# Patient Record
Sex: FEMALE | Race: WHITE | NOT HISPANIC OR LATINO
[De-identification: names, ages, dates, MRNs, and addresses within clinical notes are randomized per-mention and may not be internally consistent; named-entity substitution may affect disease eponyms.]

---

## 2021-03-18 ENCOUNTER — APPOINTMENT (OUTPATIENT)
Dept: NUCLEAR MEDICINE | Facility: CLINIC | Age: 56
End: 2021-03-18
Payer: COMMERCIAL

## 2021-03-18 ENCOUNTER — OUTPATIENT (OUTPATIENT)
Dept: OUTPATIENT SERVICES | Facility: HOSPITAL | Age: 56
LOS: 1 days | End: 2021-03-18

## 2021-03-18 ENCOUNTER — APPOINTMENT (OUTPATIENT)
Dept: GASTROENTEROLOGY | Facility: CLINIC | Age: 56
End: 2021-03-18
Payer: COMMERCIAL

## 2021-03-18 VITALS
HEART RATE: 70 BPM | SYSTOLIC BLOOD PRESSURE: 120 MMHG | DIASTOLIC BLOOD PRESSURE: 84 MMHG | OXYGEN SATURATION: 98 % | RESPIRATION RATE: 14 BRPM | HEIGHT: 62 IN | WEIGHT: 165 LBS | BODY MASS INDEX: 30.36 KG/M2 | TEMPERATURE: 97.6 F

## 2021-03-18 DIAGNOSIS — R25.1 TREMOR, UNSPECIFIED: ICD-10-CM

## 2021-03-18 PROCEDURE — 99072 ADDL SUPL MATRL&STAF TM PHE: CPT

## 2021-03-18 PROCEDURE — 99204 OFFICE O/P NEW MOD 45 MIN: CPT

## 2021-03-18 PROCEDURE — 78803 RP LOCLZJ TUM SPECT 1 AREA: CPT | Mod: 26

## 2021-03-18 NOTE — HISTORY OF PRESENT ILLNESS
[de-identified] : Patient recently with increasing constipation and sometimes goes for 5 days without moving her bowels.  She has been unresponsive to MiraLAX or fiber.  She has no nausea vomiting hematemesis melena hematochezia or weight loss.  Her mother had colon cancer and the patient's last colonoscopy was about 6 years ago.

## 2021-03-18 NOTE — ASSESSMENT
[FreeTextEntry1] : I discussed with the patient that I felt her constipation was functional but that we should treat her empirically with Zelnorm 6 mg twice a day to see if this helps ameliorate her symptoms.  In addition she does need a colonoscopy not only for the symptoms as as well as the fact that her last colonoscopy 6 years ago and her mother had colon cancer.  The indications benefits risks and alternatives were discussed with the patient is medically optimal for the planned procedure

## 2021-10-31 DIAGNOSIS — Z01.818 ENCOUNTER FOR OTHER PREPROCEDURAL EXAMINATION: ICD-10-CM

## 2021-11-01 ENCOUNTER — APPOINTMENT (OUTPATIENT)
Dept: DISASTER EMERGENCY | Facility: CLINIC | Age: 56
End: 2021-11-01

## 2021-11-02 LAB — SARS-COV-2 N GENE NPH QL NAA+PROBE: NOT DETECTED

## 2021-11-04 ENCOUNTER — APPOINTMENT (OUTPATIENT)
Dept: GASTROENTEROLOGY | Facility: GI CENTER | Age: 56
End: 2021-11-04
Payer: COMMERCIAL

## 2021-11-04 ENCOUNTER — OUTPATIENT (OUTPATIENT)
Dept: OUTPATIENT SERVICES | Facility: HOSPITAL | Age: 56
LOS: 1 days | End: 2021-11-04
Payer: COMMERCIAL

## 2021-11-04 DIAGNOSIS — Z80.0 FAMILY HISTORY OF MALIGNANT NEOPLASM OF DIGESTIVE ORGANS: ICD-10-CM

## 2021-11-04 PROCEDURE — 45378 DIAGNOSTIC COLONOSCOPY: CPT | Mod: PT

## 2021-11-04 PROCEDURE — 45378 DIAGNOSTIC COLONOSCOPY: CPT

## 2021-11-04 NOTE — PHYSICAL EXAM
[General Appearance - Alert] : alert [General Appearance - In No Acute Distress] : in no acute distress [Heart Rate And Rhythm] : heart rate was normal and rhythm regular [Heart Sounds] : normal S1 and S2 [Heart Sounds Gallop] : no gallops [Murmurs] : no murmurs [Heart Sounds Pericardial Friction Rub] : no pericardial rub [Bowel Sounds] : normal bowel sounds [Abdomen Soft] : soft [Abdomen Tenderness] : non-tender [] : no hepato-splenomegaly [Abdomen Mass (___ Cm)] : no abdominal mass palpated

## 2021-11-04 NOTE — REASON FOR VISIT
[Procedure: _________] : a [unfilled] procedure visit [FreeTextEntry1] : Chronic constipation, family history colon cancer

## 2022-04-13 ENCOUNTER — OUTPATIENT (OUTPATIENT)
Dept: OUTPATIENT SERVICES | Facility: HOSPITAL | Age: 57
LOS: 1 days | Discharge: HOME | End: 2022-04-13

## 2022-04-13 DIAGNOSIS — C91.50 ADULT T-CELL LYMPHOMA/LEUKEMIA (HTLV-1-ASSOCIATED) NOT HAVING ACHIEVED REMISSION: ICD-10-CM

## 2022-04-15 ENCOUNTER — APPOINTMENT (OUTPATIENT)
Dept: HEMATOLOGY ONCOLOGY | Facility: CLINIC | Age: 57
End: 2022-04-15
Payer: COMMERCIAL

## 2022-04-15 ENCOUNTER — RESULT REVIEW (OUTPATIENT)
Age: 57
End: 2022-04-15

## 2022-04-15 ENCOUNTER — TRANSCRIPTION ENCOUNTER (OUTPATIENT)
Age: 57
End: 2022-04-15

## 2022-04-15 VITALS
BODY MASS INDEX: 29.81 KG/M2 | OXYGEN SATURATION: 97 % | HEART RATE: 84 BPM | DIASTOLIC BLOOD PRESSURE: 78 MMHG | WEIGHT: 162 LBS | HEIGHT: 62 IN | SYSTOLIC BLOOD PRESSURE: 121 MMHG

## 2022-04-15 LAB
BASOPHILS # BLD AUTO: 0.1 K/UL — SIGNIFICANT CHANGE UP (ref 0–0.2)
BASOPHILS NFR BLD AUTO: 1.2 % — SIGNIFICANT CHANGE UP (ref 0–2)
EOSINOPHIL # BLD AUTO: 0.2 K/UL — SIGNIFICANT CHANGE UP (ref 0–0.5)
EOSINOPHIL NFR BLD AUTO: 3.7 % — SIGNIFICANT CHANGE UP (ref 0–6)
HCT VFR BLD CALC: 39.8 % — SIGNIFICANT CHANGE UP (ref 34.5–45)
HGB BLD-MCNC: 12.5 G/DL — SIGNIFICANT CHANGE UP (ref 11.5–15.5)
LYMPHOCYTES # BLD AUTO: 1.4 K/UL — SIGNIFICANT CHANGE UP (ref 1–3.3)
LYMPHOCYTES # BLD AUTO: 24.6 % — SIGNIFICANT CHANGE UP (ref 13–44)
MCHC RBC-ENTMCNC: 28.7 PG — SIGNIFICANT CHANGE UP (ref 27–34)
MCHC RBC-ENTMCNC: 31.5 G/DL — LOW (ref 32–36)
MCV RBC AUTO: 91.1 FL — SIGNIFICANT CHANGE UP (ref 80–100)
MONOCYTES # BLD AUTO: 0.6 K/UL — SIGNIFICANT CHANGE UP (ref 0–0.9)
MONOCYTES NFR BLD AUTO: 10.2 % — SIGNIFICANT CHANGE UP (ref 2–14)
NEUTROPHILS # BLD AUTO: 3.3 K/UL — SIGNIFICANT CHANGE UP (ref 1.8–7.4)
NEUTROPHILS NFR BLD AUTO: 60.4 % — SIGNIFICANT CHANGE UP (ref 43–77)
PLATELET # BLD AUTO: 328 K/UL — SIGNIFICANT CHANGE UP (ref 150–400)
RBC # BLD: 4.36 M/UL — SIGNIFICANT CHANGE UP (ref 3.8–5.2)
RBC # FLD: 12.4 % — SIGNIFICANT CHANGE UP (ref 10.3–14.5)
WBC # BLD: 5.5 K/UL — SIGNIFICANT CHANGE UP (ref 3.8–10.5)
WBC # FLD AUTO: 5.5 K/UL — SIGNIFICANT CHANGE UP (ref 3.8–10.5)

## 2022-04-15 PROCEDURE — 99205 OFFICE O/P NEW HI 60 MIN: CPT

## 2022-04-15 NOTE — ASSESSMENT
[FreeTextEntry1] : 57 year old female no significant past medical hx Patient presented on 3/4/22 c/o right axilla mass  to general surgeon, Dr. Amadeo Grace. Patient incidentally discovered it while shaving a few weeks ago.  \par Biopsy of right axilla performed on 3/22/22 revealing atypical lymphoid infiltrate ( NEGATIVE for a clonal lgH gene rearrangement/ Positive clonal TCR gamma gene rearrangement )\par \par Planned for lymph node excision with Dr. Grace 5/2/22; Patient hasn’t done pre surgical testing and wishes to see a surgeon in the [facility] system,\par \par -  Labs today with cbc/ cmp/ LDH/ Uric acid, Flow cytometry, tcell gene-arrangement\par - Some B symptoms, Night sweats since Jan 2022, fatigue, 6 lb weight loss\par - Order PET Scan \par - Will refer to dr. tovar for lymph node resection \par - If Pet with concerning widespread adenopathy will move excision up ASAP \par - f/u in 4-6 weeks

## 2022-04-15 NOTE — RESULTS/DATA
[FreeTextEntry1] : 3/22/22 Lymph Node, right axilla \par -Atypical lymphoid infiltrate\par - NEGATIVE for a clonal lgH gene rearrangement.\par - Positive clonal TCR gamma gene rearrangement

## 2022-04-15 NOTE — ADDENDUM
[FreeTextEntry1] : Documented by Shelly Suárez acting as scribe for Dr. Rendon on 04/15/2022 \par \par All Medical record entries made by the Scribe were at my, Dr. Rendon's, direction and personally dictated by me on 04/15/2022 . I have reviewed the chart and agree that the record accurately reflects my personal performance of the history, physical exam, assessment and plan. I have also personally directed, reviewed, and agreed with the discharge instructions.\par

## 2022-04-15 NOTE — REASON FOR VISIT
[Initial Consultation] : an initial consultation for [FreeTextEntry2] : atypical  lymphoid infiltrate on Axillary LN

## 2022-04-15 NOTE — HISTORY OF PRESENT ILLNESS
[de-identified] : 57 year old female with no significant past medical hx presents to office for initial visit.  Patient presented on 3/4/22 c/o right axilla mass  to general surgeon, Dr. Amadeo Grace. Patient incidentally discovered it while shavingin Jan 2022  \par Biopsy of right axilla performed on 3/22/22 revealing atypical lymphoid infiltrate ( NEGATIVE for a clonal lgH gene rearrangement/ Positive clonal TCR gamma gene rearrangement )\par \par Planned for lymph node excision with Dr. Grace\par Colonoscopy November 2021 [de-identified] : Patient presents for an initial consultation with \par \par Patient noticed small mass under armpit in January 2022. Denies pain and hasn’t grown in size.\par Breast node biopsy performed February 2022 was negative. Lymph node biopsy February 2022 .Lymph node biopsy repeated 3/22/22: Negative\par \par Planned for lymph node excision with Dr. Grace 5/2/22\par \par Denies fevers, chills, abdominal pain\par Reports night sweats started January 2022 3x a week, sweats through clothes has to change shirt, denies sweating through sheets\par Patient reports menopause ended September 2021, during menopause only had hot flashes, denies night sweats\par Reports appetite loss, does however stress eat occasionally \par Reports losing 6 lbs since February 2022, Ceased diet Dec 2021\par Reports fatigue, doesn’t have energy when she comes home and has to leave work early.\par \par Currently on linzess for constipation, baby aspirin 81 mg, Prozac 20mg, multivitamin \par \par 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

## 2022-04-18 ENCOUNTER — LABORATORY RESULT (OUTPATIENT)
Age: 57
End: 2022-04-18

## 2022-04-21 ENCOUNTER — APPOINTMENT (OUTPATIENT)
Dept: NUCLEAR MEDICINE | Facility: CLINIC | Age: 57
End: 2022-04-21

## 2022-04-21 ENCOUNTER — APPOINTMENT (OUTPATIENT)
Dept: NUCLEAR MEDICINE | Facility: CLINIC | Age: 57
End: 2022-04-21
Payer: COMMERCIAL

## 2022-04-21 ENCOUNTER — OUTPATIENT (OUTPATIENT)
Dept: OUTPATIENT SERVICES | Facility: HOSPITAL | Age: 57
LOS: 1 days | End: 2022-04-21

## 2022-04-21 DIAGNOSIS — R22.30 LOCALIZED SWELLING, MASS AND LUMP, UNSPECIFIED UPPER LIMB: ICD-10-CM

## 2022-04-21 PROCEDURE — 78815 PET IMAGE W/CT SKULL-THIGH: CPT | Mod: 26,PI

## 2022-04-22 ENCOUNTER — RESULT REVIEW (OUTPATIENT)
Age: 57
End: 2022-04-22

## 2022-04-25 ENCOUNTER — NON-APPOINTMENT (OUTPATIENT)
Age: 57
End: 2022-04-25

## 2022-04-28 ENCOUNTER — APPOINTMENT (OUTPATIENT)
Dept: SURGICAL ONCOLOGY | Facility: CLINIC | Age: 57
End: 2022-04-28
Payer: COMMERCIAL

## 2022-04-28 VITALS
HEART RATE: 73 BPM | DIASTOLIC BLOOD PRESSURE: 72 MMHG | BODY MASS INDEX: 30 KG/M2 | SYSTOLIC BLOOD PRESSURE: 110 MMHG | OXYGEN SATURATION: 98 % | HEIGHT: 62 IN | WEIGHT: 163 LBS

## 2022-04-28 PROCEDURE — 99205 OFFICE O/P NEW HI 60 MIN: CPT

## 2022-05-02 NOTE — PHYSICAL EXAM
[Normal] : supple, no neck mass and thyroid not enlarged [Normal Neck Lymph Nodes] : normal neck lymph nodes  [Normal Supraclavicular Lymph Nodes] : normal supraclavicular lymph nodes [Normal] : oriented to person, place and time, with appropriate affect [de-identified] : large right axillary mass/lymphadenopathy ; no left axillary lymphadenopathy

## 2022-05-02 NOTE — HISTORY OF PRESENT ILLNESS
[de-identified] : Ms. REESE SCHERER  is a 57 year  old female  presenting for an initial consultation for right axillary adenopathy, referred by Dr. Leslie.  \par \par The patient states she noticed right axillary fullness while shaving in 2022.  She states she didn't think much of it however she states she underwent a mammogram/sonogram at [facility] in 2022 which revealed a suspicious right axillary lymph node and biopsy was advised.  We do not have her breast imaging reports available today.  She is s/p right axillary lymph node US guided biopsy on 3/22/22 which revealed atypical lymphoid infiltrate.   Pt. states she has been experiencing fatigue and night sweats since 2022.  She also has had a 6 lb unintentional weight loss. \par \par Pt. was seen by Dr. Rendon and underwent extensive bloodwork and was referred for a PET CT.  PET completed on 22 showed FDG avid extensive right axillary lymphadenopathy and right supraclavicular lymph node.  DIfficult to delineate retroareolar right breast soft tissue, with low grade FDG avidity and minimally FDG avid diffuse, right breast cutaneous thickening. Please correlate with mammo/ultrasound if indicated. \par \par PMH: Anxiety (on Prozac), constipation/IBS (on Linzess).  On ASA 81 mg daily. 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 aunt with pancreatic cancer in 70's,  P. Grandfather with colon cancer (unknown age); M. Grandfather with unknown cancer. \par Pt. goes for routine colonoscopies due to family hx \par \par

## 2022-05-02 NOTE — REVIEW OF SYSTEMS
[Negative] : Endocrine [FreeTextEntry2] : fatigue, 6 lb weight loss [FreeTextEntry8] : IBS/constipation [de-identified] : anxiety  [FreeTextEntry1] : right axillary lymphadenopathy

## 2022-05-02 NOTE — CONSULT LETTER
[Dear  ___] : Dear  [unfilled], [Consult Letter:] : I had the pleasure of evaluating your patient, [unfilled]. [Please see my note below.] : Please see my note below. [Consult Closing:] : Thank you very much for allowing me to participate in the care of this patient.  If you have any questions, please do not hesitate to contact me. [Sincerely,] : Sincerely, [FreeTextEntry3] : Edward Preston MD, MPH, FACS, FSSO\par , [facility]\par [facility]\par Associate Professor of Surgery\par [employer] [facility]

## 2022-05-02 NOTE — ASSESSMENT
[FreeTextEntry1] : 56 y/o female with PMHx of anxiety, IBS/constipation who incidentally felt a palpable right axillary mass while shaving in 1/2022.  Pt. has been experiencing fatigue, weight loss and night sweats.  She states she underwent breast imaging 2/2022 which revealed right axillary lymphadenopathy. She is s/p right axillary US guided biopsy on 3/22/22 revealing atypical lymphoid infiltrate. PET completed on 4/21/22 showed FDG avid extensive right axillary lymphadenopathy and right supraclavicular lymph node.  DIfficult to delineate retroareolar right breast soft tissue, with low grade FDG avidity and minimally FDG avid diffuse, right breast cutaneous thickening. Please correlate with mammo/ultrasound if indicated. \par \par PLAN:\par Right axillary lymph node excision. We discussed the risks, benefits, and alternatives of the procedure with the patient, she expressed understanding and agree to proceed.

## 2022-05-03 LAB
ALBUMIN SERPL ELPH-MCNC: 4.7 G/DL
ALP BLD-CCNC: 90 U/L
ALT SERPL-CCNC: 34 U/L
ANION GAP SERPL CALC-SCNC: 11 MMOL/L
AST SERPL-CCNC: 29 U/L
BILIRUB SERPL-MCNC: 0.2 MG/DL
BUN SERPL-MCNC: 16 MG/DL
CALCIUM SERPL-MCNC: 10 MG/DL
CHLORIDE SERPL-SCNC: 102 MMOL/L
CO2 SERPL-SCNC: 26 MMOL/L
CREAT SERPL-MCNC: 0.68 MG/DL
EGFR: 103 ML/MIN/1.73M2
GLUCOSE SERPL-MCNC: 93 MG/DL
POTASSIUM SERPL-SCNC: 4.3 MMOL/L
PROT SERPL-MCNC: 8.1 G/DL
SODIUM SERPL-SCNC: 139 MMOL/L

## 2022-05-11 LAB — SURGICAL PATHOLOGY STUDY: SIGNIFICANT CHANGE UP

## 2022-05-12 ENCOUNTER — OUTPATIENT (OUTPATIENT)
Dept: OUTPATIENT SERVICES | Facility: HOSPITAL | Age: 57
LOS: 1 days | End: 2022-05-12
Payer: COMMERCIAL

## 2022-05-12 VITALS
HEART RATE: 90 BPM | SYSTOLIC BLOOD PRESSURE: 104 MMHG | TEMPERATURE: 98 F | WEIGHT: 162.04 LBS | DIASTOLIC BLOOD PRESSURE: 60 MMHG | HEIGHT: 62 IN | RESPIRATION RATE: 16 BRPM | OXYGEN SATURATION: 97 %

## 2022-05-12 DIAGNOSIS — R59.0 LOCALIZED ENLARGED LYMPH NODES: ICD-10-CM

## 2022-05-12 DIAGNOSIS — Z01.818 ENCOUNTER FOR OTHER PREPROCEDURAL EXAMINATION: ICD-10-CM

## 2022-05-12 DIAGNOSIS — Z29.9 ENCOUNTER FOR PROPHYLACTIC MEASURES, UNSPECIFIED: ICD-10-CM

## 2022-05-12 DIAGNOSIS — U07.1 COVID-19: ICD-10-CM

## 2022-05-12 LAB
ANION GAP SERPL CALC-SCNC: 13 MMOL/L — SIGNIFICANT CHANGE UP (ref 5–17)
BASOPHILS # BLD AUTO: 0.03 K/UL — SIGNIFICANT CHANGE UP (ref 0–0.2)
BASOPHILS NFR BLD AUTO: 0.5 % — SIGNIFICANT CHANGE UP (ref 0–2)
BUN SERPL-MCNC: 12.5 MG/DL — SIGNIFICANT CHANGE UP (ref 8–20)
CALCIUM SERPL-MCNC: 9.5 MG/DL — SIGNIFICANT CHANGE UP (ref 8.6–10.2)
CHLORIDE SERPL-SCNC: 100 MMOL/L — SIGNIFICANT CHANGE UP (ref 98–107)
CO2 SERPL-SCNC: 24 MMOL/L — SIGNIFICANT CHANGE UP (ref 22–29)
CREAT SERPL-MCNC: 0.61 MG/DL — SIGNIFICANT CHANGE UP (ref 0.5–1.3)
EGFR: 106 ML/MIN/1.73M2 — SIGNIFICANT CHANGE UP
EOSINOPHIL # BLD AUTO: 0.3 K/UL — SIGNIFICANT CHANGE UP (ref 0–0.5)
EOSINOPHIL NFR BLD AUTO: 5.1 % — SIGNIFICANT CHANGE UP (ref 0–6)
GLUCOSE SERPL-MCNC: 94 MG/DL — SIGNIFICANT CHANGE UP (ref 70–99)
HCT VFR BLD CALC: 37.6 % — SIGNIFICANT CHANGE UP (ref 34.5–45)
HGB BLD-MCNC: 12.1 G/DL — SIGNIFICANT CHANGE UP (ref 11.5–15.5)
IMM GRANULOCYTES NFR BLD AUTO: 0.2 % — SIGNIFICANT CHANGE UP (ref 0–1.5)
LYMPHOCYTES # BLD AUTO: 1.38 K/UL — SIGNIFICANT CHANGE UP (ref 1–3.3)
LYMPHOCYTES # BLD AUTO: 23.5 % — SIGNIFICANT CHANGE UP (ref 13–44)
MCHC RBC-ENTMCNC: 28.3 PG — SIGNIFICANT CHANGE UP (ref 27–34)
MCHC RBC-ENTMCNC: 32.2 GM/DL — SIGNIFICANT CHANGE UP (ref 32–36)
MCV RBC AUTO: 88.1 FL — SIGNIFICANT CHANGE UP (ref 80–100)
MONOCYTES # BLD AUTO: 0.41 K/UL — SIGNIFICANT CHANGE UP (ref 0–0.9)
MONOCYTES NFR BLD AUTO: 7 % — SIGNIFICANT CHANGE UP (ref 2–14)
NEUTROPHILS # BLD AUTO: 3.74 K/UL — SIGNIFICANT CHANGE UP (ref 1.8–7.4)
NEUTROPHILS NFR BLD AUTO: 63.7 % — SIGNIFICANT CHANGE UP (ref 43–77)
PLATELET # BLD AUTO: 320 K/UL — SIGNIFICANT CHANGE UP (ref 150–400)
POTASSIUM SERPL-MCNC: 4.3 MMOL/L — SIGNIFICANT CHANGE UP (ref 3.5–5.3)
POTASSIUM SERPL-SCNC: 4.3 MMOL/L — SIGNIFICANT CHANGE UP (ref 3.5–5.3)
RBC # BLD: 4.27 M/UL — SIGNIFICANT CHANGE UP (ref 3.8–5.2)
RBC # FLD: 13.4 % — SIGNIFICANT CHANGE UP (ref 10.3–14.5)
SARS-COV-2 RNA SPEC QL NAA+PROBE: SIGNIFICANT CHANGE UP
SODIUM SERPL-SCNC: 137 MMOL/L — SIGNIFICANT CHANGE UP (ref 135–145)
WBC # BLD: 5.87 K/UL — SIGNIFICANT CHANGE UP (ref 3.8–10.5)
WBC # FLD AUTO: 5.87 K/UL — SIGNIFICANT CHANGE UP (ref 3.8–10.5)

## 2022-05-12 PROCEDURE — 93010 ELECTROCARDIOGRAM REPORT: CPT

## 2022-05-12 PROCEDURE — G0463: CPT

## 2022-05-12 PROCEDURE — 85025 COMPLETE CBC W/AUTO DIFF WBC: CPT

## 2022-05-12 PROCEDURE — 93005 ELECTROCARDIOGRAM TRACING: CPT

## 2022-05-12 PROCEDURE — 36415 COLL VENOUS BLD VENIPUNCTURE: CPT

## 2022-05-12 PROCEDURE — U0005: CPT

## 2022-05-12 PROCEDURE — U0003: CPT

## 2022-05-12 PROCEDURE — 80048 BASIC METABOLIC PNL TOTAL CA: CPT

## 2022-05-12 NOTE — H&P PST ADULT - NSICDXPASTSURGICALHX_GEN_ALL_CORE_FT
PAST SURGICAL HISTORY:  H/O  section x2    H/O nasal polypectomy     History of appendectomy     History of cholecystectomy

## 2022-05-12 NOTE — H&P PST ADULT - NSICDXPASTMEDICALHX_GEN_ALL_CORE_FT
PAST MEDICAL HISTORY:  Anxiety      PAST MEDICAL HISTORY:  2019 novel coronavirus disease (COVID-19) 2/2022    Anxiety     Axillary adenopathy right    History of IBS

## 2022-05-12 NOTE — H&P PST ADULT - NSICDXFAMILYHX_GEN_ALL_CORE_FT
FAMILY HISTORY:  Father  Still living? Yes, Estimated age: Age Unknown  FH: heart disease, Age at diagnosis: Age Unknown  FHx: diabetes mellitus, Age at diagnosis: Age Unknown    Mother  Still living? No  Family hx of colon cancer, Age at diagnosis: Age Unknown  FH: dementia, Age at diagnosis: Age Unknown

## 2022-05-12 NOTE — H&P PST ADULT - NSANTHOSAYNRD_GEN_A_CORE
No. SHU screening performed.  STOP BANG Legend: 0-2 = LOW Risk; 3-4 = INTERMEDIATE Risk; 5-8 = HIGH Risk

## 2022-05-12 NOTE — H&P PST ADULT - SKIN/BREAST
[FreeTextEntry1] : Problem\par 1) Pelvic Mass\par \par Previous Therapy\par 1) MRI Pelvis 8/30/18\par    a) Large cystic mass within the pelvis 9.8x6.7x11.8cm, mass abutts the posterior uterine serosa and contacts both ovaries. The shape of this mass is along the confines of the peritoneal cavity without solid component.\par 2)Laparoscopic bilateral salpingo-oophorectomy removal of inclusion cyst 11/8/18\par    a)Portion of ovary with hemorrhagic cystic follicle\par    b)Fallopian tube without significant microscopic abnormalities \par    c)Portion of ovaries, exhibiting follicular cyst fibrotic wall, and an inclusion cyst \par    d)Adherent fragment of fallopian tube, unremarkable \par 3)US 2/26/19\par    a)Uterus is 5.9x3.9cm\par    b)Intramural fibroids x3 seen, largest 3.7x3.2x3.3cm \par    c)Endometrium 3mm\par    d)S/p BSO, and there is a 2.9x2.7x1.7cm cystic structure within the right adnexa\par    e)No free fluid seen in cul de sac 
details…

## 2022-05-12 NOTE — H&P PST ADULT - HISTORY OF PRESENT ILLNESS
57 yr old female presents with c/o enlarged right axillary lymph nodes. Pt felt enlargement in JAn 2022 while shaving denies any pain . pt went for mammo in 2/2022 with suspicious lymph node noted, biopsy done  3/22/22 with atypical lymphoid infiltrate noted.  PT states she has lost approx 8 lb, feels fatigued and has night sweats.  PET scan done 4/22 22  with FDG  Avid extensive axillary lymphadenopathy , met with DR. Rendon  and is having a node removed to determine treatment course.  57 yr old female with history of anxiety and IBS  presents with c/o enlarged right axillary lymph nodes. Pt felt enlargement in JAn 2022 while shaving denies any pain . pt went for mammo in 2/2022 with suspicious lymph node noted, biopsy done  3/22/22 with atypical lymphoid infiltrate noted.  PT states she has lost approx 8 lb, feels fatigued and has night sweats.  PET scan done 4/22 22  with FDG  Avid extensive axillary lymphadenopathy , met with DR. Rendon states she has t cell lymphoma   and is having a node removed to determine treatment course.

## 2022-05-12 NOTE — H&P PST ADULT - HISTORY OF COVID-19 VACCINATION
Enzo No APRN CNP Labossiere, Laura, RN    Caller: Unspecified (Today, 10:59 AM)               Could you please ask her if she is feeling any dizziness or lightheadedness?  Does she have access of blood pressure cuff?  I'm somewhat hesitant to increase dose without having vitals.  Also, could you ask her if she is still seeing her therapist?  If not we need to get her hooked up with another one     Thanks     Jon        Called patient and discussed the provider's recommendations.  She believes her father has a BP cuff. Pt agreed to take her BP for the next three days and will then call on Friday and provide those readings. Will then discuss dose increase.    Pt denies lightheadedness or dizziness at this time. She also confirmed that she still sees a therapist once/week and is on the wait list for DBT at the [facility].      Writer received incoming phone call from the pt with BP readings from the last few days. She provided the following information:    -7/15: 130/88, 115/84, and 123/85  -7/16: 131/75, 122/75, and 105/74  -7/17: 104/75, 107/77, and 93/73    Pt reports taking propranolol on 7/15 and 7/16, but has not taken a dose today. She reports not sleeping 7/14 and 7/15 as well, but did get some rest yesterday.    Agreed to relay the above the provider to see if he wants to make any adjustments to propranolol at this time and will then call her back.   Writer received incoming phone call from the pt. She informed this writer that she has been taking propranolol PRN for anxiety, but feels it has not been effective. Originally, she would take it at bedtime, but said it would keep her awake. She discussed with the provider who suggested taking it during the day PRN for anxiety. The pt has tried this randomly when her anxiety is heightened, but feels it's not effective. She said it will slow her down and make her want to lay down, but doesn't actually help with her anxiety.     The pt also wonders if it could cause a decrease in appetite and stomach/chest pain. Explained that this is unlikely. Pt uncertain when these symptoms began and agrees that it likely did not start with the propranolol. Although, she wonders if it's related to her anxiety.    Writer agreed to discuss all of the above with the provider and will then call her back with recommendations.     Pt confirmed that she is on the wait list for DBT and is looking forward to this. She's also getting approximately 4-5 hours of sleep/night.   Yes Abdominal pain

## 2022-05-12 NOTE — H&P PST ADULT - ASSESSMENT
57 yr old female presents with c/o enlarged right axillary lymph nodes. Pt felt enlargement in 2022 while shaving denies any pain . pt went for mammo in 2022 with suspicious lymph node noted, biopsy done  3/22/22 with atypical lymphoid infiltrate noted.  PT states she has lost approx 8 lb, feels fatigued and has night sweats.  PET scan done 22  with FDG  Avid extensive axillary lymphadenopathy , met with DR. Rendon states she has t cell lymphoma   and is having a node removed to determine treatment course.  pt was vaccinated x 2 with moderna  pcr done at [facility].  OPIOID RISK TOOL    JAMES EACH BOX THAT APPLIES AND ADD TOTALS AT THE END    FAMILY HISTORY OF SUBSTANCE ABUSE                 FEMALE         MALE                                                Alcohol                             [  ]1 pt          [  ]3pts                                               Illegal Durgs                     [  ]2 pts        [  ]3pts                                               Rx Drugs                           [  ]4 pts        [  ]4 pts    PERSONAL HISTORY OF SUBSTANCE ABUSE                                                                                          Alcohol                             [  ]3 pts       [  ]3 pts                                               Illegal Durgs                     [  ]4 pts        [  ]4 pts                                               Rx Drugs                           [  ]5 pts        [  ]5 pts    AGE BETWEEN 16-45 YEARS                                      [  ]1 pt         [  ]1 pt    HISTORY OF PREADOLESCENT   SEXUAL ABUSE                                                             [  ]3 pts        [  ]0pts    PSYCHOLOGICAL DISEASE                     ADD, OCD, Bipolar, Schizophrenia        [  ]2 pts         [  ]2 pts                      Depression                                               [  ]1 pt           [  ]1 pt           SCORING TOTAL   (add numbers and type here)              (*0**)                                     A score of 3 or lower indicated LOW risk for future opiod abuse  A score of 4 to 7 indicated moderate risk for future opiod abuse  A score of 8 or higher indicates a high risk for opiod abuse  CAPRINI VTE 2.0 SCORE [CLOT updated 2019]    AGE RELATED RISK FACTORS                                                       MOBILITY RELATED FACTORS  [X ] Age 41-60 years                                            (1 Point)                    [ ] Bed rest                                                        (1 Point)  [ ] Age: 61-74 years                                           (2 Points)                  [ ] Plaster cast                                                   (2 Points)  [ ] Age= 75 years                                              (3 Points)                    [ ] Bed bound for more than 72 hours                 (2 Points)    DISEASE RELATED RISK FACTORS                                               GENDER SPECIFIC FACTORS  [ ] Edema in the lower extremities                       (1 Point)              [ ] Pregnancy                                                     (1 Point)  [ ] Varicose veins                                               (1 Point)                     [ ] Post-partum < 6 weeks                                   (1 Point)             [ X] BMI > 25 Kg/m2                                            (1 Point)                     [ ] Hormonal therapy  or oral contraception          (1 Point)                 [ ] Sepsis (in the previous month)                        (1 Point)               [ ] History of pregnancy complications                 (1 point)  [ ] Pneumonia or serious lung disease                                               [ ] Unexplained or recurrent                     (1 Point)           (in the previous month)                               (1 Point)  [ ] Abnormal pulmonary function test                     (1 Point)                 SURGERY RELATED RISK FACTORS  [ ] Acute myocardial infarction                              (1 Point)               [ ]  Section                                             (1 Point)  [ ] Congestive heart failure (in the previous month)  (1 Point)      [ ] Minor surgery                                                  (1 Point)   [ ] Inflammatory bowel disease                             (1 Point)               [ ] Arthroscopic surgery                                        (2 Points)  [ ] Central venous access                                      (2 Points)                [ x] General surgery lasting more than 45 minutes (2 points)  [X ] Malignancy- Present or previous                   (2 Points)                [ ] Elective arthroplasty                                         (5 points)    [ ] Stroke (in the previous month)                          (5 Points)                                                                                                                                                           HEMATOLOGY RELATED FACTORS                                                 TRAUMA RELATED RISK FACTORS  [ ] Prior episodes of VTE                                     (3 Points)                [ ] Fracture of the hip, pelvis, or leg                       (5 Points)  [ ] Positive family history for VTE                         (3 Points)             [ ] Acute spinal cord injury (in the previous month)  (5 Points)  [ ] Prothrombin 13178 A                                     (3 Points)               [ ] Paralysis  (less than 1 month)                             (5 Points)  [ ] Factor V Leiden                                             (3 Points)                  [ ] Multiple Trauma within 1 month                        (5 Points)  [ ] Lupus anticoagulants                                     (3 Points)                                                           [ ] Anticardiolipin antibodies                               (3 Points)                                                       [ ] High homocysteine in the blood                      (3 Points)                                             [ ] Other congenital or acquired thrombophilia      (3 Points)                                                [ ] Heparin induced thrombocytopenia                  (3 Points)                                     Total Score [       6   ]

## 2022-05-13 NOTE — ASU PATIENT PROFILE, ADULT - NSICDXPASTMEDICALHX_GEN_ALL_CORE_FT
PAST MEDICAL HISTORY:  2019 novel coronavirus disease (COVID-19) 2/2022    Anxiety     Axillary adenopathy right    History of IBS

## 2022-05-15 ENCOUNTER — TRANSCRIPTION ENCOUNTER (OUTPATIENT)
Age: 57
End: 2022-05-15

## 2022-05-16 ENCOUNTER — OUTPATIENT (OUTPATIENT)
Dept: OUTPATIENT SERVICES | Facility: HOSPITAL | Age: 57
LOS: 1 days | End: 2022-05-16
Payer: COMMERCIAL

## 2022-05-16 ENCOUNTER — TRANSCRIPTION ENCOUNTER (OUTPATIENT)
Age: 57
End: 2022-05-16

## 2022-05-16 ENCOUNTER — RESULT REVIEW (OUTPATIENT)
Age: 57
End: 2022-05-16

## 2022-05-16 ENCOUNTER — APPOINTMENT (OUTPATIENT)
Dept: SURGICAL ONCOLOGY | Facility: HOSPITAL | Age: 57
End: 2022-05-16

## 2022-05-16 VITALS
HEIGHT: 62 IN | DIASTOLIC BLOOD PRESSURE: 68 MMHG | HEART RATE: 78 BPM | SYSTOLIC BLOOD PRESSURE: 114 MMHG | TEMPERATURE: 99 F | RESPIRATION RATE: 18 BRPM | WEIGHT: 162.04 LBS | OXYGEN SATURATION: 100 %

## 2022-05-16 VITALS
DIASTOLIC BLOOD PRESSURE: 62 MMHG | RESPIRATION RATE: 18 BRPM | SYSTOLIC BLOOD PRESSURE: 106 MMHG | HEART RATE: 69 BPM | TEMPERATURE: 98 F | OXYGEN SATURATION: 100 %

## 2022-05-16 DIAGNOSIS — R59.0 LOCALIZED ENLARGED LYMPH NODES: ICD-10-CM

## 2022-05-16 PROCEDURE — 81264 IGK REARRANGEABN CLONAL POP: CPT

## 2022-05-16 PROCEDURE — 81261 IGH GENE REARRANGE AMP METH: CPT

## 2022-05-16 PROCEDURE — 81342 TRG GENE REARRANGEMENT ANAL: CPT

## 2022-05-16 PROCEDURE — 88305 TISSUE EXAM BY PATHOLOGIST: CPT | Mod: 26

## 2022-05-16 PROCEDURE — 88341 IMHCHEM/IMCYTCHM EA ADD ANTB: CPT | Mod: 26,59

## 2022-05-16 PROCEDURE — 38525 BIOPSY/REMOVAL LYMPH NODES: CPT | Mod: RT

## 2022-05-16 PROCEDURE — 81340 TRB@ GENE REARRANGE AMPLIFY: CPT

## 2022-05-16 PROCEDURE — 88365 INSITU HYBRIDIZATION (FISH): CPT | Mod: 26

## 2022-05-16 PROCEDURE — 88364 INSITU HYBRIDIZATION (FISH): CPT | Mod: 26

## 2022-05-16 PROCEDURE — 88360 TUMOR IMMUNOHISTOCHEM/MANUAL: CPT | Mod: 26

## 2022-05-16 PROCEDURE — 88185 FLOWCYTOMETRY/TC ADD-ON: CPT

## 2022-05-16 PROCEDURE — 87205 SMEAR GRAM STAIN: CPT

## 2022-05-16 PROCEDURE — C1889: CPT

## 2022-05-16 PROCEDURE — 88342 IMHCHEM/IMCYTCHM 1ST ANTB: CPT | Mod: 26,59

## 2022-05-16 PROCEDURE — 88333 PATH CONSLTJ SURG CYTO XM 1: CPT | Mod: 26

## 2022-05-16 DEVICE — CLIP APPLIER COVIDIEN SURGICLIP 11.5" MEDIUM: Type: IMPLANTABLE DEVICE | Status: FUNCTIONAL

## 2022-05-16 RX ORDER — ONDANSETRON 8 MG/1
4 TABLET, FILM COATED ORAL ONCE
Refills: 0 | Status: COMPLETED | OUTPATIENT
Start: 2022-05-16 | End: 2022-05-16

## 2022-05-16 RX ORDER — HYDROMORPHONE HYDROCHLORIDE 2 MG/ML
0.5 INJECTION INTRAMUSCULAR; INTRAVENOUS; SUBCUTANEOUS
Refills: 0 | Status: DISCONTINUED | OUTPATIENT
Start: 2022-05-16 | End: 2022-05-16

## 2022-05-16 RX ORDER — FENTANYL CITRATE 50 UG/ML
25 INJECTION INTRAVENOUS
Refills: 0 | Status: DISCONTINUED | OUTPATIENT
Start: 2022-05-16 | End: 2022-05-16

## 2022-05-16 RX ORDER — SODIUM CHLORIDE 9 MG/ML
1000 INJECTION, SOLUTION INTRAVENOUS
Refills: 0 | Status: DISCONTINUED | OUTPATIENT
Start: 2022-05-16 | End: 2022-05-16

## 2022-05-16 RX ADMIN — ONDANSETRON 4 MILLIGRAM(S): 8 TABLET, FILM COATED ORAL at 16:15

## 2022-05-16 NOTE — BRIEF OPERATIVE NOTE - OPERATION/FINDINGS
Upon entry to clavipectoral fascia, patient noted to have a several large palpable nodes (Level I) .Single ALN dissected. Hemostasis achieved via clips and electrocautery. Adequate biopsy confirmed with intra-op frozen section. Clavipectoral fascia re-approximated. Closure of deep dermal and skin layer. No evidence of hematoma.

## 2022-05-16 NOTE — ASU DISCHARGE PLAN (ADULT/PEDIATRIC) - NS MD DC FALL RISK RISK
For information on Fall & Injury Prevention, visit: [email or web address] OR  [email or web address] OR  https://www.cdc.gov/steadi/patient.html

## 2022-05-16 NOTE — ASU DISCHARGE PLAN (ADULT/PEDIATRIC) - ASU DC SPECIAL INSTRUCTIONSFT
No creams, lotions, powders  or ointments to incision site.   You were given Tylenol for pain management.  Please DO NOT take tylenol or products that contain tylenol for the next 6-8 hours (until 9:30pm ). Please do not exceed 4000mg in 24hours.

## 2022-05-16 NOTE — ASU DISCHARGE PLAN (ADULT/PEDIATRIC) - CARE PROVIDER_API CALL
Musa Mariano (MD)  Surgery  [address] [place] [address]  Phone: [phone]  Fax: [phone]  Follow Up Time: 2 weeks

## 2022-05-17 PROBLEM — R59.0 LOCALIZED ENLARGED LYMPH NODES: Chronic | Status: ACTIVE | Noted: 2022-05-12

## 2022-05-17 PROBLEM — Z87.19 PERSONAL HISTORY OF OTHER DISEASES OF THE DIGESTIVE SYSTEM: Chronic | Status: ACTIVE | Noted: 2022-05-12

## 2022-05-17 PROBLEM — U07.1 COVID-19: Chronic | Status: ACTIVE | Noted: 2022-05-12

## 2022-05-19 LAB
LDH SERPL-CCNC: 234 U/L
URATE SERPL-MCNC: 3.6 MG/DL

## 2022-05-26 ENCOUNTER — APPOINTMENT (OUTPATIENT)
Dept: MRI IMAGING | Facility: CLINIC | Age: 57
End: 2022-05-26
Payer: COMMERCIAL

## 2022-05-26 ENCOUNTER — OUTPATIENT (OUTPATIENT)
Dept: OUTPATIENT SERVICES | Facility: HOSPITAL | Age: 57
LOS: 1 days | End: 2022-05-26

## 2022-05-26 ENCOUNTER — APPOINTMENT (OUTPATIENT)
Dept: SURGICAL ONCOLOGY | Facility: CLINIC | Age: 57
End: 2022-05-26
Payer: COMMERCIAL

## 2022-05-26 DIAGNOSIS — R22.0 LOCALIZED SWELLING, MASS AND LUMP, HEAD: ICD-10-CM

## 2022-05-26 PROCEDURE — 99024 POSTOP FOLLOW-UP VISIT: CPT

## 2022-05-26 PROCEDURE — 77049 MRI BREAST C-+ W/CAD BI: CPT | Mod: 26

## 2022-05-29 LAB — TM INTERPRETATION: SIGNIFICANT CHANGE UP

## 2022-05-31 NOTE — REASON FOR VISIT
[de-identified] : right axillary lymph node excisional biopsy [de-identified] : 5/16 [de-identified] : 10 [Spouse] : spouse

## 2022-05-31 NOTE — PHYSICAL EXAM
[Normal] : supple, no neck mass and thyroid not enlarged [Normal] : oriented to person, place and time, with appropriate affect [de-identified] : right axillary incision clean, dry, intact, no induration or fluctuance.

## 2022-05-31 NOTE — ASSESSMENT
[FreeTextEntry1] : 57 year old woman with newly diagnosed lymphoma, awaiting final node biopsy results. Wound-healing appropriate. She will follow up at [facility] with Dr. Rendon for treatment was we have final pathology.

## 2022-05-31 NOTE — HISTORY OF PRESENT ILLNESS
[FreeTextEntry1] : Ms. Johnston presents for post-op visit 10 days following excisional biopsy of pronounced right axillary adenopathy after a needle biopsy was suggestive of T-cell lymphoma, but tissue volume was insufficient. \par \par She denies any wound-healing issues, paresthesias, drainage, fevers, swelling. Final pathology has not yet resulted.

## 2022-06-01 ENCOUNTER — OUTPATIENT (OUTPATIENT)
Dept: OUTPATIENT SERVICES | Facility: HOSPITAL | Age: 57
LOS: 1 days | Discharge: HOME | End: 2022-06-01

## 2022-06-01 DIAGNOSIS — C91.50 ADULT T-CELL LYMPHOMA/LEUKEMIA (HTLV-1-ASSOCIATED) NOT HAVING ACHIEVED REMISSION: ICD-10-CM

## 2022-06-02 LAB
DNA PLOIDY SPEC FC-IMP: SIGNIFICANT CHANGE UP
DNA PLOIDY SPEC FC-IMP: SIGNIFICANT CHANGE UP
SURGICAL PATHOLOGY STUDY: SIGNIFICANT CHANGE UP

## 2022-06-06 ENCOUNTER — RESULT REVIEW (OUTPATIENT)
Age: 57
End: 2022-06-06

## 2022-06-06 ENCOUNTER — APPOINTMENT (OUTPATIENT)
Dept: SURGICAL ONCOLOGY | Facility: HOSPITAL | Age: 57
End: 2022-06-06

## 2022-06-06 ENCOUNTER — APPOINTMENT (OUTPATIENT)
Dept: HEMATOLOGY ONCOLOGY | Facility: CLINIC | Age: 57
End: 2022-06-06

## 2022-06-06 ENCOUNTER — LABORATORY RESULT (OUTPATIENT)
Age: 57
End: 2022-06-06

## 2022-06-06 ENCOUNTER — APPOINTMENT (OUTPATIENT)
Dept: HEMATOLOGY ONCOLOGY | Facility: CLINIC | Age: 57
End: 2022-06-06
Payer: COMMERCIAL

## 2022-06-06 VITALS
SYSTOLIC BLOOD PRESSURE: 107 MMHG | BODY MASS INDEX: 30 KG/M2 | HEIGHT: 62 IN | WEIGHT: 163 LBS | OXYGEN SATURATION: 100 % | HEART RATE: 72 BPM | DIASTOLIC BLOOD PRESSURE: 75 MMHG

## 2022-06-06 LAB
BASOPHILS # BLD AUTO: 0.1 K/UL — SIGNIFICANT CHANGE UP (ref 0–0.2)
BASOPHILS NFR BLD AUTO: 0.7 % — SIGNIFICANT CHANGE UP (ref 0–2)
EOSINOPHIL # BLD AUTO: 0.4 K/UL — SIGNIFICANT CHANGE UP (ref 0–0.5)
EOSINOPHIL NFR BLD AUTO: 6 % — SIGNIFICANT CHANGE UP (ref 0–6)
HCT VFR BLD CALC: 40.1 % — SIGNIFICANT CHANGE UP (ref 34.5–45)
HGB BLD-MCNC: 12.7 G/DL — SIGNIFICANT CHANGE UP (ref 11.5–15.5)
LYMPHOCYTES # BLD AUTO: 1.7 K/UL — SIGNIFICANT CHANGE UP (ref 1–3.3)
LYMPHOCYTES # BLD AUTO: 23.9 % — SIGNIFICANT CHANGE UP (ref 13–44)
MCHC RBC-ENTMCNC: 28.2 PG — SIGNIFICANT CHANGE UP (ref 27–34)
MCHC RBC-ENTMCNC: 31.7 G/DL — LOW (ref 32–36)
MCV RBC AUTO: 88.8 FL — SIGNIFICANT CHANGE UP (ref 80–100)
MONOCYTES # BLD AUTO: 0.5 K/UL — SIGNIFICANT CHANGE UP (ref 0–0.9)
MONOCYTES NFR BLD AUTO: 6.8 % — SIGNIFICANT CHANGE UP (ref 2–14)
NEUTROPHILS # BLD AUTO: 4.6 K/UL — SIGNIFICANT CHANGE UP (ref 1.8–7.4)
NEUTROPHILS NFR BLD AUTO: 62.6 % — SIGNIFICANT CHANGE UP (ref 43–77)
PLATELET # BLD AUTO: 375 K/UL — SIGNIFICANT CHANGE UP (ref 150–400)
RBC # BLD: 4.52 M/UL — SIGNIFICANT CHANGE UP (ref 3.8–5.2)
RBC # FLD: 12.6 % — SIGNIFICANT CHANGE UP (ref 10.3–14.5)
WBC # BLD: 7.3 K/UL — SIGNIFICANT CHANGE UP (ref 3.8–10.5)
WBC # FLD AUTO: 7.3 K/UL — SIGNIFICANT CHANGE UP (ref 3.8–10.5)

## 2022-06-06 PROCEDURE — 99215 OFFICE O/P EST HI 40 MIN: CPT

## 2022-06-07 ENCOUNTER — RESULT REVIEW (OUTPATIENT)
Age: 57
End: 2022-06-07

## 2022-06-07 LAB
ALBUMIN SERPL ELPH-MCNC: 4.9 G/DL
ALP BLD-CCNC: 92 U/L
ALT SERPL-CCNC: 22 U/L
ANION GAP SERPL CALC-SCNC: 12 MMOL/L
AST SERPL-CCNC: 23 U/L
BILIRUB SERPL-MCNC: 0.5 MG/DL
BUN SERPL-MCNC: 12 MG/DL
CALCIUM SERPL-MCNC: 10.4 MG/DL
CHLORIDE SERPL-SCNC: 102 MMOL/L
CO2 SERPL-SCNC: 26 MMOL/L
CREAT SERPL-MCNC: 0.6 MG/DL
EGFR: 106 ML/MIN/1.73M2
GLUCOSE SERPL-MCNC: 90 MG/DL
HBV CORE IGG+IGM SER QL: NONREACTIVE
HBV SURFACE AB SER QL: REACTIVE
HBV SURFACE AG SER QL: NONREACTIVE
HCV AB SER QL: NONREACTIVE
HCV S/CO RATIO: 0.14 S/CO
INR PPP: 1.06 RATIO
POTASSIUM SERPL-SCNC: 4.8 MMOL/L
PROT SERPL-MCNC: 8.3 G/DL
PT BLD: 12.5 SEC
SODIUM SERPL-SCNC: 140 MMOL/L

## 2022-06-07 NOTE — RESULTS/DATA
[FreeTextEntry1] : \par 5/26/22: MRI BREAST: No MRI evidence of malignancy, left breast.\par Right axillary adenopathy. Skin thickening and parenchymal edema in the right breast which was seen on PET/CT is consistent with lymphatic obstruction.\par There is a benign-appearing circumscribed mass in the right central breast for which six-month follow-up MRI is recommended to document stability.\par RECOMMENDATION:  MRI in 6 months.\par BI-RADS 3-Probably Benign Finding(s)\par \par \par 5/16/22: Er Axillary Excisional Biopsy   Peripheral T- Cell Lymphoma, NOS\par - By   immunohistochemical  stains the atypical cells are positive for CD4, BCL-6 (weak/partial), PD-1 and BCL2 (dim) with high proliferation index, suggestive of T   follicular  helper immunophenotype.\par CD 30-veTest Performed: T Cell Gene Rearrangement\par RESULTS:\par TCR-beta: NEGATIVE\par TCR-gamma: INDETERMINATE\par \par \par \par \par 3/22/22 Lymph Node, right axilla \par -Atypical lymphoid infiltrate\par - NEGATIVE for a clonal lgH gene rearrangement.\par - Positive clonal TCR gamma gene rearrangement

## 2022-06-07 NOTE — REASON FOR VISIT
[Follow-Up Visit] : a follow-up visit for [FreeTextEntry2] : atypical  lymphoid infiltrate on Axillary LN

## 2022-06-07 NOTE — ASSESSMENT
[FreeTextEntry1] : 57 year old female no significant past medical hx now seen for Peripheral T cell Lymphoma NOS\par \par Patient presented on 3/4/22 c/o right axilla mass  to general surgeon, Dr. Amadeo Grace. Patient incidentally discovered it while shaving in Jan / Feb 2022 \par Biopsy of right axilla performed on 3/22/22 revealing atypical lymphoid infiltrate ( NEGATIVE for a clonal lgH gene rearrangement/ Positive clonal TCR gamma gene rearrangement )\par \par On review of needle biopsy from 3/22/22 at [facility], suggestive of T cell lymphoma\par \par S/p right axillary lymph node excisional biopsy on 5/16/22  per DR Mariano Path c/w Peripheral T- Cell Lymphoma, NOS\par \par PET SCAN 4/21/22: \par - 1. FDG avid extensive right axillary lymphadenopathy 5.4 x 3.6 cm , SUV 22.5. and right supraclavicular lymph node.0.8 x 0.8 cm (image 53), SUV 5.8.\par 2. Difficult to delineate retroareolar right breast soft tissue, with low-grade FDG avidity and minimally FDG avid diffuse, right breast cutaneous thickening. \par \par Breast node biopsy performed February 2022 was negative. \par MRI BREAST 5/26/22: BiRADS 3 , f/u in 6 months\par \par PERIPHERAL T CELL LYMPHOMA NOS Stage 1\par IPI SCORE: 0 LOW RISK \par - Some B symptoms, Night sweats since Jan 2022, fatigue, 6 lb weight loss\par - Discussed with patient Chemotherapty with CHOEP WITH NEULASTA Support \par -D1 :  Cyclophosphamide 750 mg /m2, Adriamycin 50 mg/ m2 Vincristone 1.4mg/ m2, Etoposide 10mg/ m2 IV \par D2- D3 Etoposide 100mg/m2  IV Hydration \par D1-D5 Prednisone 10mg po \par Allopurinol 100mg daily for TLS prophylaxis\par   Discussed ae of chemotherapy including cardiac dysfunction/ BM supresson/ N/ v/  Diarrhea\par - POrt placement \par - ECHO Heart ( cardiologist Evens Barry) \par - Given that she notes a new LN in rt groin and it is 6 weeks since prior pET scan will repeat pet scan prior to starting treatment\par - WIll order Restaging PET Scan in 2-3 cycles \par - Will refer to Dr Wilkerson for transplant in 1st remission \par - F/u with jacqueline in 3 weeks \par \par \par MRI BREAST: to be repeated in Nov 2022 \par \par

## 2022-06-07 NOTE — HISTORY OF PRESENT ILLNESS
[de-identified] : 57 year old female with no significant past medical hx presents to office for initial visit.  Patient presented on 3/4/22 c/o right axilla mass  to general surgeon, Dr. Amadeo Grace. Patient incidentally discovered it while shavingin Jan 2022  \par Biopsy of right axilla performed on 3/22/22 revealing atypical lymphoid infiltrate ( NEGATIVE for a clonal lgH gene rearrangement/ Positive clonal TCR gamma gene rearrangement )\par \par Planned for lymph node excision with Dr. Grace however patient decided to stay in the [facility] and had Excisional Biopsy per Dr Mariano \par Colonoscopy November 2021 [de-identified] : Patient presents for follow up \par Since last visit LN excional BIopsy was delayed 2/2 to her travelling and pre surg testing.\par On review of needle biopsy from 3/22/22 at [facility], suggestive of T cell lymphoma\par \par S/p right axillary lymph node excisional biopsy on 5/16/22  per DR Mariano Path c/w Peripheral T- Cell Lymphoma, NOS\par PET SCAN 4/21/22: \par - 1. FDG avid extensive right axillary lymphadenopathy 5.4 x 3.6 cm , SUV 22.5. and right supraclavicular lymph node.0.8 x 0.8 cm (image 53), SUV 5.8.\par 2. Difficult to delineate retroareolar right breast soft tissue, with low-grade FDG avidity and minimally FDG avid diffuse, right breast cutaneous thickening. \par \par Breast node biopsy performed February 2022 was negative. \par MRI Breast on 5/26/22 with benign findings repeat in 6 months\par \par She reports fatigue/ Some night sweats \par Denies any increase in size of Axillary LN however notes a new right inguinal LN \par \par \par Currently on linzess for constipation, baby aspirin 81 mg, Prozac 20mg, multivitamin \par \par \par

## 2022-06-08 ENCOUNTER — NON-APPOINTMENT (OUTPATIENT)
Age: 57
End: 2022-06-08

## 2022-06-08 ENCOUNTER — APPOINTMENT (OUTPATIENT)
Dept: INTERVENTIONAL RADIOLOGY/VASCULAR | Facility: CLINIC | Age: 57
End: 2022-06-08
Payer: COMMERCIAL

## 2022-06-08 ENCOUNTER — OUTPATIENT (OUTPATIENT)
Dept: OUTPATIENT SERVICES | Facility: HOSPITAL | Age: 57
LOS: 1 days | End: 2022-06-08

## 2022-06-08 VITALS
HEART RATE: 76 BPM | RESPIRATION RATE: 16 BRPM | OXYGEN SATURATION: 100 % | SYSTOLIC BLOOD PRESSURE: 120 MMHG | DIASTOLIC BLOOD PRESSURE: 75 MMHG | TEMPERATURE: 98 F

## 2022-06-08 DIAGNOSIS — C85.90 NON-HODGKIN LYMPHOMA, UNSPECIFIED, UNSPECIFIED SITE: ICD-10-CM

## 2022-06-08 PROCEDURE — 76937 US GUIDE VASCULAR ACCESS: CPT | Mod: 26

## 2022-06-08 PROCEDURE — 36561 INSERT TUNNELED CV CATH: CPT

## 2022-06-08 PROCEDURE — 77001 FLUOROGUIDE FOR VEIN DEVICE: CPT | Mod: 26

## 2022-06-08 NOTE — HISTORY OF PRESENT ILLNESS
[FreeTextEntry1] : PRE CALL PRIOR TO APPOINTMENT: \par \par Phone Number: \par \par Diagnosis: T-cell lymphoma \par \par COVID-19 SWAB: went 6/6\par  \par RX on file: yes\par \par Referring MD: Mariama\par \par Appointment date:  06/08/2022\par \par Currently on Chemotherapy:  NO     \par \par Diabetic: NO\par \par Clotting or Bleeding disorders: NO\par \par PPM / Defibrillator: NO\par \par NPO status advised: YES\par \par History of fall: NO\par \par Assistant device for walking: NO\par \par  home:  [place] [phone]\par \par Blood Thinners: NO \par \par Prescribing MD agree to have medication held: n/a\par \par Capacity to make decisions: yes\par \par HCP: no\par \par DNR: no\par \par Person contact for Pre-Call: patient \par \par \par \par \par \par Guidelines for Screening Anesthesia / Sedation Cases	(TYPE YES OR NO) \par  \par Obtain Prescription / Authorization from Referring Physician: YES	\par \par Medical Necessity (Justification of the need for Anesthesia / Sedation) from referring Physician: YES	\par \par Have you taken oral sedation? (e.g. Xanax, Valium, and other oral sedatives):  yes 	\par \par Clearance to receive Anesthesia / Sedation: YES- BY Dr. Hauser 	\par \par \par \par ANESTHESIA EXCLUSION CRITERIA QUESTIONNAIRE:	\par 	\par Difficult Airway: (TYPE YES OR NO) 	\par          \par Previous Problem with Anesthesia or sedation: NO	\par \par History of Difficult Intubation: NO	\par \par Stridor, Snoring, Sleep Apnea: NO	\par \par Tonsils / Adenoids present: NO	\par \par Dysmorphic Facial Features: NO	\par \par Cervical Spine Fusion/ Cervical Spine Surgery: NO	\par \par Tumor in Airway: NO	\par \par Trauma to Airway: NO	\par \par Radiation therapy to head / neck: NO	\par \par Advanced Rheumatoid ArthritiS: NO	\par \par Active Cardiac Ischemia (as defined by EKG or Laboratory  Examination): NO	\par \par  Severe COPD / Home O2: NO	\par \par  Known or Suspected Increased Intracranial pressure: NO	\par \par Patient with BMI of 40 or greater : NO    Weight (kgs.) __163 lb___ Height (ft.) _5'2''_____       BMI_29.81______	 	\par  	\par Patients with Increased Risk of Aspiration: (TYPE YES OR NO) 		\par \par Abnormal Airway: NO	\par \par Hiatal Hernia with Reflux: NO	\par           \par Pregnancy: NO	 	\par           \par Altered Mental State: NO 	 	\par           \par Spinal Cord Injury with Paraplegia or Quadriplegia: NO	 	\par        \par History of delayed Gastric Emptying: NO 	 	\par          \par  ASA Physical Status of 3 or greater (See Appendix 1 from policy ANSL.5502) 	 	\par  	\par \par Malignant Hyperthermia Screening: (TYPE YES OR NO) 	\par           \par Family history unexpected death following anesthesia or exercise: NO	\par          \par  Family or personal history of Malignant Hyperthermia: NO  	\par          \par  A muscle or neuromuscular disorder: NO \par \par Malignant Hyperthermia Screening\par \par Does the patient have a history of any of the following (Type YES or NO)\par \par Malignant hyperthermia: NO\par \par A muscle or neuromuscular disorder: NO \par \par \par Personal history of: (Type YES or NO)\par \par Muscle spasm: NO\par \par Dark or chocolate-colored urine: NO\par \par Unanticipated fever immediately following anesthesia or exercise: NO	\par \par \par \par Pam -Operative Assessment (Day of Procedure) \par \par Procedure: mediport insertion\par \par Indication: t-cell lymphoma \par \par NPO status: NPO since 6/7/22\par \par Accompanied by:  Carl [phone]\par \par Falls risk: no\par \par IVL: 20g Left hand\par \par IR MD: Dr. Grady Winn \par \par Urine Pregnancy: n/a\par \par Pre-Op instructions: yes\par \par POST-OP teaching initiated: yes\par \par Allergy bracelet on: yes\par \par Anesthesia plan discussed with IR MD: yes\par \par Antibiotic given: clindamycin\par

## 2022-06-09 LAB — APTT BLD: 27.3 SEC

## 2022-06-13 ENCOUNTER — APPOINTMENT (OUTPATIENT)
Dept: NUCLEAR MEDICINE | Facility: CLINIC | Age: 57
End: 2022-06-13
Payer: COMMERCIAL

## 2022-06-13 ENCOUNTER — OUTPATIENT (OUTPATIENT)
Dept: OUTPATIENT SERVICES | Facility: HOSPITAL | Age: 57
LOS: 1 days | End: 2022-06-13

## 2022-06-13 DIAGNOSIS — C85.90 NON-HODGKIN LYMPHOMA, UNSPECIFIED, UNSPECIFIED SITE: ICD-10-CM

## 2022-06-13 PROCEDURE — 78815 PET IMAGE W/CT SKULL-THIGH: CPT | Mod: 26,PI

## 2022-06-14 ENCOUNTER — APPOINTMENT (OUTPATIENT)
Dept: CARDIOLOGY | Facility: CLINIC | Age: 57
End: 2022-06-14
Payer: COMMERCIAL

## 2022-06-14 PROCEDURE — 93306 TTE W/DOPPLER COMPLETE: CPT

## 2022-06-14 PROCEDURE — 93356 MYOCRD STRAIN IMG SPCKL TRCK: CPT

## 2022-06-20 ENCOUNTER — RESULT REVIEW (OUTPATIENT)
Age: 57
End: 2022-06-20

## 2022-06-20 ENCOUNTER — APPOINTMENT (OUTPATIENT)
Age: 57
End: 2022-06-20

## 2022-06-20 LAB
ALBUMIN SERPL ELPH-MCNC: 4.2 G/DL — SIGNIFICANT CHANGE UP (ref 3.3–5)
ALP SERPL-CCNC: 75 U/L — SIGNIFICANT CHANGE UP (ref 40–120)
ALT FLD-CCNC: 13 U/L — SIGNIFICANT CHANGE UP (ref 10–45)
ANION GAP SERPL CALC-SCNC: 11 MMOL/L — SIGNIFICANT CHANGE UP (ref 5–17)
ANISOCYTOSIS BLD QL: SLIGHT — SIGNIFICANT CHANGE UP
AST SERPL-CCNC: 21 U/L — SIGNIFICANT CHANGE UP (ref 10–40)
BASOPHILS # BLD AUTO: 0.1 K/UL — SIGNIFICANT CHANGE UP (ref 0–0.2)
BASOPHILS NFR BLD AUTO: 1 % — SIGNIFICANT CHANGE UP (ref 0–2)
BILIRUB SERPL-MCNC: 0.2 MG/DL — SIGNIFICANT CHANGE UP (ref 0.2–1.2)
BUN SERPL-MCNC: 14 MG/DL — SIGNIFICANT CHANGE UP (ref 7–23)
CALCIUM SERPL-MCNC: 9.3 MG/DL — SIGNIFICANT CHANGE UP (ref 8.4–10.5)
CHLORIDE SERPL-SCNC: 105 MMOL/L — SIGNIFICANT CHANGE UP (ref 96–108)
CO2 SERPL-SCNC: 22 MMOL/L — SIGNIFICANT CHANGE UP (ref 22–31)
CREAT SERPL-MCNC: 0.49 MG/DL — LOW (ref 0.5–1.3)
EGFR: 111 ML/MIN/1.73M2 — SIGNIFICANT CHANGE UP
EOSINOPHIL # BLD AUTO: 0.3 K/UL — SIGNIFICANT CHANGE UP (ref 0–0.5)
EOSINOPHIL NFR BLD AUTO: 3 % — SIGNIFICANT CHANGE UP (ref 0–6)
GLUCOSE SERPL-MCNC: 132 MG/DL — HIGH (ref 70–99)
HCT VFR BLD CALC: 38.8 % — SIGNIFICANT CHANGE UP (ref 34.5–45)
HGB BLD-MCNC: 12.8 G/DL — SIGNIFICANT CHANGE UP (ref 11.5–15.5)
LDH SERPL L TO P-CCNC: 313 U/L — HIGH (ref 50–242)
LYMPHOCYTES # BLD AUTO: 1.4 K/UL — SIGNIFICANT CHANGE UP (ref 1–3.3)
LYMPHOCYTES # BLD AUTO: 23 % — SIGNIFICANT CHANGE UP (ref 13–44)
MCHC RBC-ENTMCNC: 29 PG — SIGNIFICANT CHANGE UP (ref 27–34)
MCHC RBC-ENTMCNC: 32.9 G/DL — SIGNIFICANT CHANGE UP (ref 32–36)
MCV RBC AUTO: 87.9 FL — SIGNIFICANT CHANGE UP (ref 80–100)
METAMYELOCYTES # FLD: 1 % — HIGH (ref 0–0)
MONOCYTES # BLD AUTO: 0.4 K/UL — SIGNIFICANT CHANGE UP (ref 0–0.9)
MONOCYTES NFR BLD AUTO: 10 % — SIGNIFICANT CHANGE UP (ref 2–14)
NEUTROPHILS # BLD AUTO: 3.9 K/UL — SIGNIFICANT CHANGE UP (ref 1.8–7.4)
NEUTROPHILS NFR BLD AUTO: 61 % — SIGNIFICANT CHANGE UP (ref 43–77)
OVALOCYTES BLD QL SMEAR: SLIGHT — SIGNIFICANT CHANGE UP
PLAT MORPH BLD: NORMAL — SIGNIFICANT CHANGE UP
PLATELET # BLD AUTO: 250 K/UL — SIGNIFICANT CHANGE UP (ref 150–400)
POLYCHROMASIA BLD QL SMEAR: SLIGHT — SIGNIFICANT CHANGE UP
POTASSIUM SERPL-MCNC: 4.3 MMOL/L — SIGNIFICANT CHANGE UP (ref 3.5–5.3)
POTASSIUM SERPL-SCNC: 4.3 MMOL/L — SIGNIFICANT CHANGE UP (ref 3.5–5.3)
PROT SERPL-MCNC: 7.6 G/DL — SIGNIFICANT CHANGE UP (ref 6–8.3)
RBC # BLD: 4.41 M/UL — SIGNIFICANT CHANGE UP (ref 3.8–5.2)
RBC # FLD: 13 % — SIGNIFICANT CHANGE UP (ref 10.3–14.5)
RBC BLD AUTO: NORMAL — SIGNIFICANT CHANGE UP
SODIUM SERPL-SCNC: 137 MMOL/L — SIGNIFICANT CHANGE UP (ref 135–145)
VARIANT LYMPHS # BLD: 1 % — SIGNIFICANT CHANGE UP (ref 0–6)
WBC # BLD: 6.1 K/UL — SIGNIFICANT CHANGE UP (ref 3.8–10.5)
WBC # FLD AUTO: 6.1 K/UL — SIGNIFICANT CHANGE UP (ref 3.8–10.5)

## 2022-06-21 ENCOUNTER — APPOINTMENT (OUTPATIENT)
Age: 57
End: 2022-06-21

## 2022-06-21 DIAGNOSIS — R11.2 NAUSEA WITH VOMITING, UNSPECIFIED: ICD-10-CM

## 2022-06-21 DIAGNOSIS — C84.40 PERIPHERAL T-CELL LYMPHOMA, NOT ELSEWHERE CLASSIFIED, UNSPECIFIED SITE: ICD-10-CM

## 2022-06-21 DIAGNOSIS — Z51.11 ENCOUNTER FOR ANTINEOPLASTIC CHEMOTHERAPY: ICD-10-CM

## 2022-06-21 DIAGNOSIS — C85.90 NON-HODGKIN LYMPHOMA, UNSPECIFIED, UNSPECIFIED SITE: ICD-10-CM

## 2022-06-22 ENCOUNTER — APPOINTMENT (OUTPATIENT)
Age: 57
End: 2022-06-22

## 2022-06-22 DIAGNOSIS — E86.0 DEHYDRATION: ICD-10-CM

## 2022-06-23 DIAGNOSIS — Z51.89 ENCOUNTER FOR OTHER SPECIFIED AFTERCARE: ICD-10-CM

## 2022-07-06 ENCOUNTER — APPOINTMENT (OUTPATIENT)
Dept: HEMATOLOGY ONCOLOGY | Facility: CLINIC | Age: 57
End: 2022-07-06

## 2022-07-06 ENCOUNTER — RESULT REVIEW (OUTPATIENT)
Age: 57
End: 2022-07-06

## 2022-07-06 VITALS
OXYGEN SATURATION: 99 % | SYSTOLIC BLOOD PRESSURE: 105 MMHG | DIASTOLIC BLOOD PRESSURE: 71 MMHG | BODY MASS INDEX: 29.63 KG/M2 | HEART RATE: 73 BPM | HEIGHT: 62 IN | WEIGHT: 161 LBS

## 2022-07-06 LAB
ANISOCYTOSIS BLD QL: SLIGHT — SIGNIFICANT CHANGE UP
BASOPHILS # BLD AUTO: 0.3 K/UL — HIGH (ref 0–0.2)
BASOPHILS NFR BLD AUTO: 5 % — HIGH (ref 0–2)
EOSINOPHIL # BLD AUTO: 0.1 K/UL — SIGNIFICANT CHANGE UP (ref 0–0.5)
EOSINOPHIL NFR BLD AUTO: 1 % — SIGNIFICANT CHANGE UP (ref 0–6)
HCT VFR BLD CALC: 41.1 % — SIGNIFICANT CHANGE UP (ref 34.5–45)
HGB BLD-MCNC: 13.5 G/DL — SIGNIFICANT CHANGE UP (ref 11.5–15.5)
LYMPHOCYTES # BLD AUTO: 2.3 K/UL — SIGNIFICANT CHANGE UP (ref 1–3.3)
LYMPHOCYTES # BLD AUTO: 21 % — SIGNIFICANT CHANGE UP (ref 13–44)
MCHC RBC-ENTMCNC: 28.1 PG — SIGNIFICANT CHANGE UP (ref 27–34)
MCHC RBC-ENTMCNC: 32.8 G/DL — SIGNIFICANT CHANGE UP (ref 32–36)
MCV RBC AUTO: 85.6 FL — SIGNIFICANT CHANGE UP (ref 80–100)
MONOCYTES # BLD AUTO: 0.6 K/UL — SIGNIFICANT CHANGE UP (ref 0–0.9)
MONOCYTES NFR BLD AUTO: 4 % — SIGNIFICANT CHANGE UP (ref 2–14)
NEUTROPHILS # BLD AUTO: 5.9 K/UL — SIGNIFICANT CHANGE UP (ref 1.8–7.4)
NEUTROPHILS NFR BLD AUTO: 68 % — SIGNIFICANT CHANGE UP (ref 43–77)
NEUTS BAND # BLD: 1 % — SIGNIFICANT CHANGE UP (ref 0–8)
PLAT MORPH BLD: NORMAL — SIGNIFICANT CHANGE UP
PLATELET # BLD AUTO: 303 K/UL — SIGNIFICANT CHANGE UP (ref 150–400)
POIKILOCYTOSIS BLD QL AUTO: SLIGHT — SIGNIFICANT CHANGE UP
RBC # BLD: 4.8 M/UL — SIGNIFICANT CHANGE UP (ref 3.8–5.2)
RBC # FLD: 12.4 % — SIGNIFICANT CHANGE UP (ref 10.3–14.5)
RBC BLD AUTO: SIGNIFICANT CHANGE UP
TOXIC GRANULES BLD QL SMEAR: PRESENT — SIGNIFICANT CHANGE UP
WBC # BLD: 9.2 K/UL — SIGNIFICANT CHANGE UP (ref 3.8–10.5)
WBC # FLD AUTO: 9.2 K/UL — SIGNIFICANT CHANGE UP (ref 3.8–10.5)

## 2022-07-06 PROCEDURE — 99214 OFFICE O/P EST MOD 30 MIN: CPT

## 2022-07-07 NOTE — HISTORY OF PRESENT ILLNESS
[de-identified] : 57 year old female with no significant past medical hx presents to office for initial visit.  Patient presented on 3/4/22 c/o right axilla mass  to general surgeon, Dr. Amadeo Grace. Patient incidentally discovered it while shavingin Jan 2022  \par Biopsy of right axilla performed on 3/22/22 revealing atypical lymphoid infiltrate ( NEGATIVE for a clonal lgH gene rearrangement/ Positive clonal TCR gamma gene rearrangement )\par \par Planned for lymph node excision with Dr. Grace however patient decided to stay in the [facility] system and had Excisional Biopsy per Dr Mariano \par Colonoscopy November 2021\par \par LN excional BIopsy was delayed 2/2 to her travelling and pre surg testing.\par On review of needle biopsy from 3/22/22 at [facility], suggestive of T cell lymphoma\par \par S/p right axillary lymph node excisional biopsy on 5/16/22  per DR Mariano Path c/w Peripheral T- Cell Lymphoma, NOS\par PET SCAN 4/21/22: \par - 1. FDG avid extensive right axillary lymphadenopathy 5.4 x 3.6 cm , SUV 22.5. and right supraclavicular lymph node.0.8 x 0.8 cm (image 53), SUV 5.8.\par 2. Difficult to delineate retroareolar right breast soft tissue, with low-grade FDG avidity and minimally FDG avid diffuse, right breast cutaneous thickening. \par \par Breast node biopsy performed February 2022 was negative. \par MRI Breast on 5/26/22 with benign findings repeat in 6 months [de-identified] : Patient presents for follow up \par S/p right axillary lymph node excisional biopsy on 5/16/22  per DR Mariano Path c/w Peripheral T- Cell Lymphoma, NOS\par Patient started  CHOEP on 6/20/22, C2 scheduled for 7/11/22\par \par She reports fatigue after treatment\par Patient admits constipation relieved with Miralax\par Admits mild neuropathy on bottom of both feet 1-2 days after treatment then resolves\par Patient also admits mild nausea relieved with Compazine\par Admits gum sensitivity lasting 1-2 days after treatment \par Denies fever/chills, rash, mouth sores, nausea, vomiting, diarrhea,  abdominal pain, bleeding, easy bruising or visual changes, chest pain, cough, SOB or POSADAS,  LE edema.\par \par PET SCAN 6//13/22: \par IMPRESSION: Compared to FDG-PET/CT scan dated 4/21/2022:\par \par 1. Interval resolution of FDG avidity associated with right supraclavicular lymph node which has decreased in size.\par 2. Interval decrease in size, number and avidity of right axillary lymphadenopathy and resolution of  FDG avid right retropectoral lymph nodes.\par 3. Unchanged minimally FDG avid difficult to delineate retroareolar right breast soft tissue and minimally FDG avid diffuse right breast cutaneous thickening. Skin thickening and parenchymal edema in the right breast is consistent with metastatic obstruction on interval MRI from 5/26/2022.\par \par Currently on linzess for constipation, baby aspirin 81 mg, Prozac 20mg, multivitamin \par \par \par

## 2022-07-07 NOTE — RESULTS/DATA
[FreeTextEntry1] : \par 5/26/22: MRI BREAST: No MRI evidence of malignancy, left breast.\par Right axillary adenopathy. Skin thickening and parenchymal edema in the right breast which was seen on PET/CT is consistent with lymphatic obstruction.\par There is a benign-appearing circumscribed mass in the right central breast for which six-month follow-up MRI is recommended to document stability.\par RECOMMENDATION:  MRI in 6 months.\par BI-RADS 3-Probably Benign Finding(s)\par \par \par 5/16/22: Er Axillary Excisional Biopsy   Peripheral T- Cell Lymphoma, NOS\par - By   immunohistochemical  stains the atypical cells are positive for CD4, BCL-6 (weak/partial), PD-1 and BCL2 (dim) with high proliferation index, suggestive of T   follicular  helper immunophenotype.\par CD 30-veTest Performed: T Cell Gene Rearrangement\par RESULTS:\par TCR-beta: NEGATIVE\par TCR-gamma: INDETERMINATE\par \par PET SCAN 4/21/22: \par - 1. FDG avid extensive right axillary lymphadenopathy 5.4 x 3.6 cm , SUV 22.5. and right supraclavicular lymph node.0.8 x 0.8 cm (image 53), SUV 5.8.\par 2. Difficult to delineate retroareolar right breast soft tissue, with low-grade FDG avidity and minimally FDG avid diffuse, right breast cutaneous thickening. \par \par \par 3/22/22 Lymph Node, right axilla \par -Atypical lymphoid infiltrate\par - NEGATIVE for a clonal lgH gene rearrangement.\par - Positive clonal TCR gamma gene rearrangement

## 2022-07-07 NOTE — ASSESSMENT
[FreeTextEntry1] : 57 year old female no significant past medical hx now seen for Peripheral T cell Lymphoma NOS\par \par Patient presented on 3/4/22 c/o right axilla mass  to general surgeon, Dr. Amadeo Grace. Patient incidentally discovered it while shaving in Jan / Feb 2022 \par Biopsy of right axilla performed on 3/22/22 revealing atypical lymphoid infiltrate ( NEGATIVE for a clonal lgH gene rearrangement/ Positive clonal TCR gamma gene rearrangement )\par \par On review of needle biopsy from 3/22/22 at [facility], suggestive of T cell lymphoma\par \par S/p right axillary lymph node excisional biopsy on 5/16/22  per DR Mariano Path c/w Peripheral T- Cell Lymphoma, NOS\par \par PET SCAN 4/21/22: \par - 1. FDG avid extensive right axillary lymphadenopathy 5.4 x 3.6 cm , SUV 22.5. and right supraclavicular lymph node.0.8 x 0.8 cm (image 53), SUV 5.8.\par 2. Difficult to delineate retroareolar right breast soft tissue, with low-grade FDG avidity and minimally FDG avid diffuse, right breast cutaneous thickening. \par \par Breast node biopsy performed February 2022 was negative. \par MRI BREAST 5/26/22: BiRADS 3 , f/u in 6 months\par \par PET SCAN 6//13/22: \par IMPRESSION: Compared to FDG-PET/CT scan dated 4/21/2022:\par \par 1. Interval resolution of FDG avidity associated with right supraclavicular lymph node which has decreased in size.\par 2. Interval decrease in size, number and avidity of right axillary lymphadenopathy and resolution of  FDG avid right retropectoral lymph nodes.\par 3. Unchanged minimally FDG avid difficult to delineate retroareolar right breast soft tissue and minimally FDG avid diffuse right breast cutaneous thickening. Skin thickening and parenchymal edema in the right breast is consistent with metastatic obstruction on interval MRI from 5/26/2022.\par \par \par PERIPHERAL T CELL LYMPHOMA NOS Stage 1\par IPI SCORE: 0 LOW RISK \par - Some B symptoms, Night sweats since Jan 2022, fatigue, 6 lb weight loss\par - Discussed with patient Chemotherapy with CHOEP WITH NEULASTA Support \par -D1 :  Cyclophosphamide 750 mg /m2, Adriamycin 50 mg/ m2 Vincristone 1.4mg/ m2, Etoposide 10mg/ m2 IV \par D2- D3 Etoposide 100mg/m2  IV Hydration \par D1-D5 Prednisone 10mg po \par Allopurinol 100mg daily for TLS prophylaxis\par   Discussed ae of chemotherapy including cardiac dysfunction/ BM suppression/ N/ v/  Diarrhea\par - Port placement on 6/8/22\par - ECHO on 6/14/22, LV EF 59%\par - Patient started  CHOEP on 6/20/22, C2 scheduled for 7/11/22\par - WIll order Restaging PET Scan in 2-3 cycles \par - Will refer to Dr Wilkerson for transplant in 1st remission \par - F/u with in 3-4 weeks \par \par \par MRI BREAST: to be repeated in Nov 2022 \par \par

## 2022-07-11 ENCOUNTER — RESULT REVIEW (OUTPATIENT)
Age: 57
End: 2022-07-11

## 2022-07-11 ENCOUNTER — APPOINTMENT (OUTPATIENT)
Age: 57
End: 2022-07-11

## 2022-07-11 ENCOUNTER — APPOINTMENT (OUTPATIENT)
Dept: HEMATOLOGY ONCOLOGY | Facility: CLINIC | Age: 57
End: 2022-07-11

## 2022-07-11 LAB
ALBUMIN SERPL ELPH-MCNC: 4.6 G/DL — SIGNIFICANT CHANGE UP (ref 3.3–5)
ALP SERPL-CCNC: 85 U/L — SIGNIFICANT CHANGE UP (ref 40–120)
ALT FLD-CCNC: 25 U/L — SIGNIFICANT CHANGE UP (ref 10–45)
ANION GAP SERPL CALC-SCNC: 14 MMOL/L — SIGNIFICANT CHANGE UP (ref 5–17)
AST SERPL-CCNC: 37 U/L — SIGNIFICANT CHANGE UP (ref 10–40)
BASOPHILS # BLD AUTO: 0.1 K/UL — SIGNIFICANT CHANGE UP (ref 0–0.2)
BASOPHILS NFR BLD AUTO: 1.4 % — SIGNIFICANT CHANGE UP (ref 0–2)
BILIRUB SERPL-MCNC: <0.2 MG/DL — SIGNIFICANT CHANGE UP (ref 0.2–1.2)
BUN SERPL-MCNC: 17 MG/DL — SIGNIFICANT CHANGE UP (ref 7–23)
CALCIUM SERPL-MCNC: 10.2 MG/DL — SIGNIFICANT CHANGE UP (ref 8.4–10.5)
CHLORIDE SERPL-SCNC: 101 MMOL/L — SIGNIFICANT CHANGE UP (ref 96–108)
CO2 SERPL-SCNC: 21 MMOL/L — LOW (ref 22–31)
CREAT SERPL-MCNC: 0.54 MG/DL — SIGNIFICANT CHANGE UP (ref 0.5–1.3)
EGFR: 109 ML/MIN/1.73M2 — SIGNIFICANT CHANGE UP
EOSINOPHIL # BLD AUTO: 0 K/UL — SIGNIFICANT CHANGE UP (ref 0–0.5)
EOSINOPHIL NFR BLD AUTO: 0.2 % — SIGNIFICANT CHANGE UP (ref 0–6)
GLUCOSE SERPL-MCNC: 124 MG/DL — HIGH (ref 70–99)
HCT VFR BLD CALC: 36.8 % — SIGNIFICANT CHANGE UP (ref 34.5–45)
HGB BLD-MCNC: 12.4 G/DL — SIGNIFICANT CHANGE UP (ref 11.5–15.5)
LDH SERPL L TO P-CCNC: 555 U/L — HIGH (ref 50–242)
LYMPHOCYTES # BLD AUTO: 1 K/UL — SIGNIFICANT CHANGE UP (ref 1–3.3)
LYMPHOCYTES # BLD AUTO: 12.7 % — LOW (ref 13–44)
MCHC RBC-ENTMCNC: 28.7 PG — SIGNIFICANT CHANGE UP (ref 27–34)
MCHC RBC-ENTMCNC: 33.8 G/DL — SIGNIFICANT CHANGE UP (ref 32–36)
MCV RBC AUTO: 84.9 FL — SIGNIFICANT CHANGE UP (ref 80–100)
MONOCYTES # BLD AUTO: 0.2 K/UL — SIGNIFICANT CHANGE UP (ref 0–0.9)
MONOCYTES NFR BLD AUTO: 2.1 % — SIGNIFICANT CHANGE UP (ref 2–14)
NEUTROPHILS # BLD AUTO: 6.5 K/UL — SIGNIFICANT CHANGE UP (ref 1.8–7.4)
NEUTROPHILS NFR BLD AUTO: 83.5 % — HIGH (ref 43–77)
PLATELET # BLD AUTO: 420 K/UL — HIGH (ref 150–400)
POTASSIUM SERPL-MCNC: 4.5 MMOL/L — SIGNIFICANT CHANGE UP (ref 3.5–5.3)
POTASSIUM SERPL-SCNC: 4.5 MMOL/L — SIGNIFICANT CHANGE UP (ref 3.5–5.3)
PROT SERPL-MCNC: 7.6 G/DL — SIGNIFICANT CHANGE UP (ref 6–8.3)
RBC # BLD: 4.33 M/UL — SIGNIFICANT CHANGE UP (ref 3.8–5.2)
RBC # FLD: 12.6 % — SIGNIFICANT CHANGE UP (ref 10.3–14.5)
SODIUM SERPL-SCNC: 136 MMOL/L — SIGNIFICANT CHANGE UP (ref 135–145)
WBC # BLD: 7.8 K/UL — SIGNIFICANT CHANGE UP (ref 3.8–10.5)
WBC # FLD AUTO: 7.8 K/UL — SIGNIFICANT CHANGE UP (ref 3.8–10.5)

## 2022-07-12 ENCOUNTER — APPOINTMENT (OUTPATIENT)
Age: 57
End: 2022-07-12

## 2022-07-13 ENCOUNTER — APPOINTMENT (OUTPATIENT)
Age: 57
End: 2022-07-13

## 2022-07-27 ENCOUNTER — RESULT REVIEW (OUTPATIENT)
Age: 57
End: 2022-07-27

## 2022-07-27 ENCOUNTER — APPOINTMENT (OUTPATIENT)
Dept: HEMATOLOGY ONCOLOGY | Facility: CLINIC | Age: 57
End: 2022-07-27

## 2022-07-27 VITALS
WEIGHT: 164 LBS | HEIGHT: 62 IN | BODY MASS INDEX: 30.18 KG/M2 | HEART RATE: 77 BPM | OXYGEN SATURATION: 98 % | SYSTOLIC BLOOD PRESSURE: 120 MMHG | DIASTOLIC BLOOD PRESSURE: 76 MMHG

## 2022-07-27 DIAGNOSIS — R21 RASH AND OTHER NONSPECIFIC SKIN ERUPTION: ICD-10-CM

## 2022-07-27 LAB
BASOPHILS # BLD AUTO: 0.1 K/UL — SIGNIFICANT CHANGE UP (ref 0–0.2)
BASOPHILS NFR BLD AUTO: 1.6 % — SIGNIFICANT CHANGE UP (ref 0–2)
EOSINOPHIL # BLD AUTO: 0.1 K/UL — SIGNIFICANT CHANGE UP (ref 0–0.5)
EOSINOPHIL NFR BLD AUTO: 1.3 % — SIGNIFICANT CHANGE UP (ref 0–6)
HCT VFR BLD CALC: 38.2 % — SIGNIFICANT CHANGE UP (ref 34.5–45)
HGB BLD-MCNC: 12.1 G/DL — SIGNIFICANT CHANGE UP (ref 11.5–15.5)
LYMPHOCYTES # BLD AUTO: 1.8 K/UL — SIGNIFICANT CHANGE UP (ref 1–3.3)
LYMPHOCYTES # BLD AUTO: 19.6 % — SIGNIFICANT CHANGE UP (ref 13–44)
MCHC RBC-ENTMCNC: 27.8 PG — SIGNIFICANT CHANGE UP (ref 27–34)
MCHC RBC-ENTMCNC: 31.8 G/DL — LOW (ref 32–36)
MCV RBC AUTO: 87.3 FL — SIGNIFICANT CHANGE UP (ref 80–100)
MONOCYTES # BLD AUTO: 0.5 K/UL — SIGNIFICANT CHANGE UP (ref 0–0.9)
MONOCYTES NFR BLD AUTO: 5.3 % — SIGNIFICANT CHANGE UP (ref 2–14)
NEUTROPHILS # BLD AUTO: 6.6 K/UL — SIGNIFICANT CHANGE UP (ref 1.8–7.4)
NEUTROPHILS NFR BLD AUTO: 72.2 % — SIGNIFICANT CHANGE UP (ref 43–77)
PLATELET # BLD AUTO: 142 K/UL — LOW (ref 150–400)
RBC # BLD: 4.37 M/UL — SIGNIFICANT CHANGE UP (ref 3.8–5.2)
RBC # FLD: 14.8 % — HIGH (ref 10.3–14.5)
WBC # BLD: 9.1 K/UL — SIGNIFICANT CHANGE UP (ref 3.8–10.5)
WBC # FLD AUTO: 9.1 K/UL — SIGNIFICANT CHANGE UP (ref 3.8–10.5)

## 2022-07-27 PROCEDURE — 99214 OFFICE O/P EST MOD 30 MIN: CPT

## 2022-07-28 ENCOUNTER — OUTPATIENT (OUTPATIENT)
Dept: OUTPATIENT SERVICES | Facility: HOSPITAL | Age: 57
LOS: 1 days | Discharge: HOME | End: 2022-07-28

## 2022-07-28 DIAGNOSIS — C91.50 ADULT T-CELL LYMPHOMA/LEUKEMIA (HTLV-1-ASSOCIATED) NOT HAVING ACHIEVED REMISSION: ICD-10-CM

## 2022-07-29 ENCOUNTER — RX RENEWAL (OUTPATIENT)
Age: 57
End: 2022-07-29

## 2022-07-29 PROBLEM — R21 RASH: Status: ACTIVE | Noted: 2022-07-27

## 2022-07-29 NOTE — HISTORY OF PRESENT ILLNESS
[de-identified] : 57 year old female with no significant past medical hx presents to office for initial visit.  Patient presented on 3/4/22 c/o right axilla mass  to general surgeon, Dr. Amadeo Grace. Patient incidentally discovered it while shavingin Jan 2022  \par Biopsy of right axilla performed on 3/22/22 revealing atypical lymphoid infiltrate ( NEGATIVE for a clonal lgH gene rearrangement/ Positive clonal TCR gamma gene rearrangement )\par \par Planned for lymph node excision with Dr. Grace however patient decided to stay in the [facility] system and had Excisional Biopsy per Dr Mariano \par Colonoscopy November 2021\par \par LN excional BIopsy was delayed 2/2 to her travelling and pre surg testing.\par On review of needle biopsy from 3/22/22 at [facility], suggestive of T cell lymphoma\par \par S/p right axillary lymph node excisional biopsy on 5/16/22  per DR Mariano Path c/w Peripheral T- Cell Lymphoma, NOS\par PET SCAN 4/21/22: \par - 1. FDG avid extensive right axillary lymphadenopathy 5.4 x 3.6 cm , SUV 22.5. and right supraclavicular lymph node.0.8 x 0.8 cm (image 53), SUV 5.8.\par 2. Difficult to delineate retroareolar right breast soft tissue, with low-grade FDG avidity and minimally FDG avid diffuse, right breast cutaneous thickening. \par \par Breast node biopsy performed February 2022 was negative. \par MRI Breast on 5/26/22 with benign findings repeat in 6 months [de-identified] : Patient presents for follow up accompanied with \par S/p right axillary lymph node excisional biopsy on 5/16/22  per DR Mariano Path c/w Peripheral T- Cell Lymphoma, NOS\par Patient started  CHOEP on 6/20/22, C3 scheduled for 8/1/22\par \par She reports fatigue after treatment\par Patient admits constipation relieved with Miralax\par Admits mild neuropathy on bottom of both feet 1-2 days after treatment then resolves\par Patient also admits mild nausea relieved with Compazine\par Patient admits myalgias in thighs and b/l hips, taking Claritin and Tylenol with some relief. \par admits acne like rash on left side of scalp \par Denies fever/chills, rash, mouth sores, nausea, vomiting, diarrhea,  abdominal pain, bleeding, easy bruising or visual changes, chest pain, cough, SOB or POSADAS,  LE edema.\par \par PET SCAN 6//13/22: \par IMPRESSION: Compared to FDG-PET/CT scan dated 4/21/2022:\par \par 1. Interval resolution of FDG avidity associated with right supraclavicular lymph node which has decreased in size.\par 2. Interval decrease in size, number and avidity of right axillary lymphadenopathy and resolution of  FDG avid right retropectoral lymph nodes.\par 3. Unchanged minimally FDG avid difficult to delineate retroareolar right breast soft tissue and minimally FDG avid diffuse right breast cutaneous thickening. Skin thickening and parenchymal edema in the right breast is consistent with metastatic obstruction on interval MRI from 5/26/2022.\par \par Currently on linzess for constipation, baby aspirin 81 mg, Prozac 20mg, multivitamin \par \par \par

## 2022-07-29 NOTE — ASSESSMENT
[FreeTextEntry1] : 57 year old female no significant past medical hx now seen for Peripheral T cell Lymphoma NOS\par \par Patient presented on 3/4/22 c/o right axilla mass  to general surgeon, Dr. Amadeo Grace. Patient incidentally discovered it while shaving in Jan / Feb 2022 \par Biopsy of right axilla performed on 3/22/22 revealing atypical lymphoid infiltrate ( NEGATIVE for a clonal lgH gene rearrangement/ Positive clonal TCR gamma gene rearrangement )\par \par On review of needle biopsy from 3/22/22 at [facility], suggestive of T cell lymphoma\par \par S/p right axillary lymph node excisional biopsy on 5/16/22  per DR Mariano Path c/w Peripheral T- Cell Lymphoma, NOS\par \par PET SCAN 4/21/22: \par - 1. FDG avid extensive right axillary lymphadenopathy 5.4 x 3.6 cm , SUV 22.5. and right supraclavicular lymph node.0.8 x 0.8 cm (image 53), SUV 5.8.\par 2. Difficult to delineate retroareolar right breast soft tissue, with low-grade FDG avidity and minimally FDG avid diffuse, right breast cutaneous thickening. \par \par Breast node biopsy performed February 2022 was negative. \par MRI BREAST 5/26/22: BiRADS 3 , f/u in 6 months\par \par PET SCAN 6//13/22: \par IMPRESSION: Compared to FDG-PET/CT scan dated 4/21/2022:\par \par 1. Interval resolution of FDG avidity associated with right supraclavicular lymph node which has decreased in size.\par 2. Interval decrease in size, number and avidity of right axillary lymphadenopathy and resolution of  FDG avid right retropectoral lymph nodes.\par 3. Unchanged minimally FDG avid difficult to delineate retroareolar right breast soft tissue and minimally FDG avid diffuse right breast cutaneous thickening. Skin thickening and parenchymal edema in the right breast is consistent with metastatic obstruction on interval MRI from 5/26/2022.\par \par \par PERIPHERAL T CELL LYMPHOMA NOS Stage 1\par IPI SCORE: 0 LOW RISK \par - Some B symptoms, Night sweats since Jan 2022, fatigue, 6 lb weight loss\par - Discussed with patient Chemotherapy with CHOEP WITH NEULASTA Support \par -D1 :  Cyclophosphamide 750 mg /m2, Adriamycin 50 mg/ m2 Vincristone 1.4mg/ m2, Etoposide 10mg/ m2 IV \par D2- D3 Etoposide 100mg/m2  IV Hydration \par D1-D5 Prednisone 10mg po \par Allopurinol 100mg daily for TLS prophylaxis\par   Discussed ae of chemotherapy including cardiac dysfunction/ BM suppression/ N/ v/  Diarrhea\par - Port placement on 6/8/22\par - ECHO on 6/14/22, LV EF 59%\par - Patient started  CHOEP on 6/20/22, C3 scheduled for 8/1/22\par - WIll order Restaging PET Scan after 3 cycles \par - Will refer to Dr Wilkerson for transplant in 1st remission \par - Start salicylic acid cream for rash, can try clindamycin ointment if not improved\par - Advised to continue Claritin and Tylenol for myalgias, suspect due to Onpro. Call office if symptoms worsen \par - F/u with in 3-4 weeks \par \par \par MRI BREAST: to be repeated in Nov 2022 \par \par

## 2022-08-01 ENCOUNTER — APPOINTMENT (OUTPATIENT)
Age: 57
End: 2022-08-01

## 2022-08-01 ENCOUNTER — RESULT REVIEW (OUTPATIENT)
Age: 57
End: 2022-08-01

## 2022-08-01 ENCOUNTER — APPOINTMENT (OUTPATIENT)
Dept: HEMATOLOGY ONCOLOGY | Facility: CLINIC | Age: 57
End: 2022-08-01

## 2022-08-01 LAB
ALBUMIN SERPL ELPH-MCNC: 4.5 G/DL — SIGNIFICANT CHANGE UP (ref 3.3–5)
ALP SERPL-CCNC: 86 U/L — SIGNIFICANT CHANGE UP (ref 40–120)
ALT FLD-CCNC: 22 U/L — SIGNIFICANT CHANGE UP (ref 10–45)
ANION GAP SERPL CALC-SCNC: 13 MMOL/L — SIGNIFICANT CHANGE UP (ref 5–17)
AST SERPL-CCNC: 24 U/L — SIGNIFICANT CHANGE UP (ref 10–40)
BASOPHILS # BLD AUTO: 0.1 K/UL — SIGNIFICANT CHANGE UP (ref 0–0.2)
BASOPHILS NFR BLD AUTO: 1 % — SIGNIFICANT CHANGE UP (ref 0–2)
BILIRUB SERPL-MCNC: 0.2 MG/DL — SIGNIFICANT CHANGE UP (ref 0.2–1.2)
BUN SERPL-MCNC: 14 MG/DL — SIGNIFICANT CHANGE UP (ref 7–23)
CALCIUM SERPL-MCNC: 9.9 MG/DL — SIGNIFICANT CHANGE UP (ref 8.4–10.5)
CHLORIDE SERPL-SCNC: 104 MMOL/L — SIGNIFICANT CHANGE UP (ref 96–108)
CO2 SERPL-SCNC: 21 MMOL/L — LOW (ref 22–31)
CREAT SERPL-MCNC: 0.54 MG/DL — SIGNIFICANT CHANGE UP (ref 0.5–1.3)
EGFR: 109 ML/MIN/1.73M2 — SIGNIFICANT CHANGE UP
EOSINOPHIL # BLD AUTO: 0 K/UL — SIGNIFICANT CHANGE UP (ref 0–0.5)
EOSINOPHIL NFR BLD AUTO: 0.5 % — SIGNIFICANT CHANGE UP (ref 0–6)
GLUCOSE SERPL-MCNC: 123 MG/DL — HIGH (ref 70–99)
HCT VFR BLD CALC: 34.4 % — LOW (ref 34.5–45)
HGB BLD-MCNC: 11.3 G/DL — LOW (ref 11.5–15.5)
LDH SERPL L TO P-CCNC: 411 U/L — HIGH (ref 50–242)
LYMPHOCYTES # BLD AUTO: 0.9 K/UL — LOW (ref 1–3.3)
LYMPHOCYTES # BLD AUTO: 10.8 % — LOW (ref 13–44)
MCHC RBC-ENTMCNC: 28.7 PG — SIGNIFICANT CHANGE UP (ref 27–34)
MCHC RBC-ENTMCNC: 32.8 G/DL — SIGNIFICANT CHANGE UP (ref 32–36)
MCV RBC AUTO: 87.3 FL — SIGNIFICANT CHANGE UP (ref 80–100)
MONOCYTES # BLD AUTO: 0.3 K/UL — SIGNIFICANT CHANGE UP (ref 0–0.9)
MONOCYTES NFR BLD AUTO: 3.3 % — SIGNIFICANT CHANGE UP (ref 2–14)
NEUTROPHILS # BLD AUTO: 6.9 K/UL — SIGNIFICANT CHANGE UP (ref 1.8–7.4)
NEUTROPHILS NFR BLD AUTO: 84.4 % — HIGH (ref 43–77)
PLATELET # BLD AUTO: 408 K/UL — HIGH (ref 150–400)
POTASSIUM SERPL-MCNC: 4.3 MMOL/L — SIGNIFICANT CHANGE UP (ref 3.5–5.3)
POTASSIUM SERPL-SCNC: 4.3 MMOL/L — SIGNIFICANT CHANGE UP (ref 3.5–5.3)
PROT SERPL-MCNC: 7.7 G/DL — SIGNIFICANT CHANGE UP (ref 6–8.3)
RBC # BLD: 3.94 M/UL — SIGNIFICANT CHANGE UP (ref 3.8–5.2)
RBC # FLD: 15.2 % — HIGH (ref 10.3–14.5)
SODIUM SERPL-SCNC: 138 MMOL/L — SIGNIFICANT CHANGE UP (ref 135–145)
WBC # BLD: 8.2 K/UL — SIGNIFICANT CHANGE UP (ref 3.8–10.5)
WBC # FLD AUTO: 8.2 K/UL — SIGNIFICANT CHANGE UP (ref 3.8–10.5)

## 2022-08-02 ENCOUNTER — APPOINTMENT (OUTPATIENT)
Age: 57
End: 2022-08-02

## 2022-08-02 DIAGNOSIS — Z51.11 ENCOUNTER FOR ANTINEOPLASTIC CHEMOTHERAPY: ICD-10-CM

## 2022-08-02 DIAGNOSIS — R11.2 NAUSEA WITH VOMITING, UNSPECIFIED: ICD-10-CM

## 2022-08-03 ENCOUNTER — APPOINTMENT (OUTPATIENT)
Age: 57
End: 2022-08-03

## 2022-08-03 DIAGNOSIS — E86.0 DEHYDRATION: ICD-10-CM

## 2022-08-04 DIAGNOSIS — Z51.89 ENCOUNTER FOR OTHER SPECIFIED AFTERCARE: ICD-10-CM

## 2022-08-08 ENCOUNTER — APPOINTMENT (OUTPATIENT)
Dept: NUCLEAR MEDICINE | Facility: CLINIC | Age: 57
End: 2022-08-08

## 2022-08-08 ENCOUNTER — OUTPATIENT (OUTPATIENT)
Dept: OUTPATIENT SERVICES | Facility: HOSPITAL | Age: 57
LOS: 1 days | End: 2022-08-08

## 2022-08-08 DIAGNOSIS — C85.90 NON-HODGKIN LYMPHOMA, UNSPECIFIED, UNSPECIFIED SITE: ICD-10-CM

## 2022-08-08 PROCEDURE — 78815 PET IMAGE W/CT SKULL-THIGH: CPT | Mod: 26,PS

## 2022-08-12 ENCOUNTER — RX RENEWAL (OUTPATIENT)
Age: 57
End: 2022-08-12

## 2022-08-15 ENCOUNTER — APPOINTMENT (OUTPATIENT)
Dept: HEMATOLOGY ONCOLOGY | Facility: CLINIC | Age: 57
End: 2022-08-15

## 2022-08-15 ENCOUNTER — RESULT REVIEW (OUTPATIENT)
Age: 57
End: 2022-08-15

## 2022-08-15 VITALS
BODY MASS INDEX: 30.55 KG/M2 | WEIGHT: 166 LBS | HEART RATE: 75 BPM | DIASTOLIC BLOOD PRESSURE: 74 MMHG | SYSTOLIC BLOOD PRESSURE: 107 MMHG | OXYGEN SATURATION: 97 % | HEIGHT: 62 IN

## 2022-08-15 LAB
BASOPHILS # BLD AUTO: 0.2 K/UL — SIGNIFICANT CHANGE UP (ref 0–0.2)
BASOPHILS NFR BLD AUTO: 1.8 % — SIGNIFICANT CHANGE UP (ref 0–2)
EOSINOPHIL # BLD AUTO: 0.1 K/UL — SIGNIFICANT CHANGE UP (ref 0–0.5)
EOSINOPHIL NFR BLD AUTO: 1.2 % — SIGNIFICANT CHANGE UP (ref 0–6)
HCT VFR BLD CALC: 32.3 % — LOW (ref 34.5–45)
HGB BLD-MCNC: 10.4 G/DL — LOW (ref 11.5–15.5)
LYMPHOCYTES # BLD AUTO: 1.6 K/UL — SIGNIFICANT CHANGE UP (ref 1–3.3)
LYMPHOCYTES # BLD AUTO: 13.3 % — SIGNIFICANT CHANGE UP (ref 13–44)
MCHC RBC-ENTMCNC: 28.9 PG — SIGNIFICANT CHANGE UP (ref 27–34)
MCHC RBC-ENTMCNC: 32.4 G/DL — SIGNIFICANT CHANGE UP (ref 32–36)
MCV RBC AUTO: 89.2 FL — SIGNIFICANT CHANGE UP (ref 80–100)
MONOCYTES # BLD AUTO: 0.7 K/UL — SIGNIFICANT CHANGE UP (ref 0–0.9)
MONOCYTES NFR BLD AUTO: 5.8 % — SIGNIFICANT CHANGE UP (ref 2–14)
NEUTROPHILS # BLD AUTO: 9.3 K/UL — HIGH (ref 1.8–7.4)
NEUTROPHILS NFR BLD AUTO: 77.8 % — HIGH (ref 43–77)
PLATELET # BLD AUTO: 213 K/UL — SIGNIFICANT CHANGE UP (ref 150–400)
RBC # BLD: 3.62 M/UL — LOW (ref 3.8–5.2)
RBC # FLD: 16.7 % — HIGH (ref 10.3–14.5)
WBC # BLD: 11.9 K/UL — HIGH (ref 3.8–10.5)
WBC # FLD AUTO: 11.9 K/UL — HIGH (ref 3.8–10.5)

## 2022-08-15 PROCEDURE — 99215 OFFICE O/P EST HI 40 MIN: CPT

## 2022-08-15 NOTE — ADDENDUM
[FreeTextEntry1] : Documented by Shelly Suárez acting as scribe for Dr. Rendon on 08/15/2022 \par \par All Medical record entries made by the Scribe were at my, Dr. Rendon's, direction and personally dictated by me on 08/15/2022 . I have reviewed the chart and agree that the record accurately reflects my personal performance of the history, physical exam, assessment and plan. I have also personally directed, reviewed, and agreed with the discharge instructions.\par

## 2022-08-15 NOTE — HISTORY OF PRESENT ILLNESS
[de-identified] : 57 year old female with no significant past medical hx presents to office for initial visit.  Patient presented on 3/4/22 c/o right axilla mass  to general surgeon, Dr. Amadeo Grace. Patient incidentally discovered it while shavingin Jan 2022  \par Biopsy of right axilla performed on 3/22/22 revealing atypical lymphoid infiltrate ( NEGATIVE for a clonal lgH gene rearrangement/ Positive clonal TCR gamma gene rearrangement )\par \par Planned for lymph node excision with Dr. Grace however patient decided to stay in the [facility] system and had Excisional Biopsy per Dr Mariano \par Colonoscopy November 2021\par \par LN excional BIopsy was delayed 2/2 to her travelling and pre surg testing.\par On review of needle biopsy from 3/22/22 at [facility], suggestive of T cell lymphoma\par \par S/p right axillary lymph node excisional biopsy on 5/16/22  per DR Mariano Path c/w Peripheral T- Cell Lymphoma, NOS\par PET SCAN 4/21/22: \par - 1. FDG avid extensive right axillary lymphadenopathy 5.4 x 3.6 cm , SUV 22.5. and right supraclavicular lymph node.0.8 x 0.8 cm (image 53), SUV 5.8.\par 2. Difficult to delineate retroareolar right breast soft tissue, with low-grade FDG avidity and minimally FDG avid diffuse, right breast cutaneous thickening. \par \par Breast node biopsy performed February 2022 was negative. \par MRI Breast on 5/26/22 with benign findings repeat in 6 months [de-identified] : Patient presents for follow up accompanied with .\par S/p right axillary lymph node excisional biopsy on 5/16/22  per DR Mariano Path c/w Peripheral T- Cell Lymphoma, NOS\par Patient started  CHOEP on 6/20/22, C4 scheduled for 8/22/22\par \par She reports increased fatigue and insomnia 2/2 to steriod after first week of  treatment.\par She takes a nap during the day but is able to get up and be active. \par Reports patch release pain the next day. She takes Claritin and Tylenol with relief. \par Patient admits constipation uses MiraLAX however takes 6 days after treatment to have a bowel movement. \par Denies trouble hearing.  \par \par \par Currently on linzess for constipation, baby aspirin 81 mg, Prozac 20mg, multivitamin

## 2022-08-15 NOTE — ASSESSMENT
[FreeTextEntry1] : 57 year old female no significant past medical hx now seen for Peripheral T cell Lymphoma NOS\par \par Patient presented on 3/4/22 c/o right axilla mass  to general surgeon, Dr. Amadeo Grace. Patient incidentally discovered it while shaving in Jan / Feb 2022 \par Biopsy of right axilla performed on 3/22/22 revealing atypical lymphoid infiltrate ( NEGATIVE for a clonal lgH gene rearrangement/ Positive clonal TCR gamma gene rearrangement )\par \par On review of needle biopsy from 3/22/22 at [facility], suggestive of T cell lymphoma\par \par S/p right axillary lymph node excisional biopsy on 5/16/22  per DR Mariano Path c/w Peripheral T- Cell Lymphoma, NOS\par \par PET SCAN 4/21/22: \par - 1. FDG avid extensive right axillary lymphadenopathy 5.4 x 3.6 cm , SUV 22.5. and right supraclavicular lymph node.0.8 x 0.8 cm (image 53), SUV 5.8.\par 2. Difficult to delineate retroareolar right breast soft tissue, with low-grade FDG avidity and minimally FDG avid diffuse, right breast cutaneous thickening. \par \par Breast node biopsy performed February 2022 was negative. \par MRI BREAST 5/26/22: BiRADS 3 , f/u in 6 months\par \par PET SCAN 6/13/22: \par 1. Interval resolution of FDG avidity associated with right supraclavicular lymph node which has decreased in size.\par 2. Interval decrease in size, number and avidity of right axillary lymphadenopathy and resolution of  FDG avid right retropectoral lymph nodes.\par 3. Unchanged minimally FDG avid difficult to delineate retroareolar right breast soft tissue and minimally FDG avid diffuse right breast cutaneous thickening. Skin thickening and parenchymal edema in the right breast is consistent with metastatic obstruction on interval MRI from 5/26/2022.\par \par PET/CT 8/8/22 \par 1. Interval further decrease of right axillary lymph nodes.\par 2. Unchanged nonspecific minimally FDG avid retroareolar right breast soft tissue. Interval decreased avidity of unchanged cutaneous thickening in the right breast.\par 3. Subcentimeter nodular opacity in the right upper lobe medially, unchanged since PET/CT on 4/21/2022. \par 4. New generalized diffuse bone marrow hypermetabolism, nonspecific and may be related to treatment. \par \par \par PERIPHERAL T CELL LYMPHOMA NOS Stage 1\par IPI SCORE: 0 LOW RISK \par - Some B symptoms, Night sweats since Jan 2022, fatigue, 6 lb weight loss\par - Discussed with patient Chemotherapy with CHOEP WITH NEULASTA Support \par -D1 :  Cyclophosphamide 750 mg /m2, Adriamycin 50 mg/ m2 Vincristone 1.4mg/ m2, Etoposide 10mg/ m2 IV \par D2- D3 Etoposide 100mg/m2  IV Hydration \par D1-D5 Prednisone 10mg po \par Allopurinol 100mg daily for TLS prophylaxis\par \par \par - Port placement on 6/8/22\par - ECHO on 6/14/22, LV EF 59%\par - Patient started  CHOEP on 6/20/22, C4 scheduled for 8/22/22\par -Constipation; continue MiraLAX. Advised senna and colace. \par -Rash; Continue salicylic acid cream for rash, can try clindamycin ointment if not improved. \par - WIll order Restaging PET Scan after 6 cycles \par - Will refer to Dr. Acevedo for transplant evaluation \par - Echo in Sept 2022\par - f/u with jacqueline in 4 weeks \par \par \par MRI BREAST: to be repeated in Nov 2022 \par \par

## 2022-08-18 ENCOUNTER — OUTPATIENT (OUTPATIENT)
Dept: OUTPATIENT SERVICES | Facility: HOSPITAL | Age: 57
LOS: 1 days | End: 2022-08-18

## 2022-08-18 DIAGNOSIS — C85.90 NON-HODGKIN LYMPHOMA, UNSPECIFIED, UNSPECIFIED SITE: ICD-10-CM

## 2022-08-22 ENCOUNTER — APPOINTMENT (OUTPATIENT)
Age: 57
End: 2022-08-22

## 2022-08-22 ENCOUNTER — RESULT REVIEW (OUTPATIENT)
Age: 57
End: 2022-08-22

## 2022-08-22 ENCOUNTER — APPOINTMENT (OUTPATIENT)
Dept: HEMATOLOGY ONCOLOGY | Facility: CLINIC | Age: 57
End: 2022-08-22

## 2022-08-22 LAB
ALBUMIN SERPL ELPH-MCNC: 4.5 G/DL — SIGNIFICANT CHANGE UP (ref 3.3–5)
ALP SERPL-CCNC: 74 U/L — SIGNIFICANT CHANGE UP (ref 40–120)
ALT FLD-CCNC: 25 U/L — SIGNIFICANT CHANGE UP (ref 10–45)
ANION GAP SERPL CALC-SCNC: 10 MMOL/L — SIGNIFICANT CHANGE UP (ref 5–17)
AST SERPL-CCNC: 20 U/L — SIGNIFICANT CHANGE UP (ref 10–40)
BASOPHILS # BLD AUTO: 0.1 K/UL — SIGNIFICANT CHANGE UP (ref 0–0.2)
BASOPHILS NFR BLD AUTO: 1.5 % — SIGNIFICANT CHANGE UP (ref 0–2)
BILIRUB SERPL-MCNC: 0.2 MG/DL — SIGNIFICANT CHANGE UP (ref 0.2–1.2)
BUN SERPL-MCNC: 15 MG/DL — SIGNIFICANT CHANGE UP (ref 7–23)
CALCIUM SERPL-MCNC: 9.8 MG/DL — SIGNIFICANT CHANGE UP (ref 8.4–10.5)
CHLORIDE SERPL-SCNC: 102 MMOL/L — SIGNIFICANT CHANGE UP (ref 96–108)
CO2 SERPL-SCNC: 24 MMOL/L — SIGNIFICANT CHANGE UP (ref 22–31)
CREAT SERPL-MCNC: 0.53 MG/DL — SIGNIFICANT CHANGE UP (ref 0.5–1.3)
EGFR: 109 ML/MIN/1.73M2 — SIGNIFICANT CHANGE UP
EOSINOPHIL # BLD AUTO: 0.1 K/UL — SIGNIFICANT CHANGE UP (ref 0–0.5)
EOSINOPHIL NFR BLD AUTO: 1.3 % — SIGNIFICANT CHANGE UP (ref 0–6)
GLUCOSE SERPL-MCNC: 122 MG/DL — HIGH (ref 70–99)
HCT VFR BLD CALC: 30.8 % — LOW (ref 34.5–45)
HGB BLD-MCNC: 10.1 G/DL — LOW (ref 11.5–15.5)
LDH SERPL L TO P-CCNC: 296 U/L — HIGH (ref 50–242)
LYMPHOCYTES # BLD AUTO: 0.9 K/UL — LOW (ref 1–3.3)
LYMPHOCYTES # BLD AUTO: 22.2 % — SIGNIFICANT CHANGE UP (ref 13–44)
MCHC RBC-ENTMCNC: 28.7 PG — SIGNIFICANT CHANGE UP (ref 27–34)
MCHC RBC-ENTMCNC: 32.8 G/DL — SIGNIFICANT CHANGE UP (ref 32–36)
MCV RBC AUTO: 87.5 FL — SIGNIFICANT CHANGE UP (ref 80–100)
MONOCYTES # BLD AUTO: 0.3 K/UL — SIGNIFICANT CHANGE UP (ref 0–0.9)
MONOCYTES NFR BLD AUTO: 8.3 % — SIGNIFICANT CHANGE UP (ref 2–14)
NEUTROPHILS # BLD AUTO: 2.7 K/UL — SIGNIFICANT CHANGE UP (ref 1.8–7.4)
NEUTROPHILS NFR BLD AUTO: 66.8 % — SIGNIFICANT CHANGE UP (ref 43–77)
PLATELET # BLD AUTO: 392 K/UL — SIGNIFICANT CHANGE UP (ref 150–400)
POTASSIUM SERPL-MCNC: 4.3 MMOL/L — SIGNIFICANT CHANGE UP (ref 3.5–5.3)
POTASSIUM SERPL-SCNC: 4.3 MMOL/L — SIGNIFICANT CHANGE UP (ref 3.5–5.3)
PROT SERPL-MCNC: 7 G/DL — SIGNIFICANT CHANGE UP (ref 6–8.3)
RBC # BLD: 3.52 M/UL — LOW (ref 3.8–5.2)
RBC # FLD: 17.4 % — HIGH (ref 10.3–14.5)
SODIUM SERPL-SCNC: 136 MMOL/L — SIGNIFICANT CHANGE UP (ref 135–145)
WBC # BLD: 4 K/UL — SIGNIFICANT CHANGE UP (ref 3.8–10.5)
WBC # FLD AUTO: 4 K/UL — SIGNIFICANT CHANGE UP (ref 3.8–10.5)

## 2022-08-23 ENCOUNTER — APPOINTMENT (OUTPATIENT)
Age: 57
End: 2022-08-23

## 2022-08-24 ENCOUNTER — APPOINTMENT (OUTPATIENT)
Age: 57
End: 2022-08-24

## 2022-08-24 DIAGNOSIS — C85.90 NON-HODGKIN LYMPHOMA, UNSPECIFIED, UNSPECIFIED SITE: ICD-10-CM

## 2022-08-24 DIAGNOSIS — C84.40 PERIPHERAL T-CELL LYMPHOMA, NOT ELSEWHERE CLASSIFIED, UNSPECIFIED SITE: ICD-10-CM

## 2022-09-06 ENCOUNTER — APPOINTMENT (OUTPATIENT)
Dept: HEMATOLOGY ONCOLOGY | Facility: CLINIC | Age: 57
End: 2022-09-06

## 2022-09-06 ENCOUNTER — RESULT REVIEW (OUTPATIENT)
Age: 57
End: 2022-09-06

## 2022-09-06 ENCOUNTER — OUTPATIENT (OUTPATIENT)
Dept: OUTPATIENT SERVICES | Facility: HOSPITAL | Age: 57
LOS: 1 days | Discharge: HOME | End: 2022-09-06

## 2022-09-06 VITALS
HEART RATE: 79 BPM | HEIGHT: 72 IN | BODY MASS INDEX: 23.03 KG/M2 | SYSTOLIC BLOOD PRESSURE: 108 MMHG | WEIGHT: 170.01 LBS | OXYGEN SATURATION: 99 % | DIASTOLIC BLOOD PRESSURE: 74 MMHG

## 2022-09-06 DIAGNOSIS — C84.40 PERIPHERAL T-CELL LYMPHOMA, NOT ELSEWHERE CLASSIFIED, UNSPECIFIED SITE: ICD-10-CM

## 2022-09-06 LAB
BASOPHILS # BLD AUTO: 0.2 K/UL — SIGNIFICANT CHANGE UP (ref 0–0.2)
BASOPHILS NFR BLD AUTO: 2.6 % — HIGH (ref 0–2)
EOSINOPHIL # BLD AUTO: 0.1 K/UL — SIGNIFICANT CHANGE UP (ref 0–0.5)
EOSINOPHIL NFR BLD AUTO: 0.8 % — SIGNIFICANT CHANGE UP (ref 0–6)
HCT VFR BLD CALC: 31.1 % — LOW (ref 34.5–45)
HGB BLD-MCNC: 10.3 G/DL — LOW (ref 11.5–15.5)
LYMPHOCYTES # BLD AUTO: 1.3 K/UL — SIGNIFICANT CHANGE UP (ref 1–3.3)
LYMPHOCYTES # BLD AUTO: 18.6 % — SIGNIFICANT CHANGE UP (ref 13–44)
MCHC RBC-ENTMCNC: 30.1 PG — SIGNIFICANT CHANGE UP (ref 27–34)
MCHC RBC-ENTMCNC: 33.3 G/DL — SIGNIFICANT CHANGE UP (ref 32–36)
MCV RBC AUTO: 90.3 FL — SIGNIFICANT CHANGE UP (ref 80–100)
MONOCYTES # BLD AUTO: 0.6 K/UL — SIGNIFICANT CHANGE UP (ref 0–0.9)
MONOCYTES NFR BLD AUTO: 8.3 % — SIGNIFICANT CHANGE UP (ref 2–14)
NEUTROPHILS # BLD AUTO: 5 K/UL — SIGNIFICANT CHANGE UP (ref 1.8–7.4)
NEUTROPHILS NFR BLD AUTO: 69.6 % — SIGNIFICANT CHANGE UP (ref 43–77)
PLATELET # BLD AUTO: 280 K/UL — SIGNIFICANT CHANGE UP (ref 150–400)
RBC # BLD: 3.44 M/UL — LOW (ref 3.8–5.2)
RBC # FLD: 17.7 % — HIGH (ref 10.3–14.5)
WBC # BLD: 7.2 K/UL — SIGNIFICANT CHANGE UP (ref 3.8–10.5)
WBC # FLD AUTO: 7.2 K/UL — SIGNIFICANT CHANGE UP (ref 3.8–10.5)

## 2022-09-06 PROCEDURE — 99214 OFFICE O/P EST MOD 30 MIN: CPT

## 2022-09-07 ENCOUNTER — APPOINTMENT (OUTPATIENT)
Dept: HEMATOLOGY ONCOLOGY | Facility: CLINIC | Age: 57
End: 2022-09-07

## 2022-09-07 VITALS
BODY MASS INDEX: 31.44 KG/M2 | TEMPERATURE: 96.8 F | HEART RATE: 93 BPM | WEIGHT: 170.86 LBS | DIASTOLIC BLOOD PRESSURE: 68 MMHG | OXYGEN SATURATION: 99 % | SYSTOLIC BLOOD PRESSURE: 112 MMHG | RESPIRATION RATE: 17 BRPM | HEIGHT: 61.69 IN

## 2022-09-07 DIAGNOSIS — Z83.79 FAMILY HISTORY OF OTHER DISEASES OF THE DIGESTIVE SYSTEM: ICD-10-CM

## 2022-09-07 DIAGNOSIS — Z98.890 OTHER SPECIFIED POSTPROCEDURAL STATES: ICD-10-CM

## 2022-09-07 DIAGNOSIS — Z78.9 OTHER SPECIFIED HEALTH STATUS: ICD-10-CM

## 2022-09-07 DIAGNOSIS — Z80.0 FAMILY HISTORY OF MALIGNANT NEOPLASM OF DIGESTIVE ORGANS: ICD-10-CM

## 2022-09-07 PROCEDURE — 99215 OFFICE O/P EST HI 40 MIN: CPT

## 2022-09-09 NOTE — HISTORY OF PRESENT ILLNESS
[de-identified] : 57 year old female with no significant past medical hx presents to office for initial visit.  Patient presented on 3/4/22 c/o right axilla mass  to general surgeon, Dr. Amadeo Grace. Patient incidentally discovered it while shavingin Jan 2022  \par Biopsy of right axilla performed on 3/22/22 revealing atypical lymphoid infiltrate ( NEGATIVE for a clonal lgH gene rearrangement/ Positive clonal TCR gamma gene rearrangement )\par \par Planned for lymph node excision with Dr. Grace however patient decided to stay in the [facility] system and had Excisional Biopsy per Dr Mariano \par Colonoscopy November 2021\par \par LN excional BIopsy was delayed 2/2 to her travelling and pre surg testing.\par On review of needle biopsy from 3/22/22 at [facility], suggestive of T cell lymphoma\par \par S/p right axillary lymph node excisional biopsy on 5/16/22  per DR Mariano Path c/w Peripheral T- Cell Lymphoma, NOS\par PET SCAN 4/21/22: \par - 1. FDG avid extensive right axillary lymphadenopathy 5.4 x 3.6 cm , SUV 22.5. and right supraclavicular lymph node.0.8 x 0.8 cm (image 53), SUV 5.8.\par 2. Difficult to delineate retroareolar right breast soft tissue, with low-grade FDG avidity and minimally FDG avid diffuse, right breast cutaneous thickening. \par \par Breast node biopsy performed February 2022 was negative. \par MRI Breast on 5/26/22 with benign findings repeat in 6 months [de-identified] : Patient presents for follow up accompanied with .\par S/p right axillary lymph node excisional biopsy on 5/16/22  per DR Mariano Path c/w Peripheral T- Cell Lymphoma, NOS\par Patient started  CHOEP on 6/20/22, C5 scheduled for 9/12/22\par \par She reports increased fatigue  after first week of  treatment.\par She takes a nap during the day but is able to get up and be active. \par admits decreased appetite, tries to hydrate throughout the day \par Reports patch release pain the next day. She takes Claritin and Tylenol with relief. \par Patient admits constipation uses MiraLAX with relief\par Denies trouble hearing.  \par \par \par Currently on linzess for constipation, baby aspirin 81 mg, Prozac 20 mg, multivitamin

## 2022-09-09 NOTE — ASSESSMENT
[FreeTextEntry1] : 57 year old female no significant past medical hx now seen for Peripheral T cell Lymphoma NOS\par \par Patient presented on 3/4/22 c/o right axilla mass  to general surgeon, Dr. Amadeo Grace. Patient incidentally discovered it while shaving in Jan / Feb 2022 \par Biopsy of right axilla performed on 3/22/22 revealing atypical lymphoid infiltrate ( NEGATIVE for a clonal lgH gene rearrangement/ Positive clonal TCR gamma gene rearrangement )\par \par On review of needle biopsy from 3/22/22 at [facility], suggestive of T cell lymphoma\par \par S/p right axillary lymph node excisional biopsy on 5/16/22  per DR Mariano Path c/w Peripheral T- Cell Lymphoma, NOS\par \par PET SCAN 4/21/22: \par - 1. FDG avid extensive right axillary lymphadenopathy 5.4 x 3.6 cm , SUV 22.5. and right supraclavicular lymph node.0.8 x 0.8 cm (image 53), SUV 5.8.\par 2. Difficult to delineate retroareolar right breast soft tissue, with low-grade FDG avidity and minimally FDG avid diffuse, right breast cutaneous thickening. \par \par Breast node biopsy performed February 2022 was negative. \par MRI BREAST 5/26/22: BiRADS 3 , f/u in 6 months\par \par PET SCAN 6/13/22: \par 1. Interval resolution of FDG avidity associated with right supraclavicular lymph node which has decreased in size.\par 2. Interval decrease in size, number and avidity of right axillary lymphadenopathy and resolution of  FDG avid right retropectoral lymph nodes.\par 3. Unchanged minimally FDG avid difficult to delineate retroareolar right breast soft tissue and minimally FDG avid diffuse right breast cutaneous thickening. Skin thickening and parenchymal edema in the right breast is consistent with metastatic obstruction on interval MRI from 5/26/2022.\par \par PET/CT 8/8/22 \par 1. Interval further decrease of right axillary lymph nodes.\par 2. Unchanged nonspecific minimally FDG avid retroareolar right breast soft tissue. Interval decreased avidity of unchanged cutaneous thickening in the right breast.\par 3. Subcentimeter nodular opacity in the right upper lobe medially, unchanged since PET/CT on 4/21/2022. \par 4. New generalized diffuse bone marrow hypermetabolism, nonspecific and may be related to treatment. \par \par \par PERIPHERAL T CELL LYMPHOMA NOS Stage 1\par IPI SCORE: 0 LOW RISK \par - Some B symptoms, Night sweats since Jan 2022, fatigue, 6 lb weight loss\par - Discussed with patient Chemotherapy with CHOEP WITH NEULASTA Support \par -D1 :  Cyclophosphamide 750 mg /m2, Adriamycin 50 mg/ m2 Vincristone 1.4mg/ m2, Etoposide 10mg/ m2 IV \par D2- D3 Etoposide 100mg/m2  IV Hydration \par D1-D5 Prednisone 10mg po \par Allopurinol 100mg daily for TLS prophylaxis\par \par \par - Port placement on 6/8/22\par - ECHO on 6/14/22, LV EF 59%\par - Patient started  CHOEP on 6/20/22, C5 scheduled for 9/12/22\par -Constipation; continue MiraLAX. Advised senna and colace. \par -Rash-resolved \par - WIll order Restaging PET Scan after 6 cycles \par - Will refer to Dr. Acevedo for transplant evaluation, schedule on 9/7/22 \par - Echo in Sept 2022\par - f/u  in 4 weeks after PET/CT\par \par \par MRI BREAST: to be repeated in Nov 2022 \par \par

## 2022-09-12 ENCOUNTER — RESULT REVIEW (OUTPATIENT)
Age: 57
End: 2022-09-12

## 2022-09-12 ENCOUNTER — APPOINTMENT (OUTPATIENT)
Dept: HEMATOLOGY ONCOLOGY | Facility: CLINIC | Age: 57
End: 2022-09-12

## 2022-09-12 ENCOUNTER — APPOINTMENT (OUTPATIENT)
Age: 57
End: 2022-09-12

## 2022-09-12 LAB
ALBUMIN SERPL ELPH-MCNC: 4.2 G/DL — SIGNIFICANT CHANGE UP (ref 3.3–5)
ALP SERPL-CCNC: 75 U/L — SIGNIFICANT CHANGE UP (ref 40–120)
ALT FLD-CCNC: 27 U/L — SIGNIFICANT CHANGE UP (ref 10–45)
ANION GAP SERPL CALC-SCNC: 10 MMOL/L — SIGNIFICANT CHANGE UP (ref 5–17)
AST SERPL-CCNC: 33 U/L — SIGNIFICANT CHANGE UP (ref 10–40)
BASOPHILS # BLD AUTO: 0.1 K/UL — SIGNIFICANT CHANGE UP (ref 0–0.2)
BASOPHILS NFR BLD AUTO: 1 % — SIGNIFICANT CHANGE UP (ref 0–2)
BILIRUB SERPL-MCNC: 0.3 MG/DL — SIGNIFICANT CHANGE UP (ref 0.2–1.2)
BUN SERPL-MCNC: 17 MG/DL — SIGNIFICANT CHANGE UP (ref 7–23)
CALCIUM SERPL-MCNC: 9.3 MG/DL — SIGNIFICANT CHANGE UP (ref 8.4–10.5)
CHLORIDE SERPL-SCNC: 106 MMOL/L — SIGNIFICANT CHANGE UP (ref 96–108)
CO2 SERPL-SCNC: 21 MMOL/L — LOW (ref 22–31)
CREAT SERPL-MCNC: 0.49 MG/DL — LOW (ref 0.5–1.3)
EGFR: 111 ML/MIN/1.73M2 — SIGNIFICANT CHANGE UP
EOSINOPHIL # BLD AUTO: 0.1 K/UL — SIGNIFICANT CHANGE UP (ref 0–0.5)
EOSINOPHIL NFR BLD AUTO: 1 % — SIGNIFICANT CHANGE UP (ref 0–6)
GLUCOSE SERPL-MCNC: 139 MG/DL — HIGH (ref 70–99)
HCT VFR BLD CALC: 28.9 % — LOW (ref 34.5–45)
HGB BLD-MCNC: 9.5 G/DL — LOW (ref 11.5–15.5)
LDH SERPL L TO P-CCNC: 307 U/L — HIGH (ref 50–242)
LYMPHOCYTES # BLD AUTO: 0.6 K/UL — LOW (ref 1–3.3)
LYMPHOCYTES # BLD AUTO: 9.8 % — LOW (ref 13–44)
MCHC RBC-ENTMCNC: 30.2 PG — SIGNIFICANT CHANGE UP (ref 27–34)
MCHC RBC-ENTMCNC: 33.1 G/DL — SIGNIFICANT CHANGE UP (ref 32–36)
MCV RBC AUTO: 91.2 FL — SIGNIFICANT CHANGE UP (ref 80–100)
MONOCYTES # BLD AUTO: 0.3 K/UL — SIGNIFICANT CHANGE UP (ref 0–0.9)
MONOCYTES NFR BLD AUTO: 5.3 % — SIGNIFICANT CHANGE UP (ref 2–14)
NEUTROPHILS # BLD AUTO: 5.3 K/UL — SIGNIFICANT CHANGE UP (ref 1.8–7.4)
NEUTROPHILS NFR BLD AUTO: 82.9 % — HIGH (ref 43–77)
PLATELET # BLD AUTO: 405 K/UL — HIGH (ref 150–400)
POTASSIUM SERPL-MCNC: 4.4 MMOL/L — SIGNIFICANT CHANGE UP (ref 3.5–5.3)
POTASSIUM SERPL-SCNC: 4.4 MMOL/L — SIGNIFICANT CHANGE UP (ref 3.5–5.3)
PROT SERPL-MCNC: 6.8 G/DL — SIGNIFICANT CHANGE UP (ref 6–8.3)
RBC # BLD: 3.16 M/UL — LOW (ref 3.8–5.2)
RBC # FLD: 18.8 % — HIGH (ref 10.3–14.5)
SODIUM SERPL-SCNC: 137 MMOL/L — SIGNIFICANT CHANGE UP (ref 135–145)
WBC # BLD: 6.3 K/UL — SIGNIFICANT CHANGE UP (ref 3.8–10.5)
WBC # FLD AUTO: 6.3 K/UL — SIGNIFICANT CHANGE UP (ref 3.8–10.5)

## 2022-09-13 ENCOUNTER — RESULT REVIEW (OUTPATIENT)
Age: 57
End: 2022-09-13

## 2022-09-13 ENCOUNTER — APPOINTMENT (OUTPATIENT)
Age: 57
End: 2022-09-13

## 2022-09-13 DIAGNOSIS — C85.90 NON-HODGKIN LYMPHOMA, UNSPECIFIED, UNSPECIFIED SITE: ICD-10-CM

## 2022-09-13 DIAGNOSIS — R11.2 NAUSEA WITH VOMITING, UNSPECIFIED: ICD-10-CM

## 2022-09-13 DIAGNOSIS — Z51.11 ENCOUNTER FOR ANTINEOPLASTIC CHEMOTHERAPY: ICD-10-CM

## 2022-09-13 LAB — MAGNESIUM SERPL-MCNC: 1.9 MG/DL — SIGNIFICANT CHANGE UP (ref 1.6–2.6)

## 2022-09-14 ENCOUNTER — APPOINTMENT (OUTPATIENT)
Age: 57
End: 2022-09-14

## 2022-09-14 DIAGNOSIS — E86.0 DEHYDRATION: ICD-10-CM

## 2022-09-19 DIAGNOSIS — C84.40 PERIPHERAL T-CELL LYMPHOMA, NOT ELSEWHERE CLASSIFIED, UNSPECIFIED SITE: ICD-10-CM

## 2022-09-19 PROBLEM — Z78.9 SOCIAL ALCOHOL USE: Status: ACTIVE | Noted: 2022-04-29

## 2022-09-19 PROBLEM — Z80.0 FAMILY HISTORY OF PANCREATIC CANCER: Status: ACTIVE | Noted: 2022-04-15

## 2022-09-19 PROBLEM — Z98.890 HISTORY OF LYMPH NODE BIOPSY: Status: RESOLVED | Noted: 2022-04-15 | Resolved: 2022-09-19

## 2022-09-19 PROBLEM — Z83.79 FAMILY HISTORY OF COLITIS: Status: ACTIVE | Noted: 2022-04-15

## 2022-09-19 NOTE — REASON FOR VISIT
[Initial Consultation] : an initial consultation for [Lymphoma] : lymphoma [Spouse] : spouse [FreeTextEntry2] : atypical  lymphoid infiltrate on Axillary LN

## 2022-09-19 NOTE — HISTORY OF PRESENT ILLNESS
[de-identified] : 57 year old female with no significant past medical hx presents to office for initial visit for high dose chemtherapy and auto stem cell transplant....  Patient presented on 3/4/22 c/o right axilla mass  to general surgeon, Dr. Amadeo Grace. Patient incidentally discovered it while shavingin Jan 2022  \par Biopsy of right axilla performed on 3/22/22 revealing atypical lymphoid infiltrate ( NEGATIVE for a clonal lgH gene rearrangement/ Positive clonal TCR gamma gene rearrangement )\par \par Planned for lymph node excision with Dr. Grace however patient decided to stay in the [facility] system and had Excisional Biopsy per Dr Mariano \par Colonoscopy November 2021\par \par LN excional BIopsy was delayed 2/2 to her travelling and pre surg testing.\par On review of needle biopsy from 3/22/22 at [facility], suggestive of T cell lymphoma\par \par S/p right axillary lymph node excisional biopsy on 5/16/22  per DR Mariano Path c/w Peripheral T- Cell Lymphoma, NOS\par PET SCAN 4/21/22: \par - 1. FDG avid extensive right axillary lymphadenopathy 5.4 x 3.6 cm , SUV 22.5. and right supraclavicular lymph node.0.8 x 0.8 cm (image 53), SUV 5.8.\par 2. Difficult to delineate retroareolar right breast soft tissue, with low-grade FDG avidity and minimally FDG avid diffuse, right breast cutaneous thickening. \par \par Breast node biopsy performed February 2022 was negative. \par MRI Breast on 5/26/22 with benign findings repeat in 6 months [de-identified] : Patient presents accompanied with .\par S/p right axillary lymph node excisional biopsy on 5/16/22  per DR Mariano Path c/w Peripheral T- Cell Lymphoma, NOS\par Patient started  CHOEP on 6/20/22, C4  8/22/22\par \par She reports increased fatigue and insomnia 2/2 to steriod after first week of  treatment.\par She takes a nap during the day but is able to get up and be active. \par Reports patch release pain the next day. She takes Claritin and Tylenol with relief. \par Patient admits constipation uses MiraLAX however takes 6 days after treatment to have a bowel movement. \par Denies trouble hearing.  \par Currently on linzess for constipation, baby aspirin 81 mg, Prozac 20mg, multivitamin

## 2022-09-19 NOTE — ASSESSMENT
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 Amadeo Grace. Patient incidentally discovered it while shaving in Jan / Feb 2022 \par Biopsy of right axilla performed on 3/22/22 revealing atypical lymphoid infiltrate ( NEGATIVE for a clonal lgH gene rearrangement/ Positive clonal TCR gamma gene rearrangement )\par \par On review of needle biopsy from 3/22/22 at [facility], suggestive of T cell lymphoma\par \par S/p right axillary lymph node excisional biopsy on 5/16/22  per DR Montserrat Knight c/w Peripheral T- Cell Lymphoma, NOS\par \par PET SCAN 4/21/22: \par - 1. FDG avid extensive right axillary lymphadenopathy 5.4 x 3.6 cm , SUV 22.5. and right supraclavicular lymph node.0.8 x 0.8 cm (image 53), SUV 5.8.\par 2. Difficult to delineate retroareolar right breast soft tissue, with low-grade FDG avidity and minimally FDG avid diffuse, right breast cutaneous thickening. \par \par Breast node biopsy performed February 2022 was negative. \par MRI BREAST 5/26/22: BiRADS 3 , f/u in 6 months\par \par PET SCAN 6/13/22: \par 1. Interval resolution of FDG avidity associated with right supraclavicular lymph node which has decreased in size.\par 2. Interval decrease in size, number and avidity of right axillary lymphadenopathy and resolution of  FDG avid right retropectoral lymph nodes.\par 3. Unchanged minimally FDG avid difficult to delineate retroareolar right breast soft tissue and minimally FDG avid diffuse right breast cutaneous thickening. Skin thickening and parenchymal edema in the right breast is consistent with metastatic obstruction on interval MRI from 5/26/2022.\par \par PET/CT 8/8/22 \par 1. Interval further decrease of right axillary lymph nodes.\par 2. Unchanged nonspecific minimally FDG avid retroareolar right breast soft tissue. Interval decreased avidity of unchanged cutaneous thickening in the right breast.\par 3. Subcentimeter nodular opacity in the right upper lobe medially, unchanged since PET/CT on 4/21/2022. \par 4. New generalized diffuse bone marrow hypermetabolism, nonspecific and may be related to treatment. \par \par \par PERIPHERAL T CELL LYMPHOMA NOS Stage 1\par IPI SCORE: 0 LOW RISK \par - Some B symptoms, Night sweats since Jan 2022, fatigue, 6 lb weight loss\par - Discussed with patient Chemotherapy with CHOEP WITH NEULASTA Support \par -D1 :  Cyclophosphamide 750 mg /m2, Adriamycin 50 mg/ m2 Vincristone 1.4mg/ m2, Etoposide 10mg/ m2 IV \par D2- D3 Etoposide 100mg/m2  IV Hydration \par D1-D5 Prednisone 10mg po \par Allopurinol 100mg daily for TLS prophylaxis\par \par \par - Port placement on 6/8/22\par - ECHO on 6/14/22, LV EF 59%\par - Patient started  CHOEP on 6/20/22, C4  8/22/22\par -Constipation; continue MiraLAX. Advised senna and colace. \par -Rash; Continue salicylic acid cream for rash, can try clindamycin ointment if not improved. \par - Restaging PET Scan after 6 cycles..to have f/u MRI \par - Echo in Sept 2022 along with pre transplant testing and dental eval\par - f/u with jacqueline in 4 weeks \par MRI BREAST: to be repeated in Nov 2022 \par I had an extensive discussion regarding the risks, benefits, alternatives, logistics and rationale for high dose chemotherapy and auto stem cell transplant for t cell NHL..4 week hospital stay and 8 week recovery discussed...augmented cbv prep...stem cell mobilization with growth factor and collection discussed...need confirmed negative bm bx...if not consider ICE mobilization...goal 2 to 5 million cd 34 cells per kg...Collection early to mid nov...admit for transplant end nov.Literature and consents provided for review..quests addressed..f/u 3 weeks with transplant team...orientation and sw eval...\par \par

## 2022-09-19 NOTE — PHYSICAL EXAM
[Restricted in physically strenuous activity but ambulatory and able to carry out work of a light or sedentary nature] : Status 1- Restricted in physically strenuous activity but ambulatory and able to carry out work of a light or sedentary nature, e.g., light house work, office work [Normal] : normal spine exam without palpable tenderness, no kyphosis or scoliosis [de-identified] : port in place [de-identified] : No edema

## 2022-09-21 ENCOUNTER — LABORATORY RESULT (OUTPATIENT)
Age: 57
End: 2022-09-21

## 2022-09-21 ENCOUNTER — RESULT REVIEW (OUTPATIENT)
Age: 57
End: 2022-09-21

## 2022-09-21 ENCOUNTER — APPOINTMENT (OUTPATIENT)
Dept: HEMATOLOGY ONCOLOGY | Facility: CLINIC | Age: 57
End: 2022-09-21

## 2022-09-21 VITALS
TEMPERATURE: 97.3 F | HEART RATE: 96 BPM | RESPIRATION RATE: 17 BRPM | BODY MASS INDEX: 31.77 KG/M2 | DIASTOLIC BLOOD PRESSURE: 72 MMHG | WEIGHT: 171.96 LBS | SYSTOLIC BLOOD PRESSURE: 106 MMHG | OXYGEN SATURATION: 98 %

## 2022-09-21 LAB
ANISOCYTOSIS BLD QL: SLIGHT — SIGNIFICANT CHANGE UP
BASOPHILS # BLD AUTO: 0.02 K/UL — SIGNIFICANT CHANGE UP (ref 0–0.2)
BASOPHILS NFR BLD AUTO: 1 % — SIGNIFICANT CHANGE UP (ref 0–2)
DACRYOCYTES BLD QL SMEAR: SLIGHT — SIGNIFICANT CHANGE UP
EOSINOPHIL # BLD AUTO: 0.04 K/UL — SIGNIFICANT CHANGE UP (ref 0–0.5)
EOSINOPHIL NFR BLD AUTO: 2 % — SIGNIFICANT CHANGE UP (ref 0–6)
HCT VFR BLD CALC: 27.5 % — LOW (ref 34.5–45)
HGB BLD-MCNC: 9 G/DL — LOW (ref 11.5–15.5)
LYMPHOCYTES # BLD AUTO: 0.85 K/UL — LOW (ref 1–3.3)
LYMPHOCYTES # BLD AUTO: 38 % — SIGNIFICANT CHANGE UP (ref 13–44)
MCHC RBC-ENTMCNC: 30.8 PG — SIGNIFICANT CHANGE UP (ref 27–34)
MCHC RBC-ENTMCNC: 32.7 G/DL — SIGNIFICANT CHANGE UP (ref 32–36)
MCV RBC AUTO: 94.2 FL — SIGNIFICANT CHANGE UP (ref 80–100)
MONOCYTES # BLD AUTO: 0.47 K/UL — SIGNIFICANT CHANGE UP (ref 0–0.9)
MONOCYTES NFR BLD AUTO: 21 % — HIGH (ref 2–14)
MYELOCYTES NFR BLD: 2 % — HIGH (ref 0–0)
NEUTROPHILS # BLD AUTO: 0.78 K/UL — LOW (ref 1.8–7.4)
NEUTROPHILS NFR BLD AUTO: 35 % — LOW (ref 43–77)
NRBC # BLD: 8 /100 — HIGH (ref 0–0)
NRBC # BLD: SIGNIFICANT CHANGE UP /100 WBCS (ref 0–0)
PLAT MORPH BLD: NORMAL — SIGNIFICANT CHANGE UP
PLATELET # BLD AUTO: 107 K/UL — LOW (ref 150–400)
POIKILOCYTOSIS BLD QL AUTO: SLIGHT — SIGNIFICANT CHANGE UP
PROMYELOCYTES # FLD: 1 % — HIGH (ref 0–0)
RBC # BLD: 2.92 M/UL — LOW (ref 3.8–5.2)
RBC # FLD: 16.5 % — HIGH (ref 10.3–14.5)
RBC BLD AUTO: SIGNIFICANT CHANGE UP
WBC # BLD: 2.24 K/UL — LOW (ref 3.8–10.5)
WBC # FLD AUTO: 2.24 K/UL — LOW (ref 3.8–10.5)

## 2022-09-21 PROCEDURE — 38222 DX BONE MARROW BX & ASPIR: CPT | Mod: RT

## 2022-09-21 NOTE — REASON FOR VISIT
[Bone Marrow Biopsy] : bone marrow biopsy [Bone Marrow Aspiration] : bone marrow aspiration [FreeTextEntry2] : 58 yo female w/ t cell lymphoma s/p CHOEP, pre BMT assessment

## 2022-09-21 NOTE — PROCEDURE
[Bone Marrow Biopsy] : bone marrow biopsy [Bone Marrow Aspiration] : bone marrow aspiration  [Patient] : the patient [Patient identification verified] : patient identification verified [Procedure verified and consent obtained] : procedure verified and consent obtained [Correct implant and/ or special equipment obtained] : correct impact and/ or special equipment obtained [Prone] : prone [Lidocaine was injected and into the periosteum overlying the site.] : Lidocaine was injected and into the periosteum overlying the site. [Aspirate] : aspirate [Cytogenetics] : cytogenetics [FISH] : FISH [Biopsy] : biopsy [Flow Cytometry] : flow cytometry [] : The patient was instructed to remove the bandage the following AM. The patient may bathe. Acetaminophen may be taken for discomfort, as per package directions.If there are any other problems, the patient was instructed to call the office. The patient verbalized understanding, and is aware of the office contact numbers. [FreeTextEntry1] : 56 yo female w/ t cell lymphoma s/p CHOEP, pre BMT assessment [FreeTextEntry2] : CBC prior to procedure on 9/12 \par WBC 6.3\par Hgb  9.35 Hct 28.9\par Plt 405\par BM Bx and aspiration was performed by DEBRA Woodward. 2 lavender + 2 green top tubes of BM aspirate and 1 cassette of BM core specimen sent to lab.\par \par

## 2022-09-23 ENCOUNTER — RESULT REVIEW (OUTPATIENT)
Age: 57
End: 2022-09-23

## 2022-09-28 ENCOUNTER — OUTPATIENT (OUTPATIENT)
Dept: OUTPATIENT SERVICES | Facility: HOSPITAL | Age: 57
LOS: 1 days | Discharge: HOME | End: 2022-09-28

## 2022-09-28 DIAGNOSIS — C84.40 PERIPHERAL T-CELL LYMPHOMA, NOT ELSEWHERE CLASSIFIED, UNSPECIFIED SITE: ICD-10-CM

## 2022-10-03 ENCOUNTER — APPOINTMENT (OUTPATIENT)
Dept: HEMATOLOGY ONCOLOGY | Facility: CLINIC | Age: 57
End: 2022-10-03

## 2022-10-03 ENCOUNTER — RESULT REVIEW (OUTPATIENT)
Age: 57
End: 2022-10-03

## 2022-10-03 ENCOUNTER — APPOINTMENT (OUTPATIENT)
Age: 57
End: 2022-10-03

## 2022-10-03 VITALS
WEIGHT: 175 LBS | SYSTOLIC BLOOD PRESSURE: 112 MMHG | OXYGEN SATURATION: 98 % | HEART RATE: 84 BPM | BODY MASS INDEX: 32.33 KG/M2 | DIASTOLIC BLOOD PRESSURE: 78 MMHG

## 2022-10-03 DIAGNOSIS — R22.30 LOCALIZED SWELLING, MASS AND LUMP, UNSPECIFIED UPPER LIMB: ICD-10-CM

## 2022-10-03 LAB
ALBUMIN SERPL ELPH-MCNC: 4.7 G/DL — SIGNIFICANT CHANGE UP (ref 3.3–5)
ALP SERPL-CCNC: 74 U/L — SIGNIFICANT CHANGE UP (ref 40–120)
ALT FLD-CCNC: 22 U/L — SIGNIFICANT CHANGE UP (ref 10–45)
ANION GAP SERPL CALC-SCNC: 13 MMOL/L — SIGNIFICANT CHANGE UP (ref 5–17)
AST SERPL-CCNC: 27 U/L — SIGNIFICANT CHANGE UP (ref 10–40)
BASOPHILS # BLD AUTO: 0.1 K/UL — SIGNIFICANT CHANGE UP (ref 0–0.2)
BASOPHILS NFR BLD AUTO: 1.5 % — SIGNIFICANT CHANGE UP (ref 0–2)
BILIRUB SERPL-MCNC: 0.3 MG/DL — SIGNIFICANT CHANGE UP (ref 0.2–1.2)
BUN SERPL-MCNC: 11 MG/DL — SIGNIFICANT CHANGE UP (ref 7–23)
CALCIUM SERPL-MCNC: 10 MG/DL — SIGNIFICANT CHANGE UP (ref 8.4–10.5)
CHLORIDE SERPL-SCNC: 100 MMOL/L — SIGNIFICANT CHANGE UP (ref 96–108)
CO2 SERPL-SCNC: 23 MMOL/L — SIGNIFICANT CHANGE UP (ref 22–31)
CREAT SERPL-MCNC: 0.49 MG/DL — LOW (ref 0.5–1.3)
EGFR: 111 ML/MIN/1.73M2 — SIGNIFICANT CHANGE UP
EOSINOPHIL # BLD AUTO: 0 K/UL — SIGNIFICANT CHANGE UP (ref 0–0.5)
EOSINOPHIL NFR BLD AUTO: 0.7 % — SIGNIFICANT CHANGE UP (ref 0–6)
GLUCOSE SERPL-MCNC: 133 MG/DL — HIGH (ref 70–99)
HCT VFR BLD CALC: 29.8 % — LOW (ref 34.5–45)
HGB BLD-MCNC: 9.7 G/DL — LOW (ref 11.5–15.5)
LDH SERPL L TO P-CCNC: 354 U/L — HIGH (ref 50–242)
LYMPHOCYTES # BLD AUTO: 0.6 K/UL — LOW (ref 1–3.3)
LYMPHOCYTES # BLD AUTO: 13.9 % — SIGNIFICANT CHANGE UP (ref 13–44)
MCHC RBC-ENTMCNC: 32.5 G/DL — SIGNIFICANT CHANGE UP (ref 32–36)
MCHC RBC-ENTMCNC: 32.6 PG — SIGNIFICANT CHANGE UP (ref 27–34)
MCV RBC AUTO: 100.1 FL — HIGH (ref 80–100)
MONOCYTES # BLD AUTO: 0.3 K/UL — SIGNIFICANT CHANGE UP (ref 0–0.9)
MONOCYTES NFR BLD AUTO: 6 % — SIGNIFICANT CHANGE UP (ref 2–14)
NEUTROPHILS # BLD AUTO: 3.3 K/UL — SIGNIFICANT CHANGE UP (ref 1.8–7.4)
NEUTROPHILS NFR BLD AUTO: 77.9 % — HIGH (ref 43–77)
PLATELET # BLD AUTO: 415 K/UL — HIGH (ref 150–400)
POTASSIUM SERPL-MCNC: 4.4 MMOL/L — SIGNIFICANT CHANGE UP (ref 3.5–5.3)
POTASSIUM SERPL-SCNC: 4.4 MMOL/L — SIGNIFICANT CHANGE UP (ref 3.5–5.3)
PROT SERPL-MCNC: 7.3 G/DL — SIGNIFICANT CHANGE UP (ref 6–8.3)
RBC # BLD: 2.97 M/UL — LOW (ref 3.8–5.2)
RBC # FLD: 16.9 % — HIGH (ref 10.3–14.5)
SODIUM SERPL-SCNC: 135 MMOL/L — SIGNIFICANT CHANGE UP (ref 135–145)
WBC # BLD: 4.2 K/UL — SIGNIFICANT CHANGE UP (ref 3.8–10.5)
WBC # FLD AUTO: 4.2 K/UL — SIGNIFICANT CHANGE UP (ref 3.8–10.5)

## 2022-10-03 PROCEDURE — 99214 OFFICE O/P EST MOD 30 MIN: CPT

## 2022-10-04 ENCOUNTER — NON-APPOINTMENT (OUTPATIENT)
Age: 57
End: 2022-10-04

## 2022-10-04 ENCOUNTER — APPOINTMENT (OUTPATIENT)
Age: 57
End: 2022-10-04

## 2022-10-04 DIAGNOSIS — Z51.11 ENCOUNTER FOR ANTINEOPLASTIC CHEMOTHERAPY: ICD-10-CM

## 2022-10-04 DIAGNOSIS — R11.2 NAUSEA WITH VOMITING, UNSPECIFIED: ICD-10-CM

## 2022-10-05 ENCOUNTER — APPOINTMENT (OUTPATIENT)
Age: 57
End: 2022-10-05

## 2022-10-05 DIAGNOSIS — E86.0 DEHYDRATION: ICD-10-CM

## 2022-10-06 DIAGNOSIS — Z51.89 ENCOUNTER FOR OTHER SPECIFIED AFTERCARE: ICD-10-CM

## 2022-10-06 PROBLEM — R22.30 MASS OF AXILLA: Status: ACTIVE | Noted: 2022-04-15

## 2022-10-06 NOTE — ASSESSMENT
[FreeTextEntry1] : 57 year old female no significant past medical hx now seen for Peripheral T cell Lymphoma NOS\par \par Patient presented on 3/4/22 c/o right axilla mass  to general surgeon, Dr. Amadeo Grace. Patient incidentally discovered it while shaving in Jan / Feb 2022 \par Biopsy of right axilla performed on 3/22/22 revealing atypical lymphoid infiltrate ( NEGATIVE for a clonal lgH gene rearrangement/ Positive clonal TCR gamma gene rearrangement )\par \par On review of needle biopsy from 3/22/22 at [facility], suggestive of T cell lymphoma\par \par S/p right axillary lymph node excisional biopsy on 5/16/22  per DR Mariano Path c/w Peripheral T- Cell Lymphoma, NOS\par \par PET SCAN 4/21/22: \par - 1. FDG avid extensive right axillary lymphadenopathy 5.4 x 3.6 cm , SUV 22.5. and right supraclavicular lymph node.0.8 x 0.8 cm (image 53), SUV 5.8.\par 2. Difficult to delineate retroareolar right breast soft tissue, with low-grade FDG avidity and minimally FDG avid diffuse, right breast cutaneous thickening. \par \par Breast node biopsy performed February 2022 was negative. \par MRI BREAST 5/26/22: BiRADS 3 , f/u in 6 months\par \par PET SCAN 6/13/22: \par 1. Interval resolution of FDG avidity associated with right supraclavicular lymph node which has decreased in size.\par 2. Interval decrease in size, number and avidity of right axillary lymphadenopathy and resolution of  FDG avid right retropectoral lymph nodes.\par 3. Unchanged minimally FDG avid difficult to delineate retroareolar right breast soft tissue and minimally FDG avid diffuse right breast cutaneous thickening. Skin thickening and parenchymal edema in the right breast is consistent with metastatic obstruction on interval MRI from 5/26/2022.\par \par PET/CT 8/8/22 \par 1. Interval further decrease of right axillary lymph nodes.\par 2. Unchanged nonspecific minimally FDG avid retroareolar right breast soft tissue. Interval decreased avidity of unchanged cutaneous thickening in the right breast.\par 3. Subcentimeter nodular opacity in the right upper lobe medially, unchanged since PET/CT on 4/21/2022. \par 4. New generalized diffuse bone marrow hypermetabolism, nonspecific and may be related to treatment. \par \par \par PERIPHERAL T CELL LYMPHOMA NOS Stage 1\par IPI SCORE: 0 LOW RISK \par - Some B symptoms, Night sweats since Jan 2022, fatigue, 6 lb weight loss\par - Discussed with patient Chemotherapy with CHOEP WITH NEULASTA Support \par -D1 :  Cyclophosphamide 750 mg /m2, Adriamycin 50 mg/ m2 Vincristone 1.4mg/ m2, Etoposide 10mg/ m2 IV \par D2- D3 Etoposide 100mg/m2  IV Hydration \par D1-D5 Prednisone 10mg po \par Allopurinol 100mg daily for TLS prophylaxis\par \par \par - Port placement on 6/8/22\par - ECHO on 6/14/22, LV EF 59%\par - Patient started  CHOEP on 6/20/22, C6D1 scheduled for today\par -Constipation; continue MiraLAX. Advised senna and colace. \par -Rash-resolved \par - Patient evaluated by Dr. Acevedo for auto stem cell transplant for t cell NHL, has f/u in 3 weeks \par - WIll order Restaging PET Scan after 6 cycles, PEt/CT scheduled for 10/17/22 \par - MRI BREAST: to be repeated in Nov 2022 , will have it done prior transplant (end of October)\par - Echo due in Sept 2022, needs to be scheduled\par - f/u in 3-4 weeks after PET/CT\par \par \par \par

## 2022-10-06 NOTE — HISTORY OF PRESENT ILLNESS
[de-identified] : 57 year old female with no significant past medical hx presents to office for initial visit.  Patient presented on 3/4/22 c/o right axilla mass  to general surgeon, Dr. Amadeo Grace. Patient incidentally discovered it while shavingin Jan 2022  \par Biopsy of right axilla performed on 3/22/22 revealing atypical lymphoid infiltrate ( NEGATIVE for a clonal lgH gene rearrangement/ Positive clonal TCR gamma gene rearrangement )\par \par Planned for lymph node excision with Dr. Grace however patient decided to stay in the [facility] system and had Excisional Biopsy per Dr Mariano \par Colonoscopy November 2021\par \par LN excional BIopsy was delayed 2/2 to her travelling and pre surg testing.\par On review of needle biopsy from 3/22/22 at [facility], suggestive of T cell lymphoma\par \par S/p right axillary lymph node excisional biopsy on 5/16/22  per DR Mariano Path c/w Peripheral T- Cell Lymphoma, NOS\par PET SCAN 4/21/22: \par - 1. FDG avid extensive right axillary lymphadenopathy 5.4 x 3.6 cm , SUV 22.5. and right supraclavicular lymph node.0.8 x 0.8 cm (image 53), SUV 5.8.\par 2. Difficult to delineate retroareolar right breast soft tissue, with low-grade FDG avidity and minimally FDG avid diffuse, right breast cutaneous thickening. \par \par Breast node biopsy performed February 2022 was negative. \par MRI Breast on 5/26/22 with benign findings repeat in 6 months [de-identified] : Patient presents for follow up accompanied with .\par S/p right axillary lymph node excisional biopsy on 5/16/22  per DR Mariano Path c/w Peripheral T- Cell Lymphoma, NOS\par Patient started  CHOEP on 6/20/22, C6D1 scheduled for today\par \par She reports increased fatigue  after first week of  treatment.\par Admits decreased appetite, tries to hydrate throughout the day \par Reports patch release pain the next day. She takes Claritin and Tylenol with relief. \par Patient admits constipation uses Linzess and Miralax with relief\par Patient evaluated by Dr. Acevedo for auto stem cell transplant for t cell NHL\par Denies trouble hearing.  \par \par \par Currently on linzess for constipation, baby aspirin 81 mg, Prozac 20 mg, multivitamin

## 2022-10-10 ENCOUNTER — RESULT REVIEW (OUTPATIENT)
Age: 57
End: 2022-10-10

## 2022-10-10 ENCOUNTER — INPATIENT (INPATIENT)
Facility: HOSPITAL | Age: 57
LOS: 2 days | Discharge: HOME | DRG: 809 | End: 2022-10-13
Attending: INTERNAL MEDICINE | Admitting: STUDENT IN AN ORGANIZED HEALTH CARE EDUCATION/TRAINING PROGRAM
Payer: COMMERCIAL

## 2022-10-10 ENCOUNTER — APPOINTMENT (OUTPATIENT)
Dept: HEMATOLOGY ONCOLOGY | Facility: CLINIC | Age: 57
End: 2022-10-10

## 2022-10-10 VITALS
OXYGEN SATURATION: 98 % | HEIGHT: 62 IN | HEART RATE: 131 BPM | SYSTOLIC BLOOD PRESSURE: 115 MMHG | TEMPERATURE: 99 F | WEIGHT: 169.98 LBS | DIASTOLIC BLOOD PRESSURE: 69 MMHG | RESPIRATION RATE: 18 BRPM

## 2022-10-10 DIAGNOSIS — D70.9 NEUTROPENIA, UNSPECIFIED: ICD-10-CM

## 2022-10-10 LAB
ALBUMIN SERPL ELPH-MCNC: 4.5 G/DL — SIGNIFICANT CHANGE UP (ref 3.3–5.2)
ALP SERPL-CCNC: 78 U/L — SIGNIFICANT CHANGE UP (ref 40–120)
ALT FLD-CCNC: 15 U/L — SIGNIFICANT CHANGE UP
ANION GAP SERPL CALC-SCNC: 16 MMOL/L — SIGNIFICANT CHANGE UP (ref 5–17)
ANISOCYTOSIS BLD QL: SLIGHT — SIGNIFICANT CHANGE UP
APPEARANCE UR: CLEAR — SIGNIFICANT CHANGE UP
APTT BLD: 25.4 SEC — LOW (ref 27.5–35.5)
AST SERPL-CCNC: 10 U/L — SIGNIFICANT CHANGE UP
BACTERIA # UR AUTO: ABNORMAL
BASE EXCESS BLDV CALC-SCNC: 2.5 MMOL/L — SIGNIFICANT CHANGE UP (ref -2–3)
BASOPHILS # BLD AUTO: 0 K/UL — SIGNIFICANT CHANGE UP (ref 0–0.2)
BASOPHILS NFR BLD AUTO: 2 % — SIGNIFICANT CHANGE UP (ref 0–2)
BILIRUB SERPL-MCNC: 0.3 MG/DL — LOW (ref 0.4–2)
BILIRUB UR-MCNC: NEGATIVE — SIGNIFICANT CHANGE UP
BUN SERPL-MCNC: 18.1 MG/DL — SIGNIFICANT CHANGE UP (ref 8–20)
CA-I SERPL-SCNC: 1.26 MMOL/L — SIGNIFICANT CHANGE UP (ref 1.15–1.33)
CALCIUM SERPL-MCNC: 9.9 MG/DL — SIGNIFICANT CHANGE UP (ref 8.4–10.5)
CHLORIDE BLDV-SCNC: 101 MMOL/L — SIGNIFICANT CHANGE UP (ref 98–107)
CHLORIDE SERPL-SCNC: 97 MMOL/L — LOW (ref 98–107)
CO2 SERPL-SCNC: 25 MMOL/L — SIGNIFICANT CHANGE UP (ref 22–29)
COLOR SPEC: YELLOW — SIGNIFICANT CHANGE UP
CREAT SERPL-MCNC: 0.45 MG/DL — LOW (ref 0.5–1.3)
DIFF PNL FLD: ABNORMAL
EGFR: 114 ML/MIN/1.73M2 — SIGNIFICANT CHANGE UP
EOSINOPHIL # BLD AUTO: 0.1 K/UL — SIGNIFICANT CHANGE UP (ref 0–0.5)
EOSINOPHIL NFR BLD AUTO: 9 % — HIGH (ref 0–6)
EPI CELLS # UR: SIGNIFICANT CHANGE UP
GAS PNL BLDV: 134 MMOL/L — LOW (ref 136–145)
GAS PNL BLDV: SIGNIFICANT CHANGE UP
GLUCOSE BLDV-MCNC: 107 MG/DL — HIGH (ref 70–99)
GLUCOSE SERPL-MCNC: 102 MG/DL — HIGH (ref 70–99)
GLUCOSE UR QL: NEGATIVE MG/DL — SIGNIFICANT CHANGE UP
HCO3 BLDV-SCNC: 27 MMOL/L — SIGNIFICANT CHANGE UP (ref 22–29)
HCT VFR BLD CALC: 26 % — LOW (ref 34.5–45)
HCT VFR BLD CALC: 26.7 % — LOW (ref 34.5–45)
HCT VFR BLDA CALC: 27 % — SIGNIFICANT CHANGE UP
HGB BLD CALC-MCNC: 8.9 G/DL — LOW (ref 11.7–16.1)
HGB BLD-MCNC: 8.4 G/DL — LOW (ref 11.5–15.5)
HGB BLD-MCNC: 8.9 G/DL — LOW (ref 11.5–15.5)
INR BLD: 1.13 RATIO — SIGNIFICANT CHANGE UP (ref 0.88–1.16)
KETONES UR-MCNC: ABNORMAL
LACTATE BLDV-MCNC: 1.7 MMOL/L — SIGNIFICANT CHANGE UP (ref 0.5–2)
LEUKOCYTE ESTERASE UR-ACNC: NEGATIVE — SIGNIFICANT CHANGE UP
LYMPHOCYTES # BLD AUTO: 0.5 K/UL — LOW (ref 1–3.3)
LYMPHOCYTES # BLD AUTO: 44 % — SIGNIFICANT CHANGE UP (ref 13–44)
MACROCYTES BLD QL: SLIGHT — SIGNIFICANT CHANGE UP
MCHC RBC-ENTMCNC: 31.6 PG — SIGNIFICANT CHANGE UP (ref 27–34)
MCHC RBC-ENTMCNC: 32.3 GM/DL — SIGNIFICANT CHANGE UP (ref 32–36)
MCHC RBC-ENTMCNC: 33.4 PG — SIGNIFICANT CHANGE UP (ref 27–34)
MCHC RBC-ENTMCNC: 33.5 G/DL — SIGNIFICANT CHANGE UP (ref 32–36)
MCV RBC AUTO: 97.7 FL — SIGNIFICANT CHANGE UP (ref 80–100)
MCV RBC AUTO: 99.6 FL — SIGNIFICANT CHANGE UP (ref 80–100)
MICROCYTES BLD QL: SLIGHT — SIGNIFICANT CHANGE UP
MONOCYTES # BLD AUTO: 0.1 K/UL — SIGNIFICANT CHANGE UP (ref 0–0.9)
MONOCYTES NFR BLD AUTO: 11 % — SIGNIFICANT CHANGE UP (ref 2–14)
NEUTROPHILS # BLD AUTO: 0.2 K/UL — LOW (ref 1.8–7.4)
NEUTROPHILS NFR BLD AUTO: 32 % — LOW (ref 43–77)
NEUTS BAND # BLD: 2 % — SIGNIFICANT CHANGE UP (ref 0–8)
NITRITE UR-MCNC: NEGATIVE — SIGNIFICANT CHANGE UP
PCO2 BLDV: 44 MMHG — HIGH (ref 39–42)
PH BLDV: 7.4 — SIGNIFICANT CHANGE UP (ref 7.32–7.43)
PH UR: 6 — SIGNIFICANT CHANGE UP (ref 5–8)
PLAT MORPH BLD: NORMAL — SIGNIFICANT CHANGE UP
PLATELET # BLD AUTO: 79 K/UL — LOW (ref 150–400)
PLATELET # BLD AUTO: 86 K/UL — LOW (ref 150–400)
PO2 BLDV: 53 MMHG — HIGH (ref 25–45)
POIKILOCYTOSIS BLD QL AUTO: SLIGHT — SIGNIFICANT CHANGE UP
POTASSIUM BLDV-SCNC: 3.7 MMOL/L — SIGNIFICANT CHANGE UP (ref 3.5–5.1)
POTASSIUM SERPL-MCNC: 3.7 MMOL/L — SIGNIFICANT CHANGE UP (ref 3.5–5.3)
POTASSIUM SERPL-SCNC: 3.7 MMOL/L — SIGNIFICANT CHANGE UP (ref 3.5–5.3)
PROT SERPL-MCNC: 7.4 G/DL — SIGNIFICANT CHANGE UP (ref 6.6–8.7)
PROT UR-MCNC: 15
PROTHROM AB SERPL-ACNC: 13.1 SEC — SIGNIFICANT CHANGE UP (ref 10.5–13.4)
RAPID RVP RESULT: DETECTED
RBC # BLD: 2.66 M/UL — LOW (ref 3.8–5.2)
RBC # BLD: 2.68 M/UL — LOW (ref 3.8–5.2)
RBC # FLD: 14 % — SIGNIFICANT CHANGE UP (ref 10.3–14.5)
RBC # FLD: 14.5 % — SIGNIFICANT CHANGE UP (ref 10.3–14.5)
RBC BLD AUTO: SIGNIFICANT CHANGE UP
RBC CASTS # UR COMP ASSIST: SIGNIFICANT CHANGE UP /HPF (ref 0–4)
RV+EV RNA SPEC QL NAA+PROBE: DETECTED
SAO2 % BLDV: 83 % — SIGNIFICANT CHANGE UP
SARS-COV-2 RNA SPEC QL NAA+PROBE: SIGNIFICANT CHANGE UP
SODIUM SERPL-SCNC: 138 MMOL/L — SIGNIFICANT CHANGE UP (ref 135–145)
SP GR SPEC: 1.02 — SIGNIFICANT CHANGE UP (ref 1.01–1.02)
TOXIC GRANULES BLD QL SMEAR: PRESENT — SIGNIFICANT CHANGE UP
UROBILINOGEN FLD QL: 1 MG/DL
WBC # BLD: 0.57 K/UL — CRITICAL LOW (ref 3.8–10.5)
WBC # BLD: 0.9 K/UL — CRITICAL LOW (ref 3.8–10.5)
WBC # FLD AUTO: 0.57 K/UL — CRITICAL LOW (ref 3.8–10.5)
WBC # FLD AUTO: 0.9 K/UL — CRITICAL LOW (ref 3.8–10.5)
WBC UR QL: SIGNIFICANT CHANGE UP /HPF (ref 0–5)

## 2022-10-10 PROCEDURE — 99285 EMERGENCY DEPT VISIT HI MDM: CPT

## 2022-10-10 PROCEDURE — 74177 CT ABD & PELVIS W/CONTRAST: CPT | Mod: 26,MA

## 2022-10-10 PROCEDURE — 99223 1ST HOSP IP/OBS HIGH 75: CPT

## 2022-10-10 PROCEDURE — 71260 CT THORAX DX C+: CPT | Mod: 26,MA

## 2022-10-10 PROCEDURE — 71045 X-RAY EXAM CHEST 1 VIEW: CPT | Mod: 26

## 2022-10-10 PROCEDURE — 93010 ELECTROCARDIOGRAM REPORT: CPT

## 2022-10-10 RX ORDER — CEFEPIME 1 G/1
2000 INJECTION, POWDER, FOR SOLUTION INTRAMUSCULAR; INTRAVENOUS EVERY 8 HOURS
Refills: 0 | Status: DISCONTINUED | OUTPATIENT
Start: 2022-10-11 | End: 2022-10-13

## 2022-10-10 RX ORDER — SODIUM CHLORIDE 9 MG/ML
2400 INJECTION, SOLUTION INTRAVENOUS ONCE
Refills: 0 | Status: COMPLETED | OUTPATIENT
Start: 2022-10-10 | End: 2022-10-10

## 2022-10-10 RX ORDER — CEFEPIME 1 G/1
2000 INJECTION, POWDER, FOR SOLUTION INTRAMUSCULAR; INTRAVENOUS EVERY 8 HOURS
Refills: 0 | Status: DISCONTINUED | OUTPATIENT
Start: 2022-10-10 | End: 2022-10-10

## 2022-10-10 RX ORDER — CEFEPIME 1 G/1
INJECTION, POWDER, FOR SOLUTION INTRAMUSCULAR; INTRAVENOUS
Refills: 0 | Status: DISCONTINUED | OUTPATIENT
Start: 2022-10-10 | End: 2022-10-10

## 2022-10-10 RX ORDER — CEFEPIME 1 G/1
2000 INJECTION, POWDER, FOR SOLUTION INTRAMUSCULAR; INTRAVENOUS ONCE
Refills: 0 | Status: DISCONTINUED | OUTPATIENT
Start: 2022-10-10 | End: 2022-10-10

## 2022-10-10 RX ORDER — CEFAZOLIN SODIUM 1 G
1000 VIAL (EA) INJECTION EVERY 8 HOURS
Refills: 0 | Status: DISCONTINUED | OUTPATIENT
Start: 2022-10-10 | End: 2022-10-10

## 2022-10-10 RX ORDER — ALLOPURINOL 300 MG
100 TABLET ORAL DAILY
Refills: 0 | Status: DISCONTINUED | OUTPATIENT
Start: 2022-10-10 | End: 2022-10-13

## 2022-10-10 RX ORDER — IBUPROFEN 200 MG
1 TABLET ORAL
Qty: 56 | Refills: 0

## 2022-10-10 RX ORDER — AZITHROMYCIN 500 MG/1
500 TABLET, FILM COATED ORAL ONCE
Refills: 0 | Status: COMPLETED | OUTPATIENT
Start: 2022-10-10 | End: 2022-10-10

## 2022-10-10 RX ORDER — KETOROLAC TROMETHAMINE 30 MG/ML
15 SYRINGE (ML) INJECTION ONCE
Refills: 0 | Status: DISCONTINUED | OUTPATIENT
Start: 2022-10-10 | End: 2022-10-11

## 2022-10-10 RX ORDER — ONDANSETRON 8 MG/1
4 TABLET, FILM COATED ORAL ONCE
Refills: 0 | Status: COMPLETED | OUTPATIENT
Start: 2022-10-10 | End: 2022-10-10

## 2022-10-10 RX ORDER — FLUOXETINE HCL 10 MG
20 CAPSULE ORAL DAILY
Refills: 0 | Status: DISCONTINUED | OUTPATIENT
Start: 2022-10-10 | End: 2022-10-13

## 2022-10-10 RX ADMIN — SODIUM CHLORIDE 2400 MILLILITER(S): 9 INJECTION, SOLUTION INTRAVENOUS at 17:48

## 2022-10-10 RX ADMIN — CEFEPIME 2000 MILLIGRAM(S): 1 INJECTION, POWDER, FOR SOLUTION INTRAMUSCULAR; INTRAVENOUS at 17:41

## 2022-10-10 RX ADMIN — ONDANSETRON 4 MILLIGRAM(S): 8 TABLET, FILM COATED ORAL at 20:30

## 2022-10-10 RX ADMIN — AZITHROMYCIN 255 MILLIGRAM(S): 500 TABLET, FILM COATED ORAL at 17:48

## 2022-10-10 NOTE — ED ADULT NURSE NOTE - CHIEF COMPLAINT QUOTE
pt finished 6th round of chemo, has neutropenia, sent by Oncologist  A&Ox3, resp wnl, here for hydration & ABx

## 2022-10-10 NOTE — PATIENT PROFILE ADULT - FUNCTIONAL ASSESSMENT - BASIC MOBILITY 6.
Tele SB  rate high 30s to 40s at rest,asymptomatic,Art Horne NP notified no current orders,CTM.   4 = No assist / stand by assistance

## 2022-10-10 NOTE — H&P ADULT - HISTORY OF PRESENT ILLNESS
58 y/o female with hx of NHL diagnosed 5/22, s/p chest port on chemo with Etoposide, last dose 10/6 followed by Diana who was sent in by Oncology after she was told she had fever at home and URI symptoms for the past 3 days. She denies any travel, sick contacts. She states at home her temp was 100.9 and she was c/o congestion and dry cough. No urinary or GI symptoms reported. In the ED she was noted to have Tmax of 99.2, however did take Tylenol prior to coming to ED, tachycardic to 120s, but normotensive. CXR vu, U/A neg. RVP pos. for enterovirus. She was cultured, and started on Cefepime for an ANC of 193. Currently with no other complaints. Denies issues with chest port and no overlying skin changes noted.

## 2022-10-10 NOTE — ED ADULT NURSE NOTE - COVID-19 RESULT DATE/TIME
[facility], [address]    [address]    [facility] [address]    Phone:  [phone]    Fax:  [phone]       Thank You for choosing us for your health care visit. We are glad to serve you and happy to provide you with this summary of your visit. Please help us to ensure we have accurate records. If you find anything that needs to be changed, please let our staff know as soon as possible.          Your Demographic Information     Patient Name Sex Lashae Medina Female 1929       Ethnic Group Patient Race    Not of  or  Origin White      Your Visit Details     Date & Time Provider Department    2017 5:30 PM Damien Rucker DPM [facility], [address]      Your Upcoming Appointment*(Max 10)     2017  5:30 PM CDT   Office Visit with Damien Rucker DPM   [facility], [address] ([facility] [address])    [address]  [facility] [address]   [phone]              Your Vitals Were     Smoking Status                   Former Smoker           Medications Prescribed or Re-Ordered Today     None      Your Current Medications Are        Disp Refills Start End    acetaminophen-codeine (TYLENOL NO.3) 300-30 MG per tablet 60 tablet 0 2015     Sig - Route: Take 1 tablet by mouth 3 times daily as needed for Pain. - Oral    furosemide (LASIX) 20 MG tablet        Sig - Route: Take 20 mg by mouth 2 times daily. - Oral    Class: Historical Med    potassium chloride (KLOR-CON) 20 MEQ packet        Sig - Route: Take 20 mEq by mouth 2 times daily. - Oral    Class: Historical Med    lisinopril (ZESTRIL) 10 MG tablet   3/30/2011     Sig - Route: Take 1 tablet by mouth daily. - Oral    Class: Historical Med    levothyroxine (SYNTHROID, LEVOTHROID) 50 MCG tablet   2010     Sig - Route: Take 1 tablet by mouth daily. - Oral    Class:  07-Oct-2022 05:37 Historical Med    gabapentin (NEURONTIN) 100 MG tablet   11/30/2010     Sig - Route: Take 1 tablet by mouth. 8 daily - Oral    Class: Historical Med    alendronate (FOSAMAX) 35 MG tablet   11/30/2010     Sig - Route: Take 1 tablet by mouth every 7 days. - Oral    Class: Historical Med    montelukast (SINGULAIR) 10 MG tablet   11/30/2010     Sig - Route: Take 1 tablet by mouth nightly. - Oral    Class: Historical Med    pramipexole (MIRAPEX) 1 MG tablet   11/30/2010     Sig - Route: Take 1 tablet by mouth daily. - Oral    Class: Historical Med    Magnesium 250 MG TABS   11/30/2010     Sig - Route: Take  by mouth daily. - Oral    Class: Historical Med    multivitamin (THERAGRAN) per tablet   11/30/2010     Sig - Route: Take 1 tablet by mouth daily. - Oral    Class: Historical Med    aspirin 325 MG tablet   11/30/2010     Sig - Route: Take 1 tablet by mouth daily. - Oral    Class: Historical Med    Albuterol 90 MCG/ACT inhaler   11/30/2010     Sig - Route: Inhale 2 puffs into the lungs every 4 hours as needed. - Inhalation    Class: Historical Med      Allergies     No Known Allergies      Problem List as of 2/1/2017     Chronic diarrhea    Dermatophytosis of nail    Ingrowing nail    Hallux valgus (acquired)    Other hammer toe (acquired)    Pain of toes of both feet            Patient Instructions     None

## 2022-10-10 NOTE — PATIENT PROFILE ADULT - FALL HARM RISK - HARM RISK INTERVENTIONS

## 2022-10-10 NOTE — ED ADULT NURSE REASSESSMENT NOTE - NS ED NURSE REASSESS COMMENT FT1
Assumed care of pt from previous RN. A/O x4. Respirations are even and unlabored on room air. IV intact with antibiotics infusing. Pt offers no complaints of pain or discomfort at this time. Pt awaiting CT scan. Educated on plan of care and expresses understanding.

## 2022-10-10 NOTE — ED PROVIDER NOTE - PHYSICAL EXAMINATION
Gen: NAD.   Head: NC/AT  Neck: trachea midline  Resp:  No distress, lungs cta b/l  Cardiac: Heart tachycardic rate, regular rhythm.   Abdomen: Soft, nontender, nondistended.   Ext: no deformities  Neuro:  A&O appears non focal  Skin:  Warm and dry as visualized PHYSICAL EXAM: VS noted  General: nontoxic appearing  HEENT: NC/AT, mucous membranes moist, airway patent  Cardiovascular: tachycardic rate and regular rhythm, + S1/S2, no murmurs, rubs, gallops appreciated  Respiratory: clear to auscultation bilaterally, good aeration bilaterally, nonlabored respirations  Abdominal: soft, RUQ/suprapubic TTP, nondistended, no rebound, guarding or rigidity  Back: no costovertebral tenderness  Neuro: Alert and oriented x3. Moving all extremities.  Psychiatric: appropriate mood and affect.   Skin: R chest wall port clean dry intact  -Monalisa Raymundo MD Attending Physician

## 2022-10-10 NOTE — ED PROVIDER NOTE - OBJECTIVE STATEMENT
56 yo female hx of NHL presents with neutropenic fever. Patient last received chemotherapy last week. She is followed by Dr. Ruiz of Hematology/Oncology. 56 yo F with hx nonhodgkins lymphoma on CHOP (last tx Thursday, via R chest wall port, known neutropenia) presents at request of oncologist (Dr. Rendon) for fever to 100.9 in setting of 3 days of congestion and dry cough as well as nausea. no sick contacts, no recent travel

## 2022-10-10 NOTE — H&P ADULT - NSICDXPASTMEDICALHX_GEN_ALL_CORE_FT
PAST MEDICAL HISTORY:  2019 novel coronavirus disease (COVID-19) 2/2022    Anxiety     Axillary adenopathy right    History of IBS     Lymphoma

## 2022-10-10 NOTE — H&P ADULT - ASSESSMENT
56 y/o female with Neutropenic fever, SIRS, Enterovirus, Pancytopenia, Lymphoma, anxiety, IBS    Neutropenic fever:  -Patient with viral source of fever but can also increase risk of secondary bacterial infection from translocation of respiratory maria isabel   -Has severe neutropenia by numbers and CVC at risk for being seeded  -Lactate normal  -Will cont. Cefepime pending cultures and if remain neg, likely can DC Abx   -Check Procalcitonin, trend temp/WBC  -PRN tylenol   -OOB, ambulate  -VC boots, no chemical DVT-P with thrombocytopenia    Enterovirus:  -Supportive care    Pancytopenia:  -Due to Chemo therapy  -Monitor CBC, no indication for PRBC transfusion at this time  -On Neulasta     Lymphoma:  -F/U with Dr. Rendon post DC    Anxiety:  -PRN xanax    IBS:  -Resume Linzess on DC  -No symptoms at this time    Discussed with Patient, ED staff

## 2022-10-10 NOTE — ED PROVIDER NOTE - PROGRESS NOTE DETAILS
Jamie: Pt received in signout from Dr. Raymundo. Pt with history of NHL, last received chemo 10/6. Was sent in today for fever and neutropenia. Follows with Dr. Rendon of heme/onc. On reassessment, pt in no acute distress. Results discussed with pt. Will admit.

## 2022-10-10 NOTE — ED PROVIDER NOTE - CLINICAL SUMMARY MEDICAL DECISION MAKING FREE TEXT BOX
58 yo female w Neutropenic fever. Labs, EKG. Monitor and reassess. 56 yo female w Neutropenic fever. concern for sepsis. Possible bacterial PNA vs viral URI, possible intraabd pathology vs UTI as well given tenderness on exam. Labs, ivf, empiric abx (for neutropenia and CAP coverage), CTabd/pelvis given multifocal nature of abdominal tenderness, admission appropriate

## 2022-10-11 ENCOUNTER — APPOINTMENT (OUTPATIENT)
Dept: PULMONOLOGY | Facility: CLINIC | Age: 57
End: 2022-10-11

## 2022-10-11 ENCOUNTER — APPOINTMENT (OUTPATIENT)
Dept: NUCLEAR MEDICINE | Facility: HOSPITAL | Age: 57
End: 2022-10-11

## 2022-10-11 LAB
ANION GAP SERPL CALC-SCNC: 13 MMOL/L — SIGNIFICANT CHANGE UP (ref 5–17)
BASOPHILS # BLD AUTO: 0.01 K/UL — SIGNIFICANT CHANGE UP (ref 0–0.2)
BASOPHILS # BLD AUTO: 0.04 K/UL — SIGNIFICANT CHANGE UP (ref 0–0.2)
BASOPHILS NFR BLD AUTO: 1.7 % — SIGNIFICANT CHANGE UP (ref 0–2)
BASOPHILS NFR BLD AUTO: 4.6 % — HIGH (ref 0–2)
BUN SERPL-MCNC: 11.5 MG/DL — SIGNIFICANT CHANGE UP (ref 8–20)
CALCIUM SERPL-MCNC: 9.2 MG/DL — SIGNIFICANT CHANGE UP (ref 8.4–10.5)
CHLORIDE SERPL-SCNC: 99 MMOL/L — SIGNIFICANT CHANGE UP (ref 98–107)
CO2 SERPL-SCNC: 26 MMOL/L — SIGNIFICANT CHANGE UP (ref 22–29)
CREAT SERPL-MCNC: 0.48 MG/DL — LOW (ref 0.5–1.3)
CULTURE RESULTS: SIGNIFICANT CHANGE UP
DACRYOCYTES BLD QL SMEAR: SLIGHT — SIGNIFICANT CHANGE UP
EGFR: 112 ML/MIN/1.73M2 — SIGNIFICANT CHANGE UP
EOSINOPHIL # BLD AUTO: 0.04 K/UL — SIGNIFICANT CHANGE UP (ref 0–0.5)
EOSINOPHIL # BLD AUTO: 0.04 K/UL — SIGNIFICANT CHANGE UP (ref 0–0.5)
EOSINOPHIL NFR BLD AUTO: 4.6 % — SIGNIFICANT CHANGE UP (ref 0–6)
EOSINOPHIL NFR BLD AUTO: 8.3 % — HIGH (ref 0–6)
GIANT PLATELETS BLD QL SMEAR: PRESENT — SIGNIFICANT CHANGE UP
GLUCOSE SERPL-MCNC: 125 MG/DL — HIGH (ref 70–99)
HCT VFR BLD CALC: 23.3 % — LOW (ref 34.5–45)
HGB BLD-MCNC: 7.5 G/DL — LOW (ref 11.5–15.5)
LYMPHOCYTES # BLD AUTO: 0.25 K/UL — LOW (ref 1–3.3)
LYMPHOCYTES # BLD AUTO: 0.37 K/UL — LOW (ref 1–3.3)
LYMPHOCYTES # BLD AUTO: 47.7 % — HIGH (ref 13–44)
LYMPHOCYTES # BLD AUTO: 51.7 % — HIGH (ref 13–44)
LYMPHOCYTES # SPEC AUTO: 3.3 % — HIGH (ref 0–0)
LYMPHOCYTES # SPEC AUTO: 4.5 % — HIGH (ref 0–0)
MAGNESIUM SERPL-MCNC: 1.8 MG/DL — SIGNIFICANT CHANGE UP (ref 1.8–2.6)
MANUAL DIF COMMENT BLD-IMP: SIGNIFICANT CHANGE UP
MCHC RBC-ENTMCNC: 31.8 PG — SIGNIFICANT CHANGE UP (ref 27–34)
MCHC RBC-ENTMCNC: 32.2 GM/DL — SIGNIFICANT CHANGE UP (ref 32–36)
MCV RBC AUTO: 98.7 FL — SIGNIFICANT CHANGE UP (ref 80–100)
MONOCYTES # BLD AUTO: 0.03 K/UL — SIGNIFICANT CHANGE UP (ref 0–0.9)
MONOCYTES # BLD AUTO: 0.1 K/UL — SIGNIFICANT CHANGE UP (ref 0–0.9)
MONOCYTES NFR BLD AUTO: 4.5 % — SIGNIFICANT CHANGE UP (ref 2–14)
MONOCYTES NFR BLD AUTO: 6.7 % — SIGNIFICANT CHANGE UP (ref 2–14)
NEUTROPHILS # BLD AUTO: 0.14 K/UL — LOW (ref 1.8–7.4)
NEUTROPHILS # BLD AUTO: 0.26 K/UL — LOW (ref 1.8–7.4)
NEUTROPHILS NFR BLD AUTO: 23.3 % — LOW (ref 43–77)
NEUTROPHILS NFR BLD AUTO: 31.8 % — LOW (ref 43–77)
NEUTS BAND # BLD: 2.3 % — SIGNIFICANT CHANGE UP (ref 0–8)
OVALOCYTES BLD QL SMEAR: SLIGHT — SIGNIFICANT CHANGE UP
OVALOCYTES BLD QL SMEAR: SLIGHT — SIGNIFICANT CHANGE UP
PHOSPHATE SERPL-MCNC: 4 MG/DL — SIGNIFICANT CHANGE UP (ref 2.4–4.7)
PLAT MORPH BLD: NORMAL — SIGNIFICANT CHANGE UP
PLAT MORPH BLD: NORMAL — SIGNIFICANT CHANGE UP
PLATELET # BLD AUTO: 61 K/UL — LOW (ref 150–400)
POIKILOCYTOSIS BLD QL AUTO: SLIGHT — SIGNIFICANT CHANGE UP
POIKILOCYTOSIS BLD QL AUTO: SLIGHT — SIGNIFICANT CHANGE UP
POLYCHROMASIA BLD QL SMEAR: SLIGHT — SIGNIFICANT CHANGE UP
POLYCHROMASIA BLD QL SMEAR: SLIGHT — SIGNIFICANT CHANGE UP
POTASSIUM SERPL-MCNC: 4.3 MMOL/L — SIGNIFICANT CHANGE UP (ref 3.5–5.3)
POTASSIUM SERPL-SCNC: 4.3 MMOL/L — SIGNIFICANT CHANGE UP (ref 3.5–5.3)
PROCALCITONIN SERPL-MCNC: 0.13 NG/ML — HIGH (ref 0.02–0.1)
PROMYELOCYTES # FLD: 1.7 % — HIGH (ref 0–0)
RBC # BLD: 2.36 M/UL — LOW (ref 3.8–5.2)
RBC # FLD: 14.4 % — SIGNIFICANT CHANGE UP (ref 10.3–14.5)
RBC BLD AUTO: ABNORMAL
RBC BLD AUTO: ABNORMAL
SODIUM SERPL-SCNC: 138 MMOL/L — SIGNIFICANT CHANGE UP (ref 135–145)
SPECIMEN SOURCE: SIGNIFICANT CHANGE UP
STOMATOCYTES BLD QL SMEAR: PRESENT — SIGNIFICANT CHANGE UP
STOMATOCYTES BLD QL SMEAR: PRESENT — SIGNIFICANT CHANGE UP
TOXIC GRANULES BLD QL SMEAR: PRESENT — SIGNIFICANT CHANGE UP
VARIANT LYMPHS # BLD: 3.3 % — SIGNIFICANT CHANGE UP (ref 0–6)
WBC # BLD: 0.77 K/UL — CRITICAL LOW (ref 3.8–10.5)
WBC # FLD AUTO: 0.77 K/UL — CRITICAL LOW (ref 3.8–10.5)

## 2022-10-11 PROCEDURE — 99233 SBSQ HOSP IP/OBS HIGH 50: CPT

## 2022-10-11 RX ORDER — ONDANSETRON 8 MG/1
8 TABLET, FILM COATED ORAL EVERY 8 HOURS
Refills: 0 | Status: DISCONTINUED | OUTPATIENT
Start: 2022-10-11 | End: 2022-10-11

## 2022-10-11 RX ORDER — ONDANSETRON 8 MG/1
8 TABLET, FILM COATED ORAL EVERY 8 HOURS
Refills: 0 | Status: DISCONTINUED | OUTPATIENT
Start: 2022-10-11 | End: 2022-10-13

## 2022-10-11 RX ORDER — ACETAMINOPHEN 500 MG
650 TABLET ORAL EVERY 6 HOURS
Refills: 0 | Status: DISCONTINUED | OUTPATIENT
Start: 2022-10-11 | End: 2022-10-13

## 2022-10-11 RX ADMIN — CEFEPIME 2000 MILLIGRAM(S): 1 INJECTION, POWDER, FOR SOLUTION INTRAMUSCULAR; INTRAVENOUS at 17:26

## 2022-10-11 RX ADMIN — Medication 650 MILLIGRAM(S): at 21:33

## 2022-10-11 RX ADMIN — CEFEPIME 2000 MILLIGRAM(S): 1 INJECTION, POWDER, FOR SOLUTION INTRAMUSCULAR; INTRAVENOUS at 01:14

## 2022-10-11 RX ADMIN — CEFEPIME 2000 MILLIGRAM(S): 1 INJECTION, POWDER, FOR SOLUTION INTRAMUSCULAR; INTRAVENOUS at 11:46

## 2022-10-11 RX ADMIN — Medication 20 MILLIGRAM(S): at 11:46

## 2022-10-11 RX ADMIN — Medication 100 MILLIGRAM(S): at 11:46

## 2022-10-11 RX ADMIN — Medication 650 MILLIGRAM(S): at 09:32

## 2022-10-11 RX ADMIN — Medication 650 MILLIGRAM(S): at 11:23

## 2022-10-11 RX ADMIN — ONDANSETRON 8 MILLIGRAM(S): 8 TABLET, FILM COATED ORAL at 21:32

## 2022-10-11 NOTE — PHARMACOTHERAPY INTERVENTION NOTE - OUTCOME
July 15, 2020      Rob Park  [address] [place] [address]        Dear Rob,     Please be aware that coverage of these services is subject to the terms and limitations of your health insurance plan.  Call member services at your health plan with any benefit or coverage questions.    Thank you for choosing [facility]. You are scheduled for the following service(s):    Date:  8/10/2020  Monday             Procedure:  COLONOSCOPY  Doctor:        Dr. Ward   Arrival Time:  7:30 am *Enter and check in at the Main Hospital Entrance*  Procedure Time:  8:00 am      Location:   [facility]        Endoscopy Department, First Floor         [address] [place], [place] [address]      [phone] or [phone] ([facility]) to reschedule          MIRALAX -GATORADE  PREP  Colonoscopy is the most accurate test to detect colon polyps and colon cancer; and the only test where polyps can be removed. During this procedure, a doctor examines the lining of your large intestine and rectum through a flexible tube.   Transportation  You must arrange for a ride for the day of your procedure with a responsible adult. A taxi , Uber, etc, is not an option unless you are accompanied by a responsible adult. If you fail to arrange transportation with a responsible adult, your procedure will be cancelled and rescheduled.    Purchase the  following supplies at your local pharmacy:  - 2 (two) bisacodyl tablets: each tablet contains 5 mg.  (Dulcolax  laxative NOT Dulcolax  stool softener)   - 1 (one) 8.3 oz bottle of Polyethylene Glycol (PEG) 3350 Powder   (MiraLAX , Smooth LAX , ClearLAX  or equivalent)  - 64 oz Gatorade    Regular Gatorade, Gatorade G2 , Powerade , Powerade Zero  or Pedialyte  is acceptable. Red colored flavors are not allowed; all other colors (yellow, green, orange, purple and blue) are okay. It is also okay to buy two 2.12 oz packets of powdered Gatorade 
that can be mixed with water to a total volume of 64 oz of liquid.  - 1 (one) 10 oz bottle of Magnesium Citrate (Red colored flavors are not allowed)  It is also okay for you to use a 0.5 oz package of powdered magnesium citrate (17 g) mixed with 10 oz of water.      PREPARATION FOR COLONOSCOPY    7 days before:    Discontinue fiber supplements and medications containing iron. This includes Metamucil  and Fibercon ; and multivitamins with iron.    3 days before:    Begin a low-fiber diet. A low-fiber diet helps making the cleanout more effective.     Examples of a low-fiber diet include (but are not limited to): white bread, white rice, pasta, crackers, fish, chicken, eggs, ground beef, creamy peanut butter, cooked/steamed/boiled vegetables, canned fruit, bananas, melons, milk, plain yogurt cheese, salad dressing and other condiments.     The following are not allowed on a low-fiber diet: seeds, nuts, popcorn, bran, whole wheat, corn, quinoa, raw fruits and vegetables, berries and dried fruit, beans and lentils.    For additional details on low-fiber diet, please refer to the table on the last page.    2 days before:    Continue the low-fiber diet.     Drink at least 8 glasses of water throughout the day.     Stop eating solid foods at 11:45 pm.  1 day before:    In the morning: begin a clear liquid diet (liquids you can see through).     Examples of a clear liquid diet include: water, clear broth or bouillon, Gatorade, Pedialyte or Powerade, carbonated and non-carbonated soft drinks (Sprite , 7-Up , ginger ale), strained fruit juices without pulp (apple, white grape, white cranberry), Jell-O  and popsicles.     The following are not allowed on a clear liquid diet: red liquids, alcoholic beverages, dairy products (milk, creamer, and yogurt), protein shakes, creamy broths, juice with pulp and chewing tobacco.    At noon: take 2 (two) bisacodyl tablets     At 4 (and no later than 6pm): start drinking the 
Miralax-Gatorade preparation (8.3 oz of Miralax mixed with 64 oz of Gatorade in a large pitcher). Drink 1(one) 8 oz glass every 15 minutes thereafter, until the mixture is gone.    COLON CLEANSING TIPS: drink adequate amounts of fluids before and after your colon cleansing to prevent dehydration. Stay near a toilet because you will have diarrhea. Even if you are sitting on the toilet, continue to drink the cleansing solution every 15 minutes. If you feel nauseous or vomit, rinse your mouth with water, take a 15 to 30-minute-break and then continue drinking the solution. You will be uncomfortable until the stool has flushed from your colon (in about 2 to 4 hours). You may feel chilled.    Day of your procedure  You may take all of your morning medications including blood pressure medications, blood thinners (if you have not been instructed to stop these by our office), methadone, anti-seizure medications with sips of water 3 hours prior to your procedure or earlier. Do not take insulin or vitamins prior to your procedure. Continue the clear liquid diet.   4 hours prior: drink 10 oz of magnesium citrate. It may be easier to drink it with a straw.    STOP consuming all liquids after that.     Do not take anything by mouth during this time.     Allow extra time to travel to your procedure as you may need to stop and use a restroom along the way.    You are ready for the procedure, if you followed all instructions and your stool is no longer formed, but clear or yellow liquid. If you are unsure whether your colon is clean, please call our office at [phone] before you leave for your appointment.    Bring the following to your procedure:  - Insurance Card/Photo ID.   - List of current medications including over-the-counter medications and supplements.   - Your rescue inhaler if you currently use one to control asthma.    Canceling or rescheduling your appointment:   If you must cancel or reschedule your appointment, 
accepted
please call [phone] as soon as possible.      COLONOSCOPY PRE-PROCEDURE CHECKLIST    If you have diabetes, ask your regular doctor for diet and medication restrictions.  If you take an anticoagulant or anti-platelet medication (such as Coumadin , Lovenox , Pradaxa , Xarelto , Eliquis , etc.), please call your primary doctor for advice on holding this medication.  If you take aspirin you may continue to do so.  If you are or may be pregnant, please discuss the risks and benefits of this procedure with your doctor.        What happens during a colonoscopy?    Plan to spend up to two hours, starting at registration time, at the endoscopy center the day of your procedure. The colonoscopy takes an average of 15 to 30 minutes. Recovery time is about 30 minutes.      Before the exam:    You will change into a gown.    Your medical history and medication list will be reviewed with you, unless that has been done over the phone prior to the procedure.     A nurse will insert an intravenous (IV) line into your hand or arm.    The doctor will meet with you and will give you a consent form to sign.  During the exam:     Medicine will be given through the IV line to help you relax.     Your heart rate and oxygen levels will be monitored. If your blood pressure is low, you may be given fluids through the IV line.     The doctor will insert a flexible hollow tube, called a colonoscope, into your rectum. The scope will be advanced slowly through the large intestine (colon).    You may have a feeling of fullness or pressure.     If an abnormal tissue or a polyp is found, the doctor may remove it through the endoscope for closer examination, or biopsy. Tissue removal is painless    After the exam:           Any tissue samples removed during the exam will be sent to a lab for evaluation. It may take 5-7 working days for you to be notified of the results.     A nurse will provide you with complete discharge instructions before you 
accepted
leave the endoscopy center. Be sure to ask the nurse for specific instructions if you take blood thinners such as Aspirin, Coumadin or Plavix.     The doctor will prepare a full report for you and for the physician who referred you for the procedure.     Your doctor will talk with you about the initial results of your exam.      Medication given during the exam will prohibit you from driving for the rest of the day.     Following the exam, you may resume your normal diet. Your first meal should be light, no greasy foods. Avoid alcohol until the next day.     You may resume your regular activities the day after the procedure.     DIRECTIONS TO THE ENDOSCOPY DEPARTMENT    From the north ([facility])  Take [address] South, exit on [address] . Get into the left hand dacia, turn left (east), go one-half mile to [address] and turn left. Go north to the second stoplight, take a right on [address] and follow it to the Main Hospital entrance.    From the south ([facility])  Take 35N to the 35E split and exit on [address] . On [address] , turn left (west) to [address]. Turn right (north) on [address]. Go north to the second stoplight, take a right on [address] and follow it to the Main Hospital entrance.    From the east via 35E ([facility])  Take [address]E south to [address]  exit. Turn right on [address]. Go west to [address]. Turn right (north) on [address]. Go to the second stoplight, take a right on [address] to the Main Hospital entrance.    From the east via [address] ([facility])  Take [address] to [address]. Turn left (south) on [address] to [address], turn left (east) on [address] and follow it to the Main Hospital entrance.    From the west via [address] ([place])  Take [address] east to [address]. Turn right (south) on [address] to [address], turn left (east) on [address] and follow it to the Main Hospital entrance.      LOW-FIBER DIET    Foods RECOMMENDED Foods to AVOID   Breads, Cereal, Rice and Pasta:   White bread, rolls, biscuits, croissant and meño toast.   Waffles, Samoan toast and pancakes.   White rice, noodles, pasta, macaroni and peeled cooked potatoes.   Plain crackers and saltines.   Cooked cereals: farina, cream of rice.   Cold cereals: Puffed Rice , Rice Krispies , Corn Flakes  and Special K    Breads, Cereal, Rice and Pasta:   Breads or rolls with nuts, seeds or fruit.   Whole wheat, pumpernickel, rye breads and cornbread.   Potatoes with skin, brown or wild rice, and kasha (buckwheat).     Vegetables:   Tender cooked and canned vegetables without seeds: carrots, asparagus tips, green or wax beans, pumpkin, spinach, lima beans. Vegetables:   Raw or steamed vegetables.   Vegetables with seeds.   Sauerkraut.   Winter squash, peas, broccoli, Brussel sprouts, cabbage, onions, cauliflower, baked beans, peas and corn.   Fruits:   Strained fruit juice.   Canned fruit, except pineapple.   Ripe bananas and melon. Fruits:   Prunes and prune juice.   Raw fruits.   Dried fruits: figs, dates and raisins.   Milk/Dairy:   Milk: plain or flavored.   Yogurt, custard and ice cream.   Cheese and cottage cheese Milk/Dairy:     Meat and other proteins:   ground, well-cooked tender beef, lamb, ham, veal, pork, fish, poultry and organ meats.   Eggs.   Peanut butter without nuts. Meat and other proteins:   Tough, fibrous meats with gristle.   Dry beans, peas and lentils.   Peanut butter with nuts.   Tofu.   Fats, Snack, Sweets, Condiments and Beverages:   Margarine, butter, oils, mayonnaise, sour cream and salad dressing, plain gravy.   Sugar, hard candy, clear jelly, honey and syrup.   Spices, cooked herbs, bouillon, broth and soups made with allowed vegetable, ketchup and mustard.   Coffee, tea and carbonated drinks.   Plain cakes, cookies 
and pretzels.   Gelatin, plain puddings, custard, ice cream, sherbet and popsicles. Fats, Snack, Sweets, Condiments and Beverages:   Nuts, seeds and coconut.   Jam, marmalade and preserves.   Pickles, olives, relish and horseradish.   All desserts containing nuts, seeds, dried fruit and coconut; or made from whole grains or bran.   Candy made with nuts or seeds.   Popcorn.

## 2022-10-11 NOTE — PHARMACOTHERAPY INTERVENTION NOTE - COMMENTS
Modified penicillin allergy history to state that patient tolerated cefepime during this admission.     Bandar Weiss, PharmD, [facility]  Clinical Pharmacy Specialist, Infectious Diseases  Tele-Antimicrobial Stewardship Program (Tele-ASP)  Tele-ASP Phone: [phone]  
Recommended obtaining a Legionella urinary antigen in the setting of azithromycin ordered empirically for CAP.     Marvin Eugene, Loren  Clinical Pharmacy Specialist, Infectious Diseases  Tele-Antimicrobial Stewardship Program (Tele-ASP)  Tele-ASP Phone: [phone]

## 2022-10-11 NOTE — PROGRESS NOTE ADULT - ASSESSMENT
58 y/o female with Neutropenic fever, SIRS, Enterovirus, Pancytopenia, Lymphoma who got last chemo infusion on 10/06 with filgrastim administration same day, anxiety, IBS     Neutropenic fever in pt with URI symptoms and + Entero/Rhino virus   -not toxic appearing >> downgraded to med/surg bed  - c/w cefepime  - will f/u on obtained blood and urine cx  - supportive care with PRN tylenol, zofran, OOB, ambulate  -VC boots, no chemical DVT-P with thrombocytopenia    Enterovirus  -Supportive care    Pancytopenia due to  Chemo therapy for NHL  - cbc noted  -Monitor CBC, no indication for PRBC transfusion at this time  - s/p filgrastim on 10/06  - type and screen in am    Lymphoma:  -F/U with Dr. Rendon post DC    Anxiety:  -PRN xanax    IBS:  -Resume Linzess on DC  -No symptoms at this time    DVT ppx - ambulation every shift  Code/social -  full code  Dispo - if blood cx and urine cx w/o grown may be d/c w/in next 48 hf on PO cefalosporin

## 2022-10-12 ENCOUNTER — TRANSCRIPTION ENCOUNTER (OUTPATIENT)
Age: 57
End: 2022-10-12

## 2022-10-12 LAB
ANION GAP SERPL CALC-SCNC: 13 MMOL/L — SIGNIFICANT CHANGE UP (ref 5–17)
BLD GP AB SCN SERPL QL: SIGNIFICANT CHANGE UP
BUN SERPL-MCNC: 12.6 MG/DL — SIGNIFICANT CHANGE UP (ref 8–20)
CALCIUM SERPL-MCNC: 9.5 MG/DL — SIGNIFICANT CHANGE UP (ref 8.4–10.5)
CHLORIDE SERPL-SCNC: 102 MMOL/L — SIGNIFICANT CHANGE UP (ref 98–107)
CO2 SERPL-SCNC: 24 MMOL/L — SIGNIFICANT CHANGE UP (ref 22–29)
CREAT SERPL-MCNC: 0.56 MG/DL — SIGNIFICANT CHANGE UP (ref 0.5–1.3)
EGFR: 108 ML/MIN/1.73M2 — SIGNIFICANT CHANGE UP
GLUCOSE SERPL-MCNC: 106 MG/DL — HIGH (ref 70–99)
HCT VFR BLD CALC: 25.7 % — LOW (ref 34.5–45)
HGB BLD-MCNC: 8.5 G/DL — LOW (ref 11.5–15.5)
MCHC RBC-ENTMCNC: 32.3 PG — SIGNIFICANT CHANGE UP (ref 27–34)
MCHC RBC-ENTMCNC: 33.1 GM/DL — SIGNIFICANT CHANGE UP (ref 32–36)
MCV RBC AUTO: 97.7 FL — SIGNIFICANT CHANGE UP (ref 80–100)
MRSA PCR RESULT.: SIGNIFICANT CHANGE UP
NRBC # BLD: 1 /100 WBCS — HIGH (ref 0–0)
PLATELET # BLD AUTO: 67 K/UL — LOW (ref 150–400)
POTASSIUM SERPL-MCNC: 3.9 MMOL/L — SIGNIFICANT CHANGE UP (ref 3.5–5.3)
POTASSIUM SERPL-SCNC: 3.9 MMOL/L — SIGNIFICANT CHANGE UP (ref 3.5–5.3)
RBC # BLD: 2.63 M/UL — LOW (ref 3.8–5.2)
RBC # FLD: 14.3 % — SIGNIFICANT CHANGE UP (ref 10.3–14.5)
S AUREUS DNA NOSE QL NAA+PROBE: SIGNIFICANT CHANGE UP
SODIUM SERPL-SCNC: 138 MMOL/L — SIGNIFICANT CHANGE UP (ref 135–145)
WBC # BLD: 3.02 K/UL — LOW (ref 3.8–10.5)
WBC # FLD AUTO: 3.02 K/UL — LOW (ref 3.8–10.5)

## 2022-10-12 PROCEDURE — 99233 SBSQ HOSP IP/OBS HIGH 50: CPT

## 2022-10-12 RX ORDER — POLYETHYLENE GLYCOL 3350 17 G/17G
17 POWDER, FOR SOLUTION ORAL
Qty: 0 | Refills: 0 | DISCHARGE
Start: 2022-10-12

## 2022-10-12 RX ORDER — POLYETHYLENE GLYCOL 3350 17 G/17G
17 POWDER, FOR SOLUTION ORAL DAILY
Refills: 0 | Status: DISCONTINUED | OUTPATIENT
Start: 2022-10-12 | End: 2022-10-13

## 2022-10-12 RX ORDER — INFLUENZA VIRUS VACCINE 15; 15; 15; 15 UG/.5ML; UG/.5ML; UG/.5ML; UG/.5ML
0.5 SUSPENSION INTRAMUSCULAR ONCE
Refills: 0 | Status: DISCONTINUED | OUTPATIENT
Start: 2022-10-12 | End: 2022-10-13

## 2022-10-12 RX ORDER — SENNA PLUS 8.6 MG/1
2 TABLET ORAL DAILY
Refills: 0 | Status: DISCONTINUED | OUTPATIENT
Start: 2022-10-12 | End: 2022-10-13

## 2022-10-12 RX ADMIN — Medication 650 MILLIGRAM(S): at 21:05

## 2022-10-12 RX ADMIN — Medication 650 MILLIGRAM(S): at 11:06

## 2022-10-12 RX ADMIN — ONDANSETRON 8 MILLIGRAM(S): 8 TABLET, FILM COATED ORAL at 11:06

## 2022-10-12 RX ADMIN — Medication 100 MILLIGRAM(S): at 11:06

## 2022-10-12 RX ADMIN — Medication 650 MILLIGRAM(S): at 20:20

## 2022-10-12 RX ADMIN — CEFEPIME 2000 MILLIGRAM(S): 1 INJECTION, POWDER, FOR SOLUTION INTRAMUSCULAR; INTRAVENOUS at 12:39

## 2022-10-12 RX ADMIN — SENNA PLUS 2 TABLET(S): 8.6 TABLET ORAL at 13:27

## 2022-10-12 RX ADMIN — Medication 20 MILLIGRAM(S): at 11:06

## 2022-10-12 RX ADMIN — CEFEPIME 2000 MILLIGRAM(S): 1 INJECTION, POWDER, FOR SOLUTION INTRAMUSCULAR; INTRAVENOUS at 17:48

## 2022-10-12 RX ADMIN — POLYETHYLENE GLYCOL 3350 17 GRAM(S): 17 POWDER, FOR SOLUTION ORAL at 13:27

## 2022-10-12 RX ADMIN — ONDANSETRON 8 MILLIGRAM(S): 8 TABLET, FILM COATED ORAL at 19:06

## 2022-10-12 RX ADMIN — CEFEPIME 2000 MILLIGRAM(S): 1 INJECTION, POWDER, FOR SOLUTION INTRAMUSCULAR; INTRAVENOUS at 02:12

## 2022-10-12 NOTE — DISCHARGE NOTE PROVIDER - NSDCCPCAREPLAN_GEN_ALL_CORE_FT
PRINCIPAL DISCHARGE DIAGNOSIS  Diagnosis: Neutropenic fever  Assessment and Plan of Treatment:       SECONDARY DISCHARGE DIAGNOSES  Diagnosis: Pancytopenia due to chemotherapy  Assessment and Plan of Treatment:      PRINCIPAL DISCHARGE DIAGNOSIS  Diagnosis: Neutropenic fever  Assessment and Plan of Treatment: PO levaquin for 4 more days to complete a 7 day course of antibiotics  Source of fever was Enterovirus infection      SECONDARY DISCHARGE DIAGNOSES  Diagnosis: Pancytopenia due to chemotherapy  Assessment and Plan of Treatment: Improved  Follow up with your oncologist as scheduled

## 2022-10-12 NOTE — DISCHARGE NOTE PROVIDER - ATTENDING DISCHARGE PHYSICAL EXAMINATION:
Vital Signs Last 24 Hrs  T(C): 36.8 (13 Oct 2022 04:55), Max: 37.2 (12 Oct 2022 16:25)  T(F): 98.3 (13 Oct 2022 04:55), Max: 99 (12 Oct 2022 16:25)  HR: 86 (13 Oct 2022 04:55) (86 - 104)  BP: 95/61 (13 Oct 2022 04:55) (95/61 - 103/63)  BP(mean): --  RR: 18 (13 Oct 2022 04:55) (18 - 18)  SpO2: 98% (13 Oct 2022 04:55) (97% - 98%)    Parameters below as of 13 Oct 2022 04:55  Patient On (Oxygen Delivery Method): room air        PHYSICAL EXAM:    GENERAL: NAD, AOX3  HEAD:  Atraumatic, Normocephalic  EYES: conjunctiva and sclera clear  ENMT: Moist mucous membranes  NECK: Supple  CHEST/LUNG: Clear to auscultation bilaterally; No rales, rhonchi, wheezing, or rubs  HEART: Regular rate and rhythm; No murmurs, rubs, or gallops  ABDOMEN: Soft, Nontender, Nondistended; Bowel sounds present  EXTREMITIES:  2+ Peripheral Pulses, No clubbing, cyanosis, or edema

## 2022-10-12 NOTE — DISCHARGE NOTE PROVIDER - CARE PROVIDER_API CALL
Kizzy Lutz)  Hematology; Internal Medicine; Medical Oncology  [address] [place] [address]  Phone: [phone]  Fax: [phone]  Follow Up Time: 1 week

## 2022-10-12 NOTE — DISCHARGE NOTE PROVIDER - NSDCMRMEDTOKEN_GEN_ALL_CORE_FT
acetaminophen 500 mg oral tablet: 2 tab(s) orally every 6 hours, As Needed  allopurinol 100 mg oral tablet: orally once a day  ALPRAZolam 0.5 mg oral tablet: 1  orally , As Needed  aspirin 81 mg oral tablet:   Linzess 145 mcg oral capsule: 1 cap(s) orally once a day  polyethylene glycol 3350 oral powder for reconstitution: 17 gram(s) orally once a day  PROzac 20 mg oral capsule: 1 cap(s) orally once a day   acetaminophen 500 mg oral tablet: 2 tab(s) orally every 6 hours, As Needed  allopurinol 100 mg oral tablet: orally once a day  ALPRAZolam 0.5 mg oral tablet: 1  orally , As Needed  aspirin 81 mg oral tablet:   levoFLOXacin 500 mg oral tablet: 1 tab(s) orally every 24 hours   Linzess 145 mcg oral capsule: 1 cap(s) orally once a day  polyethylene glycol 3350 oral powder for reconstitution: 17 gram(s) orally once a day  PROzac 20 mg oral capsule: 1 cap(s) orally once a day

## 2022-10-12 NOTE — PROGRESS NOTE ADULT - ASSESSMENT
56 y/o female with Neutropenic fever, SIRS, Enterovirus, Pancytopenia, Lymphoma who got last chemo infusion on 10/06 with filgrastim administration same day, anxiety, IBS     Assessment/plan:    1. Neutropenic fever in  with URI symptoms and + Entero/Rhino virus   - IV Cefepime day 2/5    - Blood cultures negative x 2  - Urine culture negative  - CT chest abdomen and pelvis negative   - supportive care with PRN tylenol, zofran, OOB, ambulate  -VC boots, no chemical DVT-P with thrombocytopenia    2. Enterovirus  -Supportive care    3. Pancytopenia due to  Chemo therapy for NHL  - Improving   - s/p filgrastim on 10/06    4. Lymphoma:  -F/U with Dr. Rendon post DC    5. Anxiety:  -PRN xanax    6. IBS:  -Resume Linzess on DC  -No symptoms at this time    DVT ppx - ambulation every shift  Code/social -  full code       56 y/o female with Neutropenic fever, SIRS, Enterovirus, Pancytopenia, Lymphoma who got last chemo infusion on 10/06 with filgrastim administration same day, anxiety, IBS     Assessment/plan:    1. Neutropenic fever in  with URI symptoms and + Entero/Rhino virus   - IV Cefepime day 2/5    - Blood cultures negative x 2  - Urine culture negative  - CT chest abdomen and pelvis negative   - supportive care with PRN tylenol, zofran, OOB, ambulate  -VC boots, no chemical DVT-P with thrombocytopenia    2. Enterovirus  -Supportive care    3. Pancytopenia due to  Chemo therapy for NHL  - Improving   - s/p filgrastim on 10/06    4. Lymphoma:  -F/U with Dr. Rendon post DC    5. Anxiety:  -PRN xanax    6. IBS:  -Resume Linzess on DC  -Add miralax and senna     DVT ppx - ambulation every shift  Code/social -  full code    Anticipate discharge home in 24 hours

## 2022-10-12 NOTE — DISCHARGE NOTE PROVIDER - NSDCFUSCHEDAPPT_GEN_ALL_CORE_FT
[facility]  [facility]  [address]   Scheduled Appointment: 10/17/2022    [facility]  [facility]  [address]   Scheduled Appointment: 10/17/2022    Fozia Acevedo  [facility]  [facility]  Scheduled Appointment: 10/19/2022    Fozia Acevedo  [facility]  [facility]  Scheduled Appointment: 10/19/2022    Kizzy Lutz  [facility]  [facility]  Scheduled Appointment: 10/24/2022    [facility]  MRI  [address]  Scheduled Appointment: 10/25/2022    Jose Mac  [facility]  [facility] [address]  Scheduled Appointment: 10/27/2022    [facility]  [facility]  Scheduled Appointment: 11/16/2022    [facility]  [facility]  Scheduled Appointment: 12/28/2022

## 2022-10-12 NOTE — DISCHARGE NOTE PROVIDER - HOSPITAL COURSE
56 y/o female with Neutropenic fever, SIRS, Enterovirus, Pancytopenia, Lymphoma who got last chemo infusion on 10/06 with filgrastim administration same day, anxiety, IBS. Admitted for neutropenic fever secondary to Enterovirus. Started on Empiric IV Cefepime. Blood cultures negative x 2, urine culture negative. CT chest abdomen and pelvis negative. Status post filgrastrim on 10/06 prior to admission. Pancytopenic improved. Afebrile. Discharged home in stable condition.

## 2022-10-13 ENCOUNTER — TRANSCRIPTION ENCOUNTER (OUTPATIENT)
Age: 57
End: 2022-10-13

## 2022-10-13 VITALS
TEMPERATURE: 99 F | OXYGEN SATURATION: 96 % | SYSTOLIC BLOOD PRESSURE: 105 MMHG | DIASTOLIC BLOOD PRESSURE: 73 MMHG | RESPIRATION RATE: 18 BRPM | HEART RATE: 72 BPM

## 2022-10-13 LAB
BASOPHILS # BLD AUTO: 0.15 K/UL — SIGNIFICANT CHANGE UP (ref 0–0.2)
BASOPHILS NFR BLD AUTO: 0.9 % — SIGNIFICANT CHANGE UP (ref 0–2)
EOSINOPHIL # BLD AUTO: 0 K/UL — SIGNIFICANT CHANGE UP (ref 0–0.5)
EOSINOPHIL NFR BLD AUTO: 0 % — SIGNIFICANT CHANGE UP (ref 0–6)
HCT VFR BLD CALC: 26.6 % — LOW (ref 34.5–45)
HGB BLD-MCNC: 8.5 G/DL — LOW (ref 11.5–15.5)
LYMPHOCYTES # BLD AUTO: 1.58 K/UL — SIGNIFICANT CHANGE UP (ref 1–3.3)
LYMPHOCYTES # BLD AUTO: 9.6 % — LOW (ref 13–44)
MCHC RBC-ENTMCNC: 32 GM/DL — SIGNIFICANT CHANGE UP (ref 32–36)
MCHC RBC-ENTMCNC: 32.4 PG — SIGNIFICANT CHANGE UP (ref 27–34)
MCV RBC AUTO: 101.5 FL — HIGH (ref 80–100)
MONOCYTES # BLD AUTO: 1 K/UL — HIGH (ref 0–0.9)
MONOCYTES NFR BLD AUTO: 6.1 % — SIGNIFICANT CHANGE UP (ref 2–14)
NEUTROPHILS # BLD AUTO: 11.85 K/UL — HIGH (ref 1.8–7.4)
NEUTROPHILS NFR BLD AUTO: 65.2 % — SIGNIFICANT CHANGE UP (ref 43–77)
PLATELET # BLD AUTO: 102 K/UL — LOW (ref 150–400)
RBC # BLD: 2.62 M/UL — LOW (ref 3.8–5.2)
RBC # FLD: 14.4 % — SIGNIFICANT CHANGE UP (ref 10.3–14.5)
WBC # BLD: 16.44 K/UL — HIGH (ref 3.8–10.5)
WBC # FLD AUTO: 16.44 K/UL — HIGH (ref 3.8–10.5)

## 2022-10-13 PROCEDURE — 80048 BASIC METABOLIC PNL TOTAL CA: CPT

## 2022-10-13 PROCEDURE — 83605 ASSAY OF LACTIC ACID: CPT

## 2022-10-13 PROCEDURE — 85027 COMPLETE CBC AUTOMATED: CPT

## 2022-10-13 PROCEDURE — 87040 BLOOD CULTURE FOR BACTERIA: CPT

## 2022-10-13 PROCEDURE — 85730 THROMBOPLASTIN TIME PARTIAL: CPT

## 2022-10-13 PROCEDURE — 82330 ASSAY OF CALCIUM: CPT

## 2022-10-13 PROCEDURE — 84132 ASSAY OF SERUM POTASSIUM: CPT

## 2022-10-13 PROCEDURE — 85025 COMPLETE CBC W/AUTO DIFF WBC: CPT

## 2022-10-13 PROCEDURE — 86850 RBC ANTIBODY SCREEN: CPT

## 2022-10-13 PROCEDURE — 93005 ELECTROCARDIOGRAM TRACING: CPT

## 2022-10-13 PROCEDURE — 87086 URINE CULTURE/COLONY COUNT: CPT

## 2022-10-13 PROCEDURE — 99239 HOSP IP/OBS DSCHRG MGMT >30: CPT

## 2022-10-13 PROCEDURE — 81001 URINALYSIS AUTO W/SCOPE: CPT

## 2022-10-13 PROCEDURE — 99285 EMERGENCY DEPT VISIT HI MDM: CPT | Mod: 25

## 2022-10-13 PROCEDURE — 36415 COLL VENOUS BLD VENIPUNCTURE: CPT

## 2022-10-13 PROCEDURE — 82803 BLOOD GASES ANY COMBINATION: CPT

## 2022-10-13 PROCEDURE — 85018 HEMOGLOBIN: CPT

## 2022-10-13 PROCEDURE — 71045 X-RAY EXAM CHEST 1 VIEW: CPT

## 2022-10-13 PROCEDURE — 82947 ASSAY GLUCOSE BLOOD QUANT: CPT

## 2022-10-13 PROCEDURE — 87641 MR-STAPH DNA AMP PROBE: CPT

## 2022-10-13 PROCEDURE — 85610 PROTHROMBIN TIME: CPT

## 2022-10-13 PROCEDURE — 96375 TX/PRO/DX INJ NEW DRUG ADDON: CPT

## 2022-10-13 PROCEDURE — 86901 BLOOD TYPING SEROLOGIC RH(D): CPT

## 2022-10-13 PROCEDURE — 84100 ASSAY OF PHOSPHORUS: CPT

## 2022-10-13 PROCEDURE — 0225U NFCT DS DNA&RNA 21 SARSCOV2: CPT

## 2022-10-13 PROCEDURE — 84145 PROCALCITONIN (PCT): CPT

## 2022-10-13 PROCEDURE — 96374 THER/PROPH/DIAG INJ IV PUSH: CPT

## 2022-10-13 PROCEDURE — 83735 ASSAY OF MAGNESIUM: CPT

## 2022-10-13 PROCEDURE — 80053 COMPREHEN METABOLIC PANEL: CPT

## 2022-10-13 PROCEDURE — 82435 ASSAY OF BLOOD CHLORIDE: CPT

## 2022-10-13 PROCEDURE — 86900 BLOOD TYPING SEROLOGIC ABO: CPT

## 2022-10-13 PROCEDURE — 84295 ASSAY OF SERUM SODIUM: CPT

## 2022-10-13 PROCEDURE — 74177 CT ABD & PELVIS W/CONTRAST: CPT | Mod: MA

## 2022-10-13 PROCEDURE — 87640 STAPH A DNA AMP PROBE: CPT

## 2022-10-13 PROCEDURE — 71260 CT THORAX DX C+: CPT | Mod: MA

## 2022-10-13 PROCEDURE — 85014 HEMATOCRIT: CPT

## 2022-10-13 RX ADMIN — CEFEPIME 2000 MILLIGRAM(S): 1 INJECTION, POWDER, FOR SOLUTION INTRAMUSCULAR; INTRAVENOUS at 01:54

## 2022-10-13 NOTE — DISCHARGE NOTE NURSING/CASE MANAGEMENT/SOCIAL WORK - PATIENT PORTAL LINK FT
You can access the FollowMyHealth Patient Portal offered by [facility] by registering at the following website: [email or web address]. By joining [employer]’s FollowMyHealth portal, you will also be able to view your health information using other applications (apps) compatible with our system.

## 2022-10-13 NOTE — PROGRESS NOTE ADULT - SUBJECTIVE AND OBJECTIVE BOX
[facility] Division of Hospital Medicine    Chief Complaint:  neutropenic fever    SUBJECTIVE: reports feeling weakn and tired no other more specific complains    OVERNIGHT EVENTS: none reported    Patient denies chest pain, SOB, abd pain, N/V, fever, chills, dysuria or any other complaints. All remainder ROS negative.     MEDICATIONS  (STANDING):  allopurinol 100 milliGRAM(s) Oral daily  cefepime  Injectable. 2000 milliGRAM(s) IV Push every 8 hours  FLUoxetine 20 milliGRAM(s) Oral daily    MEDICATIONS  (PRN):  acetaminophen     Tablet .. 650 milliGRAM(s) Oral every 6 hours PRN Moderate Pain (4 - 6)  ondansetron Injectable 8 milliGRAM(s) IV Push every 8 hours PRN Nausea and/or Vomiting        I&O's Summary      PHYSICAL EXAM:  Vital Signs Last 24 Hrs  T(C): 37.3 (11 Oct 2022 11:39), Max: 37.9 (11 Oct 2022 07:30)  T(F): 99.2 (11 Oct 2022 11:39), Max: 100.2 (11 Oct 2022 07:30)  HR: 91 (11 Oct 2022 11:39) (76 - 131)  BP: 99/58 (11 Oct 2022 11:39) (99/58 - 115/69)  BP(mean): 84 (10 Oct 2022 22:23) (84 - 84)  RR: 18 (11 Oct 2022 11:39) (18 - 18)  SpO2: 100% (11 Oct 2022 11:39) (97% - 100%)    Parameters below as of 11 Oct 2022 11:39  Patient On (Oxygen Delivery Method): room air            CONSTITUTIONAL: NAD, well-developed, well-groomed  ENMT: Moist oral mucosa, no pharyngeal injection or exudates; normal dentition; No JVD  RESPIRATORY: Normal respiratory effort; lungs are clear to auscultation bilaterally  CARDIOVASCULAR: Regular rate and rhythm, normal S1 and S2, no murmur/rub/gallop; No lower extremity edema; Peripheral pulses are 2+ bilaterally  ABDOMEN: Nontender to palpation, normoactive bowel sounds, no rebound/guarding; No hepatosplenomegaly  MUSCLOSKELETAL:  no clubbing or cyanosis of digits; no joint swelling or tenderness to palpation  PSYCH: A+O to person, place, and time; affect appropriate  NEUROLOGY: CN 2-12 are intact and symmetric; no gross sensory deficits; was observed moving all 4 ext against gravity cooperating with exam.   SKIN: No rashes; no palpable lesions    LABS:                        7.5    0.77  )-----------( 61       ( 11 Oct 2022 02:43 )             23.3     10-11    138  |  99  |  11.5  ----------------------------<  125<H>  4.3   |  26.0  |  0.48<L>    Ca    9.2      11 Oct 2022 02:43  Phos  4.0     10-11  Mg     1.8     10-11    TPro  7.4  /  Alb  4.5  /  TBili  0.3<L>  /  DBili  x   /  AST  10  /  ALT  15  /  AlkPhos  78  10-10    PT/INR - ( 10 Oct 2022 17:10 )   PT: 13.1 sec;   INR: 1.13 ratio         PTT - ( 10 Oct 2022 17:10 )  PTT:25.4 sec      Urinalysis Basic - ( 10 Oct 2022 17:00 )    Color: Yellow / Appearance: Clear / S.025 / pH: x  Gluc: x / Ketone: Trace  / Bili: Negative / Urobili: 1 mg/dL   Blood: x / Protein: 15 / Nitrite: Negative   Leuk Esterase: Negative / RBC: 0-2 /HPF / WBC 0-2 /HPF   Sq Epi: x / Non Sq Epi: Occasional / Bacteria: Few        CAPILLARY BLOOD GLUCOSE            RADIOLOGY & ADDITIONAL TESTS:  Results Reviewed:   Imaging Personally Reviewed:  Electrocardiogram Personally Reviewed:
CC: Follow up     INTERVAL HPI/OVERNIGHT EVENTS: Patient seen and examined, afebrile no acute complaints overnight.       Vital Signs Last 24 Hrs  T(C): 36.8 (13 Oct 2022 04:55), Max: 37.2 (12 Oct 2022 16:25)  T(F): 98.3 (13 Oct 2022 04:55), Max: 99 (12 Oct 2022 16:25)  HR: 86 (13 Oct 2022 04:55) (86 - 104)  BP: 95/61 (13 Oct 2022 04:55) (95/61 - 103/63)  BP(mean): --  RR: 18 (13 Oct 2022 04:55) (18 - 18)  SpO2: 98% (13 Oct 2022 04:55) (97% - 98%)    Parameters below as of 13 Oct 2022 04:55  Patient On (Oxygen Delivery Method): room air        PHYSICAL EXAM:    GENERAL: NAD, AOX3  HEAD:  Atraumatic, Normocephalic  EYES: conjunctiva and sclera clear  ENMT: Moist mucous membranes  NECK: Supple  CHEST/LUNG: Clear to auscultation bilaterally; No rales, rhonchi, wheezing, or rubs  HEART: Regular rate and rhythm; No murmurs, rubs, or gallops  ABDOMEN: Soft, Nontender, Nondistended; Bowel sounds present  EXTREMITIES:  2+ Peripheral Pulses, No clubbing, cyanosis, or edema        MEDICATIONS  (STANDING):  allopurinol 100 milliGRAM(s) Oral daily  FLUoxetine 20 milliGRAM(s) Oral daily  influenza   Vaccine 0.5 milliLiter(s) IntraMuscular once  polyethylene glycol 3350 17 Gram(s) Oral daily  senna 2 Tablet(s) Oral daily    MEDICATIONS  (PRN):  acetaminophen     Tablet .. 650 milliGRAM(s) Oral every 6 hours PRN Moderate Pain (4 - 6)  ondansetron Injectable 8 milliGRAM(s) IV Push every 8 hours PRN Nausea and/or Vomiting      Allergies    doxycycline (Rash)  penicillins (Unknown)    Intolerances          LABS:                          8.5    16.44 )-----------( 102      ( 13 Oct 2022 04:50 )             26.6     10-12    138  |  102  |  12.6  ----------------------------<  106<H>  3.9   |  24.0  |  0.56    Ca    9.5      12 Oct 2022 04:50            RADIOLOGY & ADDITIONAL TESTS:  
CC: Follow up     INTERVAL HPI/OVERNIGHT EVENTS: Patient seen and examined, afebrile. Non productive cough. + Consitpation. Denies chills or sweats.       Vital Signs Last 24 Hrs  T(C): 36.8 (12 Oct 2022 04:33), Max: 37.3 (11 Oct 2022 11:39)  T(F): 98.3 (12 Oct 2022 04:33), Max: 99.2 (11 Oct 2022 11:39)  HR: 98 (12 Oct 2022 04:33) (61 - 115)  BP: 100/66 (12 Oct 2022 04:33) (99/58 - 121/80)  BP(mean): --  RR: 18 (12 Oct 2022 04:33) (18 - 18)  SpO2: 97% (12 Oct 2022 04:33) (96% - 100%)    Parameters below as of 12 Oct 2022 04:33  Patient On (Oxygen Delivery Method): room air        PHYSICAL EXAM:    GENERAL: NAD, AOX3  HEAD:  Atraumatic, Normocephalic  EYES:  conjunctiva and sclera clear  ENMT: Moist mucous membranes  NECK: Supple  CHEST/LUNG: Clear to auscultation bilaterally; No rales, rhonchi, wheezing, or rubs  HEART: Regular rate and rhythm; No murmurs, rubs, or gallops  ABDOMEN: Soft, Nontender, Nondistended; Bowel sounds present  EXTREMITIES:  2+ Peripheral Pulses, No clubbing, cyanosis, or edema        MEDICATIONS  (STANDING):  allopurinol 100 milliGRAM(s) Oral daily  cefepime  Injectable. 2000 milliGRAM(s) IV Push every 8 hours  FLUoxetine 20 milliGRAM(s) Oral daily  influenza   Vaccine 0.5 milliLiter(s) IntraMuscular once    MEDICATIONS  (PRN):  acetaminophen     Tablet .. 650 milliGRAM(s) Oral every 6 hours PRN Moderate Pain (4 - 6)  ondansetron Injectable 8 milliGRAM(s) IV Push every 8 hours PRN Nausea and/or Vomiting      Allergies    doxycycline (Rash)  penicillins (Unknown)    Intolerances          LABS:                          8.5    3.02  )-----------( 67       ( 12 Oct 2022 04:50 )             25.7     10-12    138  |  102  |  12.6  ----------------------------<  106<H>  3.9   |  24.0  |  0.56    Ca    9.5      12 Oct 2022 04:50  Phos  4.0     10-11  Mg     1.8     10-11    TPro  7.4  /  Alb  4.5  /  TBili  0.3<L>  /  DBili  x   /  AST  10  /  ALT  15  /  AlkPhos  78  10-10    PT/INR - ( 10 Oct 2022 17:10 )   PT: 13.1 sec;   INR: 1.13 ratio         PTT - ( 10 Oct 2022 17:10 )  PTT:25.4 sec  Urinalysis Basic - ( 10 Oct 2022 17:00 )    Color: Yellow / Appearance: Clear / S.025 / pH: x  Gluc: x / Ketone: Trace  / Bili: Negative / Urobili: 1 mg/dL   Blood: x / Protein: 15 / Nitrite: Negative   Leuk Esterase: Negative / RBC: 0-2 /HPF / WBC 0-2 /HPF   Sq Epi: x / Non Sq Epi: Occasional / Bacteria: Few        RADIOLOGY & ADDITIONAL TESTS:  
Upon entry, pt seated in bedside chair in NAD; + IV. Wife present at bedside. Pt left as received with all tubes/lines intact, call bell in reach and in NAD.

## 2022-10-13 NOTE — DISCHARGE NOTE NURSING/CASE MANAGEMENT/SOCIAL WORK - NSDCPEFALRISK_GEN_ALL_CORE
Ok to give tylenol or ibuprofen as needed for pain or  fever, alternate every 3 hours if needed  Ok to try over the counter cough and cold meds like benadryl or cetirzine for runny nose or post nasal drip  Take omnicef daily for 10 days      
For information on Fall & Injury Prevention, visit: [email or web address] OR  [email or web address] OR  https://www.cdc.gov/steadi/patient.html

## 2022-10-13 NOTE — PROGRESS NOTE ADULT - ASSESSMENT
56 y/o female with Neutropenic fever, SIRS, Enterovirus, Pancytopenia, Lymphoma who got last chemo infusion on 10/06 with filgrastim administration same day, anxiety, IBS     Assessment/plan:    1. Neutropenic fever in  with URI symptoms and + Entero/Rhino virus   - IV Cefepime day 3/7 transition to PO    - Blood cultures negative x 2  - Urine culture negative  - CT chest abdomen and pelvis negative   - supportive care with PRN tylenol, zofran, OOB, ambulate  -VC boots, no chemical DVT-P with thrombocytopenia    2. Enterovirus  -Supportive care    3. Pancytopenia due to  Chemo therapy for NHL  - Improving   - s/p filgrastim on 10/06    4. Lymphoma:  -F/U with Dr. Rendon post DC    5. Anxiety:  -PRN xanax    6. IBS:  -Resume Linzess on DC  -Add miralax and senna     DVT ppx - ambulation every shift  Code/social -  full code      Medically stable for discharge

## 2022-10-15 LAB
CULTURE RESULTS: SIGNIFICANT CHANGE UP
CULTURE RESULTS: SIGNIFICANT CHANGE UP
SPECIMEN SOURCE: SIGNIFICANT CHANGE UP
SPECIMEN SOURCE: SIGNIFICANT CHANGE UP

## 2022-10-17 ENCOUNTER — OUTPATIENT (OUTPATIENT)
Dept: OUTPATIENT SERVICES | Facility: HOSPITAL | Age: 57
LOS: 1 days | End: 2022-10-17

## 2022-10-17 ENCOUNTER — APPOINTMENT (OUTPATIENT)
Dept: NUCLEAR MEDICINE | Facility: CLINIC | Age: 57
End: 2022-10-17

## 2022-10-17 DIAGNOSIS — C85.90 NON-HODGKIN LYMPHOMA, UNSPECIFIED, UNSPECIFIED SITE: ICD-10-CM

## 2022-10-17 PROCEDURE — 78815 PET IMAGE W/CT SKULL-THIGH: CPT | Mod: 26,PS

## 2022-10-19 ENCOUNTER — RESULT REVIEW (OUTPATIENT)
Age: 57
End: 2022-10-19

## 2022-10-19 ENCOUNTER — APPOINTMENT (OUTPATIENT)
Dept: HEMATOLOGY ONCOLOGY | Facility: CLINIC | Age: 57
End: 2022-10-19

## 2022-10-19 VITALS
BODY MASS INDEX: 32.38 KG/M2 | SYSTOLIC BLOOD PRESSURE: 115 MMHG | RESPIRATION RATE: 16 BRPM | HEART RATE: 93 BPM | TEMPERATURE: 97.3 F | OXYGEN SATURATION: 99 % | DIASTOLIC BLOOD PRESSURE: 73 MMHG | WEIGHT: 175.25 LBS

## 2022-10-19 LAB
BASOPHILS # BLD AUTO: 0.06 K/UL — SIGNIFICANT CHANGE UP (ref 0–0.2)
BASOPHILS NFR BLD AUTO: 1 % — SIGNIFICANT CHANGE UP (ref 0–2)
EOSINOPHIL # BLD AUTO: 0.02 K/UL — SIGNIFICANT CHANGE UP (ref 0–0.5)
EOSINOPHIL NFR BLD AUTO: 0.3 % — SIGNIFICANT CHANGE UP (ref 0–6)
HCT VFR BLD CALC: 30.5 % — LOW (ref 34.5–45)
HGB BLD-MCNC: 9.7 G/DL — LOW (ref 11.5–15.5)
IMM GRANULOCYTES NFR BLD AUTO: 1.8 % — HIGH (ref 0–0.9)
LYMPHOCYTES # BLD AUTO: 1.12 K/UL — SIGNIFICANT CHANGE UP (ref 1–3.3)
LYMPHOCYTES # BLD AUTO: 18.7 % — SIGNIFICANT CHANGE UP (ref 13–44)
MCHC RBC-ENTMCNC: 31.6 PG — SIGNIFICANT CHANGE UP (ref 27–34)
MCHC RBC-ENTMCNC: 31.8 G/DL — LOW (ref 32–36)
MCV RBC AUTO: 99.3 FL — SIGNIFICANT CHANGE UP (ref 80–100)
MONOCYTES # BLD AUTO: 0.64 K/UL — SIGNIFICANT CHANGE UP (ref 0–0.9)
MONOCYTES NFR BLD AUTO: 10.7 % — SIGNIFICANT CHANGE UP (ref 2–14)
NEUTROPHILS # BLD AUTO: 4.04 K/UL — SIGNIFICANT CHANGE UP (ref 1.8–7.4)
NEUTROPHILS NFR BLD AUTO: 67.5 % — SIGNIFICANT CHANGE UP (ref 43–77)
NRBC # BLD: 0 /100 WBCS — SIGNIFICANT CHANGE UP (ref 0–0)
PLATELET # BLD AUTO: 334 K/UL — SIGNIFICANT CHANGE UP (ref 150–400)
RBC # BLD: 3.07 M/UL — LOW (ref 3.8–5.2)
RBC # FLD: 14.8 % — HIGH (ref 10.3–14.5)
WBC # BLD: 5.99 K/UL — SIGNIFICANT CHANGE UP (ref 3.8–10.5)
WBC # FLD AUTO: 5.99 K/UL — SIGNIFICANT CHANGE UP (ref 3.8–10.5)

## 2022-10-19 PROCEDURE — 99215 OFFICE O/P EST HI 40 MIN: CPT

## 2022-10-24 ENCOUNTER — APPOINTMENT (OUTPATIENT)
Dept: HEMATOLOGY ONCOLOGY | Facility: CLINIC | Age: 57
End: 2022-10-24

## 2022-10-24 VITALS
SYSTOLIC BLOOD PRESSURE: 103 MMHG | WEIGHT: 175.01 LBS | DIASTOLIC BLOOD PRESSURE: 69 MMHG | OXYGEN SATURATION: 97 % | HEIGHT: 61.69 IN | BODY MASS INDEX: 32.2 KG/M2 | HEART RATE: 79 BPM

## 2022-10-24 DIAGNOSIS — R59.0 LOCALIZED ENLARGED LYMPH NODES: ICD-10-CM

## 2022-10-24 PROCEDURE — 99215 OFFICE O/P EST HI 40 MIN: CPT

## 2022-10-24 NOTE — HISTORY OF PRESENT ILLNESS
[de-identified] : 57 year old female with no significant past medical hx presents to office for initial visit for high dose chemtherapy and auto stem cell transplant....  Patient presented on 3/4/22 c/o right axilla mass  to general surgeon, Dr. Amadeo Grace. Patient incidentally discovered it while shavingin Jan 2022  \par Biopsy of right axilla performed on 3/22/22 revealing atypical lymphoid infiltrate ( NEGATIVE for a clonal lgH gene rearrangement/ Positive clonal TCR gamma gene rearrangement )\par \par Planned for lymph node excision with Dr. Grace however patient decided to stay in the [facility] system and had Excisional Biopsy per Dr Mariano \par Colonoscopy November 2021\par \par LN excional BIopsy was delayed 2/2 to her travelling and pre surg testing.\par On review of needle biopsy from 3/22/22 at [facility], suggestive of T cell lymphoma\par \par S/p right axillary lymph node excisional biopsy on 5/16/22  per DR Mariano Path c/w Peripheral T- Cell Lymphoma, NOS\par PET SCAN 4/21/22: \par - 1. FDG avid extensive right axillary lymphadenopathy 5.4 x 3.6 cm , SUV 22.5. and right supraclavicular lymph node.0.8 x 0.8 cm (image 53), SUV 5.8.\par 2. Difficult to delineate retroareolar right breast soft tissue, with low-grade FDG avidity and minimally FDG avid diffuse, right breast cutaneous thickening. \par \par Breast node biopsy performed February 2022 was negative. \par MRI Breast on 5/26/22 with benign findings repeat in 6 months [de-identified] : Patient presents accompanied with .\par S/p right axillary lymph node excisional biopsy on 5/16/22  per DR Mariano Path c/w Peripheral T- Cell Lymphoma, NOS\par Patient started  CHOEP on 6/20/22, C4  8/22/22\par \par She reports increased fatigue and insomnia 2/2 to steriod after first week of  treatment.\par She takes a nap during the day but is able to get up and be active. \par Reports patch release pain the next day. She takes Claritin and Tylenol with relief. \par Patient admits constipation uses MiraLAX however takes 6 days after treatment to have a bowel movement. \par Denies trouble hearing.  \par Currently on linzess for constipation, baby aspirin 81 mg, Prozac 20mg, multivitamin \par \par 10/19/22 S/P hospitalization for krzysztof fever with 6th cycle of chemotherapy...now recovered

## 2022-10-24 NOTE — PHYSICAL EXAM
[Restricted in physically strenuous activity but ambulatory and able to carry out work of a light or sedentary nature] : Status 1- Restricted in physically strenuous activity but ambulatory and able to carry out work of a light or sedentary nature, e.g., light house work, office work [Normal] : affect appropriate [de-identified] : port in place [de-identified] : No edema

## 2022-10-24 NOTE — ASSESSMENT
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 Amadeo Grace. Patient incidentally discovered it while shaving in Jan / Feb 2022 \par Biopsy of right axilla performed on 3/22/22 revealing atypical lymphoid infiltrate ( NEGATIVE for a clonal lgH gene rearrangement/ Positive clonal TCR gamma gene rearrangement )\par \par On review of needle biopsy from 3/22/22 at [facility], suggestive of T cell lymphoma\par \par S/p right axillary lymph node excisional biopsy on 5/16/22  per DR Montserrat Knight c/w Peripheral T- Cell Lymphoma, NOS\par \par PET SCAN 4/21/22: \par - 1. FDG avid extensive right axillary lymphadenopathy 5.4 x 3.6 cm , SUV 22.5. and right supraclavicular lymph node.0.8 x 0.8 cm (image 53), SUV 5.8.\par 2. Difficult to delineate retroareolar right breast soft tissue, with low-grade FDG avidity and minimally FDG avid diffuse, right breast cutaneous thickening. \par \par Breast node biopsy performed February 2022 was negative. \par MRI BREAST 5/26/22: BiRADS 3 , f/u in 6 months\par \par PET SCAN 6/13/22: \par 1. Interval resolution of FDG avidity associated with right supraclavicular lymph node which has decreased in size.\par 2. Interval decrease in size, number and avidity of right axillary lymphadenopathy and resolution of  FDG avid right retropectoral lymph nodes.\par 3. Unchanged minimally FDG avid difficult to delineate retroareolar right breast soft tissue and minimally FDG avid diffuse right breast cutaneous thickening. Skin thickening and parenchymal edema in the right breast is consistent with metastatic obstruction on interval MRI from 5/26/2022.\par \par PET/CT 8/8/22 \par 1. Interval further decrease of right axillary lymph nodes.\par 2. Unchanged nonspecific minimally FDG avid retroareolar right breast soft tissue. Interval decreased avidity of unchanged cutaneous thickening in the right breast.\par 3. Subcentimeter nodular opacity in the right upper lobe medially, unchanged since PET/CT on 4/21/2022. \par 4. New generalized diffuse bone marrow hypermetabolism, nonspecific and may be related to treatment. \par \par \par PERIPHERAL T CELL LYMPHOMA NOS Stage 1\par IPI SCORE: 0 LOW RISK \par - Some B symptoms, Night sweats since Jan 2022, fatigue, 6 lb weight loss\par - Discussed with patient Chemotherapy with CHOEP WITH NEULASTA Support \par -D1 :  Cyclophosphamide 750 mg /m2, Adriamycin 50 mg/ m2 Vincristone 1.4mg/ m2, Etoposide 10mg/ m2 IV \par D2- D3 Etoposide 100mg/m2  IV Hydration \par D1-D5 Prednisone 10mg po \par Allopurinol 100mg daily for TLS prophylaxis\par \par \par - Port placement on 6/8/22\par - ECHO on 6/14/22, LV EF 59%\par - Patient started  CHOEP on 6/20/22, C4  8/22/22 now completed 6\par -Constipation; continue MiraLAX. Advised senna and colace. \par -Rash; Continue salicylic acid cream for rash, can try clindamycin ointment if not improved. \par - Restaging PET Scan and bm bx  after 6 cycles...confirms response.....to have f/u MRI of breast\par - Echo in Sept 2022 along with pre transplant testing and dental eval\par MRI BREAST: to be repeated in Nov 2022 \par I had another  extensive discussion regarding the risks, benefits, alternatives, logistics and rationale for high dose chemotherapy and auto stem cell transplant for t cell NHL..4 week hospital stay and 8 week recovery discussed...augmented cbv prep...stem cell mobilization with growth factor and collection discussed...need confirmed negative bm bx...if not consider ICE mobilization...goal 2 to 5 million cd 34 cells per kg...Collection early to mid nov...admit for transplant end nov.Literature and consents provided for review..quests addressed..f/u 3 weeks with transplant team...orientation and sw eval...\par \par

## 2022-10-25 ENCOUNTER — APPOINTMENT (OUTPATIENT)
Dept: MRI IMAGING | Facility: CLINIC | Age: 57
End: 2022-10-25

## 2022-10-25 ENCOUNTER — OUTPATIENT (OUTPATIENT)
Dept: OUTPATIENT SERVICES | Facility: HOSPITAL | Age: 57
LOS: 1 days | End: 2022-10-25

## 2022-10-25 DIAGNOSIS — R22.30 LOCALIZED SWELLING, MASS AND LUMP, UNSPECIFIED UPPER LIMB: ICD-10-CM

## 2022-10-25 PROCEDURE — 77049 MRI BREAST C-+ W/CAD BI: CPT | Mod: 26

## 2022-10-27 ENCOUNTER — APPOINTMENT (OUTPATIENT)
Dept: GASTROENTEROLOGY | Facility: CLINIC | Age: 57
End: 2022-10-27

## 2022-10-27 VITALS
RESPIRATION RATE: 14 BRPM | BODY MASS INDEX: 32.2 KG/M2 | OXYGEN SATURATION: 98 % | TEMPERATURE: 98 F | SYSTOLIC BLOOD PRESSURE: 128 MMHG | DIASTOLIC BLOOD PRESSURE: 87 MMHG | WEIGHT: 175 LBS | HEIGHT: 62 IN | HEART RATE: 84 BPM

## 2022-10-27 PROCEDURE — 99214 OFFICE O/P EST MOD 30 MIN: CPT

## 2022-10-27 RX ORDER — PREDNISONE 50 MG/1
50 TABLET ORAL
Qty: 10 | Refills: 5 | Status: DISCONTINUED | COMMUNITY
Start: 2022-06-09 | End: 2022-10-27

## 2022-10-27 RX ORDER — SODIUM PICOSULFATE, MAGNESIUM OXIDE, AND ANHYDROUS CITRIC ACID 10; 3.5; 12 MG/160ML; G/160ML; G/160ML
10-3.5-12 MG-GM LIQUID ORAL
Qty: 1 | Refills: 0 | Status: DISCONTINUED | COMMUNITY
Start: 2021-03-18 | End: 2022-10-27

## 2022-10-27 RX ORDER — ALPRAZOLAM 0.5 MG/1
0.5 TABLET ORAL
Qty: 2 | Refills: 0 | Status: DISCONTINUED | COMMUNITY
Start: 2022-05-16 | End: 2022-10-27

## 2022-10-27 RX ORDER — SALICYLIC ACID 60 MG/G
6 GEL TOPICAL
Qty: 1 | Refills: 1 | Status: DISCONTINUED | COMMUNITY
Start: 2022-07-27 | End: 2022-10-27

## 2022-10-27 RX ORDER — FILGRASTIM-SNDZ 300 UG/.5ML
300 INJECTION, SOLUTION INTRAVENOUS; SUBCUTANEOUS
Qty: 5 | Refills: 0 | Status: DISCONTINUED | COMMUNITY
Start: 2022-10-19 | End: 2022-10-27

## 2022-10-27 RX ORDER — PROCHLORPERAZINE MALEATE 10 MG/1
10 TABLET ORAL EVERY 6 HOURS
Qty: 120 | Refills: 0 | Status: DISCONTINUED | COMMUNITY
Start: 2022-06-06 | End: 2022-10-27

## 2022-10-27 RX ORDER — SODIUM PICOSULFATE, MAGNESIUM OXIDE, AND ANHYDROUS CITRIC ACID 10; 3.5; 12 MG/160ML; G/160ML; G/160ML
10-3.5-12 MG-GM LIQUID ORAL
Qty: 2 | Refills: 0 | Status: DISCONTINUED | COMMUNITY
Start: 2021-03-18 | End: 2022-10-27

## 2022-10-27 RX ORDER — CLINDAMYCIN PHOSPHATE 10 MG/ML
1 LOTION TOPICAL
Qty: 3 | Refills: 5 | Status: DISCONTINUED | COMMUNITY
Start: 2022-08-01 | End: 2022-10-27

## 2022-10-27 NOTE — HISTORY OF PRESENT ILLNESS
[FreeTextEntry1] : Patient with history of chronic constipation treated with Linzess 145 mcg daily successfully.  Patient was asymptomatic although recently she has been on chemotherapy for lymphoma and this is upset her bowel movements.  She had a normal colonoscopy last year.  She has no rectal bleeding.  She is going to undergo a bone marrow transplant next month.

## 2022-10-27 NOTE — ASSESSMENT
[FreeTextEntry1] : The patient is doing well and I renewed her Linzess.  If she has any issues related to her lymphoma treatment causing bowel issues with the Linzess she will call me.

## 2022-10-28 ENCOUNTER — LABORATORY RESULT (OUTPATIENT)
Age: 57
End: 2022-10-28

## 2022-10-28 ENCOUNTER — APPOINTMENT (OUTPATIENT)
Dept: HEMATOLOGY ONCOLOGY | Facility: CLINIC | Age: 57
End: 2022-10-28

## 2022-10-28 ENCOUNTER — APPOINTMENT (OUTPATIENT)
Dept: RADIOLOGY | Facility: IMAGING CENTER | Age: 57
End: 2022-10-28

## 2022-10-28 ENCOUNTER — RESULT REVIEW (OUTPATIENT)
Age: 57
End: 2022-10-28

## 2022-10-28 ENCOUNTER — APPOINTMENT (OUTPATIENT)
Dept: PULMONOLOGY | Facility: CLINIC | Age: 57
End: 2022-10-28

## 2022-10-28 ENCOUNTER — OUTPATIENT (OUTPATIENT)
Dept: OUTPATIENT SERVICES | Facility: HOSPITAL | Age: 57
LOS: 1 days | End: 2022-10-28

## 2022-10-28 ENCOUNTER — OUTPATIENT (OUTPATIENT)
Dept: OUTPATIENT SERVICES | Facility: HOSPITAL | Age: 57
LOS: 1 days | End: 2022-10-28
Payer: COMMERCIAL

## 2022-10-28 DIAGNOSIS — C85.90 NON-HODGKIN LYMPHOMA, UNSPECIFIED, UNSPECIFIED SITE: ICD-10-CM

## 2022-10-28 DIAGNOSIS — Z01.818 ENCOUNTER FOR OTHER PREPROCEDURAL EXAMINATION: ICD-10-CM

## 2022-10-28 DIAGNOSIS — Z00.8 ENCOUNTER FOR OTHER GENERAL EXAMINATION: ICD-10-CM

## 2022-10-28 PROBLEM — R59.0 AXILLARY LYMPHADENOPATHY: Status: ACTIVE | Noted: 2022-04-29

## 2022-10-28 LAB
BASOPHILS # BLD AUTO: 0.03 K/UL — SIGNIFICANT CHANGE UP (ref 0–0.2)
BASOPHILS NFR BLD AUTO: 1 % — SIGNIFICANT CHANGE UP (ref 0–2)
EOSINOPHIL # BLD AUTO: 0.02 K/UL — SIGNIFICANT CHANGE UP (ref 0–0.5)
EOSINOPHIL NFR BLD AUTO: 0.7 % — SIGNIFICANT CHANGE UP (ref 0–6)
HCT VFR BLD CALC: 29.8 % — LOW (ref 34.5–45)
HGB BLD-MCNC: 9.6 G/DL — LOW (ref 11.5–15.5)
IMM GRANULOCYTES NFR BLD AUTO: 0.3 % — SIGNIFICANT CHANGE UP (ref 0–0.9)
LYMPHOCYTES # BLD AUTO: 0.68 K/UL — LOW (ref 1–3.3)
LYMPHOCYTES # BLD AUTO: 23.3 % — SIGNIFICANT CHANGE UP (ref 13–44)
MCHC RBC-ENTMCNC: 31.9 PG — SIGNIFICANT CHANGE UP (ref 27–34)
MCHC RBC-ENTMCNC: 32.2 G/DL — SIGNIFICANT CHANGE UP (ref 32–36)
MCV RBC AUTO: 99 FL — SIGNIFICANT CHANGE UP (ref 80–100)
MONOCYTES # BLD AUTO: 0.29 K/UL — SIGNIFICANT CHANGE UP (ref 0–0.9)
MONOCYTES NFR BLD AUTO: 9.9 % — SIGNIFICANT CHANGE UP (ref 2–14)
NEUTROPHILS # BLD AUTO: 1.89 K/UL — SIGNIFICANT CHANGE UP (ref 1.8–7.4)
NEUTROPHILS NFR BLD AUTO: 64.8 % — SIGNIFICANT CHANGE UP (ref 43–77)
NRBC # BLD: 0 /100 WBCS — SIGNIFICANT CHANGE UP (ref 0–0)
PLATELET # BLD AUTO: 333 K/UL — SIGNIFICANT CHANGE UP (ref 150–400)
RBC # BLD: 3.01 M/UL — LOW (ref 3.8–5.2)
RBC # FLD: 14.4 % — SIGNIFICANT CHANGE UP (ref 10.3–14.5)
WBC # BLD: 2.92 K/UL — LOW (ref 3.8–10.5)
WBC # FLD AUTO: 2.92 K/UL — LOW (ref 3.8–10.5)

## 2022-10-28 PROCEDURE — 94729 DIFFUSING CAPACITY: CPT

## 2022-10-28 PROCEDURE — 94010 BREATHING CAPACITY TEST: CPT

## 2022-10-28 PROCEDURE — 70220 X-RAY EXAM OF SINUSES: CPT | Mod: 26

## 2022-10-28 PROCEDURE — 36600 WITHDRAWAL OF ARTERIAL BLOOD: CPT | Mod: 59

## 2022-10-28 PROCEDURE — 82803 BLOOD GASES ANY COMBINATION: CPT

## 2022-10-28 PROCEDURE — ZZZZZ: CPT

## 2022-10-28 PROCEDURE — 94726 PLETHYSMOGRAPHY LUNG VOLUMES: CPT

## 2022-10-28 PROCEDURE — 70220 X-RAY EXAM OF SINUSES: CPT

## 2022-10-28 NOTE — HISTORY OF PRESENT ILLNESS
[de-identified] : 57 year old female with no significant past medical hx presents to office for initial visit.  Patient presented on 3/4/22 c/o right axilla mass  to general surgeon, Dr. Amadeo Grace. Patient incidentally discovered it while shavingin Jan 2022  \par Biopsy of right axilla performed on 3/22/22 revealing atypical lymphoid infiltrate ( NEGATIVE for a clonal lgH gene rearrangement/ Positive clonal TCR gamma gene rearrangement )\par \par Planned for lymph node excision with Dr. Grace however patient decided to stay in the [facility] system and had Excisional Biopsy per Dr Mariano \par Colonoscopy November 2021\par \par LN excional BIopsy was delayed 2/2 to her travelling and pre surg testing.\par On review of needle biopsy from 3/22/22 at [facility], suggestive of T cell lymphoma\par \par S/p right axillary lymph node excisional biopsy on 5/16/22  per DR Mariano Path c/w Peripheral T- Cell Lymphoma, NOS\par PET SCAN 4/21/22: \par - 1. FDG avid extensive right axillary lymphadenopathy 5.4 x 3.6 cm , SUV 22.5. and right supraclavicular lymph node.0.8 x 0.8 cm (image 53), SUV 5.8.\par 2. Difficult to delineate retroareolar right breast soft tissue, with low-grade FDG avidity and minimally FDG avid diffuse, right breast cutaneous thickening. \par \par Breast node biopsy performed February 2022 was negative. \par MRI Breast on 5/26/22 with benign findings repeat in 6 months [de-identified] : Patient presents for follow up accompanied with .\par S/p right axillary lymph node excisional biopsy on 5/16/22  per DR Mariano Path c/w Peripheral T- Cell Lymphoma, NOS\par Patient started  CHOEP on 6/20/22, Completed C 6 on 10/6/22\par \par PET SCAN ON 10/17/22\par 1. Interval further decreased size of right axillary lymph nodes with unchanged minimal activity comparable to mediastinal blood pool ([place]au[place] 2).\par 2. Unchanged nonspecific minimally FDG avid retroareolar right breast soft tissue and unchanged minimally avid cutaneous thickening in the right breast.\par 3. Unchanged too small to characterize subcentimeter nodular opacity in the right upper lobe medially.\par 4. Generalized diffuse bone marrow hypermetabolism without CT correlate, slightly less prominent. This is nonspecific and may be related to treatment with G-CSF.\par \par Patient admits constipation uses Linzess and Miralax with relief\par Patient evaluated by Dr. Acevedo for auto stem cell transplant for t cell NHL\par Denies trouble hearing.  \par \par \par Currently on linzess for constipation, baby aspirin 81 mg, Prozac 20 mg, multivitamin

## 2022-10-28 NOTE — ASSESSMENT
[FreeTextEntry1] : 57 year old female no significant past medical hx now seen for Peripheral T cell Lymphoma NOS\par \par Patient presented on 3/4/22 c/o right axilla mass  to general surgeon, Dr. Amadeo Grace. Patient incidentally discovered it while shaving in Jan / Feb 2022 \par Biopsy of right axilla performed on 3/22/22 revealing atypical lymphoid infiltrate ( NEGATIVE for a clonal lgH gene rearrangement/ Positive clonal TCR gamma gene rearrangement )\par \par On review of needle biopsy from 3/22/22 at [facility], suggestive of T cell lymphoma\par \par S/p right axillary lymph node excisional biopsy on 5/16/22  per DR Mariano Path c/w Peripheral T- Cell Lymphoma, NOS\par \par PET SCAN 4/21/22: \par - 1. FDG avid extensive right axillary lymphadenopathy 5.4 x 3.6 cm , SUV 22.5. and right supraclavicular lymph node.0.8 x 0.8 cm (image 53), SUV 5.8.\par 2. Difficult to delineate retroareolar right breast soft tissue, with low-grade FDG avidity and minimally FDG avid diffuse, right breast cutaneous thickening. \par \par Breast node biopsy performed February 2022 was negative. \par MRI BREAST 5/26/22: BiRADS 3 , f/u in 6 months\par \par PET SCAN 6/13/22: \par 1. Interval resolution of FDG avidity associated with right supraclavicular lymph node which has decreased in size.\par 2. Interval decrease in size, number and avidity of right axillary lymphadenopathy and resolution of  FDG avid right retropectoral lymph nodes.\par 3. Unchanged minimally FDG avid difficult to delineate retroareolar right breast soft tissue and minimally FDG avid diffuse right breast cutaneous thickening. Skin thickening and parenchymal edema in the right breast is consistent with metastatic obstruction on interval MRI from 5/26/2022.\par \par PET/CT 8/8/22 \par 1. Interval further decrease of right axillary lymph nodes.\par 2. Unchanged nonspecific minimally FDG avid retroareolar right breast soft tissue. Interval decreased avidity of unchanged cutaneous thickening in the right breast.\par 3. Subcentimeter nodular opacity in the right upper lobe medially, unchanged since PET/CT on 4/21/2022. \par 4. New generalized diffuse bone marrow hypermetabolism, nonspecific and may be related to treatment. \par \par \par PERIPHERAL T CELL LYMPHOMA NOS Stage 1\par IPI SCORE: 0 LOW RISK \par - Some B symptoms, Night sweats since Jan 2022, fatigue, 6 lb weight loss\par - Discussed with patient Chemotherapy with CHOEP WITH NEULASTA Support \par -D1 :  Cyclophosphamide 750 mg /m2, Adriamycin 50 mg/ m2 Vincristone 1.4mg/ m2, Etoposide 10mg/ m2 IV \par D2- D3 Etoposide 100mg/m2  IV Hydration \par D1-D5 Prednisone 10mg po \par Allopurinol 100mg daily for TLS prophylaxis\par \par \par - Port placement on 6/8/22\par - ECHO on 6/14/22, LV EF 59%\par - Patient started  CHOEP on 6/20/22, Completed C 6 on 10/6/22\par - Patient evaluated by Dr. Acevedo for auto stem cell transplant for t cell NHL, Plan ON 11/16/22 \par  on 11/3-11/5/22\par - MRI BREAST  tomorrow \par \par \par \par \par

## 2022-11-03 ENCOUNTER — APPOINTMENT (OUTPATIENT)
Dept: HEMATOLOGY ONCOLOGY | Facility: CLINIC | Age: 57
End: 2022-11-03

## 2022-11-03 ENCOUNTER — RESULT REVIEW (OUTPATIENT)
Age: 57
End: 2022-11-03

## 2022-11-03 VITALS
OXYGEN SATURATION: 99 % | TEMPERATURE: 97.2 F | WEIGHT: 173.92 LBS | BODY MASS INDEX: 31.81 KG/M2 | RESPIRATION RATE: 16 BRPM | DIASTOLIC BLOOD PRESSURE: 66 MMHG | HEART RATE: 69 BPM | SYSTOLIC BLOOD PRESSURE: 96 MMHG

## 2022-11-03 DIAGNOSIS — Z01.818 ENCOUNTER FOR OTHER PREPROCEDURAL EXAMINATION: ICD-10-CM

## 2022-11-03 LAB
ALBUMIN SERPL ELPH-MCNC: 4.8 G/DL
ALP BLD-CCNC: 92 U/L
ALT SERPL-CCNC: 35 U/L
ANION GAP SERPL CALC-SCNC: 13 MMOL/L
APTT BLD: 27.6 SEC
AST SERPL-CCNC: 26 U/L
BASOPHILS # BLD AUTO: 0.04 K/UL — SIGNIFICANT CHANGE UP (ref 0–0.2)
BASOPHILS NFR BLD AUTO: 1.1 % — SIGNIFICANT CHANGE UP (ref 0–2)
BILIRUB SERPL-MCNC: 0.2 MG/DL
BUN SERPL-MCNC: 12 MG/DL
CALCIUM SERPL-MCNC: 10.2 MG/DL
CHLORIDE SERPL-SCNC: 101 MMOL/L
CO2 SERPL-SCNC: 26 MMOL/L
CREAT SERPL-MCNC: 0.62 MG/DL
EGFR: 105 ML/MIN/1.73M2
EOSINOPHIL # BLD AUTO: 0.09 K/UL — SIGNIFICANT CHANGE UP (ref 0–0.5)
EOSINOPHIL NFR BLD AUTO: 2.6 % — SIGNIFICANT CHANGE UP (ref 0–6)
GLUCOSE SERPL-MCNC: 115 MG/DL
HCT VFR BLD CALC: 32.5 % — LOW (ref 34.5–45)
HGB BLD-MCNC: 10.4 G/DL — LOW (ref 11.5–15.5)
IMM GRANULOCYTES NFR BLD AUTO: 0 % — SIGNIFICANT CHANGE UP (ref 0–0.9)
INR PPP: 1.03 RATIO
LYMPHOCYTES # BLD AUTO: 0.92 K/UL — LOW (ref 1–3.3)
LYMPHOCYTES # BLD AUTO: 26.4 % — SIGNIFICANT CHANGE UP (ref 13–44)
MCHC RBC-ENTMCNC: 31 PG — SIGNIFICANT CHANGE UP (ref 27–34)
MCHC RBC-ENTMCNC: 32 G/DL — SIGNIFICANT CHANGE UP (ref 32–36)
MCV RBC AUTO: 97 FL — SIGNIFICANT CHANGE UP (ref 80–100)
MONOCYTES # BLD AUTO: 0.5 K/UL — SIGNIFICANT CHANGE UP (ref 0–0.9)
MONOCYTES NFR BLD AUTO: 14.3 % — HIGH (ref 2–14)
NEUTROPHILS # BLD AUTO: 1.94 K/UL — SIGNIFICANT CHANGE UP (ref 1.8–7.4)
NEUTROPHILS NFR BLD AUTO: 55.6 % — SIGNIFICANT CHANGE UP (ref 43–77)
NRBC # BLD: 0 /100 WBCS — SIGNIFICANT CHANGE UP (ref 0–0)
PLATELET # BLD AUTO: 256 K/UL — SIGNIFICANT CHANGE UP (ref 150–400)
POTASSIUM SERPL-SCNC: 4.8 MMOL/L
PROT SERPL-MCNC: 7.1 G/DL
PT BLD: 11.9 SEC
RBC # BLD: 3.35 M/UL — LOW (ref 3.8–5.2)
RBC # FLD: 13.4 % — SIGNIFICANT CHANGE UP (ref 10.3–14.5)
SODIUM SERPL-SCNC: 140 MMOL/L
WBC # BLD: 3.49 K/UL — LOW (ref 3.8–10.5)
WBC # FLD AUTO: 3.49 K/UL — LOW (ref 3.8–10.5)

## 2022-11-03 PROCEDURE — 99214 OFFICE O/P EST MOD 30 MIN: CPT

## 2022-11-03 RX ORDER — ASPIRIN 81 MG
81 TABLET, DELAYED RELEASE (ENTERIC COATED) ORAL
Refills: 0 | Status: DISCONTINUED | COMMUNITY
End: 2022-11-03

## 2022-11-03 RX ORDER — TEGASEROD 6 MG/1
6 TABLET ORAL TWICE DAILY
Qty: 60 | Refills: 3 | Status: DISCONTINUED | COMMUNITY
Start: 2021-03-18 | End: 2022-11-03

## 2022-11-04 ENCOUNTER — OUTPATIENT (OUTPATIENT)
Dept: OUTPATIENT SERVICES | Facility: HOSPITAL | Age: 57
LOS: 1 days | End: 2022-11-04
Payer: COMMERCIAL

## 2022-11-04 DIAGNOSIS — Z11.52 ENCOUNTER FOR SCREENING FOR COVID-19: ICD-10-CM

## 2022-11-04 LAB — SARS-COV-2 RNA SPEC QL NAA+PROBE: SIGNIFICANT CHANGE UP

## 2022-11-04 PROCEDURE — C9803: CPT

## 2022-11-04 PROCEDURE — U0005: CPT

## 2022-11-04 PROCEDURE — U0003: CPT

## 2022-11-04 NOTE — PHYSICAL EXAM
[Restricted in physically strenuous activity but ambulatory and able to carry out work of a light or sedentary nature] : Status 1- Restricted in physically strenuous activity but ambulatory and able to carry out work of a light or sedentary nature, e.g., light house work, office work [Normal] : affect appropriate [de-identified] : port in place [de-identified] : No edema

## 2022-11-04 NOTE — HISTORY OF PRESENT ILLNESS
[de-identified] : 57 year old female with no significant past medical hx presents to office for initial visit for high dose chemtherapy and auto stem cell transplant....  Patient presented on 3/4/22 c/o right axilla mass  to general surgeon, Dr. Amadeo Grace. Patient incidentally discovered it while shavingin Jan 2022  \par Biopsy of right axilla performed on 3/22/22 revealing atypical lymphoid infiltrate ( NEGATIVE for a clonal lgH gene rearrangement/ Positive clonal TCR gamma gene rearrangement )\par \par Planned for lymph node excision with Dr. Grace however patient decided to stay in the [facility] system and had Excisional Biopsy per Dr Mariano \par Colonoscopy November 2021\par \par LN excional BIopsy was delayed 2/2 to her travelling and pre surg testing.\par On review of needle biopsy from 3/22/22 at [facility], suggestive of T cell lymphoma\par \par S/p right axillary lymph node excisional biopsy on 5/16/22  per DR Mariano Path c/w Peripheral T- Cell Lymphoma, NOS\par PET SCAN 4/21/22: \par - 1. FDG avid extensive right axillary lymphadenopathy 5.4 x 3.6 cm , SUV 22.5. and right supraclavicular lymph node.0.8 x 0.8 cm (image 53), SUV 5.8.\par 2. Difficult to delineate retroareolar right breast soft tissue, with low-grade FDG avidity and minimally FDG avid diffuse, right breast cutaneous thickening. \par \par Breast node biopsy performed February 2022 was negative. \par MRI Breast on 5/26/22 with benign findings repeat in 6 months [de-identified] : Patient presents accompanied with .\par S/p right axillary lymph node excisional biopsy on 5/16/22  per DR Mariano Path c/w Peripheral T- Cell Lymphoma, NOS\par Patient started  CHOEP on 6/20/22, C4  8/22/22\par \par She reports increased fatigue and insomnia 2/2 to steriod after first week of  treatment.\par She takes a nap during the day but is able to get up and be active. \par Reports patch release pain the next day. She takes Claritin and Tylenol with relief. \par Patient admits constipation uses MiraLAX however takes 6 days after treatment to have a bowel movement. \par Denies trouble hearing.  \par Currently on linzess for constipation, baby aspirin 81 mg, Prozac 20mg, multivitamin \par \par 10/19/22 S/P hospitalization for krzysztof fever with 6th cycle of chemotherapy...now recovered\par \par On 11/3/22, patient presents for growth factor teaching. Overall well with no acute concerns. Only complaint is lower back pain. Denies fever, chills, nausea, vomiting, diarrhea, rash, mouth sores or any signs of active bleeding. Denies chest pain or B/L LE edema.

## 2022-11-04 NOTE — ASSESSMENT
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 Amadeo Grace. Patient incidentally discovered it while shaving in Jan / Feb 2022 \par Biopsy of right axilla performed on 3/22/22 revealing atypical lymphoid infiltrate ( NEGATIVE for a clonal lgH gene rearrangement/ Positive clonal TCR gamma gene rearrangement )\par \par On review of needle biopsy from 3/22/22 at [facility], suggestive of T cell lymphoma\par \par S/p right axillary lymph node excisional biopsy on 5/16/22  per DR Montserrat Knight c/w Peripheral T- Cell Lymphoma, NOS\par \par PET SCAN 4/21/22: \par - 1. FDG avid extensive right axillary lymphadenopathy 5.4 x 3.6 cm , SUV 22.5. and right supraclavicular lymph node.0.8 x 0.8 cm (image 53), SUV 5.8.\par 2. Difficult to delineate retroareolar right breast soft tissue, with low-grade FDG avidity and minimally FDG avid diffuse, right breast cutaneous thickening. \par \par Breast node biopsy performed February 2022 was negative. \par MRI BREAST 5/26/22: BiRADS 3 , f/u in 6 months\par \par PET SCAN 6/13/22: \par 1. Interval resolution of FDG avidity associated with right supraclavicular lymph node which has decreased in size.\par 2. Interval decrease in size, number and avidity of right axillary lymphadenopathy and resolution of  FDG avid right retropectoral lymph nodes.\par 3. Unchanged minimally FDG avid difficult to delineate retroareolar right breast soft tissue and minimally FDG avid diffuse right breast cutaneous thickening. Skin thickening and parenchymal edema in the right breast is consistent with metastatic obstruction on interval MRI from 5/26/2022.\par \par PET/CT 8/8/22 \par 1. Interval further decrease of right axillary lymph nodes.\par 2. Unchanged nonspecific minimally FDG avid retroareolar right breast soft tissue. Interval decreased avidity of unchanged cutaneous thickening in the right breast.\par 3. Subcentimeter nodular opacity in the right upper lobe medially, unchanged since PET/CT on 4/21/2022. \par 4. New generalized diffuse bone marrow hypermetabolism, nonspecific and may be related to treatment. \par \par \par PERIPHERAL T CELL LYMPHOMA NOS Stage 1\par IPI SCORE: 0 LOW RISK \par - Some B symptoms, Night sweats since Jan 2022, fatigue, 6 lb weight loss\par - Discussed with patient Chemotherapy with CHOEP WITH NEULASTA Support \par -D1 :  Cyclophosphamide 750 mg /m2, Adriamycin 50 mg/ m2 Vincristone 1.4mg/ m2, Etoposide 10mg/ m2 IV \par D2- D3 Etoposide 100mg/m2  IV Hydration \par D1-D5 Prednisone 10mg po \par Allopurinol 100mg daily for TLS prophylaxis\par \par \par - Port placement on 6/8/22\par - ECHO on 6/14/22, LV EF 59%\par - Patient started  CHOEP on 6/20/22, C4  8/22/22 now completed 6\par -Constipation; continue MiraLAX. Advised senna and colace. \par -Rash; Continue salicylic acid cream for rash, can try clindamycin ointment if not improved. \par - Restaging PET Scan and bm bx  after 6 cycles...confirms response.....to have f/u MRI of breast\par - Echo in Sept 2022 along with pre transplant testing and dental eval\par MRI BREAST: to be repeated in Nov 2022 \par I had another  extensive discussion regarding the risks, benefits, alternatives, logistics and rationale for high dose chemotherapy and auto stem cell transplant for t cell NHL..4 week hospital stay and 8 week recovery discussed...augmented cbv prep...stem cell mobilization with growth factor and collection discussed...need confirmed negative bm bx...if not consider ICE mobilization...goal 2 to 5 million cd 34 cells per kg...Collection early to mid nov...admit for transplant end nov.Literature and consents provided for review..quests addressed..f/u 3 weeks with transplant team...orientation and sw eval...\par \par 11/3/22\par Patient presents for growth factor teaching.\par CBC stable. No indication for transfusion today.\par Tyrell will receive Zarxio 780 mcg SQ Daily 11/3/22- 11/7/22. Stem cell collection will start on 11/7/22. \par Teach back method used during today's visit. NP administered Zarxio 480 mcg to RLQ abdomen. Lot #[id number] expiration December 2024. Tolerated injection with no adverse reaction.\par Second Zarxio 300 mcg Lot # [id number] Expiration September 2024 administered by spouse to RLQ abdomen. Tolerated injection with no adverse reaction.\par Recommended Claritin daily 11/3/22- 11/7/22. May take Tylenol prn for pain.\par Avoid Advil, Aleve and aspirin.\par Encouraged to increase calcium in diet over the next few days. \par Questions and concerns addressed. Reassurance provided.\par COVID 19 PCR scheduled at [facility] tomorrow.\par 11/7/22 Return to [facility] for Shiley catheter placement and stem cell collection. \par \par

## 2022-11-04 NOTE — REVIEW OF SYSTEMS
[Negative] : Allergic/Immunologic [Joint Pain] : no joint pain [Joint Stiffness] : no joint stiffness [Muscle Pain] : no muscle pain [Muscle Weakness] : no muscle weakness [FreeTextEntry9] : Lower back pain

## 2022-11-07 ENCOUNTER — OUTPATIENT (OUTPATIENT)
Dept: OUTPATIENT SERVICES | Facility: HOSPITAL | Age: 57
LOS: 1 days | End: 2022-11-07
Payer: COMMERCIAL

## 2022-11-07 ENCOUNTER — TRANSCRIPTION ENCOUNTER (OUTPATIENT)
Age: 57
End: 2022-11-07

## 2022-11-07 ENCOUNTER — RESULT REVIEW (OUTPATIENT)
Age: 57
End: 2022-11-07

## 2022-11-07 VITALS
OXYGEN SATURATION: 99 % | HEART RATE: 78 BPM | DIASTOLIC BLOOD PRESSURE: 92 MMHG | TEMPERATURE: 98 F | SYSTOLIC BLOOD PRESSURE: 118 MMHG

## 2022-11-07 DIAGNOSIS — C90.00 MULTIPLE MYELOMA NOT HAVING ACHIEVED REMISSION: ICD-10-CM

## 2022-11-07 DIAGNOSIS — C85.90 NON-HODGKIN LYMPHOMA, UNSPECIFIED, UNSPECIFIED SITE: ICD-10-CM

## 2022-11-07 LAB
CA-I BLD-SCNC: 1.28 MMOL/L — SIGNIFICANT CHANGE UP (ref 1.15–1.33)
CD34 CELLS # FLD: 39 /UL — SIGNIFICANT CHANGE UP
CD34 VIABILITY: 100 % — SIGNIFICANT CHANGE UP
HCT VFR BLD CALC: 34.2 % — LOW (ref 34.5–45)
HGB BLD-MCNC: 10.7 G/DL — LOW (ref 11.5–15.5)
LYMPHOCYTES # BLD AUTO: 5 % — LOW (ref 13–44)
MCHC RBC-ENTMCNC: 30.7 PG — SIGNIFICANT CHANGE UP (ref 27–34)
MCHC RBC-ENTMCNC: 31.3 GM/DL — LOW (ref 32–36)
MCV RBC AUTO: 98.3 FL — SIGNIFICANT CHANGE UP (ref 80–100)
METAMYELOCYTES # FLD: 2 % — HIGH (ref 0–0)
MONOCYTES NFR BLD AUTO: 2 % — SIGNIFICANT CHANGE UP (ref 2–14)
MYELOCYTES NFR BLD: 1 % — HIGH (ref 0–0)
NEUTROPHILS NFR BLD AUTO: 74 % — SIGNIFICANT CHANGE UP (ref 43–77)
NEUTS BAND # BLD: 15 % — HIGH (ref 0–8)
NRBC # BLD: 0 /100 WBCS — SIGNIFICANT CHANGE UP (ref 0–0)
PLAT MORPH BLD: NORMAL — SIGNIFICANT CHANGE UP
PLATELET # BLD AUTO: 236 K/UL — SIGNIFICANT CHANGE UP (ref 150–400)
PROMYELOCYTES # FLD: 1 % — HIGH (ref 0–0)
RBC # BLD: 3.48 M/UL — LOW (ref 3.8–5.2)
RBC # FLD: 13.4 % — SIGNIFICANT CHANGE UP (ref 10.3–14.5)
RBC BLD AUTO: SIGNIFICANT CHANGE UP
WBC # BLD: 60.32 K/UL — CRITICAL HIGH (ref 3.8–10.5)
WBC # FLD AUTO: 60.32 K/UL — CRITICAL HIGH (ref 3.8–10.5)

## 2022-11-07 PROCEDURE — 36556 INSERT NON-TUNNEL CV CATH: CPT

## 2022-11-07 PROCEDURE — 76937 US GUIDE VASCULAR ACCESS: CPT

## 2022-11-07 PROCEDURE — 76937 US GUIDE VASCULAR ACCESS: CPT | Mod: 26

## 2022-11-07 PROCEDURE — 38206 HARVEST AUTO STEM CELLS: CPT

## 2022-11-07 PROCEDURE — 77001 FLUOROGUIDE FOR VEIN DEVICE: CPT

## 2022-11-07 PROCEDURE — 86367 STEM CELLS TOTAL COUNT: CPT

## 2022-11-07 PROCEDURE — 86900 BLOOD TYPING SEROLOGIC ABO: CPT

## 2022-11-07 PROCEDURE — C1769: CPT

## 2022-11-07 PROCEDURE — 85027 COMPLETE CBC AUTOMATED: CPT

## 2022-11-07 PROCEDURE — 82330 ASSAY OF CALCIUM: CPT

## 2022-11-07 PROCEDURE — 86850 RBC ANTIBODY SCREEN: CPT

## 2022-11-07 PROCEDURE — C1894: CPT

## 2022-11-07 PROCEDURE — P9047: CPT

## 2022-11-07 PROCEDURE — 99214 OFFICE O/P EST MOD 30 MIN: CPT | Mod: 25

## 2022-11-07 PROCEDURE — 85007 BL SMEAR W/DIFF WBC COUNT: CPT

## 2022-11-07 PROCEDURE — 86901 BLOOD TYPING SEROLOGIC RH(D): CPT

## 2022-11-07 PROCEDURE — C1752: CPT

## 2022-11-07 PROCEDURE — 77001 FLUOROGUIDE FOR VEIN DEVICE: CPT | Mod: 26

## 2022-11-07 RX ORDER — BNT162B2 ORIGINAL AND OMICRON BA.4/BA.5 .1125; .1125 MG/2.25ML; MG/2.25ML
0.3 INJECTION, SUSPENSION INTRAMUSCULAR ONCE
Refills: 0 | Status: DISCONTINUED | OUTPATIENT
Start: 2022-11-07 | End: 2022-11-21

## 2022-11-07 NOTE — ASU DISCHARGE PLAN (ADULT/PEDIATRIC) - NURSING INSTRUCTIONS
Please feel free to contact us at [phone] if any problems arise. After 6PM, Monday through Friday, on weekends and on holidays, please call [phone] and ask for the radiology resident on call to be paged..

## 2022-11-07 NOTE — ASU DISCHARGE PLAN (ADULT/PEDIATRIC) - ASU DC SPECIAL INSTRUCTIONSFT
Non-Tunneled Central Venous Catheter Placement    Discharge Instructions  - You have had a non-tunneled central venous catheter placed in your neck.  - The catheter is ready for use.  - You may shower as long as the catheter and dressing remains dry.  -  No soaking or swimming until the catheter is removed or when the site is completely healed.  - Keep the area covered and dry for as long as the catheter remains in. It may be removed and changed by a nurse as needed.  - Do not perform any heavy lifting or put tension on the area for the next week or until the site is healed.  - You may resume your normal diet.  - You may resume your normal medications however you should wait 48 hours before restarting aspirin, plavix, or blood thinners.  - It is normal to experience some pain over the site for the next few days. You may take Tylenol for that pain. Do not take more frequently than every 6 hours and do not exceed more than 3000mg of Tylenol in a 24 hour period.    Notify your primary physician and/or Interventional Radiology IMMEDIATELY if you experience any of the following       - Fever of 101F or 38C       - Chills or Rigors/ Shakes       - Swelling and/or Redness in the area around the port       - Worsening Pain       - Blood soaked bandages or worsening bleeding       - Lightheadedness and/or dizziness upon standing       - Chest Pain/ Tightness       - Shortness of Breath       - Difficulty walking    If you have a problem that you believe requires IMMEDIATE attention, please go to your NEAREST Emergency Room. If you believe your problem can safely wait until you speak to a physician, please call Interventional Radiology for any concerns.      Central venous catheter Removal    Discharge Instructions  - You have had a central venous catheter removed.  - Sit upright x2hrs to prevent bleeding  - You may shower in 48 hours. No soaking or swimming until the site is completely healed.  - Keep the area covered and dry for the next 48 hours.  - Do not perform any heavy lifting for the next few days or until the site is healed.  - You may resume your normal diet.  - It is normal to experience some pain over the site for the next few days. You may take Tylenol for that pain. Do not take more frequently than every 6 hours and do not exceed more than 3000mg of Tylenol in a 24 hour period.    Notify your primary physician and/or Interventional Radiology IMMEDIATELY if you experience any of the following       - Fever of 101F or 38C       - Chills or Rigors/ Shakes       - Swelling and/or Redness in the area around the catheter removal site       - Worsening Pain       - Blood soaked bandages or worsening bleeding       - Lightheadedness and/or dizziness upon standing       - Chest Pain/ Tightness       - Shortness of Breath       - Difficulty walking    If you have a problem that you believe requires IMMEDIATE attention, please go to your NEAREST Emergency Room. If you believe your problem can safely wait until you speak to a physician, please call Interventional Radiology for any concerns.    Please feel free to contact us at [phone] if any problems arise. After 6PM, Monday through Friday, on weekends and on holidays, please call [phone] and ask for the radiology resident on call to be paged.

## 2022-11-07 NOTE — PRE PROCEDURE NOTE - PRE PROCEDURE EVALUATION
Interventional Radiology    HPI: 57y Female with t-cell lymphoma who presents to IR for shiley placement for stem cell harvesting.     Allergies: doxycycline (Rash)  penicillins (Unknown)    Medications (Abx/Cardiac/Anticoagulation/Blood Products)      Data:    T(C): --  HR: --  BP: --  RR: --  SpO2: --    Exam  General: No acute distress  Chest: Non labored breathing    -WBC 3.49 / HgB 10.4 / Hct 32.5 / Plt 256      Plan: 57y Female presents for shiley placement   -Risks/Benefits/alternatives explained with the patient and/or healthcare proxy and witnessed informed consent obtained.

## 2022-11-08 ENCOUNTER — OUTPATIENT (OUTPATIENT)
Dept: OUTPATIENT SERVICES | Facility: HOSPITAL | Age: 57
LOS: 1 days | End: 2022-11-08
Payer: COMMERCIAL

## 2022-11-08 ENCOUNTER — APPOINTMENT (OUTPATIENT)
Dept: INFUSION THERAPY | Facility: HOSPITAL | Age: 57
End: 2022-11-08

## 2022-11-08 ENCOUNTER — OUTPATIENT (OUTPATIENT)
Dept: OUTPATIENT SERVICES | Facility: HOSPITAL | Age: 57
LOS: 1 days | Discharge: HOME | End: 2022-11-08

## 2022-11-08 VITALS
TEMPERATURE: 99 F | DIASTOLIC BLOOD PRESSURE: 85 MMHG | OXYGEN SATURATION: 96 % | SYSTOLIC BLOOD PRESSURE: 115 MMHG | HEART RATE: 84 BPM | RESPIRATION RATE: 18 BRPM

## 2022-11-08 VITALS
TEMPERATURE: 99 F | OXYGEN SATURATION: 96 % | HEART RATE: 77 BPM | SYSTOLIC BLOOD PRESSURE: 111 MMHG | RESPIRATION RATE: 17 BRPM | DIASTOLIC BLOOD PRESSURE: 70 MMHG

## 2022-11-08 DIAGNOSIS — C85.90 NON-HODGKIN LYMPHOMA, UNSPECIFIED, UNSPECIFIED SITE: ICD-10-CM

## 2022-11-08 LAB — PLATELET # BLD AUTO: 136 K/UL — LOW (ref 150–400)

## 2022-11-08 PROCEDURE — 96523 IRRIG DRUG DELIVERY DEVICE: CPT

## 2022-11-08 PROCEDURE — 85049 AUTOMATED PLATELET COUNT: CPT

## 2022-11-14 ENCOUNTER — RESULT REVIEW (OUTPATIENT)
Age: 57
End: 2022-11-14

## 2022-11-14 ENCOUNTER — APPOINTMENT (OUTPATIENT)
Dept: INFUSION THERAPY | Facility: HOSPITAL | Age: 57
End: 2022-11-14

## 2022-11-14 DIAGNOSIS — Z01.818 ENCOUNTER FOR OTHER PREPROCEDURAL EXAMINATION: ICD-10-CM

## 2022-11-14 LAB
ALBUMIN SERPL ELPH-MCNC: 4.6 G/DL — SIGNIFICANT CHANGE UP (ref 3.3–5)
ALP SERPL-CCNC: 115 U/L — SIGNIFICANT CHANGE UP (ref 40–120)
ALT FLD-CCNC: 27 U/L — SIGNIFICANT CHANGE UP (ref 10–45)
ANION GAP SERPL CALC-SCNC: 10 MMOL/L — SIGNIFICANT CHANGE UP (ref 5–17)
APTT BLD: 27.1 SEC — LOW (ref 27.5–35.5)
AST SERPL-CCNC: 21 U/L — SIGNIFICANT CHANGE UP (ref 10–40)
BILIRUB SERPL-MCNC: 0.2 MG/DL — SIGNIFICANT CHANGE UP (ref 0.2–1.2)
BUN SERPL-MCNC: 14 MG/DL — SIGNIFICANT CHANGE UP (ref 7–23)
CALCIUM SERPL-MCNC: 9.7 MG/DL — SIGNIFICANT CHANGE UP (ref 8.4–10.5)
CHLORIDE SERPL-SCNC: 102 MMOL/L — SIGNIFICANT CHANGE UP (ref 96–108)
CO2 SERPL-SCNC: 26 MMOL/L — SIGNIFICANT CHANGE UP (ref 22–31)
CREAT SERPL-MCNC: 0.61 MG/DL — SIGNIFICANT CHANGE UP (ref 0.5–1.3)
EGFR: 106 ML/MIN/1.73M2 — SIGNIFICANT CHANGE UP
GLUCOSE SERPL-MCNC: 94 MG/DL — SIGNIFICANT CHANGE UP (ref 70–99)
HCT VFR BLD CALC: 32.4 % — LOW (ref 34.5–45)
HGB BLD-MCNC: 10.6 G/DL — LOW (ref 11.5–15.5)
INR BLD: 0.98 RATIO — SIGNIFICANT CHANGE UP (ref 0.88–1.16)
LDH SERPL L TO P-CCNC: 179 U/L — SIGNIFICANT CHANGE UP (ref 50–242)
MAGNESIUM SERPL-MCNC: 2.1 MG/DL — SIGNIFICANT CHANGE UP (ref 1.6–2.6)
MCHC RBC-ENTMCNC: 30.8 PG — SIGNIFICANT CHANGE UP (ref 27–34)
MCHC RBC-ENTMCNC: 32.7 G/DL — SIGNIFICANT CHANGE UP (ref 32–36)
MCV RBC AUTO: 94.2 FL — SIGNIFICANT CHANGE UP (ref 80–100)
PLATELET # BLD AUTO: 191 K/UL — SIGNIFICANT CHANGE UP (ref 150–400)
POTASSIUM SERPL-MCNC: 4.4 MMOL/L — SIGNIFICANT CHANGE UP (ref 3.5–5.3)
POTASSIUM SERPL-SCNC: 4.4 MMOL/L — SIGNIFICANT CHANGE UP (ref 3.5–5.3)
PROT SERPL-MCNC: 7.1 G/DL — SIGNIFICANT CHANGE UP (ref 6–8.3)
PROTHROM AB SERPL-ACNC: 11.4 SEC — SIGNIFICANT CHANGE UP (ref 10.5–13.4)
RBC # BLD: 3.44 M/UL — LOW (ref 3.8–5.2)
RBC # FLD: 13 % — SIGNIFICANT CHANGE UP (ref 10.3–14.5)
SODIUM SERPL-SCNC: 138 MMOL/L — SIGNIFICANT CHANGE UP (ref 135–145)
WBC # BLD: 2.96 K/UL — LOW (ref 3.8–10.5)
WBC # FLD AUTO: 2.96 K/UL — LOW (ref 3.8–10.5)

## 2022-11-15 ENCOUNTER — APPOINTMENT (OUTPATIENT)
Age: 57
End: 2022-11-15

## 2022-11-15 ENCOUNTER — APPOINTMENT (OUTPATIENT)
Dept: INFUSION THERAPY | Facility: HOSPITAL | Age: 57
End: 2022-11-15

## 2022-11-15 LAB — SARS-COV-2 RNA SPEC QL NAA+PROBE: SIGNIFICANT CHANGE UP

## 2022-11-16 ENCOUNTER — INPATIENT (INPATIENT)
Facility: HOSPITAL | Age: 57
LOS: 29 days | Discharge: HOME HEALTH | DRG: 16 | End: 2022-12-16
Attending: INTERNAL MEDICINE | Admitting: INTERNAL MEDICINE
Payer: COMMERCIAL

## 2022-11-16 VITALS
RESPIRATION RATE: 18 BRPM | TEMPERATURE: 99 F | HEIGHT: 62.2 IN | WEIGHT: 177.03 LBS | SYSTOLIC BLOOD PRESSURE: 117 MMHG | HEART RATE: 73 BPM | DIASTOLIC BLOOD PRESSURE: 73 MMHG | OXYGEN SATURATION: 100 %

## 2022-11-16 DIAGNOSIS — Z29.9 ENCOUNTER FOR PROPHYLACTIC MEASURES, UNSPECIFIED: ICD-10-CM

## 2022-11-16 DIAGNOSIS — C84.A0 CUTANEOUS T-CELL LYMPHOMA, UNSPECIFIED, UNSPECIFIED SITE: ICD-10-CM

## 2022-11-16 DIAGNOSIS — Z94.84 STEM CELLS TRANSPLANT STATUS: ICD-10-CM

## 2022-11-16 LAB
APPEARANCE UR: ABNORMAL
APTT BLD: 27.1 SEC — LOW (ref 27.5–35.5)
BACTERIA # UR AUTO: NEGATIVE — SIGNIFICANT CHANGE UP
BASOPHILS # BLD AUTO: 0.04 K/UL — SIGNIFICANT CHANGE UP (ref 0–0.2)
BASOPHILS NFR BLD AUTO: 1.7 % — SIGNIFICANT CHANGE UP (ref 0–2)
BILIRUB UR-MCNC: NEGATIVE — SIGNIFICANT CHANGE UP
COLOR SPEC: SIGNIFICANT CHANGE UP
DIFF PNL FLD: NEGATIVE — SIGNIFICANT CHANGE UP
EOSINOPHIL # BLD AUTO: 0.09 K/UL — SIGNIFICANT CHANGE UP (ref 0–0.5)
EOSINOPHIL NFR BLD AUTO: 3.5 % — SIGNIFICANT CHANGE UP (ref 0–6)
EPI CELLS # UR: 15 /HPF — HIGH
FIBRINOGEN PPP-MCNC: 265 MG/DL — SIGNIFICANT CHANGE UP (ref 200–445)
GLUCOSE UR QL: NEGATIVE — SIGNIFICANT CHANGE UP
HCT VFR BLD CALC: 32.5 % — LOW (ref 34.5–45)
HGB BLD-MCNC: 10.2 G/DL — LOW (ref 11.5–15.5)
HYALINE CASTS # UR AUTO: 2 /LPF — SIGNIFICANT CHANGE UP (ref 0–2)
INR BLD: 1.13 RATIO — SIGNIFICANT CHANGE UP (ref 0.88–1.16)
KETONES UR-MCNC: SIGNIFICANT CHANGE UP
LDH SERPL L TO P-CCNC: 150 U/L — SIGNIFICANT CHANGE UP (ref 50–242)
LEUKOCYTE ESTERASE UR-ACNC: ABNORMAL
LYMPHOCYTES # BLD AUTO: 0.42 K/UL — LOW (ref 1–3.3)
LYMPHOCYTES # BLD AUTO: 16.5 % — SIGNIFICANT CHANGE UP (ref 13–44)
MAGNESIUM SERPL-MCNC: 1.9 MG/DL — SIGNIFICANT CHANGE UP (ref 1.6–2.6)
MANUAL SMEAR VERIFICATION: SIGNIFICANT CHANGE UP
MCHC RBC-ENTMCNC: 30.3 PG — SIGNIFICANT CHANGE UP (ref 27–34)
MCHC RBC-ENTMCNC: 31.4 GM/DL — LOW (ref 32–36)
MCV RBC AUTO: 96.4 FL — SIGNIFICANT CHANGE UP (ref 80–100)
MONOCYTES # BLD AUTO: 0.13 K/UL — SIGNIFICANT CHANGE UP (ref 0–0.9)
MONOCYTES NFR BLD AUTO: 5.2 % — SIGNIFICANT CHANGE UP (ref 2–14)
NEUTROPHILS # BLD AUTO: 1.88 K/UL — SIGNIFICANT CHANGE UP (ref 1.8–7.4)
NEUTROPHILS NFR BLD AUTO: 73.1 % — SIGNIFICANT CHANGE UP (ref 43–77)
NITRITE UR-MCNC: NEGATIVE — SIGNIFICANT CHANGE UP
PH UR: 5.5 — SIGNIFICANT CHANGE UP (ref 5–8)
PHOSPHATE SERPL-MCNC: 4 MG/DL — SIGNIFICANT CHANGE UP (ref 2.5–4.5)
PLAT MORPH BLD: NORMAL — SIGNIFICANT CHANGE UP
PLATELET # BLD AUTO: 221 K/UL — SIGNIFICANT CHANGE UP (ref 150–400)
PROT UR-MCNC: SIGNIFICANT CHANGE UP
PROTHROM AB SERPL-ACNC: 13 SEC — SIGNIFICANT CHANGE UP (ref 10.5–13.4)
RBC # BLD: 3.37 M/UL — LOW (ref 3.8–5.2)
RBC # BLD: 3.37 M/UL — LOW (ref 3.8–5.2)
RBC # FLD: 13.2 % — SIGNIFICANT CHANGE UP (ref 10.3–14.5)
RBC BLD AUTO: SIGNIFICANT CHANGE UP
RBC CASTS # UR COMP ASSIST: 1 /HPF — SIGNIFICANT CHANGE UP (ref 0–4)
RETICS #: 37.4 K/UL — SIGNIFICANT CHANGE UP (ref 25–125)
RETICS/RBC NFR: 1.1 % — SIGNIFICANT CHANGE UP (ref 0.5–2.5)
SP GR SPEC: 1.02 — SIGNIFICANT CHANGE UP (ref 1.01–1.02)
UROBILINOGEN FLD QL: NEGATIVE — SIGNIFICANT CHANGE UP
WBC # BLD: 2.57 K/UL — LOW (ref 3.8–10.5)
WBC # FLD AUTO: 2.57 K/UL — LOW (ref 3.8–10.5)
WBC UR QL: 16 /HPF — HIGH (ref 0–5)

## 2022-11-16 PROCEDURE — 36556 INSERT NON-TUNNEL CV CATH: CPT | Mod: LT

## 2022-11-16 PROCEDURE — 93010 ELECTROCARDIOGRAM REPORT: CPT

## 2022-11-16 PROCEDURE — 76937 US GUIDE VASCULAR ACCESS: CPT | Mod: 26

## 2022-11-16 PROCEDURE — 36556 INSERT NON-TUNNEL CV CATH: CPT

## 2022-11-16 PROCEDURE — 77001 FLUOROGUIDE FOR VEIN DEVICE: CPT | Mod: 26

## 2022-11-16 RX ORDER — ACYCLOVIR SODIUM 500 MG
400 VIAL (EA) INTRAVENOUS EVERY 8 HOURS
Refills: 0 | Status: DISCONTINUED | OUTPATIENT
Start: 2022-11-24 | End: 2022-12-16

## 2022-11-16 RX ORDER — APREPITANT 80 MG/1
125 CAPSULE ORAL ONCE
Refills: 0 | Status: DISCONTINUED | OUTPATIENT
Start: 2022-11-16 | End: 2022-11-16

## 2022-11-16 RX ORDER — ONDANSETRON 8 MG/1
8 TABLET, FILM COATED ORAL EVERY 8 HOURS
Refills: 0 | Status: COMPLETED | OUTPATIENT
Start: 2022-11-16 | End: 2022-11-25

## 2022-11-16 RX ORDER — ETOPOSIDE 20 MG/ML
664 VIAL (ML) INTRAVENOUS ONCE
Refills: 0 | Status: COMPLETED | OUTPATIENT
Start: 2022-11-17 | End: 2022-11-17

## 2022-11-16 RX ORDER — URSODIOL 250 MG/1
300 TABLET, FILM COATED ORAL
Refills: 0 | Status: DISCONTINUED | OUTPATIENT
Start: 2022-11-16 | End: 2022-12-16

## 2022-11-16 RX ORDER — ACETAMINOPHEN 500 MG
650 TABLET ORAL EVERY 6 HOURS
Refills: 0 | Status: DISCONTINUED | OUTPATIENT
Start: 2022-11-16 | End: 2022-12-16

## 2022-11-16 RX ORDER — FILGRASTIM 480MCG/1.6
480 VIAL (ML) INJECTION EVERY 24 HOURS
Refills: 0 | Status: DISCONTINUED | OUTPATIENT
Start: 2022-11-25 | End: 2022-12-09

## 2022-11-16 RX ORDER — HEPARIN SODIUM 5000 [USP'U]/ML
334 INJECTION INTRAVENOUS; SUBCUTANEOUS
Qty: 25000 | Refills: 0 | Status: DISCONTINUED | OUTPATIENT
Start: 2022-11-16 | End: 2022-12-09

## 2022-11-16 RX ORDER — FUROSEMIDE 40 MG
20 TABLET ORAL EVERY 24 HOURS
Refills: 0 | Status: COMPLETED | OUTPATIENT
Start: 2022-11-18 | End: 2022-11-21

## 2022-11-16 RX ORDER — ONDANSETRON 8 MG/1
8 TABLET, FILM COATED ORAL EVERY 8 HOURS
Refills: 0 | Status: DISCONTINUED | OUTPATIENT
Start: 2022-11-16 | End: 2022-11-16

## 2022-11-16 RX ORDER — NYSTATIN CREAM 100000 [USP'U]/G
1 CREAM TOPICAL
Refills: 0 | Status: DISCONTINUED | OUTPATIENT
Start: 2022-11-16 | End: 2022-12-16

## 2022-11-16 RX ORDER — ETOPOSIDE 20 MG/ML
800 VIAL (ML) INTRAVENOUS
Refills: 0 | Status: COMPLETED | OUTPATIENT
Start: 2022-11-16 | End: 2022-11-17

## 2022-11-16 RX ORDER — PANTOPRAZOLE SODIUM 20 MG/1
40 TABLET, DELAYED RELEASE ORAL
Refills: 0 | Status: DISCONTINUED | OUTPATIENT
Start: 2022-11-16 | End: 2022-12-16

## 2022-11-16 RX ORDER — FOLIC ACID 0.8 MG
1 TABLET ORAL DAILY
Refills: 0 | Status: DISCONTINUED | OUTPATIENT
Start: 2022-11-16 | End: 2022-12-16

## 2022-11-16 RX ORDER — FLUCONAZOLE 150 MG/1
400 TABLET ORAL DAILY
Refills: 0 | Status: DISCONTINUED | OUTPATIENT
Start: 2022-11-16 | End: 2022-12-16

## 2022-11-16 RX ORDER — METOCLOPRAMIDE HCL 10 MG
10 TABLET ORAL EVERY 6 HOURS
Refills: 0 | Status: DISCONTINUED | OUTPATIENT
Start: 2022-11-16 | End: 2022-12-16

## 2022-11-16 RX ORDER — LORATADINE 10 MG/1
10 TABLET ORAL DAILY
Refills: 0 | Status: DISCONTINUED | OUTPATIENT
Start: 2022-11-16 | End: 2022-12-16

## 2022-11-16 RX ORDER — SODIUM CHLORIDE 9 MG/ML
1000 INJECTION, SOLUTION INTRAVENOUS
Refills: 0 | Status: DISCONTINUED | OUTPATIENT
Start: 2022-11-24 | End: 2022-12-15

## 2022-11-16 RX ORDER — SENNA PLUS 8.6 MG/1
1 TABLET ORAL AT BEDTIME
Refills: 0 | Status: DISCONTINUED | OUTPATIENT
Start: 2022-11-16 | End: 2022-11-16

## 2022-11-16 RX ORDER — DEXAMETHASONE 0.5 MG/5ML
10 ELIXIR ORAL EVERY 24 HOURS
Refills: 0 | Status: COMPLETED | OUTPATIENT
Start: 2022-11-16 | End: 2022-11-24

## 2022-11-16 RX ORDER — ACETAMINOPHEN 500 MG
650 TABLET ORAL ONCE
Refills: 0 | Status: COMPLETED | OUTPATIENT
Start: 2022-11-25 | End: 2022-11-25

## 2022-11-16 RX ORDER — CLOTRIMAZOLE 10 MG
1 TROCHE MUCOUS MEMBRANE
Refills: 0 | Status: DISCONTINUED | OUTPATIENT
Start: 2022-11-16 | End: 2022-12-16

## 2022-11-16 RX ORDER — SODIUM CHLORIDE 9 MG/ML
1000 INJECTION, SOLUTION INTRAVENOUS
Refills: 0 | Status: DISCONTINUED | OUTPATIENT
Start: 2022-11-18 | End: 2022-11-23

## 2022-11-16 RX ORDER — PALIFERMIN 6.25 MG
4800 VIAL (EA) INTRAVENOUS EVERY 24 HOURS
Refills: 0 | Status: DISCONTINUED | OUTPATIENT
Start: 2022-11-25 | End: 2022-11-27

## 2022-11-16 RX ORDER — SODIUM BICARBONATE 1 MEQ/ML
10 SYRINGE (ML) INTRAVENOUS
Refills: 0 | Status: DISCONTINUED | OUTPATIENT
Start: 2022-11-16 | End: 2022-12-16

## 2022-11-16 RX ORDER — FLUOXETINE HCL 10 MG
20 CAPSULE ORAL DAILY
Refills: 0 | Status: DISCONTINUED | OUTPATIENT
Start: 2022-11-16 | End: 2022-12-16

## 2022-11-16 RX ORDER — DIPHENHYDRAMINE HCL 50 MG
25 CAPSULE ORAL ONCE
Refills: 0 | Status: COMPLETED | OUTPATIENT
Start: 2022-11-25 | End: 2022-11-25

## 2022-11-16 RX ORDER — SODIUM CHLORIDE 9 MG/ML
10 INJECTION INTRAMUSCULAR; INTRAVENOUS; SUBCUTANEOUS
Refills: 0 | Status: DISCONTINUED | OUTPATIENT
Start: 2022-11-16 | End: 2022-12-16

## 2022-11-16 RX ORDER — APREPITANT 80 MG/1
125 CAPSULE ORAL ONCE
Refills: 0 | Status: COMPLETED | OUTPATIENT
Start: 2022-11-16 | End: 2022-11-16

## 2022-11-16 RX ORDER — SODIUM CHLORIDE 9 MG/ML
1000 INJECTION INTRAMUSCULAR; INTRAVENOUS; SUBCUTANEOUS
Refills: 0 | Status: DISCONTINUED | OUTPATIENT
Start: 2022-11-16 | End: 2022-12-15

## 2022-11-16 RX ORDER — HYDROCORTISONE 20 MG
50 TABLET ORAL ONCE
Refills: 0 | Status: COMPLETED | OUTPATIENT
Start: 2022-11-25 | End: 2022-11-25

## 2022-11-16 RX ORDER — SALIVA SUBSTITUTE COMB NO.11 351 MG
5 POWDER IN PACKET (EA) MUCOUS MEMBRANE
Refills: 0 | Status: DISCONTINUED | OUTPATIENT
Start: 2022-11-16 | End: 2022-12-16

## 2022-11-16 RX ORDER — POLYETHYLENE GLYCOL 3350 17 G/17G
17 POWDER, FOR SOLUTION ORAL DAILY
Refills: 0 | Status: DISCONTINUED | OUTPATIENT
Start: 2022-11-16 | End: 2022-12-16

## 2022-11-16 RX ORDER — CHLORHEXIDINE GLUCONATE 213 G/1000ML
1 SOLUTION TOPICAL
Refills: 0 | Status: DISCONTINUED | OUTPATIENT
Start: 2022-11-16 | End: 2022-12-16

## 2022-11-16 RX ORDER — SENNA PLUS 8.6 MG/1
2 TABLET ORAL AT BEDTIME
Refills: 0 | Status: DISCONTINUED | OUTPATIENT
Start: 2022-11-16 | End: 2022-12-16

## 2022-11-16 RX ORDER — LIDOCAINE AND PRILOCAINE CREAM 25; 25 MG/G; MG/G
1 CREAM TOPICAL DAILY
Refills: 0 | Status: DISCONTINUED | OUTPATIENT
Start: 2022-11-25 | End: 2022-12-16

## 2022-11-16 RX ADMIN — CHLORHEXIDINE GLUCONATE 1 APPLICATION(S): 213 SOLUTION TOPICAL at 19:10

## 2022-11-16 RX ADMIN — Medication 5 MILLILITER(S): at 20:23

## 2022-11-16 RX ADMIN — Medication 1 LOZENGE: at 23:54

## 2022-11-16 RX ADMIN — NYSTATIN CREAM 1 APPLICATION(S): 100000 CREAM TOPICAL at 17:59

## 2022-11-16 RX ADMIN — Medication 800 MILLIGRAM(S): at 23:51

## 2022-11-16 RX ADMIN — Medication 102 MILLIGRAM(S): at 17:31

## 2022-11-16 RX ADMIN — Medication 1 LOZENGE: at 17:32

## 2022-11-16 RX ADMIN — ONDANSETRON 8 MILLIGRAM(S): 8 TABLET, FILM COATED ORAL at 21:36

## 2022-11-16 RX ADMIN — LORATADINE 10 MILLIGRAM(S): 10 TABLET ORAL at 15:57

## 2022-11-16 RX ADMIN — Medication 1 LOZENGE: at 20:23

## 2022-11-16 RX ADMIN — APREPITANT 125 MILLIGRAM(S): 80 CAPSULE ORAL at 16:19

## 2022-11-16 RX ADMIN — Medication 20 MILLIGRAM(S): at 17:55

## 2022-11-16 RX ADMIN — Medication 1 TABLET(S): at 17:32

## 2022-11-16 RX ADMIN — URSODIOL 300 MILLIGRAM(S): 250 TABLET, FILM COATED ORAL at 17:31

## 2022-11-16 RX ADMIN — FLUCONAZOLE 400 MILLIGRAM(S): 150 TABLET ORAL at 15:57

## 2022-11-16 RX ADMIN — Medication 1 MILLIGRAM(S): at 15:58

## 2022-11-16 RX ADMIN — Medication 1 TABLET(S): at 15:57

## 2022-11-16 RX ADMIN — Medication 5 MILLILITER(S): at 17:32

## 2022-11-16 RX ADMIN — Medication 800 MILLIGRAM(S): at 16:44

## 2022-11-16 RX ADMIN — Medication 10 MILLILITER(S): at 17:31

## 2022-11-16 RX ADMIN — PANTOPRAZOLE SODIUM 40 MILLIGRAM(S): 20 TABLET, DELAYED RELEASE ORAL at 15:57

## 2022-11-16 RX ADMIN — Medication 10 MILLILITER(S): at 23:57

## 2022-11-16 RX ADMIN — Medication 10 MILLILITER(S): at 21:34

## 2022-11-16 RX ADMIN — Medication 5 MILLILITER(S): at 23:54

## 2022-11-16 NOTE — H&P ADULT - HISTORY OF PRESENT ILLNESS
This is a 57 year old female with peripheral T cell lymphoma status post CHOEP x 6 admitted for an autologous peripheral blood stem cell transplant with high dose CBV prep regimen. Hematologic history as follows: initially presented on 3/4/22 with right axilla mass to general surgeon. Biopsy on 3/22/22 showed atypical lymphoid infiltrate. She later had an excisional biopsy on 5/16/22 which confirmed peripheral T cell lymphoma. She has no complaints on admission.

## 2022-11-16 NOTE — H&P ADULT - RESPIRATORY
(Inserted Image. Unable to display)   February 19, 2020        YONATHAN KENT  [address], [place] [id number]        Dear YONATHAN,      Thank you for selecting [facility] for your healthcare needs.    Our records indicate you are due for the following services:    Diabetic Exam ~ Please bring your glucose meter and/or your blood glucose diary to your appointment.  Hypertension check ~ please remember to bring your at-home blood pressure readings with you to your appointment.   Fasting Lab Tests ~ Please do not eat or drink anything 10 hours prior to your scheduled appointment time.  (Water and any medications that you may need are allowed unless directed otherwise.)    If you had your labs done at another facility or with Direct Access Lab Testing at [facility], please bring in a copy of the results to your next visit, mail a copy, or drop off a copy of your results to your Healthcare Provider.    You are due for lab work and an office visit, please schedule the lab appointment 1 week before the office visit.  This will assure all results are available to discuss with your provider during your visit.    **It is very helpful if you bring your medication bottles to your appointment.  This assures we have all of your current medications, including strength and dosing information, documented accurately in your medical record.    To schedule an appointment or if you have further questions, please contact your primary clinic:   [facility]       [phone]   [facility]       [phone]              [facility]     [phone]      Powered by [employer]    Sincerely,    Aiden Drummond M.D., FACP   clear to auscultation bilaterally

## 2022-11-16 NOTE — H&P ADULT - REASON FOR ADMISSION
Autologous peripheral blood stem cell transplant with high dose CBV prep regimen for treatment of peripheral T cell lymphoma

## 2022-11-16 NOTE — H&P ADULT - VTE RISK ASSESSMENT
"Patient presents with complaints of sore throat. Dad states brother was diagnosed with strep yesterday. Unable to get vitals on patient as she was uncooperative.  Chief Complaint   Patient presents with     Pharyngitis       Initial There were no vitals taken for this visit. Estimated body mass index is 15.01 kg/m  as calculated from the following:    Height as of 3/25/19: 3' 8.88\" (1.14 m).    Weight as of 3/25/19: 43 lb (19.5 kg).  Medication Reconciliation: complete    Yessi Nails LPN  "
<<--- Click to launch

## 2022-11-16 NOTE — H&P ADULT - CRITICAL CARE ATTENDING COMMENT
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     Problem/Plan -   1:  Problem: Stem cell transplant   ·  Plan: 1. Admit to BMTU   2. TLC placement in IR   3. Day -9 - day -2 - antiemetics   4. Day -9 & day -8, VP16 2400mg/m2 IV x 34 hours (final concentration 0.4mg /mL).   5. Strict I&O, daily weights, prn diuresis   6. After completion of VP16- start CTX hydration (D51/2NS + 10mEq KCl @ 150ml / m2) - continue for 24 hours post infusion of CTX   7. Day -7 - day -4 - CTX 1800 mg / m2 daily over 2 hours. Follow SMA7, mag, phos 6hours post CT . Mesna  12mg / kg - hemorrhagic cystitis ppx   8. Day -3 - BCNU 400mg / m2   9. Day -2 - day -1 - rest days  10. Day 0 - HPC transplant. Start Zarxio 480mcg SQ QD, continue through engraftment. Kepivance on days 0, 1 & 2   11. Anxiolytics, antinausea, antidiarrhea medications as needed   12. Lasix PRN while being aggressively hydrated to avoid VOD   13. Nutritional support, pain management.    Problem/Plan - 2:  ·  Problem: Need for prophylactic measure.   ·  Plan: 1. VOD prophylaxis - low dose heparin gtt (dosed at 100 units / kg / day), glutamine supplementation, Actigall BID   2. PCP prophylaxis - Bactrim DS through day -2    3. Antiviral prophylaxis - Acyclovir 400mg po TID to start day -1   4. Antifungal prophylaxis- Diflucan 400 mg po daily.  5. GI prophylaxis - Protonix po QD   6. Antibacterial prophylaxis - when ANC < 500, start Cipro 500mg po BID. If becomes febrile, pan cx, CXR and change Cipro to Cefepime 2g IV q 8 hours. Continue until count recovery  7. Kepivance for prevention of mucositis- days 0, +1, +2  8. Aggressive mouth care and skin care as per protocol.  9. transfusion and electrolyte support

## 2022-11-16 NOTE — PROCEDURE NOTE - NSSIZEINFR_GEN_A_CORE
per Dr. Saucedo he spoke with Dr. Scruggs and states the pt has bilateral subdural 
hematoma. 20cm length/7

## 2022-11-16 NOTE — H&P ADULT - PROBLEM SELECTOR PLAN 2
1. VOD prophylaxis - low dose heparin gtt (dosed at 100 units / kg / day), glutamine supplementation, Actigall BID   2. PCP prophylaxis - Bactrim DS through day -2    3. Antiviral prophylaxis - Acyclovir 400mg po TID to start day -1   4. Antifungal prophylaxis- Diflucan 400 mg po daily.  5. GI prophylaxis - Protonix po QD   6. Antibacterial prophylaxis - when ANC < 500, start Cipro 500mg po BID. If becomes febrile, pan cx, CXR and change Cipro to Cefepime 2g IV q 8 hours. Continue until count recovery  7. Kepivance for prevention of mucositis- days 0, +1, +2  8. Aggressive mouth care and skin care as per protocol

## 2022-11-16 NOTE — PRE PROCEDURE NOTE - PRE PROCEDURE EVALUATION
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  Presents to IR for TLC placement .    NPO:  n/a    Allergies: doxycycline (Rash)  penicillins (Unknown)    Medications (Abx/Cardiac/Anticoagulation/Blood Products)      Data:  158  80.3  T(C): 37.1  HR: 73  BP: 117/73  RR: 18  SpO2: 100%          Exam  General: No acute distress  Chest: Non labored breathing    -WBC 2.96 / HgB 10.6 / Hct 32.4 / Plt 191  -Na 138 / Cl 102 / BUN 14 / Glucose 94  -K 4.4 / CO2 26 / Cr 0.61  -ALT 27 / Alk Phos 115 / T.Bili 0.2  -INR0.98    Imaging:   reviewed     Plan: 57y Female presents for non tunneled cvc TLC for stem cell therapy  -Risks/Benefits/alternatives explained with the patient and/or healthcare proxy and witnessed informed consent obtained.

## 2022-11-16 NOTE — H&P ADULT - PROBLEM SELECTOR PLAN 1
1. Admit to [facility]MT[facility]   2. TLC placement in IR   3. Day -9 - day -2 - antiemetics   4. Day -9 & day -8, VP16 2400mg/m2 IV x 34 hours (final concentration 0.4mg /mL).   5. Strict I&O, daily weights, prn diuresis   6. After completion of VP16- start CTX hydration (D51/2NS + 10mEq KCl @ 150ml / m2) - continue for 24 hours post infusion of CTX   7. Day -7 - day -4 - CTX 1800 mg / m2 daily over 2 hours. Follow SMA7, mag, phos 6hours post CT . Mesna  12mg / kg - hemorrhagic cystitis ppx   8. Day -3 - BCNU 400mg / m2   9. Day -2 - day -1 - rest days  10. Day 0 - HPC transplant. Start Zarxio 480mcg SQ QD, continue through engraftment. Kepivance on days 0, 1 & 2   11. Anxiolytics, antinausea, antidiarrhea medications as needed   12. Lasix PRN while being aggressively hydrated to avoid VOD   13. Nutritional support, pain management

## 2022-11-16 NOTE — H&P ADULT - ASSESSMENT
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

## 2022-11-17 LAB
ALBUMIN SERPL ELPH-MCNC: 4.1 G/DL — SIGNIFICANT CHANGE UP (ref 3.3–5)
ALP SERPL-CCNC: 102 U/L — SIGNIFICANT CHANGE UP (ref 40–120)
ALT FLD-CCNC: 20 U/L — SIGNIFICANT CHANGE UP (ref 10–45)
ANION GAP SERPL CALC-SCNC: 12 MMOL/L — SIGNIFICANT CHANGE UP (ref 5–17)
APTT BLD: 28.1 SEC — SIGNIFICANT CHANGE UP (ref 27.5–35.5)
AST SERPL-CCNC: 18 U/L — SIGNIFICANT CHANGE UP (ref 10–40)
BASOPHILS # BLD AUTO: 0.01 K/UL — SIGNIFICANT CHANGE UP (ref 0–0.2)
BASOPHILS NFR BLD AUTO: 0.3 % — SIGNIFICANT CHANGE UP (ref 0–2)
BILIRUB SERPL-MCNC: 0.3 MG/DL — SIGNIFICANT CHANGE UP (ref 0.2–1.2)
BUN SERPL-MCNC: 7 MG/DL — SIGNIFICANT CHANGE UP (ref 7–23)
CALCIUM SERPL-MCNC: 8.9 MG/DL — SIGNIFICANT CHANGE UP (ref 8.4–10.5)
CHLORIDE SERPL-SCNC: 107 MMOL/L — SIGNIFICANT CHANGE UP (ref 96–108)
CO2 SERPL-SCNC: 20 MMOL/L — LOW (ref 22–31)
CREAT SERPL-MCNC: 0.45 MG/DL — LOW (ref 0.5–1.3)
EGFR: 114 ML/MIN/1.73M2 — SIGNIFICANT CHANGE UP
EOSINOPHIL # BLD AUTO: 0 K/UL — SIGNIFICANT CHANGE UP (ref 0–0.5)
EOSINOPHIL NFR BLD AUTO: 0 % — SIGNIFICANT CHANGE UP (ref 0–6)
FIBRINOGEN PPP-MCNC: 266 MG/DL — SIGNIFICANT CHANGE UP (ref 200–445)
GLUCOSE SERPL-MCNC: 151 MG/DL — HIGH (ref 70–99)
HCT VFR BLD CALC: 31.5 % — LOW (ref 34.5–45)
HGB BLD-MCNC: 9.8 G/DL — LOW (ref 11.5–15.5)
IGA FLD-MCNC: 135 MG/DL — SIGNIFICANT CHANGE UP (ref 84–499)
IGD SER-MCNC: <1 MG/DL — SIGNIFICANT CHANGE UP
IGG FLD-MCNC: 786 MG/DL — SIGNIFICANT CHANGE UP (ref 610–1660)
IGM SERPL-MCNC: 51 MG/DL — SIGNIFICANT CHANGE UP (ref 35–242)
IMM GRANULOCYTES NFR BLD AUTO: 0.6 % — SIGNIFICANT CHANGE UP (ref 0–0.9)
INR BLD: 1.14 RATIO — SIGNIFICANT CHANGE UP (ref 0.88–1.16)
KAPPA LC SER QL IFE: 1.36 MG/DL — SIGNIFICANT CHANGE UP (ref 0.33–1.94)
KAPPA/LAMBDA FREE LIGHT CHAIN RATIO, SERUM: 1.07 RATIO — SIGNIFICANT CHANGE UP (ref 0.26–1.65)
LAMBDA LC SER QL IFE: 1.27 MG/DL — SIGNIFICANT CHANGE UP (ref 0.57–2.63)
LDH SERPL L TO P-CCNC: 143 U/L — SIGNIFICANT CHANGE UP (ref 50–242)
LYMPHOCYTES # BLD AUTO: 0.15 K/UL — LOW (ref 1–3.3)
LYMPHOCYTES # BLD AUTO: 4.1 % — LOW (ref 13–44)
MAGNESIUM SERPL-MCNC: 1.8 MG/DL — SIGNIFICANT CHANGE UP (ref 1.6–2.6)
MCHC RBC-ENTMCNC: 30 PG — SIGNIFICANT CHANGE UP (ref 27–34)
MCHC RBC-ENTMCNC: 31.1 GM/DL — LOW (ref 32–36)
MCV RBC AUTO: 96.3 FL — SIGNIFICANT CHANGE UP (ref 80–100)
MONOCYTES # BLD AUTO: 0.06 K/UL — SIGNIFICANT CHANGE UP (ref 0–0.9)
MONOCYTES NFR BLD AUTO: 1.7 % — LOW (ref 2–14)
NEUTROPHILS # BLD AUTO: 3.38 K/UL — SIGNIFICANT CHANGE UP (ref 1.8–7.4)
NEUTROPHILS NFR BLD AUTO: 93.3 % — HIGH (ref 43–77)
NRBC # BLD: 0 /100 WBCS — SIGNIFICANT CHANGE UP (ref 0–0)
PHOSPHATE SERPL-MCNC: 3 MG/DL — SIGNIFICANT CHANGE UP (ref 2.5–4.5)
PLATELET # BLD AUTO: 214 K/UL — SIGNIFICANT CHANGE UP (ref 150–400)
POTASSIUM SERPL-MCNC: 3.9 MMOL/L — SIGNIFICANT CHANGE UP (ref 3.5–5.3)
POTASSIUM SERPL-SCNC: 3.9 MMOL/L — SIGNIFICANT CHANGE UP (ref 3.5–5.3)
PROT SERPL-MCNC: 6.7 G/DL — SIGNIFICANT CHANGE UP (ref 6–8.3)
PROTHROM AB SERPL-ACNC: 13.3 SEC — SIGNIFICANT CHANGE UP (ref 10.5–13.4)
RBC # BLD: 3.27 M/UL — LOW (ref 3.8–5.2)
RBC # FLD: 13.2 % — SIGNIFICANT CHANGE UP (ref 10.3–14.5)
SODIUM SERPL-SCNC: 139 MMOL/L — SIGNIFICANT CHANGE UP (ref 135–145)
WBC # BLD: 3.62 K/UL — LOW (ref 3.8–10.5)
WBC # FLD AUTO: 3.62 K/UL — LOW (ref 3.8–10.5)

## 2022-11-17 RX ORDER — POTASSIUM CHLORIDE 20 MEQ
20 PACKET (EA) ORAL
Refills: 0 | Status: COMPLETED | OUTPATIENT
Start: 2022-11-17 | End: 2022-11-17

## 2022-11-17 RX ORDER — FUROSEMIDE 40 MG
40 TABLET ORAL ONCE
Refills: 0 | Status: COMPLETED | OUTPATIENT
Start: 2022-11-17 | End: 2022-11-17

## 2022-11-17 RX ORDER — APREPITANT 80 MG/1
80 CAPSULE ORAL EVERY 24 HOURS
Refills: 0 | Status: DISCONTINUED | OUTPATIENT
Start: 2022-11-17 | End: 2022-11-17

## 2022-11-17 RX ORDER — APREPITANT 80 MG/1
80 CAPSULE ORAL EVERY 24 HOURS
Refills: 0 | Status: COMPLETED | OUTPATIENT
Start: 2022-11-17 | End: 2022-11-25

## 2022-11-17 RX ADMIN — Medication 1 TABLET(S): at 17:29

## 2022-11-17 RX ADMIN — ONDANSETRON 8 MILLIGRAM(S): 8 TABLET, FILM COATED ORAL at 21:44

## 2022-11-17 RX ADMIN — Medication 1 LOZENGE: at 17:10

## 2022-11-17 RX ADMIN — Medication 5 MILLILITER(S): at 13:02

## 2022-11-17 RX ADMIN — Medication 40 MILLIGRAM(S): at 17:13

## 2022-11-17 RX ADMIN — Medication 10 MILLILITER(S): at 12:00

## 2022-11-17 RX ADMIN — NYSTATIN CREAM 1 APPLICATION(S): 100000 CREAM TOPICAL at 17:30

## 2022-11-17 RX ADMIN — URSODIOL 300 MILLIGRAM(S): 250 TABLET, FILM COATED ORAL at 17:29

## 2022-11-17 RX ADMIN — Medication 650 MILLIGRAM(S): at 06:45

## 2022-11-17 RX ADMIN — LORATADINE 10 MILLIGRAM(S): 10 TABLET ORAL at 13:01

## 2022-11-17 RX ADMIN — Medication 102 MILLIGRAM(S): at 17:26

## 2022-11-17 RX ADMIN — Medication 10 MILLILITER(S): at 17:11

## 2022-11-17 RX ADMIN — ONDANSETRON 8 MILLIGRAM(S): 8 TABLET, FILM COATED ORAL at 06:48

## 2022-11-17 RX ADMIN — FLUCONAZOLE 400 MILLIGRAM(S): 150 TABLET ORAL at 12:56

## 2022-11-17 RX ADMIN — NYSTATIN CREAM 1 APPLICATION(S): 100000 CREAM TOPICAL at 06:49

## 2022-11-17 RX ADMIN — Medication 800 MILLIGRAM(S): at 07:34

## 2022-11-17 RX ADMIN — Medication 1 LOZENGE: at 19:54

## 2022-11-17 RX ADMIN — Medication 5 MILLILITER(S): at 17:11

## 2022-11-17 RX ADMIN — PANTOPRAZOLE SODIUM 40 MILLIGRAM(S): 20 TABLET, DELAYED RELEASE ORAL at 06:45

## 2022-11-17 RX ADMIN — Medication 50 MILLIEQUIVALENT(S): at 12:56

## 2022-11-17 RX ADMIN — Medication 5 MILLILITER(S): at 19:54

## 2022-11-17 RX ADMIN — Medication 1 TABLET(S): at 06:45

## 2022-11-17 RX ADMIN — Medication 5 MILLILITER(S): at 09:10

## 2022-11-17 RX ADMIN — Medication 10 MILLILITER(S): at 20:11

## 2022-11-17 RX ADMIN — APREPITANT 80 MILLIGRAM(S): 80 CAPSULE ORAL at 12:58

## 2022-11-17 RX ADMIN — Medication 1 TABLET(S): at 13:01

## 2022-11-17 RX ADMIN — URSODIOL 300 MILLIGRAM(S): 250 TABLET, FILM COATED ORAL at 06:45

## 2022-11-17 RX ADMIN — CHLORHEXIDINE GLUCONATE 1 APPLICATION(S): 213 SOLUTION TOPICAL at 06:49

## 2022-11-17 RX ADMIN — Medication 664 MILLIGRAM(S): at 22:20

## 2022-11-17 RX ADMIN — Medication 1 MILLIGRAM(S): at 12:57

## 2022-11-17 RX ADMIN — Medication 50 MILLIEQUIVALENT(S): at 09:09

## 2022-11-17 RX ADMIN — Medication 650 MILLIGRAM(S): at 07:15

## 2022-11-17 RX ADMIN — Medication 20 MILLIGRAM(S): at 13:02

## 2022-11-17 RX ADMIN — Medication 1 LOZENGE: at 13:02

## 2022-11-17 RX ADMIN — ONDANSETRON 8 MILLIGRAM(S): 8 TABLET, FILM COATED ORAL at 14:17

## 2022-11-17 RX ADMIN — Medication 10 MILLILITER(S): at 08:00

## 2022-11-17 RX ADMIN — POLYETHYLENE GLYCOL 3350 17 GRAM(S): 17 POWDER, FOR SOLUTION ORAL at 17:00

## 2022-11-17 RX ADMIN — Medication 1 LOZENGE: at 09:10

## 2022-11-17 RX ADMIN — Medication 800 MILLIGRAM(S): at 14:43

## 2022-11-17 RX ADMIN — Medication 50 MILLIEQUIVALENT(S): at 14:16

## 2022-11-17 RX ADMIN — Medication 40 MILLIGRAM(S): at 09:09

## 2022-11-17 NOTE — DIETITIAN INITIAL EVALUATION ADULT - NUTRITIONGOAL OUTCOME1
1. Patient to meet 75% of estimated needs 1. Patient to continue to meet 75% of estimated needs Pt will meet >75% estimated nutritional needs daily via best tolerated route

## 2022-11-17 NOTE — DIETITIAN INITIAL EVALUATION ADULT - OTHER INFO
Patient alert and oriented at time of visit. Patient is currently on a regular diet and reports eating % of meals. She reports that she has been experiencing some constipation and reports last BM was 11/14. Patient was educated on the importance of fiber to aid in GI distress and was advised to try either prunes or benny seed pudding, in which patient elected to try the benny seed pudding. Per chart review, patient is also ordered for Senna and polyethylene. Patient reports no diarrhea or vomiting and reports that she occasionally will feel nauseated. Food preferences obtained from patient and NKFA to report.  She reports that she has been experiencing some constipation and reports last BM was 11/14. Patient was educated on the importance of fiber to aid in GI distress and was advised to try either prunes or benny seed pudding, in which patient elected to try the benny seed pudding. Bowel regimen noted (Senna, Miralax). Reports intermittent nausea (Emend, Zofran, Reglan). Patient denies diarrhea, vomiting. Food preferences obtained from patient and FA to report.

## 2022-11-17 NOTE — DIETITIAN INITIAL EVALUATION ADULT - PERTINENT LABORATORY DATA
11-17    139  |  107  |  7   ----------------------------<  151<H>  3.9   |  20<L>  |  0.45<L>    Ca    8.9      17 Nov 2022 07:31  Phos  3.0     11-17  Mg     1.8     11-17    TPro  6.7  /  Alb  4.1  /  TBili  0.3  /  DBili  x   /  AST  18  /  ALT  20  /  AlkPhos  102  11-17

## 2022-11-17 NOTE — DIETITIAN INITIAL EVALUATION ADULT - PERSON TAUGHT/METHOD
Patient educated on the importance of food safety which includes washing produce thoroughly, cooking foods thoroughly and at optimal temperatures.     If patient is going to snack, she was educated on the importance of eating pre-packages snacks, using pre-packaged condiments and/or dressings.     Patient also educated that if she does not have appetite for 3 big meals, it is recommended that she consume 6 smaller meals throughout the day to promote adequate energy and protein needs./verbal instruction/teach back - (Patient repeats in own words)/patient instructed       Patient also educated that if she does not have appetite for 3 big meals, it is recommended that she consume 6 smaller meals throughout the day to promote adequate energy and protein needs./verbal instruction/teach back - (Patient repeats in own words)/patient instructed

## 2022-11-17 NOTE — DIETITIAN INITIAL EVALUATION ADULT - EDUCATION DIETARY MODIFICATIONS
teach back/(2) meets goals/outcomes/verbalization Discussed importance of adequate consumption of meals/snacks to optimize protein-energy intake. Encouraged small/frequent meals, nutrient dense snacks, prioritizing protein foods at meal time.    Reviewed BMT nutrition recommendations: food safety recommendations, increased protein-energy needs, Glutasolve 1x/day./teach back/(2) meets goals/outcomes/verbalization

## 2022-11-17 NOTE — PROGRESS NOTE ADULT - SUBJECTIVE AND OBJECTIVE BOX
HPC Transplant Team                                                      Critical / Counseling Time Provided: 30 minutes                                                                                                                                                        Chief Complaint: Autologous peripheral blood stem cell transplant with high dose CBV prep regimen for treatment of peripheral T cell lymphoma    S: Patient seen and examined with HPC Transplant Team:     All other ROS negative     O: Vitals:   Vital Signs Last 24 Hrs  T(C): 36.2 (17 Nov 2022 06:03), Max: 37.1 (16 Nov 2022 09:35)  T(F): 97.2 (17 Nov 2022 06:03), Max: 98.8 (16 Nov 2022 09:35)  HR: 84 (17 Nov 2022 06:03) (73 - 92)  BP: 119/72 (17 Nov 2022 06:03) (108/70 - 124/82)  BP(mean): --  RR: 20 (17 Nov 2022 06:03) (18 - 20)  SpO2: 96% (17 Nov 2022 06:03) (96% - 100%)    Parameters below as of 16 Nov 2022 21:10  Patient On (Oxygen Delivery Method): room air      Admit weight: 80.3kg   Daily Height in cm: 158 (16 Nov 2022 10:28)    Today's weight:     Intake / Output:   11-16 @ 07:01  -  11-17 @ 07:00  --------------------------------------------------------  IN: 4673 mL / OUT: 4800 mL / NET: -127 mL      PE:   Oropharynx: no erythema or ulcerations  Oral Mucositis:              -                                          Grade: n/a   CVS: S1, S2 RRR   Lungs: CTA throughout bilaterally   Abdomen: + BS x 4, soft, NT, ND   Extremities: no edema   Gastric Mucositis:       -                                           Grade: n/a   Intestinal Mucositis:     -                                         Grade: n/a   Skin: no rash   TLC: CDI   Neuro: A&O x 3   Pain: 0    Labs:   CBC Full  -  ( 17 Nov 2022 07:29 )  WBC Count : 3.62 K/uL  Hemoglobin : 9.8 g/dL  Hematocrit : 31.5 %  Platelet Count - Automated : 214 K/uL  Mean Cell Volume : 96.3 fl  Mean Cell Hemoglobin : 30.0 pg  Mean Cell Hemoglobin Concentration : 31.1 gm/dL  Auto Neutrophil # : 3.38 K/uL  Auto Lymphocyte # : 0.15 K/uL  Auto Monocyte # : 0.06 K/uL  Auto Eosinophil # : 0.00 K/uL  Auto Basophil # : 0.01 K/uL  Auto Neutrophil % : 93.3 %  Auto Lymphocyte % : 4.1 %  Auto Monocyte % : 1.7 %  Auto Eosinophil % : 0.0 %  Auto Basophil % : 0.3 %                          9.8    3.62  )-----------( 214      ( 17 Nov 2022 07:29 )             31.5       Phos  3.0     11-17  Mg     1.8     11-17      PT/INR - ( 17 Nov 2022 07:29 )   PT: 13.3 sec;   INR: 1.14 ratio         PTT - ( 17 Nov 2022 07:29 )  PTT:28.1 sec    Lactate Dehydrogenase, Serum: 143 U/L (11-17 @ 07:31)  Lactate Dehydrogenase, Serum: 150 U/L (11-16 @ 15:54)      Meds:   Antimicrobials:   clotrimazole Lozenge 1 Lozenge Oral five times a day  fluconAZOLE   Tablet 400 milliGRAM(s) Oral daily  trimethoprim  160 mG/sulfamethoxazole 800 mG 1 Tablet(s) Oral every 12 hours      Heme / Onc:   etoposide (TOPOSAR) IVPB (eMAR) 800 milliGRAM(s) IV Intermittent <User Schedule>  etoposide (TOPOSAR) IVPB (eMAR) 664 milliGRAM(s) IV Intermittent once  heparin  Infusion 334 Unit(s)/Hr IV Continuous <Continuous>      GI:  pantoprazole    Tablet 40 milliGRAM(s) Oral before breakfast  polyethylene glycol 3350 17 Gram(s) Oral daily PRN  senna 2 Tablet(s) Oral at bedtime PRN  sodium bicarbonate Mouth Rinse 10 milliLiter(s) Swish and Spit five times a day  ursodiol Capsule 300 milliGRAM(s) Oral two times a day      Cardiovascular:   furosemide   Injectable 40 milliGRAM(s) IV Push once  furosemide   Injectable 40 milliGRAM(s) IV Push once      Immunologic:       Other medications:   acetaminophen     Tablet .. 650 milliGRAM(s) Oral every 6 hours  Biotene Dry Mouth Oral Rinse 5 milliLiter(s) Swish and Spit five times a day  chlorhexidine 4% Liquid 1 Application(s) Topical <User Schedule>  dexAMETHasone  IVPB 10 milliGRAM(s) IV Intermittent every 24 hours  FLUoxetine 20 milliGRAM(s) Oral daily  folic acid 1 milliGRAM(s) Oral daily  loratadine 10 milliGRAM(s) Oral daily  LORazepam   Injectable 1 milliGRAM(s) IV Push daily  multivitamin 1 Tablet(s) Oral daily  nystatin Powder 1 Application(s) Topical two times a day  ondansetron Injectable 8 milliGRAM(s) IV Push every 8 hours  potassium chloride  20 mEq/100 mL IVPB 20 milliEquivalent(s) IV Intermittent every 2 hours  sodium chloride 0.9%. 1000 milliLiter(s) IV Continuous <Continuous>      PRN:   acetaminophen     Tablet .. 650 milliGRAM(s) Oral every 6 hours PRN  LORazepam   Injectable 1 milliGRAM(s) IV Push every 6 hours PRN  metoclopramide Injectable 10 milliGRAM(s) IV Push every 6 hours PRN  polyethylene glycol 3350 17 Gram(s) Oral daily PRN  senna 2 Tablet(s) Oral at bedtime PRN  sodium chloride 0.9% lock flush 10 milliLiter(s) IV Push every 1 hour PRN      A/P:  57 year old female with a history of  peripheral T cell lymphoma   Pre  :  Autologous PBSCT day - 8   11/17-continue -16 24 hour continuous infusion, strict I&O, daily weights, prn diuresis     1. Infectious Disease:   clotrimazole Lozenge 1 Lozenge Oral five times a day  fluconAZOLE   Tablet 400 milliGRAM(s) Oral daily  trimethoprim  160 mG/sulfamethoxazole 800 mG 1 Tablet(s) Oral every 12 hours    2. VOD Prophylaxis: Actigall, Glutamine, Heparin (dosed at 100 units / kg / day)     3. GI Prophylaxis:   pantoprazole    Tablet 40 milliGRAM(s) Oral before breakfast    4. Mouthcare - NS / NaHCO3 rinses, Mycelex, Biotene; Skin care     5. GVHD prophylaxis - n/a     6. Transfuse & replete electrolytes prn   potassium chloride  20 mEq/100 mL IVPB 20 milliEquivalent(s) IV Intermittent every 2 hours    7. IV hydration, daily weights, strict I&O, prn diuresis   Lasix 40mg IV x 1 0800  Lasix 40mg IV x 1 1600    8. PO intake as tolerated, nutrition follow up as needed, MVI, folic acid     9. Antiemetics, anti-diarrhea medications:   LORazepam   Injectable 1 milliGRAM(s) IV Push every 6 hours PRN  metoclopramide Injectable 10 milliGRAM(s) IV Push every 6 hours PRN  LORazepam   Injectable 1 milliGRAM(s) IV Push daily  ondansetron Injectable 8 milliGRAM(s) IV Push every 8 hours  dexAMETHasone  IVPB 10 milliGRAM(s) IV Intermittent every 24 hours    10. OOB as tolerated, physical therapy consult if needed     11. Monitor coags / fibrinogen 2x week, vitamin K as needed     12. Monitor closely for clinical changes, monitor for fevers     13. Emotional support provided, plan of care discussed and questions addressed     14. Patient education done regarding chemotherapy prep, plan of care, restrictions and discharge planning     15. Continue regular social work input     I have written the above note for Dr. Acevedo who performed service with me in the room.   Leena Ibrahim NP-C ([phone])    I have seen and examined patient with NP, I agree with above note as scribed.                    HPC Transplant Team                                                      Critical / Counseling Time Provided: 30 minutes                                                                                                                                                        Chief Complaint: Autologous peripheral blood stem cell transplant with high dose CBV prep regimen for treatment of peripheral T cell lymphoma    S: Patient seen and examined with HPC Transplant Team:     All other ROS negative     O: Vitals:   Vital Signs Last 24 Hrs  T(C): 36.2 (17 Nov 2022 06:03), Max: 37.1 (16 Nov 2022 09:35)  T(F): 97.2 (17 Nov 2022 06:03), Max: 98.8 (16 Nov 2022 09:35)  HR: 84 (17 Nov 2022 06:03) (73 - 92)  BP: 119/72 (17 Nov 2022 06:03) (108/70 - 124/82)  BP(mean): --  RR: 20 (17 Nov 2022 06:03) (18 - 20)  SpO2: 96% (17 Nov 2022 06:03) (96% - 100%)    Parameters below as of 16 Nov 2022 21:10  Patient On (Oxygen Delivery Method): room air      Admit weight: 80.3kg   Daily Height in cm: 158 (16 Nov 2022 10:28)    Today's weight:     Intake / Output:   11-16 @ 07:01  -  11-17 @ 07:00  --------------------------------------------------------  IN: 4673 mL / OUT: 4800 mL / NET: -127 mL      PE:   Oropharynx: no erythema or ulcerations  Oral Mucositis:              -                                          Grade: n/a   CVS: S1, S2 RRR   Lungs: CTA throughout bilaterally   Abdomen: + BS x 4, soft, NT, ND   Extremities: no edema   Gastric Mucositis:       -                                           Grade: n/a   Intestinal Mucositis:     -                                         Grade: n/a   Skin: no rash   TLC: CDI   Neuro: A&O x 3   Pain: 0    Labs:   CBC Full  -  ( 17 Nov 2022 07:29 )  WBC Count : 3.62 K/uL  Hemoglobin : 9.8 g/dL  Hematocrit : 31.5 %  Platelet Count - Automated : 214 K/uL  Mean Cell Volume : 96.3 fl  Mean Cell Hemoglobin : 30.0 pg  Mean Cell Hemoglobin Concentration : 31.1 gm/dL  Auto Neutrophil # : 3.38 K/uL  Auto Lymphocyte # : 0.15 K/uL  Auto Monocyte # : 0.06 K/uL  Auto Eosinophil # : 0.00 K/uL  Auto Basophil # : 0.01 K/uL  Auto Neutrophil % : 93.3 %  Auto Lymphocyte % : 4.1 %  Auto Monocyte % : 1.7 %  Auto Eosinophil % : 0.0 %  Auto Basophil % : 0.3 %                          9.8    3.62  )-----------( 214      ( 17 Nov 2022 07:29 )             31.5     11-17    139  |  107  |  7   ----------------------------<  151<H>  3.9   |  20<L>  |  0.45<L>    Ca    8.9      17 Nov 2022 07:31  Phos  3.0     11-17  Mg     1.8     11-17    TPro  6.7  /  Alb  4.1  /  TBili  0.3  /  DBili  x   /  AST  18  /  ALT  20  /  AlkPhos  102  11-17      Phos  3.0     11-17  Mg     1.8     11-17      PT/INR - ( 17 Nov 2022 07:29 )   PT: 13.3 sec;   INR: 1.14 ratio         PTT - ( 17 Nov 2022 07:29 )  PTT:28.1 sec    Lactate Dehydrogenase, Serum: 143 U/L (11-17 @ 07:31)  Lactate Dehydrogenase, Serum: 150 U/L (11-16 @ 15:54)      Meds:   Antimicrobials:   clotrimazole Lozenge 1 Lozenge Oral five times a day  fluconAZOLE   Tablet 400 milliGRAM(s) Oral daily  trimethoprim  160 mG/sulfamethoxazole 800 mG 1 Tablet(s) Oral every 12 hours      Heme / Onc:   etoposide (TOPOSAR) IVPB (eMAR) 800 milliGRAM(s) IV Intermittent <User Schedule>  etoposide (TOPOSAR) IVPB (eMAR) 664 milliGRAM(s) IV Intermittent once  heparin  Infusion 334 Unit(s)/Hr IV Continuous <Continuous>      GI:  pantoprazole    Tablet 40 milliGRAM(s) Oral before breakfast  polyethylene glycol 3350 17 Gram(s) Oral daily PRN  senna 2 Tablet(s) Oral at bedtime PRN  sodium bicarbonate Mouth Rinse 10 milliLiter(s) Swish and Spit five times a day  ursodiol Capsule 300 milliGRAM(s) Oral two times a day      Cardiovascular:   furosemide   Injectable 40 milliGRAM(s) IV Push once  furosemide   Injectable 40 milliGRAM(s) IV Push once      Immunologic:       Other medications:   acetaminophen     Tablet .. 650 milliGRAM(s) Oral every 6 hours  Biotene Dry Mouth Oral Rinse 5 milliLiter(s) Swish and Spit five times a day  chlorhexidine 4% Liquid 1 Application(s) Topical <User Schedule>  dexAMETHasone  IVPB 10 milliGRAM(s) IV Intermittent every 24 hours  FLUoxetine 20 milliGRAM(s) Oral daily  folic acid 1 milliGRAM(s) Oral daily  loratadine 10 milliGRAM(s) Oral daily  LORazepam   Injectable 1 milliGRAM(s) IV Push daily  multivitamin 1 Tablet(s) Oral daily  nystatin Powder 1 Application(s) Topical two times a day  ondansetron Injectable 8 milliGRAM(s) IV Push every 8 hours  potassium chloride  20 mEq/100 mL IVPB 20 milliEquivalent(s) IV Intermittent every 2 hours  sodium chloride 0.9%. 1000 milliLiter(s) IV Continuous <Continuous>      PRN:   acetaminophen     Tablet .. 650 milliGRAM(s) Oral every 6 hours PRN  LORazepam   Injectable 1 milliGRAM(s) IV Push every 6 hours PRN  metoclopramide Injectable 10 milliGRAM(s) IV Push every 6 hours PRN  polyethylene glycol 3350 17 Gram(s) Oral daily PRN  senna 2 Tablet(s) Oral at bedtime PRN  sodium chloride 0.9% lock flush 10 milliLiter(s) IV Push every 1 hour PRN      A/P:  57 year old female with a history of  peripheral T cell lymphoma   Pre  :  Autologous PBSCT day - 8   11/17-continue -16 24 hour continuous infusion, strict I&O, daily weights, prn diuresis     1. Infectious Disease:   clotrimazole Lozenge 1 Lozenge Oral five times a day  fluconAZOLE   Tablet 400 milliGRAM(s) Oral daily  trimethoprim  160 mG/sulfamethoxazole 800 mG 1 Tablet(s) Oral every 12 hours    2. VOD Prophylaxis: Actigall, Glutamine, Heparin (dosed at 100 units / kg / day)     3. GI Prophylaxis:   pantoprazole    Tablet 40 milliGRAM(s) Oral before breakfast    4. Mouthcare - NS / NaHCO3 rinses, Mycelex, Biotene; Skin care     5. GVHD prophylaxis - n/a     6. Transfuse & replete electrolytes prn   potassium chloride  20 mEq/100 mL IVPB 20 milliEquivalent(s) IV Intermittent every 2 hours    7. IV hydration, daily weights, strict I&O, prn diuresis   Lasix 40mg IV x 1 0800  Lasix 40mg IV x 1 1600    8. PO intake as tolerated, nutrition follow up as needed, MVI, folic acid     9. Antiemetics, anti-diarrhea medications:   LORazepam   Injectable 1 milliGRAM(s) IV Push every 6 hours PRN  metoclopramide Injectable 10 milliGRAM(s) IV Push every 6 hours PRN  LORazepam   Injectable 1 milliGRAM(s) IV Push daily  ondansetron Injectable 8 milliGRAM(s) IV Push every 8 hours  dexAMETHasone  IVPB 10 milliGRAM(s) IV Intermittent every 24 hours    10. OOB as tolerated, physical therapy consult if needed     11. Monitor coags / fibrinogen 2x week, vitamin K as needed     12. Monitor closely for clinical changes, monitor for fevers     13. Emotional support provided, plan of care discussed and questions addressed     14. Patient education done regarding chemotherapy prep, plan of care, restrictions and discharge planning     15. Continue regular social work input     I have written the above note for Dr. Acevedo who performed service with me in the room.   Leena Ibrahim NP-C ([phone])    I have seen and examined patient with NP, I agree with above note as scribed.                    [facility]PC Transplant Team                                                      Critical / Counseling Time Provided: 30 minutes                                                                                                                                                        Chief Complaint: Autologous peripheral blood stem cell transplant with high dose CBV prep regimen for treatment of peripheral T cell lymphoma    S: Patient seen and examined with HPC Transplant Team:   No complaints today   All other ROS negative     O: Vitals:   Vital Signs Last 24 Hrs  T(C): 36.2 (17 Nov 2022 06:03), Max: 37.1 (16 Nov 2022 09:35)  T(F): 97.2 (17 Nov 2022 06:03), Max: 98.8 (16 Nov 2022 09:35)  HR: 84 (17 Nov 2022 06:03) (73 - 92)  BP: 119/72 (17 Nov 2022 06:03) (108/70 - 124/82)  BP(mean): --  RR: 20 (17 Nov 2022 06:03) (18 - 20)  SpO2: 96% (17 Nov 2022 06:03) (96% - 100%)    Parameters below as of 16 Nov 2022 21:10  Patient On (Oxygen Delivery Method): room air      Admit weight: 80.3kg   Daily Height in cm: 158 (16 Nov 2022 10:28)    Today's weight: 81.4kg     Intake / Output:   11-16 @ 07:01  -  11-17 @ 07:00  --------------------------------------------------------  IN: 4673 mL / OUT: 4800 mL / NET: -127 mL      PE:   Oropharynx: no erythema or ulcerations  Oral Mucositis:              -                                          Grade: n/a   CVS: S1, S2 RRR   Lungs: CTA throughout bilaterally   Abdomen: + BS x 4, soft, NT, ND   Extremities: no edema   Gastric Mucositis:       -                                           Grade: n/a   Intestinal Mucositis:     -                                         Grade: n/a   Skin: no rash   TLC: CDI   Neuro: A&O x 3   Pain: 0    Labs:   CBC Full  -  ( 17 Nov 2022 07:29 )  WBC Count : 3.62 K/uL  Hemoglobin : 9.8 g/dL  Hematocrit : 31.5 %  Platelet Count - Automated : 214 K/uL  Mean Cell Volume : 96.3 fl  Mean Cell Hemoglobin : 30.0 pg  Mean Cell Hemoglobin Concentration : 31.1 gm/dL  Auto Neutrophil # : 3.38 K/uL  Auto Lymphocyte # : 0.15 K/uL  Auto Monocyte # : 0.06 K/uL  Auto Eosinophil # : 0.00 K/uL  Auto Basophil # : 0.01 K/uL  Auto Neutrophil % : 93.3 %  Auto Lymphocyte % : 4.1 %  Auto Monocyte % : 1.7 %  Auto Eosinophil % : 0.0 %  Auto Basophil % : 0.3 %                          9.8    3.62  )-----------( 214      ( 17 Nov 2022 07:29 )             31.5     11-17    139  |  107  |  7   ----------------------------<  151<H>  3.9   |  20<L>  |  0.45<L>    Ca    8.9      17 Nov 2022 07:31  Phos  3.0     11-17  Mg     1.8     11-17    TPro  6.7  /  Alb  4.1  /  TBili  0.3  /  DBili  x   /  AST  18  /  ALT  20  /  AlkPhos  102  11-17      Phos  3.0     11-17  Mg     1.8     11-17      PT/INR - ( 17 Nov 2022 07:29 )   PT: 13.3 sec;   INR: 1.14 ratio         PTT - ( 17 Nov 2022 07:29 )  PTT:28.1 sec    Lactate Dehydrogenase, Serum: 143 U/L (11-17 @ 07:31)  Lactate Dehydrogenase, Serum: 150 U/L (11-16 @ 15:54)      Meds:   Antimicrobials:   clotrimazole Lozenge 1 Lozenge Oral five times a day  fluconAZOLE   Tablet 400 milliGRAM(s) Oral daily  trimethoprim  160 mG/sulfamethoxazole 800 mG 1 Tablet(s) Oral every 12 hours      Heme / Onc:   etoposide (TOPOSAR) IVPB (eMAR) 800 milliGRAM(s) IV Intermittent <User Schedule>  etoposide (TOPOSAR) IVPB (eMAR) 664 milliGRAM(s) IV Intermittent once  heparin  Infusion 334 Unit(s)/Hr IV Continuous <Continuous>      GI:  pantoprazole    Tablet 40 milliGRAM(s) Oral before breakfast  polyethylene glycol 3350 17 Gram(s) Oral daily PRN  senna 2 Tablet(s) Oral at bedtime PRN  sodium bicarbonate Mouth Rinse 10 milliLiter(s) Swish and Spit five times a day  ursodiol Capsule 300 milliGRAM(s) Oral two times a day      Cardiovascular:   furosemide   Injectable 40 milliGRAM(s) IV Push once  furosemide   Injectable 40 milliGRAM(s) IV Push once      Immunologic:       Other medications:   acetaminophen     Tablet .. 650 milliGRAM(s) Oral every 6 hours  Biotene Dry Mouth Oral Rinse 5 milliLiter(s) Swish and Spit five times a day  chlorhexidine 4% Liquid 1 Application(s) Topical <User Schedule>  dexAMETHasone  IVPB 10 milliGRAM(s) IV Intermittent every 24 hours  FLUoxetine 20 milliGRAM(s) Oral daily  folic acid 1 milliGRAM(s) Oral daily  loratadine 10 milliGRAM(s) Oral daily  LORazepam   Injectable 1 milliGRAM(s) IV Push daily  multivitamin 1 Tablet(s) Oral daily  nystatin Powder 1 Application(s) Topical two times a day  ondansetron Injectable 8 milliGRAM(s) IV Push every 8 hours  potassium chloride  20 mEq/100 mL IVPB 20 milliEquivalent(s) IV Intermittent every 2 hours  sodium chloride 0.9%. 1000 milliLiter(s) IV Continuous <Continuous>      PRN:   acetaminophen     Tablet .. 650 milliGRAM(s) Oral every 6 hours PRN  LORazepam   Injectable 1 milliGRAM(s) IV Push every 6 hours PRN  metoclopramide Injectable 10 milliGRAM(s) IV Push every 6 hours PRN  polyethylene glycol 3350 17 Gram(s) Oral daily PRN  senna 2 Tablet(s) Oral at bedtime PRN  sodium chloride 0.9% lock flush 10 milliLiter(s) IV Push every 1 hour PRN      A/P:  57 year old female with a history of  peripheral T cell lymphoma   Pre  :  Autologous PBSCT day - 8   11/17-continue -16 24 hour continuous infusion, strict I&O, daily weights, prn diuresis     1. Infectious Disease:   clotrimazole Lozenge 1 Lozenge Oral five times a day  fluconAZOLE   Tablet 400 milliGRAM(s) Oral daily  trimethoprim  160 mG/sulfamethoxazole 800 mG 1 Tablet(s) Oral every 12 hours    2. VOD Prophylaxis: Actigall, Glutamine, Heparin (dosed at 100 units / kg / day)     3. GI Prophylaxis:   pantoprazole    Tablet 40 milliGRAM(s) Oral before breakfast    4. Mouthcare - NS / NaHCO3 rinses, Mycelex, Biotene; Skin care     5. GVHD prophylaxis - n/a     6. Transfuse & replete electrolytes prn   potassium chloride  20 mEq/100 mL IVPB 20 milliEquivalent(s) IV Intermittent every 2 hours    7. IV hydration, daily weights, strict I&O, prn diuresis   Lasix 40mg IV x 1 0800  Lasix 40mg IV x 1 1600    8. PO intake as tolerated, nutrition follow up as needed, MVI, folic acid     9. Antiemetics, anti-diarrhea medications:   LORazepam   Injectable 1 milliGRAM(s) IV Push every 6 hours PRN  metoclopramide Injectable 10 milliGRAM(s) IV Push every 6 hours PRN  LORazepam   Injectable 1 milliGRAM(s) IV Push daily  ondansetron Injectable 8 milliGRAM(s) IV Push every 8 hours  dexAMETHasone  IVPB 10 milliGRAM(s) IV Intermittent every 24 hours    10. OOB as tolerated, physical therapy consult if needed     11. Monitor coags / fibrinogen 2x week, vitamin K as needed     12. Monitor closely for clinical changes, monitor for fevers     13. Emotional support provided, plan of care discussed and questions addressed     14. Patient education done regarding chemotherapy prep, plan of care, restrictions and discharge planning     15. Continue regular social work input     I have written the above note for Dr. Acevedo who performed service with me in the room.   Leena Ibrahim NP-C ([phone])    I have seen and examined patient with NP, I agree with above note as scribed.

## 2022-11-17 NOTE — DIETITIAN INITIAL EVALUATION ADULT - REASON INDICATOR FOR ASSESSMENT
Initial Visit / Collateral obtained from patient and chart review RD assessment warranted for: new BMT Pt  Collateral obtained from patient and chart review

## 2022-11-17 NOTE — DIETITIAN INITIAL EVALUATION ADULT - ENERGY INTAKE
Adequate (%) Patient alert and oriented at time of visit. Patient is currently on a regular diet and reports eating % of meals.

## 2022-11-17 NOTE — DIETITIAN INITIAL EVALUATION ADULT - ETIOLOGY
Increased physiological demand Decreased ability to meet EER in setting of tx side effects Decreased ability to meet EER in setting of treatment side effects

## 2022-11-17 NOTE — DIETITIAN INITIAL EVALUATION ADULT - PERTINENT MEDS FT
Nutrition Pertinent Meds:    clotrimazole Lozenge 1 Lozenge Oral five times a day  trimethoprim  160 mG/sulfamethoxazole 800 mG 1 Tablet(s) Oral every 12 hours  furosemide   Injectable 40 milliGRAM(s) IV Push once  aprepitant 80 milliGRAM(s) Oral every 24 hours  ondansetron Injectable 8 milliGRAM(s) IV Push every 8 hours  etoposide (TOPOSAR) IVPB (eMAR) 800 milliGRAM(s) IV Intermittent <User Schedule>  etoposide (TOPOSAR) IVPB (eMAR) 664 milliGRAM(s) IV Intermittent once  multivitamin 1 Tablet(s) Oral daily  pantoprazole    Tablet 40 milliGRAM(s) Oral before breakfast  Biotene Dry Mouth Oral Rinse 5 milliLiter(s) Swish and Spit five times a day  dexAMETHasone  IVPB 10 milliGRAM(s) IV Intermittent every 24 hours  folic acid 1 milliGRAM(s) Oral daily  sodium bicarbonate Mouth Rinse 10 milliLiter(s) Swish and Spit five times a day  sodium chloride 0.9%. 1000 milliLiter(s) (20 mL/Hr) IV Continuous <Continuous>  metoclopramide Injectable 10 milliGRAM(s) IV Push every 6 hours PRN Nausea and/or Vomiting  senna 2 Tablet(s) Oral at bedtime PRN Constipation  polyethylene glycol 3350 17 Gram(s) Oral daily PRN Constipation

## 2022-11-17 NOTE — DIETITIAN INITIAL EVALUATION ADULT - NS FNS DIET ORDER
Diet, Regular:   GlutaSolve(Glutamine) 15gm pkg     Qty per Day:  1 (11-16-22 @ 09:46) [Active]

## 2022-11-17 NOTE — DIETITIAN INITIAL EVALUATION ADULT - ORAL INTAKE PTA/DIET HISTORY
Patient reports living at home with her  and children in which she does primarily of the cooking. Patient reports eating % of meals and being aware of food safety protocol. Reports she usually buys food that is pre-packaged, aware that produce has to be thoroughly washed before consuming and foods have to be cooked fully. Patient reports that before her chemo, her UBW was: 160lbs and her height is 5'2".  Patient reports living at home with her  and children in which she does primarily of the cooking. Patient reports eating well PTA and following  food safety protocol. Reports she usually buys food that is pre-packaged, aware that produce has to be thoroughly washed before consuming and foods have to be cooked fully. Patient reports that before her chemo, her UBW was: 160lbs and her height is 5'2".

## 2022-11-17 NOTE — DIETITIAN INITIAL EVALUATION ADULT - ADD RECOMMEND
1. Continue regular diet as medically feasible/tolerated  2. Recommend adding extra fiber to tray (benny pudding) to promote BM  3. Monitor appetite  4. Continue to monitor nutritional intake, tolerance to diet, weights, lab, skin integrity, bowel movements  5. Continue to monitor micronutrient supplements; MVI, Folic Acid  6. RD remains available 1. Continue regular diet as medically feasible/tolerated  2. Food preferences obtained; RD to honor as able.   3. Continue to monitor nutritional intake, tolerance to diet, weights, lab, skin integrity, bowel movements  5. Continue to monitor micronutrient supplements; MVI, Folic Acid  6. Continue Glutasolve 1x/day.

## 2022-11-17 NOTE — DIETITIAN INITIAL EVALUATION ADULT - REASON FOR ADMISSION
Per chart review, patient is "A/P:  57 year old female with a history of  peripheral T cell lymphoma   Pre  :  Autologous PBSCT day - 8 11/17-continue -16 24 hour continuous infusion, strict I&O, daily weights, prn diuresis".     Per chart review, patient is "A/P:  57 year old female with a history of  peripheral T cell lymphoma. Pre  :  Autologous PBSCT day - 8 11/17-continue -16 24 hour continuous infusion, strict I&O, daily weights, prn diuresis".

## 2022-11-18 DIAGNOSIS — Z51.5 ENCOUNTER FOR PALLIATIVE CARE: ICD-10-CM

## 2022-11-18 DIAGNOSIS — R53.83 OTHER FATIGUE: ICD-10-CM

## 2022-11-18 DIAGNOSIS — C85.90 NON-HODGKIN LYMPHOMA, UNSPECIFIED, UNSPECIFIED SITE: ICD-10-CM

## 2022-11-18 DIAGNOSIS — R53.81 OTHER MALAISE: ICD-10-CM

## 2022-11-18 LAB
ALBUMIN SERPL ELPH-MCNC: 4.1 G/DL — SIGNIFICANT CHANGE UP (ref 3.3–5)
ALBUMIN SERPL ELPH-MCNC: 4.5 G/DL — SIGNIFICANT CHANGE UP (ref 3.3–5)
ALP SERPL-CCNC: 92 U/L — SIGNIFICANT CHANGE UP (ref 40–120)
ALP SERPL-CCNC: 93 U/L — SIGNIFICANT CHANGE UP (ref 40–120)
ALT FLD-CCNC: 29 U/L — SIGNIFICANT CHANGE UP (ref 10–45)
ALT FLD-CCNC: 35 U/L — SIGNIFICANT CHANGE UP (ref 10–45)
ANION GAP SERPL CALC-SCNC: 12 MMOL/L — SIGNIFICANT CHANGE UP (ref 5–17)
ANION GAP SERPL CALC-SCNC: 13 MMOL/L — SIGNIFICANT CHANGE UP (ref 5–17)
AST SERPL-CCNC: 29 U/L — SIGNIFICANT CHANGE UP (ref 10–40)
AST SERPL-CCNC: 31 U/L — SIGNIFICANT CHANGE UP (ref 10–40)
BASOPHILS # BLD AUTO: 0 K/UL — SIGNIFICANT CHANGE UP (ref 0–0.2)
BASOPHILS NFR BLD AUTO: 0 % — SIGNIFICANT CHANGE UP (ref 0–2)
BILIRUB SERPL-MCNC: 0.2 MG/DL — SIGNIFICANT CHANGE UP (ref 0.2–1.2)
BILIRUB SERPL-MCNC: 0.3 MG/DL — SIGNIFICANT CHANGE UP (ref 0.2–1.2)
BLD GP AB SCN SERPL QL: NEGATIVE — SIGNIFICANT CHANGE UP
BUN SERPL-MCNC: 10 MG/DL — SIGNIFICANT CHANGE UP (ref 7–23)
BUN SERPL-MCNC: 17 MG/DL — SIGNIFICANT CHANGE UP (ref 7–23)
CALCIUM SERPL-MCNC: 9.3 MG/DL — SIGNIFICANT CHANGE UP (ref 8.4–10.5)
CALCIUM SERPL-MCNC: 9.4 MG/DL — SIGNIFICANT CHANGE UP (ref 8.4–10.5)
CHLORIDE SERPL-SCNC: 100 MMOL/L — SIGNIFICANT CHANGE UP (ref 96–108)
CHLORIDE SERPL-SCNC: 107 MMOL/L — SIGNIFICANT CHANGE UP (ref 96–108)
CO2 SERPL-SCNC: 19 MMOL/L — LOW (ref 22–31)
CO2 SERPL-SCNC: 24 MMOL/L — SIGNIFICANT CHANGE UP (ref 22–31)
CREAT SERPL-MCNC: 0.53 MG/DL — SIGNIFICANT CHANGE UP (ref 0.5–1.3)
CREAT SERPL-MCNC: 0.58 MG/DL — SIGNIFICANT CHANGE UP (ref 0.5–1.3)
EGFR: 107 ML/MIN/1.73M2 — SIGNIFICANT CHANGE UP
EGFR: 109 ML/MIN/1.73M2 — SIGNIFICANT CHANGE UP
EOSINOPHIL # BLD AUTO: 0 K/UL — SIGNIFICANT CHANGE UP (ref 0–0.5)
EOSINOPHIL NFR BLD AUTO: 0 % — SIGNIFICANT CHANGE UP (ref 0–6)
GLUCOSE SERPL-MCNC: 166 MG/DL — HIGH (ref 70–99)
GLUCOSE SERPL-MCNC: 181 MG/DL — HIGH (ref 70–99)
HCT VFR BLD CALC: 28.7 % — LOW (ref 34.5–45)
HGB BLD-MCNC: 9 G/DL — LOW (ref 11.5–15.5)
IGE SERPL-ACNC: 6 KU/L — SIGNIFICANT CHANGE UP
IMM GRANULOCYTES NFR BLD AUTO: 0.8 % — SIGNIFICANT CHANGE UP (ref 0–0.9)
LDH SERPL L TO P-CCNC: 159 U/L — SIGNIFICANT CHANGE UP (ref 50–242)
LYMPHOCYTES # BLD AUTO: 0.09 K/UL — LOW (ref 1–3.3)
LYMPHOCYTES # BLD AUTO: 2.4 % — LOW (ref 13–44)
MAGNESIUM SERPL-MCNC: 1.8 MG/DL — SIGNIFICANT CHANGE UP (ref 1.6–2.6)
MAGNESIUM SERPL-MCNC: 1.9 MG/DL — SIGNIFICANT CHANGE UP (ref 1.6–2.6)
MCHC RBC-ENTMCNC: 30.4 PG — SIGNIFICANT CHANGE UP (ref 27–34)
MCHC RBC-ENTMCNC: 31.4 GM/DL — LOW (ref 32–36)
MCV RBC AUTO: 97 FL — SIGNIFICANT CHANGE UP (ref 80–100)
MONOCYTES # BLD AUTO: 0.05 K/UL — SIGNIFICANT CHANGE UP (ref 0–0.9)
MONOCYTES NFR BLD AUTO: 1.3 % — LOW (ref 2–14)
NEUTROPHILS # BLD AUTO: 3.62 K/UL — SIGNIFICANT CHANGE UP (ref 1.8–7.4)
NEUTROPHILS NFR BLD AUTO: 95.5 % — HIGH (ref 43–77)
NRBC # BLD: 0 /100 WBCS — SIGNIFICANT CHANGE UP (ref 0–0)
PHOSPHATE SERPL-MCNC: 3.2 MG/DL — SIGNIFICANT CHANGE UP (ref 2.5–4.5)
PHOSPHATE SERPL-MCNC: 3.5 MG/DL — SIGNIFICANT CHANGE UP (ref 2.5–4.5)
PLATELET # BLD AUTO: 188 K/UL — SIGNIFICANT CHANGE UP (ref 150–400)
POTASSIUM SERPL-MCNC: 3.7 MMOL/L — SIGNIFICANT CHANGE UP (ref 3.5–5.3)
POTASSIUM SERPL-MCNC: 3.8 MMOL/L — SIGNIFICANT CHANGE UP (ref 3.5–5.3)
POTASSIUM SERPL-SCNC: 3.7 MMOL/L — SIGNIFICANT CHANGE UP (ref 3.5–5.3)
POTASSIUM SERPL-SCNC: 3.8 MMOL/L — SIGNIFICANT CHANGE UP (ref 3.5–5.3)
PROT SERPL-MCNC: 6.8 G/DL — SIGNIFICANT CHANGE UP (ref 6–8.3)
PROT SERPL-MCNC: 7.2 G/DL — SIGNIFICANT CHANGE UP (ref 6–8.3)
RBC # BLD: 2.96 M/UL — LOW (ref 3.8–5.2)
RBC # FLD: 13.5 % — SIGNIFICANT CHANGE UP (ref 10.3–14.5)
RH IG SCN BLD-IMP: POSITIVE — SIGNIFICANT CHANGE UP
SODIUM SERPL-SCNC: 136 MMOL/L — SIGNIFICANT CHANGE UP (ref 135–145)
SODIUM SERPL-SCNC: 139 MMOL/L — SIGNIFICANT CHANGE UP (ref 135–145)
SP GR UR STRIP: 1.01 — SIGNIFICANT CHANGE UP (ref 1.01–1.02)
WBC # BLD: 3.79 K/UL — LOW (ref 3.8–10.5)
WBC # FLD AUTO: 3.79 K/UL — LOW (ref 3.8–10.5)

## 2022-11-18 PROCEDURE — 99223 1ST HOSP IP/OBS HIGH 75: CPT

## 2022-11-18 PROCEDURE — 93010 ELECTROCARDIOGRAM REPORT: CPT

## 2022-11-18 RX ORDER — FUROSEMIDE 40 MG
40 TABLET ORAL ONCE
Refills: 0 | Status: DISCONTINUED | OUTPATIENT
Start: 2022-11-20 | End: 2022-11-20

## 2022-11-18 RX ORDER — FUROSEMIDE 40 MG
40 TABLET ORAL ONCE
Refills: 0 | Status: COMPLETED | OUTPATIENT
Start: 2022-11-19 | End: 2022-11-19

## 2022-11-18 RX ORDER — POTASSIUM CHLORIDE 20 MEQ
20 PACKET (EA) ORAL
Refills: 0 | Status: COMPLETED | OUTPATIENT
Start: 2022-11-19 | End: 2022-11-19

## 2022-11-18 RX ORDER — FUROSEMIDE 40 MG
40 TABLET ORAL ONCE
Refills: 0 | Status: COMPLETED | OUTPATIENT
Start: 2022-11-18 | End: 2022-11-18

## 2022-11-18 RX ORDER — POTASSIUM CHLORIDE 20 MEQ
20 PACKET (EA) ORAL
Refills: 0 | Status: COMPLETED | OUTPATIENT
Start: 2022-11-18 | End: 2022-11-18

## 2022-11-18 RX ORDER — CYCLOPHOSPHAMIDE 100 MG
2898 VIAL (EA) INTRAVENOUS EVERY 24 HOURS
Refills: 0 | Status: COMPLETED | OUTPATIENT
Start: 2022-11-18 | End: 2022-11-21

## 2022-11-18 RX ORDER — POTASSIUM CHLORIDE 20 MEQ
20 PACKET (EA) ORAL
Refills: 0 | Status: COMPLETED | OUTPATIENT
Start: 2022-11-20 | End: 2022-11-20

## 2022-11-18 RX ORDER — FUROSEMIDE 40 MG
40 TABLET ORAL ONCE
Refills: 0 | Status: COMPLETED | OUTPATIENT
Start: 2022-11-20 | End: 2022-11-20

## 2022-11-18 RX ORDER — MESNA 100 MG/ML
964 INJECTION, SOLUTION INTRAVENOUS
Refills: 0 | Status: COMPLETED | OUTPATIENT
Start: 2022-11-18 | End: 2022-11-19

## 2022-11-18 RX ORDER — PETROLATUM,WHITE
1 JELLY (GRAM) TOPICAL
Refills: 0 | Status: DISCONTINUED | OUTPATIENT
Start: 2022-11-18 | End: 2022-12-16

## 2022-11-18 RX ADMIN — Medication 1 LOZENGE: at 16:23

## 2022-11-18 RX ADMIN — Medication 40 MILLIGRAM(S): at 17:17

## 2022-11-18 RX ADMIN — Medication 20 MILLIGRAM(S): at 20:05

## 2022-11-18 RX ADMIN — Medication 650 MILLIGRAM(S): at 18:55

## 2022-11-18 RX ADMIN — Medication 1 LOZENGE: at 20:04

## 2022-11-18 RX ADMIN — Medication 5 MILLILITER(S): at 11:13

## 2022-11-18 RX ADMIN — HEPARIN SODIUM 3.34 UNIT(S)/HR: 5000 INJECTION INTRAVENOUS; SUBCUTANEOUS at 20:05

## 2022-11-18 RX ADMIN — Medication 10 MILLILITER(S): at 00:22

## 2022-11-18 RX ADMIN — Medication 10 MILLILITER(S): at 16:24

## 2022-11-18 RX ADMIN — NYSTATIN CREAM 1 APPLICATION(S): 100000 CREAM TOPICAL at 06:53

## 2022-11-18 RX ADMIN — Medication 2898 MILLIGRAM(S): at 15:07

## 2022-11-18 RX ADMIN — Medication 40 MILLIGRAM(S): at 08:54

## 2022-11-18 RX ADMIN — Medication 10 MILLILITER(S): at 23:05

## 2022-11-18 RX ADMIN — Medication 10 MILLILITER(S): at 08:54

## 2022-11-18 RX ADMIN — SODIUM CHLORIDE 270 MILLILITER(S): 9 INJECTION, SOLUTION INTRAVENOUS at 18:28

## 2022-11-18 RX ADMIN — ONDANSETRON 8 MILLIGRAM(S): 8 TABLET, FILM COATED ORAL at 13:33

## 2022-11-18 RX ADMIN — MESNA 964 MILLIGRAM(S): 100 INJECTION, SOLUTION INTRAVENOUS at 18:00

## 2022-11-18 RX ADMIN — SODIUM CHLORIDE 270 MILLILITER(S): 9 INJECTION, SOLUTION INTRAVENOUS at 20:04

## 2022-11-18 RX ADMIN — NYSTATIN CREAM 1 APPLICATION(S): 100000 CREAM TOPICAL at 17:17

## 2022-11-18 RX ADMIN — Medication 1 LOZENGE: at 11:10

## 2022-11-18 RX ADMIN — Medication 5 MILLILITER(S): at 23:05

## 2022-11-18 RX ADMIN — Medication 5 MILLILITER(S): at 00:21

## 2022-11-18 RX ADMIN — Medication 20 MILLIGRAM(S): at 11:13

## 2022-11-18 RX ADMIN — Medication 1 TABLET(S): at 06:50

## 2022-11-18 RX ADMIN — Medication 10 MILLILITER(S): at 20:04

## 2022-11-18 RX ADMIN — SODIUM CHLORIDE 20 MILLILITER(S): 9 INJECTION INTRAMUSCULAR; INTRAVENOUS; SUBCUTANEOUS at 20:03

## 2022-11-18 RX ADMIN — MESNA 964 MILLIGRAM(S): 100 INJECTION, SOLUTION INTRAVENOUS at 14:53

## 2022-11-18 RX ADMIN — Medication 50 MILLIEQUIVALENT(S): at 16:22

## 2022-11-18 RX ADMIN — Medication 50 MILLIEQUIVALENT(S): at 08:56

## 2022-11-18 RX ADMIN — Medication 1 LOZENGE: at 08:55

## 2022-11-18 RX ADMIN — Medication 5 MILLILITER(S): at 20:04

## 2022-11-18 RX ADMIN — LORATADINE 10 MILLIGRAM(S): 10 TABLET ORAL at 11:12

## 2022-11-18 RX ADMIN — ONDANSETRON 8 MILLIGRAM(S): 8 TABLET, FILM COATED ORAL at 06:51

## 2022-11-18 RX ADMIN — ONDANSETRON 8 MILLIGRAM(S): 8 TABLET, FILM COATED ORAL at 20:08

## 2022-11-18 RX ADMIN — Medication 650 MILLIGRAM(S): at 17:27

## 2022-11-18 RX ADMIN — Medication 1 LOZENGE: at 23:05

## 2022-11-18 RX ADMIN — Medication 5 MILLILITER(S): at 16:23

## 2022-11-18 RX ADMIN — URSODIOL 300 MILLIGRAM(S): 250 TABLET, FILM COATED ORAL at 06:50

## 2022-11-18 RX ADMIN — Medication 1 MILLIGRAM(S): at 11:10

## 2022-11-18 RX ADMIN — PANTOPRAZOLE SODIUM 40 MILLIGRAM(S): 20 TABLET, DELAYED RELEASE ORAL at 06:51

## 2022-11-18 RX ADMIN — Medication 50 MILLIEQUIVALENT(S): at 10:59

## 2022-11-18 RX ADMIN — CHLORHEXIDINE GLUCONATE 1 APPLICATION(S): 213 SOLUTION TOPICAL at 06:53

## 2022-11-18 RX ADMIN — URSODIOL 300 MILLIGRAM(S): 250 TABLET, FILM COATED ORAL at 17:16

## 2022-11-18 RX ADMIN — Medication 102 MILLIGRAM(S): at 18:29

## 2022-11-18 RX ADMIN — Medication 1 TABLET(S): at 17:17

## 2022-11-18 RX ADMIN — MESNA 964 MILLIGRAM(S): 100 INJECTION, SOLUTION INTRAVENOUS at 21:00

## 2022-11-18 RX ADMIN — APREPITANT 80 MILLIGRAM(S): 80 CAPSULE ORAL at 13:33

## 2022-11-18 RX ADMIN — FLUCONAZOLE 400 MILLIGRAM(S): 150 TABLET ORAL at 11:10

## 2022-11-18 RX ADMIN — Medication 1 TABLET(S): at 11:11

## 2022-11-18 RX ADMIN — Medication 1 LOZENGE: at 00:21

## 2022-11-18 RX ADMIN — Medication 5 MILLILITER(S): at 08:55

## 2022-11-18 RX ADMIN — Medication 10 MILLILITER(S): at 11:10

## 2022-11-18 NOTE — CONSULT NOTE ADULT - PROBLEM SELECTOR RECOMMENDATION 4
Current functional PPSv2: 70  Nursing care required:  Some assitance with ADLs         Family Health Care Decision Act (FHCDA) Surrogate Decision Maker Hierarchy in the absence of a health care agent  [facility] Article 81 Guardian-->Spouse or domestic partner--> Adult child-->Parent-->Sibling--> Close friend

## 2022-11-18 NOTE — ADVANCED PRACTICE NURSE CONSULT - ASSESSMENT
with [facility] TLC intact , drsg . dry and intact with single lumen mediport on the Right side not access. still with continous hydration NS +10meq KCL/L at 270cc/hr, And NS @  20cc/hr, and heparin drip @ 3.3 cc/hr  with [facility] TLC intact , drsg . dry and intact with single lumen mediport on the Right side not access. still with continous hydration NS +10meq KCL/L at 270cc/hr, And NS @  20cc/hr, and heparin drip @ 3.3 cc/hr . lasix 20mgs given IV post cytoxan as per protocol

## 2022-11-18 NOTE — CONSULT NOTE ADULT - PROBLEM SELECTOR RECOMMENDATION 5
Actions:  [x] Rapport building     [] Symptom assessment    [] Eliciting preferences of goals   [] Prognostic understanding    [] Emotional Support  [] Coping skill development  []  Other  Interdisciplinary Referrals: None, services reviewed   Communication: d/w BMT Sw  Documentation Review: [x] Primary Team [] Consultants [] Interdisciplinary team  Content: n/a negative

## 2022-11-18 NOTE — CONSULT NOTE ADULT - SUBJECTIVE AND OBJECTIVE BOX
HPI:  This is a 57 year old female with peripheral T cell lymphoma status post CHOEP x 6 admitted for an autologous peripheral blood stem cell transplant with high dose CBV prep regimen. Hematologic history as follows: initially presented on 3/4/22 with right axilla mass to general surgeon. Biopsy on 3/22/22 showed atypical lymphoid infiltrate. She later had an excisional biopsy on 22 which confirmed peripheral T cell lymphoma. She has no complaints on admission.  (2022 12:00)        Palliative Global Assessment    Family Heatlh Care Decision Act Surrogate Decision Maker Hierarchy  L Article 81 Guardian-->Spouse or domestic partner--> Adult child-->Parent-->Sibling--> Close friend      Contacts listed in the paper or electronic chart  Name Carl Johnston  Relationship   Number(s) See EMR  Translation Required: none  Spouse or Partner: Above  Family unit:  and 3 AYA children  Twins 20  Other 22  Family history of malignancy: mother colon cancer  Residence: [ ] Apartment   [x] House  [ ] Other  Degree of functionality in the home: Full  Performance Status (PPSv2):  BMI:BMI (kg/m2): 32.2 (22 @ 10:28)  Engagement in the home: Full  Employment: [ ] Currently employed [x ] Exited workforce due to illness  [ ] Retired prior to illness  [ ] No/distant history of employment   Support network (degree):  ---Family:        [ ] Low  [ ] Moderate  [ x] High  ---Neighbors: [ ] Low  [ ] Moderate  [ ] High  ---Social:         [ ] Low  [ ] Moderate  [ ] High  ---Spiritual:    [ ] Low  [ ] Moderate  [ ] High  Financial concerns: None at this time  Coping Strategies: Reading, phone/video calls  Caregiver burden/fatigue/needs:  is reportedly stoic.   Grief identified: [] Yes [x] No    PERTINENT PM/SXH:   Anxiety    History of IBS    Axillary adenopathy    2019 novel coronavirus disease (COVID-19)    Lymphoma      History of appendectomy    H/O nasal polypectomy    H/O  section    History of cholecystectomy      FAMILY HISTORY:  Family hx of colon cancer (Mother)    FHx: diabetes mellitus (Father)    FH: heart disease (Father)    FH: dementia (Mother)      Family Hx substance abuse [ ]yes [ ]no  ITEMS NOT CHECKED ARE NOT PRESENT    SOCIAL HISTORY:   Significant other/partner[ x]  Children[x ]  Congregation/Spirituality:  Substance hx:  [ ]   Tobacco hx:  [ ]   Alcohol hx: [x ]   Home Opioid hx:  [ ] I-Stop Reference No:  Living Situation: [ ]Home  [ ]Long term care  [ ]Rehab [ ]Other    ADVANCE DIRECTIVES:    DNR/MOLST  [ ]  Living Will  [ ]   DECISION MAKER(s):  [ ] Health Care Proxy(s)  [ ] Surrogate(s)  [ ] Guardian           Name(s): Phone Number(s):    BASELINE (I)ADL(s) (prior to admission):  [place]: [ ]Total  [ ] Moderate [ ]Dependent    Allergies    doxycycline (Rash)  penicillins (Unknown)    Intolerances    MEDICATIONS  (STANDING):  acetaminophen     Tablet .. 650 milliGRAM(s) Oral every 6 hours  aprepitant 80 milliGRAM(s) Oral every 24 hours  Biotene Dry Mouth Oral Rinse 5 milliLiter(s) Swish and Spit five times a day  chlorhexidine 4% Liquid 1 Application(s) Topical <User Schedule>  clotrimazole Lozenge 1 Lozenge Oral five times a day  cyclophosphamide IVPB (eMAR) 2898 milliGRAM(s) IV Intermittent every 24 hours  dexAMETHasone  IVPB 10 milliGRAM(s) IV Intermittent every 24 hours  fluconAZOLE   Tablet 400 milliGRAM(s) Oral daily  FLUoxetine 20 milliGRAM(s) Oral daily  folic acid 1 milliGRAM(s) Oral daily  furosemide   Injectable 20 milliGRAM(s) IV Push every 24 hours  furosemide   Injectable 40 milliGRAM(s) IV Push once  heparin  Infusion 334 Unit(s)/Hr (3.34 mL/Hr) IV Continuous <Continuous>  loratadine 10 milliGRAM(s) Oral daily  LORazepam   Injectable 1 milliGRAM(s) IV Push daily  mesna IVPB (eMAR) 964 milliGRAM(s) IV Intermittent every 3 hours  multivitamin 1 Tablet(s) Oral daily  nystatin Powder 1 Application(s) Topical two times a day  ondansetron Injectable 8 milliGRAM(s) IV Push every 8 hours  pantoprazole    Tablet 40 milliGRAM(s) Oral before breakfast  potassium chloride  20 mEq/100 mL IVPB 20 milliEquivalent(s) IV Intermittent every 2 hours  sodium bicarbonate Mouth Rinse 10 milliLiter(s) Swish and Spit five times a day  sodium chloride 0.9% 1000 milliLiter(s) (270 mL/Hr) IV Continuous <Continuous>  sodium chloride 0.9%. 1000 milliLiter(s) (20 mL/Hr) IV Continuous <Continuous>  trimethoprim  160 mG/sulfamethoxazole 800 mG 1 Tablet(s) Oral every 12 hours  ursodiol Capsule 300 milliGRAM(s) Oral two times a day    MEDICATIONS  (PRN):  acetaminophen     Tablet .. 650 milliGRAM(s) Oral every 6 hours PRN Temp greater or equal to 38C (100.4F), Mild Pain (1 - 3)  AQUAPHOR (petrolatum Ointment) 1 Application(s) Topical two times a day PRN dry skin  LORazepam   Injectable 1 milliGRAM(s) IV Push every 6 hours PRN Anxiety / Nausea / Vomiting  metoclopramide Injectable 10 milliGRAM(s) IV Push every 6 hours PRN Nausea and/or Vomiting  polyethylene glycol 3350 17 Gram(s) Oral daily PRN Constipation  senna 2 Tablet(s) Oral at bedtime PRN Constipation  sodium chloride 0.9% lock flush 10 milliLiter(s) IV Push every 1 hour PRN Pre/post blood products, medications, blood draw, and to maintain line patency    PRESENT SYMPTOMS: [ ]Unable to self-report  [ ] CPOT [ ] PAINADs [ ] RDOS  Source if other than patient:  [ ]Family   [ ]Team     Pain: [ ]yes [x ]no  QOL impact -   Location -                    Aggravating factors -  Quality -  Radiation -  Timing-  Severity (0-10 scale):  Minimal acceptable level (0-10 scale):     CPOT:    https://www.[email or web address]/getattachment/[email or web address]/Critical-Care-Pain-Observation-Tool-(CPOT)    PAIN AD Score:   http://geriatrictoolkit.[place].[email or web address]/painad.pdf (press ctrl +  left click to view)    Dyspnea:                           [ ]Mild [ ]Moderate [ ]Severe      RDOS:  0 to 2  minimal or no respiratory distress   3  mild distress  4 to 6 moderate distress  >7 severe distress  https://homecareinformation.net/handouts/hen/Respiratory_Distress_Observation_Scale.pdf (Ctrl +  left click to view)     Anxiety:                             [ ]Mild [ ]Moderate [ ]Severe  Fatigue:                             [x ]Mild [ ]Moderate [ ]Severe  Nausea:                             [ ]Mild [ ]Moderate [ ]Severe  Loss of appetite:              [ ]Mild [ ]Moderate [ ]Severe  Constipation:                    [ ]Mild [ ]Moderate [ ]Severe    PCSSQ[Palliative Care Spiritual Screening Question]   Severity (0-10):   Score of 4 or > indicate consideration of Chaplaincy referral.  Chaplaincy Referral: [ ] yes [ ] refused [ ] following [x ] Deferred     Caregiver [place]? : [ ] yes [ x] no [ ] Deferred [ ] Declined             Social work referral [ ] Patient & Family Centered Care Referral [ ]     Anticipatory Grief present?:  [ ] yes [ x] no  [ ] Deferred                  Social work referral [ ] Chaplaincy Referral[ ]      Other Symptoms:  [x ]All other review of systems negative     Palliative Performance Status Version 2:    60-70     %    http://npcrc.org/files/news/palliative_performance_scale_ppsv2.pdf  PHYSICAL EXAM:  Vital Signs Last 24 Hrs  T(C): 37.1 (2022 13:46), Max: 37.3 (2022 17:21)  T(F): 98.8 (2022 13:46), Max: 99.1 (2022 17:21)  HR: 90 (2022 13:46) (76 - 93)  BP: 108/73 (2022 13:46) (101/64 - 117/78)  BP(mean): --  RR: 18 (2022 13:46) (18 - 18)  SpO2: 98% (2022 13:46) (96% - 98%)    Parameters below as of 2022 13:46  Patient On (Oxygen Delivery Method): room air     I&O's Summary    2022 07:01  -  2022 07:00  --------------------------------------------------------  IN: 8742 mL / OUT: 8550 mL / NET: 192 mL    2022 07:01  -  2022 14:57  --------------------------------------------------------  IN: 2483.4 mL / OUT: 3000 mL / NET: -516.6 mL      GENERAL: [ ]Cachexia    [x ]Alert  [ ]Oriented x   [ ]Lethargic  [ ]Unarousable  [ ]Verbal  [ ]Non-Verbal  Behavioral:   [ ] Anxiety  [ ] Delirium [ ] Agitation [ x] Other Calm  HEENT:  [ ]Normal   [ x]Dry mouth   [ ]ET Tube/Trach  [ ]Oral lesions  PULMONARY:  No tachypnea  [ ]Clear [ ]Tachypnea  [ ]Audible excessive secretions   [ ]Rhonchi        [ ]Right [ ]Left [ ]Bilateral  [ ]Crackles        [ ]Right [ ]Left [ ]Bilateral  [ ]Wheezing     [ ]Right [ ]Left [ ]Bilateral  [ ]Diminished breath sounds [ ]right [ ]left [ ]bilateral  CARDIOVASCULAR:  No JVP  [ ]Regular [ ]Irregular [ ]Tachy  [ ]Garret [ ]Murmur [ ]Other  GASTROINTESTINAL:  [ ]Soft  [ ]Distended   [ ]+BS  [ ]Non tender [ ]Tender  [ ]Other [ ]PEG [ ]OGT/ NGT  Last BM:  GENITOURINARY:  [x ]Normal [ ] Incontinent   [ ]Oliguria/Anuria   [ ]Davis  MUSCULOSKELETAL:   [x ]Normal   [ ]Weakness  [ ]Bed/Wheelchair bound [ ]Edema  NEUROLOGIC:   [x ]No focal deficits  [ ]Cognitive impairment  [ ]Dysphagia [ ]Dysarthria [ ]Paresis [ ]Other   SKIN:   [ ]Normal  [ ]Rash  [ ]Other  [ ]Pressure ulcer(s)       Present on admission [ ]y [ ]n    CRITICAL CARE:  [ ] Shock Present  [ ]Septic [ ]Cardiogenic [ ]Neurologic [ ]Hypovolemic  [ ]  Vasopressors [ ]  Inotropes   [ ]Respiratory failure present [ ]Mechanical ventilation [ ]Non-invasive ventilatory support [ ]High flow    [ ]Acute  [ ]Chronic [ ]Hypoxic  [ ]Hypercarbic [ ]Other  [ ]Other organ failure     LABS:                        9.0    3.79  )-----------( 188      ( 2022 07:32 )             28.7   11-18    139  |  107  |  10  ----------------------------<  166<H>  3.7   |  19<L>  |  0.53    Ca    9.3      2022 07:16  Phos  3.5       Mg     1.9         TPro  6.8  /  Alb  4.1  /  TBili  0.3  /  DBili  x   /  AST  31  /  ALT  29  /  AlkPhos  92  11-18  PT/INR - ( 2022 07:29 )   PT: 13.3 sec;   INR: 1.14 ratio         PTT - ( 2022 07:29 )  PTT:28.1 sec    Urinalysis Basic - ( 2022 17:21 )    Color: Light Yellow / Appearance: Slightly Turbid / S.017 / pH: x  Gluc: x / Ketone: Trace  / Bili: Negative / Urobili: Negative   Blood: x / Protein: Trace / Nitrite: Negative   Leuk Esterase: Small / RBC: 1 /hpf / WBC 16 /HPF   Sq Epi: x / Non Sq Epi: 15 /hpf / Bacteria: Negative      RADIOLOGY & ADDITIONAL STUDIES:    PROTEIN CALORIE MALNUTRITION PRESENT: [ ]mild [ ]moderate [ ]severe [ ]underweight [ ]morbid obesity  https://www.andeal.org/vault/2440/web/files/ONC/Table_Clinical%20Characteristics%20to%20Document%20Malnutrition-White%20JV%20et%20al%603191.pdf    Height (cm): 158 (22 @ 10:28), 156.69 (10-24-22 @ 10:00), 157.5 (10-11-22 @ 23:35)  Weight (kg): 80.3 (22 @ 10:28), 78 (22 @ 15:07), 79.6 (10-11-22 @ 23:35)  BMI (kg/m2): 32.2 (22 @ 10:28), 31.8 (22 @ 15:07), 32.4 (10-24-22 @ 10:00)    [ ]PPSV2 < or = to 30% [ ]significant weight loss  [ ]poor nutritional intake  [ ]anasarca[ ]Artificial Nutrition      Other REFERRALS:  [ ]Hospice  [ ]Child Life  [ ]Social Work  [ ]Case management [ ]Holistic Therapy     Goals of Care Document:

## 2022-11-18 NOTE — ADVANCED PRACTICE NURSE CONSULT - RECOMMEDATIONS
continue all present meds and hydration. continue anti emetics , give the rest of the remaining Mesna as per protocol . 6hrs post cytoxan will be drawn . will continue to monitor.

## 2022-11-18 NOTE — ADVANCED PRACTICE NURSE CONSULT - REASON FOR CONSULT
pt. was admitted for high dose chemotheraphy followed by auto transplant . today is first day of cytoxan and EKG was done by Day shift , urine SG was done and cytoxan was  checked and re -checked with second RN as per protocol. Mesna 964 mgs IV  was also started 15mins prior to cytoxan. and a total of 5 doses will be given to pt.  pt. was admitted for high dose chemotheraphy followed by auto transplant . today is first day of cytoxan and EKG was done by Day shift , urine SG was done and cytoxan was  checked and re -checked with second RN as per protocol. Mesna 964 mgs IV  was also started 15mins prior to cytoxan. and a total of 5 doses will be given to pt. as per protocol

## 2022-11-18 NOTE — PROGRESS NOTE ADULT - SUBJECTIVE AND OBJECTIVE BOX
HPC Transplant Team                                                      Critical / Counseling Time Provided: 30 minutes                                                                                                                                                        Chief Complaint: Autologous peripheral blood stem cell transplant with high dose CBV prep regimen for treatment of peripheral T cell lymphoma    S: Patient seen and examined with HPC Transplant Team:   No complaints today   All other ROS negative     O: Vitals:   Vital Signs Last 24 Hrs  T(C): 36.2 (18 Nov 2022 06:08), Max: 37.3 (17 Nov 2022 17:21)  T(F): 97.2 (18 Nov 2022 06:08), Max: 99.1 (17 Nov 2022 17:21)  HR: 92 (18 Nov 2022 06:08) (76 - 92)  BP: 117/78 (18 Nov 2022 06:08) (101/64 - 119/76)  BP(mean): --  RR: 18 (18 Nov 2022 06:08) (18 - 18)  SpO2: 98% (18 Nov 2022 06:08) (96% - 100%)    Parameters below as of 17 Nov 2022 21:10  Patient On (Oxygen Delivery Method): room air    Admit weight: 80.3kg   Daily Height in cm: 158 (16 Nov 2022 10:28)    Today's weight: 81.4kg     Intake / Output:   11-17 @ 07:01  -  11-18 @ 07:00  --------------------------------------------------------  IN: 8742 mL / OUT: 8550 mL / NET: 192 mL      PE:   Oropharynx: no erythema or ulcerations  Oral Mucositis:              -                                          Grade: n/a   CVS: S1, S2 RRR   Lungs: CTA throughout bilaterally   Abdomen: + BS x 4, soft, NT, ND   Extremities: no edema   Gastric Mucositis:       -                                           Grade: n/a   Intestinal Mucositis:     -                                         Grade: n/a   Skin: no rash   TLC: CDI   Neuro: A&O x 3   Pain: 0      Labs:                         Cultures:         Radiology:       Meds:   Antimicrobials:   clotrimazole Lozenge 1 Lozenge Oral five times a day  fluconAZOLE   Tablet 400 milliGRAM(s) Oral daily  trimethoprim  160 mG/sulfamethoxazole 800 mG 1 Tablet(s) Oral every 12 hours      Heme / Onc:   heparin  Infusion 334 Unit(s)/Hr IV Continuous <Continuous>      GI:  pantoprazole    Tablet 40 milliGRAM(s) Oral before breakfast  polyethylene glycol 3350 17 Gram(s) Oral daily PRN  senna 2 Tablet(s) Oral at bedtime PRN  sodium bicarbonate Mouth Rinse 10 milliLiter(s) Swish and Spit five times a day  ursodiol Capsule 300 milliGRAM(s) Oral two times a day    Cardiovascular:   furosemide   Injectable 20 milliGRAM(s) IV Push every 24 hours    Other medications:   acetaminophen     Tablet .. 650 milliGRAM(s) Oral every 6 hours  aprepitant 80 milliGRAM(s) Oral every 24 hours  Biotene Dry Mouth Oral Rinse 5 milliLiter(s) Swish and Spit five times a day  chlorhexidine 4% Liquid 1 Application(s) Topical <User Schedule>  dexAMETHasone  IVPB 10 milliGRAM(s) IV Intermittent every 24 hours  FLUoxetine 20 milliGRAM(s) Oral daily  folic acid 1 milliGRAM(s) Oral daily  loratadine 10 milliGRAM(s) Oral daily  LORazepam   Injectable 1 milliGRAM(s) IV Push daily  multivitamin 1 Tablet(s) Oral daily  nystatin Powder 1 Application(s) Topical two times a day  ondansetron Injectable 8 milliGRAM(s) IV Push every 8 hours  sodium chloride 0.9% 1000 milliLiter(s) IV Continuous <Continuous>  sodium chloride 0.9%. 1000 milliLiter(s) IV Continuous <Continuous>    PRN:   acetaminophen     Tablet .. 650 milliGRAM(s) Oral every 6 hours PRN  LORazepam   Injectable 1 milliGRAM(s) IV Push every 6 hours PRN  metoclopramide Injectable 10 milliGRAM(s) IV Push every 6 hours PRN  polyethylene glycol 3350 17 Gram(s) Oral daily PRN  senna 2 Tablet(s) Oral at bedtime PRN  sodium chloride 0.9% lock flush 10 milliLiter(s) IV Push every 1 hour PRN    A/P:  57 year old female with a history of  peripheral T cell lymphoma   Pre  :  Autologous PBSCT day - 7  11/17-continue -16 24 hour continuous infusion, strict I&O, daily weights, prn diuresis     1. Infectious Disease:   clotrimazole Lozenge 1 Lozenge Oral five times a day  fluconAZOLE   Tablet 400 milliGRAM(s) Oral daily  trimethoprim  160 mG/sulfamethoxazole 800 mG 1 Tablet(s) Oral every 12 hours    2. VOD Prophylaxis: Actigall, Glutamine, Heparin (dosed at 100 units / kg / day)     3. GI Prophylaxis:   pantoprazole    Tablet 40 milliGRAM(s) Oral before breakfast    4. Mouthcare - NS / NaHCO3 rinses, Mycelex, Biotene; Skin care     5. GVHD prophylaxis - n/a     6. Transfuse & replete electrolytes prn   potassium chloride  20 mEq/100 mL IVPB 20 milliEquivalent(s) IV Intermittent every 2 hours    7. IV hydration, daily weights, strict I&O, prn diuresis   Lasix 40mg IV x 1 0800  Lasix 40mg IV x 1 1600    8. PO intake as tolerated, nutrition follow up as needed, MVI, folic acid     9. Antiemetics, anti-diarrhea medications:   LORazepam   Injectable 1 milliGRAM(s) IV Push every 6 hours PRN  metoclopramide Injectable 10 milliGRAM(s) IV Push every 6 hours PRN  LORazepam   Injectable 1 milliGRAM(s) IV Push daily  ondansetron Injectable 8 milliGRAM(s) IV Push every 8 hours  dexAMETHasone  IVPB 10 milliGRAM(s) IV Intermittent every 24 hours    10. OOB as tolerated, physical therapy consult if needed     11. Monitor coags / fibrinogen 2x week, vitamin K as needed     12. Monitor closely for clinical changes, monitor for fevers     13. Emotional support provided, plan of care discussed and questions addressed     14. Patient education done regarding chemotherapy prep, plan of care, restrictions and discharge planning     15. Continue regular social work input       I have written the above note for Dr. Cabrales who performed service with me in the room.   Maday Becker  NP-C ([phone])    I have seen and examined patient with NP, I agree with above note as scribed.                    HPC Transplant Team                                                      Critical / Counseling Time Provided: 30 minutes                                                                                                                                                        Chief Complaint: Autologous peripheral blood stem cell transplant with high dose CBV prep regimen for treatment of peripheral T cell lymphoma    S: Patient seen and examined with HPC Transplant Team:   No complaints today   All other ROS negative     O: Vitals:   Vital Signs Last 24 Hrs  T(C): 36.2 (18 Nov 2022 06:08), Max: 37.3 (17 Nov 2022 17:21)  T(F): 97.2 (18 Nov 2022 06:08), Max: 99.1 (17 Nov 2022 17:21)  HR: 92 (18 Nov 2022 06:08) (76 - 92)  BP: 117/78 (18 Nov 2022 06:08) (101/64 - 119/76)  BP(mean): --  RR: 18 (18 Nov 2022 06:08) (18 - 18)  SpO2: 98% (18 Nov 2022 06:08) (96% - 100%)    Parameters below as of 17 Nov 2022 21:10  Patient On (Oxygen Delivery Method): room air    Admit weight: 80.3kg   Daily Height in cm: 158 (16 Nov 2022 10:28)    Today's weight: 81.4kg     Intake / Output:   11-17 @ 07:01  -  11-18 @ 07:00  --------------------------------------------------------  IN: 8742 mL / OUT: 8550 mL / NET: 192 mL      PE:   Oropharynx: no erythema or ulcerations  Oral Mucositis:              -                                          Grade: n/a   CVS: S1, S2 RRR   Lungs: CTA throughout bilaterally   Abdomen: + BS x 4, soft, NT, ND   Extremities: no edema   Gastric Mucositis:       -                                           Grade: n/a   Intestinal Mucositis:     -                                         Grade: n/a   Skin: no rash   TLC: CDI   Neuro: A&O x 3   Pain: 0      Labs:                         Cultures:         Radiology:       Meds:   Antimicrobials:   clotrimazole Lozenge 1 Lozenge Oral five times a day  fluconAZOLE   Tablet 400 milliGRAM(s) Oral daily  trimethoprim  160 mG/sulfamethoxazole 800 mG 1 Tablet(s) Oral every 12 hours      Heme / Onc:   heparin  Infusion 334 Unit(s)/Hr IV Continuous <Continuous>      GI:  pantoprazole    Tablet 40 milliGRAM(s) Oral before breakfast  polyethylene glycol 3350 17 Gram(s) Oral daily PRN  senna 2 Tablet(s) Oral at bedtime PRN  sodium bicarbonate Mouth Rinse 10 milliLiter(s) Swish and Spit five times a day  ursodiol Capsule 300 milliGRAM(s) Oral two times a day    Cardiovascular:   furosemide   Injectable 20 milliGRAM(s) IV Push every 24 hours    Other medications:   acetaminophen     Tablet .. 650 milliGRAM(s) Oral every 6 hours  aprepitant 80 milliGRAM(s) Oral every 24 hours  Biotene Dry Mouth Oral Rinse 5 milliLiter(s) Swish and Spit five times a day  chlorhexidine 4% Liquid 1 Application(s) Topical <User Schedule>  dexAMETHasone  IVPB 10 milliGRAM(s) IV Intermittent every 24 hours  FLUoxetine 20 milliGRAM(s) Oral daily  folic acid 1 milliGRAM(s) Oral daily  loratadine 10 milliGRAM(s) Oral daily  LORazepam   Injectable 1 milliGRAM(s) IV Push daily  multivitamin 1 Tablet(s) Oral daily  nystatin Powder 1 Application(s) Topical two times a day  ondansetron Injectable 8 milliGRAM(s) IV Push every 8 hours  sodium chloride 0.9% 1000 milliLiter(s) IV Continuous <Continuous>  sodium chloride 0.9%. 1000 milliLiter(s) IV Continuous <Continuous>    PRN:   acetaminophen     Tablet .. 650 milliGRAM(s) Oral every 6 hours PRN  LORazepam   Injectable 1 milliGRAM(s) IV Push every 6 hours PRN  metoclopramide Injectable 10 milliGRAM(s) IV Push every 6 hours PRN  polyethylene glycol 3350 17 Gram(s) Oral daily PRN  senna 2 Tablet(s) Oral at bedtime PRN  sodium chloride 0.9% lock flush 10 milliLiter(s) IV Push every 1 hour PRN    A/P:  57 year old female with a history of  peripheral T cell lymphoma   Pre  :  Autologous PBSCT day - 7  11/17-continue -16 24 hour continuous infusion, strict I&O, daily weights, prn diuresis     1. Infectious Disease:   clotrimazole Lozenge 1 Lozenge Oral five times a day  fluconAZOLE   Tablet 400 milliGRAM(s) Oral daily  trimethoprim  160 mG/sulfamethoxazole 800 mG 1 Tablet(s) Oral every 12 hours    2. VOD Prophylaxis: Actigall, Glutamine, Heparin (dosed at 100 units / kg / day)     3. GI Prophylaxis:   pantoprazole    Tablet 40 milliGRAM(s) Oral before breakfast    4. Mouthcare - NS / NaHCO3 rinses, Mycelex, Biotene; Skin care     5. GVHD prophylaxis - n/a     6. Transfuse & replete electrolytes prn   potassium chloride  20 mEq/100 mL IVPB 20 milliEquivalent(s) IV Intermittent every 2 hours    7. IV hydration, daily weights, strict I&O, prn diuresis   Lasix 40mg IV x 1 0800  Lasix 40mg IV x 1 1600    8. PO intake as tolerated, nutrition follow up as needed, MVI, folic acid     9. Antiemetics, anti-diarrhea medications:   LORazepam   Injectable 1 milliGRAM(s) IV Push every 6 hours PRN  metoclopramide Injectable 10 milliGRAM(s) IV Push every 6 hours PRN  LORazepam   Injectable 1 milliGRAM(s) IV Push daily  ondansetron Injectable 8 milliGRAM(s) IV Push every 8 hours  dexAMETHasone  IVPB 10 milliGRAM(s) IV Intermittent every 24 hours    10. OOB as tolerated, physical therapy consult if needed     11. Monitor coags / fibrinogen 2x week, vitamin K as needed     12. Monitor closely for clinical changes, monitor for fevers     13. Emotional support provided, plan of care discussed and questions addressed     14. Patient education done regarding chemotherapy prep, plan of care, restrictions and discharge planning     15. Continue regular social work input     I have written the above note for Dr. Acevedo who performed service with me in the room.   Maday Becker  NP-C ([phone])    I have seen and examined patient with NP, I agree with above note as scribed.                    HPC Transplant Team                                                      Critical / Counseling Time Provided: 30 minutes                                                                                                                                                        Chief Complaint: Autologous peripheral blood stem cell transplant with high dose CBV prep regimen for treatment of peripheral T cell lymphoma    S: Patient seen and examined with HPC Transplant Team:   No complaints today   All other ROS negative     O: Vitals:   Vital Signs Last 24 Hrs  T(C): 36.2 (18 Nov 2022 06:08), Max: 37.3 (17 Nov 2022 17:21)  T(F): 97.2 (18 Nov 2022 06:08), Max: 99.1 (17 Nov 2022 17:21)  HR: 92 (18 Nov 2022 06:08) (76 - 92)  BP: 117/78 (18 Nov 2022 06:08) (101/64 - 119/76)  BP(mean): --  RR: 18 (18 Nov 2022 06:08) (18 - 18)  SpO2: 98% (18 Nov 2022 06:08) (96% - 100%)    Parameters below as of 17 Nov 2022 21:10  Patient On (Oxygen Delivery Method): room air    Admit weight: 80.3kg   Today's weight: 80.6 kg     Intake / Output:   11-17 @ 07:01  -  11-18 @ 07:00  --------------------------------------------------------  IN: 8742 mL / OUT: 8550 mL / NET: 192 mL    PE:   Oropharynx: no erythema or ulcerations  Oral Mucositis:              -                                          Grade: n/a   CVS: S1, S2 RRR   Lungs: CTA throughout bilaterally   Abdomen: + BS x 4, soft, NT, ND   Extremities: no edema   Gastric Mucositis:       -                                           Grade: n/a   Intestinal Mucositis:     -                                         Grade: n/a   Skin: no rash   TLC: CDI   Neuro: A&O x 3   Pain: 0    Labs:                        9.0    3.79  )-----------( 188      ( 18 Nov 2022 07:32 )             28.7     Mean Cell Volume : 97.0 fl  Mean Cell Hemoglobin : 30.4 pg  Mean Cell Hemoglobin Concentration : 31.4 gm/dL  Auto Neutrophil # : 3.62 K/uL  Auto Lymphocyte # : 0.09 K/uL  Auto Monocyte # : 0.05 K/uL  Auto Eosinophil # : 0.00 K/uL  Auto Basophil # : 0.00 K/uL  Auto Neutrophil % : 95.5 %  Auto Lymphocyte % : 2.4 %  Auto Monocyte % : 1.3 %  Auto Eosinophil % : 0.0 %  Auto Basophil % : 0.0 %    11-18    139  |  107  |  10  ----------------------------<  166<H>  3.7   |  19<L>  |  0.53    Ca    9.3      18 Nov 2022 07:16  Phos  3.5     11-18  Mg     1.9     11-18    TPro  6.8  /  Alb  4.1  /  TBili  0.3  /  DBili  x   /  AST  31  /  ALT  29  /  AlkPhos  92  11-18  Mg 1.9  Phos 3.5  PT/INR - ( 17 Nov 2022 07:29 )   PT: 13.3 sec;   INR: 1.14 ratio    PTT - ( 17 Nov 2022 07:29 )  PTT:28.1 sec    Uric Acid --        Cultures:   NA    Radiology:   NA    Meds:   Antimicrobials:   clotrimazole Lozenge 1 Lozenge Oral five times a day  fluconAZOLE   Tablet 400 milliGRAM(s) Oral daily  trimethoprim  160 mG/sulfamethoxazole 800 mG 1 Tablet(s) Oral every 12 hours    Heme / Onc:   heparin  Infusion 334 Unit(s)/Hr IV Continuous <Continuous>    GI:  pantoprazole    Tablet 40 milliGRAM(s) Oral before breakfast  polyethylene glycol 3350 17 Gram(s) Oral daily PRN  senna 2 Tablet(s) Oral at bedtime PRN  sodium bicarbonate Mouth Rinse 10 milliLiter(s) Swish and Spit five times a day  ursodiol Capsule 300 milliGRAM(s) Oral two times a day    Cardiovascular:   furosemide   Injectable 20 milliGRAM(s) IV Push every 24 hours    Other medications:   acetaminophen     Tablet .. 650 milliGRAM(s) Oral every 6 hours  aprepitant 80 milliGRAM(s) Oral every 24 hours  Biotene Dry Mouth Oral Rinse 5 milliLiter(s) Swish and Spit five times a day  chlorhexidine 4% Liquid 1 Application(s) Topical <User Schedule>  dexAMETHasone  IVPB 10 milliGRAM(s) IV Intermittent every 24 hours  FLUoxetine 20 milliGRAM(s) Oral daily  folic acid 1 milliGRAM(s) Oral daily  loratadine 10 milliGRAM(s) Oral daily  LORazepam   Injectable 1 milliGRAM(s) IV Push daily  multivitamin 1 Tablet(s) Oral daily  nystatin Powder 1 Application(s) Topical two times a day  ondansetron Injectable 8 milliGRAM(s) IV Push every 8 hours  sodium chloride 0.9% 1000 milliLiter(s) IV Continuous <Continuous>  sodium chloride 0.9%. 1000 milliLiter(s) IV Continuous <Continuous>    PRN:   acetaminophen     Tablet .. 650 milliGRAM(s) Oral every 6 hours PRN  LORazepam   Injectable 1 milliGRAM(s) IV Push every 6 hours PRN  metoclopramide Injectable 10 milliGRAM(s) IV Push every 6 hours PRN  polyethylene glycol 3350 17 Gram(s) Oral daily PRN  senna 2 Tablet(s) Oral at bedtime PRN  sodium chloride 0.9% lock flush 10 milliLiter(s) IV Push every 1 hour PRN    A/P:  57 year old female with a history of  peripheral T cell lymphoma   Pre  :  Autologous PBSCT day - 7  11/17- Completed  -16 24 hour continuous infusion, strict I&O, daily weights, prn diuresis   11/18 - Cytoxan day 1/4.    1. Infectious Disease:   clotrimazole Lozenge 1 Lozenge Oral five times a day  fluconAZOLE   Tablet 400 milliGRAM(s) Oral daily  trimethoprim  160 mG/sulfamethoxazole 800 mG 1 Tablet(s) Oral every 12 hours    2. VOD Prophylaxis: Actigall, Glutamine, Heparin (dosed at 100 units / kg / day)     3. GI Prophylaxis:   pantoprazole    Tablet 40 milliGRAM(s) Oral before breakfast    4. Mouthcare - NS / NaHCO3 rinses, Mycelex, Biotene; Skin care     5. GVHD prophylaxis - n/a     6. Transfuse & replete electrolytes prn   potassium chloride  20 mEq/100 mL IVPB 20 milliEquivalent(s) IV Intermittent every 2 hours    7. IV hydration, daily weights, strict I&O, prn diuresis   Lasix 40mg IV x 1 0800  Lasix 40mg IV x 1 1600    8. PO intake as tolerated, nutrition follow up as needed, MVI, folic acid     9. Antiemetics, anti-diarrhea medications:   LORazepam   Injectable 1 milliGRAM(s) IV Push every 6 hours PRN  metoclopramide Injectable 10 milliGRAM(s) IV Push every 6 hours PRN  LORazepam   Injectable 1 milliGRAM(s) IV Push daily  ondansetron Injectable 8 milliGRAM(s) IV Push every 8 hours  dexAMETHasone  IVPB 10 milliGRAM(s) IV Intermittent every 24 hours    10. OOB as tolerated, physical therapy consult if needed     11. Monitor coags / fibrinogen 2x week, vitamin K as needed     12. Monitor closely for clinical changes, monitor for fevers     13. Emotional support provided, plan of care discussed and questions addressed     14. Patient education done regarding chemotherapy prep, plan of care, restrictions and discharge planning     15. Continue regular social work input     I have written the above note for Dr. Acevedo who performed service with me in the room.   Maday Becker  NP-C ([phone])    I have seen and examined patient with NP, I agree with above note as scribed.

## 2022-11-18 NOTE — CONSULT NOTE ADULT - PROBLEM SELECTOR RECOMMENDATION 9
Predominant symptom: Fatigue  Likely due to chemo  Degree of control: Fair control  Relative to previous day: comparable  Current treatment regimen: n/a  Recommendations: ambulate as tolerated  Risk mitigation: n/a  Adverse events noted: none

## 2022-11-18 NOTE — CONSULT NOTE ADULT - PROBLEM SELECTOR RECOMMENDATION 2
IV chemotherapy as part of stem cell transplant process  Continue to monitor for known adverse effects/symptoms  Risk of infectious complications given anticipated impact on hematopoietic lineages and resultant immune compromise  PPx/Tx per primary team  Relevant factors: n/a

## 2022-11-18 NOTE — PROGRESS NOTE ADULT - CRITICAL CARE ATTENDING COMMENT
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     Problem/Plan -   1:  Problem: Stem cell transplant   ·  Plan: 1. Admit to BMTU   2. TLC placement in IR   3. Day -9 - day -2 - antiemetics   4. Day -9 & day -8, VP16 2400mg/m2 IV x 34 hours (final concentration 0.4mg /mL).   5. Strict I&O, daily weights, prn diuresis   6. After completion of VP16- start CTX hydration (D51/2NS + 10mEq KCl @ 150ml / m2) - continue for 24 hours post infusion of CTX   7. Day -7 - day -4 - CTX 1800 mg / m2 daily over 2 hours. Follow SMA7, mag, phos 6hours post CT . Mesna  12mg / kg - hemorrhagic cystitis ppx   8. Day -3 - BCNU 400mg / m2   9. Day -2 - day -1 - rest days  10. Day 0 - HPC transplant. Start Zarxio 480mcg SQ QD, continue through engraftment. Kepivance on days 0, 1 & 2   11. Anxiolytics, antinausea, antidiarrhea medications as needed   12. Lasix PRN while being aggressively hydrated to avoid VOD   13. Nutritional support, pain management.    Problem/Plan - 2:  ·  Problem: Need for prophylactic measure.   ·  Plan: 1. VOD prophylaxis - low dose heparin gtt (dosed at 100 units / kg / day), glutamine supplementation, Actigall BID   2. PCP prophylaxis - Bactrim DS through day -2    3. Antiviral prophylaxis - Acyclovir 400mg po TID to start day -1   4. Antifungal prophylaxis- Diflucan 400 mg po daily.  5. GI prophylaxis - Protonix po QD   6. Antibacterial prophylaxis - when ANC < 500, start Cipro 500mg po BID. If becomes febrile, pan cx, CXR and change Cipro to Cefepime 2g IV q 8 hours. Continue until count recovery  7. Kepivance for prevention of mucositis- days 0, +1, +2  8. Aggressive mouth care and skin care as per protocol.  9. transfusion and electrolyte support 57 year old female with peripheral T cell lymphoma s/p CHOEP x 6 admitted for autologous pbsct with high dose CBV prep regimen.   Day -7..no issues  Problem/Plan -   1:  Problem: Stem cell transplant   ·  Plan: 1. Admit to BMTU   2. TLC placement in IR   3. Day -9 - day -2 - antiemetics   4. Day -9 & day -8, VP16 2400mg/m2 IV x 34 hours (final concentration 0.4mg /mL).   5. Strict I&O, daily weights, prn diuresis   6. After completion of VP16- start CTX hydration (D51/2NS + 10mEq KCl @ 150ml / m2) - continue for 24 hours post infusion of CTX   7. Day -7 - day -4 - CTX 1800 mg / m2 daily over 2 hours. Follow SMA7, mag, phos 6hours post CT . Mesna  12mg / kg - hemorrhagic cystitis ppx   8. Day -3 - BCNU 400mg / m2   9. Day -2 - day -1 - rest days  10. Day 0 - HPC transplant. Start Zarxio 480mcg SQ QD, continue through engraftment. Kepivance on days 0, 1 & 2   11. Anxiolytics, antinausea, antidiarrhea medications as needed   12. Lasix PRN while being aggressively hydrated to avoid VOD   13. Nutritional support, pain management.    Problem/Plan - 2:  ·  Problem: Need for prophylactic measure.   ·  Plan: 1. VOD prophylaxis - low dose heparin gtt (dosed at 100 units / kg / day), glutamine supplementation, Actigall BID   2. PCP prophylaxis - Bactrim DS through day -2    3. Antiviral prophylaxis - Acyclovir 400mg po TID to start day -1   4. Antifungal prophylaxis- Diflucan 400 mg po daily.  5. GI prophylaxis - Protonix po QD   6. Antibacterial prophylaxis - when ANC < 500, start Cipro 500mg po BID. If becomes febrile, pan cx, CXR and change Cipro to Cefepime 2g IV q 8 hours. Continue until count recovery  7. Kepivance for prevention of mucositis- days 0, +1, +2  8. Aggressive mouth care and skin care as per protocol.  9. transfusion and electrolyte support

## 2022-11-18 NOTE — CONSULT NOTE ADULT - PROBLEM SELECTOR RECOMMENDATION 3
Condition: peripheral T cell lymphoma  Previous transplant: none  Active treatment: conditioning regimen for transplant  Transplant Type: autologous  Transplant Day: Day -7  Clinical impact on complexity: Significant

## 2022-11-19 LAB
ALBUMIN SERPL ELPH-MCNC: 4.2 G/DL — SIGNIFICANT CHANGE UP (ref 3.3–5)
ALBUMIN SERPL ELPH-MCNC: 4.2 G/DL — SIGNIFICANT CHANGE UP (ref 3.3–5)
ALP SERPL-CCNC: 80 U/L — SIGNIFICANT CHANGE UP (ref 40–120)
ALP SERPL-CCNC: 83 U/L — SIGNIFICANT CHANGE UP (ref 40–120)
ALT FLD-CCNC: 28 U/L — SIGNIFICANT CHANGE UP (ref 10–45)
ALT FLD-CCNC: 33 U/L — SIGNIFICANT CHANGE UP (ref 10–45)
ANION GAP SERPL CALC-SCNC: 13 MMOL/L — SIGNIFICANT CHANGE UP (ref 5–17)
ANION GAP SERPL CALC-SCNC: 13 MMOL/L — SIGNIFICANT CHANGE UP (ref 5–17)
AST SERPL-CCNC: 21 U/L — SIGNIFICANT CHANGE UP (ref 10–40)
AST SERPL-CCNC: 23 U/L — SIGNIFICANT CHANGE UP (ref 10–40)
BASOPHILS # BLD AUTO: 0 K/UL — SIGNIFICANT CHANGE UP (ref 0–0.2)
BASOPHILS NFR BLD AUTO: 0 % — SIGNIFICANT CHANGE UP (ref 0–2)
BILIRUB SERPL-MCNC: 0.2 MG/DL — SIGNIFICANT CHANGE UP (ref 0.2–1.2)
BILIRUB SERPL-MCNC: 0.3 MG/DL — SIGNIFICANT CHANGE UP (ref 0.2–1.2)
BUN SERPL-MCNC: 13 MG/DL — SIGNIFICANT CHANGE UP (ref 7–23)
BUN SERPL-MCNC: 17 MG/DL — SIGNIFICANT CHANGE UP (ref 7–23)
CALCIUM SERPL-MCNC: 9.1 MG/DL — SIGNIFICANT CHANGE UP (ref 8.4–10.5)
CALCIUM SERPL-MCNC: 9.6 MG/DL — SIGNIFICANT CHANGE UP (ref 8.4–10.5)
CHLORIDE SERPL-SCNC: 101 MMOL/L — SIGNIFICANT CHANGE UP (ref 96–108)
CHLORIDE SERPL-SCNC: 103 MMOL/L — SIGNIFICANT CHANGE UP (ref 96–108)
CO2 SERPL-SCNC: 22 MMOL/L — SIGNIFICANT CHANGE UP (ref 22–31)
CO2 SERPL-SCNC: 23 MMOL/L — SIGNIFICANT CHANGE UP (ref 22–31)
CREAT SERPL-MCNC: 0.47 MG/DL — LOW (ref 0.5–1.3)
CREAT SERPL-MCNC: 0.51 MG/DL — SIGNIFICANT CHANGE UP (ref 0.5–1.3)
EGFR: 110 ML/MIN/1.73M2 — SIGNIFICANT CHANGE UP
EGFR: 112 ML/MIN/1.73M2 — SIGNIFICANT CHANGE UP
EOSINOPHIL # BLD AUTO: 0 K/UL — SIGNIFICANT CHANGE UP (ref 0–0.5)
EOSINOPHIL NFR BLD AUTO: 0 % — SIGNIFICANT CHANGE UP (ref 0–6)
GLUCOSE SERPL-MCNC: 190 MG/DL — HIGH (ref 70–99)
GLUCOSE SERPL-MCNC: 209 MG/DL — HIGH (ref 70–99)
HCT VFR BLD CALC: 28.8 % — LOW (ref 34.5–45)
HGB BLD-MCNC: 9 G/DL — LOW (ref 11.5–15.5)
IMM GRANULOCYTES NFR BLD AUTO: 0.4 % — SIGNIFICANT CHANGE UP (ref 0–0.9)
LDH SERPL L TO P-CCNC: 166 U/L — SIGNIFICANT CHANGE UP (ref 50–242)
LYMPHOCYTES # BLD AUTO: 0.09 K/UL — LOW (ref 1–3.3)
LYMPHOCYTES # BLD AUTO: 3.9 % — LOW (ref 13–44)
MAGNESIUM SERPL-MCNC: 1.9 MG/DL — SIGNIFICANT CHANGE UP (ref 1.6–2.6)
MAGNESIUM SERPL-MCNC: 2 MG/DL — SIGNIFICANT CHANGE UP (ref 1.6–2.6)
MCHC RBC-ENTMCNC: 30.1 PG — SIGNIFICANT CHANGE UP (ref 27–34)
MCHC RBC-ENTMCNC: 31.3 GM/DL — LOW (ref 32–36)
MCV RBC AUTO: 96.3 FL — SIGNIFICANT CHANGE UP (ref 80–100)
MONOCYTES # BLD AUTO: 0.04 K/UL — SIGNIFICANT CHANGE UP (ref 0–0.9)
MONOCYTES NFR BLD AUTO: 1.7 % — LOW (ref 2–14)
NEUTROPHILS # BLD AUTO: 2.18 K/UL — SIGNIFICANT CHANGE UP (ref 1.8–7.4)
NEUTROPHILS NFR BLD AUTO: 94 % — HIGH (ref 43–77)
NRBC # BLD: 0 /100 WBCS — SIGNIFICANT CHANGE UP (ref 0–0)
PHOSPHATE SERPL-MCNC: 2.5 MG/DL — SIGNIFICANT CHANGE UP (ref 2.5–4.5)
PHOSPHATE SERPL-MCNC: 3.5 MG/DL — SIGNIFICANT CHANGE UP (ref 2.5–4.5)
PLATELET # BLD AUTO: 188 K/UL — SIGNIFICANT CHANGE UP (ref 150–400)
POTASSIUM SERPL-MCNC: 3.8 MMOL/L — SIGNIFICANT CHANGE UP (ref 3.5–5.3)
POTASSIUM SERPL-MCNC: 4.2 MMOL/L — SIGNIFICANT CHANGE UP (ref 3.5–5.3)
POTASSIUM SERPL-SCNC: 3.8 MMOL/L — SIGNIFICANT CHANGE UP (ref 3.5–5.3)
POTASSIUM SERPL-SCNC: 4.2 MMOL/L — SIGNIFICANT CHANGE UP (ref 3.5–5.3)
PROT SERPL-MCNC: 6.7 G/DL — SIGNIFICANT CHANGE UP (ref 6–8.3)
PROT SERPL-MCNC: 6.7 G/DL — SIGNIFICANT CHANGE UP (ref 6–8.3)
RBC # BLD: 2.99 M/UL — LOW (ref 3.8–5.2)
RBC # FLD: 13.7 % — SIGNIFICANT CHANGE UP (ref 10.3–14.5)
SODIUM SERPL-SCNC: 136 MMOL/L — SIGNIFICANT CHANGE UP (ref 135–145)
SODIUM SERPL-SCNC: 139 MMOL/L — SIGNIFICANT CHANGE UP (ref 135–145)
SP GR UR STRIP: 1.01 — LOW (ref 1.01–1.02)
WBC # BLD: 2.32 K/UL — LOW (ref 3.8–10.5)
WBC # FLD AUTO: 2.32 K/UL — LOW (ref 3.8–10.5)

## 2022-11-19 PROCEDURE — 93010 ELECTROCARDIOGRAM REPORT: CPT

## 2022-11-19 PROCEDURE — 99291 CRITICAL CARE FIRST HOUR: CPT

## 2022-11-19 RX ORDER — MESNA 100 MG/ML
964 INJECTION, SOLUTION INTRAVENOUS
Refills: 0 | Status: COMPLETED | OUTPATIENT
Start: 2022-11-19 | End: 2022-11-20

## 2022-11-19 RX ADMIN — MESNA 964 MILLIGRAM(S): 100 INJECTION, SOLUTION INTRAVENOUS at 14:25

## 2022-11-19 RX ADMIN — Medication 10 MILLILITER(S): at 12:13

## 2022-11-19 RX ADMIN — MESNA 964 MILLIGRAM(S): 100 INJECTION, SOLUTION INTRAVENOUS at 20:33

## 2022-11-19 RX ADMIN — Medication 5 MILLILITER(S): at 12:13

## 2022-11-19 RX ADMIN — Medication 50 MILLIEQUIVALENT(S): at 10:03

## 2022-11-19 RX ADMIN — FLUCONAZOLE 400 MILLIGRAM(S): 150 TABLET ORAL at 12:12

## 2022-11-19 RX ADMIN — Medication 1 LOZENGE: at 12:13

## 2022-11-19 RX ADMIN — MESNA 964 MILLIGRAM(S): 100 INJECTION, SOLUTION INTRAVENOUS at 03:00

## 2022-11-19 RX ADMIN — LORATADINE 10 MILLIGRAM(S): 10 TABLET ORAL at 12:12

## 2022-11-19 RX ADMIN — ONDANSETRON 8 MILLIGRAM(S): 8 TABLET, FILM COATED ORAL at 05:03

## 2022-11-19 RX ADMIN — NYSTATIN CREAM 1 APPLICATION(S): 100000 CREAM TOPICAL at 05:03

## 2022-11-19 RX ADMIN — CHLORHEXIDINE GLUCONATE 1 APPLICATION(S): 213 SOLUTION TOPICAL at 10:03

## 2022-11-19 RX ADMIN — ONDANSETRON 8 MILLIGRAM(S): 8 TABLET, FILM COATED ORAL at 14:25

## 2022-11-19 RX ADMIN — ONDANSETRON 8 MILLIGRAM(S): 8 TABLET, FILM COATED ORAL at 20:23

## 2022-11-19 RX ADMIN — Medication 5 MILLILITER(S): at 20:16

## 2022-11-19 RX ADMIN — SODIUM CHLORIDE 20 MILLILITER(S): 9 INJECTION INTRAMUSCULAR; INTRAVENOUS; SUBCUTANEOUS at 20:15

## 2022-11-19 RX ADMIN — Medication 1 LOZENGE: at 20:17

## 2022-11-19 RX ADMIN — Medication 5 MILLILITER(S): at 07:48

## 2022-11-19 RX ADMIN — NYSTATIN CREAM 1 APPLICATION(S): 100000 CREAM TOPICAL at 18:27

## 2022-11-19 RX ADMIN — MESNA 964 MILLIGRAM(S): 100 INJECTION, SOLUTION INTRAVENOUS at 17:33

## 2022-11-19 RX ADMIN — Medication 1 MILLIGRAM(S): at 12:12

## 2022-11-19 RX ADMIN — Medication 40 MILLIGRAM(S): at 07:47

## 2022-11-19 RX ADMIN — Medication 1 LOZENGE: at 23:21

## 2022-11-19 RX ADMIN — URSODIOL 300 MILLIGRAM(S): 250 TABLET, FILM COATED ORAL at 17:33

## 2022-11-19 RX ADMIN — Medication 102 MILLIGRAM(S): at 17:33

## 2022-11-19 RX ADMIN — URSODIOL 300 MILLIGRAM(S): 250 TABLET, FILM COATED ORAL at 05:04

## 2022-11-19 RX ADMIN — Medication 10 MILLILITER(S): at 23:21

## 2022-11-19 RX ADMIN — Medication 50 MILLIEQUIVALENT(S): at 12:12

## 2022-11-19 RX ADMIN — Medication 1 LOZENGE: at 07:48

## 2022-11-19 RX ADMIN — Medication 20 MILLIGRAM(S): at 12:12

## 2022-11-19 RX ADMIN — Medication 1 TABLET(S): at 05:04

## 2022-11-19 RX ADMIN — Medication 5 MILLILITER(S): at 23:20

## 2022-11-19 RX ADMIN — HEPARIN SODIUM 3.34 UNIT(S)/HR: 5000 INJECTION INTRAVENOUS; SUBCUTANEOUS at 20:16

## 2022-11-19 RX ADMIN — PANTOPRAZOLE SODIUM 40 MILLIGRAM(S): 20 TABLET, DELAYED RELEASE ORAL at 05:04

## 2022-11-19 RX ADMIN — Medication 1 TABLET(S): at 17:33

## 2022-11-19 RX ADMIN — Medication 50 MILLIEQUIVALENT(S): at 14:28

## 2022-11-19 RX ADMIN — Medication 2898 MILLIGRAM(S): at 14:36

## 2022-11-19 RX ADMIN — Medication 10 MILLILITER(S): at 18:27

## 2022-11-19 RX ADMIN — APREPITANT 80 MILLIGRAM(S): 80 CAPSULE ORAL at 12:13

## 2022-11-19 RX ADMIN — Medication 1 LOZENGE: at 17:34

## 2022-11-19 RX ADMIN — MESNA 964 MILLIGRAM(S): 100 INJECTION, SOLUTION INTRAVENOUS at 23:30

## 2022-11-19 RX ADMIN — Medication 5 MILLILITER(S): at 17:37

## 2022-11-19 RX ADMIN — Medication 10 MILLILITER(S): at 07:47

## 2022-11-19 RX ADMIN — Medication 1 TABLET(S): at 12:12

## 2022-11-19 RX ADMIN — Medication 10 MILLILITER(S): at 20:17

## 2022-11-19 RX ADMIN — SODIUM CHLORIDE 270 MILLILITER(S): 9 INJECTION, SOLUTION INTRAVENOUS at 20:16

## 2022-11-19 RX ADMIN — MESNA 964 MILLIGRAM(S): 100 INJECTION, SOLUTION INTRAVENOUS at 00:00

## 2022-11-19 NOTE — PROGRESS NOTE ADULT - CRITICAL CARE ATTENDING COMMENT
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   Day -7..no issues  Problem/Plan -   1:  Problem: Stem cell transplant   ·  Plan: 1. Admit to BMTU   2. TLC placement in IR   3. Day -9 - day -2 - antiemetics   4. Day -9 & day -8, VP16 2400mg/m2 IV x 34 hours (final concentration 0.4mg /mL).   5. Strict I&O, daily weights, prn diuresis   6. After completion of VP16- start CTX hydration (D51/2NS + 10mEq KCl @ 150ml / m2) - continue for 24 hours post infusion of CTX   7. Day -7 - day -4 - CTX 1800 mg / m2 daily over 2 hours. Follow SMA7, mag, phos 6hours post CT . [place]  12mg / kg - hemorrhagic cystitis ppx   8. Day -3 - BCNU 400mg / m2   9. Day -2 - day -1 - rest days  10. Day 0 - HPC transplant. Start Zarxio 480mcg SQ QD, continue through engraftment. Kepivance on days 0, 1 & 2   11. Anxiolytics, antinausea, antidiarrhea medications as needed   12. Lasix PRN while being aggressively hydrated to avoid VOD   13. Nutritional support, pain management.    Problem/Plan - 2:  ·  Problem: Need for prophylactic measure.   ·  Plan: 1. VOD prophylaxis - low dose heparin gtt (dosed at 100 units / kg / day), glutamine supplementation, Actigall BID   2. PCP prophylaxis - Bactrim DS through day -2    3. Antiviral prophylaxis - Acyclovir 400mg po TID to start day -1   4. Antifungal prophylaxis- Diflucan 400 mg po daily.  5. GI prophylaxis - Protonix po QD   6. Antibacterial prophylaxis - when ANC < 500, start Cipro 500mg po BID. If becomes febrile, pan cx, CXR and change Cipro to Cefepime 2g IV q 8 hours. Continue until count recovery  7. Kepivance for prevention of mucositis- days 0, +1, +2  8. Aggressive mouth care and skin care as per protocol.  9. transfusion and electrolyte support

## 2022-11-19 NOTE — PROGRESS NOTE ADULT - SUBJECTIVE AND OBJECTIVE BOX
HPC Transplant Team                                                      Critical / Counseling Time Provided: 30 minutes                                                                                                                                                        Chief Complaint: Autologous peripheral blood stem cell transplant with high dose CBV prep regimen for treatment of peripheral T cell lymphoma    S: Patient seen and examined with HPC Transplant Team:   No complaints today   All other ROS negative     O: Vitals:   Vital Signs Last 24 Hrs  T(C): 36.8 (19 Nov 2022 05:00), Max: 37.1 (18 Nov 2022 09:05)  T(F): 98.2 (19 Nov 2022 05:00), Max: 98.8 (18 Nov 2022 09:05)  HR: 82 (19 Nov 2022 05:00) (71 - 93)  BP: 118/64 (19 Nov 2022 05:00) (103/60 - 118/64)  BP(mean): --  RR: 18 (19 Nov 2022 05:00) (18 - 19)  SpO2: 98% (19 Nov 2022 05:00) (98% - 100%)    Parameters below as of 19 Nov 2022 05:00  Patient On (Oxygen Delivery Method): room air    Admit weight: 80.3kg   Today's weight: 80.6 kg       Intake / Output:   11-18 @ 07:01  -  11-19 @ 07:00  --------------------------------------------------------  IN: 8185.3 mL / OUT: 9130 mL / NET: -944.7 mL    PE:   Oropharynx: no erythema or ulcerations  Oral Mucositis:              -                                          Grade: n/a   CVS: S1, S2 RRR   Lungs: CTA throughout bilaterally   Abdomen: + BS x 4, soft, NT, ND   Extremities: no edema   Gastric Mucositis:       -                                           Grade: n/a   Intestinal Mucositis:     -                                         Grade: n/a   Skin: no rash   TLC: CDI   Neuro: A&O x 3   Pain: 0    Labs:         Cultures:         Radiology:       Meds:   Antimicrobials:   clotrimazole Lozenge 1 Lozenge Oral five times a day  fluconAZOLE   Tablet 400 milliGRAM(s) Oral daily  trimethoprim  160 mG/sulfamethoxazole 800 mG 1 Tablet(s) Oral every 12 hours      Heme / Onc:   cyclophosphamide IVPB (eMAR) 2898 milliGRAM(s) IV Intermittent every 24 hours  heparin  Infusion 334 Unit(s)/Hr IV Continuous <Continuous>  mesna IVPB (eMAR) 964 milliGRAM(s) IV Intermittent every 3 hours      GI:  pantoprazole    Tablet 40 milliGRAM(s) Oral before breakfast  polyethylene glycol 3350 17 Gram(s) Oral daily PRN  senna 2 Tablet(s) Oral at bedtime PRN  sodium bicarbonate Mouth Rinse 10 milliLiter(s) Swish and Spit five times a day  ursodiol Capsule 300 milliGRAM(s) Oral two times a day      Cardiovascular:   furosemide   Injectable 20 milliGRAM(s) IV Push every 24 hours  furosemide   Injectable 40 milliGRAM(s) IV Push once  furosemide   Injectable 40 milliGRAM(s) IV Push once    Other medications:   acetaminophen     Tablet .. 650 milliGRAM(s) Oral every 6 hours  aprepitant 80 milliGRAM(s) Oral every 24 hours  Biotene Dry Mouth Oral Rinse 5 milliLiter(s) Swish and Spit five times a day  chlorhexidine 4% Liquid 1 Application(s) Topical <User Schedule>  dexAMETHasone  IVPB 10 milliGRAM(s) IV Intermittent every 24 hours  FLUoxetine 20 milliGRAM(s) Oral daily  folic acid 1 milliGRAM(s) Oral daily  loratadine 10 milliGRAM(s) Oral daily  LORazepam   Injectable 1 milliGRAM(s) IV Push daily  multivitamin 1 Tablet(s) Oral daily  nystatin Powder 1 Application(s) Topical two times a day  ondansetron Injectable 8 milliGRAM(s) IV Push every 8 hours  potassium chloride  20 mEq/100 mL IVPB 20 milliEquivalent(s) IV Intermittent every 2 hours  sodium chloride 0.9% 1000 milliLiter(s) IV Continuous <Continuous>  sodium chloride 0.9%. 1000 milliLiter(s) IV Continuous <Continuous>    PRN:   acetaminophen     Tablet .. 650 milliGRAM(s) Oral every 6 hours PRN  AQUAPHOR (petrolatum Ointment) 1 Application(s) Topical two times a day PRN  LORazepam   Injectable 1 milliGRAM(s) IV Push every 6 hours PRN  metoclopramide Injectable 10 milliGRAM(s) IV Push every 6 hours PRN  polyethylene glycol 3350 17 Gram(s) Oral daily PRN  senna 2 Tablet(s) Oral at bedtime PRN  sodium chloride 0.9% lock flush 10 milliLiter(s) IV Push every 1 hour PRN    A/P:    57 year old female with a history of  peripheral T cell lymphoma   Pre  :  Autologous PBSCT day - 6  11/17- Completed  -16 24 hour continuous infusion, strict I&O, daily weights, prn diuresis   11/18 - Cytoxan day 1/4.    1. Infectious Disease:   clotrimazole Lozenge 1 Lozenge Oral five times a day  fluconAZOLE   Tablet 400 milliGRAM(s) Oral daily  trimethoprim  160 mG/sulfamethoxazole 800 mG 1 Tablet(s) Oral every 12 hours    2. VOD Prophylaxis: Actigall, Glutamine, Heparin (dosed at 100 units / kg / day)     3. GI Prophylaxis:   pantoprazole    Tablet 40 milliGRAM(s) Oral before breakfast    4. Mouthcare - NS / NaHCO3 rinses, Mycelex, Biotene; Skin care     5. GVHD prophylaxis - n/a     6. Transfuse & replete electrolytes prn   potassium chloride  20 mEq/100 mL IVPB 20 milliEquivalent(s) IV Intermittent every 2 hours    7. IV hydration, daily weights, strict I&O, prn diuresis   Lasix 40mg IV x 1 0800  Lasix 40mg IV x 1 1600    8. PO intake as tolerated, nutrition follow up as needed, MVI, folic acid     9. Antiemetics, anti-diarrhea medications:   LORazepam   Injectable 1 milliGRAM(s) IV Push every 6 hours PRN  metoclopramide Injectable 10 milliGRAM(s) IV Push every 6 hours PRN  LORazepam   Injectable 1 milliGRAM(s) IV Push daily  ondansetron Injectable 8 milliGRAM(s) IV Push every 8 hours  dexAMETHasone  IVPB 10 milliGRAM(s) IV Intermittent every 24 hours    10. OOB as tolerated, physical therapy consult if needed     11. Monitor coags / fibrinogen 2x week, vitamin K as needed     12. Monitor closely for clinical changes, monitor for fevers     13. Emotional support provided, plan of care discussed and questions addressed     14. Patient education done regarding chemotherapy prep, plan of care, restrictions and discharge planning     15. Continue regular social work input     I have written the above note for Dr. Acevedo who performed service with me in the room.   Maday Becker  NP-C ([phone])    I have seen and examined patient with NP, I agree with above note as scribed.                    HPC Transplant Team                                                      Critical / Counseling Time Provided: 30 minutes                                                                                                                                                        Chief Complaint: Autologous peripheral blood stem cell transplant with high dose CBV prep regimen for treatment of peripheral T cell lymphoma    S: Patient seen and examined with HPC Transplant Team:   No complaints today.    All other ROS negative     O: Vitals:   Vital Signs Last 24 Hrs  T(C): 36.8 (19 Nov 2022 05:00), Max: 37.1 (18 Nov 2022 09:05)  T(F): 98.2 (19 Nov 2022 05:00), Max: 98.8 (18 Nov 2022 09:05)  HR: 82 (19 Nov 2022 05:00) (71 - 93)  BP: 118/64 (19 Nov 2022 05:00) (103/60 - 118/64)  BP(mean): --  RR: 18 (19 Nov 2022 05:00) (18 - 19)  SpO2: 98% (19 Nov 2022 05:00) (98% - 100%)    Parameters below as of 19 Nov 2022 05:00  Patient On (Oxygen Delivery Method): room air    Admit weight: 80.3kg   Today's weight: 78.8 kg    Intake / Output:   11-18 @ 07:01  -  11-19 @ 07:00  --------------------------------------------------------  IN: 8185.3 mL / OUT: 9130 mL / NET: -944.7 mL    PE:   Oropharynx: no erythema or ulcerations  Oral Mucositis:              -                                          Grade: n/a   CVS: S1, S2 RRR   Lungs: CTA throughout bilaterally   Abdomen: + BS x 4, soft, NT, ND   Extremities: no edema   Gastric Mucositis:       -                                           Grade: n/a   Intestinal Mucositis:     -                                         Grade: n/a   Skin: no rash, flushed face, neck, UE  TLC: CDI   Neuro: A&O x 3   Pain: Denies    Labs:                        9.0    2.32  )-----------( 188      ( 19 Nov 2022 07:21 )             28.8     Mean Cell Volume : 96.3 fl  Mean Cell Hemoglobin : 30.1 pg  Mean Cell Hemoglobin Concentration : 31.3 gm/dL  Auto Neutrophil # : 2.18 K/uL  Auto Lymphocyte # : 0.09 K/uL  Auto Monocyte # : 0.04 K/uL  Auto Eosinophil # : 0.00 K/uL  Auto Basophil # : 0.00 K/uL  Auto Neutrophil % : 94.0 %  Auto Lymphocyte % : 3.9 %  Auto Monocyte % : 1.7 %  Auto Eosinophil % : 0.0 %  Auto Basophil % : 0.0 %    11-19    139  |  103  |  13  ----------------------------<  190<H>  3.8   |  23  |  0.51    Ca    9.6      19 Nov 2022 07:20  Phos  3.5     11-19  Mg     1.9     11-19    TPro  6.7  /  Alb  4.2  /  TBili  0.3  /  DBili  x   /  AST  23  /  ALT  33  /  AlkPhos  83  11-19    Mg 1.9  Phos 3.5  Mg 1.8  Phos 3.2    Uric Acid --    Cultures:   NA    Radiology:   NA    Meds:   Antimicrobials:   clotrimazole Lozenge 1 Lozenge Oral five times a day  fluconAZOLE   Tablet 400 milliGRAM(s) Oral daily  trimethoprim  160 mG/sulfamethoxazole 800 mG 1 Tablet(s) Oral every 12 hours    Heme / Onc:   cyclophosphamide IVPB (eMAR) 2898 milliGRAM(s) IV Intermittent every 24 hours  heparin  Infusion 334 Unit(s)/Hr IV Continuous <Continuous>  mesna IVPB (eMAR) 964 milliGRAM(s) IV Intermittent every 3 hours    GI:  pantoprazole    Tablet 40 milliGRAM(s) Oral before breakfast  polyethylene glycol 3350 17 Gram(s) Oral daily PRN  senna 2 Tablet(s) Oral at bedtime PRN  sodium bicarbonate Mouth Rinse 10 milliLiter(s) Swish and Spit five times a day  ursodiol Capsule 300 milliGRAM(s) Oral two times a day    Cardiovascular:   furosemide   Injectable 20 milliGRAM(s) IV Push every 24 hours  furosemide   Injectable 40 milliGRAM(s) IV Push once  furosemide   Injectable 40 milliGRAM(s) IV Push once    Other medications:   acetaminophen     Tablet .. 650 milliGRAM(s) Oral every 6 hours  aprepitant 80 milliGRAM(s) Oral every 24 hours  Biotene Dry Mouth Oral Rinse 5 milliLiter(s) Swish and Spit five times a day  chlorhexidine 4% Liquid 1 Application(s) Topical <User Schedule>  dexAMETHasone  IVPB 10 milliGRAM(s) IV Intermittent every 24 hours  FLUoxetine 20 milliGRAM(s) Oral daily  folic acid 1 milliGRAM(s) Oral daily  loratadine 10 milliGRAM(s) Oral daily  LORazepam   Injectable 1 milliGRAM(s) IV Push daily  multivitamin 1 Tablet(s) Oral daily  nystatin Powder 1 Application(s) Topical two times a day  ondansetron Injectable 8 milliGRAM(s) IV Push every 8 hours  potassium chloride  20 mEq/100 mL IVPB 20 milliEquivalent(s) IV Intermittent every 2 hours  sodium chloride 0.9% 1000 milliLiter(s) IV Continuous <Continuous>  sodium chloride 0.9%. 1000 milliLiter(s) IV Continuous <Continuous>    PRN:   acetaminophen     Tablet .. 650 milliGRAM(s) Oral every 6 hours PRN  AQUAPHOR (petrolatum Ointment) 1 Application(s) Topical two times a day PRN  LORazepam   Injectable 1 milliGRAM(s) IV Push every 6 hours PRN  metoclopramide Injectable 10 milliGRAM(s) IV Push every 6 hours PRN  polyethylene glycol 3350 17 Gram(s) Oral daily PRN  senna 2 Tablet(s) Oral at bedtime PRN  sodium chloride 0.9% lock flush 10 milliLiter(s) IV Push every 1 hour PRN    A/P:    57 year old female with a history of  peripheral T cell lymphoma   Pre  :  Autologous PBSCT day - 5  11/17- Completed  -16 24 hour continuous infusion, strict I&O, daily weights, prn diuresis   11/18 - Cytoxan day 2/4.    1. Infectious Disease:   clotrimazole Lozenge 1 Lozenge Oral five times a day  fluconAZOLE   Tablet 400 milliGRAM(s) Oral daily  trimethoprim  160 mG/sulfamethoxazole 800 mG 1 Tablet(s) Oral every 12 hours    2. VOD Prophylaxis: Actigall, Glutamine, Heparin (dosed at 100 units / kg / day)     3. GI Prophylaxis:   pantoprazole    Tablet 40 milliGRAM(s) Oral before breakfast    4. Mouth care - NS / NaHCO3 rinses, Mycelex, Biotene; Skin care     5. GVHD prophylaxis - n/a     6. Transfuse & replete electrolytes prn   potassium chloride  20 mEq/100 mL IVPB 20 milliEquivalent(s) IV Intermittent every 2 hours    7. IV hydration, daily weights, strict I&O, prn diuresis   Lasix 40mg IV x 1 0800  Lasix 40mg IV x 1 1600    8. PO intake as tolerated, nutrition follow up as needed, MVI, folic acid     9. Antiemetics, anti-diarrhea medications:   LORazepam   Injectable 1 milliGRAM(s) IV Push every 6 hours PRN  metoclopramide Injectable 10 milliGRAM(s) IV Push every 6 hours PRN  LORazepam   Injectable 1 milliGRAM(s) IV Push daily  ondansetron Injectable 8 milliGRAM(s) IV Push every 8 hours  dexAMETHasone  IVPB 10 milliGRAM(s) IV Intermittent every 24 hours    10. OOB as tolerated, physical therapy consult if needed     11. Monitor coags / fibrinogen 2x week, vitamin K as needed     12. Monitor closely for clinical changes, monitor for fevers     13. Emotional support provided, plan of care discussed and questions addressed     14. Patient education done regarding chemotherapy prep, plan of care, restrictions and discharge planning     15. Continue regular social work input     I have written the above note for Dr. Kaplan who performed service with me in the room.   Maday Becker  NP-C ([phone])    I have seen and examined patient with NP, I agree with above note as scribed.

## 2022-11-20 LAB
ALBUMIN SERPL ELPH-MCNC: 3.9 G/DL — SIGNIFICANT CHANGE UP (ref 3.3–5)
ALBUMIN SERPL ELPH-MCNC: 4 G/DL — SIGNIFICANT CHANGE UP (ref 3.3–5)
ALP SERPL-CCNC: 71 U/L — SIGNIFICANT CHANGE UP (ref 40–120)
ALP SERPL-CCNC: 74 U/L — SIGNIFICANT CHANGE UP (ref 40–120)
ALT FLD-CCNC: 25 U/L — SIGNIFICANT CHANGE UP (ref 10–45)
ALT FLD-CCNC: 26 U/L — SIGNIFICANT CHANGE UP (ref 10–45)
ANION GAP SERPL CALC-SCNC: 12 MMOL/L — SIGNIFICANT CHANGE UP (ref 5–17)
ANION GAP SERPL CALC-SCNC: 13 MMOL/L — SIGNIFICANT CHANGE UP (ref 5–17)
AST SERPL-CCNC: 12 U/L — SIGNIFICANT CHANGE UP (ref 10–40)
AST SERPL-CCNC: 14 U/L — SIGNIFICANT CHANGE UP (ref 10–40)
BASOPHILS # BLD AUTO: 0 K/UL — SIGNIFICANT CHANGE UP (ref 0–0.2)
BASOPHILS NFR BLD AUTO: 0 % — SIGNIFICANT CHANGE UP (ref 0–2)
BILIRUB SERPL-MCNC: 0.2 MG/DL — SIGNIFICANT CHANGE UP (ref 0.2–1.2)
BILIRUB SERPL-MCNC: 0.3 MG/DL — SIGNIFICANT CHANGE UP (ref 0.2–1.2)
BUN SERPL-MCNC: 12 MG/DL — SIGNIFICANT CHANGE UP (ref 7–23)
BUN SERPL-MCNC: 18 MG/DL — SIGNIFICANT CHANGE UP (ref 7–23)
CALCIUM SERPL-MCNC: 8.8 MG/DL — SIGNIFICANT CHANGE UP (ref 8.4–10.5)
CALCIUM SERPL-MCNC: 9.3 MG/DL — SIGNIFICANT CHANGE UP (ref 8.4–10.5)
CHLORIDE SERPL-SCNC: 106 MMOL/L — SIGNIFICANT CHANGE UP (ref 96–108)
CHLORIDE SERPL-SCNC: 99 MMOL/L — SIGNIFICANT CHANGE UP (ref 96–108)
CO2 SERPL-SCNC: 22 MMOL/L — SIGNIFICANT CHANGE UP (ref 22–31)
CO2 SERPL-SCNC: 24 MMOL/L — SIGNIFICANT CHANGE UP (ref 22–31)
CREAT SERPL-MCNC: 0.46 MG/DL — LOW (ref 0.5–1.3)
CREAT SERPL-MCNC: 0.64 MG/DL — SIGNIFICANT CHANGE UP (ref 0.5–1.3)
EGFR: 104 ML/MIN/1.73M2 — SIGNIFICANT CHANGE UP
EGFR: 113 ML/MIN/1.73M2 — SIGNIFICANT CHANGE UP
EOSINOPHIL # BLD AUTO: 0 K/UL — SIGNIFICANT CHANGE UP (ref 0–0.5)
EOSINOPHIL NFR BLD AUTO: 0 % — SIGNIFICANT CHANGE UP (ref 0–6)
GLUCOSE SERPL-MCNC: 153 MG/DL — HIGH (ref 70–99)
GLUCOSE SERPL-MCNC: 217 MG/DL — HIGH (ref 70–99)
HCT VFR BLD CALC: 26.3 % — LOW (ref 34.5–45)
HGB BLD-MCNC: 8.2 G/DL — LOW (ref 11.5–15.5)
LDH SERPL L TO P-CCNC: 139 U/L — SIGNIFICANT CHANGE UP (ref 50–242)
LYMPHOCYTES # BLD AUTO: 0.09 K/UL — LOW (ref 1–3.3)
LYMPHOCYTES # BLD AUTO: 6.1 % — LOW (ref 13–44)
MAGNESIUM SERPL-MCNC: 2 MG/DL — SIGNIFICANT CHANGE UP (ref 1.6–2.6)
MAGNESIUM SERPL-MCNC: 2.1 MG/DL — SIGNIFICANT CHANGE UP (ref 1.6–2.6)
MANUAL SMEAR VERIFICATION: SIGNIFICANT CHANGE UP
MCHC RBC-ENTMCNC: 30.6 PG — SIGNIFICANT CHANGE UP (ref 27–34)
MCHC RBC-ENTMCNC: 31.2 GM/DL — LOW (ref 32–36)
MCV RBC AUTO: 98.1 FL — SIGNIFICANT CHANGE UP (ref 80–100)
MONOCYTES # BLD AUTO: 0 K/UL — SIGNIFICANT CHANGE UP (ref 0–0.9)
MONOCYTES NFR BLD AUTO: 0 % — LOW (ref 2–14)
NEUTROPHILS # BLD AUTO: 1.35 K/UL — LOW (ref 1.8–7.4)
NEUTROPHILS NFR BLD AUTO: 93.9 % — HIGH (ref 43–77)
PHOSPHATE SERPL-MCNC: 2.7 MG/DL — SIGNIFICANT CHANGE UP (ref 2.5–4.5)
PHOSPHATE SERPL-MCNC: 2.8 MG/DL — SIGNIFICANT CHANGE UP (ref 2.5–4.5)
PLAT MORPH BLD: NORMAL — SIGNIFICANT CHANGE UP
PLATELET # BLD AUTO: 162 K/UL — SIGNIFICANT CHANGE UP (ref 150–400)
POTASSIUM SERPL-MCNC: 4.5 MMOL/L — SIGNIFICANT CHANGE UP (ref 3.5–5.3)
POTASSIUM SERPL-MCNC: 4.5 MMOL/L — SIGNIFICANT CHANGE UP (ref 3.5–5.3)
POTASSIUM SERPL-SCNC: 4.5 MMOL/L — SIGNIFICANT CHANGE UP (ref 3.5–5.3)
POTASSIUM SERPL-SCNC: 4.5 MMOL/L — SIGNIFICANT CHANGE UP (ref 3.5–5.3)
PROT SERPL-MCNC: 6 G/DL — SIGNIFICANT CHANGE UP (ref 6–8.3)
PROT SERPL-MCNC: 6.3 G/DL — SIGNIFICANT CHANGE UP (ref 6–8.3)
RBC # BLD: 2.68 M/UL — LOW (ref 3.8–5.2)
RBC # FLD: 13.8 % — SIGNIFICANT CHANGE UP (ref 10.3–14.5)
RBC BLD AUTO: SIGNIFICANT CHANGE UP
SODIUM SERPL-SCNC: 135 MMOL/L — SIGNIFICANT CHANGE UP (ref 135–145)
SODIUM SERPL-SCNC: 141 MMOL/L — SIGNIFICANT CHANGE UP (ref 135–145)
SP GR UR STRIP: 1.01 — LOW (ref 1.01–1.02)
WBC # BLD: 1.44 K/UL — LOW (ref 3.8–10.5)
WBC # FLD AUTO: 1.44 K/UL — LOW (ref 3.8–10.5)

## 2022-11-20 PROCEDURE — 99291 CRITICAL CARE FIRST HOUR: CPT

## 2022-11-20 PROCEDURE — 93010 ELECTROCARDIOGRAM REPORT: CPT

## 2022-11-20 RX ORDER — MESNA 100 MG/ML
964 INJECTION, SOLUTION INTRAVENOUS
Refills: 0 | Status: COMPLETED | OUTPATIENT
Start: 2022-11-20 | End: 2022-11-21

## 2022-11-20 RX ADMIN — Medication 40 MILLIGRAM(S): at 08:15

## 2022-11-20 RX ADMIN — Medication 10 MILLILITER(S): at 20:03

## 2022-11-20 RX ADMIN — SODIUM CHLORIDE 270 MILLILITER(S): 9 INJECTION, SOLUTION INTRAVENOUS at 21:30

## 2022-11-20 RX ADMIN — Medication 50 MILLIEQUIVALENT(S): at 08:17

## 2022-11-20 RX ADMIN — Medication 1 LOZENGE: at 23:01

## 2022-11-20 RX ADMIN — Medication 1 TABLET(S): at 11:46

## 2022-11-20 RX ADMIN — Medication 1 TABLET(S): at 17:02

## 2022-11-20 RX ADMIN — ONDANSETRON 8 MILLIGRAM(S): 8 TABLET, FILM COATED ORAL at 20:06

## 2022-11-20 RX ADMIN — Medication 5 MILLILITER(S): at 11:46

## 2022-11-20 RX ADMIN — Medication 10 MILLILITER(S): at 23:02

## 2022-11-20 RX ADMIN — LORATADINE 10 MILLIGRAM(S): 10 TABLET ORAL at 11:45

## 2022-11-20 RX ADMIN — Medication 20 MILLIGRAM(S): at 11:46

## 2022-11-20 RX ADMIN — PANTOPRAZOLE SODIUM 40 MILLIGRAM(S): 20 TABLET, DELAYED RELEASE ORAL at 05:14

## 2022-11-20 RX ADMIN — MESNA 964 MILLIGRAM(S): 100 INJECTION, SOLUTION INTRAVENOUS at 02:30

## 2022-11-20 RX ADMIN — MESNA 964 MILLIGRAM(S): 100 INJECTION, SOLUTION INTRAVENOUS at 20:30

## 2022-11-20 RX ADMIN — Medication 2898 MILLIGRAM(S): at 14:50

## 2022-11-20 RX ADMIN — Medication 5 MILLILITER(S): at 08:16

## 2022-11-20 RX ADMIN — URSODIOL 300 MILLIGRAM(S): 250 TABLET, FILM COATED ORAL at 17:02

## 2022-11-20 RX ADMIN — Medication 10 MILLILITER(S): at 17:02

## 2022-11-20 RX ADMIN — Medication 1 MILLIGRAM(S): at 11:46

## 2022-11-20 RX ADMIN — MESNA 964 MILLIGRAM(S): 100 INJECTION, SOLUTION INTRAVENOUS at 23:30

## 2022-11-20 RX ADMIN — Medication 1 LOZENGE: at 08:15

## 2022-11-20 RX ADMIN — Medication 10 MILLILITER(S): at 11:47

## 2022-11-20 RX ADMIN — Medication 1 TABLET(S): at 05:14

## 2022-11-20 RX ADMIN — Medication 650 MILLIGRAM(S): at 21:00

## 2022-11-20 RX ADMIN — Medication 5 MILLILITER(S): at 23:01

## 2022-11-20 RX ADMIN — Medication 50 MILLIEQUIVALENT(S): at 11:47

## 2022-11-20 RX ADMIN — MESNA 964 MILLIGRAM(S): 100 INJECTION, SOLUTION INTRAVENOUS at 14:29

## 2022-11-20 RX ADMIN — Medication 1 LOZENGE: at 20:03

## 2022-11-20 RX ADMIN — NYSTATIN CREAM 1 APPLICATION(S): 100000 CREAM TOPICAL at 17:03

## 2022-11-20 RX ADMIN — ONDANSETRON 8 MILLIGRAM(S): 8 TABLET, FILM COATED ORAL at 05:13

## 2022-11-20 RX ADMIN — APREPITANT 80 MILLIGRAM(S): 80 CAPSULE ORAL at 12:14

## 2022-11-20 RX ADMIN — Medication 50 MILLIEQUIVALENT(S): at 10:54

## 2022-11-20 RX ADMIN — Medication 5 MILLILITER(S): at 20:03

## 2022-11-20 RX ADMIN — Medication 1 LOZENGE: at 11:47

## 2022-11-20 RX ADMIN — CHLORHEXIDINE GLUCONATE 1 APPLICATION(S): 213 SOLUTION TOPICAL at 08:17

## 2022-11-20 RX ADMIN — SODIUM CHLORIDE 20 MILLILITER(S): 9 INJECTION INTRAMUSCULAR; INTRAVENOUS; SUBCUTANEOUS at 20:04

## 2022-11-20 RX ADMIN — NYSTATIN CREAM 1 APPLICATION(S): 100000 CREAM TOPICAL at 05:13

## 2022-11-20 RX ADMIN — Medication 10 MILLILITER(S): at 08:16

## 2022-11-20 RX ADMIN — Medication 102 MILLIGRAM(S): at 17:02

## 2022-11-20 RX ADMIN — Medication 5 MILLILITER(S): at 17:03

## 2022-11-20 RX ADMIN — Medication 1 LOZENGE: at 17:03

## 2022-11-20 RX ADMIN — ONDANSETRON 8 MILLIGRAM(S): 8 TABLET, FILM COATED ORAL at 14:07

## 2022-11-20 RX ADMIN — MESNA 964 MILLIGRAM(S): 100 INJECTION, SOLUTION INTRAVENOUS at 17:33

## 2022-11-20 RX ADMIN — Medication 20 MILLIGRAM(S): at 18:14

## 2022-11-20 RX ADMIN — URSODIOL 300 MILLIGRAM(S): 250 TABLET, FILM COATED ORAL at 05:14

## 2022-11-20 RX ADMIN — HEPARIN SODIUM 3.34 UNIT(S)/HR: 5000 INJECTION INTRAVENOUS; SUBCUTANEOUS at 20:04

## 2022-11-20 RX ADMIN — Medication 650 MILLIGRAM(S): at 20:30

## 2022-11-20 RX ADMIN — FLUCONAZOLE 400 MILLIGRAM(S): 150 TABLET ORAL at 11:45

## 2022-11-20 NOTE — PROGRESS NOTE ADULT - SUBJECTIVE AND OBJECTIVE BOX
HPC Transplant Team                                                      Critical / Counseling Time Provided: 30 minutes                                                                                                                                                        Chief Complaint: Autologous peripheral blood stem cell transplant with high dose CBV prep regimen for treatment of peripheral T cell lymphoma    S: Patient seen and examined with HPC Transplant Team:   No complaints today   All other ROS negative     O: Vitals:   Vital Signs Last 24 Hrs  T(C): 36.8 (2022 05:20), Max: 37.2 (2022 09:38)  T(F): 98.2 (2022 05:20), Max: 99 (2022 09:38)  HR: 83 (2022 05:20) (72 - 87)  BP: 125/80 (2022 05:20) (98/63 - 125/80)  BP(mean): --  RR: 18 (2022 05:20) (18 - 20)  SpO2: 98% (2022 05:20) (97% - 99%)    Parameters below as of 2022 05:20  Patient On (Oxygen Delivery Method): room air        Admit weight: 80.3kg     Daily Weight in k.4 (2022 08:10)    Intake / Output:   - @ 07:01  -  20 @ 07:00  --------------------------------------------------------  IN: 7025.1 mL / OUT: 7970 mL / NET: -944.9 mL      PE:   Oropharynx: no erythema or ulcerations  Oral Mucositis:              -                                          Grade: n/a   CVS: S1, S2 RRR   Lungs: CTA throughout bilaterally   Abdomen: + BS x 4, soft, NT, ND   Extremities: no edema   Gastric Mucositis:       -                                           Grade: n/a   Intestinal Mucositis:     -                                         Grade: n/a   Skin: no rash   TLC: CDI   Neuro: A&O x 3   Pain: 0          Labs:   CBC Full  -  ( 2022 06:52 )  WBC Count : 1.44 K/uL  Hemoglobin : 8.2 g/dL  Hematocrit : 26.3 %  Platelet Count - Automated : 162 K/uL  Mean Cell Volume : 98.1 fl  Mean Cell Hemoglobin : 30.6 pg  Mean Cell Hemoglobin Concentration : 31.2 gm/dL  Auto Neutrophil # : 1.35 K/uL  Auto Lymphocyte # : 0.09 K/uL  Auto Monocyte # : 0.00 K/uL  Auto Eosinophil # : 0.00 K/uL  Auto Basophil # : 0.00 K/uL  Auto Neutrophil % : 93.9 %  Auto Lymphocyte % : 6.1 %  Auto Monocyte % : 0.0 %  Auto Eosinophil % : 0.0 %  Auto Basophil % : 0.0 %                          8.2    1.44  )-----------( 162      ( 2022 06:52 )             26.3         141  |  106  |  12  ----------------------------<  153<H>  4.5   |  22  |  0.46<L>    Ca    9.3      2022 06:50  Phos  2.7       Mg     2.1         TPro  6.0  /  Alb  3.9  /  TBili  0.3  /  DBili  x   /  AST  14  /  ALT  25  /  AlkPhos  71        LIVER FUNCTIONS - ( 2022 06:50 )  Alb: 3.9 g/dL / Pro: 6.0 g/dL / ALK PHOS: 71 U/L / ALT: 25 U/L / AST: 14 U/L / GGT: x           Lactate Dehydrogenase, Serum: 139 U/L ( @ 06:50)                Meds:   Antimicrobials:   clotrimazole Lozenge 1 Lozenge Oral five times a day  fluconAZOLE   Tablet 400 milliGRAM(s) Oral daily  trimethoprim  160 mG/sulfamethoxazole 800 mG 1 Tablet(s) Oral every 12 hours      Heme / Onc:   cyclophosphamide IVPB (eMAR) 2898 milliGRAM(s) IV Intermittent every 24 hours  heparin  Infusion 334 Unit(s)/Hr IV Continuous <Continuous>  mesna IVPB (eMAR) 964 milliGRAM(s) IV Intermittent every 3 hours      GI:  pantoprazole    Tablet 40 milliGRAM(s) Oral before breakfast  polyethylene glycol 3350 17 Gram(s) Oral daily PRN  senna 2 Tablet(s) Oral at bedtime PRN  sodium bicarbonate Mouth Rinse 10 milliLiter(s) Swish and Spit five times a day  ursodiol Capsule 300 milliGRAM(s) Oral two times a day      Cardiovascular:   furosemide   Injectable 20 milliGRAM(s) IV Push every 24 hours  furosemide   Injectable 40 milliGRAM(s) IV Push once      Immunologic:       Other medications:   acetaminophen     Tablet .. 650 milliGRAM(s) Oral every 6 hours  aprepitant 80 milliGRAM(s) Oral every 24 hours  Biotene Dry Mouth Oral Rinse 5 milliLiter(s) Swish and Spit five times a day  chlorhexidine 4% Liquid 1 Application(s) Topical <User Schedule>  dexAMETHasone  IVPB 10 milliGRAM(s) IV Intermittent every 24 hours  FLUoxetine 20 milliGRAM(s) Oral daily  folic acid 1 milliGRAM(s) Oral daily  loratadine 10 milliGRAM(s) Oral daily  LORazepam   Injectable 1 milliGRAM(s) IV Push daily  multivitamin 1 Tablet(s) Oral daily  nystatin Powder 1 Application(s) Topical two times a day  ondansetron Injectable 8 milliGRAM(s) IV Push every 8 hours  potassium chloride  20 mEq/100 mL IVPB 20 milliEquivalent(s) IV Intermittent every 2 hours  sodium chloride 0.9% 1000 milliLiter(s) IV Continuous <Continuous>  sodium chloride 0.9%. 1000 milliLiter(s) IV Continuous <Continuous>      PRN:   acetaminophen     Tablet .. 650 milliGRAM(s) Oral every 6 hours PRN  AQUAPHOR (petrolatum Ointment) 1 Application(s) Topical two times a day PRN  LORazepam   Injectable 1 milliGRAM(s) IV Push every 6 hours PRN  metoclopramide Injectable 10 milliGRAM(s) IV Push every 6 hours PRN  polyethylene glycol 3350 17 Gram(s) Oral daily PRN  senna 2 Tablet(s) Oral at bedtime PRN  sodium chloride 0.9% lock flush 10 milliLiter(s) IV Push every 1 hour PRN      A/P:  57 year old female with a history of  peripheral T cell lymphoma   Pre  :  Autologous PBSCT day - 7  - Completed  -16 24 hour continuous infusion, strict I&O, daily weights, prn diuresis    - Cytoxan day 3/4.    1. Infectious Disease:   clotrimazole Lozenge 1 Lozenge Oral five times a day  fluconAZOLE   Tablet 400 milliGRAM(s) Oral daily  trimethoprim  160 mG/sulfamethoxazole 800 mG 1 Tablet(s) Oral every 12 hours    2. VOD Prophylaxis: Actigall, Glutamine, Heparin (dosed at 100 units / kg / day)     3. GI Prophylaxis:   pantoprazole    Tablet 40 milliGRAM(s) Oral before breakfast    4. Mouthcare - NS / NaHCO3 rinses, Mycelex, Biotene; Skin care     5. GVHD prophylaxis - n/a     6. Transfuse & replete electrolytes prn   potassium chloride  20 mEq/100 mL IVPB 20 milliEquivalent(s) IV Intermittent every 2 hours    7. IV hydration, daily weights, strict I&O, prn diuresis   Lasix 40mg IV x 1 0800  Lasix 40mg IV x 1 1600    8. PO intake as tolerated, nutrition follow up as needed, MVI, folic acid     9. Antiemetics, anti-diarrhea medications:   LORazepam   Injectable 1 milliGRAM(s) IV Push every 6 hours PRN  metoclopramide Injectable 10 milliGRAM(s) IV Push every 6 hours PRN  LORazepam   Injectable 1 milliGRAM(s) IV Push daily  ondansetron Injectable 8 milliGRAM(s) IV Push every 8 hours  dexAMETHasone  IVPB 10 milliGRAM(s) IV Intermittent every 24 hours    10. OOB as tolerated, physical therapy consult if needed     11. Monitor coags / fibrinogen 2x week, vitamin K as needed     12. Monitor closely for clinical changes, monitor for fevers     13. Emotional support provided, plan of care discussed and questions addressed     14. Patient education done regarding chemotherapy prep, plan of care, restrictions and discharge planning     15. Continue regular social work input     I have written the above note for Dr. Acevedo who performed service with me in the room.   Therese Winn  NP-C ([phone])    I have seen and examined patient with NP, I agree with above note as scribed.                      HPC Transplant Team                                                      Critical / Counseling Time Provided: 30 minutes                                                                                                                                                        Chief Complaint: Autologous peripheral blood stem cell transplant with high dose CBV prep regimen for treatment of peripheral T cell lymphoma    S: Patient seen and examined with HPC Transplant Team:   No complaints today   All other ROS negative     O: Vitals:   Vital Signs Last 24 Hrs  T(C): 36.8 (2022 05:20), Max: 37.2 (2022 09:38)  T(F): 98.2 (2022 05:20), Max: 99 (2022 09:38)  HR: 83 (2022 05:20) (72 - 87)  BP: 125/80 (2022 05:20) (98/63 - 125/80)  BP(mean): --  RR: 18 (2022 05:20) (18 - 20)  SpO2: 98% (2022 05:20) (97% - 99%)    Parameters below as of 2022 05:20  Patient On (Oxygen Delivery Method): room air        Admit weight: 80.3kg     Daily Weight in k.4 (2022 08:10)    Intake / Output:   - @ 07:01  -  20 @ 07:00  --------------------------------------------------------  IN: 7025.1 mL / OUT: 7970 mL / NET: -944.9 mL      PE:   Oropharynx: no erythema or ulcerations  Oral Mucositis:              -                                          Grade: n/a   CVS: S1, S2 RRR   Lungs: CTA throughout bilaterally   Abdomen: + BS x 4, soft, NT, ND   Extremities: no edema   Gastric Mucositis:       -                                           Grade: n/a   Intestinal Mucositis:     -                                         Grade: n/a   Skin: no rash   TLC: CDI   Neuro: A&O x 3   Pain: 0          Labs:   CBC Full  -  ( 2022 06:52 )  WBC Count : 1.44 K/uL  Hemoglobin : 8.2 g/dL  Hematocrit : 26.3 %  Platelet Count - Automated : 162 K/uL  Mean Cell Volume : 98.1 fl  Mean Cell Hemoglobin : 30.6 pg  Mean Cell Hemoglobin Concentration : 31.2 gm/dL  Auto Neutrophil # : 1.35 K/uL  Auto Lymphocyte # : 0.09 K/uL  Auto Monocyte # : 0.00 K/uL  Auto Eosinophil # : 0.00 K/uL  Auto Basophil # : 0.00 K/uL  Auto Neutrophil % : 93.9 %  Auto Lymphocyte % : 6.1 %  Auto Monocyte % : 0.0 %  Auto Eosinophil % : 0.0 %  Auto Basophil % : 0.0 %                          8.2    1.44  )-----------( 162      ( 2022 06:52 )             26.3         141  |  106  |  12  ----------------------------<  153<H>  4.5   |  22  |  0.46<L>    Ca    9.3      2022 06:50  Phos  2.7       Mg     2.1         TPro  6.0  /  Alb  3.9  /  TBili  0.3  /  DBili  x   /  AST  14  /  ALT  25  /  AlkPhos  71        LIVER FUNCTIONS - ( 2022 06:50 )  Alb: 3.9 g/dL / Pro: 6.0 g/dL / ALK PHOS: 71 U/L / ALT: 25 U/L / AST: 14 U/L / GGT: x           Lactate Dehydrogenase, Serum: 139 U/L ( @ 06:50)                Meds:   Antimicrobials:   clotrimazole Lozenge 1 Lozenge Oral five times a day  fluconAZOLE   Tablet 400 milliGRAM(s) Oral daily  trimethoprim  160 mG/sulfamethoxazole 800 mG 1 Tablet(s) Oral every 12 hours      Heme / Onc:   cyclophosphamide IVPB (eMAR) 2898 milliGRAM(s) IV Intermittent every 24 hours  heparin  Infusion 334 Unit(s)/Hr IV Continuous <Continuous>  mesna IVPB (eMAR) 964 milliGRAM(s) IV Intermittent every 3 hours      GI:  pantoprazole    Tablet 40 milliGRAM(s) Oral before breakfast  polyethylene glycol 3350 17 Gram(s) Oral daily PRN  senna 2 Tablet(s) Oral at bedtime PRN  sodium bicarbonate Mouth Rinse 10 milliLiter(s) Swish and Spit five times a day  ursodiol Capsule 300 milliGRAM(s) Oral two times a day      Cardiovascular:   furosemide   Injectable 20 milliGRAM(s) IV Push every 24 hours  furosemide   Injectable 40 milliGRAM(s) IV Push once      Immunologic:       Other medications:   acetaminophen     Tablet .. 650 milliGRAM(s) Oral every 6 hours  aprepitant 80 milliGRAM(s) Oral every 24 hours  Biotene Dry Mouth Oral Rinse 5 milliLiter(s) Swish and Spit five times a day  chlorhexidine 4% Liquid 1 Application(s) Topical <User Schedule>  dexAMETHasone  IVPB 10 milliGRAM(s) IV Intermittent every 24 hours  FLUoxetine 20 milliGRAM(s) Oral daily  folic acid 1 milliGRAM(s) Oral daily  loratadine 10 milliGRAM(s) Oral daily  LORazepam   Injectable 1 milliGRAM(s) IV Push daily  multivitamin 1 Tablet(s) Oral daily  nystatin Powder 1 Application(s) Topical two times a day  ondansetron Injectable 8 milliGRAM(s) IV Push every 8 hours  potassium chloride  20 mEq/100 mL IVPB 20 milliEquivalent(s) IV Intermittent every 2 hours  sodium chloride 0.9% 1000 milliLiter(s) IV Continuous <Continuous>  sodium chloride 0.9%. 1000 milliLiter(s) IV Continuous <Continuous>      PRN:   acetaminophen     Tablet .. 650 milliGRAM(s) Oral every 6 hours PRN  AQUAPHOR (petrolatum Ointment) 1 Application(s) Topical two times a day PRN  LORazepam   Injectable 1 milliGRAM(s) IV Push every 6 hours PRN  metoclopramide Injectable 10 milliGRAM(s) IV Push every 6 hours PRN  polyethylene glycol 3350 17 Gram(s) Oral daily PRN  senna 2 Tablet(s) Oral at bedtime PRN  sodium chloride 0.9% lock flush 10 milliLiter(s) IV Push every 1 hour PRN      A/P:  57 year old female with a history of  peripheral T cell lymphoma   Pre  :  Autologous PBSCT day -5  - Completed  -16 24 hour continuous infusion, strict I&O, daily weights, prn diuresis    - Cytoxan day 3/4.    1. Infectious Disease:   clotrimazole Lozenge 1 Lozenge Oral five times a day  fluconAZOLE   Tablet 400 milliGRAM(s) Oral daily  trimethoprim  160 mG/sulfamethoxazole 800 mG 1 Tablet(s) Oral every 12 hours    2. VOD Prophylaxis: Actigall, Glutamine, Heparin (dosed at 100 units / kg / day)     3. GI Prophylaxis:   pantoprazole    Tablet 40 milliGRAM(s) Oral before breakfast    4. Mouthcare - NS / NaHCO3 rinses, Mycelex, Biotene; Skin care     5. GVHD prophylaxis - n/a     6. Transfuse & replete electrolytes prn   potassium chloride  20 mEq/100 mL IVPB 20 milliEquivalent(s) IV Intermittent every 2 hours    7. IV hydration, daily weights, strict I&O, prn diuresis   Lasix 40mg IV x 1 0800  Lasix 40mg IV x 1 1600    8. PO intake as tolerated, nutrition follow up as needed, MVI, folic acid     9. Antiemetics, anti-diarrhea medications:   LORazepam   Injectable 1 milliGRAM(s) IV Push every 6 hours PRN  metoclopramide Injectable 10 milliGRAM(s) IV Push every 6 hours PRN  LORazepam   Injectable 1 milliGRAM(s) IV Push daily  ondansetron Injectable 8 milliGRAM(s) IV Push every 8 hours  dexAMETHasone  IVPB 10 milliGRAM(s) IV Intermittent every 24 hours    10. OOB as tolerated, physical therapy consult if needed     11. Monitor coags / fibrinogen 2x week, vitamin K as needed     12. Monitor closely for clinical changes, monitor for fevers     13. Emotional support provided, plan of care discussed and questions addressed     14. Patient education done regarding chemotherapy prep, plan of care, restrictions and discharge planning     15. Continue regular social work input     I have written the above note for Dr. Acevedo who performed service with me in the room.   Therese Winn  NP-C ([phone])    I have seen and examined patient with NP, I agree with above note as scribed.

## 2022-11-20 NOTE — PROGRESS NOTE ADULT - CRITICAL CARE ATTENDING COMMENT
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   Day -7..no issues  Problem/Plan -   1:  Problem: Stem cell transplant   ·  Plan: 1. Admit to BMTU   2. TLC placement in IR   3. Day -9 - day -2 - antiemetics   4. Day -9 & day -8, VP16 2400mg/m2 IV x 34 hours (final concentration 0.4mg /mL).   5. Strict I&O, daily weights, prn diuresis   6. After completion of VP16- start CTX hydration (D51/2NS + 10mEq KCl @ 150ml / m2) - continue for 24 hours post infusion of CTX   7. Day -7 - day -4 - CTX 1800 mg / m2 daily over 2 hours. Follow SMA7, mag, phos 6hours post CT . Mesna  12mg / kg - hemorrhagic cystitis ppx   8. Day -3 - BCNU 400mg / m2   9. Day -2 - day -1 - rest days  10. Day 0 - HPC transplant. Start Zarxio 480mcg SQ QD, continue through engraftment. Kepivance on days 0, 1 & 2   11. Anxiolytics, antinausea, antidiarrhea medications as needed   12. Lasix PRN while being aggressively hydrated to avoid VOD   13. Nutritional support, pain management.    Problem/Plan - 2:  ·  Problem: Need for prophylactic measure.   ·  Plan: 1. VOD prophylaxis - low dose heparin gtt (dosed at 100 units / kg / day), glutamine supplementation, Actigall BID   2. PCP prophylaxis - Bactrim DS through day -2    3. Antiviral prophylaxis - Acyclovir 400mg po TID to start day -1   4. Antifungal prophylaxis- Diflucan 400 mg po daily.  5. GI prophylaxis - Protonix po QD   6. Antibacterial prophylaxis - when ANC < 500, start Cipro 500mg po BID. If becomes febrile, pan cx, CXR and change Cipro to Cefepime 2g IV q 8 hours. Continue until count recovery  7. Kepivance for prevention of mucositis- days 0, +1, +2  8. Aggressive mouth care and skin care as per protocol.  9. transfusion and electrolyte support 57 year old female with peripheral T cell lymphoma s/p CHOEP x 6 admitted for autologous pbsct with high dose CBV prep regimen.   Day -5.no issues  Problem/Plan -   1:  Problem: Stem cell transplant   ·  Plan: 1. Admit to BMTU   2. TLC placement in IR   3. Day -9 - day -2 - antiemetics   4. Day -9 & day -8, VP16 2400mg/m2 IV x 34 hours (final concentration 0.4mg /mL).   5. Strict I&O, daily weights, prn diuresis   6. After completion of VP16- start CTX hydration (D51/2NS + 10mEq KCl @ 150ml / m2) - continue for 24 hours post infusion of CTX   7. Day -7 - day -4 - CTX 1800 mg / m2 daily over 2 hours. Follow SMA7, mag, phos 6hours post CT . Mesna  12mg / kg - hemorrhagic cystitis ppx   8. Day -3 - BCNU 400mg / m2   9. Day -2 - day -1 - rest days  10. Day 0 - HPC transplant. Start Zarxio 480mcg SQ QD, continue through engraftment. Kepivance on days 0, 1 & 2   11. Anxiolytics, antinausea, antidiarrhea medications as needed   12. Lasix PRN while being aggressively hydrated to avoid VOD   13. Nutritional support, pain management.    Problem/Plan - 2:  ·  Problem: Need for prophylactic measure.   ·  Plan: 1. VOD prophylaxis - low dose heparin gtt (dosed at 100 units / kg / day), glutamine supplementation, Actigall BID   2. PCP prophylaxis - Bactrim DS through day -2    3. Antiviral prophylaxis - Acyclovir 400mg po TID to start day -1   4. Antifungal prophylaxis- Diflucan 400 mg po daily.  5. GI prophylaxis - Protonix po QD   6. Antibacterial prophylaxis - when ANC < 500, start Cipro 500mg po BID. If becomes febrile, pan cx, CXR and change Cipro to Cefepime 2g IV q 8 hours. Continue until count recovery  7. Kepivance for prevention of mucositis- days 0, +1, +2  8. Aggressive mouth care and skin care as per protocol.  9. transfusion and electrolyte support

## 2022-11-21 LAB
ALBUMIN SERPL ELPH-MCNC: 4 G/DL — SIGNIFICANT CHANGE UP (ref 3.3–5)
ALBUMIN SERPL ELPH-MCNC: 4 G/DL — SIGNIFICANT CHANGE UP (ref 3.3–5)
ALP SERPL-CCNC: 74 U/L — SIGNIFICANT CHANGE UP (ref 40–120)
ALP SERPL-CCNC: 74 U/L — SIGNIFICANT CHANGE UP (ref 40–120)
ALT FLD-CCNC: 22 U/L — SIGNIFICANT CHANGE UP (ref 10–45)
ALT FLD-CCNC: 24 U/L — SIGNIFICANT CHANGE UP (ref 10–45)
ANION GAP SERPL CALC-SCNC: 10 MMOL/L — SIGNIFICANT CHANGE UP (ref 5–17)
ANION GAP SERPL CALC-SCNC: 9 MMOL/L — SIGNIFICANT CHANGE UP (ref 5–17)
ANISOCYTOSIS BLD QL: SLIGHT — SIGNIFICANT CHANGE UP
APTT BLD: 25 SEC — LOW (ref 27.5–35.5)
AST SERPL-CCNC: 14 U/L — SIGNIFICANT CHANGE UP (ref 10–40)
AST SERPL-CCNC: 14 U/L — SIGNIFICANT CHANGE UP (ref 10–40)
BASOPHILS # BLD AUTO: 0 K/UL — SIGNIFICANT CHANGE UP (ref 0–0.2)
BASOPHILS NFR BLD AUTO: 0 % — SIGNIFICANT CHANGE UP (ref 0–2)
BILIRUB SERPL-MCNC: 0.3 MG/DL — SIGNIFICANT CHANGE UP (ref 0.2–1.2)
BILIRUB SERPL-MCNC: 0.3 MG/DL — SIGNIFICANT CHANGE UP (ref 0.2–1.2)
BLD GP AB SCN SERPL QL: NEGATIVE — SIGNIFICANT CHANGE UP
BUN SERPL-MCNC: 13 MG/DL — SIGNIFICANT CHANGE UP (ref 7–23)
BUN SERPL-MCNC: 15 MG/DL — SIGNIFICANT CHANGE UP (ref 7–23)
CALCIUM SERPL-MCNC: 9.2 MG/DL — SIGNIFICANT CHANGE UP (ref 8.4–10.5)
CALCIUM SERPL-MCNC: 9.4 MG/DL — SIGNIFICANT CHANGE UP (ref 8.4–10.5)
CHLORIDE SERPL-SCNC: 104 MMOL/L — SIGNIFICANT CHANGE UP (ref 96–108)
CHLORIDE SERPL-SCNC: 104 MMOL/L — SIGNIFICANT CHANGE UP (ref 96–108)
CO2 SERPL-SCNC: 22 MMOL/L — SIGNIFICANT CHANGE UP (ref 22–31)
CO2 SERPL-SCNC: 25 MMOL/L — SIGNIFICANT CHANGE UP (ref 22–31)
CREAT SERPL-MCNC: 0.46 MG/DL — LOW (ref 0.5–1.3)
CREAT SERPL-MCNC: 0.48 MG/DL — LOW (ref 0.5–1.3)
EGFR: 112 ML/MIN/1.73M2 — SIGNIFICANT CHANGE UP
EGFR: 113 ML/MIN/1.73M2 — SIGNIFICANT CHANGE UP
EOSINOPHIL # BLD AUTO: 0 K/UL — SIGNIFICANT CHANGE UP (ref 0–0.5)
EOSINOPHIL NFR BLD AUTO: 0 % — SIGNIFICANT CHANGE UP (ref 0–6)
FIBRINOGEN PPP-MCNC: 202 MG/DL — SIGNIFICANT CHANGE UP (ref 200–445)
GLUCOSE SERPL-MCNC: 144 MG/DL — HIGH (ref 70–99)
GLUCOSE SERPL-MCNC: 245 MG/DL — HIGH (ref 70–99)
HCT VFR BLD CALC: 27.4 % — LOW (ref 34.5–45)
HGB BLD-MCNC: 8.5 G/DL — LOW (ref 11.5–15.5)
INR BLD: 1.1 RATIO — SIGNIFICANT CHANGE UP (ref 0.88–1.16)
LDH SERPL L TO P-CCNC: 135 U/L — SIGNIFICANT CHANGE UP (ref 50–242)
LYMPHOCYTES # BLD AUTO: 0.13 K/UL — LOW (ref 1–3.3)
LYMPHOCYTES # BLD AUTO: 7.1 % — LOW (ref 13–44)
MAGNESIUM SERPL-MCNC: 2.1 MG/DL — SIGNIFICANT CHANGE UP (ref 1.6–2.6)
MAGNESIUM SERPL-MCNC: 2.2 MG/DL — SIGNIFICANT CHANGE UP (ref 1.6–2.6)
MANUAL SMEAR VERIFICATION: SIGNIFICANT CHANGE UP
MCHC RBC-ENTMCNC: 30.1 PG — SIGNIFICANT CHANGE UP (ref 27–34)
MCHC RBC-ENTMCNC: 31 GM/DL — LOW (ref 32–36)
MCV RBC AUTO: 97.2 FL — SIGNIFICANT CHANGE UP (ref 80–100)
MONOCYTES # BLD AUTO: 0 K/UL — SIGNIFICANT CHANGE UP (ref 0–0.9)
MONOCYTES NFR BLD AUTO: 0 % — LOW (ref 2–14)
NEUTROPHILS # BLD AUTO: 1.64 K/UL — LOW (ref 1.8–7.4)
NEUTROPHILS NFR BLD AUTO: 92.9 % — HIGH (ref 43–77)
PHOSPHATE SERPL-MCNC: 2.6 MG/DL — SIGNIFICANT CHANGE UP (ref 2.5–4.5)
PHOSPHATE SERPL-MCNC: 2.8 MG/DL — SIGNIFICANT CHANGE UP (ref 2.5–4.5)
PLAT MORPH BLD: NORMAL — SIGNIFICANT CHANGE UP
PLATELET # BLD AUTO: 167 K/UL — SIGNIFICANT CHANGE UP (ref 150–400)
POIKILOCYTOSIS BLD QL AUTO: SLIGHT — SIGNIFICANT CHANGE UP
POTASSIUM SERPL-MCNC: 4.5 MMOL/L — SIGNIFICANT CHANGE UP (ref 3.5–5.3)
POTASSIUM SERPL-MCNC: 5 MMOL/L — SIGNIFICANT CHANGE UP (ref 3.5–5.3)
POTASSIUM SERPL-SCNC: 4.5 MMOL/L — SIGNIFICANT CHANGE UP (ref 3.5–5.3)
POTASSIUM SERPL-SCNC: 5 MMOL/L — SIGNIFICANT CHANGE UP (ref 3.5–5.3)
PROT SERPL-MCNC: 6.3 G/DL — SIGNIFICANT CHANGE UP (ref 6–8.3)
PROT SERPL-MCNC: 6.5 G/DL — SIGNIFICANT CHANGE UP (ref 6–8.3)
PROTHROM AB SERPL-ACNC: 12.8 SEC — SIGNIFICANT CHANGE UP (ref 10.5–13.4)
RBC # BLD: 2.82 M/UL — LOW (ref 3.8–5.2)
RBC # FLD: 13.5 % — SIGNIFICANT CHANGE UP (ref 10.3–14.5)
RBC BLD AUTO: ABNORMAL
RH IG SCN BLD-IMP: POSITIVE — SIGNIFICANT CHANGE UP
SODIUM SERPL-SCNC: 135 MMOL/L — SIGNIFICANT CHANGE UP (ref 135–145)
SODIUM SERPL-SCNC: 139 MMOL/L — SIGNIFICANT CHANGE UP (ref 135–145)
SP GR UR STRIP: 1.01 — LOW (ref 1.01–1.02)
WBC # BLD: 1.77 K/UL — LOW (ref 3.8–10.5)
WBC # FLD AUTO: 1.77 K/UL — LOW (ref 3.8–10.5)

## 2022-11-21 PROCEDURE — 93010 ELECTROCARDIOGRAM REPORT: CPT

## 2022-11-21 PROCEDURE — 99291 CRITICAL CARE FIRST HOUR: CPT

## 2022-11-21 RX ORDER — POTASSIUM CHLORIDE 20 MEQ
20 PACKET (EA) ORAL
Refills: 0 | Status: COMPLETED | OUTPATIENT
Start: 2022-11-21 | End: 2022-11-21

## 2022-11-21 RX ORDER — FUROSEMIDE 40 MG
40 TABLET ORAL ONCE
Refills: 0 | Status: COMPLETED | OUTPATIENT
Start: 2022-11-21 | End: 2022-11-21

## 2022-11-21 RX ORDER — MESNA 100 MG/ML
964 INJECTION, SOLUTION INTRAVENOUS
Refills: 0 | Status: COMPLETED | OUTPATIENT
Start: 2022-11-21 | End: 2022-11-22

## 2022-11-21 RX ADMIN — Medication 102 MILLIGRAM(S): at 16:34

## 2022-11-21 RX ADMIN — Medication 10 MILLILITER(S): at 20:45

## 2022-11-21 RX ADMIN — MESNA 964 MILLIGRAM(S): 100 INJECTION, SOLUTION INTRAVENOUS at 23:48

## 2022-11-21 RX ADMIN — Medication 2898 MILLIGRAM(S): at 14:56

## 2022-11-21 RX ADMIN — CHLORHEXIDINE GLUCONATE 1 APPLICATION(S): 213 SOLUTION TOPICAL at 08:55

## 2022-11-21 RX ADMIN — Medication 1 MILLIGRAM(S): at 13:14

## 2022-11-21 RX ADMIN — NYSTATIN CREAM 1 APPLICATION(S): 100000 CREAM TOPICAL at 05:08

## 2022-11-21 RX ADMIN — NYSTATIN CREAM 1 APPLICATION(S): 100000 CREAM TOPICAL at 18:00

## 2022-11-21 RX ADMIN — Medication 1 LOZENGE: at 13:11

## 2022-11-21 RX ADMIN — Medication 10 MILLIGRAM(S): at 13:17

## 2022-11-21 RX ADMIN — ONDANSETRON 8 MILLIGRAM(S): 8 TABLET, FILM COATED ORAL at 13:18

## 2022-11-21 RX ADMIN — Medication 1 TABLET(S): at 17:59

## 2022-11-21 RX ADMIN — ONDANSETRON 8 MILLIGRAM(S): 8 TABLET, FILM COATED ORAL at 21:14

## 2022-11-21 RX ADMIN — APREPITANT 80 MILLIGRAM(S): 80 CAPSULE ORAL at 13:15

## 2022-11-21 RX ADMIN — LORATADINE 10 MILLIGRAM(S): 10 TABLET ORAL at 13:14

## 2022-11-21 RX ADMIN — MESNA 964 MILLIGRAM(S): 100 INJECTION, SOLUTION INTRAVENOUS at 14:38

## 2022-11-21 RX ADMIN — URSODIOL 300 MILLIGRAM(S): 250 TABLET, FILM COATED ORAL at 05:09

## 2022-11-21 RX ADMIN — MESNA 964 MILLIGRAM(S): 100 INJECTION, SOLUTION INTRAVENOUS at 20:45

## 2022-11-21 RX ADMIN — Medication 20 MILLIGRAM(S): at 13:14

## 2022-11-21 RX ADMIN — ONDANSETRON 8 MILLIGRAM(S): 8 TABLET, FILM COATED ORAL at 05:09

## 2022-11-21 RX ADMIN — Medication 50 MILLIEQUIVALENT(S): at 14:39

## 2022-11-21 RX ADMIN — Medication 5 MILLILITER(S): at 20:45

## 2022-11-21 RX ADMIN — Medication 5 MILLILITER(S): at 16:32

## 2022-11-21 RX ADMIN — Medication 1 TABLET(S): at 13:13

## 2022-11-21 RX ADMIN — MESNA 964 MILLIGRAM(S): 100 INJECTION, SOLUTION INTRAVENOUS at 17:58

## 2022-11-21 RX ADMIN — FLUCONAZOLE 400 MILLIGRAM(S): 150 TABLET ORAL at 13:13

## 2022-11-21 RX ADMIN — Medication 1 LOZENGE: at 20:45

## 2022-11-21 RX ADMIN — Medication 1 LOZENGE: at 16:33

## 2022-11-21 RX ADMIN — Medication 5 MILLILITER(S): at 13:12

## 2022-11-21 RX ADMIN — Medication 50 MILLIEQUIVALENT(S): at 13:11

## 2022-11-21 RX ADMIN — MESNA 964 MILLIGRAM(S): 100 INJECTION, SOLUTION INTRAVENOUS at 02:30

## 2022-11-21 RX ADMIN — Medication 50 MILLIEQUIVALENT(S): at 08:55

## 2022-11-21 RX ADMIN — Medication 10 MILLILITER(S): at 16:32

## 2022-11-21 RX ADMIN — Medication 1 LOZENGE: at 08:54

## 2022-11-21 RX ADMIN — Medication 10 MILLILITER(S): at 08:53

## 2022-11-21 RX ADMIN — Medication 40 MILLIGRAM(S): at 08:54

## 2022-11-21 RX ADMIN — PANTOPRAZOLE SODIUM 40 MILLIGRAM(S): 20 TABLET, DELAYED RELEASE ORAL at 05:10

## 2022-11-21 RX ADMIN — Medication 10 MILLILITER(S): at 13:12

## 2022-11-21 RX ADMIN — Medication 5 MILLILITER(S): at 08:54

## 2022-11-21 RX ADMIN — URSODIOL 300 MILLIGRAM(S): 250 TABLET, FILM COATED ORAL at 17:59

## 2022-11-21 RX ADMIN — Medication 1 TABLET(S): at 05:09

## 2022-11-21 NOTE — PROGRESS NOTE ADULT - CRITICAL CARE ATTENDING COMMENT
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   Day - 4 no issues  Problem/Plan -   1:  Problem: Stem cell transplant   ·  Plan: 1. Admit to BMTU   2. TLC placement in IR   3. Day -9 - day -2 - antiemetics   4. Day -9 & day -8, VP16 2400mg/m2 IV x 34 hours (final concentration 0.4mg /mL).   5. Strict I&O, daily weights, prn diuresis   6. After completion of VP16- start CTX hydration (D51/2NS + 10mEq KCl @ 150ml / m2) - continue for 24 hours post infusion of CTX   7. Day -7 - day -4 - CTX 1800 mg / m2 daily over 2 hours. Follow SMA7, mag, phos 6hours post CT . Mesna  12mg / kg - hemorrhagic cystitis ppx   8. Day -3 - BCNU 400mg / m2   9. Day -2 - day -1 - rest days  10. Day 0 - HPC transplant. Start Zarxio 480mcg SQ QD, continue through engraftment. Kepivance on days 0, 1 & 2   11. Anxiolytics, antinausea, antidiarrhea medications as needed   12. Lasix PRN while being aggressively hydrated to avoid VOD   13. Nutritional support, pain management.    Problem/Plan - 2:  ·  Problem: Need for prophylactic measure.   ·  Plan: 1. VOD prophylaxis - low dose heparin gtt (dosed at 100 units / kg / day), glutamine supplementation, Actigall BID   2. PCP prophylaxis - Bactrim DS through day -2    3. Antiviral prophylaxis - Acyclovir 400mg po TID to start day -1   4. Antifungal prophylaxis- Diflucan 400 mg po daily.  5. GI prophylaxis - Protonix po QD   6. Antibacterial prophylaxis - when ANC < 500, start Cipro 500mg po BID. If becomes febrile, pan cx, CXR and change Cipro to Cefepime 2g IV q 8 hours. Continue until count recovery  7. Kepivance for prevention of mucositis- days 0, +1, +2  8. Aggressive mouth care and skin care as per protocol.  9. transfusion and electrolyte support 57 year old female with peripheral T cell lymphoma s/p CHOEP x 6 admitted for autologous pbsct with high dose CBV prep regimen.   Day - 4   Problem/Plan -   1:  Problem: Stem cell transplant   ·  Plan: 1. Admit to BMTU   2. TLC placement in IR   3. Day -9 - day -2 - antiemetics   4. Day -9 & day -8, VP16 2400mg/m2 IV x 34 hours (final concentration 0.4mg /mL).   5. Strict I&O, daily weights, prn diuresis   6. After completion of VP16- start CTX hydration (D51/2NS + 10mEq KCl @ 150ml / m2) - continue for 24 hours post infusion of CTX   7. Day -7 - day -4 - CTX 1800 mg / m2 daily over 2 hours. Follow SMA7, mag, phos 6hours post CT . Mesna  12mg / kg - hemorrhagic cystitis ppx   8. Day -3 - BCNU 400mg / m2   9. Day -2 - day -1 - rest days  10. Day 0 - HPC transplant. Start Zarxio 480mcg SQ QD, continue through engraftment. Kepivance on days 0, 1 & 2   11. Anxiolytics, antinausea, antidiarrhea medications as needed   12. Lasix PRN while being aggressively hydrated to avoid VOD   13. Nutritional support, pain management.    Problem/Plan - 2:  ·  Problem: Need for prophylactic measure.   ·  Plan: 1. VOD prophylaxis - low dose heparin gtt (dosed at 100 units / kg / day), glutamine supplementation, Actigall BID   2. PCP prophylaxis - Bactrim DS through day -2    3. Antiviral prophylaxis - Acyclovir 400mg po TID to start day -1   4. Antifungal prophylaxis- Diflucan 400 mg po daily.  5. GI prophylaxis - Protonix po QD   6. Antibacterial prophylaxis - when ANC < 500, start Cipro 500mg po BID. If becomes febrile, pan cx, CXR and change Cipro to Cefepime 2g IV q 8 hours. Continue until count recovery  7. Kepivance for prevention of mucositis- days 0, +1, +2  8. Aggressive mouth care and skin care as per protocol.  9. Receiving transfusion and electrolyte support

## 2022-11-21 NOTE — PROGRESS NOTE ADULT - SUBJECTIVE AND OBJECTIVE BOX
[facility]PC Transplant Team                                                      Critical / Counseling Time Provided: 30 minutes                                                                                                                                                        Chief Complaint: Autologous peripheral blood stem cell transplant with high dose CBV prep regimen for treatment of peripheral T cell lymphoma    S: Patient seen and examined with HPC Transplant Team:     All other ROS negative     O: Vitals:   Vital Signs Last 24 Hrs  T(C): 36.6 (2022 05:10), Max: 37.2 (2022 13:52)  T(F): 97.9 (2022 05:10), Max: 99 (2022 13:52)  HR: 74 (2022 05:10) (74 - 94)  BP: 105/64 (2022 05:10) (93/53 - 117/72)  BP(mean): --  RR: 18 (2022 05:10) (18 - 19)  SpO2: 99% (2022 05:10) (97% - 99%)    Parameters below as of 2022 05:10  Patient On (Oxygen Delivery Method): room air        Admit weight: 80.3kg   Daily Weight in k.4 (2022 08:10)  Today's weight:     Intake / Output:   11-20 @ 07:01  -  11-21 @ 07:00  --------------------------------------------------------  IN: 9058.2 mL / OUT: 9000 mL / NET: 58.2 mL      PE:   Oropharynx: no erythema or ulcerations  Oral Mucositis:              -                                          Grade: n/a   CVS: S1, S2 RRR   Lungs: CTA throughout bilaterally   Abdomen: + BS x 4, soft, NT, ND   Extremities: no edema   Gastric Mucositis:       -                                           Grade: n/a   Intestinal Mucositis:     -                                         Grade: n/a   Skin: no rash   TLC: CDI   Neuro: A&O x 3   Pain: 0    Labs:   CBC Full  -  ( 2022 06:52 )  WBC Count : 1.77 K/uL  Hemoglobin : 8.5 g/dL  Hematocrit : 27.4 %  Platelet Count - Automated : 167 K/uL  Mean Cell Volume : 97.2 fl  Mean Cell Hemoglobin : 30.1 pg  Mean Cell Hemoglobin Concentration : 31.0 gm/dL  Auto Neutrophil # : x  Auto Lymphocyte # : x  Auto Monocyte # : x  Auto Eosinophil # : x  Auto Basophil # : x  Auto Neutrophil % : x  Auto Lymphocyte % : x  Auto Monocyte % : x  Auto Eosinophil % : x  Auto Basophil % : x                          8.5    1.77  )-----------( 167      ( 2022 06:52 )             27.4         139  |  104  |  13  ----------------------------<  144<H>  4.5   |  25  |  0.48<L>    Ca    9.2      2022 06:52  Phos  2.8       Mg     2.2         TPro  6.3  /  Alb  4.0  /  TBili  0.3  /  DBili  x   /  AST  14  /  ALT  24  /  AlkPhos  74      PT/INR - ( 2022 06:52 )   PT: 12.8 sec;   INR: 1.10 ratio         PTT - ( 2022 06:52 )  PTT:25.0 sec  LIVER FUNCTIONS - ( 2022 06:52 )  Alb: 4.0 g/dL / Pro: 6.3 g/dL / ALK PHOS: 74 U/L / ALT: 24 U/L / AST: 14 U/L / GGT: x           Lactate Dehydrogenase, Serum: 135 U/L ( @ 06:52)        Meds:   Antimicrobials:   clotrimazole Lozenge 1 Lozenge Oral five times a day  fluconAZOLE   Tablet 400 milliGRAM(s) Oral daily  trimethoprim  160 mG/sulfamethoxazole 800 mG 1 Tablet(s) Oral every 12 hours      Heme / Onc:   cyclophosphamide IVPB (eMAR) 2898 milliGRAM(s) IV Intermittent every 24 hours  heparin  Infusion 334 Unit(s)/Hr IV Continuous <Continuous>  mesna IVPB (eMAR) 964 milliGRAM(s) IV Intermittent every 3 hours      GI:  pantoprazole    Tablet 40 milliGRAM(s) Oral before breakfast  polyethylene glycol 3350 17 Gram(s) Oral daily PRN  senna 2 Tablet(s) Oral at bedtime PRN  sodium bicarbonate Mouth Rinse 10 milliLiter(s) Swish and Spit five times a day  ursodiol Capsule 300 milliGRAM(s) Oral two times a day      Cardiovascular:   furosemide   Injectable 20 milliGRAM(s) IV Push every 24 hours  furosemide   Injectable 40 milliGRAM(s) IV Push once  furosemide   Injectable 40 milliGRAM(s) IV Push once      Immunologic:       Other medications:   acetaminophen     Tablet .. 650 milliGRAM(s) Oral every 6 hours  aprepitant 80 milliGRAM(s) Oral every 24 hours  Biotene Dry Mouth Oral Rinse 5 milliLiter(s) Swish and Spit five times a day  chlorhexidine 4% Liquid 1 Application(s) Topical <User Schedule>  dexAMETHasone  IVPB 10 milliGRAM(s) IV Intermittent every 24 hours  FLUoxetine 20 milliGRAM(s) Oral daily  folic acid 1 milliGRAM(s) Oral daily  loratadine 10 milliGRAM(s) Oral daily  LORazepam   Injectable 1 milliGRAM(s) IV Push daily  multivitamin 1 Tablet(s) Oral daily  nystatin Powder 1 Application(s) Topical two times a day  ondansetron Injectable 8 milliGRAM(s) IV Push every 8 hours  potassium chloride  20 mEq/100 mL IVPB 20 milliEquivalent(s) IV Intermittent every 2 hours  sodium chloride 0.9% 1000 milliLiter(s) IV Continuous <Continuous>  sodium chloride 0.9%. 1000 milliLiter(s) IV Continuous <Continuous>      PRN:   acetaminophen     Tablet .. 650 milliGRAM(s) Oral every 6 hours PRN  AQUAPHOR (petrolatum Ointment) 1 Application(s) Topical two times a day PRN  LORazepam   Injectable 1 milliGRAM(s) IV Push every 6 hours PRN  metoclopramide Injectable 10 milliGRAM(s) IV Push every 6 hours PRN  polyethylene glycol 3350 17 Gram(s) Oral daily PRN  senna 2 Tablet(s) Oral at bedtime PRN  sodium chloride 0.9% lock flush 10 milliLiter(s) IV Push every 1 hour PRN    A/P:  57 year old female with a history of  peripheral T cell lymphoma   Pre  :  Autologous PBSCT day -5  - Completed  -16 24 hour continuous infusion, strict I&O, daily weights, prn diuresis    - Cytoxan day 3/.  - CTX /- continue CTX hydration for 24 hours post infusion of last dose. Strict I&O, daily weights, prn diuresis. Start cipro 500mg po BID when ANC < 500     1. Infectious Disease:   clotrimazole Lozenge 1 Lozenge Oral five times a day  fluconAZOLE   Tablet 400 milliGRAM(s) Oral daily  trimethoprim  160 mG/sulfamethoxazole 800 mG 1 Tablet(s) Oral every 12 hours    2. VOD Prophylaxis: Actigall, Glutamine, Heparin (dosed at 100 units / kg / day)     3. GI Prophylaxis:    pantoprazole    Tablet 40 milliGRAM(s) Oral before breakfast    4. Mouthcare - NS / NaHCO3 rinses, Mycelex, Biotene; Skin care     5. GVHD prophylaxis - n/a     6. Transfuse & replete electrolytes prn   KCl 20mEq IV q 2 hours x 3 doses     7. IV hydration, daily weights, strict I&O, prn diuresis   Lasix 40mg IV x 1 0800  Lasix 40mg IV x 1 1600    8. PO intake as tolerated, nutrition follow up as needed, MVI, folic acid     9. Antiemetics, anti-diarrhea medications:   LORazepam   Injectable 1 milliGRAM(s) IV Push every 6 hours PRN  metoclopramide Injectable 10 milliGRAM(s) IV Push every 6 hours PRN  aprepitant 80 milliGRAM(s) Oral every 24 hours  dexAMETHasone  IVPB 10 milliGRAM(s) IV Intermittent every 24 hours  LORazepam   Injectable 1 milliGRAM(s) IV Push daily  ondansetron Injectable 8 milliGRAM(s) IV Push every 8 hours    10. OOB as tolerated, physical therapy consult if needed     11. Monitor coags / fibrinogen 2x week, vitamin K as needed     12. Monitor closely for clinical changes, monitor for fevers     13. Emotional support provided, plan of care discussed and questions addressed     14. Patient education done regarding chemotherapy prep, plan of care, restrictions and discharge planning     15. Continue regular social work input     I have written the above note for Dr. Cabrales who performed service with me in the room.   Leena Ibrahim NP-C ([phone])    I have seen and examined patient with NP, I agree with above note as scribed.                    [facility] Transplant Team                                                      Critical / Counseling Time Provided: 30 minutes                                                                                                                                                        Chief Complaint: Autologous peripheral blood stem cell transplant with high dose CBV prep regimen for treatment of peripheral T cell lymphoma    S: Patient seen and examined with HPC Transplant Team:     All other ROS negative     O: Vitals:   Vital Signs Last 24 Hrs  T(C): 36.6 (2022 05:10), Max: 37.2 (2022 13:52)  T(F): 97.9 (2022 05:10), Max: 99 (2022 13:52)  HR: 74 (2022 05:10) (74 - 94)  BP: 105/64 (2022 05:10) (93/53 - 117/72)  BP(mean): --  RR: 18 (2022 05:10) (18 - 19)  SpO2: 99% (2022 05:10) (97% - 99%)    Parameters below as of 2022 05:10  Patient On (Oxygen Delivery Method): room air        Admit weight: 80.3kg   Daily Weight in k.4 (2022 08:10)  Today's weight: 78.6kg     Intake / Output:   -20 @ 07:01  -   @ 07:00  --------------------------------------------------------  IN: 9058.2 mL / OUT: 9000 mL / NET: 58.2 mL      PE:   Oropharynx: no erythema or ulcerations  Oral Mucositis:              -                                          Grade: n/a   CVS: S1, S2 RRR   Lungs: CTA throughout bilaterally   Abdomen: + BS x 4, soft, NT, ND   Extremities: no edema   Gastric Mucositis:       -                                           Grade: n/a   Intestinal Mucositis:     -                                         Grade: n/a   Skin: no rash   TLC: CDI   Neuro: A&O x 3   Pain: 0    Labs:   CBC Full  -  ( 2022 06:52 )  WBC Count : 1.77 K/uL  Hemoglobin : 8.5 g/dL  Hematocrit : 27.4 %  Platelet Count - Automated : 167 K/uL  Mean Cell Volume : 97.2 fl  Mean Cell Hemoglobin : 30.1 pg  Mean Cell Hemoglobin Concentration : 31.0 gm/dL  Auto Neutrophil # : x  Auto Lymphocyte # : x  Auto Monocyte # : x  Auto Eosinophil # : x  Auto Basophil # : x  Auto Neutrophil % : x  Auto Lymphocyte % : x  Auto Monocyte % : x  Auto Eosinophil % : x  Auto Basophil % : x                          8.5    1.77  )-----------( 167      ( 2022 06:52 )             27.4         139  |  104  |  13  ----------------------------<  144<H>  4.5   |  25  |  0.48<L>    Ca    9.2      2022 06:52  Phos  2.8       Mg     2.2         TPro  6.3  /  Alb  4.0  /  TBili  0.3  /  DBili  x   /  AST  14  /  ALT  24  /  AlkPhos  74      PT/INR - ( 2022 06:52 )   PT: 12.8 sec;   INR: 1.10 ratio         PTT - ( 2022 06:52 )  PTT:25.0 sec  LIVER FUNCTIONS - ( 2022 06:52 )  Alb: 4.0 g/dL / Pro: 6.3 g/dL / ALK PHOS: 74 U/L / ALT: 24 U/L / AST: 14 U/L / GGT: x           Lactate Dehydrogenase, Serum: 135 U/L ( @ 06:52)        Meds:   Antimicrobials:   clotrimazole Lozenge 1 Lozenge Oral five times a day  fluconAZOLE   Tablet 400 milliGRAM(s) Oral daily  trimethoprim  160 mG/sulfamethoxazole 800 mG 1 Tablet(s) Oral every 12 hours      Heme / Onc:   cyclophosphamide IVPB (eMAR) 2898 milliGRAM(s) IV Intermittent every 24 hours  heparin  Infusion 334 Unit(s)/Hr IV Continuous <Continuous>  mesna IVPB (eMAR) 964 milliGRAM(s) IV Intermittent every 3 hours      GI:  pantoprazole    Tablet 40 milliGRAM(s) Oral before breakfast  polyethylene glycol 3350 17 Gram(s) Oral daily PRN  senna 2 Tablet(s) Oral at bedtime PRN  sodium bicarbonate Mouth Rinse 10 milliLiter(s) Swish and Spit five times a day  ursodiol Capsule 300 milliGRAM(s) Oral two times a day      Cardiovascular:   furosemide   Injectable 20 milliGRAM(s) IV Push every 24 hours  furosemide   Injectable 40 milliGRAM(s) IV Push once  furosemide   Injectable 40 milliGRAM(s) IV Push once      Immunologic:       Other medications:   acetaminophen     Tablet .. 650 milliGRAM(s) Oral every 6 hours  aprepitant 80 milliGRAM(s) Oral every 24 hours  Biotene Dry Mouth Oral Rinse 5 milliLiter(s) Swish and Spit five times a day  chlorhexidine 4% Liquid 1 Application(s) Topical <User Schedule>  dexAMETHasone  IVPB 10 milliGRAM(s) IV Intermittent every 24 hours  FLUoxetine 20 milliGRAM(s) Oral daily  folic acid 1 milliGRAM(s) Oral daily  loratadine 10 milliGRAM(s) Oral daily  LORazepam   Injectable 1 milliGRAM(s) IV Push daily  multivitamin 1 Tablet(s) Oral daily  nystatin Powder 1 Application(s) Topical two times a day  ondansetron Injectable 8 milliGRAM(s) IV Push every 8 hours  potassium chloride  20 mEq/100 mL IVPB 20 milliEquivalent(s) IV Intermittent every 2 hours  sodium chloride 0.9% 1000 milliLiter(s) IV Continuous <Continuous>  sodium chloride 0.9%. 1000 milliLiter(s) IV Continuous <Continuous>      PRN:   acetaminophen     Tablet .. 650 milliGRAM(s) Oral every 6 hours PRN  AQUAPHOR (petrolatum Ointment) 1 Application(s) Topical two times a day PRN  LORazepam   Injectable 1 milliGRAM(s) IV Push every 6 hours PRN  metoclopramide Injectable 10 milliGRAM(s) IV Push every 6 hours PRN  polyethylene glycol 3350 17 Gram(s) Oral daily PRN  senna 2 Tablet(s) Oral at bedtime PRN  sodium chloride 0.9% lock flush 10 milliLiter(s) IV Push every 1 hour PRN    A/P:  57 year old female with a history of  peripheral T cell lymphoma   Pre  :  Autologous PBSCT day -5  - Completed  -16 24 hour continuous infusion, strict I&O, daily weights, prn diuresis    - Cytoxan day 3/.  - CTX /- continue CTX hydration for 24 hours post infusion of last dose. Strict I&O, daily weights, prn diuresis. Start cipro 500mg po BID when ANC < 500     1. Infectious Disease:   clotrimazole Lozenge 1 Lozenge Oral five times a day  fluconAZOLE   Tablet 400 milliGRAM(s) Oral daily  trimethoprim  160 mG/sulfamethoxazole 800 mG 1 Tablet(s) Oral every 12 hours    2. VOD Prophylaxis: Actigall, Glutamine, Heparin (dosed at 100 units / kg / day)     3. GI Prophylaxis:    pantoprazole    Tablet 40 milliGRAM(s) Oral before breakfast    4. Mouthcare - NS / NaHCO3 rinses, Mycelex, Biotene; Skin care     5. GVHD prophylaxis - n/a     6. Transfuse & replete electrolytes prn   KCl 20mEq IV q 2 hours x 3 doses     7. IV hydration, daily weights, strict I&O, prn diuresis   Lasix 40mg IV x 1 0800  Lasix 40mg IV x 1 1600    8. PO intake as tolerated, nutrition follow up as needed, MVI, folic acid     9. Antiemetics, anti-diarrhea medications:   LORazepam   Injectable 1 milliGRAM(s) IV Push every 6 hours PRN  metoclopramide Injectable 10 milliGRAM(s) IV Push every 6 hours PRN  aprepitant 80 milliGRAM(s) Oral every 24 hours  dexAMETHasone  IVPB 10 milliGRAM(s) IV Intermittent every 24 hours  LORazepam   Injectable 1 milliGRAM(s) IV Push daily  ondansetron Injectable 8 milliGRAM(s) IV Push every 8 hours    10. OOB as tolerated, physical therapy consult if needed     11. Monitor coags / fibrinogen 2x week, vitamin K as needed     12. Monitor closely for clinical changes, monitor for fevers     13. Emotional support provided, plan of care discussed and questions addressed     14. Patient education done regarding chemotherapy prep, plan of care, restrictions and discharge planning     15. Continue regular social work input     I have written the above note for Dr. Cabrales who performed service with me in the room.   Leena Ibrahim NP-C ([phone])    I have seen and examined patient with NP, I agree with above note as scribed.                    [facility] Transplant Team                                                      Critical / Counseling Time Provided: 30 minutes                                                                                                                                                        Chief Complaint: Autologous peripheral blood stem cell transplant with high dose CBV prep regimen for treatment of peripheral T cell lymphoma    S: Patient seen and examined with HPC Transplant Team:   + fatigue   + occasional nausea  + occasional loose stool   All other ROS negative     O: Vitals:   Vital Signs Last 24 Hrs  T(C): 36.6 (2022 05:10), Max: 37.2 (2022 13:52)  T(F): 97.9 (2022 05:10), Max: 99 (2022 13:52)  HR: 74 (2022 05:10) (74 - 94)  BP: 105/64 (2022 05:10) (93/53 - 117/72)  BP(mean): --  RR: 18 (2022 05:10) (18 - 19)  SpO2: 99% (2022 05:10) (97% - 99%)    Parameters below as of 2022 05:10  Patient On (Oxygen Delivery Method): room air      Admit weight: 80.3kg   Daily Weight in k.4 (2022 08:10)  Today's weight: 78.6kg     Intake / Output:   -20 @ 07:01  -   @ 07:00  --------------------------------------------------------  IN: 9058.2 mL / OUT: 9000 mL / NET: 58.2 mL      PE:   Oropharynx: no erythema or ulcerations  Oral Mucositis:              -                                          Grade: n/a   CVS: S1, S2 RRR   Lungs: CTA throughout bilaterally   Abdomen: + BS x 4, soft, NT, ND   Extremities: no edema   Gastric Mucositis:       -                                           Grade: n/a   Intestinal Mucositis:     -                                         Grade: n/a   Skin: + macular erythema to chest, upper back and neck post kepivance   TLC: CDI   Neuro: A&O x 3   Pain: 0    Labs:   CBC Full  -  ( 2022 06:52 )  WBC Count : 1.77 K/uL  Hemoglobin : 8.5 g/dL  Hematocrit : 27.4 %  Platelet Count - Automated : 167 K/uL  Mean Cell Volume : 97.2 fl  Mean Cell Hemoglobin : 30.1 pg  Mean Cell Hemoglobin Concentration : 31.0 gm/dL  Auto Neutrophil # : x  Auto Lymphocyte # : x  Auto Monocyte # : x  Auto Eosinophil # : x  Auto Basophil # : x  Auto Neutrophil % : x  Auto Lymphocyte % : x  Auto Monocyte % : x  Auto Eosinophil % : x  Auto Basophil % : x                          8.5    1.77  )-----------( 167      ( 2022 06:52 )             27.4         139  |  104  |  13  ----------------------------<  144<H>  4.5   |  25  |  0.48<L>    Ca    9.2      2022 06:52  Phos  2.8       Mg     2.2         TPro  6.3  /  Alb  4.0  /  TBili  0.3  /  DBili  x   /  AST  14  /  ALT  24  /  AlkPhos  74      PT/INR - ( 2022 06:52 )   PT: 12.8 sec;   INR: 1.10 ratio         PTT - ( 2022 06:52 )  PTT:25.0 sec  LIVER FUNCTIONS - ( 2022 06:52 )  Alb: 4.0 g/dL / Pro: 6.3 g/dL / ALK PHOS: 74 U/L / ALT: 24 U/L / AST: 14 U/L / GGT: x           Lactate Dehydrogenase, Serum: 135 U/L ( @ 06:52)        Meds:   Antimicrobials:   clotrimazole Lozenge 1 Lozenge Oral five times a day  fluconAZOLE   Tablet 400 milliGRAM(s) Oral daily  trimethoprim  160 mG/sulfamethoxazole 800 mG 1 Tablet(s) Oral every 12 hours      Heme / Onc:   cyclophosphamide IVPB (eMAR) 2898 milliGRAM(s) IV Intermittent every 24 hours  heparin  Infusion 334 Unit(s)/Hr IV Continuous <Continuous>  mesna IVPB (eMAR) 964 milliGRAM(s) IV Intermittent every 3 hours      GI:  pantoprazole    Tablet 40 milliGRAM(s) Oral before breakfast  polyethylene glycol 3350 17 Gram(s) Oral daily PRN  senna 2 Tablet(s) Oral at bedtime PRN  sodium bicarbonate Mouth Rinse 10 milliLiter(s) Swish and Spit five times a day  ursodiol Capsule 300 milliGRAM(s) Oral two times a day      Cardiovascular:   furosemide   Injectable 20 milliGRAM(s) IV Push every 24 hours  furosemide   Injectable 40 milliGRAM(s) IV Push once  furosemide   Injectable 40 milliGRAM(s) IV Push once      Immunologic:       Other medications:   acetaminophen     Tablet .. 650 milliGRAM(s) Oral every 6 hours  aprepitant 80 milliGRAM(s) Oral every 24 hours  Biotene Dry Mouth Oral Rinse 5 milliLiter(s) Swish and Spit five times a day  chlorhexidine 4% Liquid 1 Application(s) Topical <User Schedule>  dexAMETHasone  IVPB 10 milliGRAM(s) IV Intermittent every 24 hours  FLUoxetine 20 milliGRAM(s) Oral daily  folic acid 1 milliGRAM(s) Oral daily  loratadine 10 milliGRAM(s) Oral daily  LORazepam   Injectable 1 milliGRAM(s) IV Push daily  multivitamin 1 Tablet(s) Oral daily  nystatin Powder 1 Application(s) Topical two times a day  ondansetron Injectable 8 milliGRAM(s) IV Push every 8 hours  potassium chloride  20 mEq/100 mL IVPB 20 milliEquivalent(s) IV Intermittent every 2 hours  sodium chloride 0.9% 1000 milliLiter(s) IV Continuous <Continuous>  sodium chloride 0.9%. 1000 milliLiter(s) IV Continuous <Continuous>      PRN:   acetaminophen     Tablet .. 650 milliGRAM(s) Oral every 6 hours PRN  AQUAPHOR (petrolatum Ointment) 1 Application(s) Topical two times a day PRN  LORazepam   Injectable 1 milliGRAM(s) IV Push every 6 hours PRN  metoclopramide Injectable 10 milliGRAM(s) IV Push every 6 hours PRN  polyethylene glycol 3350 17 Gram(s) Oral daily PRN  senna 2 Tablet(s) Oral at bedtime PRN  sodium chloride 0.9% lock flush 10 milliLiter(s) IV Push every 1 hour PRN    A/P:  57 year old female with a history of  peripheral T cell lymphoma   Pre  :  Autologous PBSCT day -5  - Completed  -16 24 hour continuous infusion, strict I&O, daily weights, prn diuresis    - Cytoxan day 3/.  - CTX /- continue CTX hydration for 24 hours post infusion of last dose. Strict I&O, daily weights, prn diuresis. Start cipro 500mg po BID when ANC < 500     1. Infectious Disease:   clotrimazole Lozenge 1 Lozenge Oral five times a day  fluconAZOLE   Tablet 400 milliGRAM(s) Oral daily  trimethoprim  160 mG/sulfamethoxazole 800 mG 1 Tablet(s) Oral every 12 hours    2. VOD Prophylaxis: Actigall, Glutamine, Heparin (dosed at 100 units / kg / day)     3. GI Prophylaxis:    pantoprazole    Tablet 40 milliGRAM(s) Oral before breakfast    4. Mouthcare - NS / NaHCO3 rinses, Mycelex, Biotene; Skin care     5. GVHD prophylaxis - n/a     6. Transfuse & replete electrolytes prn   KCl 20mEq IV q 2 hours x 3 doses     7. IV hydration, daily weights, strict I&O, prn diuresis   Lasix 40mg IV x 1 0800  Lasix 40mg IV x 1 1600    8. PO intake as tolerated, nutrition follow up as needed, MVI, folic acid     9. Antiemetics, anti-diarrhea medications:   LORazepam   Injectable 1 milliGRAM(s) IV Push every 6 hours PRN  metoclopramide Injectable 10 milliGRAM(s) IV Push every 6 hours PRN  aprepitant 80 milliGRAM(s) Oral every 24 hours  dexAMETHasone  IVPB 10 milliGRAM(s) IV Intermittent every 24 hours  LORazepam   Injectable 1 milliGRAM(s) IV Push daily  ondansetron Injectable 8 milliGRAM(s) IV Push every 8 hours    10. OOB as tolerated, physical therapy consult if needed     11. Monitor coags / fibrinogen 2x week, vitamin K as needed     12. Monitor closely for clinical changes, monitor for fevers     13. Emotional support provided, plan of care discussed and questions addressed     14. Patient education done regarding chemotherapy prep, plan of care, restrictions and discharge planning     15. Continue regular social work input     I have written the above note for Dr. Cabrales who performed service with me in the room.   Leena Ibrahim NP-C ([phone])    I have seen and examined patient with NP, I agree with above note as scribed.                    [facility]PC Transplant Team                                                      Critical / Counseling Time Provided: 30 minutes                                                                                                                                                        Chief Complaint: Autologous peripheral blood stem cell transplant with high dose CBV prep regimen for treatment of peripheral T cell lymphoma    S: Patient seen and examined with HPC Transplant Team:   + fatigue   + occasional nausea  + occasional loose stool   All other ROS negative     O: Vitals:   Vital Signs Last 24 Hrs  T(C): 36.6 (2022 05:10), Max: 37.2 (2022 13:52)  T(F): 97.9 (2022 05:10), Max: 99 (2022 13:52)  HR: 74 (2022 05:10) (74 - 94)  BP: 105/64 (2022 05:10) (93/53 - 117/72)  BP(mean): --  RR: 18 (2022 05:10) (18 - 19)  SpO2: 99% (2022 05:10) (97% - 99%)    Parameters below as of 2022 05:10  Patient On (Oxygen Delivery Method): room air      Admit weight: 80.3kg   Daily Weight in k.4 (2022 08:10)  Today's weight: 78.6kg     Intake / Output:   -20 @ 07:01  -   @ 07:00  --------------------------------------------------------  IN: 9058.2 mL / OUT: 9000 mL / NET: 58.2 mL      PE:   Oropharynx: no erythema or ulcerations  Oral Mucositis:              -                                          Grade: n/a   CVS: S1, S2 RRR   Lungs: CTA throughout bilaterally   Abdomen: + BS x 4, soft, NT, ND   Extremities: no edema   Gastric Mucositis:       -                                           Grade: n/a   Intestinal Mucositis:     -                                         Grade: n/a   Skin: + macular erythema to chest, upper back and neck post kepivance   TLC: CDI   Neuro: A&O x 3   Pain: 0    Labs:   CBC Full  -  ( 2022 06:52 )  WBC Count : 1.77 K/uL  Hemoglobin : 8.5 g/dL  Hematocrit : 27.4 %  Platelet Count - Automated : 167 K/uL  Mean Cell Volume : 97.2 fl  Mean Cell Hemoglobin : 30.1 pg  Mean Cell Hemoglobin Concentration : 31.0 gm/dL  Auto Neutrophil # : x  Auto Lymphocyte # : x  Auto Monocyte # : x  Auto Eosinophil # : x  Auto Basophil # : x  Auto Neutrophil % : x  Auto Lymphocyte % : x  Auto Monocyte % : x  Auto Eosinophil % : x  Auto Basophil % : x                          8.5    1.77  )-----------( 167      ( 2022 06:52 )             27.4         139  |  104  |  13  ----------------------------<  144<H>  4.5   |  25  |  0.48<L>    Ca    9.2      2022 06:52  Phos  2.8       Mg     2.2         TPro  6.3  /  Alb  4.0  /  TBili  0.3  /  DBili  x   /  AST  14  /  ALT  24  /  AlkPhos  74      PT/INR - ( 2022 06:52 )   PT: 12.8 sec;   INR: 1.10 ratio         PTT - ( 2022 06:52 )  PTT:25.0 sec  LIVER FUNCTIONS - ( 2022 06:52 )  Alb: 4.0 g/dL / Pro: 6.3 g/dL / ALK PHOS: 74 U/L / ALT: 24 U/L / AST: 14 U/L / GGT: x           Lactate Dehydrogenase, Serum: 135 U/L ( @ 06:52)        Meds:   Antimicrobials:   clotrimazole Lozenge 1 Lozenge Oral five times a day  fluconAZOLE   Tablet 400 milliGRAM(s) Oral daily  trimethoprim  160 mG/sulfamethoxazole 800 mG 1 Tablet(s) Oral every 12 hours      Heme / Onc:   cyclophosphamide IVPB (eMAR) 2898 milliGRAM(s) IV Intermittent every 24 hours  heparin  Infusion 334 Unit(s)/Hr IV Continuous <Continuous>  mesna IVPB (eMAR) 964 milliGRAM(s) IV Intermittent every 3 hours      GI:  pantoprazole    Tablet 40 milliGRAM(s) Oral before breakfast  polyethylene glycol 3350 17 Gram(s) Oral daily PRN  senna 2 Tablet(s) Oral at bedtime PRN  sodium bicarbonate Mouth Rinse 10 milliLiter(s) Swish and Spit five times a day  ursodiol Capsule 300 milliGRAM(s) Oral two times a day      Cardiovascular:   furosemide   Injectable 20 milliGRAM(s) IV Push every 24 hours  furosemide   Injectable 40 milliGRAM(s) IV Push once  furosemide   Injectable 40 milliGRAM(s) IV Push once      Immunologic:       Other medications:   acetaminophen     Tablet .. 650 milliGRAM(s) Oral every 6 hours  aprepitant 80 milliGRAM(s) Oral every 24 hours  Biotene Dry Mouth Oral Rinse 5 milliLiter(s) Swish and Spit five times a day  chlorhexidine 4% Liquid 1 Application(s) Topical <User Schedule>  dexAMETHasone  IVPB 10 milliGRAM(s) IV Intermittent every 24 hours  FLUoxetine 20 milliGRAM(s) Oral daily  folic acid 1 milliGRAM(s) Oral daily  loratadine 10 milliGRAM(s) Oral daily  LORazepam   Injectable 1 milliGRAM(s) IV Push daily  multivitamin 1 Tablet(s) Oral daily  nystatin Powder 1 Application(s) Topical two times a day  ondansetron Injectable 8 milliGRAM(s) IV Push every 8 hours  potassium chloride  20 mEq/100 mL IVPB 20 milliEquivalent(s) IV Intermittent every 2 hours  sodium chloride 0.9% 1000 milliLiter(s) IV Continuous <Continuous>  sodium chloride 0.9%. 1000 milliLiter(s) IV Continuous <Continuous>      PRN:   acetaminophen     Tablet .. 650 milliGRAM(s) Oral every 6 hours PRN  AQUAPHOR (petrolatum Ointment) 1 Application(s) Topical two times a day PRN  LORazepam   Injectable 1 milliGRAM(s) IV Push every 6 hours PRN  metoclopramide Injectable 10 milliGRAM(s) IV Push every 6 hours PRN  polyethylene glycol 3350 17 Gram(s) Oral daily PRN  senna 2 Tablet(s) Oral at bedtime PRN  sodium chloride 0.9% lock flush 10 milliLiter(s) IV Push every 1 hour PRN    A/P:  57 year old female with a history of  peripheral T cell lymphoma   Pre  :  Autologous PBSCT day -4  - Completed  -16 24 hour continuous infusion, strict I&O, daily weights, prn diuresis    - Cytoxan day 3/4.  - CTX - continue CTX hydration for 24 hours post infusion of last dose. Strict I&O, daily weights, prn diuresis. Start cipro 500mg po BID when ANC < 500     1. Infectious Disease:   clotrimazole Lozenge 1 Lozenge Oral five times a day  fluconAZOLE   Tablet 400 milliGRAM(s) Oral daily  trimethoprim  160 mG/sulfamethoxazole 800 mG 1 Tablet(s) Oral every 12 hours    2. VOD Prophylaxis: Actigall, Glutamine, Heparin (dosed at 100 units / kg / day)     3. GI Prophylaxis:    pantoprazole    Tablet 40 milliGRAM(s) Oral before breakfast    4. Mouthcare - NS / NaHCO3 rinses, Mycelex, Biotene; Skin care     5. GVHD prophylaxis - n/a     6. Transfuse & replete electrolytes prn   KCl 20mEq IV q 2 hours x 3 doses     7. IV hydration, daily weights, strict I&O, prn diuresis   Lasix 40mg IV x 1 0800  Lasix 40mg IV x 1 1600    8. PO intake as tolerated, nutrition follow up as needed, MVI, folic acid     9. Antiemetics, anti-diarrhea medications:   LORazepam   Injectable 1 milliGRAM(s) IV Push every 6 hours PRN  metoclopramide Injectable 10 milliGRAM(s) IV Push every 6 hours PRN  aprepitant 80 milliGRAM(s) Oral every 24 hours  dexAMETHasone  IVPB 10 milliGRAM(s) IV Intermittent every 24 hours  LORazepam   Injectable 1 milliGRAM(s) IV Push daily  ondansetron Injectable 8 milliGRAM(s) IV Push every 8 hours    10. OOB as tolerated, physical therapy consult if needed     11. Monitor coags / fibrinogen 2x week, vitamin K as needed     12. Monitor closely for clinical changes, monitor for fevers     13. Emotional support provided, plan of care discussed and questions addressed     14. Patient education done regarding chemotherapy prep, plan of care, restrictions and discharge planning     15. Continue regular social work input     I have written the above note for Dr. Cabrales who performed service with me in the room.   Leena Ibrahim NP-C ([phone])    I have seen and examined patient with NP, I agree with above note as scribed.

## 2022-11-22 LAB
ALBUMIN SERPL ELPH-MCNC: 3.9 G/DL — SIGNIFICANT CHANGE UP (ref 3.3–5)
ALP SERPL-CCNC: 72 U/L — SIGNIFICANT CHANGE UP (ref 40–120)
ALT FLD-CCNC: 22 U/L — SIGNIFICANT CHANGE UP (ref 10–45)
ANION GAP SERPL CALC-SCNC: 9 MMOL/L — SIGNIFICANT CHANGE UP (ref 5–17)
AST SERPL-CCNC: 12 U/L — SIGNIFICANT CHANGE UP (ref 10–40)
BASOPHILS # BLD AUTO: 0 K/UL — SIGNIFICANT CHANGE UP (ref 0–0.2)
BASOPHILS NFR BLD AUTO: 0 % — SIGNIFICANT CHANGE UP (ref 0–2)
BILIRUB SERPL-MCNC: 0.3 MG/DL — SIGNIFICANT CHANGE UP (ref 0.2–1.2)
BUN SERPL-MCNC: 10 MG/DL — SIGNIFICANT CHANGE UP (ref 7–23)
CALCIUM SERPL-MCNC: 9.2 MG/DL — SIGNIFICANT CHANGE UP (ref 8.4–10.5)
CHLORIDE SERPL-SCNC: 104 MMOL/L — SIGNIFICANT CHANGE UP (ref 96–108)
CO2 SERPL-SCNC: 25 MMOL/L — SIGNIFICANT CHANGE UP (ref 22–31)
CREAT SERPL-MCNC: 0.44 MG/DL — LOW (ref 0.5–1.3)
CULTURE RESULTS: SIGNIFICANT CHANGE UP
EGFR: 114 ML/MIN/1.73M2 — SIGNIFICANT CHANGE UP
EOSINOPHIL # BLD AUTO: 0.02 K/UL — SIGNIFICANT CHANGE UP (ref 0–0.5)
EOSINOPHIL NFR BLD AUTO: 1.8 % — SIGNIFICANT CHANGE UP (ref 0–6)
GLUCOSE SERPL-MCNC: 129 MG/DL — HIGH (ref 70–99)
HCT VFR BLD CALC: 26.7 % — LOW (ref 34.5–45)
HGB BLD-MCNC: 8.3 G/DL — LOW (ref 11.5–15.5)
HYPOSEGMENTATION: PRESENT — SIGNIFICANT CHANGE UP
LDH SERPL L TO P-CCNC: 127 U/L — SIGNIFICANT CHANGE UP (ref 50–242)
LYMPHOCYTES # BLD AUTO: 0.08 K/UL — LOW (ref 1–3.3)
LYMPHOCYTES # BLD AUTO: 7 % — LOW (ref 13–44)
MAGNESIUM SERPL-MCNC: 2.2 MG/DL — SIGNIFICANT CHANGE UP (ref 1.6–2.6)
MANUAL SMEAR VERIFICATION: SIGNIFICANT CHANGE UP
MCHC RBC-ENTMCNC: 30.1 PG — SIGNIFICANT CHANGE UP (ref 27–34)
MCHC RBC-ENTMCNC: 31.1 GM/DL — LOW (ref 32–36)
MCV RBC AUTO: 96.7 FL — SIGNIFICANT CHANGE UP (ref 80–100)
MONOCYTES # BLD AUTO: 0 K/UL — SIGNIFICANT CHANGE UP (ref 0–0.9)
MONOCYTES NFR BLD AUTO: 0 % — LOW (ref 2–14)
NEUTROPHILS # BLD AUTO: 1.04 K/UL — LOW (ref 1.8–7.4)
NEUTROPHILS NFR BLD AUTO: 91.2 % — HIGH (ref 43–77)
PHOSPHATE SERPL-MCNC: 2.7 MG/DL — SIGNIFICANT CHANGE UP (ref 2.5–4.5)
PLAT MORPH BLD: NORMAL — SIGNIFICANT CHANGE UP
PLATELET # BLD AUTO: 130 K/UL — LOW (ref 150–400)
POTASSIUM SERPL-MCNC: 4.5 MMOL/L — SIGNIFICANT CHANGE UP (ref 3.5–5.3)
POTASSIUM SERPL-SCNC: 4.5 MMOL/L — SIGNIFICANT CHANGE UP (ref 3.5–5.3)
PROT SERPL-MCNC: 6.3 G/DL — SIGNIFICANT CHANGE UP (ref 6–8.3)
RBC # BLD: 2.76 M/UL — LOW (ref 3.8–5.2)
RBC # FLD: 13.5 % — SIGNIFICANT CHANGE UP (ref 10.3–14.5)
RBC BLD AUTO: SIGNIFICANT CHANGE UP
SODIUM SERPL-SCNC: 138 MMOL/L — SIGNIFICANT CHANGE UP (ref 135–145)
SPECIMEN SOURCE: SIGNIFICANT CHANGE UP
WBC # BLD: 1.14 K/UL — LOW (ref 3.8–10.5)
WBC # FLD AUTO: 1.14 K/UL — LOW (ref 3.8–10.5)

## 2022-11-22 PROCEDURE — 99291 CRITICAL CARE FIRST HOUR: CPT

## 2022-11-22 RX ORDER — FUROSEMIDE 40 MG
40 TABLET ORAL ONCE
Refills: 0 | Status: COMPLETED | OUTPATIENT
Start: 2022-11-22 | End: 2022-11-22

## 2022-11-22 RX ORDER — ZINC OXIDE 200 MG/G
1 OINTMENT TOPICAL THREE TIMES A DAY
Refills: 0 | Status: DISCONTINUED | OUTPATIENT
Start: 2022-11-22 | End: 2022-12-16

## 2022-11-22 RX ORDER — POTASSIUM CHLORIDE 20 MEQ
20 PACKET (EA) ORAL
Refills: 0 | Status: COMPLETED | OUTPATIENT
Start: 2022-11-22 | End: 2022-11-22

## 2022-11-22 RX ORDER — DIPHENHYDRAMINE HYDROCHLORIDE AND LIDOCAINE HYDROCHLORIDE AND ALUMINUM HYDROXIDE AND MAGNESIUM HYDRO
10 KIT EVERY 6 HOURS
Refills: 0 | Status: DISCONTINUED | OUTPATIENT
Start: 2022-11-22 | End: 2022-12-16

## 2022-11-22 RX ORDER — CARMUSTINE 100 MG
644 KIT INTRAVENOUS ONCE
Refills: 0 | Status: COMPLETED | OUTPATIENT
Start: 2022-11-22 | End: 2022-11-22

## 2022-11-22 RX ADMIN — Medication 5 MILLILITER(S): at 00:06

## 2022-11-22 RX ADMIN — Medication 10 MILLILITER(S): at 20:37

## 2022-11-22 RX ADMIN — DIPHENHYDRAMINE HYDROCHLORIDE AND LIDOCAINE HYDROCHLORIDE AND ALUMINUM HYDROXIDE AND MAGNESIUM HYDRO 10 MILLILITER(S): KIT at 18:12

## 2022-11-22 RX ADMIN — Medication 50 MILLIEQUIVALENT(S): at 12:00

## 2022-11-22 RX ADMIN — Medication 1 LOZENGE: at 17:00

## 2022-11-22 RX ADMIN — Medication 10 MILLIGRAM(S): at 17:00

## 2022-11-22 RX ADMIN — Medication 50 MILLIEQUIVALENT(S): at 17:00

## 2022-11-22 RX ADMIN — Medication 1 TABLET(S): at 18:10

## 2022-11-22 RX ADMIN — FLUCONAZOLE 400 MILLIGRAM(S): 150 TABLET ORAL at 12:57

## 2022-11-22 RX ADMIN — MESNA 964 MILLIGRAM(S): 100 INJECTION, SOLUTION INTRAVENOUS at 02:47

## 2022-11-22 RX ADMIN — Medication 1 TABLET(S): at 12:56

## 2022-11-22 RX ADMIN — Medication 40 MILLIGRAM(S): at 16:09

## 2022-11-22 RX ADMIN — CHLORHEXIDINE GLUCONATE 1 APPLICATION(S): 213 SOLUTION TOPICAL at 10:00

## 2022-11-22 RX ADMIN — SODIUM CHLORIDE 270 MILLILITER(S): 9 INJECTION, SOLUTION INTRAVENOUS at 05:27

## 2022-11-22 RX ADMIN — Medication 1 LOZENGE: at 12:58

## 2022-11-22 RX ADMIN — URSODIOL 300 MILLIGRAM(S): 250 TABLET, FILM COATED ORAL at 05:27

## 2022-11-22 RX ADMIN — NYSTATIN CREAM 1 APPLICATION(S): 100000 CREAM TOPICAL at 06:18

## 2022-11-22 RX ADMIN — Medication 10 MILLILITER(S): at 08:15

## 2022-11-22 RX ADMIN — URSODIOL 300 MILLIGRAM(S): 250 TABLET, FILM COATED ORAL at 18:10

## 2022-11-22 RX ADMIN — Medication 10 MILLILITER(S): at 17:00

## 2022-11-22 RX ADMIN — LORATADINE 10 MILLIGRAM(S): 10 TABLET ORAL at 12:57

## 2022-11-22 RX ADMIN — Medication 20 MILLIGRAM(S): at 12:59

## 2022-11-22 RX ADMIN — ONDANSETRON 8 MILLIGRAM(S): 8 TABLET, FILM COATED ORAL at 14:00

## 2022-11-22 RX ADMIN — SODIUM CHLORIDE 20 MILLILITER(S): 9 INJECTION INTRAMUSCULAR; INTRAVENOUS; SUBCUTANEOUS at 05:28

## 2022-11-22 RX ADMIN — Medication 1 LOZENGE: at 08:15

## 2022-11-22 RX ADMIN — HEPARIN SODIUM 3.34 UNIT(S)/HR: 5000 INJECTION INTRAVENOUS; SUBCUTANEOUS at 06:18

## 2022-11-22 RX ADMIN — APREPITANT 80 MILLIGRAM(S): 80 CAPSULE ORAL at 12:57

## 2022-11-22 RX ADMIN — Medication 5 MILLILITER(S): at 16:59

## 2022-11-22 RX ADMIN — Medication 1 LOZENGE: at 20:37

## 2022-11-22 RX ADMIN — Medication 40 MILLIGRAM(S): at 09:19

## 2022-11-22 RX ADMIN — Medication 10 MILLILITER(S): at 00:05

## 2022-11-22 RX ADMIN — Medication 5 MILLILITER(S): at 12:56

## 2022-11-22 RX ADMIN — Medication 5 MILLILITER(S): at 08:15

## 2022-11-22 RX ADMIN — NYSTATIN CREAM 1 APPLICATION(S): 100000 CREAM TOPICAL at 18:10

## 2022-11-22 RX ADMIN — Medication 1 TABLET(S): at 05:27

## 2022-11-22 RX ADMIN — ONDANSETRON 8 MILLIGRAM(S): 8 TABLET, FILM COATED ORAL at 05:27

## 2022-11-22 RX ADMIN — CARMUSTINE 644 MILLIGRAM(S): KIT at 14:55

## 2022-11-22 RX ADMIN — Medication 1 LOZENGE: at 00:06

## 2022-11-22 RX ADMIN — Medication 50 MILLIEQUIVALENT(S): at 10:00

## 2022-11-22 RX ADMIN — ONDANSETRON 8 MILLIGRAM(S): 8 TABLET, FILM COATED ORAL at 21:02

## 2022-11-22 RX ADMIN — Medication 102 MILLIGRAM(S): at 18:09

## 2022-11-22 RX ADMIN — Medication 10 MILLILITER(S): at 12:58

## 2022-11-22 RX ADMIN — PANTOPRAZOLE SODIUM 40 MILLIGRAM(S): 20 TABLET, DELAYED RELEASE ORAL at 06:17

## 2022-11-22 RX ADMIN — Medication 5 MILLILITER(S): at 20:37

## 2022-11-22 RX ADMIN — Medication 1 MILLIGRAM(S): at 12:57

## 2022-11-22 NOTE — CHART NOTE - NSCHARTNOTEFT_GEN_A_CORE
Nutrition Follow Up Note  Patient seen for: BMT Follow up    Chart reviewed, events noted: "57 year old female with a history of  peripheral T cell lymphoma Pre  :  Autologous PBSCT day -3; - Completed  -16 24 hour continuous infusion, strict I&O, daily weights, prn diuresis;  - Cytoxan day 3/4.; - CTX - continue CTX hydration for 24 hours post infusion of last dose. Strict I&O, daily weights, prn diuresis. Start cipro 500mg po BID when ANC < 500."    Source: [x] Patient       [x] EMR        [] RN        [] Family at bedside       [] Other:    Diet Order:   Diet, Regular:   GlutaSolve(Glutamine) 15gm pkg     Qty per Day:  1 (22)    - Is current order appropriate/adequate? [x] Yes  []  No:     - PO intake :   [] >75%  Adequate    [x] 50-75%  Fair       [] <50%  Poor    - Nutrition-related concerns:      -      -       -      -  GI:  Last BM .   Bowel Regimen? [] Yes   [x] No      Weights:   Daily Weight in k.5 (), Weight in k.6 (), Weight in k.4 (), Weight in k.8 (-), Weight in k.6 (-), Weight in k.4 ()    Nutritionally Pertinent MEDICATIONS  (STANDING):  carmustine IVPB (eMAR)  clotrimazole Lozenge  dexAMETHasone  IVPB  fluconAZOLE   Tablet  folic acid  furosemide   Injectable  multivitamin  pantoprazole    Tablet  potassium chloride  20 mEq/100 mL IVPB  sodium bicarbonate Mouth Rinse  sodium chloride 0.9%  sodium chloride 0.9%.  trimethoprim  160 mG/sulfamethoxazole 800 mG  ursodiol Capsule    Pertinent Labs:  @ 06:52: Na 138, BUN 10, Cr 0.44<L>, <H>, K+ 4.5, Phos 2.7, Mg 2.2, Alk Phos 72, ALT/SGPT 22, AST/SGOT 12, HbA1c --   @ 22:19: Na 135, BUN 15, Cr 0.46<L>, <H>, K+ 5.0, Phos 2.6, Mg 2.1, Alk Phos 74, ALT/SGPT 22, AST/SGOT 14, HbA1c --      Finger Sticks: n/a      Skin per nursing documentation: No pressure injuries noted per flowsheets   Edema: no edema noted per flowsheets    Estimated Needs:   [x] no change since previous assessment ()  [] recalculated:     Previous Nutrition Diagnosis:   Nutrition Diagnosis is: [x] ongoing  [] resolved [] not applicable     New Nutrition Diagnosis: [x] Not applicable    Nutrition Care Plan:  [x] In Progress  [] Achieved  [] Not applicable    Nutrition Interventions:     Education Provided:       [x] Yes:  [] No:        Recommendations:         [x] Continue current diet order            [] Add oral nutrition supplement:     [] Discontinue current diet order. Recommend:      [] Add micronutrient supplementation:      [] Continue current micronutrient supplementation:      [] Other:     Monitoring and Evaluation:   Continue to monitor nutritional intake, tolerance to diet prescription, weights, labs, skin integrity      RD remains available upon request and will follow up per protocol  Corinne Lima, Dietetic Intern, Pager #: [phone] Nutrition Follow Up Note  Patient seen for: BMT Follow up    Chart reviewed, events noted: "57 year old female with a history of  peripheral T cell lymphoma Pre  :  Autologous PBSCT day -3; - Completed  -16 24 hour continuous infusion, strict I&O, daily weights, prn diuresis;  - Cytoxan day 3/4.; - CTX - continue CTX hydration for 24 hours post infusion of last dose. Strict I&O, daily weights, prn diuresis. Start cipro 500mg po BID when ANC < 500."    Source: [x] Patient       [x] EMR        [] RN        [] Family at bedside       [] Other:    Diet Order:   Diet, Regular:   GlutaSolve(Glutamine) 15gm pkg     Qty per Day:  1 (22)    - Is current order appropriate/adequate? [x] Yes  []  No:     - PO intake :   [] >75%  Adequate    [x] 50-75%  Fair       [] <50%  Poor    - Nutrition-related concerns:      -      -       -      -  GI:  Last BM .   Bowel Regimen? [] Yes   [x] No  -Reports some nausea, no vomiting; no d/c, previous constipation resolved, loose stools currently.      Weights:   Daily Weight in k.5 (), Weight in k.6 (), Weight in k.4 (-), Weight in k.8 (-), Weight in k.6 (-18), Weight in k.4 (-17)    Nutritionally Pertinent MEDICATIONS  (STANDING):  carmustine IVPB (eMAR)  clotrimazole Lozenge  dexAMETHasone  IVPB  fluconAZOLE   Tablet  folic acid  furosemide   Injectable  multivitamin  pantoprazole    Tablet  potassium chloride  20 mEq/100 mL IVPB  sodium bicarbonate Mouth Rinse  sodium chloride 0.9%  sodium chloride 0.9%.  trimethoprim  160 mG/sulfamethoxazole 800 mG  ursodiol Capsule    Pertinent Labs:  @ 06:52: Na 138, BUN 10, Cr 0.44<L>, <H>, K+ 4.5, Phos 2.7, Mg 2.2, Alk Phos 72, ALT/SGPT 22, AST/SGOT 12, HbA1c --   @ 22:19: Na 135, BUN 15, Cr 0.46<L>, <H>, K+ 5.0, Phos 2.6, Mg 2.1, Alk Phos 74, ALT/SGPT 22, AST/SGOT 14, HbA1c --      Finger Sticks: n/a      Skin per nursing documentation: No pressure injuries noted per flowsheets   Edema: no edema noted per flowsheets    Estimated Needs:   [x] no change since previous assessment (-)  [] recalculated:     Previous Nutrition Diagnosis: Predicted inadequate protein intake  Nutrition Diagnosis is: [x] ongoing  [] resolved [] not applicable     New Nutrition Diagnosis: [x] Not applicable    Nutrition Care Plan:  [x] In Progress- Patient meeting 50-75% protein-energy needs  [] Achieved  [] Not applicable    Nutrition Interventions:     Education Provided:       [x] Yes:  [] No:        Recommendations:         [x] Continue current diet order            [] Add oral nutrition supplement:     [] Discontinue current diet order. Recommend:      [] Add micronutrient supplementation:      [] Continue current micronutrient supplementation:      [] Other:     Monitoring and Evaluation:   Continue to monitor nutritional intake, tolerance to diet prescription, weights, labs, skin integrity      RD remains available upon request and will follow up per protocol  Corinne Lima, Dietetic Intern, Pager #: [phone] Nutrition Follow Up Note  Patient seen for: BMT Follow up    Chart reviewed, events noted: "57 year old female with a history of  peripheral T cell lymphoma Pre  :  Autologous PBSCT day -3; - Completed  -16 24 hour continuous infusion, strict I&O, daily weights, prn diuresis;  - Cytoxan day 3/4.; - CTX - continue CTX hydration for 24 hours post infusion of last dose. Strict I&O, daily weights, prn diuresis. Start cipro 500mg po BID when ANC < 500."    Source: [x] Patient       [x] EMR        [] RN        [] Family at bedside       [] Other:    Diet Order:   Diet, Regular:   GlutaSolve(Glutamine) 15gm pkg     Qty per Day:  1 (22)    - Is current order appropriate/adequate? [x] Yes  []  No:     - PO intake :   [] >75%  Adequate    [x] 50-75%  Fair       [] <50%  Poor  -Pt endorses 50-75% intake of meals    - Nutrition-related concerns:      -Pt reports not ready for nutritional supplement yet.      -Pt with fair appetite, no difficulty chewing or swallowing.      -Pt unsure of which foods on plate belong to protein food group.      -No difficulty chewing or swallowing.        GI:  Last BM .   Bowel Regimen? [] Yes   [x] No  -Reports some nausea, no vomiting; no d/c, prior constipation resolved following previous RD education, loose stools currently.      Weights:   Dosing weight in k.3 (-)  Daily Weight in k.5 (), Weight in k.6 (), Weight in k.4 (), Weight in k.8 (-), Weight in k.6 (-18), Weight in k.4 (17)  Change in weight likely due to IVF.    Nutritionally Pertinent MEDICATIONS  (STANDING):  carmustine IVPB (eMAR)  clotrimazole Lozenge  dexAMETHasone  IVPB  fluconAZOLE   Tablet  folic acid  furosemide   Injectable  multivitamin  pantoprazole    Tablet  potassium chloride  20 mEq/100 mL IVPB  sodium bicarbonate Mouth Rinse  sodium chloride 0.9%  sodium chloride 0.9%.  trimethoprim  160 mG/sulfamethoxazole 800 mG  ursodiol Capsule    Pertinent Labs:  @ 06:52: Na 138, BUN 10, Cr 0.44<L>, <H>, K+ 4.5, Phos 2.7, Mg 2.2, Alk Phos 72, ALT/SGPT 22, AST/SGOT 12, HbA1c --   @ 22:19: Na 135, BUN 15, Cr 0.46<L>, <H>, K+ 5.0, Phos 2.6, Mg 2.1, Alk Phos 74, ALT/SGPT 22, AST/SGOT 14, HbA1c --      Finger Sticks: n/a      Skin per nursing documentation: No pressure injuries noted per flowsheets   Edema: no edema noted per flowsheets    Estimated Needs:   [x] no change since previous assessment ()  Estimated kcal needs:  Estimated protein needs:  Defer fluid needs to team    Previous Nutrition Diagnosis: Predicted inadequate protein intake  Nutrition Diagnosis is: [x] ongoing  [] resolved [] not applicable     New Nutrition Diagnosis: [x] Not applicable    Nutrition Care Plan:  [x] In Progress- Patient meeting 50-75% protein-energy needs  [] Achieved  [] Not applicable    Nutrition Interventions:     Education Provided:       [x] Yes: Educated patient on focusing on protein intake and which foods on plate belong to protein group; emphasized small frequent meals for when appetite fluctuates.         Recommendations:         [x] Continue current diet order            [] Add oral nutrition supplement:     [] Discontinue current diet order. Recommend:      [] Add micronutrient supplementation:      [] Continue current micronutrient supplementation:      [] Other:     Monitoring and Evaluation:   Continue to monitor nutritional intake, tolerance to diet prescription, weights, labs, skin integrity      RD remains available upon request and will follow up per protocol  Corinne Lima, Dietetic Intern, Pager #: [phone] Nutrition Follow Up Note  Patient seen for: BMT Follow up    Chart reviewed, events noted: "57 year old female with a history of  peripheral T cell lymphoma Pre  :  Autologous PBSCT day -3; - Completed  -16 24 hour continuous infusion, strict I&O, daily weights, prn diuresis;  - Cytoxan day 3/4.; - CTX - continue CTX hydration for 24 hours post infusion of last dose. Strict I&O, daily weights, prn diuresis. Start cipro 500mg po BID when ANC < 500."    Source: [x] Patient       [x] EMR        [] RN        [] Family at bedside       [] Other:    Diet Order:   Diet, Regular:   GlutaSolve(Glutamine) 15gm pkg     Qty per Day:  1 (22)    - Is current order appropriate/adequate? [x] Yes  []  No:     - PO intake :   [] >75%  Adequate    [x] 50-75%  Fair       [] <50%  Poor  -Pt endorses 50-75% intake of meals    - Nutrition-related concerns:      -Pt reports not ready for nutritional supplement yet.      -Pt with fair appetite, no difficulty chewing or swallowing.      -Pt unsure of which foods on plate belong to protein food group.      -No difficulty chewing or swallowing.        GI:  Last BM .   Bowel Regimen? [] Yes   [x] No  -Reports some nausea, no vomiting; no d/c, prior constipation resolved following previous RD education, loose stools currently.      Weights:   Dosing weight in k.3 (-)  Daily Weight in k.5 (), Weight in k.6 (), Weight in k.4 (), Weight in k.8 (-), Weight in k.6 (-18), Weight in k.4 (17)  Change in weight likely due to IVF.    Nutritionally Pertinent MEDICATIONS  (STANDING):  carmustine IVPB (eMAR)  clotrimazole Lozenge  dexAMETHasone  IVPB  fluconAZOLE   Tablet  folic acid  furosemide   Injectable  multivitamin  pantoprazole    Tablet  potassium chloride  20 mEq/100 mL IVPB  sodium bicarbonate Mouth Rinse  sodium chloride 0.9%  sodium chloride 0.9%.  trimethoprim  160 mG/sulfamethoxazole 800 mG  ursodiol Capsule    Pertinent Labs:  @ 06:52: Na 138, BUN 10, Cr 0.44<L>, <H>, K+ 4.5, Phos 2.7, Mg 2.2, Alk Phos 72, ALT/SGPT 22, AST/SGOT 12, HbA1c --   @ 22:19: Na 135, BUN 15, Cr 0.46<L>, <H>, K+ 5.0, Phos 2.6, Mg 2.1, Alk Phos 74, ALT/SGPT 22, AST/SGOT 14, HbA1c --      Finger Sticks: no FS noted per results      Skin per nursing documentation: No pressure injuries noted per flowsheets   Edema: no edema noted per flowsheets    Estimated Needs:   [x] no change since previous assessment ()  Estimated kcal needs:  Estimated protein needs:  Defer fluid needs to team    Previous Nutrition Diagnosis: Predicted inadequate protein intake  Nutrition Diagnosis is: [x] ongoing  [] resolved [] not applicable     New Nutrition Diagnosis: [x] Not applicable    Nutrition Care Plan:  [x] In Progress- Patient meeting 50-75% protein-energy needs  [] Achieved  [] Not applicable    Nutrition Interventions:     Education Provided:       [x] Yes: Educated patient on focusing on protein intake and which foods on plate belong to protein group; emphasized small frequent meals for when appetite fluctuates.        Recommendations:      1) Continue current diet order + Glutasolve 1x/day as tolerated and medically feasible  2) Continue to provide assistance and encouragement with PO intake  3) Continue to monitor nutritional intake, tolerance to diet prescription, weights, labs, skin integrity, bowel movements   4) RD will remain available for reiteration of education as appropriate as well as assessing need for nutritional supplement  5) Continue micronutrient supplementation: folic acid, multivitamin    Monitoring and Evaluation:   Continue to monitor nutritional intake, tolerance to diet prescription, weights, labs, skin integrity      RD remains available upon request and will follow up per protocol  Corinne Lima, Dietetic Intern, Pager #: [phone] Nutrition Follow Up Note  Patient seen for: BMT Follow up    Chart reviewed, events noted: "57 year old female with a history of  peripheral T cell lymphoma Pre  :  Autologous PBSCT day -3; - Completed  -16 24 hour continuous infusion, strict I&O, daily weights, prn diuresis;  - Cytoxan day 3/4.; - CTX - continue CTX hydration for 24 hours post infusion of last dose. Strict I&O, daily weights, prn diuresis. Start cipro 500mg po BID when ANC < 500."    Source: [x] Patient       [x] EMR        [] RN        [] Family at bedside       [] Other:    Diet Order:   Diet, Regular:   GlutaSolve(Glutamine) 15gm pkg     Qty per Day:  1 (22)    - Is current order appropriate/adequate? [x] Yes  []  No:     - PO intake :   [] >75%  Adequate    [x] 50-75%  Fair       [] <50%  Poor  -Pt endorses 50-75% intake of meals    - Nutrition-related concerns:      -Pt reports not ready for nutritional supplement yet.      -Pt with fair appetite, no difficulty chewing or swallowing.      -Pt unsure of which foods on plate belong to protein food group.      -No difficulty chewing or swallowing.        GI:  Last BM .   Bowel Regimen? [] Yes   [x] No  -Reports some nausea, no vomiting; no d/c, prior constipation resolved following previous RD education, loose stools currently.      Weights:   Dosing weight in k.3 ()  Daily Weight in k.5 (), Weight in k.6 (), Weight in k.4 (), Weight in k.8 (-), Weight in k.6 (-18), Weight in k.4 (17)  Change in weight likely due to IVF and diuretics.     Nutritionally Pertinent MEDICATIONS  (STANDING):  carmustine IVPB (eMAR)  clotrimazole Lozenge  dexAMETHasone  IVPB  fluconAZOLE   Tablet  folic acid  furosemide   Injectable  multivitamin  pantoprazole    Tablet  potassium chloride  20 mEq/100 mL IVPB  sodium bicarbonate Mouth Rinse  sodium chloride 0.9%  sodium chloride 0.9%.  trimethoprim  160 mG/sulfamethoxazole 800 mG  ursodiol Capsule    Pertinent Labs:  @ 06:52: Na 138, BUN 10, Cr 0.44<L>, <H>, K+ 4.5, Phos 2.7, Mg 2.2, Alk Phos 72, ALT/SGPT 22, AST/SGOT 12, HbA1c --   @ 22:19: Na 135, BUN 15, Cr 0.46<L>, <H>, K+ 5.0, Phos 2.6, Mg 2.1, Alk Phos 74, ALT/SGPT 22, AST/SGOT 14, HbA1c --      Skin per nursing documentation: No pressure injuries noted per flowsheets   Edema: no edema noted per flowsheets    Estimated Needs:   [x] no change since previous assessment () based on CBW 177lb/80.2kg :  25-30 kcal/ [id number] kcal  1-1.5 Gm pro/ k.2-112.28 Gm pro   Defer fluid needs to medical team    Previous Nutrition Diagnosis: Predicted inadequate protein intake  Nutrition Diagnosis is: [x] ongoing- emphasized importance of protein intake    New Nutrition Diagnosis: [x] Not applicable    Nutrition Care Plan:  [x] In Progress- Patient meeting 50-75% protein-energy needs  [] Achieved  [] Not applicable    Nutrition Interventions:     Education Provided:       [x] Yes: Educated patient on focusing on protein intake and which foods on plate belong to protein group; emphasized small frequent meals for when appetite fluctuates.        Recommendations:      1) Continue current diet order + Glutasolve 1x/day as tolerated and medically feasible  2) Continue to provide assistance and encouragement with PO intake  3) Continue to monitor nutritional intake, tolerance to diet prescription, weights, labs, skin integrity, bowel movements   4) RD will remain available for reiteration of education as appropriate as well as assessing need for nutritional supplement  5) Continue micronutrient supplementation: folic acid, multivitamin    Monitoring and Evaluation:   Continue to monitor nutritional intake, tolerance to diet prescription, weights, labs, skin integrity      RD remains available upon request and will follow up per protocol  Corinne Lima, Dietetic Intern, Pager #: [phone] Nutrition Follow Up Note  Patient seen for: BMT Follow up    Chart reviewed, events noted: "57 year old female with peripheral T cell lymphoma s/p CHOEP x 6 admitted for autologous pbsct with high dose CBV prep regimen. Day - 4 "    Source: [x] Patient       [x] EMR        [] RN        [] Family at bedside       [] Other:    Diet Order:   Diet, Regular:   GlutaSolve(Glutamine) 15gm pkg     Qty per Day:  1 (22)    - Is current order appropriate/adequate? [x] Yes  []  No:     - PO intake :   [] >75%  Adequate    [x] 50-75%  Fair       [] <50%  Poor    - Nutrition-related concerns:      - Pt reports fair appetite/PO intake, ~50-75% of meals consumed.       - Declined oral nutritional supplement at this time. Food preferences obtained, RD will honor as able.       -Pt unsure of which foods on plate belong to protein food group.      -No difficulty chewing or swallowing.        GI:  Last BM .   Bowel Regimen? [] Yes   [x] No  -Reports some nausea, no vomiting; no d/c, prior constipation resolved following previous RD education, loose stools currently.      Weights:   Dosing weight in k.3 (11-16)  Daily Weight in k.5 (-22), Weight in k.6 (-21), Weight in k.4 (11-20), Weight in k.8 (11-19), Weight in k.6 (11-18), Weight in k.4 (11-17)  Change in weight likely due to IVF and diuretics.     Nutritionally Pertinent MEDICATIONS  (STANDING):  carmustine IVPB (eMAR)  clotrimazole Lozenge  dexAMETHasone  IVPB  fluconAZOLE   Tablet  folic acid  furosemide   Injectable  multivitamin  pantoprazole    Tablet  potassium chloride  20 mEq/100 mL IVPB  sodium bicarbonate Mouth Rinse  sodium chloride 0.9%  sodium chloride 0.9%.  trimethoprim  160 mG/sulfamethoxazole 800 mG  ursodiol Capsule    Pertinent Labs:  @ 06:52: Na 138, BUN 10, Cr 0.44<L>, <H>, K+ 4.5, Phos 2.7, Mg 2.2, Alk Phos 72, ALT/SGPT 22, AST/SGOT 12, HbA1c --   @ 22:19: Na 135, BUN 15, Cr 0.46<L>, <H>, K+ 5.0, Phos 2.6, Mg 2.1, Alk Phos 74, ALT/SGPT 22, AST/SGOT 14      Skin per nursing documentation: No pressure injuries noted per flowsheets   Edema: no edema noted per flowsheets    Estimated Needs:   [x] no change since previous assessment () based on CBW 177lb/80.2kg :  25-30 kcal/ [id number] kcal  1-1.5 Gm pro/ k.2-112.28 Gm pro   Defer fluid needs to medical team    Previous Nutrition Diagnosis: Predicted inadequate protein intake  Nutrition Diagnosis is: [x] ongoing- emphasized importance of protein intake    New Nutrition Diagnosis: [x] Not applicable    Nutrition Care Plan:  [x] In Progress- Patient meeting 50-75% protein-energy needs  [] Achieved  [] Not applicable    Nutrition Interventions:     Education Provided:       [x] Yes: Educated patient on focusing on protein intake and which foods on plate belong to protein group; emphasized small frequent meals for when appetite fluctuates.        Recommendations:      1) Continue current diet order + Glutasolve 1x/day as tolerated and medically feasible  2) Continue to provide assistance and encouragement with PO intake  3) Continue to monitor nutritional intake, tolerance to diet prescription, weights, labs, skin integrity, bowel movements   4) RD will remain available for reiteration of education as appropriate as well as assessing need for nutritional supplement  5) Continue micronutrient supplementation: folic acid, multivitamin    Monitoring and Evaluation:   Continue to monitor nutritional intake, tolerance to diet prescription, weights, labs, skin integrity      RD remains available upon request and will follow up per protocol  Corinne Lima, Dietetic Intern, Pager #: [phone] Nutrition Follow Up Note  Patient seen for: BMT Follow up    Chart reviewed, events noted: "57 year old female with peripheral T cell lymphoma s/p CHOEP x 6 admitted for autologous pbsct with high dose CBV prep regimen. Day - 4 "    Source: [x] Patient       [x] EMR        [] RN        [] Family at bedside       [] Other:    Diet Order:   Diet, Regular:   GlutaSolve(Glutamine) 15gm pkg     Qty per Day:  1 (22)    - Is current order appropriate/adequate? [x] Yes  []  No:     - PO intake :   [] >75%  Adequate    [x] 50-75%  Fair       [] <50%  Poor    - Nutrition-related concerns:      - Pt reports fair appetite/PO intake, ~50-75% of meals consumed x 5 days.       - Declined oral nutritional supplement at this time. Food preferences obtained, RD will honor as able.       - Ordered for Decadron, BG controlled at this time       - Micronutrient/Other supplementation: multivitamin, folic acid, KCl        GI:  Last BM .   Bowel Regimen? [] Yes   [x] No  -No difficulty chewing or swallowing. Reports some nausea, no vomiting; prior constipation (improving per Pt ), loose stools today     Weights:   Dosing weight in k.3 (11-16)  Daily Weight in k.5 (-22), Weight in k.6 (11-21), Weight in k.4 (11-20), Weight in k.8 (11-19), Weight in k.6 (11-18), Weight in k.4 (11-17)  ** Weight fluctuating - Pt received IVF and diuretics in-house. Weight changes likely secondary to fluid shifts. RD to continue to monitor weight trends as able.     Nutritionally Pertinent MEDICATIONS  (STANDING):  carmustine IVPB (eMAR)  clotrimazole Lozenge  dexAMETHasone  IVPB  fluconAZOLE   Tablet  folic acid  furosemide   Injectable  multivitamin  pantoprazole    Tablet  potassium chloride  20 mEq/100 mL IVPB  sodium bicarbonate Mouth Rinse  sodium chloride 0.9%  sodium chloride 0.9%.  trimethoprim  160 mG/sulfamethoxazole 800 mG  ursodiol Capsule    Pertinent Labs:  @ 06:52: Na 138, BUN 10, Cr 0.44<L>, <H>, K+ 4.5, Phos 2.7, Mg 2.2, Alk Phos 72, ALT/SGPT 22, AST/SGOT 12, HbA1c --  11-21 @ 22:19: Na 135, BUN 15, Cr 0.46<L>, <H>, K+ 5.0, Phos 2.6, Mg 2.1, Alk Phos 74, ALT/SGPT 22, AST/SGOT 14    Skin per nursing documentation: No pressure injuries noted per flowsheets   Edema: no edema noted per flowsheets    Estimated Needs:   [x] no change since previous assessment () based on CBW 177lb/80.2kg :  25-30 kcal/ [id number] kcal  1-1.5 Gm pro/ k.2-112.28 Gm pro   Defer fluid needs to medical team    Previous Nutrition Diagnosis: Predicted inadequate protein intake  Nutrition Diagnosis is: [x] ongoing- emphasized importance of protein intake    New Nutrition Diagnosis: [x] inadequate protein-energy intake RT decreased ability to consume adequate protein-energy in setting of treatment side effects AEB auto SCT day -4, <75% of meals x 5 days    Nutrition Care Plan:  [x] In Progress- Patient meeting 50-75% protein-energy needs  [] Achieved  [] Not applicable    Nutrition Interventions:     Education Provided:       [x] Yes: Educated patient on prioritizing protein intake and protein food sources; emphasized small frequent meals to optimize PO; encouraged nutrient dense snacks.       Recommendations:      1) Continue current diet order + Glutasolve 1x/day as tolerated and medically feasible  2) Continue to provide assistance and encouragement with PO intake  3) Continue to monitor nutritional intake, tolerance to diet prescription, weights, labs, skin integrity, bowel movements   4) RD will remain available for reiteration of education as appropriate as well as assessing need for nutritional supplement  5) Continue micronutrient supplementation: folic acid, multivitamin    Monitoring and Evaluation:   Continue to monitor nutritional intake, tolerance to diet prescription, weights, labs, skin integrity    RD remains available upon request and will follow up per protocol  Corinne Lima, Dietetic Intern, Pager #: [phone]

## 2022-11-22 NOTE — PROGRESS NOTE ADULT - CRITICAL CARE ATTENDING COMMENT
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   Day - 4   Problem/Plan -   1:  Problem: Stem cell transplant   ·  Plan: 1. Admit to BMTU   2. TLC placement in IR   3. Day -9 - day -2 - antiemetics   4. Day -9 & day -8, VP16 2400mg/m2 IV x 34 hours (final concentration 0.4mg /mL).   5. Strict I&O, daily weights, prn diuresis   6. After completion of VP16- start CTX hydration (D51/2NS + 10mEq KCl @ 150ml / m2) - continue for 24 hours post infusion of CTX   7. Day -7 - day -4 - CTX 1800 mg / m2 daily over 2 hours. Follow SMA7, mag, phos 6hours post CT . Mesna  12mg / kg - hemorrhagic cystitis ppx   8. Day -3 - BCNU 400mg / m2   9. Day -2 - day -1 - rest days  10. Day 0 - HPC transplant. Start Zarxio 480mcg SQ QD, continue through engraftment. Kepivance on days 0, 1 & 2   11. Anxiolytics, antinausea, antidiarrhea medications as needed   12. Lasix PRN while being aggressively hydrated to avoid VOD   13. Nutritional support, pain management.    Problem/Plan - 2:  ·  Problem: Need for prophylactic measure.   ·  Plan: 1. VOD prophylaxis - low dose heparin gtt (dosed at 100 units / kg / day), glutamine supplementation, Actigall BID   2. PCP prophylaxis - Bactrim DS through day -2    3. Antiviral prophylaxis - Acyclovir 400mg po TID to start day -1   4. Antifungal prophylaxis- Diflucan 400 mg po daily.  5. GI prophylaxis - Protonix po QD   6. Antibacterial prophylaxis - when ANC < 500, start Cipro 500mg po BID. If becomes febrile, pan cx, CXR and change Cipro to Cefepime 2g IV q 8 hours. Continue until count recovery  7. Kepivance for prevention of mucositis- days 0, +1, +2  8. Aggressive mouth care and skin care as per protocol.  9. Receiving transfusion and electrolyte support 57 year old female with peripheral T cell lymphoma s/p CHOEP x 6 admitted for autologous pbsct with high dose CBV prep regimen.   Day - 3  Problem/Plan -   1:  Problem: Stem cell transplant   ·  Plan: 1. Admit to BMTU   2. TLC placement in IR   3. Day -9 - day -2 - antiemetics   4. Day -9 & day -8, VP16 2400mg/m2 IV x 34 hours (final concentration 0.4mg /mL).   5. Strict I&O, daily weights, prn diuresis   6. After completion of VP16- start CTX hydration (D51/2NS + 10mEq KCl @ 150ml / m2) - continue for 24 hours post infusion of CTX   7. Day -7 - day -4 - CTX 1800 mg / m2 daily over 2 hours. Follow SMA7, mag, phos 6hours post CT . Mesna  12mg / kg - hemorrhagic cystitis ppx   8. Day -3 - BCNU 400mg / m2   9. Day -2 - day -1 - rest days  10. Day 0(11/25/22) - HPC transplant. Start Zarxio 480mcg SQ QD, continue through engraftment. Kepivance on days 0, 1 & 2   11. Anxiolytics, antinausea, antidiarrhea medications as needed   12. Lasix PRN while being aggressively hydrated to avoid VOD   13. Nutritional support, pain management.    Problem/Plan - 2:  ·  Problem: Need for prophylactic measure.   ·  Plan: 1. VOD prophylaxis - low dose heparin gtt (dosed at 100 units / kg / day), glutamine supplementation, Actigall BID   2. PCP prophylaxis - Bactrim DS through day -2    3. Antiviral prophylaxis - Acyclovir 400mg po TID to start day -1   4. Antifungal prophylaxis- Diflucan 400 mg po daily.  5. GI prophylaxis - Protonix po QD   6. Antibacterial prophylaxis - when ANC < 500, start Cipro 500mg po BID. If becomes febrile, pan cx, CXR and change Cipro to Cefepime 2g IV q 8 hours. Continue until count recovery  7. Kepivance for prevention of mucositis- days 0, +1, +2  8. Aggressive mouth care and skin care as per protocol.  9. Receiving transfusion and electrolyte support

## 2022-11-22 NOTE — PROGRESS NOTE ADULT - SUBJECTIVE AND OBJECTIVE BOX
HPC Transplant Team                                                      Critical / Counseling Time Provided: 30 minutes                                                                                                                                                        Chief Complaint: Autologous peripheral blood stem cell transplant with high dose CBV prep regimen for treatment of peripheral T cell lymphoma    S: Patient seen and examined with HPC Transplant Team:   + fatigue   + occasional nausea  + occasional loose stool   All other ROS negative     O: Vitals:   Vital Signs Last 24 Hrs  T(C): 36.7 (2022 05:17), Max: 37.1 (2022 17:17)  T(F): 98.1 (2022 05:17), Max: 98.8 (2022 17:17)  HR: 82 (2022 05:17) (71 - 87)  BP: 116/69 (2022 05:17) (100/62 - 117/75)  BP(mean): --  RR: 18 (2022 05:17) (18 - 18)  SpO2: 100% (2022 05:17) (98% - 100%)    Parameters below as of 2022 05:17  Patient On (Oxygen Delivery Method): room air    Admit weight: 80.3 kg   Daily     Daily Weight in k.6 (2022 09:50)  Today's weight: pending     Intake / Output:    @ 07:01  -   @ 07:00  --------------------------------------------------------  IN: 8448.8 mL / OUT: 9400 mL / NET: -951.2 mL    PE:   Oropharynx: no erythema or ulcerations  Oral Mucositis:              -                                          Grade: n/a   CVS: S1, S2 RRR   Lungs: CTA throughout bilaterally   Abdomen: + BS x 4, soft, NT, ND   Extremities: no edema   Gastric Mucositis:       -                                           Grade: n/a   Intestinal Mucositis:     -                                         Grade: n/a   Skin: + macular erythema to chest, upper back and neck post kepivance   TLC: CDI   Neuro: A&O x 3   Pain: 0      Labs:   CBC Full  -  ( 2022 06:53 )  WBC Count : 1.14 K/uL  Hemoglobin : 8.3 g/dL  Hematocrit : 26.7 %  Platelet Count - Automated : 130 K/uL  Mean Cell Volume : 96.7 fl  Mean Cell Hemoglobin : 30.1 pg  Mean Cell Hemoglobin Concentration : 31.1 gm/dL  Auto Neutrophil # : x  Auto Lymphocyte # : x  Auto Monocyte # : x  Auto Eosinophil # : x  Auto Basophil # : x  Auto Neutrophil % : x  Auto Lymphocyte % : x  Auto Monocyte % : x  Auto Eosinophil % : x  Auto Basophil % : x                          8.3    1.14  )-----------( 130      ( 2022 06:53 )             26.7         138  |  104  |  10  ----------------------------<  129<H>  4.5   |  25  |  0.44<L>    Ca    9.2      2022 06:52  Phos  2.7       Mg     2.2         TPro  6.3  /  Alb  3.9  /  TBili  0.3  /  DBili  x   /  AST  12  /  ALT  22  /  AlkPhos  72      PT/INR - ( 2022 06:52 )   PT: 12.8 sec;   INR: 1.10 ratio         PTT - ( 2022 06:52 )  PTT:25.0 sec  LIVER FUNCTIONS - ( 2022 06:52 )  Alb: 3.9 g/dL / Pro: 6.3 g/dL / ALK PHOS: 72 U/L / ALT: 22 U/L / AST: 12 U/L / GGT: x           Lactate Dehydrogenase, Serum: 127 U/L ( @ 06:52)    Cultures:   Culture - Sterility Check (22 @ 13:29)    Culture Results:   No growth to date    Specimen Source: STER [id number]    Meds:   Antimicrobials:   clotrimazole Lozenge 1 Lozenge Oral five times a day  fluconAZOLE   Tablet 400 milliGRAM(s) Oral daily  trimethoprim  160 mG/sulfamethoxazole 800 mG 1 Tablet(s) Oral every 12 hours    Heme / Onc:   heparin  Infusion 334 Unit(s)/Hr IV Continuous <Continuous>    GI:  pantoprazole    Tablet 40 milliGRAM(s) Oral before breakfast  polyethylene glycol 3350 17 Gram(s) Oral daily PRN  senna 2 Tablet(s) Oral at bedtime PRN  sodium bicarbonate Mouth Rinse 10 milliLiter(s) Swish and Spit five times a day  ursodiol Capsule 300 milliGRAM(s) Oral two times a day    Cardiovascular:   furosemide   Injectable 40 milliGRAM(s) IV Push once  furosemide   Injectable 40 milliGRAM(s) IV Push once      Other medications:   acetaminophen     Tablet .. 650 milliGRAM(s) Oral every 6 hours  aprepitant 80 milliGRAM(s) Oral every 24 hours  Biotene Dry Mouth Oral Rinse 5 milliLiter(s) Swish and Spit five times a day  chlorhexidine 4% Liquid 1 Application(s) Topical <User Schedule>  dexAMETHasone  IVPB 10 milliGRAM(s) IV Intermittent every 24 hours  FLUoxetine 20 milliGRAM(s) Oral daily  folic acid 1 milliGRAM(s) Oral daily  loratadine 10 milliGRAM(s) Oral daily  LORazepam   Injectable 1 milliGRAM(s) IV Push daily  multivitamin 1 Tablet(s) Oral daily  nystatin Powder 1 Application(s) Topical two times a day  ondansetron Injectable 8 milliGRAM(s) IV Push every 8 hours  potassium chloride  20 mEq/100 mL IVPB 20 milliEquivalent(s) IV Intermittent every 2 hours  sodium chloride 0.9% 1000 milliLiter(s) IV Continuous <Continuous>  sodium chloride 0.9%. 1000 milliLiter(s) IV Continuous <Continuous>      PRN:   acetaminophen     Tablet .. 650 milliGRAM(s) Oral every 6 hours PRN  AQUAPHOR (petrolatum Ointment) 1 Application(s) Topical two times a day PRN  LORazepam   Injectable 1 milliGRAM(s) IV Push every 6 hours PRN  metoclopramide Injectable 10 milliGRAM(s) IV Push every 6 hours PRN  polyethylene glycol 3350 17 Gram(s) Oral daily PRN  senna 2 Tablet(s) Oral at bedtime PRN  sodium chloride 0.9% lock flush 10 milliLiter(s) IV Push every 1 hour PRN      A/P:  57 year old female with a history of  peripheral T cell lymphoma   Pre  :  Autologous PBSCT day -3  - Completed  -16 24 hour continuous infusion, strict I&O, daily weights, prn diuresis    - Cytoxan day 3/.  - CTX /- continue CTX hydration for 24 hours post infusion of last dose. Strict I&O, daily weights, prn diuresis. Start cipro 500mg po BID when ANC < 500       1. Infectious Disease:   clotrimazole Lozenge 1 Lozenge Oral five times a day  fluconAZOLE   Tablet 400 milliGRAM(s) Oral daily  trimethoprim  160 mG/sulfamethoxazole 800 mG 1 Tablet(s)    2. VOD Prophylaxis: Actigall, Glutamine, Heparin (dosed at 100 units / kg / day)     3. GI Prophylaxis:    pantoprazole    Tablet 40 milliGRAM(s) Oral before breakfast    4. Mouthcare - NS / NaHCO3 rinses, Mycelex, Biotene; Skin care     5. GVHD prophylaxis - n/a     6. Transfuse & replete electrolytes prn   KCl 20mEq IV q 2 hours x 3 doses     7. IV hydration, daily weights, strict I&O, prn diuresis   Lasix 40mg IV x 1 0800  Lasix 40mg IV x 1 1600    8. PO intake as tolerated, nutrition follow up as needed, MVI, folic acid     9. Antiemetics, anti-diarrhea medications:   LORazepam   Injectable 1 milliGRAM(s) IV Push every 6 hours PRN  metoclopramide Injectable 10 milliGRAM(s) IV Push every 6 hours PRN  aprepitant 80 milliGRAM(s) Oral every 24 hours  dexAMETHasone  IVPB 10 milliGRAM(s) IV Intermittent every 24 hours  LORazepam   Injectable 1 milliGRAM(s) IV Push daily  ondansetron Injectable 8 milliGRAM(s) IV Push every 8 hours    10. OOB as tolerated, physical therapy consult if needed     11. Monitor coags / fibrinogen 2x week, vitamin K as needed     12. Monitor closely for clinical changes, monitor for fevers     13. Emotional support provided, plan of care discussed and questions addressed     14. Patient education done regarding chemotherapy prep, plan of care, restrictions and discharge planning     15. Continue regular social work input     I have written the above note for Dr. Cabrales who performed service with me in the room.   Balbina Astudillo NP-C ([phone])    I have seen and examined patient with NP, I agree with above note as scribed.        HPC Transplant Team                                                      Critical / Counseling Time Provided: 30 minutes                                                                                                                                                        Chief Complaint: Autologous peripheral blood stem cell transplant with high dose CBV prep regimen for treatment of peripheral T cell lymphoma    S: Patient seen and examined with HPC Transplant Team:   + fatigue   + occasional nausea  + occasional loose stool   + chronic low back pain     All other ROS negative     O: Vitals:   Vital Signs Last 24 Hrs  T(C): 36.7 (2022 05:17), Max: 37.1 (2022 17:17)  T(F): 98.1 (2022 05:17), Max: 98.8 (2022 17:17)  HR: 82 (2022 05:17) (71 - 87)  BP: 116/69 (2022 05:17) (100/62 - 117/75)  BP(mean): --  RR: 18 (2022 05:17) (18 - 18)  SpO2: 100% (2022 05:17) (98% - 100%)    Parameters below as of 2022 05:17  Patient On (Oxygen Delivery Method): room air    Admit weight: 80.3 kg     Daily Weight in k.6 (2022 09:50)  Today's weight: 78.6 kg    Intake / Output:    @ 07:01  -   @ 07:00  --------------------------------------------------------  IN: 8448.8 mL / OUT: 9400 mL / NET: -951.2 mL    PE:   Oropharynx: no erythema or ulcerations  Oral Mucositis:              -                                          Grade: n/a   CVS: S1, S2 RRR   Lungs: CTA throughout bilaterally   Abdomen: + BS x 4, soft, NT, ND   Extremities: no edema   Gastric Mucositis:       -                                           Grade: n/a   Intestinal Mucositis:     -                                         Grade: n/a   Skin: + macular erythema to chest, upper back and neck post kepivance   TLC: CDI   Neuro: A&O x 3   Pain: 0      Labs:   CBC Full  -  ( 2022 06:53 )  WBC Count : 1.14 K/uL  Hemoglobin : 8.3 g/dL  Hematocrit : 26.7 %  Platelet Count - Automated : 130 K/uL  Mean Cell Volume : 96.7 fl  Mean Cell Hemoglobin : 30.1 pg  Mean Cell Hemoglobin Concentration : 31.1 gm/dL  Auto Neutrophil # : x  Auto Lymphocyte # : x  Auto Monocyte # : x  Auto Eosinophil # : x  Auto Basophil # : x  Auto Neutrophil % : x  Auto Lymphocyte % : x  Auto Monocyte % : x  Auto Eosinophil % : x  Auto Basophil % : x                          8.3    1.14  )-----------( 130      ( 2022 06:53 )             26.7         138  |  104  |  10  ----------------------------<  129<H>  4.5   |  25  |  0.44<L>    Ca    9.2      2022 06:52  Phos  2.7       Mg     2.2         TPro  6.3  /  Alb  3.9  /  TBili  0.3  /  DBili  x   /  AST  12  /  ALT  22  /  AlkPhos  72      PT/INR - ( 2022 06:52 )   PT: 12.8 sec;   INR: 1.10 ratio         PTT - ( 2022 06:52 )  PTT:25.0 sec  LIVER FUNCTIONS - ( 2022 06:52 )  Alb: 3.9 g/dL / Pro: 6.3 g/dL / ALK PHOS: 72 U/L / ALT: 22 U/L / AST: 12 U/L / GGT: x           Lactate Dehydrogenase, Serum: 127 U/L ( @ 06:52)    Cultures:   Culture - Sterility Check (22 @ 13:29)    Culture Results:   No growth to date    Specimen Source: STER [id number]    Meds:   Antimicrobials:   clotrimazole Lozenge 1 Lozenge Oral five times a day  fluconAZOLE   Tablet 400 milliGRAM(s) Oral daily  trimethoprim  160 mG/sulfamethoxazole 800 mG 1 Tablet(s) Oral every 12 hours    Heme / Onc:   heparin  Infusion 334 Unit(s)/Hr IV Continuous <Continuous>    GI:  pantoprazole    Tablet 40 milliGRAM(s) Oral before breakfast  polyethylene glycol 3350 17 Gram(s) Oral daily PRN  senna 2 Tablet(s) Oral at bedtime PRN  sodium bicarbonate Mouth Rinse 10 milliLiter(s) Swish and Spit five times a day  ursodiol Capsule 300 milliGRAM(s) Oral two times a day    Cardiovascular:   furosemide   Injectable 40 milliGRAM(s) IV Push once  furosemide   Injectable 40 milliGRAM(s) IV Push once      Other medications:   acetaminophen     Tablet .. 650 milliGRAM(s) Oral every 6 hours  aprepitant 80 milliGRAM(s) Oral every 24 hours  Biotene Dry Mouth Oral Rinse 5 milliLiter(s) Swish and Spit five times a day  chlorhexidine 4% Liquid 1 Application(s) Topical <User Schedule>  dexAMETHasone  IVPB 10 milliGRAM(s) IV Intermittent every 24 hours  FLUoxetine 20 milliGRAM(s) Oral daily  folic acid 1 milliGRAM(s) Oral daily  loratadine 10 milliGRAM(s) Oral daily  LORazepam   Injectable 1 milliGRAM(s) IV Push daily  multivitamin 1 Tablet(s) Oral daily  nystatin Powder 1 Application(s) Topical two times a day  ondansetron Injectable 8 milliGRAM(s) IV Push every 8 hours  potassium chloride  20 mEq/100 mL IVPB 20 milliEquivalent(s) IV Intermittent every 2 hours  sodium chloride 0.9% 1000 milliLiter(s) IV Continuous <Continuous>  sodium chloride 0.9%. 1000 milliLiter(s) IV Continuous <Continuous>      PRN:   acetaminophen     Tablet .. 650 milliGRAM(s) Oral every 6 hours PRN  AQUAPHOR (petrolatum Ointment) 1 Application(s) Topical two times a day PRN  LORazepam   Injectable 1 milliGRAM(s) IV Push every 6 hours PRN  metoclopramide Injectable 10 milliGRAM(s) IV Push every 6 hours PRN  polyethylene glycol 3350 17 Gram(s) Oral daily PRN  senna 2 Tablet(s) Oral at bedtime PRN  sodium chloride 0.9% lock flush 10 milliLiter(s) IV Push every 1 hour PRN      A/P:  57 year old female with a history of  peripheral T cell lymphoma   Pre  :  Autologous PBSCT day -3  - Completed  -16 24 hour continuous infusion, strict I&O, daily weights, prn diuresis    - Cytoxan day 3/.  - CTX /- continue CTX hydration for 24 hours post infusion of last dose. Strict I&O, daily weights, prn diuresis. Start cipro 500mg po BID when ANC < 500    Lasix 40mv IV x 2 today       1. Infectious Disease:   clotrimazole Lozenge 1 Lozenge Oral five times a day  fluconAZOLE   Tablet 400 milliGRAM(s) Oral daily  trimethoprim  160 mG/sulfamethoxazole 800 mG 1 Tablet(s)    2. VOD Prophylaxis: Actigall, Glutamine, Heparin (dosed at 100 units / kg / day)     3. GI Prophylaxis:    pantoprazole    Tablet 40 milliGRAM(s) Oral before breakfast    4. Mouthcare - NS / NaHCO3 rinses, Mycelex, Biotene; Skin care     5. GVHD prophylaxis - n/a     6. Transfuse & replete electrolytes prn   KCl 20mEq IV q 2 hours x 3 doses     7. IV hydration, daily weights, strict I&O, prn diuresis   Lasix 40mg IV x 1 0800  Lasix 40mg IV x 1 1600    8. PO intake as tolerated, nutrition follow up as needed, MVI, folic acid     9. Antiemetics, anti-diarrhea medications:   LORazepam   Injectable 1 milliGRAM(s) IV Push every 6 hours PRN  metoclopramide Injectable 10 milliGRAM(s) IV Push every 6 hours PRN  aprepitant 80 milliGRAM(s) Oral every 24 hours  dexAMETHasone  IVPB 10 milliGRAM(s) IV Intermittent every 24 hours  LORazepam   Injectable 1 milliGRAM(s) IV Push daily  ondansetron Injectable 8 milliGRAM(s) IV Push every 8 hours    10. OOB as tolerated, physical therapy consult if needed     11. Monitor coags / fibrinogen 2x week, vitamin K as needed     12. Monitor closely for clinical changes, monitor for fevers     13. Emotional support provided, plan of care discussed and questions addressed     14. Patient education done regarding chemotherapy prep, plan of care, restrictions and discharge planning     15. Continue regular social work input     I have written the above note for Dr. Cabrales who performed service with me in the room.   Balbina Astudillo NP-C ([phone])    I have seen and examined patient with NP, I agree with above note as scribed.

## 2022-11-23 LAB
ALBUMIN SERPL ELPH-MCNC: 4.3 G/DL — SIGNIFICANT CHANGE UP (ref 3.3–5)
ALP SERPL-CCNC: 77 U/L — SIGNIFICANT CHANGE UP (ref 40–120)
ALT FLD-CCNC: 23 U/L — SIGNIFICANT CHANGE UP (ref 10–45)
ANION GAP SERPL CALC-SCNC: 11 MMOL/L — SIGNIFICANT CHANGE UP (ref 5–17)
AST SERPL-CCNC: 15 U/L — SIGNIFICANT CHANGE UP (ref 10–40)
BILIRUB SERPL-MCNC: 0.3 MG/DL — SIGNIFICANT CHANGE UP (ref 0.2–1.2)
BLD GP AB SCN SERPL QL: NEGATIVE — SIGNIFICANT CHANGE UP
BUN SERPL-MCNC: 13 MG/DL — SIGNIFICANT CHANGE UP (ref 7–23)
CALCIUM SERPL-MCNC: 9.5 MG/DL — SIGNIFICANT CHANGE UP (ref 8.4–10.5)
CHLORIDE SERPL-SCNC: 98 MMOL/L — SIGNIFICANT CHANGE UP (ref 96–108)
CO2 SERPL-SCNC: 26 MMOL/L — SIGNIFICANT CHANGE UP (ref 22–31)
CREAT SERPL-MCNC: 0.49 MG/DL — LOW (ref 0.5–1.3)
EGFR: 111 ML/MIN/1.73M2 — SIGNIFICANT CHANGE UP
G6PD RBC-CCNC: 16.7 U/G HGB — SIGNIFICANT CHANGE UP (ref 7–20.5)
GLUCOSE SERPL-MCNC: 124 MG/DL — HIGH (ref 70–99)
HCT VFR BLD CALC: 27.8 % — LOW (ref 34.5–45)
HGB BLD-MCNC: 9 G/DL — LOW (ref 11.5–15.5)
LDH SERPL L TO P-CCNC: 130 U/L — SIGNIFICANT CHANGE UP (ref 50–242)
MAGNESIUM SERPL-MCNC: 2.1 MG/DL — SIGNIFICANT CHANGE UP (ref 1.6–2.6)
MCHC RBC-ENTMCNC: 30.5 PG — SIGNIFICANT CHANGE UP (ref 27–34)
MCHC RBC-ENTMCNC: 32.4 GM/DL — SIGNIFICANT CHANGE UP (ref 32–36)
MCV RBC AUTO: 94.2 FL — SIGNIFICANT CHANGE UP (ref 80–100)
NRBC # BLD: 0 /100 WBCS — SIGNIFICANT CHANGE UP (ref 0–0)
PHOSPHATE SERPL-MCNC: 3.2 MG/DL — SIGNIFICANT CHANGE UP (ref 2.5–4.5)
PLATELET # BLD AUTO: 116 K/UL — LOW (ref 150–400)
POTASSIUM SERPL-MCNC: 4.4 MMOL/L — SIGNIFICANT CHANGE UP (ref 3.5–5.3)
POTASSIUM SERPL-SCNC: 4.4 MMOL/L — SIGNIFICANT CHANGE UP (ref 3.5–5.3)
PROT SERPL-MCNC: 7 G/DL — SIGNIFICANT CHANGE UP (ref 6–8.3)
RBC # BLD: 2.95 M/UL — LOW (ref 3.8–5.2)
RBC # FLD: 13.3 % — SIGNIFICANT CHANGE UP (ref 10.3–14.5)
RH IG SCN BLD-IMP: POSITIVE — SIGNIFICANT CHANGE UP
SODIUM SERPL-SCNC: 135 MMOL/L — SIGNIFICANT CHANGE UP (ref 135–145)
WBC # BLD: 0.48 K/UL — CRITICAL LOW (ref 3.8–10.5)
WBC # FLD AUTO: 0.48 K/UL — CRITICAL LOW (ref 3.8–10.5)

## 2022-11-23 PROCEDURE — 99291 CRITICAL CARE FIRST HOUR: CPT

## 2022-11-23 RX ORDER — CIPROFLOXACIN LACTATE 400MG/40ML
500 VIAL (ML) INTRAVENOUS EVERY 12 HOURS
Refills: 0 | Status: DISCONTINUED | OUTPATIENT
Start: 2022-11-23 | End: 2022-11-28

## 2022-11-23 RX ADMIN — APREPITANT 80 MILLIGRAM(S): 80 CAPSULE ORAL at 13:22

## 2022-11-23 RX ADMIN — DIPHENHYDRAMINE HYDROCHLORIDE AND LIDOCAINE HYDROCHLORIDE AND ALUMINUM HYDROXIDE AND MAGNESIUM HYDRO 10 MILLILITER(S): KIT at 13:28

## 2022-11-23 RX ADMIN — Medication 10 MILLILITER(S): at 13:21

## 2022-11-23 RX ADMIN — Medication 1 LOZENGE: at 16:29

## 2022-11-23 RX ADMIN — Medication 10 MILLILITER(S): at 16:28

## 2022-11-23 RX ADMIN — Medication 10 MILLILITER(S): at 09:00

## 2022-11-23 RX ADMIN — URSODIOL 300 MILLIGRAM(S): 250 TABLET, FILM COATED ORAL at 05:22

## 2022-11-23 RX ADMIN — ONDANSETRON 8 MILLIGRAM(S): 8 TABLET, FILM COATED ORAL at 13:22

## 2022-11-23 RX ADMIN — DIPHENHYDRAMINE HYDROCHLORIDE AND LIDOCAINE HYDROCHLORIDE AND ALUMINUM HYDROXIDE AND MAGNESIUM HYDRO 10 MILLILITER(S): KIT at 23:07

## 2022-11-23 RX ADMIN — DIPHENHYDRAMINE HYDROCHLORIDE AND LIDOCAINE HYDROCHLORIDE AND ALUMINUM HYDROXIDE AND MAGNESIUM HYDRO 10 MILLILITER(S): KIT at 05:24

## 2022-11-23 RX ADMIN — CHLORHEXIDINE GLUCONATE 1 APPLICATION(S): 213 SOLUTION TOPICAL at 09:04

## 2022-11-23 RX ADMIN — Medication 1 LOZENGE: at 20:09

## 2022-11-23 RX ADMIN — PANTOPRAZOLE SODIUM 40 MILLIGRAM(S): 20 TABLET, DELAYED RELEASE ORAL at 06:17

## 2022-11-23 RX ADMIN — Medication 1 LOZENGE: at 00:34

## 2022-11-23 RX ADMIN — Medication 5 MILLILITER(S): at 00:35

## 2022-11-23 RX ADMIN — Medication 500 MILLIGRAM(S): at 18:33

## 2022-11-23 RX ADMIN — Medication 5 MILLILITER(S): at 09:00

## 2022-11-23 RX ADMIN — LORATADINE 10 MILLIGRAM(S): 10 TABLET ORAL at 13:27

## 2022-11-23 RX ADMIN — Medication 1 TABLET(S): at 18:32

## 2022-11-23 RX ADMIN — Medication 1 LOZENGE: at 13:20

## 2022-11-23 RX ADMIN — DIPHENHYDRAMINE HYDROCHLORIDE AND LIDOCAINE HYDROCHLORIDE AND ALUMINUM HYDROXIDE AND MAGNESIUM HYDRO 10 MILLILITER(S): KIT at 00:53

## 2022-11-23 RX ADMIN — Medication 500 MILLIGRAM(S): at 13:24

## 2022-11-23 RX ADMIN — Medication 10 MILLILITER(S): at 00:35

## 2022-11-23 RX ADMIN — Medication 102 MILLIGRAM(S): at 18:30

## 2022-11-23 RX ADMIN — HEPARIN SODIUM 3.34 UNIT(S)/HR: 5000 INJECTION INTRAVENOUS; SUBCUTANEOUS at 20:08

## 2022-11-23 RX ADMIN — URSODIOL 300 MILLIGRAM(S): 250 TABLET, FILM COATED ORAL at 18:33

## 2022-11-23 RX ADMIN — SODIUM CHLORIDE 20 MILLILITER(S): 9 INJECTION INTRAMUSCULAR; INTRAVENOUS; SUBCUTANEOUS at 05:24

## 2022-11-23 RX ADMIN — Medication 10 MILLILITER(S): at 23:05

## 2022-11-23 RX ADMIN — Medication 20 MILLIGRAM(S): at 16:20

## 2022-11-23 RX ADMIN — ONDANSETRON 8 MILLIGRAM(S): 8 TABLET, FILM COATED ORAL at 05:22

## 2022-11-23 RX ADMIN — Medication 5 MILLILITER(S): at 13:21

## 2022-11-23 RX ADMIN — HEPARIN SODIUM 3.34 UNIT(S)/HR: 5000 INJECTION INTRAVENOUS; SUBCUTANEOUS at 06:20

## 2022-11-23 RX ADMIN — Medication 1 LOZENGE: at 09:04

## 2022-11-23 RX ADMIN — Medication 5 MILLILITER(S): at 16:28

## 2022-11-23 RX ADMIN — NYSTATIN CREAM 1 APPLICATION(S): 100000 CREAM TOPICAL at 05:23

## 2022-11-23 RX ADMIN — Medication 1 TABLET(S): at 05:23

## 2022-11-23 RX ADMIN — DIPHENHYDRAMINE HYDROCHLORIDE AND LIDOCAINE HYDROCHLORIDE AND ALUMINUM HYDROXIDE AND MAGNESIUM HYDRO 10 MILLILITER(S): KIT at 18:33

## 2022-11-23 RX ADMIN — Medication 1 LOZENGE: at 23:05

## 2022-11-23 RX ADMIN — Medication 1 MILLIGRAM(S): at 13:26

## 2022-11-23 RX ADMIN — Medication 10 MILLILITER(S): at 20:09

## 2022-11-23 RX ADMIN — NYSTATIN CREAM 1 APPLICATION(S): 100000 CREAM TOPICAL at 18:33

## 2022-11-23 RX ADMIN — ONDANSETRON 8 MILLIGRAM(S): 8 TABLET, FILM COATED ORAL at 20:30

## 2022-11-23 RX ADMIN — FLUCONAZOLE 400 MILLIGRAM(S): 150 TABLET ORAL at 13:25

## 2022-11-23 RX ADMIN — Medication 1 TABLET(S): at 13:25

## 2022-11-23 RX ADMIN — Medication 5 MILLILITER(S): at 20:09

## 2022-11-23 RX ADMIN — Medication 5 MILLILITER(S): at 23:05

## 2022-11-23 RX ADMIN — SODIUM CHLORIDE 20 MILLILITER(S): 9 INJECTION INTRAMUSCULAR; INTRAVENOUS; SUBCUTANEOUS at 20:08

## 2022-11-23 NOTE — PROGRESS NOTE ADULT - CRITICAL CARE ATTENDING COMMENT
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   Day - 3  Problem/Plan -   1:  Problem: Stem cell transplant   ·  Plan: 1. Admit to BMTU   2. TLC placement in IR   3. Day -9 - day -2 - antiemetics   4. Day -9 & day -8, VP16 2400mg/m2 IV x 34 hours (final concentration 0.4mg /mL).   5. Strict I&O, daily weights, prn diuresis   6. After completion of VP16- start CTX hydration (D51/2NS + 10mEq KCl @ 150ml / m2) - continue for 24 hours post infusion of CTX   7. Day -7 - day -4 - CTX 1800 mg / m2 daily over 2 hours. Follow SMA7, mag, phos 6hours post CT . Mesna  12mg / kg - hemorrhagic cystitis ppx   8. Day -3 - BCNU 400mg / m2   9. Day -2 - day -1 - rest days  10. Day 0(11/25/22) - HPC transplant. Start Zarxio 480mcg SQ QD, continue through engraftment. Kepivance on days 0, 1 & 2   11. Anxiolytics, antinausea, antidiarrhea medications as needed   12. Lasix PRN while being aggressively hydrated to avoid VOD   13. Nutritional support, pain management.    Problem/Plan - 2:  ·  Problem: Need for prophylactic measure.   ·  Plan: 1. VOD prophylaxis - low dose heparin gtt (dosed at 100 units / kg / day), glutamine supplementation, Actigall BID   2. PCP prophylaxis - Bactrim DS through day -2    3. Antiviral prophylaxis - Acyclovir 400mg po TID to start day -1   4. Antifungal prophylaxis- Diflucan 400 mg po daily.  5. GI prophylaxis - Protonix po QD   6. Antibacterial prophylaxis - when ANC < 500, start Cipro 500mg po BID. If becomes febrile, pan cx, CXR and change Cipro to Cefepime 2g IV q 8 hours. Continue until count recovery  7. Kepivance for prevention of mucositis- days 0, +1, +2  8. Aggressive mouth care and skin care as per protocol.  9. Receiving transfusion and electrolyte support 57 year old female with peripheral T cell lymphoma s/p CHOEP x 6 admitted for autologous pbsct with high dose CBV prep regimen.   Day - 2  Problem/Plan -   1:  Problem: Stem cell transplant   ·  Plan: 1. Admit to BMTU   2. TLC placement in IR   3. Day -9 - day -2 - antiemetics   4. Day -9 & day -8, VP16 2400mg/m2 IV x 34 hours (final concentration 0.4mg /mL).   5. Strict I&O, daily weights, prn diuresis   6. After completion of VP16- start CTX hydration (D51/2NS + 10mEq KCl @ 150ml / m2) - continue for 24 hours post infusion of CTX   7. Day -7 - day -4 - CTX 1800 mg / m2 daily over 2 hours. Follow SMA7, mag, phos 6hours post CT . Mesna  12mg / kg - hemorrhagic cystitis ppx   8. Day -3 - BCNU 400mg / m2   9. Day -2 - day -1 - rest days  10. Day 0(11/25/22) - HPC transplant. Start Zarxio 480mcg SQ QD, continue through engraftment. Kepivance on days 0, 1 & 2   11. Anxiolytics, antinausea, antidiarrhea medications as needed   12. Lasix PRN while being aggressively hydrated to avoid VOD   13. Nutritional support, pain management.    Problem/Plan - 2:  ·  Problem: Need for prophylactic measure.   ·  Plan: 1. VOD prophylaxis - low dose heparin gtt (dosed at 100 units / kg / day), glutamine supplementation, Actigall BID   2. PCP prophylaxis - Bactrim DS through day -2    3. Antiviral prophylaxis - Acyclovir 400mg po TID to start day -1   4. Antifungal prophylaxis- Diflucan 400 mg po daily.  5. GI prophylaxis - Protonix po QD   6. Antibacterial prophylaxis - when ANC < 500, start Cipro 500mg po BID. If becomes febrile, pan cx, CXR and change Cipro to Cefepime 2g IV q 8 hours. Continue until count recovery  7. Kepivance for prevention of mucositis- days 0, +1, +2  8. Aggressive mouth care and skin care as per protocol.  9. Receiving transfusion and electrolyte support

## 2022-11-23 NOTE — PROGRESS NOTE ADULT - SUBJECTIVE AND OBJECTIVE BOX
HPC Transplant Team                                                      Critical / Counseling Time Provided: 30 minutes    Chief Complaint: Autologous peripheral blood stem cell transplant with high dose CBV prep regimen for treatment of peripheral T cell lymphoma    S: Patient seen and examined with HPC Transplant Team:   + fatigue   + occasional nausea  + occasional loose stool   + chronic low back pain     All other ROS negative     O: Vitals:   Vital Signs Last 24 Hrs  T(C): 36.9 (2022 05:24), Max: 37.1 (2022 17:35)  T(F): 98.4 (2022 05:24), Max: 98.8 (2022 17:35)  HR: 80 (2022 05:24) (80 - 102)  BP: 105/71 (2022 05:24) (94/59 - 117/75)  BP(mean): --  RR: 18 (2022 05:24) (18 - 18)  SpO2: 98% (2022 05:24) (98% - 100%)    Parameters below as of 2022 05:24  Patient On (Oxygen Delivery Method): room air    Admit weight: 80.3 kg   Daily Weight in k.5 (2022 09:25)  Today's weight: pending     Intake / Output:    @ 07:01  -   @ 07:00  --------------------------------------------------------  IN: 5377.3 mL / OUT: 6800 mL / NET: -1422.7 mL    PE:   Oropharynx: no erythema or ulcerations  Oral Mucositis:              -                                          Grade: n/a   CVS: S1, S2 RRR   Lungs: CTA throughout bilaterally   Abdomen: + BS x 4, soft, NT, ND   Extremities: no edema   Gastric Mucositis:       -                                           Grade: n/a   Intestinal Mucositis:     -                                         Grade: n/a   Skin: + macular erythema to chest, upper back and neck post kepivance   TLC: CDI   Neuro: A&O x 3   Pain: 0      Labs:   CBC Full  -  ( 2022 06:53 )  WBC Count : 1.14 K/uL  Hemoglobin : 8.3 g/dL  Hematocrit : 26.7 %  Platelet Count - Automated : 130 K/uL  Mean Cell Volume : 96.7 fl  Mean Cell Hemoglobin : 30.1 pg  Mean Cell Hemoglobin Concentration : 31.1 gm/dL  Auto Neutrophil # : 1.04 K/uL  Auto Lymphocyte # : 0.08 K/uL  Auto Monocyte # : 0.00 K/uL  Auto Eosinophil # : 0.02 K/uL  Auto Basophil # : 0.00 K/uL  Auto Neutrophil % : 91.2 %  Auto Lymphocyte % : 7.0 %  Auto Monocyte % : 0.0 %  Auto Eosinophil % : 1.8 %  Auto Basophil % : 0.0 %                          8.3    1.14  )-----------( 130      ( 2022 06:53 )             26.7         138  |  104  |  10  ----------------------------<  129<H>  4.5   |  25  |  0.44<L>    Ca    9.2      2022 06:52  Phos  2.7       Mg     2.2         TPro  6.3  /  Alb  3.9  /  TBili  0.3  /  DBili  x   /  AST  12  /  ALT  22  /  AlkPhos  72        LIVER FUNCTIONS - ( 2022 06:52 )  Alb: 3.9 g/dL / Pro: 6.3 g/dL / ALK PHOS: 72 U/L / ALT: 22 U/L / AST: 12 U/L / GGT: x           Meds:   Antimicrobials:   clotrimazole Lozenge 1 Lozenge Oral five times a day  fluconAZOLE   Tablet 400 milliGRAM(s) Oral daily  trimethoprim  160 mG/sulfamethoxazole 800 mG 1 Tablet(s) Oral every 12 hours    Heme / Onc:   heparin  Infusion 334 Unit(s)/Hr IV Continuous <Continuous>    GI:  pantoprazole    Tablet 40 milliGRAM(s) Oral before breakfast  polyethylene glycol 3350 17 Gram(s) Oral daily PRN  senna 2 Tablet(s) Oral at bedtime PRN  sodium bicarbonate Mouth Rinse 10 milliLiter(s) Swish and Spit five times a day  ursodiol Capsule 300 milliGRAM(s) Oral two times a day    Other medications:   acetaminophen     Tablet .. 650 milliGRAM(s) Oral every 6 hours  aprepitant 80 milliGRAM(s) Oral every 24 hours  Biotene Dry Mouth Oral Rinse 5 milliLiter(s) Swish and Spit five times a day  chlorhexidine 4% Liquid 1 Application(s) Topical <User Schedule>  dexAMETHasone  IVPB 10 milliGRAM(s) IV Intermittent every 24 hours  FIRST- Mouthwash  BLM 10 milliLiter(s) Swish and Spit every 6 hours  FLUoxetine 20 milliGRAM(s) Oral daily  folic acid 1 milliGRAM(s) Oral daily  loratadine 10 milliGRAM(s) Oral daily  LORazepam   Injectable 1 milliGRAM(s) IV Push daily  multivitamin 1 Tablet(s) Oral daily  nystatin Powder 1 Application(s) Topical two times a day  ondansetron Injectable 8 milliGRAM(s) IV Push every 8 hours  sodium chloride 0.9% 1000 milliLiter(s) IV Continuous <Continuous>  sodium chloride 0.9%. 1000 milliLiter(s) IV Continuous <Continuous>      PRN:   acetaminophen     Tablet .. 650 milliGRAM(s) Oral every 6 hours PRN  AQUAPHOR (petrolatum Ointment) 1 Application(s) Topical two times a day PRN  LORazepam   Injectable 1 milliGRAM(s) IV Push every 6 hours PRN  metoclopramide Injectable 10 milliGRAM(s) IV Push every 6 hours PRN  polyethylene glycol 3350 17 Gram(s) Oral daily PRN  senna 2 Tablet(s) Oral at bedtime PRN  sodium chloride 0.9% lock flush 10 milliLiter(s) IV Push every 1 hour PRN  zinc oxide 40% Paste 1 Application(s) Topical three times a day PRN    A/P:  57 year old female with a history of  peripheral T cell lymphoma   Pre  :  Autologous PBSCT day -2  - Completed  -16 24 hour continuous infusion, strict I&O, daily weights, prn diuresis    - Cytoxan day 3/.  - CTX - continue CTX hydration for 24 hours post infusion of last dose. Strict I&O, daily weights, prn diuresis. Start cipro 500mg po BID when ANC < 500    Lasix 40mv IV x 2 today     1. Infectious Disease:   clotrimazole Lozenge 1 Lozenge Oral five times a day  fluconAZOLE   Tablet 400 milliGRAM(s) Oral daily  trimethoprim  160 mG/sulfamethoxazole 800 mG 1 Tablet(s) Oral every 12     2. VOD Prophylaxis: Actigall, Glutamine, Heparin (dosed at 100 units / kg / day)     3. GI Prophylaxis:    pantoprazole    Tablet 40 milliGRAM(s) Oral before breakfast    4. Mouthcare - NS / NaHCO3 rinses, Mycelex, Biotene; Skin care     5. GVHD prophylaxis - n/a     6. Transfuse & replete electrolytes prn     7. IV hydration, daily weights, strict I&O, prn diuresis     8. PO intake as tolerated, nutrition follow up as needed, MVI, folic acid     9. Antiemetics, anti-diarrhea medications:   LORazepam   Injectable 1 milliGRAM(s) IV Push every 6 hours PRN  metoclopramide Injectable 10 milliGRAM(s) IV Push every 6 hours PRN  aprepitant 80 milliGRAM(s) Oral every 24 hours  dexAMETHasone  IVPB 10 milliGRAM(s) IV Intermittent every 24 hours  LORazepam   Injectable 1 milliGRAM(s) IV Push daily  ondansetron Injectable 8 milliGRAM(s) IV Push every 8 hours    10. OOB as tolerated, physical therapy consult if needed     11. Monitor coags / fibrinogen 2x week, vitamin K as needed     12. Monitor closely for clinical changes, monitor for fevers     13. Emotional support provided, plan of care discussed and questions addressed     14. Patient education done regarding chemotherapy prep, plan of care, restrictions and discharge planning     15. Continue regular social work input     I have written the above note for Dr. Cabrales who performed service with me in the room.   Balbina Astudillo NP-C ([phone])    I have seen and examined patient with NP, I agree with above note as scribed.          HPC Transplant Team                                                      Critical / Counseling Time Provided: 30 minutes    Chief Complaint: Autologous peripheral blood stem cell transplant with high dose CBV prep regimen for treatment of peripheral T cell lymphoma    S: Patient seen and examined with HPC Transplant Team:   + fatigue   + occasional nausea  + occasional loose stool   + chronic low back pain     All other ROS negative     O: Vitals:   Vital Signs Last 24 Hrs  T(C): 36.9 (2022 05:24), Max: 37.1 (2022 17:35)  T(F): 98.4 (2022 05:24), Max: 98.8 (2022 17:35)  HR: 80 (2022 05:24) (80 - 102)  BP: 105/71 (2022 05:24) (94/59 - 117/75)  BP(mean): --  RR: 18 (2022 05:24) (18 - 18)  SpO2: 98% (2022 05:24) (98% - 100%)    Parameters below as of 2022 05:24  Patient On (Oxygen Delivery Method): room air    Admit weight: 80.3 kg   Daily Weight in k.5 (2022 09:25)  Today's weight: pending     Intake / Output:    @ 07:01  -   @ 07:00  --------------------------------------------------------  IN: 5377.3 mL / OUT: 6800 mL / NET: -1422.7 mL    PE:   Oropharynx: no erythema or ulcerations  Oral Mucositis:              -                                          Grade: n/a   CVS: S1, S2 RRR   Lungs: CTA throughout bilaterally   Abdomen: + BS x 4, soft, NT, ND   Extremities: no edema   Gastric Mucositis:       -                                           Grade: n/a   Intestinal Mucositis:     -                                         Grade: n/a   Skin: + macular erythema to chest, upper back and neck post kepivance   TLC: CDI   Neuro: A&O x 3   Pain: 0      Labs:   CBC Full  -  ( 2022 07:06 )  WBC Count : 0.48 K/uL  Hemoglobin : 9.0 g/dL  Hematocrit : 27.8 %  Platelet Count - Automated : 116 K/uL  Mean Cell Volume : 94.2 fl  Mean Cell Hemoglobin : 30.5 pg  Mean Cell Hemoglobin Concentration : 32.4 gm/dL  Auto Neutrophil # : x  Auto Lymphocyte # : x  Auto Monocyte # : x  Auto Eosinophil # : x  Auto Basophil # : x  Auto Neutrophil % : x  Auto Lymphocyte % : x  Auto Monocyte % : x  Auto Eosinophil % : x  Auto Basophil % : x    2022 07:06    135    |  98     |  13     ----------------------------<  124    4.4     |  26     |  0.49     Ca    9.5        2022 07:06  Phos  3.2       2022 07:06  Mg     2.1       2022 07:06    TPro  7.0    /  Alb  4.3    /  TBili  0.3    /  DBili  x      /  AST  15     /  ALT  23     /  AlkPhos  77     2022 07:06      Meds:   Antimicrobials:   clotrimazole Lozenge 1 Lozenge Oral five times a day  fluconAZOLE   Tablet 400 milliGRAM(s) Oral daily  trimethoprim  160 mG/sulfamethoxazole 800 mG 1 Tablet(s) Oral every 12 hours    Heme / Onc:   heparin  Infusion 334 Unit(s)/Hr IV Continuous <Continuous>    GI:  pantoprazole    Tablet 40 milliGRAM(s) Oral before breakfast  polyethylene glycol 3350 17 Gram(s) Oral daily PRN  senna 2 Tablet(s) Oral at bedtime PRN  sodium bicarbonate Mouth Rinse 10 milliLiter(s) Swish and Spit five times a day  ursodiol Capsule 300 milliGRAM(s) Oral two times a day    Other medications:   acetaminophen     Tablet .. 650 milliGRAM(s) Oral every 6 hours  aprepitant 80 milliGRAM(s) Oral every 24 hours  Biotene Dry Mouth Oral Rinse 5 milliLiter(s) Swish and Spit five times a day  chlorhexidine 4% Liquid 1 Application(s) Topical <User Schedule>  dexAMETHasone  IVPB 10 milliGRAM(s) IV Intermittent every 24 hours  FIRST- Mouthwash  BLM 10 milliLiter(s) Swish and Spit every 6 hours  FLUoxetine 20 milliGRAM(s) Oral daily  folic acid 1 milliGRAM(s) Oral daily  loratadine 10 milliGRAM(s) Oral daily  LORazepam   Injectable 1 milliGRAM(s) IV Push daily  multivitamin 1 Tablet(s) Oral daily  nystatin Powder 1 Application(s) Topical two times a day  ondansetron Injectable 8 milliGRAM(s) IV Push every 8 hours  sodium chloride 0.9% 1000 milliLiter(s) IV Continuous <Continuous>  sodium chloride 0.9%. 1000 milliLiter(s) IV Continuous <Continuous>      PRN:   acetaminophen     Tablet .. 650 milliGRAM(s) Oral every 6 hours PRN  AQUAPHOR (petrolatum Ointment) 1 Application(s) Topical two times a day PRN  LORazepam   Injectable 1 milliGRAM(s) IV Push every 6 hours PRN  metoclopramide Injectable 10 milliGRAM(s) IV Push every 6 hours PRN  polyethylene glycol 3350 17 Gram(s) Oral daily PRN  senna 2 Tablet(s) Oral at bedtime PRN  sodium chloride 0.9% lock flush 10 milliLiter(s) IV Push every 1 hour PRN  zinc oxide 40% Paste 1 Application(s) Topical three times a day PRN    A/P:  57 year old female with a history of  peripheral T cell lymphoma   Pre  :  Autologous PBSCT day -2  - Completed  -16 24 hour continuous infusion, strict I&O, daily weights, prn diuresis    - Cytoxan day 3/4.  - CTX - continue CTX hydration for 24 hours post infusion of last dose. Strict I&O, daily weights, prn diuresis. Start cipro 500mg po BID when ANC < 500    Lasix 40mv IV x 2 today     1. Infectious Disease:   clotrimazole Lozenge 1 Lozenge Oral five times a day  fluconAZOLE   Tablet 400 milliGRAM(s) Oral daily  trimethoprim  160 mG/sulfamethoxazole 800 mG 1 Tablet(s) Oral every 12     2. VOD Prophylaxis: Actigall, Glutamine, Heparin (dosed at 100 units / kg / day)     3. GI Prophylaxis:    pantoprazole    Tablet 40 milliGRAM(s) Oral before breakfast    4. Mouthcare - NS / NaHCO3 rinses, Mycelex, Biotene; Skin care     5. GVHD prophylaxis - n/a     6. Transfuse & replete electrolytes prn     7. IV hydration, daily weights, strict I&O, prn diuresis     8. PO intake as tolerated, nutrition follow up as needed, MVI, folic acid     9. Antiemetics, anti-diarrhea medications:   LORazepam   Injectable 1 milliGRAM(s) IV Push every 6 hours PRN  metoclopramide Injectable 10 milliGRAM(s) IV Push every 6 hours PRN  aprepitant 80 milliGRAM(s) Oral every 24 hours  dexAMETHasone  IVPB 10 milliGRAM(s) IV Intermittent every 24 hours  LORazepam   Injectable 1 milliGRAM(s) IV Push daily  ondansetron Injectable 8 milliGRAM(s) IV Push every 8 hours    10. OOB as tolerated, physical therapy consult if needed     11. Monitor coags / fibrinogen 2x week, vitamin K as needed     12. Monitor closely for clinical changes, monitor for fevers     13. Emotional support provided, plan of care discussed and questions addressed     14. Patient education done regarding chemotherapy prep, plan of care, restrictions and discharge planning     15. Continue regular social work input     I have written the above note for Dr. Cabrales who performed service with me in the room.   Balbina Astudillo NP-C ([phone])    I have seen and examined patient with NP, I agree with above note as scribed.          HPC Transplant Team                                                      Critical / Counseling Time Provided: 30 minutes    Chief Complaint: Autologous peripheral blood stem cell transplant with high dose CBV prep regimen for treatment of peripheral T cell lymphoma    S: Patient seen and examined with HPC Transplant Team:   + fatigue   + occasional nausea  + occasional loose stool   + chronic low back pain     All other ROS negative     O: Vitals:   Vital Signs Last 24 Hrs  T(C): 36.9 (2022 05:24), Max: 37.1 (2022 17:35)  T(F): 98.4 (2022 05:24), Max: 98.8 (2022 17:35)  HR: 80 (2022 05:24) (80 - 102)  BP: 105/71 (2022 05:24) (94/59 - 117/75)  BP(mean): --  RR: 18 (2022 05:24) (18 - 18)  SpO2: 98% (2022 05:24) (98% - 100%)    Parameters below as of 2022 05:24  Patient On (Oxygen Delivery Method): room air    Admit weight: 80.3 kg   Daily Weight in k.5 (2022 09:25)  Today's weight: pending     Intake / Output:    @ 07:01  -   @ 07:00  --------------------------------------------------------  IN: 5377.3 mL / OUT: 6800 mL / NET: -1422.7 mL    PE:   Oropharynx: no erythema or ulcerations  Oral Mucositis:              -                                          Grade: n/a   CVS: S1, S2 RRR   Lungs: CTA throughout bilaterally   Abdomen: + BS x 4, soft, NT, ND   Extremities: no edema   Gastric Mucositis:       -                                           Grade: n/a   Intestinal Mucositis:     -                                         Grade: n/a   Skin: + macular erythema to chest, upper back and neck post kepivance   TLC: CDI   Neuro: A&O x 3   Pain: 0      Labs:   CBC Full  -  ( 2022 07:06 )  WBC Count : 0.48 K/uL  Hemoglobin : 9.0 g/dL  Hematocrit : 27.8 %  Platelet Count - Automated : 116 K/uL  Mean Cell Volume : 94.2 fl  Mean Cell Hemoglobin : 30.5 pg  Mean Cell Hemoglobin Concentration : 32.4 gm/dL  Auto Neutrophil # : x  Auto Lymphocyte # : x  Auto Monocyte # : x  Auto Eosinophil # : x  Auto Basophil # : x  Auto Neutrophil % : x  Auto Lymphocyte % : x  Auto Monocyte % : x  Auto Eosinophil % : x  Auto Basophil % : x    2022 07:06    135    |  98     |  13     ----------------------------<  124    4.4     |  26     |  0.49     Ca    9.5        2022 07:06  Phos  3.2       2022 07:06  Mg     2.1       2022 07:06    TPro  7.0    /  Alb  4.3    /  TBili  0.3    /  DBili  x      /  AST  15     /  ALT  23     /  AlkPhos  77     2022 07:06      Meds:   Antimicrobials:   clotrimazole Lozenge 1 Lozenge Oral five times a day  fluconAZOLE   Tablet 400 milliGRAM(s) Oral daily  trimethoprim  160 mG/sulfamethoxazole 800 mG 1 Tablet(s) Oral every 12 hours    Heme / Onc:   heparin  Infusion 334 Unit(s)/Hr IV Continuous <Continuous>    GI:  pantoprazole    Tablet 40 milliGRAM(s) Oral before breakfast  polyethylene glycol 3350 17 Gram(s) Oral daily PRN  senna 2 Tablet(s) Oral at bedtime PRN  sodium bicarbonate Mouth Rinse 10 milliLiter(s) Swish and Spit five times a day  ursodiol Capsule 300 milliGRAM(s) Oral two times a day    Other medications:   acetaminophen     Tablet .. 650 milliGRAM(s) Oral every 6 hours  aprepitant 80 milliGRAM(s) Oral every 24 hours  Biotene Dry Mouth Oral Rinse 5 milliLiter(s) Swish and Spit five times a day  chlorhexidine 4% Liquid 1 Application(s) Topical <User Schedule>  dexAMETHasone  IVPB 10 milliGRAM(s) IV Intermittent every 24 hours  FIRST- Mouthwash  BLM 10 milliLiter(s) Swish and Spit every 6 hours  FLUoxetine 20 milliGRAM(s) Oral daily  folic acid 1 milliGRAM(s) Oral daily  loratadine 10 milliGRAM(s) Oral daily  LORazepam   Injectable 1 milliGRAM(s) IV Push daily  multivitamin 1 Tablet(s) Oral daily  nystatin Powder 1 Application(s) Topical two times a day  ondansetron Injectable 8 milliGRAM(s) IV Push every 8 hours  sodium chloride 0.9% 1000 milliLiter(s) IV Continuous <Continuous>  sodium chloride 0.9%. 1000 milliLiter(s) IV Continuous <Continuous>      PRN:   acetaminophen     Tablet .. 650 milliGRAM(s) Oral every 6 hours PRN  AQUAPHOR (petrolatum Ointment) 1 Application(s) Topical two times a day PRN  LORazepam   Injectable 1 milliGRAM(s) IV Push every 6 hours PRN  metoclopramide Injectable 10 milliGRAM(s) IV Push every 6 hours PRN  polyethylene glycol 3350 17 Gram(s) Oral daily PRN  senna 2 Tablet(s) Oral at bedtime PRN  sodium chloride 0.9% lock flush 10 milliLiter(s) IV Push every 1 hour PRN  zinc oxide 40% Paste 1 Application(s) Topical three times a day PRN    A/P:  57 year old female with a history of  peripheral T cell lymphoma   Pre  :  Autologous PBSCT day -2  - Completed  -16 24 hour continuous infusion, strict I&O, daily weights, prn diuresis    - Cytoxan day 3/.  - CTX - continue CTX hydration for 24 hours post infusion of last dose. Strict I&O, daily weights, prn diuresis. Start cipro 500mg po BID when ANC < 500    Lasix 40mv IV x 2 today    Neutropenic. Started Cipro for prophylaxis. If febrile, panculture and start Cefepime (has tolerated in the past)     1. Infectious Disease:   clotrimazole Lozenge 1 Lozenge Oral five times a day  fluconAZOLE   Tablet 400 milliGRAM(s) Oral daily  trimethoprim  160 mG/sulfamethoxazole 800 mG 1 Tablet(s) Oral every 12     2. VOD Prophylaxis: Actigall, Glutamine, Heparin (dosed at 100 units / kg / day)     3. GI Prophylaxis:    pantoprazole    Tablet 40 milliGRAM(s) Oral before breakfast    4. Mouthcare - NS / NaHCO3 rinses, Mycelex, Biotene; Skin care     5. GVHD prophylaxis - n/a     6. Transfuse & replete electrolytes prn     7. IV hydration, daily weights, strict I&O, prn diuresis     8. PO intake as tolerated, nutrition follow up as needed, MVI, folic acid     9. Antiemetics, anti-diarrhea medications:   LORazepam   Injectable 1 milliGRAM(s) IV Push every 6 hours PRN  metoclopramide Injectable 10 milliGRAM(s) IV Push every 6 hours PRN  aprepitant 80 milliGRAM(s) Oral every 24 hours  dexAMETHasone  IVPB 10 milliGRAM(s) IV Intermittent every 24 hours  LORazepam   Injectable 1 milliGRAM(s) IV Push daily  ondansetron Injectable 8 milliGRAM(s) IV Push every 8 hours    10. OOB as tolerated, physical therapy consult if needed     11. Monitor coags / fibrinogen 2x week, vitamin K as needed     12. Monitor closely for clinical changes, monitor for fevers     13. Emotional support provided, plan of care discussed and questions addressed     14. Patient education done regarding chemotherapy prep, plan of care, restrictions and discharge planning     15. Continue regular social work input     I have written the above note for Dr. Cabrales who performed service with me in the room.   Balbina Astudillo NP-C ([phone])    I have seen and examined patient with NP, I agree with above note as scribed.          HPC Transplant Team                                                      Critical / Counseling Time Provided: 30 minutes    Chief Complaint: Autologous peripheral blood stem cell transplant with high dose CBV prep regimen for treatment of peripheral T cell lymphoma    S: Patient seen and examined with HPC Transplant Team:   + fatigue   + intermittent nausea; reports episode last night   + occasional loose stool 3x yesterday   + chronic low back pain     All other ROS negative     O: Vitals:   Vital Signs Last 24 Hrs  T(C): 36.9 (2022 05:24), Max: 37.1 (2022 17:35)  T(F): 98.4 (2022 05:24), Max: 98.8 (2022 17:35)  HR: 80 (2022 05:24) (80 - 102)  BP: 105/71 (2022 05:24) (94/59 - 117/75)  BP(mean): --  RR: 18 (2022 05:24) (18 - 18)  SpO2: 98% (2022 05:24) (98% - 100%)    Parameters below as of 2022 05:24  Patient On (Oxygen Delivery Method): room air    Admit weight: 80.3 kg   Daily Weight in k.5 (2022 09:25)  Today's weight: 76 kg     Intake / Output:    @ 07:01  -   @ 07:00  --------------------------------------------------------  IN: 5377.3 mL / OUT: 6800 mL / NET: -1422.7 mL    PE:   Oropharynx: no erythema or ulcerations  Oral Mucositis:              -                                          Grade: n/a   CVS: S1, S2 RRR   Lungs: CTA throughout bilaterally   Abdomen: + BS x 4, soft, NT, ND   Extremities: no edema   Gastric Mucositis:       -                                           Grade: n/a   Intestinal Mucositis:     -                                         Grade: n/a   Skin: + macular erythema to chest, upper back and neck post kepivance; unchanged   TLC: CDI   Neuro: A&O x 3   Pain: 0    Labs:   CBC Full  -  ( 2022 07:06 )  WBC Count : 0.48 K/uL  Hemoglobin : 9.0 g/dL  Hematocrit : 27.8 %  Platelet Count - Automated : 116 K/uL  Mean Cell Volume : 94.2 fl  Mean Cell Hemoglobin : 30.5 pg  Mean Cell Hemoglobin Concentration : 32.4 gm/dL  Auto Neutrophil # : x  Auto Lymphocyte # : x  Auto Monocyte # : x  Auto Eosinophil # : x  Auto Basophil # : x  Auto Neutrophil % : x  Auto Lymphocyte % : x  Auto Monocyte % : x  Auto Eosinophil % : x  Auto Basophil % : x    2022 07:06    135    |  98     |  13     ----------------------------<  124    4.4     |  26     |  0.49     Ca    9.5        2022 07:06  Phos  3.2       2022 07:06  Mg     2.1       2022 07:06    TPro  7.0    /  Alb  4.3    /  TBili  0.3    /  DBili  x      /  AST  15     /  ALT  23     /  AlkPhos  77     2022 07:06      Meds:   Antimicrobials:   clotrimazole Lozenge 1 Lozenge Oral five times a day  fluconAZOLE   Tablet 400 milliGRAM(s) Oral daily  trimethoprim  160 mG/sulfamethoxazole 800 mG 1 Tablet(s) Oral every 12 hours    Heme / Onc:   heparin  Infusion 334 Unit(s)/Hr IV Continuous <Continuous>    GI:  pantoprazole    Tablet 40 milliGRAM(s) Oral before breakfast  polyethylene glycol 3350 17 Gram(s) Oral daily PRN  senna 2 Tablet(s) Oral at bedtime PRN  sodium bicarbonate Mouth Rinse 10 milliLiter(s) Swish and Spit five times a day  ursodiol Capsule 300 milliGRAM(s) Oral two times a day    Other medications:   acetaminophen     Tablet .. 650 milliGRAM(s) Oral every 6 hours  aprepitant 80 milliGRAM(s) Oral every 24 hours  Biotene Dry Mouth Oral Rinse 5 milliLiter(s) Swish and Spit five times a day  chlorhexidine 4% Liquid 1 Application(s) Topical <User Schedule>  dexAMETHasone  IVPB 10 milliGRAM(s) IV Intermittent every 24 hours  FIRST- Mouthwash  BLM 10 milliLiter(s) Swish and Spit every 6 hours  FLUoxetine 20 milliGRAM(s) Oral daily  folic acid 1 milliGRAM(s) Oral daily  loratadine 10 milliGRAM(s) Oral daily  LORazepam   Injectable 1 milliGRAM(s) IV Push daily  multivitamin 1 Tablet(s) Oral daily  nystatin Powder 1 Application(s) Topical two times a day  ondansetron Injectable 8 milliGRAM(s) IV Push every 8 hours  sodium chloride 0.9% 1000 milliLiter(s) IV Continuous <Continuous>  sodium chloride 0.9%. 1000 milliLiter(s) IV Continuous <Continuous>      PRN:   acetaminophen     Tablet .. 650 milliGRAM(s) Oral every 6 hours PRN  AQUAPHOR (petrolatum Ointment) 1 Application(s) Topical two times a day PRN  LORazepam   Injectable 1 milliGRAM(s) IV Push every 6 hours PRN  metoclopramide Injectable 10 milliGRAM(s) IV Push every 6 hours PRN  polyethylene glycol 3350 17 Gram(s) Oral daily PRN  senna 2 Tablet(s) Oral at bedtime PRN  sodium chloride 0.9% lock flush 10 milliLiter(s) IV Push every 1 hour PRN  zinc oxide 40% Paste 1 Application(s) Topical three times a day PRN    A/P:  57 year old female with a history of  peripheral T cell lymphoma   Pre  :  Autologous PBSCT day -2  - Completed  -16 24 hour continuous infusion, strict I&O, daily weights, prn diuresis    - Cytoxan day 3.  - CTX - continue CTX hydration for 24 hours post infusion of last dose. Strict I&O, daily weights, prn diuresis. Start cipro 500mg po BID when ANC < 500    Lasix 40mv IV x 2 today    Neutropenic. Started Cipro for prophylaxis. If febrile, panculture and start Cefepime (has tolerated in the past)     1. Infectious Disease:   clotrimazole Lozenge 1 Lozenge Oral five times a day  fluconAZOLE   Tablet 400 milliGRAM(s) Oral daily  trimethoprim  160 mG/sulfamethoxazole 800 mG 1 Tablet(s) Oral every 12     2. VOD Prophylaxis: Actigall, Glutamine, Heparin (dosed at 100 units / kg / day)     3. GI Prophylaxis:    pantoprazole    Tablet 40 milliGRAM(s) Oral before breakfast    4. Mouthcare - NS / NaHCO3 rinses, Mycelex, Biotene; Skin care     5. GVHD prophylaxis - n/a     6. Transfuse & replete electrolytes prn     7. IV hydration, daily weights, strict I&O, prn diuresis     8. PO intake as tolerated, nutrition follow up as needed, MVI, folic acid     9. Antiemetics, anti-diarrhea medications:   LORazepam   Injectable 1 milliGRAM(s) IV Push every 6 hours PRN  metoclopramide Injectable 10 milliGRAM(s) IV Push every 6 hours PRN  aprepitant 80 milliGRAM(s) Oral every 24 hours  dexAMETHasone  IVPB 10 milliGRAM(s) IV Intermittent every 24 hours  LORazepam   Injectable 1 milliGRAM(s) IV Push daily  ondansetron Injectable 8 milliGRAM(s) IV Push every 8 hours    10. OOB as tolerated, physical therapy consult if needed     11. Monitor coags / fibrinogen 2x week, vitamin K as needed     12. Monitor closely for clinical changes, monitor for fevers     13. Emotional support provided, plan of care discussed and questions addressed     14. Patient education done regarding chemotherapy prep, plan of care, restrictions and discharge planning     15. Continue regular social work input     I have written the above note for Dr. Cabrales who performed service with me in the room.   Balbina Astudillo NP-C ([phone])    I have seen and examined patient with NP, I agree with above note as scribed.

## 2022-11-24 LAB
ALBUMIN SERPL ELPH-MCNC: 4.3 G/DL — SIGNIFICANT CHANGE UP (ref 3.3–5)
ALP SERPL-CCNC: 75 U/L — SIGNIFICANT CHANGE UP (ref 40–120)
ALT FLD-CCNC: 26 U/L — SIGNIFICANT CHANGE UP (ref 10–45)
ANION GAP SERPL CALC-SCNC: 12 MMOL/L — SIGNIFICANT CHANGE UP (ref 5–17)
APTT BLD: 23.5 SEC — LOW (ref 27.5–35.5)
AST SERPL-CCNC: 16 U/L — SIGNIFICANT CHANGE UP (ref 10–40)
BILIRUB SERPL-MCNC: 0.2 MG/DL — SIGNIFICANT CHANGE UP (ref 0.2–1.2)
BUN SERPL-MCNC: 14 MG/DL — SIGNIFICANT CHANGE UP (ref 7–23)
CALCIUM SERPL-MCNC: 9.2 MG/DL — SIGNIFICANT CHANGE UP (ref 8.4–10.5)
CHLORIDE SERPL-SCNC: 101 MMOL/L — SIGNIFICANT CHANGE UP (ref 96–108)
CO2 SERPL-SCNC: 24 MMOL/L — SIGNIFICANT CHANGE UP (ref 22–31)
CREAT SERPL-MCNC: 0.46 MG/DL — LOW (ref 0.5–1.3)
EGFR: 113 ML/MIN/1.73M2 — SIGNIFICANT CHANGE UP
FIBRINOGEN PPP-MCNC: 221 MG/DL — SIGNIFICANT CHANGE UP (ref 200–445)
GLUCOSE SERPL-MCNC: 123 MG/DL — HIGH (ref 70–99)
HCT VFR BLD CALC: 27.1 % — LOW (ref 34.5–45)
HGB BLD-MCNC: 8.7 G/DL — LOW (ref 11.5–15.5)
INR BLD: 1.09 RATIO — SIGNIFICANT CHANGE UP (ref 0.88–1.16)
LDH SERPL L TO P-CCNC: 126 U/L — SIGNIFICANT CHANGE UP (ref 50–242)
MAGNESIUM SERPL-MCNC: 2.1 MG/DL — SIGNIFICANT CHANGE UP (ref 1.6–2.6)
MCHC RBC-ENTMCNC: 30.4 PG — SIGNIFICANT CHANGE UP (ref 27–34)
MCHC RBC-ENTMCNC: 32.1 GM/DL — SIGNIFICANT CHANGE UP (ref 32–36)
MCV RBC AUTO: 94.8 FL — SIGNIFICANT CHANGE UP (ref 80–100)
NRBC # BLD: 0 /100 WBCS — SIGNIFICANT CHANGE UP (ref 0–0)
PHOSPHATE SERPL-MCNC: 3.1 MG/DL — SIGNIFICANT CHANGE UP (ref 2.5–4.5)
PLATELET # BLD AUTO: 90 K/UL — LOW (ref 150–400)
POTASSIUM SERPL-MCNC: 4.6 MMOL/L — SIGNIFICANT CHANGE UP (ref 3.5–5.3)
POTASSIUM SERPL-SCNC: 4.6 MMOL/L — SIGNIFICANT CHANGE UP (ref 3.5–5.3)
PROT SERPL-MCNC: 6.7 G/DL — SIGNIFICANT CHANGE UP (ref 6–8.3)
PROTHROM AB SERPL-ACNC: 12.5 SEC — SIGNIFICANT CHANGE UP (ref 10.5–13.4)
RBC # BLD: 2.86 M/UL — LOW (ref 3.8–5.2)
RBC # FLD: 13.3 % — SIGNIFICANT CHANGE UP (ref 10.3–14.5)
SODIUM SERPL-SCNC: 137 MMOL/L — SIGNIFICANT CHANGE UP (ref 135–145)
WBC # BLD: 0.09 K/UL — CRITICAL LOW (ref 3.8–10.5)
WBC # FLD AUTO: 0.09 K/UL — CRITICAL LOW (ref 3.8–10.5)

## 2022-11-24 PROCEDURE — 99291 CRITICAL CARE FIRST HOUR: CPT

## 2022-11-24 RX ADMIN — Medication 20 MILLIGRAM(S): at 13:33

## 2022-11-24 RX ADMIN — Medication 102 MILLIGRAM(S): at 17:45

## 2022-11-24 RX ADMIN — PANTOPRAZOLE SODIUM 40 MILLIGRAM(S): 20 TABLET, DELAYED RELEASE ORAL at 05:24

## 2022-11-24 RX ADMIN — CHLORHEXIDINE GLUCONATE 1 APPLICATION(S): 213 SOLUTION TOPICAL at 09:00

## 2022-11-24 RX ADMIN — URSODIOL 300 MILLIGRAM(S): 250 TABLET, FILM COATED ORAL at 05:24

## 2022-11-24 RX ADMIN — SODIUM CHLORIDE 150 MILLILITER(S): 9 INJECTION, SOLUTION INTRAVENOUS at 23:16

## 2022-11-24 RX ADMIN — Medication 1 LOZENGE: at 13:26

## 2022-11-24 RX ADMIN — HEPARIN SODIUM 3.34 UNIT(S)/HR: 5000 INJECTION INTRAVENOUS; SUBCUTANEOUS at 20:15

## 2022-11-24 RX ADMIN — Medication 1 LOZENGE: at 20:15

## 2022-11-24 RX ADMIN — Medication 1 LOZENGE: at 23:11

## 2022-11-24 RX ADMIN — Medication 400 MILLIGRAM(S): at 13:45

## 2022-11-24 RX ADMIN — DIPHENHYDRAMINE HYDROCHLORIDE AND LIDOCAINE HYDROCHLORIDE AND ALUMINUM HYDROXIDE AND MAGNESIUM HYDRO 10 MILLILITER(S): KIT at 05:24

## 2022-11-24 RX ADMIN — DIPHENHYDRAMINE HYDROCHLORIDE AND LIDOCAINE HYDROCHLORIDE AND ALUMINUM HYDROXIDE AND MAGNESIUM HYDRO 10 MILLILITER(S): KIT at 17:46

## 2022-11-24 RX ADMIN — ONDANSETRON 8 MILLIGRAM(S): 8 TABLET, FILM COATED ORAL at 20:17

## 2022-11-24 RX ADMIN — Medication 500 MILLIGRAM(S): at 05:23

## 2022-11-24 RX ADMIN — Medication 1 LOZENGE: at 16:56

## 2022-11-24 RX ADMIN — Medication 5 MILLILITER(S): at 16:56

## 2022-11-24 RX ADMIN — LORATADINE 10 MILLIGRAM(S): 10 TABLET ORAL at 13:44

## 2022-11-24 RX ADMIN — Medication 5 MILLILITER(S): at 20:15

## 2022-11-24 RX ADMIN — NYSTATIN CREAM 1 APPLICATION(S): 100000 CREAM TOPICAL at 17:47

## 2022-11-24 RX ADMIN — Medication 10 MILLILITER(S): at 20:16

## 2022-11-24 RX ADMIN — Medication 1 TABLET(S): at 13:29

## 2022-11-24 RX ADMIN — Medication 10 MILLILITER(S): at 09:00

## 2022-11-24 RX ADMIN — Medication 10 MILLILITER(S): at 16:56

## 2022-11-24 RX ADMIN — Medication 5 MILLILITER(S): at 08:59

## 2022-11-24 RX ADMIN — Medication 10 MILLILITER(S): at 13:25

## 2022-11-24 RX ADMIN — DIPHENHYDRAMINE HYDROCHLORIDE AND LIDOCAINE HYDROCHLORIDE AND ALUMINUM HYDROXIDE AND MAGNESIUM HYDRO 10 MILLILITER(S): KIT at 23:15

## 2022-11-24 RX ADMIN — NYSTATIN CREAM 1 APPLICATION(S): 100000 CREAM TOPICAL at 05:24

## 2022-11-24 RX ADMIN — Medication 400 MILLIGRAM(S): at 05:23

## 2022-11-24 RX ADMIN — Medication 1 LOZENGE: at 08:59

## 2022-11-24 RX ADMIN — ONDANSETRON 8 MILLIGRAM(S): 8 TABLET, FILM COATED ORAL at 13:45

## 2022-11-24 RX ADMIN — URSODIOL 300 MILLIGRAM(S): 250 TABLET, FILM COATED ORAL at 17:47

## 2022-11-24 RX ADMIN — Medication 500 MILLIGRAM(S): at 17:48

## 2022-11-24 RX ADMIN — FLUCONAZOLE 400 MILLIGRAM(S): 150 TABLET ORAL at 13:29

## 2022-11-24 RX ADMIN — Medication 5 MILLILITER(S): at 13:25

## 2022-11-24 RX ADMIN — Medication 5 MILLILITER(S): at 23:12

## 2022-11-24 RX ADMIN — Medication 10 MILLILITER(S): at 23:12

## 2022-11-24 RX ADMIN — ONDANSETRON 8 MILLIGRAM(S): 8 TABLET, FILM COATED ORAL at 05:24

## 2022-11-24 RX ADMIN — SODIUM CHLORIDE 20 MILLILITER(S): 9 INJECTION INTRAMUSCULAR; INTRAVENOUS; SUBCUTANEOUS at 20:15

## 2022-11-24 RX ADMIN — DIPHENHYDRAMINE HYDROCHLORIDE AND LIDOCAINE HYDROCHLORIDE AND ALUMINUM HYDROXIDE AND MAGNESIUM HYDRO 10 MILLILITER(S): KIT at 13:27

## 2022-11-24 RX ADMIN — APREPITANT 80 MILLIGRAM(S): 80 CAPSULE ORAL at 13:44

## 2022-11-24 RX ADMIN — Medication 400 MILLIGRAM(S): at 20:24

## 2022-11-24 RX ADMIN — Medication 10 MILLIGRAM(S): at 09:36

## 2022-11-24 RX ADMIN — Medication 1 MILLIGRAM(S): at 13:31

## 2022-11-24 NOTE — PROGRESS NOTE ADULT - CRITICAL CARE ATTENDING COMMENT
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   Day - 2  Problem/Plan -   1:  Problem: Stem cell transplant   ·  Plan: 1. Admit to BMTU   2. TLC placement in IR   3. Day -9 - day -2 - antiemetics   4. Day -9 & day -8, VP16 2400mg/m2 IV x 34 hours (final concentration 0.4mg /mL).   5. Strict I&O, daily weights, prn diuresis   6. After completion of VP16- start CTX hydration (D51/2NS + 10mEq KCl @ 150ml / m2) - continue for 24 hours post infusion of CTX   7. Day -7 - day -4 - CTX 1800 mg / m2 daily over 2 hours. Follow SMA7, mag, phos 6hours post CT . [place]  12mg / kg - hemorrhagic cystitis ppx   8. Day -3 - BCNU 400mg / m2   9. Day -2 - day -1 - rest days  10. Day 0(11/25/22) - HPC transplant. Start Zarxio 480mcg SQ QD, continue through engraftment. Kepivance on days 0, 1 & 2   11. Anxiolytics, antinausea, antidiarrhea medications as needed   12. Lasix PRN while being aggressively hydrated to avoid VOD   13. Nutritional support, pain management.    Problem/Plan - 2:  ·  Problem: Need for prophylactic measure.   ·  Plan: 1. VOD prophylaxis - low dose heparin gtt (dosed at 100 units / kg / day), glutamine supplementation, Actigall BID   2. PCP prophylaxis - Bactrim DS through day -2    3. Antiviral prophylaxis - Acyclovir 400mg po TID to start day -1   4. Antifungal prophylaxis- Diflucan 400 mg po daily.  5. GI prophylaxis - Protonix po QD   6. Antibacterial prophylaxis - when ANC < 500, start Cipro 500mg po BID. If becomes febrile, pan cx, CXR and change Cipro to Cefepime 2g IV q 8 hours. Continue until count recovery  7. Kepivance for prevention of mucositis- days 0, +1, +2  8. Aggressive mouth care and skin care as per protocol.  9. Receiving transfusion and electrolyte support

## 2022-11-24 NOTE — PROGRESS NOTE ADULT - NS ATTEND AMEND GEN_ALL_CORE FT
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   Day - 1  Problem/Plan -   1:  Problem: Stem cell transplant   ·  Plan: 1. Admit to BMTU   2. TLC placement in IR   3. Day -9 - day -2 - antiemetics   4. Day -9 & day -8, VP16 2400mg/m2 IV x 34 hours (final concentration 0.4mg /mL).   5. Strict I&O, daily weights, prn diuresis   6. After completion of VP16- start CTX hydration (D51/2NS + 10mEq KCl @ 150ml / m2) - continue for 24 hours post infusion of CTX   7. Day -7 - day -4 - CTX 1800 mg / m2 daily over 2 hours. Follow SMA7, mag, phos 6hours post CT . [place]  12mg / kg - hemorrhagic cystitis ppx   8. Day -3 - BCNU 400mg / m2   9. Day -2 - day -1 - rest days  10. Day 0(11/25/22) - HPC transplant. Start Zarxio 480mcg SQ QD, continue through engraftment. Kepivance on days 0, 1 & 2   11. Anxiolytics, antinausea, antidiarrhea medications as needed   12. Lasix PRN while being aggressively hydrated to avoid VOD   13. Nutritional support, pain management.    Problem/Plan - 2:  ·  Problem: Need for prophylactic measure.   ·  Plan: 1. VOD prophylaxis - low dose heparin gtt (dosed at 100 units / kg / day), glutamine supplementation, Actigall BID   2. PCP prophylaxis - Bactrim DS through day -2    3. Antiviral prophylaxis - Acyclovir 400mg po TID to start day -1   4. Antifungal prophylaxis- Diflucan 400 mg po daily.  5. GI prophylaxis - Protonix po QD   6. Antibacterial prophylaxis - when ANC < 500, start Cipro 500mg po BID. If becomes febrile, pan cx, CXR and change Cipro to Cefepime 2g IV q 8 hours. Continue until count recovery  7. Kepivance for prevention of mucositis- days 0, +1, +2  8. Aggressive mouth care and skin care as per protocol.  9. Receiving transfusion and electrolyte support

## 2022-11-24 NOTE — PROGRESS NOTE ADULT - SUBJECTIVE AND OBJECTIVE BOX
HPC Transplant Team                                                      Critical / Counseling Time Provided: 30 minutes                                                                                                                                                          Chief Complaint: Autologous peripheral blood stem cell transplant with high dose CBV prep regimen for treatment of peripheral T cell lymphoma    S: Patient seen and examined with [facility] Transplant Team:   + fatigue   + intermittent nausea; reports episode last night   + occasional loose stool 3x yesterday   + chronic low back pain     All other ROS negative     O: Vitals:   Vital Signs Last 24 Hrs  T(C): 36.8 (24 Nov 2022 05:20), Max: 37.2 (23 Nov 2022 14:08)  T(F): 98.2 (24 Nov 2022 05:20), Max: 99 (23 Nov 2022 14:08)  HR: 78 (24 Nov 2022 05:20) (78 - 92)  BP: 106/71 (24 Nov 2022 05:20) (91/59 - 118/74)  BP(mean): --  RR: 18 (24 Nov 2022 05:20) (18 - 19)  SpO2: 99% (24 Nov 2022 05:20) (98% - 100%)    Parameters below as of 24 Nov 2022 05:20  Patient On (Oxygen Delivery Method): room air      Admit weight: 80.3 kg   Today's weight: 76 kg     Intake / Output:   11-23 @ 07:01  -  11-24 @ 07:00  --------------------------------------------------------  IN: 2309.7 mL / OUT: 3300 mL / NET: -990.3 mL    PE:   Oropharynx: no erythema or ulcerations  Oral Mucositis:              -                                          Grade: n/a   CVS: S1, S2 RRR   Lungs: CTA throughout bilaterally   Abdomen: + BS x 4, soft, NT, ND   Extremities: no edema   Gastric Mucositis:       -                                           Grade: n/a   Intestinal Mucositis:     -                                         Grade: n/a   Skin: + macular erythema to chest, upper back and neck post kepivance; unchanged   TLC: CDI   Neuro: A&O x 3   Pain: 0    Labs:       Cultures:         Radiology:       Meds:   Antimicrobials:   acyclovir   Oral Tab/Cap 400 milliGRAM(s) Oral every 8 hours  ciprofloxacin     Tablet 500 milliGRAM(s) Oral every 12 hours  clotrimazole Lozenge 1 Lozenge Oral five times a day  fluconAZOLE   Tablet 400 milliGRAM(s) Oral daily    Heme / Onc:   heparin  Infusion 334 Unit(s)/Hr IV Continuous <Continuous>    GI:  pantoprazole    Tablet 40 milliGRAM(s) Oral before breakfast  polyethylene glycol 3350 17 Gram(s) Oral daily PRN  senna 2 Tablet(s) Oral at bedtime PRN  sodium bicarbonate Mouth Rinse 10 milliLiter(s) Swish and Spit five times a day  ursodiol Capsule 300 milliGRAM(s) Oral two times a day    Other medications:   acetaminophen     Tablet .. 650 milliGRAM(s) Oral every 6 hours  aprepitant 80 milliGRAM(s) Oral every 24 hours  Biotene Dry Mouth Oral Rinse 5 milliLiter(s) Swish and Spit five times a day  chlorhexidine 4% Liquid 1 Application(s) Topical <User Schedule>  dexAMETHasone  IVPB 10 milliGRAM(s) IV Intermittent every 24 hours  FIRST- Mouthwash  BLM 10 milliLiter(s) Swish and Spit every 6 hours  FLUoxetine 20 milliGRAM(s) Oral daily  folic acid 1 milliGRAM(s) Oral daily  loratadine 10 milliGRAM(s) Oral daily  LORazepam   Injectable 1 milliGRAM(s) IV Push daily  multivitamin 1 Tablet(s) Oral daily  nystatin Powder 1 Application(s) Topical two times a day  ondansetron Injectable 8 milliGRAM(s) IV Push every 8 hours  sodium chloride 0.45% 1000 milliLiter(s) IV Continuous <Continuous>  sodium chloride 0.9%. 1000 milliLiter(s) IV Continuous <Continuous>    PRN:   acetaminophen     Tablet .. 650 milliGRAM(s) Oral every 6 hours PRN  AQUAPHOR (petrolatum Ointment) 1 Application(s) Topical two times a day PRN  metoclopramide Injectable 10 milliGRAM(s) IV Push every 6 hours PRN  polyethylene glycol 3350 17 Gram(s) Oral daily PRN  senna 2 Tablet(s) Oral at bedtime PRN  sodium chloride 0.9% lock flush 10 milliLiter(s) IV Push every 1 hour PRN  zinc oxide 40% Paste 1 Application(s) Topical three times a day PRN    A/P: 57 year old female with a history of  peripheral T cell lymphoma   Pre  :  Autologous PBSCT day - 1  11/17- Completed  -16 24 hour continuous infusion, strict I&O, daily weights, prn diuresis   11/20 - Cytoxan day 3/4.  11/21- CTX 4/4- continue CTX hydration for 24 hours post infusion of last dose. Strict I&O, daily weights, prn diuresis. Start cipro 500mg po BID when ANC < 500   11/22 Lasix 40mv IV x 2 today   11/23 Neutropenic. Started Cipro for prophylaxis. If febrile, panculture and start Cefepime (has tolerated in the past)     1. Infectious Disease:   clotrimazole Lozenge 1 Lozenge Oral five times a day  fluconAZOLE   Tablet 400 milliGRAM(s) Oral daily  trimethoprim  160 mG/sulfamethoxazole 800 mG 1 Tablet(s) Oral every 12     2. VOD Prophylaxis: Actigall, Glutamine, Heparin (dosed at 100 units / kg / day)     3. GI Prophylaxis:    pantoprazole    Tablet 40 milliGRAM(s) Oral before breakfast    4. Mouth care - NS / NaHCO3 rinses, Mycelex, Biotene; Skin care     5. GVHD prophylaxis - n/a     6. Transfuse & replete electrolytes prn     7. IV hydration, daily weights, strict I&O, prn diuresis     8. PO intake as tolerated, nutrition follow up as needed, MVI, folic acid     9. Antiemetics, anti-diarrhea medications:   LORazepam   Injectable 1 milliGRAM(s) IV Push every 6 hours PRN  metoclopramide Injectable 10 milliGRAM(s) IV Push every 6 hours PRN  aprepitant 80 milliGRAM(s) Oral every 24 hours  dexAMETHasone  IVPB 10 milliGRAM(s) IV Intermittent every 24 hours  LORazepam   Injectable 1 milliGRAM(s) IV Push daily  ondansetron Injectable 8 milliGRAM(s) IV Push every 8 hours    10. OOB as tolerated, physical therapy consult if needed     11. Monitor coags / fibrinogen 2x week, vitamin K as needed     12. Monitor closely for clinical changes, monitor for fevers     13. Emotional support provided, plan of care discussed and questions addressed     14. Patient education done regarding chemotherapy prep, plan of care, restrictions and discharge planning     15. Continue regular social work input      I have written the above note for Dr. Acevedo who performed service with me in the room.   Maday Becker  NP-C ([phone])    I have seen and examined patient with NP, I agree with above note as scribed.                    [facility] Transplant Team                                                      Critical / Counseling Time Provided: 30 minutes                                                                                                                                                          Chief Complaint: Autologous peripheral blood stem cell transplant with high dose CBV prep regimen for treatment of peripheral T cell lymphoma    S: Patient seen and examined with [facility]PC Transplant Team:   + fatigue   + intermittent nausea  + soft BM today  + chronic low back pain     All other ROS negative     O: Vitals:   Vital Signs Last 24 Hrs  T(C): 36.8 (24 Nov 2022 05:20), Max: 37.2 (23 Nov 2022 14:08)  T(F): 98.2 (24 Nov 2022 05:20), Max: 99 (23 Nov 2022 14:08)  HR: 78 (24 Nov 2022 05:20) (78 - 92)  BP: 106/71 (24 Nov 2022 05:20) (91/59 - 118/74)  BP(mean): --  RR: 18 (24 Nov 2022 05:20) (18 - 19)  SpO2: 99% (24 Nov 2022 05:20) (98% - 100%)    Parameters below as of 24 Nov 2022 05:20  Patient On (Oxygen Delivery Method): room air      Admit weight: 80.3 kg   Today's weight: 76 kg     Intake / Output:   11-23 @ 07:01  -  11-24 @ 07:00  --------------------------------------------------------  IN: 2309.7 mL / OUT: 3300 mL / NET: -990.3 mL    PE:   Oropharynx: no erythema or ulcerations  Oral Mucositis:              -                                          Grade: n/a   CVS: S1, S2 RRR   Lungs: CTA throughout bilaterally   Abdomen: + BS x 4, soft, NT, ND   Extremities: no edema   Gastric Mucositis:       -                                           Grade: n/a   Intestinal Mucositis:     -                                         Grade: n/a   Skin: + macular erythema to chest, upper back and neck post kepivance; unchanged   TLC: CDI   Neuro: A&O x 3   Pain: 0    Labs:       Cultures:         Radiology:       Meds:   Antimicrobials:   acyclovir   Oral Tab/Cap 400 milliGRAM(s) Oral every 8 hours  ciprofloxacin     Tablet 500 milliGRAM(s) Oral every 12 hours  clotrimazole Lozenge 1 Lozenge Oral five times a day  fluconAZOLE   Tablet 400 milliGRAM(s) Oral daily    Heme / Onc:   heparin  Infusion 334 Unit(s)/Hr IV Continuous <Continuous>    GI:  pantoprazole    Tablet 40 milliGRAM(s) Oral before breakfast  polyethylene glycol 3350 17 Gram(s) Oral daily PRN  senna 2 Tablet(s) Oral at bedtime PRN  sodium bicarbonate Mouth Rinse 10 milliLiter(s) Swish and Spit five times a day  ursodiol Capsule 300 milliGRAM(s) Oral two times a day    Other medications:   acetaminophen     Tablet .. 650 milliGRAM(s) Oral every 6 hours  aprepitant 80 milliGRAM(s) Oral every 24 hours  Biotene Dry Mouth Oral Rinse 5 milliLiter(s) Swish and Spit five times a day  chlorhexidine 4% Liquid 1 Application(s) Topical <User Schedule>  dexAMETHasone  IVPB 10 milliGRAM(s) IV Intermittent every 24 hours  FIRST- Mouthwash  BLM 10 milliLiter(s) Swish and Spit every 6 hours  FLUoxetine 20 milliGRAM(s) Oral daily  folic acid 1 milliGRAM(s) Oral daily  loratadine 10 milliGRAM(s) Oral daily  LORazepam   Injectable 1 milliGRAM(s) IV Push daily  multivitamin 1 Tablet(s) Oral daily  nystatin Powder 1 Application(s) Topical two times a day  ondansetron Injectable 8 milliGRAM(s) IV Push every 8 hours  sodium chloride 0.45% 1000 milliLiter(s) IV Continuous <Continuous>  sodium chloride 0.9%. 1000 milliLiter(s) IV Continuous <Continuous>    PRN:   acetaminophen     Tablet .. 650 milliGRAM(s) Oral every 6 hours PRN  AQUAPHOR (petrolatum Ointment) 1 Application(s) Topical two times a day PRN  metoclopramide Injectable 10 milliGRAM(s) IV Push every 6 hours PRN  polyethylene glycol 3350 17 Gram(s) Oral daily PRN  senna 2 Tablet(s) Oral at bedtime PRN  sodium chloride 0.9% lock flush 10 milliLiter(s) IV Push every 1 hour PRN  zinc oxide 40% Paste 1 Application(s) Topical three times a day PRN    A/P: 57 year old female with a history of  peripheral T cell lymphoma   Pre  :  Autologous PBSCT day - 1  11/17- Completed  -16 24 hour continuous infusion, strict I&O, daily weights, prn diuresis   11/20 - Cytoxan day 3/4.  11/21- CTX 4/4- continue CTX hydration for 24 hours post infusion of last dose. Strict I&O, daily weights, prn diuresis. Start cipro 500mg po BID when ANC < 500   11/22 Lasix 40mv IV x 2 today   11/23 Neutropenic. Started Cipro for prophylaxis. If febrile, panculture and start Cefepime (has tolerated in the past)     1. Infectious Disease:   clotrimazole Lozenge 1 Lozenge Oral five times a day  fluconAZOLE   Tablet 400 milliGRAM(s) Oral daily  trimethoprim  160 mG/sulfamethoxazole 800 mG 1 Tablet(s) Oral every 12     2. VOD Prophylaxis: Actigall, Glutamine, Heparin (dosed at 100 units / kg / day)     3. GI Prophylaxis:    pantoprazole    Tablet 40 milliGRAM(s) Oral before breakfast    4. Mouth care - NS / NaHCO3 rinses, Mycelex, Biotene; Skin care     5. GVHD prophylaxis - n/a     6. Transfuse & replete electrolytes prn     7. IV hydration, daily weights, strict I&O, prn diuresis     8. PO intake as tolerated, nutrition follow up as needed, MVI, folic acid     9. Antiemetics, anti-diarrhea medications:   LORazepam   Injectable 1 milliGRAM(s) IV Push every 6 hours PRN  metoclopramide Injectable 10 milliGRAM(s) IV Push every 6 hours PRN  aprepitant 80 milliGRAM(s) Oral every 24 hours  dexAMETHasone  IVPB 10 milliGRAM(s) IV Intermittent every 24 hours  LORazepam   Injectable 1 milliGRAM(s) IV Push daily  ondansetron Injectable 8 milliGRAM(s) IV Push every 8 hours    10. OOB as tolerated, physical therapy consult if needed     11. Monitor coags / fibrinogen 2x week, vitamin K as needed     12. Monitor closely for clinical changes, monitor for fevers     13. Emotional support provided, plan of care discussed and questions addressed     14. Patient education done regarding chemotherapy prep, plan of care, restrictions and discharge planning     15. Continue regular social work input      I have written the above note for Dr. Acevedo who performed service with me in the room.   Maday Becker  NP-C ([phone])    I have seen and examined patient with NP, I agree with above note as scribed.                    [facility] Transplant Team                                                      Critical / Counseling Time Provided: 30 minutes                                                                                                                                                          Chief Complaint: Autologous peripheral blood stem cell transplant with high dose CBV prep regimen for treatment of peripheral T cell lymphoma    S: Patient seen and examined with [facility]PC Transplant Team:   + fatigue   + intermittent nausea  + soft BM today  + chronic low back pain     All other ROS negative     O: Vitals:   Vital Signs Last 24 Hrs  T(C): 36.8 (24 Nov 2022 05:20), Max: 37.2 (23 Nov 2022 14:08)  T(F): 98.2 (24 Nov 2022 05:20), Max: 99 (23 Nov 2022 14:08)  HR: 78 (24 Nov 2022 05:20) (78 - 92)  BP: 106/71 (24 Nov 2022 05:20) (91/59 - 118/74)  BP(mean): --  RR: 18 (24 Nov 2022 05:20) (18 - 19)  SpO2: 99% (24 Nov 2022 05:20) (98% - 100%)    Parameters below as of 24 Nov 2022 05:20  Patient On (Oxygen Delivery Method): room air      Admit weight: 80.3 kg   Today's weight: 75.7 kg     Intake / Output:   11-23 @ 07:01  -  11-24 @ 07:00  --------------------------------------------------------  IN: 2309.7 mL / OUT: 3300 mL / NET: -990.3 mL    PE:   Oropharynx: no erythema or ulcerations  Oral Mucositis:              -                                          Grade: n/a   CVS: S1, S2 RRR   Lungs: CTA throughout bilaterally   Abdomen: + BS x 4, soft, NT, ND   Extremities: no edema   Gastric Mucositis:       -                                           Grade: n/a   Intestinal Mucositis:     -                                         Grade: n/a   Skin: + macular erythema to chest, upper back and neck post kepivance; unchanged   TLC: CDI   Neuro: A&O x 3   Pain: 0    Labs:       Cultures:         Radiology:       Meds:   Antimicrobials:   acyclovir   Oral Tab/Cap 400 milliGRAM(s) Oral every 8 hours  ciprofloxacin     Tablet 500 milliGRAM(s) Oral every 12 hours  clotrimazole Lozenge 1 Lozenge Oral five times a day  fluconAZOLE   Tablet 400 milliGRAM(s) Oral daily    Heme / Onc:   heparin  Infusion 334 Unit(s)/Hr IV Continuous <Continuous>    GI:  pantoprazole    Tablet 40 milliGRAM(s) Oral before breakfast  polyethylene glycol 3350 17 Gram(s) Oral daily PRN  senna 2 Tablet(s) Oral at bedtime PRN  sodium bicarbonate Mouth Rinse 10 milliLiter(s) Swish and Spit five times a day  ursodiol Capsule 300 milliGRAM(s) Oral two times a day    Other medications:   acetaminophen     Tablet .. 650 milliGRAM(s) Oral every 6 hours  aprepitant 80 milliGRAM(s) Oral every 24 hours  Biotene Dry Mouth Oral Rinse 5 milliLiter(s) Swish and Spit five times a day  chlorhexidine 4% Liquid 1 Application(s) Topical <User Schedule>  dexAMETHasone  IVPB 10 milliGRAM(s) IV Intermittent every 24 hours  FIRST- Mouthwash  BLM 10 milliLiter(s) Swish and Spit every 6 hours  FLUoxetine 20 milliGRAM(s) Oral daily  folic acid 1 milliGRAM(s) Oral daily  loratadine 10 milliGRAM(s) Oral daily  LORazepam   Injectable 1 milliGRAM(s) IV Push daily  multivitamin 1 Tablet(s) Oral daily  nystatin Powder 1 Application(s) Topical two times a day  ondansetron Injectable 8 milliGRAM(s) IV Push every 8 hours  sodium chloride 0.45% 1000 milliLiter(s) IV Continuous <Continuous>  sodium chloride 0.9%. 1000 milliLiter(s) IV Continuous <Continuous>    PRN:   acetaminophen     Tablet .. 650 milliGRAM(s) Oral every 6 hours PRN  AQUAPHOR (petrolatum Ointment) 1 Application(s) Topical two times a day PRN  metoclopramide Injectable 10 milliGRAM(s) IV Push every 6 hours PRN  polyethylene glycol 3350 17 Gram(s) Oral daily PRN  senna 2 Tablet(s) Oral at bedtime PRN  sodium chloride 0.9% lock flush 10 milliLiter(s) IV Push every 1 hour PRN  zinc oxide 40% Paste 1 Application(s) Topical three times a day PRN    A/P: 57 year old female with a history of  peripheral T cell lymphoma   Pre  :  Autologous PBSCT day - 1  11/17- Completed  -16 24 hour continuous infusion, strict I&O, daily weights, prn diuresis   11/20 - Cytoxan day 3/4.  11/21- CTX 4/4- continue CTX hydration for 24 hours post infusion of last dose. Strict I&O, daily weights, prn diuresis. Start cipro 500mg po BID when ANC < 500   11/22 Lasix 40mv IV x 2 today   11/23 Neutropenic. Started Cipro for prophylaxis. If febrile, panculture and start Cefepime (has tolerated in the past)     1. Infectious Disease:   clotrimazole Lozenge 1 Lozenge Oral five times a day  fluconAZOLE   Tablet 400 milliGRAM(s) Oral daily  trimethoprim  160 mG/sulfamethoxazole 800 mG 1 Tablet(s) Oral every 12     2. VOD Prophylaxis: Actigall, Glutamine, Heparin (dosed at 100 units / kg / day)     3. GI Prophylaxis:    pantoprazole    Tablet 40 milliGRAM(s) Oral before breakfast    4. Mouth care - NS / NaHCO3 rinses, Mycelex, Biotene; Skin care     5. GVHD prophylaxis - n/a     6. Transfuse & replete electrolytes prn     7. IV hydration, daily weights, strict I&O, prn diuresis     8. PO intake as tolerated, nutrition follow up as needed, MVI, folic acid     9. Antiemetics, anti-diarrhea medications:   LORazepam   Injectable 1 milliGRAM(s) IV Push every 6 hours PRN  metoclopramide Injectable 10 milliGRAM(s) IV Push every 6 hours PRN  aprepitant 80 milliGRAM(s) Oral every 24 hours  dexAMETHasone  IVPB 10 milliGRAM(s) IV Intermittent every 24 hours  LORazepam   Injectable 1 milliGRAM(s) IV Push daily  ondansetron Injectable 8 milliGRAM(s) IV Push every 8 hours    10. OOB as tolerated, physical therapy consult if needed     11. Monitor coags / fibrinogen 2x week, vitamin K as needed     12. Monitor closely for clinical changes, monitor for fevers     13. Emotional support provided, plan of care discussed and questions addressed     14. Patient education done regarding chemotherapy prep, plan of care, restrictions and discharge planning     15. Continue regular social work input      I have written the above note for Dr. Acevedo who performed service with me in the room.   Maday Becker  NP-C ([phone])    I have seen and examined patient with NP, I agree with above note as scribed.                    [facility] Transplant Team                                                      Critical / Counseling Time Provided: 30 minutes                                                                                                                                                          Chief Complaint: Autologous peripheral blood stem cell transplant with high dose CBV prep regimen for treatment of peripheral T cell lymphoma    S: Patient seen and examined with [facility] Transplant Team:   + fatigue   + intermittent nausea  + soft BM today  + chronic low back pain     All other ROS negative     O: Vitals:   Vital Signs Last 24 Hrs  T(C): 36.8 (24 Nov 2022 05:20), Max: 37.2 (23 Nov 2022 14:08)  T(F): 98.2 (24 Nov 2022 05:20), Max: 99 (23 Nov 2022 14:08)  HR: 78 (24 Nov 2022 05:20) (78 - 92)  BP: 106/71 (24 Nov 2022 05:20) (91/59 - 118/74)  BP(mean): --  RR: 18 (24 Nov 2022 05:20) (18 - 19)  SpO2: 99% (24 Nov 2022 05:20) (98% - 100%)    Parameters below as of 24 Nov 2022 05:20  Patient On (Oxygen Delivery Method): room air    Admit weight: 80.3 kg   Today's weight: 75.7 kg     Intake / Output:   11-23 @ 07:01  -  11-24 @ 07:00  --------------------------------------------------------  IN: 2309.7 mL / OUT: 3300 mL / NET: -990.3 mL    PE:   Oropharynx: no erythema or ulcerations  Oral Mucositis:              -                                          Grade: n/a   CVS: S1, S2 RRR   Lungs: CTA throughout bilaterally   Abdomen: + BS x 4, soft, NT, ND   Extremities: no edema   Gastric Mucositis:       -                                           Grade: n/a   Intestinal Mucositis:     -                                         Grade: n/a   Skin: + macular erythema to chest, upper back and neck post kepivance; unchanged   TLC: CDI   Neuro: A&O x 3   Pain: 0    Labs:                        8.7    0.09  )-----------( 90       ( 24 Nov 2022 07:36 )             27.1     Mean Cell Volume : 94.8 fl  Mean Cell Hemoglobin : 30.4 pg  Mean Cell Hemoglobin Concentration : 32.1 gm/dL  Auto Neutrophil # : x  Auto Lymphocyte # : x  Auto Monocyte # : x  Auto Eosinophil # : x  Auto Basophil # : x  Auto Neutrophil % : x  Auto Lymphocyte % : x  Auto Monocyte % : x  Auto Eosinophil % : x  Auto Basophil % : x      11-24    137  |  101  |  14  ----------------------------<  123<H>  4.6   |  24  |  0.46<L>    Ca    9.2      24 Nov 2022 07:36  Phos  3.1     11-24  Mg     2.1     11-24    TPro  6.7  /  Alb  4.3  /  TBili  0.2  /  DBili  x   /  AST  16  /  ALT  26  /  AlkPhos  75  11-24  Mg 2.1  Phos 3.1  PT/INR - ( 24 Nov 2022 07:36 )   PT: 12.5 sec;   INR: 1.09 ratio    PTT - ( 24 Nov 2022 07:36 )  PTT:23.5 sec    Uric Acid --    Cultures:   NA    Radiology:   NA    Meds:   Antimicrobials:   acyclovir   Oral Tab/Cap 400 milliGRAM(s) Oral every 8 hours  ciprofloxacin     Tablet 500 milliGRAM(s) Oral every 12 hours  clotrimazole Lozenge 1 Lozenge Oral five times a day  fluconAZOLE   Tablet 400 milliGRAM(s) Oral daily    Heme / Onc:   heparin  Infusion 334 Unit(s)/Hr IV Continuous <Continuous>    GI:  pantoprazole    Tablet 40 milliGRAM(s) Oral before breakfast  polyethylene glycol 3350 17 Gram(s) Oral daily PRN  senna 2 Tablet(s) Oral at bedtime PRN  sodium bicarbonate Mouth Rinse 10 milliLiter(s) Swish and Spit five times a day  ursodiol Capsule 300 milliGRAM(s) Oral two times a day    Other medications:   acetaminophen     Tablet .. 650 milliGRAM(s) Oral every 6 hours  aprepitant 80 milliGRAM(s) Oral every 24 hours  Biotene Dry Mouth Oral Rinse 5 milliLiter(s) Swish and Spit five times a day  chlorhexidine 4% Liquid 1 Application(s) Topical <User Schedule>  dexAMETHasone  IVPB 10 milliGRAM(s) IV Intermittent every 24 hours  FIRST- Mouthwash  BLM 10 milliLiter(s) Swish and Spit every 6 hours  FLUoxetine 20 milliGRAM(s) Oral daily  folic acid 1 milliGRAM(s) Oral daily  loratadine 10 milliGRAM(s) Oral daily  LORazepam   Injectable 1 milliGRAM(s) IV Push daily  multivitamin 1 Tablet(s) Oral daily  nystatin Powder 1 Application(s) Topical two times a day  ondansetron Injectable 8 milliGRAM(s) IV Push every 8 hours  sodium chloride 0.45% 1000 milliLiter(s) IV Continuous <Continuous>  sodium chloride 0.9%. 1000 milliLiter(s) IV Continuous <Continuous>    PRN:   acetaminophen     Tablet .. 650 milliGRAM(s) Oral every 6 hours PRN  AQUAPHOR (petrolatum Ointment) 1 Application(s) Topical two times a day PRN  metoclopramide Injectable 10 milliGRAM(s) IV Push every 6 hours PRN  polyethylene glycol 3350 17 Gram(s) Oral daily PRN  senna 2 Tablet(s) Oral at bedtime PRN  sodium chloride 0.9% lock flush 10 milliLiter(s) IV Push every 1 hour PRN  zinc oxide 40% Paste 1 Application(s) Topical three times a day PRN    A/P: 57 year old female with a history of  peripheral T cell lymphoma   Pre  :  Autologous PBSCT day - 1  11/17- Completed  -16 24 hour continuous infusion, strict I&O, daily weights, prn diuresis   11/20 - Cytoxan day 3/4.  11/21- CTX 4/4- continue CTX hydration for 24 hours post infusion of last dose. Strict I&O, daily weights, prn diuresis. Start cipro 500mg po BID when ANC < 500   11/22 Lasix 40mv IV x 2 today   11/23 Neutropenic. Started Cipro for prophylaxis. If febrile, panculture and start Cefepime (has tolerated in the past)     1. Infectious Disease:   clotrimazole Lozenge 1 Lozenge Oral five times a day  fluconAZOLE   Tablet 400 milliGRAM(s) Oral daily  trimethoprim  160 mG/sulfamethoxazole 800 mG 1 Tablet(s) Oral every 12     2. VOD Prophylaxis: Actigall, Glutamine, Heparin (dosed at 100 units / kg / day) -  heparin  Infusion 334 Unit(s)/Hr IV Continuous <Continuous>    3. GI Prophylaxis:    pantoprazole    Tablet 40 milliGRAM(s) Oral before breakfast    4. Mouth care - NS / NaHCO3 rinses, Mycelex, Biotene; Skin care     5. GVHD prophylaxis - n/a     6. Transfuse & replete electrolytes prn     7. IV hydration, daily weights, strict I&O, prn diuresis     8. PO intake as tolerated, nutrition follow up as needed, MVI, folic acid     9. Antiemetics, anti-diarrhea medications:   LORazepam   Injectable 1 milliGRAM(s) IV Push every 6 hours PRN  metoclopramide Injectable 10 milliGRAM(s) IV Push every 6 hours PRN  aprepitant 80 milliGRAM(s) Oral every 24 hours  dexAMETHasone  IVPB 10 milliGRAM(s) IV Intermittent every 24 hours  LORazepam   Injectable 1 milliGRAM(s) IV Push daily  ondansetron Injectable 8 milliGRAM(s) IV Push every 8 hours    10. OOB as tolerated, physical therapy consult if needed     11. Monitor coags / fibrinogen 2x week, vitamin K as needed     12. Monitor closely for clinical changes, monitor for fevers     13. Emotional support provided, plan of care discussed and questions addressed     14. Patient education done regarding chemotherapy prep, plan of care, restrictions and discharge planning     15. Continue regular social work input      I have written the above note for Dr. Da Silva who performed service with me in the room.   Maday Becker  NP-C ([phone])    I have seen and examined patient with NP, I agree with above note as scribed.

## 2022-11-25 LAB
ALBUMIN SERPL ELPH-MCNC: 4 G/DL — SIGNIFICANT CHANGE UP (ref 3.3–5)
ALP SERPL-CCNC: 72 U/L — SIGNIFICANT CHANGE UP (ref 40–120)
ALT FLD-CCNC: 31 U/L — SIGNIFICANT CHANGE UP (ref 10–45)
ANION GAP SERPL CALC-SCNC: 9 MMOL/L — SIGNIFICANT CHANGE UP (ref 5–17)
AST SERPL-CCNC: 18 U/L — SIGNIFICANT CHANGE UP (ref 10–40)
BILIRUB SERPL-MCNC: 0.2 MG/DL — SIGNIFICANT CHANGE UP (ref 0.2–1.2)
BLD GP AB SCN SERPL QL: NEGATIVE — SIGNIFICANT CHANGE UP
BUN SERPL-MCNC: 12 MG/DL — SIGNIFICANT CHANGE UP (ref 7–23)
CALCIUM SERPL-MCNC: 9.2 MG/DL — SIGNIFICANT CHANGE UP (ref 8.4–10.5)
CHLORIDE SERPL-SCNC: 101 MMOL/L — SIGNIFICANT CHANGE UP (ref 96–108)
CO2 SERPL-SCNC: 26 MMOL/L — SIGNIFICANT CHANGE UP (ref 22–31)
CREAT SERPL-MCNC: 0.4 MG/DL — LOW (ref 0.5–1.3)
EGFR: 117 ML/MIN/1.73M2 — SIGNIFICANT CHANGE UP
GLUCOSE SERPL-MCNC: 136 MG/DL — HIGH (ref 70–99)
HCT VFR BLD CALC: 25.6 % — LOW (ref 34.5–45)
HGB BLD-MCNC: 8.3 G/DL — LOW (ref 11.5–15.5)
LDH SERPL L TO P-CCNC: 114 U/L — SIGNIFICANT CHANGE UP (ref 50–242)
MAGNESIUM SERPL-MCNC: 2.1 MG/DL — SIGNIFICANT CHANGE UP (ref 1.6–2.6)
MCHC RBC-ENTMCNC: 30.2 PG — SIGNIFICANT CHANGE UP (ref 27–34)
MCHC RBC-ENTMCNC: 32.4 GM/DL — SIGNIFICANT CHANGE UP (ref 32–36)
MCV RBC AUTO: 93.1 FL — SIGNIFICANT CHANGE UP (ref 80–100)
NRBC # BLD: 0 /100 WBCS — SIGNIFICANT CHANGE UP (ref 0–0)
PHOSPHATE SERPL-MCNC: 3.2 MG/DL — SIGNIFICANT CHANGE UP (ref 2.5–4.5)
PLATELET # BLD AUTO: 56 K/UL — LOW (ref 150–400)
POTASSIUM SERPL-MCNC: 3.9 MMOL/L — SIGNIFICANT CHANGE UP (ref 3.5–5.3)
POTASSIUM SERPL-SCNC: 3.9 MMOL/L — SIGNIFICANT CHANGE UP (ref 3.5–5.3)
PROT SERPL-MCNC: 6.6 G/DL — SIGNIFICANT CHANGE UP (ref 6–8.3)
RBC # BLD: 2.75 M/UL — LOW (ref 3.8–5.2)
RBC # FLD: 13.2 % — SIGNIFICANT CHANGE UP (ref 10.3–14.5)
RH IG SCN BLD-IMP: POSITIVE — SIGNIFICANT CHANGE UP
SODIUM SERPL-SCNC: 136 MMOL/L — SIGNIFICANT CHANGE UP (ref 135–145)
WBC # BLD: 0.04 K/UL — CRITICAL LOW (ref 3.8–10.5)
WBC # FLD AUTO: 0.04 K/UL — CRITICAL LOW (ref 3.8–10.5)

## 2022-11-25 PROCEDURE — 99291 CRITICAL CARE FIRST HOUR: CPT

## 2022-11-25 PROCEDURE — 38241 TRANSPLT AUTOL HCT/DONOR: CPT

## 2022-11-25 RX ORDER — FUROSEMIDE 40 MG
40 TABLET ORAL ONCE
Refills: 0 | Status: COMPLETED | OUTPATIENT
Start: 2022-11-25 | End: 2022-11-25

## 2022-11-25 RX ADMIN — NYSTATIN CREAM 1 APPLICATION(S): 100000 CREAM TOPICAL at 17:10

## 2022-11-25 RX ADMIN — Medication 4800 MICROGRAM(S): at 18:30

## 2022-11-25 RX ADMIN — PANTOPRAZOLE SODIUM 40 MILLIGRAM(S): 20 TABLET, DELAYED RELEASE ORAL at 05:05

## 2022-11-25 RX ADMIN — Medication 5 MILLILITER(S): at 07:52

## 2022-11-25 RX ADMIN — ONDANSETRON 8 MILLIGRAM(S): 8 TABLET, FILM COATED ORAL at 14:17

## 2022-11-25 RX ADMIN — DIPHENHYDRAMINE HYDROCHLORIDE AND LIDOCAINE HYDROCHLORIDE AND ALUMINUM HYDROXIDE AND MAGNESIUM HYDRO 10 MILLILITER(S): KIT at 05:04

## 2022-11-25 RX ADMIN — Medication 10 MILLILITER(S): at 17:08

## 2022-11-25 RX ADMIN — Medication 1 LOZENGE: at 23:25

## 2022-11-25 RX ADMIN — Medication 5 MILLILITER(S): at 23:25

## 2022-11-25 RX ADMIN — Medication 5 MILLILITER(S): at 17:07

## 2022-11-25 RX ADMIN — Medication 500 MILLIGRAM(S): at 05:04

## 2022-11-25 RX ADMIN — Medication 400 MILLIGRAM(S): at 05:04

## 2022-11-25 RX ADMIN — Medication 10 MILLILITER(S): at 07:52

## 2022-11-25 RX ADMIN — Medication 40 MILLIGRAM(S): at 09:27

## 2022-11-25 RX ADMIN — DIPHENHYDRAMINE HYDROCHLORIDE AND LIDOCAINE HYDROCHLORIDE AND ALUMINUM HYDROXIDE AND MAGNESIUM HYDRO 10 MILLILITER(S): KIT at 23:26

## 2022-11-25 RX ADMIN — ONDANSETRON 8 MILLIGRAM(S): 8 TABLET, FILM COATED ORAL at 05:05

## 2022-11-25 RX ADMIN — Medication 500 MILLIGRAM(S): at 17:09

## 2022-11-25 RX ADMIN — Medication 650 MILLIGRAM(S): at 14:13

## 2022-11-25 RX ADMIN — Medication 1 LOZENGE: at 20:22

## 2022-11-25 RX ADMIN — Medication 5 MILLILITER(S): at 20:22

## 2022-11-25 RX ADMIN — APREPITANT 80 MILLIGRAM(S): 80 CAPSULE ORAL at 12:31

## 2022-11-25 RX ADMIN — Medication 400 MILLIGRAM(S): at 14:17

## 2022-11-25 RX ADMIN — Medication 20 MILLIGRAM(S): at 12:30

## 2022-11-25 RX ADMIN — Medication 400 MILLIGRAM(S): at 21:35

## 2022-11-25 RX ADMIN — Medication 1 LOZENGE: at 07:52

## 2022-11-25 RX ADMIN — SODIUM CHLORIDE 150 MILLILITER(S): 9 INJECTION, SOLUTION INTRAVENOUS at 23:26

## 2022-11-25 RX ADMIN — Medication 650 MILLIGRAM(S): at 11:36

## 2022-11-25 RX ADMIN — LORATADINE 10 MILLIGRAM(S): 10 TABLET ORAL at 12:31

## 2022-11-25 RX ADMIN — LIDOCAINE AND PRILOCAINE CREAM 1 APPLICATION(S): 25; 25 CREAM TOPICAL at 17:10

## 2022-11-25 RX ADMIN — Medication 10 MILLILITER(S): at 11:37

## 2022-11-25 RX ADMIN — CHLORHEXIDINE GLUCONATE 1 APPLICATION(S): 213 SOLUTION TOPICAL at 09:27

## 2022-11-25 RX ADMIN — Medication 1 MILLIGRAM(S): at 12:29

## 2022-11-25 RX ADMIN — Medication 1 TABLET(S): at 12:30

## 2022-11-25 RX ADMIN — Medication 10 MILLILITER(S): at 23:24

## 2022-11-25 RX ADMIN — DIPHENHYDRAMINE HYDROCHLORIDE AND LIDOCAINE HYDROCHLORIDE AND ALUMINUM HYDROXIDE AND MAGNESIUM HYDRO 5 MILLILITER(S): KIT at 17:07

## 2022-11-25 RX ADMIN — URSODIOL 300 MILLIGRAM(S): 250 TABLET, FILM COATED ORAL at 05:05

## 2022-11-25 RX ADMIN — NYSTATIN CREAM 1 APPLICATION(S): 100000 CREAM TOPICAL at 05:05

## 2022-11-25 RX ADMIN — Medication 25 MILLIGRAM(S): at 11:37

## 2022-11-25 RX ADMIN — Medication 5 MILLILITER(S): at 11:37

## 2022-11-25 RX ADMIN — Medication 1 LOZENGE: at 11:37

## 2022-11-25 RX ADMIN — URSODIOL 300 MILLIGRAM(S): 250 TABLET, FILM COATED ORAL at 17:08

## 2022-11-25 RX ADMIN — Medication 10 MILLILITER(S): at 20:22

## 2022-11-25 RX ADMIN — Medication 50 MILLIGRAM(S): at 11:36

## 2022-11-25 RX ADMIN — Medication 480 MICROGRAM(S): at 18:31

## 2022-11-25 RX ADMIN — DIPHENHYDRAMINE HYDROCHLORIDE AND LIDOCAINE HYDROCHLORIDE AND ALUMINUM HYDROXIDE AND MAGNESIUM HYDRO 5 MILLILITER(S): KIT at 11:38

## 2022-11-25 RX ADMIN — FLUCONAZOLE 400 MILLIGRAM(S): 150 TABLET ORAL at 12:29

## 2022-11-25 RX ADMIN — Medication 1 LOZENGE: at 17:08

## 2022-11-25 NOTE — CHART NOTE - NSCHARTNOTEFT_GEN_A_CORE
After pre-medication, Ms. Johnston received 316ml of autologous, pooled, thawed, washed, mobilized, plasma reduced, HPC apheresis over approximately 1 hour. Cell counts as follows:   Total MNCs (x10^8/kg) = 6.67  CD34+ cells (10^6/kg) = 5.46  Cell Viability (%) = 80%  She tolerated the infusion well with no adverse reactions noted. Leena Ibrahim NP-C [phone]

## 2022-11-25 NOTE — PROGRESS NOTE ADULT - CRITICAL CARE ATTENDING COMMENT
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   Day - 0  Problem/Plan -   1:  Problem: Stem cell transplant   ·  Plan: 1. Admit to BMTU   2. TLC placement in IR   3. Day -9 - day -2 - antiemetics   4. Day -9 & day -8, VP16 2400mg/m2 IV x 34 hours (final concentration 0.4mg /mL).   5. Strict I&O, daily weights, prn diuresis   6. After completion of VP16- start CTX hydration (D51/2NS + 10mEq KCl @ 150ml / m2) - continue for 24 hours post infusion of CTX   7. Day -7 - day -4 - CTX 1800 mg / m2 daily over 2 hours. Follow SMA7, mag, phos 6hours post CT . [place]  12mg / kg - hemorrhagic cystitis ppx   8. Day -3 - BCNU 400mg / m2   9. Day -2 - day -1 - rest days  10. Day 0(11/25/22) - HPC transplant. Start Zarxio 480mcg SQ QD, continue through engraftment. Kepivance on days 0, 1 & 2   11. Anxiolytics, antinausea, antidiarrhea medications as needed   12. Lasix PRN while being aggressively hydrated to avoid VOD   13. Nutritional support, pain management.    Problem/Plan - 2:  ·  Problem: Need for prophylactic measure.   ·  Plan: 1. VOD prophylaxis - low dose heparin gtt (dosed at 100 units / kg / day), glutamine supplementation, Actigall BID   2. PCP prophylaxis - Bactrim DS through day -2    3. Antiviral prophylaxis - Acyclovir 400mg po TID to start day -1   4. Antifungal prophylaxis- Diflucan 400 mg po daily.  5. GI prophylaxis - Protonix po QD   6. Antibacterial prophylaxis - when ANC < 500, start Cipro 500mg po BID. If becomes febrile, pan cx, CXR and change Cipro to Cefepime 2g IV q 8 hours. Continue until count recovery  7. Kepivance for prevention of mucositis- days 0, +1, +2  8. Aggressive mouth care and skin care as per protocol. On 11/25, early mucositis on lower lip, no ulcers. Continue topical care.   9. Receiving transfusion and electrolyte support.    11/25-Chemotherapy induced nausea and diarrhea managed with antiemetics and antidiarrheals. Now patient states stool is loose rather than watery.   Continue Cipro, Acyclovir, Diflucan prophylaxis. OOB/ambulate. She received HPC transplant today without complications. Begin Zarxio daily.

## 2022-11-25 NOTE — PROGRESS NOTE ADULT - SUBJECTIVE AND OBJECTIVE BOX
HPC Transplant Team                                                      Critical / Counseling Time Provided: 30 minutes                                                                                                                                                        Chief Complaint: Autologous peripheral blood stem cell transplant with high dose CBV prep regimen for treatment of peripheral T cell lymphoma    S: Patient seen and examined with HPC Transplant Team:   + fatigue   + intermittent nausea  + soft BM today  + chronic low back pain     O: Vitals:   Vital Signs Last 24 Hrs  T(C): 36.7 (2022 05:00), Max: 37.2 (2022 09:30)  T(F): 98.1 (2022 05:00), Max: 99 (2022 09:30)  HR: 74 (2022 05:00) (70 - 95)  BP: 120/72 (2022 05:00) (102/69 - 120/72)  BP(mean): --  RR: 19 (2022 05:00) (18 - 19)  SpO2: 98% (2022 05:00) (97% - 100%)    Parameters below as of 2022 05:00  Patient On (Oxygen Delivery Method): room air    Admit weight: 80.3 kg   Daily     Daily Weight in k.6 (2022 09:30)    Intake / Output:    @ : @ 07:00  --------------------------------------------------------  IN: 3891.9 mL / OUT: 2875 mL / NET: 1016.9 mL     @ : @ 08:23  --------------------------------------------------------  IN: 282.9 mL / OUT: 600 mL / NET: -317.1 mL    Admit weight: 80.3 kg       Labs:   CBC Full  -  ( 2022 06:43 )  WBC Count : 0.04 K/uL  Hemoglobin : 8.3 g/dL  Hematocrit : 25.6 %  Platelet Count - Automated : 56 K/uL  Mean Cell Volume : 93.1 fl  Mean Cell Hemoglobin : 30.2 pg  Mean Cell Hemoglobin Concentration : 32.4 gm/dL  Auto Neutrophil # : x  Auto Lymphocyte # : x  Auto Monocyte # : x  Auto Eosinophil # : x  Auto Basophil # : x  Auto Neutrophil % : x  Auto Lymphocyte % : x  Auto Monocyte % : x  Auto Eosinophil % : x  Auto Basophil % : x                          8.3    0.04  )-----------( 56       ( 2022 06:43 )             25.6         136  |  101  |  12  ----------------------------<  136<H>  3.9   |  26  |  0.40<L>    Ca    9.2      2022 06:44  Phos  3.2       Mg     2.1         TPro  6.6  /  Alb  4.0  /  TBili  0.2  /  DBili  x   /  AST  18  /  ALT  31  /  AlkPhos  72      PT/INR - ( 2022 07:36 )   PT: 12.5 sec;   INR: 1.09 ratio         PTT - ( 2022 07:36 )  PTT:23.5 sec  LIVER FUNCTIONS - ( 2022 06:44 )  Alb: 4.0 g/dL / Pro: 6.6 g/dL / ALK PHOS: 72 U/L / ALT: 31 U/L / AST: 18 U/L / GGT: x           Lactate Dehydrogenase, Serum: 114 U/L ( @ 06:44)      Meds:   Antimicrobials:   acyclovir   Oral Tab/Cap 400 milliGRAM(s) Oral every 8 hours  ciprofloxacin     Tablet 500 milliGRAM(s) Oral every 12 hours  clotrimazole Lozenge 1 Lozenge Oral five times a day  fluconAZOLE   Tablet 400 milliGRAM(s) Oral daily      Heme / Onc:   heparin  Infusion 334 Unit(s)/Hr IV Continuous <Continuous>      GI:  pantoprazole    Tablet 40 milliGRAM(s) Oral before breakfast  polyethylene glycol 3350 17 Gram(s) Oral daily PRN  senna 2 Tablet(s) Oral at bedtime PRN  sodium bicarbonate Mouth Rinse 10 milliLiter(s) Swish and Spit five times a day  ursodiol Capsule 300 milliGRAM(s) Oral two times a day      Cardiovascular:   furosemide   Injectable 40 milliGRAM(s) IV Push once      Immunologic:   filgrastim-sndz (ZARXIO) Injectable 480 MICROGram(s) SubCutaneous every 24 hours      Other medications:   acetaminophen     Tablet .. 650 milliGRAM(s) Oral once  acetaminophen     Tablet .. 650 milliGRAM(s) Oral every 6 hours  aprepitant 80 milliGRAM(s) Oral every 24 hours  Biotene Dry Mouth Oral Rinse 5 milliLiter(s) Swish and Spit five times a day  chlorhexidine 4% Liquid 1 Application(s) Topical <User Schedule>  diphenhydrAMINE Injectable 25 milliGRAM(s) IV Push once  FIRST- Mouthwash  BLM 10 milliLiter(s) Swish and Spit every 6 hours  FLUoxetine 20 milliGRAM(s) Oral daily  folic acid 1 milliGRAM(s) Oral daily  hydrocortisone sodium succinate Injectable 50 milliGRAM(s) IV Push once  lidocaine/prilocaine Cream 1 Application(s) Topical daily  loratadine 10 milliGRAM(s) Oral daily  LORazepam   Injectable 1 milliGRAM(s) IV Push daily  multivitamin 1 Tablet(s) Oral daily  nystatin Powder 1 Application(s) Topical two times a day  ondansetron Injectable 8 milliGRAM(s) IV Push every 8 hours  palifermin Injectable  (eMAR) 4800 MICROGram(s) IV Push every 24 hours  sodium chloride 0.45% 1000 milliLiter(s) IV Continuous <Continuous>  sodium chloride 0.9%. 1000 milliLiter(s) IV Continuous <Continuous>      PRN:   acetaminophen     Tablet .. 650 milliGRAM(s) Oral every 6 hours PRN  AQUAPHOR (petrolatum Ointment) 1 Application(s) Topical two times a day PRN  metoclopramide Injectable 10 milliGRAM(s) IV Push every 6 hours PRN  polyethylene glycol 3350 17 Gram(s) Oral daily PRN  senna 2 Tablet(s) Oral at bedtime PRN  sodium chloride 0.9% lock flush 10 milliLiter(s) IV Push every 1 hour PRN  zinc oxide 40% Paste 1 Application(s) Topical three times a day PRN      A/P: 57 year old female with a history of  peripheral T cell lymphoma   Pre  :  Autologous PBSCT day 0  - Completed  -16 24 hour continuous infusion, strict I&O, daily weights, prn diuresis    - Cytoxan day 3/.  - CTX - continue CTX hydration for 24 hours post infusion of last dose. Strict I&O, daily weights, prn diuresis. Start cipro 500mg po BID when ANC < 500    Lasix 40mv IV x 2 today    Neutropenic. Started Cipro for prophylaxis. If febrile, panculture and start Cefepime (has tolerated in the past)     1. Infectious Disease:   clotrimazole Lozenge 1 Lozenge Oral five times a day  fluconAZOLE   Tablet 400 milliGRAM(s) Oral daily  trimethoprim  160 mG/sulfamethoxazole 800 mG 1 Tablet(s) Oral every 12     2. VOD Prophylaxis: Actigall, Glutamine, Heparin (dosed at 100 units / kg / day) -  heparin  Infusion 334 Unit(s)/Hr IV Continuous <Continuous>    3. GI Prophylaxis:    pantoprazole    Tablet 40 milliGRAM(s) Oral before breakfast    4. Mouth care - NS / NaHCO3 rinses, Mycelex, Biotene; Skin care     5. GVHD prophylaxis - n/a     6. Transfuse & replete electrolytes prn     7. IV hydration, daily weights, strict I&O, prn diuresis     8. PO intake as tolerated, nutrition follow up as needed, MVI, folic acid     9. Antiemetics, anti-diarrhea medications:   LORazepam   Injectable 1 milliGRAM(s) IV Push every 6 hours PRN  metoclopramide Injectable 10 milliGRAM(s) IV Push every 6 hours PRN  aprepitant 80 milliGRAM(s) Oral every 24 hours  dexAMETHasone  IVPB 10 milliGRAM(s) IV Intermittent every 24 hours  LORazepam   Injectable 1 milliGRAM(s) IV Push daily  ondansetron Injectable 8 milliGRAM(s) IV Push every 8 hours    10. OOB as tolerated, physical therapy consult if needed     11. Monitor coags / fibrinogen 2x week, vitamin K as needed     12. Monitor closely for clinical changes, monitor for fevers     13. Emotional support provided, plan of care discussed and questions addressed     14. Patient education done regarding chemotherapy prep, plan of care, restrictions and discharge planning     15. Continue regular social work input      I have written the above note for Dr. Cabrales who performed service with me in the room.   Hien Padilla NP-C ([phone])    I have seen and examined patient with NP, I agree with above note as scribed.      HPC Transplant Team                                                      Critical / Counseling Time Provided: 30 minutes                                                                                                                                                        Chief Complaint: Autologous peripheral blood stem cell transplant with high dose CBV prep regimen for treatment of peripheral T cell lymphoma    S: Patient seen and examined with HPC Transplant Team:   + fatigue   + intermittent nausea  + loose stool but no diarrhea.   + chronic low back pain     O: Vitals:   Vital Signs Last 24 Hrs  T(C): 36.7 (2022 05:00), Max: 37.2 (2022 09:30)  T(F): 98.1 (2022 05:00), Max: 99 (2022 09:30)  HR: 74 (2022 05:00) (70 - 95)  BP: 120/72 (2022 05:00) (102/69 - 120/72)  BP(mean): --  RR: 19 (2022 05:00) (18 - 19)  SpO2: 98% (2022 05:00) (97% - 100%)    PE:   Oropharynx: (+) 1 erythema on bottom lip no ulcerations  Oral Mucositis:              -                                          ndGndrndanddndend:nd nd2nd CVS: S1, S2 RRR   Lungs: CTA throughout bilaterally   Abdomen: + BS x 4, soft, NT, ND   Extremities: no edema   Gastric Mucositis:       -                                           Grade: n/a   Intestinal Mucositis:     -                                         Grade: n/a   Skin: + macular erythema to chest, upper back and neck post kepivance; unchanged   TLC: CDI   Neuro: A&O x 3   Pain: 0    Parameters below as of 2022 05:00  Patient On (Oxygen Delivery Method): room air    Admit weight: 80.3 kg   Daily     Daily Weight in k.6 (2022 09:30)    Intake / Output:    @ :  -   @ 07:00  --------------------------------------------------------  IN: 3891.9 mL / OUT: 2875 mL / NET: 1016.9 mL     @ 07:  -   @ 08:23  --------------------------------------------------------  IN: 282.9 mL / OUT: 600 mL / NET: -317.1 mL        Labs:   CBC Full  -  ( 2022 06:43 )  WBC Count : 0.04 K/uL  Hemoglobin : 8.3 g/dL  Hematocrit : 25.6 %  Platelet Count - Automated : 56 K/uL  Mean Cell Volume : 93.1 fl  Mean Cell Hemoglobin : 30.2 pg  Mean Cell Hemoglobin Concentration : 32.4 gm/dL  Auto Neutrophil # : x  Auto Lymphocyte # : x  Auto Monocyte # : x  Auto Eosinophil # : x  Auto Basophil # : x  Auto Neutrophil % : x  Auto Lymphocyte % : x  Auto Monocyte % : x  Auto Eosinophil % : x  Auto Basophil % : x                          8.3    0.04  )-----------( 56       ( 2022 06:43 )             25.6     -    136  |  101  |  12  ----------------------------<  136<H>  3.9   |  26  |  0.40<L>    Ca    9.2      2022 06:44  Phos  3.2       Mg     2.1         TPro  6.6  /  Alb  4.0  /  TBili  0.2  /  DBili  x   /  AST  18  /  ALT  31  /  AlkPhos  72      PT/INR - ( 2022 07:36 )   PT: 12.5 sec;   INR: 1.09 ratio         PTT - ( 2022 07:36 )  PTT:23.5 sec  LIVER FUNCTIONS - ( 2022 06:44 )  Alb: 4.0 g/dL / Pro: 6.6 g/dL / ALK PHOS: 72 U/L / ALT: 31 U/L / AST: 18 U/L / GGT: x           Lactate Dehydrogenase, Serum: 114 U/L ( @ 06:44)      Meds:   Antimicrobials:   acyclovir   Oral Tab/Cap 400 milliGRAM(s) Oral every 8 hours  ciprofloxacin     Tablet 500 milliGRAM(s) Oral every 12 hours  clotrimazole Lozenge 1 Lozenge Oral five times a day  fluconAZOLE   Tablet 400 milliGRAM(s) Oral daily      Heme / Onc:   heparin  Infusion 334 Unit(s)/Hr IV Continuous <Continuous>      GI:  pantoprazole    Tablet 40 milliGRAM(s) Oral before breakfast  polyethylene glycol 3350 17 Gram(s) Oral daily PRN  senna 2 Tablet(s) Oral at bedtime PRN  sodium bicarbonate Mouth Rinse 10 milliLiter(s) Swish and Spit five times a day  ursodiol Capsule 300 milliGRAM(s) Oral two times a day      Cardiovascular:   furosemide   Injectable 40 milliGRAM(s) IV Push once      Immunologic:   filgrastim-sndz (ZARXIO) Injectable 480 MICROGram(s) SubCutaneous every 24 hours      Other medications:   acetaminophen     Tablet .. 650 milliGRAM(s) Oral once  acetaminophen     Tablet .. 650 milliGRAM(s) Oral every 6 hours  aprepitant 80 milliGRAM(s) Oral every 24 hours  Biotene Dry Mouth Oral Rinse 5 milliLiter(s) Swish and Spit five times a day  chlorhexidine 4% Liquid 1 Application(s) Topical <User Schedule>  diphenhydrAMINE Injectable 25 milliGRAM(s) IV Push once  FIRST- Mouthwash  BLM 10 milliLiter(s) Swish and Spit every 6 hours  FLUoxetine 20 milliGRAM(s) Oral daily  folic acid 1 milliGRAM(s) Oral daily  hydrocortisone sodium succinate Injectable 50 milliGRAM(s) IV Push once  lidocaine/prilocaine Cream 1 Application(s) Topical daily  loratadine 10 milliGRAM(s) Oral daily  LORazepam   Injectable 1 milliGRAM(s) IV Push daily  multivitamin 1 Tablet(s) Oral daily  nystatin Powder 1 Application(s) Topical two times a day  ondansetron Injectable 8 milliGRAM(s) IV Push every 8 hours  palifermin Injectable  (eMAR) 4800 MICROGram(s) IV Push every 24 hours  sodium chloride 0.45% 1000 milliLiter(s) IV Continuous <Continuous>  sodium chloride 0.9%. 1000 milliLiter(s) IV Continuous <Continuous>      PRN:   acetaminophen     Tablet .. 650 milliGRAM(s) Oral every 6 hours PRN  AQUAPHOR (petrolatum Ointment) 1 Application(s) Topical two times a day PRN  metoclopramide Injectable 10 milliGRAM(s) IV Push every 6 hours PRN  polyethylene glycol 3350 17 Gram(s) Oral daily PRN  senna 2 Tablet(s) Oral at bedtime PRN  sodium chloride 0.9% lock flush 10 milliLiter(s) IV Push every 1 hour PRN  zinc oxide 40% Paste 1 Application(s) Topical three times a day PRN      A/P: 57 year old female with a history of  peripheral T cell lymphoma   Pre  :  Autologous PBSCT day 0  - Completed  -16 24 hour continuous infusion, strict I&O, daily weights, prn diuresis    - Cytoxan day 3/4.  - CTX - continue CTX hydration for 24 hours post infusion of last dose. Strict I&O, daily weights, prn diuresis. Start cipro 500mg po BID when ANC < 500    Neutropenic. Started Cipro for prophylaxis. If febrile, panculture and start Cefepime (has tolerated in the past)     1. Infectious Disease:   clotrimazole Lozenge 1 Lozenge Oral five times a day  fluconAZOLE   Tablet 400 milliGRAM(s) Oral daily  trimethoprim  160 mG/sulfamethoxazole 800 mG 1 Tablet(s) Oral every 12     2. VOD Prophylaxis: Actigall, Glutamine, Heparin (dosed at 100 units / kg / day) -  heparin  Infusion 334 Unit(s)/Hr IV Continuous <Continuous>    3. GI Prophylaxis:    pantoprazole    Tablet 40 milliGRAM(s) Oral before breakfast    4. Mouth care - NS / NaHCO3 rinses, Mycelex, Biotene; Skin care     5. GVHD prophylaxis - n/a     6. Transfuse & replete electrolytes prn     7. IV hydration, daily weights, strict I&O, prn diuresis   Lasix 40mg IV x1 today.     8. PO intake as tolerated, nutrition follow up as needed, MVI, folic acid     9. Antiemetics, anti-diarrhea medications:   LORazepam   Injectable 1 milliGRAM(s) IV Push every 6 hours PRN  metoclopramide Injectable 10 milliGRAM(s) IV Push every 6 hours PRN  aprepitant 80 milliGRAM(s) Oral every 24 hours  dexAMETHasone  IVPB 10 milliGRAM(s) IV Intermittent every 24 hours  LORazepam   Injectable 1 milliGRAM(s) IV Push daily  ondansetron Injectable 8 milliGRAM(s) IV Push every 8 hours    10. OOB as tolerated, physical therapy consult if needed     11. Monitor coags / fibrinogen 2x week, vitamin K as needed     12. Monitor closely for clinical changes, monitor for fevers     13. Emotional support provided, plan of care discussed and questions addressed     14. Patient education done regarding chemotherapy prep, plan of care, restrictions and discharge planning     15. Continue regular social work input      I have written the above note for Dr. Cabrales who performed service with me in the room.   Hien Padilla NP-C ([phone])    I have seen and examined patient with NP, I agree with above note as scribed.      HPC Transplant Team                                                      Critical / Counseling Time Provided: 30 minutes                                                                                                                                                        Chief Complaint: Autologous peripheral blood stem cell transplant with high dose CBV prep regimen for treatment of peripheral T cell lymphoma    S: Patient seen and examined with HPC Transplant Team:   + fatigue   + intermittent nausea  + loose stool but no diarrhea.   + chronic low back pain     O: Vitals:   Vital Signs Last 24 Hrs  T(C): 36.7 (2022 05:00), Max: 37.2 (2022 09:30)  T(F): 98.1 (2022 05:00), Max: 99 (2022 09:30)  HR: 74 (2022 05:00) (70 - 95)  BP: 120/72 (2022 05:00) (102/69 - 120/72)  BP(mean): --  RR: 19 (2022 05:00) (18 - 19)  SpO2: 98% (2022 05:00) (97% - 100%)    PE:   Oropharynx: (+) 1 erythema on bottom lip no ulcerations  Oral Mucositis:              -                                          ndGndrndanddndend:nd nd2nd CVS: S1, S2 RRR   Lungs: CTA throughout bilaterally   Abdomen: + BS x 4, soft, NT, ND   Extremities: no edema   Gastric Mucositis:       -                                           Grade: n/a   Intestinal Mucositis:     -                                         Grade: n/a   Skin: + macular erythema to chest, upper back and neck post kepivance; unchanged   TLC: CDI   Neuro: A&O x 3   Pain: 0    Parameters below as of 2022 05:00  Patient On (Oxygen Delivery Method): room air    Admit weight: 80.3 kg   Daily  76.5 kg   Daily Weight in k.6 (2022 09:30)    Intake / Output:    @ : @ 07:00  --------------------------------------------------------  IN: 3891.9 mL / OUT: 2875 mL / NET: 1016.9 mL     @ 07: @ 08:23  --------------------------------------------------------  IN: 282.9 mL / OUT: 600 mL / NET: -317.1 mL        Labs:   CBC Full  -  ( 2022 06:43 )  WBC Count : 0.04 K/uL  Hemoglobin : 8.3 g/dL  Hematocrit : 25.6 %  Platelet Count - Automated : 56 K/uL  Mean Cell Volume : 93.1 fl  Mean Cell Hemoglobin : 30.2 pg  Mean Cell Hemoglobin Concentration : 32.4 gm/dL  Auto Neutrophil # : x  Auto Lymphocyte # : x  Auto Monocyte # : x  Auto Eosinophil # : x  Auto Basophil # : x  Auto Neutrophil % : x  Auto Lymphocyte % : x  Auto Monocyte % : x  Auto Eosinophil % : x  Auto Basophil % : x                          8.3    0.04  )-----------( 56       ( 2022 06:43 )             25.6         136  |  101  |  12  ----------------------------<  136<H>  3.9   |  26  |  0.40<L>    Ca    9.2      2022 06:44  Phos  3.2       Mg     2.1         TPro  6.6  /  Alb  4.0  /  TBili  0.2  /  DBili  x   /  AST  18  /  ALT  31  /  AlkPhos  72      PT/INR - ( 2022 07:36 )   PT: 12.5 sec;   INR: 1.09 ratio         PTT - ( 2022 07:36 )  PTT:23.5 sec  LIVER FUNCTIONS - ( 2022 06:44 )  Alb: 4.0 g/dL / Pro: 6.6 g/dL / ALK PHOS: 72 U/L / ALT: 31 U/L / AST: 18 U/L / GGT: x           Lactate Dehydrogenase, Serum: 114 U/L ( @ 06:44)      Meds:   Antimicrobials:   acyclovir   Oral Tab/Cap 400 milliGRAM(s) Oral every 8 hours  ciprofloxacin     Tablet 500 milliGRAM(s) Oral every 12 hours  clotrimazole Lozenge 1 Lozenge Oral five times a day  fluconAZOLE   Tablet 400 milliGRAM(s) Oral daily      Heme / Onc:   heparin  Infusion 334 Unit(s)/Hr IV Continuous <Continuous>      GI:  pantoprazole    Tablet 40 milliGRAM(s) Oral before breakfast  polyethylene glycol 3350 17 Gram(s) Oral daily PRN  senna 2 Tablet(s) Oral at bedtime PRN  sodium bicarbonate Mouth Rinse 10 milliLiter(s) Swish and Spit five times a day  ursodiol Capsule 300 milliGRAM(s) Oral two times a day      Cardiovascular:   furosemide   Injectable 40 milliGRAM(s) IV Push once      Immunologic:   filgrastim-sndz (ZARXIO) Injectable 480 MICROGram(s) SubCutaneous every 24 hours      Other medications:   acetaminophen     Tablet .. 650 milliGRAM(s) Oral once  acetaminophen     Tablet .. 650 milliGRAM(s) Oral every 6 hours  aprepitant 80 milliGRAM(s) Oral every 24 hours  Biotene Dry Mouth Oral Rinse 5 milliLiter(s) Swish and Spit five times a day  chlorhexidine 4% Liquid 1 Application(s) Topical <User Schedule>  diphenhydrAMINE Injectable 25 milliGRAM(s) IV Push once  FIRST- Mouthwash  BLM 10 milliLiter(s) Swish and Spit every 6 hours  FLUoxetine 20 milliGRAM(s) Oral daily  folic acid 1 milliGRAM(s) Oral daily  hydrocortisone sodium succinate Injectable 50 milliGRAM(s) IV Push once  lidocaine/prilocaine Cream 1 Application(s) Topical daily  loratadine 10 milliGRAM(s) Oral daily  LORazepam   Injectable 1 milliGRAM(s) IV Push daily  multivitamin 1 Tablet(s) Oral daily  nystatin Powder 1 Application(s) Topical two times a day  ondansetron Injectable 8 milliGRAM(s) IV Push every 8 hours  palifermin Injectable  (eMAR) 4800 MICROGram(s) IV Push every 24 hours  sodium chloride 0.45% 1000 milliLiter(s) IV Continuous <Continuous>  sodium chloride 0.9%. 1000 milliLiter(s) IV Continuous <Continuous>      PRN:   acetaminophen     Tablet .. 650 milliGRAM(s) Oral every 6 hours PRN  AQUAPHOR (petrolatum Ointment) 1 Application(s) Topical two times a day PRN  metoclopramide Injectable 10 milliGRAM(s) IV Push every 6 hours PRN  polyethylene glycol 3350 17 Gram(s) Oral daily PRN  senna 2 Tablet(s) Oral at bedtime PRN  sodium chloride 0.9% lock flush 10 milliLiter(s) IV Push every 1 hour PRN  zinc oxide 40% Paste 1 Application(s) Topical three times a day PRN      A/P: 57 year old female with a history of  peripheral T cell lymphoma   Pre  :  Autologous PBSCT day 0  - Completed  -16 24 hour continuous infusion, strict I&O, daily weights, prn diuresis    - Cytoxan day 3/4.  - CTX - continue CTX hydration for 24 hours post infusion of last dose. Strict I&O, daily weights, prn diuresis. Start cipro 500mg po BID when ANC < 500    Neutropenic. Started Cipro for prophylaxis. If febrile, panculture and start Cefepime (has tolerated in the past)     1. Infectious Disease:   clotrimazole Lozenge 1 Lozenge Oral five times a day  fluconAZOLE   Tablet 400 milliGRAM(s) Oral daily  trimethoprim  160 mG/sulfamethoxazole 800 mG 1 Tablet(s) Oral every 12     2. VOD Prophylaxis: Actigall, Glutamine, Heparin (dosed at 100 units / kg / day) -  heparin  Infusion 334 Unit(s)/Hr IV Continuous <Continuous>    3. GI Prophylaxis:    pantoprazole    Tablet 40 milliGRAM(s) Oral before breakfast    4. Mouth care - NS / NaHCO3 rinses, Mycelex, Biotene; Skin care     5. GVHD prophylaxis - n/a     6. Transfuse & replete electrolytes prn     7. IV hydration, daily weights, strict I&O, prn diuresis   Lasix 40mg IV x1 today.     8. PO intake as tolerated, nutrition follow up as needed, MVI, folic acid     9. Antiemetics, anti-diarrhea medications:   LORazepam   Injectable 1 milliGRAM(s) IV Push every 6 hours PRN  metoclopramide Injectable 10 milliGRAM(s) IV Push every 6 hours PRN  aprepitant 80 milliGRAM(s) Oral every 24 hours  dexAMETHasone  IVPB 10 milliGRAM(s) IV Intermittent every 24 hours  LORazepam   Injectable 1 milliGRAM(s) IV Push daily  ondansetron Injectable 8 milliGRAM(s) IV Push every 8 hours    10. OOB as tolerated, physical therapy consult if needed     11. Monitor coags / fibrinogen 2x week, vitamin K as needed     12. Monitor closely for clinical changes, monitor for fevers     13. Emotional support provided, plan of care discussed and questions addressed     14. Patient education done regarding chemotherapy prep, plan of care, restrictions and discharge planning     15. Continue regular social work input      I have written the above note for Dr. Cabrales who performed service with me in the room.   Hien Padilla NP-C ([phone])    I have seen and examined patient with NP, I agree with above note as scribed.      HPC Transplant Team                                                      Critical / Counseling Time Provided: 30 minutes                                                                                                                                                        Chief Complaint: Autologous peripheral blood stem cell transplant with high dose CBV prep regimen for treatment of peripheral T cell lymphoma    S: Patient seen and examined with HPC Transplant Team:   + fatigue   + intermittent nausea  + loose stool but no diarrhea.   + chronic low back pain     O: Vitals:   Vital Signs Last 24 Hrs  T(C): 36.7 (2022 05:00), Max: 37.2 (2022 09:30)  T(F): 98.1 (2022 05:00), Max: 99 (2022 09:30)  HR: 74 (2022 05:00) (70 - 95)  BP: 120/72 (2022 05:00) (102/69 - 120/72)  BP(mean): --  RR: 19 (2022 05:00) (18 - 19)  SpO2: 98% (2022 05:00) (97% - 100%)    PE:   Oropharynx: (+) 1 erythema on bottom lip no ulcerations  Oral Mucositis:              -                                          ndGndrndanddndend:nd nd2nd CVS: S1, S2 RRR   Lungs: CTA throughout bilaterally   Abdomen: + BS x 4, soft, NT, ND   Extremities: no edema   Gastric Mucositis:       -                                           Grade: n/a   Intestinal Mucositis:     -                                         Grade: n/a   Skin: + macular erythema to chest, upper back and neck post kepivance; unchanged   TLC: CDI   Neuro: A&O x 3   Pain: 0    Parameters below as of 2022 05:00  Patient On (Oxygen Delivery Method): room air    Admit weight: 80.3 kg   Daily  76.5 kg   Daily Weight in k.6 (2022 09:30)    Intake / Output:    @ : @ 07:00  --------------------------------------------------------  IN: 3891.9 mL / OUT: 2875 mL / NET: 1016.9 mL     @ 07: @ 08:23  --------------------------------------------------------  IN: 282.9 mL / OUT: 600 mL / NET: -317.1 mL        Labs:   CBC Full  -  ( 2022 06:43 )  WBC Count : 0.04 K/uL  Hemoglobin : 8.3 g/dL  Hematocrit : 25.6 %  Platelet Count - Automated : 56 K/uL  Mean Cell Volume : 93.1 fl  Mean Cell Hemoglobin : 30.2 pg  Mean Cell Hemoglobin Concentration : 32.4 gm/dL  Auto Neutrophil # : x  Auto Lymphocyte # : x  Auto Monocyte # : x  Auto Eosinophil # : x  Auto Basophil # : x  Auto Neutrophil % : x  Auto Lymphocyte % : x  Auto Monocyte % : x  Auto Eosinophil % : x  Auto Basophil % : x                          8.3    0.04  )-----------( 56       ( 2022 06:43 )             25.6         136  |  101  |  12  ----------------------------<  136<H>  3.9   |  26  |  0.40<L>    Ca    9.2      2022 06:44  Phos  3.2       Mg     2.1         TPro  6.6  /  Alb  4.0  /  TBili  0.2  /  DBili  x   /  AST  18  /  ALT  31  /  AlkPhos  72      PT/INR - ( 2022 07:36 )   PT: 12.5 sec;   INR: 1.09 ratio         PTT - ( 2022 07:36 )  PTT:23.5 sec  LIVER FUNCTIONS - ( 2022 06:44 )  Alb: 4.0 g/dL / Pro: 6.6 g/dL / ALK PHOS: 72 U/L / ALT: 31 U/L / AST: 18 U/L / GGT: x           Lactate Dehydrogenase, Serum: 114 U/L ( @ 06:44)      Meds:   Antimicrobials:   acyclovir   Oral Tab/Cap 400 milliGRAM(s) Oral every 8 hours  ciprofloxacin     Tablet 500 milliGRAM(s) Oral every 12 hours  clotrimazole Lozenge 1 Lozenge Oral five times a day  fluconAZOLE   Tablet 400 milliGRAM(s) Oral daily      Heme / Onc:   heparin  Infusion 334 Unit(s)/Hr IV Continuous <Continuous>      GI:  pantoprazole    Tablet 40 milliGRAM(s) Oral before breakfast  polyethylene glycol 3350 17 Gram(s) Oral daily PRN  senna 2 Tablet(s) Oral at bedtime PRN  sodium bicarbonate Mouth Rinse 10 milliLiter(s) Swish and Spit five times a day  ursodiol Capsule 300 milliGRAM(s) Oral two times a day      Cardiovascular:   furosemide   Injectable 40 milliGRAM(s) IV Push once      Immunologic:   filgrastim-sndz (ZARXIO) Injectable 480 MICROGram(s) SubCutaneous every 24 hours      Other medications:   acetaminophen     Tablet .. 650 milliGRAM(s) Oral once  acetaminophen     Tablet .. 650 milliGRAM(s) Oral every 6 hours  aprepitant 80 milliGRAM(s) Oral every 24 hours  Biotene Dry Mouth Oral Rinse 5 milliLiter(s) Swish and Spit five times a day  chlorhexidine 4% Liquid 1 Application(s) Topical <User Schedule>  diphenhydrAMINE Injectable 25 milliGRAM(s) IV Push once  FIRST- Mouthwash  BLM 10 milliLiter(s) Swish and Spit every 6 hours  FLUoxetine 20 milliGRAM(s) Oral daily  folic acid 1 milliGRAM(s) Oral daily  hydrocortisone sodium succinate Injectable 50 milliGRAM(s) IV Push once  lidocaine/prilocaine Cream 1 Application(s) Topical daily  loratadine 10 milliGRAM(s) Oral daily  LORazepam   Injectable 1 milliGRAM(s) IV Push daily  multivitamin 1 Tablet(s) Oral daily  nystatin Powder 1 Application(s) Topical two times a day  ondansetron Injectable 8 milliGRAM(s) IV Push every 8 hours  palifermin Injectable  (eMAR) 4800 MICROGram(s) IV Push every 24 hours  sodium chloride 0.45% 1000 milliLiter(s) IV Continuous <Continuous>  sodium chloride 0.9%. 1000 milliLiter(s) IV Continuous <Continuous>      PRN:   acetaminophen     Tablet .. 650 milliGRAM(s) Oral every 6 hours PRN  AQUAPHOR (petrolatum Ointment) 1 Application(s) Topical two times a day PRN  metoclopramide Injectable 10 milliGRAM(s) IV Push every 6 hours PRN  polyethylene glycol 3350 17 Gram(s) Oral daily PRN  senna 2 Tablet(s) Oral at bedtime PRN  sodium chloride 0.9% lock flush 10 milliLiter(s) IV Push every 1 hour PRN  zinc oxide 40% Paste 1 Application(s) Topical three times a day PRN      A/P: 57 year old female with a history of  peripheral T cell lymphoma   Pre  :  Autologous PBSCT day 0  - Completed  -16 24 hour continuous infusion, strict I&O, daily weights, prn diuresis    - Cytoxan day 3/4.  - CTX - continue CTX hydration for 24 hours post infusion of last dose. Strict I&O, daily weights, prn diuresis. Start cipro 500mg po BID when ANC < 500    Neutropenic. Started Cipro for prophylaxis. If febrile, panculture and start Cefepime (has tolerated in the past)     1. Infectious Disease:   acyclovir   Oral Tab/Cap 400 milliGRAM(s) Oral every 8 hours  ciprofloxacin     Tablet 500 milliGRAM(s) Oral every 12 hours  clotrimazole Lozenge 1 Lozenge Oral five times a day  fluconAZOLE   Tablet 400 milliGRAM(s) Oral daily    2. VOD Prophylaxis: Actigall, Glutamine, Heparin (dosed at 100 units / kg / day) -  heparin  Infusion 334 Unit(s)/Hr IV Continuous <Continuous>    3. GI Prophylaxis:    pantoprazole    Tablet 40 milliGRAM(s) Oral before breakfast    4. Mouth care - NS / NaHCO3 rinses, Mycelex, Biotene; Skin care     5. GVHD prophylaxis - n/a     6. Transfuse & replete electrolytes prn     7. IV hydration, daily weights, strict I&O, prn diuresis   Lasix 40mg IV x1 today.     8. PO intake as tolerated, nutrition follow up as needed, MVI, folic acid     9. Antiemetics, anti-diarrhea medications:   LORazepam   Injectable 1 milliGRAM(s) IV Push every 6 hours PRN  metoclopramide Injectable 10 milliGRAM(s) IV Push every 6 hours PRN  aprepitant 80 milliGRAM(s) Oral every 24 hours  dexAMETHasone  IVPB 10 milliGRAM(s) IV Intermittent every 24 hours  LORazepam   Injectable 1 milliGRAM(s) IV Push daily  ondansetron Injectable 8 milliGRAM(s) IV Push every 8 hours    10. OOB as tolerated, physical therapy consult if needed     11. Monitor coags / fibrinogen 2x week, vitamin K as needed     12. Monitor closely for clinical changes, monitor for fevers     13. Emotional support provided, plan of care discussed and questions addressed     14. Patient education done regarding chemotherapy prep, plan of care, restrictions and discharge planning     15. Continue regular social work input      I have written the above note for Dr. Cabrales who performed service with me in the room.   Hien Padilla NP-C ([phone])    I have seen and examined patient with NP, I agree with above note as scribed.

## 2022-11-26 LAB
ALBUMIN SERPL ELPH-MCNC: 3.5 G/DL — SIGNIFICANT CHANGE UP (ref 3.3–5)
ALP SERPL-CCNC: 70 U/L — SIGNIFICANT CHANGE UP (ref 40–120)
ALT FLD-CCNC: 34 U/L — SIGNIFICANT CHANGE UP (ref 10–45)
ANION GAP SERPL CALC-SCNC: 12 MMOL/L — SIGNIFICANT CHANGE UP (ref 5–17)
AST SERPL-CCNC: 22 U/L — SIGNIFICANT CHANGE UP (ref 10–40)
BILIRUB SERPL-MCNC: 0.2 MG/DL — SIGNIFICANT CHANGE UP (ref 0.2–1.2)
BUN SERPL-MCNC: 12 MG/DL — SIGNIFICANT CHANGE UP (ref 7–23)
CALCIUM SERPL-MCNC: 8.4 MG/DL — SIGNIFICANT CHANGE UP (ref 8.4–10.5)
CHLORIDE SERPL-SCNC: 99 MMOL/L — SIGNIFICANT CHANGE UP (ref 96–108)
CO2 SERPL-SCNC: 28 MMOL/L — SIGNIFICANT CHANGE UP (ref 22–31)
CREAT SERPL-MCNC: 0.49 MG/DL — LOW (ref 0.5–1.3)
EGFR: 111 ML/MIN/1.73M2 — SIGNIFICANT CHANGE UP
GLUCOSE SERPL-MCNC: 120 MG/DL — HIGH (ref 70–99)
HCT VFR BLD CALC: 25.1 % — LOW (ref 34.5–45)
HGB BLD-MCNC: 8 G/DL — LOW (ref 11.5–15.5)
LDH SERPL L TO P-CCNC: 285 U/L — HIGH (ref 50–242)
MAGNESIUM SERPL-MCNC: 1.9 MG/DL — SIGNIFICANT CHANGE UP (ref 1.6–2.6)
MCHC RBC-ENTMCNC: 29.9 PG — SIGNIFICANT CHANGE UP (ref 27–34)
MCHC RBC-ENTMCNC: 31.9 GM/DL — LOW (ref 32–36)
MCV RBC AUTO: 93.7 FL — SIGNIFICANT CHANGE UP (ref 80–100)
NRBC # BLD: 0 /100 WBCS — SIGNIFICANT CHANGE UP (ref 0–0)
PHOSPHATE SERPL-MCNC: 3.4 MG/DL — SIGNIFICANT CHANGE UP (ref 2.5–4.5)
PLATELET # BLD AUTO: 26 K/UL — LOW (ref 150–400)
POTASSIUM SERPL-MCNC: 3 MMOL/L — LOW (ref 3.5–5.3)
POTASSIUM SERPL-MCNC: 4.1 MMOL/L — SIGNIFICANT CHANGE UP (ref 3.5–5.3)
POTASSIUM SERPL-SCNC: 3 MMOL/L — LOW (ref 3.5–5.3)
POTASSIUM SERPL-SCNC: 4.1 MMOL/L — SIGNIFICANT CHANGE UP (ref 3.5–5.3)
PROT SERPL-MCNC: 5.7 G/DL — LOW (ref 6–8.3)
RBC # BLD: 2.68 M/UL — LOW (ref 3.8–5.2)
RBC # FLD: 13.2 % — SIGNIFICANT CHANGE UP (ref 10.3–14.5)
SODIUM SERPL-SCNC: 139 MMOL/L — SIGNIFICANT CHANGE UP (ref 135–145)
WBC # BLD: 0.03 K/UL — CRITICAL LOW (ref 3.8–10.5)
WBC # FLD AUTO: 0.03 K/UL — CRITICAL LOW (ref 3.8–10.5)

## 2022-11-26 PROCEDURE — 99233 SBSQ HOSP IP/OBS HIGH 50: CPT | Mod: GC

## 2022-11-26 RX ORDER — POTASSIUM CHLORIDE 20 MEQ
20 PACKET (EA) ORAL
Refills: 0 | Status: COMPLETED | OUTPATIENT
Start: 2022-11-26 | End: 2022-11-26

## 2022-11-26 RX ORDER — POTASSIUM CHLORIDE 20 MEQ
40 PACKET (EA) ORAL ONCE
Refills: 0 | Status: COMPLETED | OUTPATIENT
Start: 2022-11-26 | End: 2022-11-26

## 2022-11-26 RX ORDER — FUROSEMIDE 40 MG
20 TABLET ORAL ONCE
Refills: 0 | Status: COMPLETED | OUTPATIENT
Start: 2022-11-26 | End: 2022-11-26

## 2022-11-26 RX ADMIN — Medication 1 LOZENGE: at 12:01

## 2022-11-26 RX ADMIN — Medication 1 LOZENGE: at 15:45

## 2022-11-26 RX ADMIN — Medication 5 MILLILITER(S): at 12:01

## 2022-11-26 RX ADMIN — Medication 20 MILLIGRAM(S): at 15:44

## 2022-11-26 RX ADMIN — Medication 1 MILLIGRAM(S): at 12:03

## 2022-11-26 RX ADMIN — Medication 10 MILLILITER(S): at 15:45

## 2022-11-26 RX ADMIN — Medication 20 MILLIEQUIVALENT(S): at 10:21

## 2022-11-26 RX ADMIN — DIPHENHYDRAMINE HYDROCHLORIDE AND LIDOCAINE HYDROCHLORIDE AND ALUMINUM HYDROXIDE AND MAGNESIUM HYDRO 10 MILLILITER(S): KIT at 06:13

## 2022-11-26 RX ADMIN — URSODIOL 300 MILLIGRAM(S): 250 TABLET, FILM COATED ORAL at 06:12

## 2022-11-26 RX ADMIN — Medication 40 MILLIEQUIVALENT(S): at 12:06

## 2022-11-26 RX ADMIN — NYSTATIN CREAM 1 APPLICATION(S): 100000 CREAM TOPICAL at 17:13

## 2022-11-26 RX ADMIN — Medication 500 MILLIGRAM(S): at 17:13

## 2022-11-26 RX ADMIN — Medication 20 MILLIGRAM(S): at 12:03

## 2022-11-26 RX ADMIN — Medication 1 LOZENGE: at 20:18

## 2022-11-26 RX ADMIN — DIPHENHYDRAMINE HYDROCHLORIDE AND LIDOCAINE HYDROCHLORIDE AND ALUMINUM HYDROXIDE AND MAGNESIUM HYDRO 5 MILLILITER(S): KIT at 12:03

## 2022-11-26 RX ADMIN — Medication 50 MILLIEQUIVALENT(S): at 09:36

## 2022-11-26 RX ADMIN — LIDOCAINE AND PRILOCAINE CREAM 1 APPLICATION(S): 25; 25 CREAM TOPICAL at 14:31

## 2022-11-26 RX ADMIN — Medication 50 MILLIEQUIVALENT(S): at 12:03

## 2022-11-26 RX ADMIN — URSODIOL 300 MILLIGRAM(S): 250 TABLET, FILM COATED ORAL at 17:13

## 2022-11-26 RX ADMIN — Medication 400 MILLIGRAM(S): at 21:57

## 2022-11-26 RX ADMIN — LORATADINE 10 MILLIGRAM(S): 10 TABLET ORAL at 12:02

## 2022-11-26 RX ADMIN — CHLORHEXIDINE GLUCONATE 1 APPLICATION(S): 213 SOLUTION TOPICAL at 08:13

## 2022-11-26 RX ADMIN — Medication 1 TABLET(S): at 12:02

## 2022-11-26 RX ADMIN — Medication 10 MILLILITER(S): at 12:01

## 2022-11-26 RX ADMIN — Medication 400 MILLIGRAM(S): at 14:30

## 2022-11-26 RX ADMIN — Medication 10 MILLIGRAM(S): at 17:12

## 2022-11-26 RX ADMIN — PANTOPRAZOLE SODIUM 40 MILLIGRAM(S): 20 TABLET, DELAYED RELEASE ORAL at 06:12

## 2022-11-26 RX ADMIN — NYSTATIN CREAM 1 APPLICATION(S): 100000 CREAM TOPICAL at 06:13

## 2022-11-26 RX ADMIN — Medication 5 MILLILITER(S): at 20:18

## 2022-11-26 RX ADMIN — Medication 5 MILLILITER(S): at 08:12

## 2022-11-26 RX ADMIN — Medication 1 LOZENGE: at 08:12

## 2022-11-26 RX ADMIN — Medication 5 MILLILITER(S): at 15:46

## 2022-11-26 RX ADMIN — Medication 20 MILLIEQUIVALENT(S): at 08:15

## 2022-11-26 RX ADMIN — Medication 4800 MICROGRAM(S): at 15:04

## 2022-11-26 RX ADMIN — Medication 50 MILLIEQUIVALENT(S): at 08:15

## 2022-11-26 RX ADMIN — DIPHENHYDRAMINE HYDROCHLORIDE AND LIDOCAINE HYDROCHLORIDE AND ALUMINUM HYDROXIDE AND MAGNESIUM HYDRO 10 MILLILITER(S): KIT at 17:12

## 2022-11-26 RX ADMIN — Medication 20 MILLIGRAM(S): at 10:50

## 2022-11-26 RX ADMIN — Medication 400 MILLIGRAM(S): at 06:12

## 2022-11-26 RX ADMIN — FLUCONAZOLE 400 MILLIGRAM(S): 150 TABLET ORAL at 12:02

## 2022-11-26 RX ADMIN — Medication 480 MICROGRAM(S): at 17:15

## 2022-11-26 RX ADMIN — Medication 10 MILLILITER(S): at 08:12

## 2022-11-26 RX ADMIN — Medication 500 MILLIGRAM(S): at 06:12

## 2022-11-26 RX ADMIN — Medication 10 MILLILITER(S): at 20:19

## 2022-11-26 RX ADMIN — Medication 20 MILLIEQUIVALENT(S): at 14:31

## 2022-11-26 NOTE — PROGRESS NOTE ADULT - NS ATTEND AMEND GEN_ALL_CORE FT
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   Today is Day +1  Problem/Plan -   1:  Problem: Stem cell transplant   ·  Plan: 1. Admit to BMTU   2. TLC placement in IR   3. Day -9 - day -2 - antiemetics   4. Day -9 & day -8, VP16 2400mg/m2 IV x 34 hours (final concentration 0.4mg /mL).   5. Strict I&O, daily weights, prn diuresis   6. After completion of VP16- start CTX hydration (D51/2NS + 10mEq KCl @ 150ml / m2) - continue for 24 hours post infusion of CTX   7. Day -7 - day -4 - CTX 1800 mg / m2 daily over 2 hours. Follow SMA7, mag, phos 6hours post CT . [place]  12mg / kg - hemorrhagic cystitis ppx   8. Day -3 - BCNU 400mg / m2   9. Day -2 - day -1 - rest days  10. Day 0(11/25/22) - HPC transplant. Start Zarxio 480mcg SQ QD, continue through engraftment. Kepivance on days 0, 1 & 2   11. Anxiolytics, antinausea, antidiarrhea medications as needed   12. Lasix PRN while being aggressively hydrated to avoid VOD   13. Nutritional support, pain management.    Problem/Plan - 2:  ·  Problem: Need for prophylactic measure.   ·  Plan: 1. VOD prophylaxis - low dose heparin gtt (dosed at 100 units / kg / day), glutamine supplementation, Actigall BID   2. PCP prophylaxis - Bactrim DS through day -2    3. Antiviral prophylaxis - Acyclovir 400mg po TID to start day -1   4. Antifungal prophylaxis- Diflucan 400 mg po daily.  5. GI prophylaxis - Protonix po QD   6. Antibacterial prophylaxis - when ANC < 500, start Cipro 500mg po BID. If becomes febrile, pan cx, CXR and change Cipro to Cefepime 2g IV q 8 hours. Continue until count recovery  7. Kepivance for prevention of mucositis- days 0, +1, +2  8. Aggressive mouth care and skin care as per protocol. On 11/25, early mucositis on lower lip, no ulcers. Continue topical care.   9. Receiving transfusion and electrolyte support.    11/25-Chemotherapy induced nausea and diarrhea managed with antiemetics and antidiarrheals. Now patient states stool is loose rather than watery.   Continue Cipro, Acyclovir, Diflucan prophylaxis. OOB/ambulate. She received HPC transplant today without complications. Begin Zarxio daily.  11/26/22: continues to have loose stools overnight, this am with epistaxis lasting ~ 5 minutes. To receive platelets. Remains fatigued

## 2022-11-26 NOTE — PROGRESS NOTE ADULT - SUBJECTIVE AND OBJECTIVE BOX
HPC Transplant Team                                                      Critical / Counseling Time Provided: 30 minutes                                                                                                                                                        Chief Complaint: Autologous peripheral blood stem cell transplant with high dose CBV prep regimen for treatment of peripheral T cell lymphoma         HPC Transplant Team                                                      Critical / Counseling Time Provided: 30 minutes                                                                                                                                                        Chief Complaint: Autologous peripheral blood stem cell transplant with high dose CBV prep regimen for treatment of peripheral T cell lymphoma    S: Patient seen and examined with HPC Transplant Team:   +fatigue  +loose stools  All other ROS negative    O: Vitals:   Vital Signs Last 24 Hrs  T(C): 37.3 (2022 05:10), Max: 37.3 (2022 05:10)  T(F): 99.1 (2022 05:10), Max: 99.1 (2022 05:10)  HR: 77 (2022 05:10) (74 - 104)  BP: 109/64 (2022 05:10) (85/67 - 116/70)  BP(mean): --  RR: 18 (2022 05:10) (18 - 24)  SpO2: 98% (2022 05:10) (97% - 100%)    Parameters below as of 2022 05:10  Patient On (Oxygen Delivery Method): room air      Admit weight:   Daily     Daily Weight in k.5 (2022 10:00)    Intake / Output:    @ : @ 07:00  --------------------------------------------------------  IN: 4932.2 mL / OUT: 4800 mL / NET: 132.2 mL     @ 07: @ 08:31  --------------------------------------------------------  IN: 284.4 mL / OUT: 0 mL / NET: 284.4 mL      PE:   Oropharynx: (+) 1 erythema on bottom lip no ulcerations  Oral Mucositis:            +                                          ndGndrndanddndend:nd nd2nd CVS: S1, S2 RRR   Lungs: CTA throughout bilaterally   Abdomen: + BS x 4, soft, NT, ND   Extremities: no edema   Gastric Mucositis:       -                                           Grade: n/a   Intestinal Mucositis:     -                                         Grade: n/a   Skin: + macular erythema to chest, upper back and neck post kepivance; unchanged   TLC: CDI   Neuro: A&O x 3   Pain: 0      Labs:   CBC Full  -  ( 2022 06:58 )  WBC Count : 0.03 K/uL  Hemoglobin : 8.0 g/dL  Hematocrit : 25.1 %  Platelet Count - Automated : 26 K/uL  Mean Cell Volume : 93.7 fl  Mean Cell Hemoglobin : 29.9 pg  Mean Cell Hemoglobin Concentration : 31.9 gm/dL  Auto Neutrophil # : x  Auto Lymphocyte # : x  Auto Monocyte # : x  Auto Eosinophil # : x  Auto Basophil # : x  Auto Neutrophil % : x  Auto Lymphocyte % : x  Auto Monocyte % : x  Auto Eosinophil % : x  Auto Basophil % : x                          8.0    0.03  )-----------(        ( 2022 06:58 )             25.1         139  |  99  |  12  ----------------------------<  120<H>  3.0<L>   |  28  |  0.49<L>    Ca    8.4      2022 06:57  Phos  3.4       Mg     1.9         TPro  5.7<L>  /  Alb  3.5  /  TBili  0.2  /  DBili  x   /  AST  22  /  ALT  34  /  AlkPhos  70        LIVER FUNCTIONS - ( 2022 06:57 )  Alb: 3.5 g/dL / Pro: 5.7 g/dL / ALK PHOS: 70 U/L / ALT: 34 U/L / AST: 22 U/L / GGT: x           Lactate Dehydrogenase, Serum: 285 U/L ( @ 06:57)      Meds:   Antimicrobials:   acyclovir   Oral Tab/Cap 400 milliGRAM(s) Oral every 8 hours  ciprofloxacin     Tablet 500 milliGRAM(s) Oral every 12 hours  clotrimazole Lozenge 1 Lozenge Oral five times a day  fluconAZOLE   Tablet 400 milliGRAM(s) Oral daily      Heme / Onc:   heparin  Infusion 334 Unit(s)/Hr IV Continuous <Continuous>      GI:  pantoprazole    Tablet 40 milliGRAM(s) Oral before breakfast  polyethylene glycol 3350 17 Gram(s) Oral daily PRN  senna 2 Tablet(s) Oral at bedtime PRN  sodium bicarbonate Mouth Rinse 10 milliLiter(s) Swish and Spit five times a day  ursodiol Capsule 300 milliGRAM(s) Oral two times a day      Cardiovascular:       Immunologic:   filgrastim-sndz (ZARXIO) Injectable 480 MICROGram(s) SubCutaneous every 24 hours      Other medications:   acetaminophen     Tablet .. 650 milliGRAM(s) Oral every 6 hours  Biotene Dry Mouth Oral Rinse 5 milliLiter(s) Swish and Spit five times a day  chlorhexidine 4% Liquid 1 Application(s) Topical <User Schedule>  FIRST- Mouthwash  BLM 10 milliLiter(s) Swish and Spit every 6 hours  FLUoxetine 20 milliGRAM(s) Oral daily  folic acid 1 milliGRAM(s) Oral daily  lidocaine/prilocaine Cream 1 Application(s) Topical daily  loratadine 10 milliGRAM(s) Oral daily  multivitamin 1 Tablet(s) Oral daily  nystatin Powder 1 Application(s) Topical two times a day  palifermin Injectable  (eMAR) 4800 MICROGram(s) IV Push every 24 hours  potassium chloride    Tablet ER 20 milliEquivalent(s) Oral every 2 hours  potassium chloride  20 mEq/100 mL IVPB 20 milliEquivalent(s) IV Intermittent every 2 hours  sodium chloride 0.45% 1000 milliLiter(s) IV Continuous <Continuous>  sodium chloride 0.9%. 1000 milliLiter(s) IV Continuous <Continuous>      PRN:   acetaminophen     Tablet .. 650 milliGRAM(s) Oral every 6 hours PRN  AQUAPHOR (petrolatum Ointment) 1 Application(s) Topical two times a day PRN  metoclopramide Injectable 10 milliGRAM(s) IV Push every 6 hours PRN  polyethylene glycol 3350 17 Gram(s) Oral daily PRN  senna 2 Tablet(s) Oral at bedtime PRN  sodium chloride 0.9% lock flush 10 milliLiter(s) IV Push every 1 hour PRN  zinc oxide 40% Paste 1 Application(s) Topical three times a day PRN    A/P: 57 year old female with a history of  peripheral T cell lymphoma   Pre  :  Autologous PBSCT day +1  - Completed  -16 24 hour continuous infusion, strict I&O, daily weights, prn diuresis    - Cytoxan day 3/4.  - CTX - continue CTX hydration for 24 hours post infusion of last dose. Strict I&O, daily weights, prn diuresis. Start cipro 500mg po BID when ANC < 500    Neutropenic. Started Cipro for prophylaxis. If febrile, panculture and start Cefepime (has tolerated in the past)     1. Infectious Disease:   acyclovir   Oral Tab/Cap 400 milliGRAM(s) Oral every 8 hours  ciprofloxacin     Tablet 500 milliGRAM(s) Oral every 12 hours  clotrimazole Lozenge 1 Lozenge Oral five times a day  fluconAZOLE   Tablet 400 milliGRAM(s) Oral daily    2. VOD Prophylaxis: Actigall, Glutamine, Heparin (dosed at 100 units / kg / day) -  heparin  Infusion 334 Unit(s)/Hr IV Continuous <Continuous>    3. GI Prophylaxis:    pantoprazole    Tablet 40 milliGRAM(s) Oral before breakfast    4. Mouth care - NS / NaHCO3 rinses, Mycelex, Biotene; Skin care     5. GVHD prophylaxis - n/a     6. Transfuse & replete electrolytes prn   hypokalemia- repleted    7. IV hydration, daily weights, strict I&O, prn diuresis     8. PO intake as tolerated, nutrition follow up as needed, MVI, folic acid     9. Antiemetics, anti-diarrhea medications:   LORazepam   Injectable 1 milliGRAM(s) IV Push every 6 hours PRN  metoclopramide Injectable 10 milliGRAM(s) IV Push every 6 hours PRN  aprepitant 80 milliGRAM(s) Oral every 24 hours  dexAMETHasone  IVPB 10 milliGRAM(s) IV Intermittent every 24 hours  LORazepam   Injectable 1 milliGRAM(s) IV Push daily  ondansetron Injectable 8 milliGRAM(s) IV Push every 8 hours    10. OOB as tolerated, physical therapy consult if needed     11. Monitor coags / fibrinogen 2x week, vitamin K as needed     12. Monitor closely for clinical changes, monitor for fevers     13. Emotional support provided, plan of care discussed and questions addressed     14. Patient education done regarding chemotherapy prep, plan of care, restrictions and discharge planning     15. Continue regular social work input      I have written the above note for Dr. Da Silva who performed service with me in the room.   Elizabeth Zendejas [facility] ([phone])    I have seen and examined patient with NP, I agree with above note as scribed.                      HPC Transplant Team                                                      Critical / Counseling Time Provided: 30 minutes                                                                                                                                                        Chief Complaint: Autologous peripheral blood stem cell transplant with high dose CBV prep regimen for treatment of peripheral T cell lymphoma    S: Patient seen and examined with HPC Transplant Team:   +fatigue  +loose stools  All other ROS negative    O: Vitals:   Vital Signs Last 24 Hrs  T(C): 37.3 (26 Nov 2022 05:10), Max: 37.3 (26 Nov 2022 05:10)  T(F): 99.1 (26 Nov 2022 05:10), Max: 99.1 (26 Nov 2022 05:10)  HR: 77 (26 Nov 2022 05:10) (74 - 104)  BP: 109/64 (26 Nov 2022 05:10) (85/67 - 116/70)  BP(mean): --  RR: 18 (26 Nov 2022 05:10) (18 - 24)  SpO2: 98% (26 Nov 2022 05:10) (97% - 100%)    Parameters below as of 26 Nov 2022 05:10  Patient On (Oxygen Delivery Method): room air      Admit weight:   Daily     Today's Weight: 78.1kg    Intake / Output:   11-25 @ 07:01 - 11-26 @ 07:00  --------------------------------------------------------  IN: 4932.2 mL / OUT: 4800 mL / NET: 132.2 mL    11-26 @ 07:01 - 11-26 @ 08:31  --------------------------------------------------------  IN: 284.4 mL / OUT: 0 mL / NET: 284.4 mL      PE:   Oropharynx: (+) 1 erythema on bottom lip no ulcerations  Oral Mucositis:            +                                          ndGndrndanddndend:nd nd2nd CVS: S1, S2 RRR   Lungs: CTA throughout bilaterally   Abdomen: + BS x 4, soft, NT, ND   Extremities: no edema   Gastric Mucositis:       -                                           Grade: n/a   Intestinal Mucositis:     -                                         Grade: n/a   Skin: + macular erythema to chest, upper back and neck post kepivance; unchanged   TLC: CDI   Neuro: A&O x 3   Pain: 0      Labs:   CBC Full  -  ( 26 Nov 2022 06:58 )  WBC Count : 0.03 K/uL  Hemoglobin : 8.0 g/dL  Hematocrit : 25.1 %  Platelet Count - Automated : 26 K/uL  Mean Cell Volume : 93.7 fl  Mean Cell Hemoglobin : 29.9 pg  Mean Cell Hemoglobin Concentration : 31.9 gm/dL  Auto Neutrophil # : x  Auto Lymphocyte # : x  Auto Monocyte # : x  Auto Eosinophil # : x  Auto Basophil # : x  Auto Neutrophil % : x  Auto Lymphocyte % : x  Auto Monocyte % : x  Auto Eosinophil % : x  Auto Basophil % : x                          8.0    0.03  )-----------( 26       ( 26 Nov 2022 06:58 )             25.1     11-26    139  |  99  |  12  ----------------------------<  120<H>  3.0<L>   |  28  |  0.49<L>    Ca    8.4      26 Nov 2022 06:57  Phos  3.4     11-26  Mg     1.9     11-26    TPro  5.7<L>  /  Alb  3.5  /  TBili  0.2  /  DBili  x   /  AST  22  /  ALT  34  /  AlkPhos  70  11-26      LIVER FUNCTIONS - ( 26 Nov 2022 06:57 )  Alb: 3.5 g/dL / Pro: 5.7 g/dL / ALK PHOS: 70 U/L / ALT: 34 U/L / AST: 22 U/L / GGT: x           Lactate Dehydrogenase, Serum: 285 U/L (11-26 @ 06:57)      Meds:   Antimicrobials:   acyclovir   Oral Tab/Cap 400 milliGRAM(s) Oral every 8 hours  ciprofloxacin     Tablet 500 milliGRAM(s) Oral every 12 hours  clotrimazole Lozenge 1 Lozenge Oral five times a day  fluconAZOLE   Tablet 400 milliGRAM(s) Oral daily      Heme / Onc:   heparin  Infusion 334 Unit(s)/Hr IV Continuous <Continuous>      GI:  pantoprazole    Tablet 40 milliGRAM(s) Oral before breakfast  polyethylene glycol 3350 17 Gram(s) Oral daily PRN  senna 2 Tablet(s) Oral at bedtime PRN  sodium bicarbonate Mouth Rinse 10 milliLiter(s) Swish and Spit five times a day  ursodiol Capsule 300 milliGRAM(s) Oral two times a day      Cardiovascular:       Immunologic:   filgrastim-sndz (ZARXIO) Injectable 480 MICROGram(s) SubCutaneous every 24 hours      Other medications:   acetaminophen     Tablet .. 650 milliGRAM(s) Oral every 6 hours  Biotene Dry Mouth Oral Rinse 5 milliLiter(s) Swish and Spit five times a day  chlorhexidine 4% Liquid 1 Application(s) Topical <User Schedule>  FIRST- Mouthwash  BLM 10 milliLiter(s) Swish and Spit every 6 hours  FLUoxetine 20 milliGRAM(s) Oral daily  folic acid 1 milliGRAM(s) Oral daily  lidocaine/prilocaine Cream 1 Application(s) Topical daily  loratadine 10 milliGRAM(s) Oral daily  multivitamin 1 Tablet(s) Oral daily  nystatin Powder 1 Application(s) Topical two times a day  palifermin Injectable  (eMAR) 4800 MICROGram(s) IV Push every 24 hours  potassium chloride    Tablet ER 20 milliEquivalent(s) Oral every 2 hours  potassium chloride  20 mEq/100 mL IVPB 20 milliEquivalent(s) IV Intermittent every 2 hours  sodium chloride 0.45% 1000 milliLiter(s) IV Continuous <Continuous>  sodium chloride 0.9%. 1000 milliLiter(s) IV Continuous <Continuous>      PRN:   acetaminophen     Tablet .. 650 milliGRAM(s) Oral every 6 hours PRN  AQUAPHOR (petrolatum Ointment) 1 Application(s) Topical two times a day PRN  metoclopramide Injectable 10 milliGRAM(s) IV Push every 6 hours PRN  polyethylene glycol 3350 17 Gram(s) Oral daily PRN  senna 2 Tablet(s) Oral at bedtime PRN  sodium chloride 0.9% lock flush 10 milliLiter(s) IV Push every 1 hour PRN  zinc oxide 40% Paste 1 Application(s) Topical three times a day PRN    A/P: 57 year old female with a history of  peripheral T cell lymphoma   Pre  :  Autologous PBSCT day +1  11/17- Completed  -16 24 hour continuous infusion, strict I&O, daily weights, prn diuresis   11/20 - Cytoxan day 3/4.  11/21- CTX 4/4- continue CTX hydration for 24 hours post infusion of last dose. Strict I&O, daily weights, prn diuresis. Start cipro 500mg po BID when ANC < 500   11/23 Neutropenic. Started Cipro for prophylaxis. If febrile, panculture and start Cefepime (has tolerated in the past)     1. Infectious Disease:   acyclovir   Oral Tab/Cap 400 milliGRAM(s) Oral every 8 hours  ciprofloxacin     Tablet 500 milliGRAM(s) Oral every 12 hours  clotrimazole Lozenge 1 Lozenge Oral five times a day  fluconAZOLE   Tablet 400 milliGRAM(s) Oral daily    2. VOD Prophylaxis: Actigall, Glutamine, Heparin (dosed at 100 units / kg / day) -  heparin  Infusion 334 Unit(s)/Hr IV Continuous <Continuous>    3. GI Prophylaxis:    pantoprazole    Tablet 40 milliGRAM(s) Oral before breakfast    4. Mouth care - NS / NaHCO3 rinses, Mycelex, Biotene; Skin care     5. GVHD prophylaxis - n/a     6. Transfuse & replete electrolytes prn   hypokalemia- repleted    7. IV hydration, daily weights, strict I&O, prn diuresis   20mg IVP Lasix x 2    8. PO intake as tolerated, nutrition follow up as needed, MVI, folic acid     9. Antiemetics, anti-diarrhea medications:   LORazepam   Injectable 1 milliGRAM(s) IV Push every 6 hours PRN  metoclopramide Injectable 10 milliGRAM(s) IV Push every 6 hours PRN  aprepitant 80 milliGRAM(s) Oral every 24 hours  dexAMETHasone  IVPB 10 milliGRAM(s) IV Intermittent every 24 hours  LORazepam   Injectable 1 milliGRAM(s) IV Push daily  ondansetron Injectable 8 milliGRAM(s) IV Push every 8 hours    10. OOB as tolerated, physical therapy consult if needed     11. Monitor coags / fibrinogen 2x week, vitamin K as needed     12. Monitor closely for clinical changes, monitor for fevers     13. Emotional support provided, plan of care discussed and questions addressed     14. Patient education done regarding chemotherapy prep, plan of care, restrictions and discharge planning     15. Continue regular social work input      I have written the above note for Dr. Da Silva who performed service with me in the room.   Elizabeth Zendejas [facility] ([phone])    I have seen and examined patient with NP, I agree with above note as scribed.

## 2022-11-27 LAB
ALBUMIN SERPL ELPH-MCNC: 3.8 G/DL — SIGNIFICANT CHANGE UP (ref 3.3–5)
ALP SERPL-CCNC: 82 U/L — SIGNIFICANT CHANGE UP (ref 40–120)
ALT FLD-CCNC: 43 U/L — SIGNIFICANT CHANGE UP (ref 10–45)
ANION GAP SERPL CALC-SCNC: 11 MMOL/L — SIGNIFICANT CHANGE UP (ref 5–17)
AST SERPL-CCNC: 23 U/L — SIGNIFICANT CHANGE UP (ref 10–40)
BILIRUB SERPL-MCNC: 0.3 MG/DL — SIGNIFICANT CHANGE UP (ref 0.2–1.2)
BUN SERPL-MCNC: 13 MG/DL — SIGNIFICANT CHANGE UP (ref 7–23)
CALCIUM SERPL-MCNC: 8.8 MG/DL — SIGNIFICANT CHANGE UP (ref 8.4–10.5)
CHLORIDE SERPL-SCNC: 101 MMOL/L — SIGNIFICANT CHANGE UP (ref 96–108)
CO2 SERPL-SCNC: 27 MMOL/L — SIGNIFICANT CHANGE UP (ref 22–31)
CREAT SERPL-MCNC: 0.43 MG/DL — LOW (ref 0.5–1.3)
EGFR: 115 ML/MIN/1.73M2 — SIGNIFICANT CHANGE UP
GLUCOSE SERPL-MCNC: 121 MG/DL — HIGH (ref 70–99)
HCT VFR BLD CALC: 25.3 % — LOW (ref 34.5–45)
HGB BLD-MCNC: 8 G/DL — LOW (ref 11.5–15.5)
LDH SERPL L TO P-CCNC: 185 U/L — SIGNIFICANT CHANGE UP (ref 50–242)
MAGNESIUM SERPL-MCNC: 1.9 MG/DL — SIGNIFICANT CHANGE UP (ref 1.6–2.6)
MCHC RBC-ENTMCNC: 29.7 PG — SIGNIFICANT CHANGE UP (ref 27–34)
MCHC RBC-ENTMCNC: 31.6 GM/DL — LOW (ref 32–36)
MCV RBC AUTO: 94.1 FL — SIGNIFICANT CHANGE UP (ref 80–100)
NRBC # BLD: 0 /100 WBCS — SIGNIFICANT CHANGE UP (ref 0–0)
PHOSPHATE SERPL-MCNC: 3.4 MG/DL — SIGNIFICANT CHANGE UP (ref 2.5–4.5)
PLATELET # BLD AUTO: 12 K/UL — CRITICAL LOW (ref 150–400)
POTASSIUM SERPL-MCNC: 3.7 MMOL/L — SIGNIFICANT CHANGE UP (ref 3.5–5.3)
POTASSIUM SERPL-SCNC: 3.7 MMOL/L — SIGNIFICANT CHANGE UP (ref 3.5–5.3)
PROT SERPL-MCNC: 6.2 G/DL — SIGNIFICANT CHANGE UP (ref 6–8.3)
RBC # BLD: 2.69 M/UL — LOW (ref 3.8–5.2)
RBC # FLD: 13.2 % — SIGNIFICANT CHANGE UP (ref 10.3–14.5)
SODIUM SERPL-SCNC: 139 MMOL/L — SIGNIFICANT CHANGE UP (ref 135–145)
WBC # BLD: <0.1 K/UL — CRITICAL LOW (ref 3.8–10.5)
WBC # FLD AUTO: <0.1 K/UL — CRITICAL LOW (ref 3.8–10.5)

## 2022-11-27 PROCEDURE — 99233 SBSQ HOSP IP/OBS HIGH 50: CPT

## 2022-11-27 RX ADMIN — PANTOPRAZOLE SODIUM 40 MILLIGRAM(S): 20 TABLET, DELAYED RELEASE ORAL at 06:04

## 2022-11-27 RX ADMIN — FLUCONAZOLE 400 MILLIGRAM(S): 150 TABLET ORAL at 11:04

## 2022-11-27 RX ADMIN — CHLORHEXIDINE GLUCONATE 1 APPLICATION(S): 213 SOLUTION TOPICAL at 09:58

## 2022-11-27 RX ADMIN — NYSTATIN CREAM 1 APPLICATION(S): 100000 CREAM TOPICAL at 17:15

## 2022-11-27 RX ADMIN — LORATADINE 10 MILLIGRAM(S): 10 TABLET ORAL at 11:04

## 2022-11-27 RX ADMIN — Medication 1 LOZENGE: at 20:57

## 2022-11-27 RX ADMIN — Medication 5 MILLILITER(S): at 20:58

## 2022-11-27 RX ADMIN — Medication 10 MILLILITER(S): at 15:47

## 2022-11-27 RX ADMIN — Medication 650 MILLIGRAM(S): at 22:48

## 2022-11-27 RX ADMIN — Medication 10 MILLILITER(S): at 21:03

## 2022-11-27 RX ADMIN — DIPHENHYDRAMINE HYDROCHLORIDE AND LIDOCAINE HYDROCHLORIDE AND ALUMINUM HYDROXIDE AND MAGNESIUM HYDRO 10 MILLILITER(S): KIT at 11:08

## 2022-11-27 RX ADMIN — Medication 5 MILLILITER(S): at 15:47

## 2022-11-27 RX ADMIN — Medication 1 MILLIGRAM(S): at 11:03

## 2022-11-27 RX ADMIN — Medication 400 MILLIGRAM(S): at 14:24

## 2022-11-27 RX ADMIN — Medication 650 MILLIGRAM(S): at 23:18

## 2022-11-27 RX ADMIN — Medication 500 MILLIGRAM(S): at 17:13

## 2022-11-27 RX ADMIN — Medication 20 MILLIGRAM(S): at 11:03

## 2022-11-27 RX ADMIN — LIDOCAINE AND PRILOCAINE CREAM 1 APPLICATION(S): 25; 25 CREAM TOPICAL at 11:08

## 2022-11-27 RX ADMIN — Medication 1 TABLET(S): at 11:03

## 2022-11-27 RX ADMIN — Medication 10 MILLILITER(S): at 11:02

## 2022-11-27 RX ADMIN — Medication 1 LOZENGE: at 00:23

## 2022-11-27 RX ADMIN — Medication 1 LOZENGE: at 15:47

## 2022-11-27 RX ADMIN — DIPHENHYDRAMINE HYDROCHLORIDE AND LIDOCAINE HYDROCHLORIDE AND ALUMINUM HYDROXIDE AND MAGNESIUM HYDRO 10 MILLILITER(S): KIT at 00:23

## 2022-11-27 RX ADMIN — Medication 500 MILLIGRAM(S): at 06:04

## 2022-11-27 RX ADMIN — Medication 400 MILLIGRAM(S): at 06:04

## 2022-11-27 RX ADMIN — DIPHENHYDRAMINE HYDROCHLORIDE AND LIDOCAINE HYDROCHLORIDE AND ALUMINUM HYDROXIDE AND MAGNESIUM HYDRO 10 MILLILITER(S): KIT at 06:09

## 2022-11-27 RX ADMIN — URSODIOL 300 MILLIGRAM(S): 250 TABLET, FILM COATED ORAL at 17:14

## 2022-11-27 RX ADMIN — Medication 1 LOZENGE: at 11:02

## 2022-11-27 RX ADMIN — URSODIOL 300 MILLIGRAM(S): 250 TABLET, FILM COATED ORAL at 06:04

## 2022-11-27 RX ADMIN — Medication 5 MILLILITER(S): at 00:22

## 2022-11-27 RX ADMIN — SODIUM CHLORIDE 20 MILLILITER(S): 9 INJECTION INTRAMUSCULAR; INTRAVENOUS; SUBCUTANEOUS at 06:05

## 2022-11-27 RX ADMIN — Medication 480 MICROGRAM(S): at 12:16

## 2022-11-27 RX ADMIN — Medication 10 MILLILITER(S): at 09:58

## 2022-11-27 RX ADMIN — Medication 10 MILLILITER(S): at 00:23

## 2022-11-27 RX ADMIN — Medication 5 MILLILITER(S): at 09:57

## 2022-11-27 RX ADMIN — Medication 1 LOZENGE: at 09:57

## 2022-11-27 RX ADMIN — HEPARIN SODIUM 3.34 UNIT(S)/HR: 5000 INJECTION INTRAVENOUS; SUBCUTANEOUS at 06:07

## 2022-11-27 RX ADMIN — Medication 400 MILLIGRAM(S): at 21:03

## 2022-11-27 RX ADMIN — DIPHENHYDRAMINE HYDROCHLORIDE AND LIDOCAINE HYDROCHLORIDE AND ALUMINUM HYDROXIDE AND MAGNESIUM HYDRO 10 MILLILITER(S): KIT at 17:14

## 2022-11-27 RX ADMIN — Medication 5 MILLILITER(S): at 11:02

## 2022-11-27 RX ADMIN — NYSTATIN CREAM 1 APPLICATION(S): 100000 CREAM TOPICAL at 06:08

## 2022-11-27 NOTE — PROGRESS NOTE ADULT - NS ATTEND AMEND GEN_ALL_CORE FT
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   Today is Day +1  Problem/Plan -   1:  Problem: Stem cell transplant   ·  Plan: 1. Admit to BMTU   2. TLC placement in IR   3. Day -9 - day -2 - antiemetics   4. Day -9 & day -8, VP16 2400mg/m2 IV x 34 hours (final concentration 0.4mg /mL).   5. Strict I&O, daily weights, prn diuresis   6. After completion of VP16- start CTX hydration (D51/2NS + 10mEq KCl @ 150ml / m2) - continue for 24 hours post infusion of CTX   7. Day -7 - day -4 - CTX 1800 mg / m2 daily over 2 hours. Follow SMA7, mag, phos 6hours post CT . [place]  12mg / kg - hemorrhagic cystitis ppx   8. Day -3 - BCNU 400mg / m2   9. Day -2 - day -1 - rest days  10. Day 0(11/25/22) - HPC transplant. Start Zarxio 480mcg SQ QD, continue through engraftment. Kepivance on days 0, 1 & 2   11. Anxiolytics, antinausea, antidiarrhea medications as needed   12. Lasix PRN while being aggressively hydrated to avoid VOD   13. Nutritional support, pain management.    Problem/Plan - 2:  ·  Problem: Need for prophylactic measure.   ·  Plan: 1. VOD prophylaxis - low dose heparin gtt (dosed at 100 units / kg / day), glutamine supplementation, Actigall BID   2. PCP prophylaxis - Bactrim DS through day -2    3. Antiviral prophylaxis - Acyclovir 400mg po TID to start day -1   4. Antifungal prophylaxis- Diflucan 400 mg po daily.  5. GI prophylaxis - Protonix po QD   6. Antibacterial prophylaxis - when ANC < 500, start Cipro 500mg po BID. If becomes febrile, pan cx, CXR and change Cipro to Cefepime 2g IV q 8 hours. Continue until count recovery  7. Kepivance for prevention of mucositis- days 0, +1, +2  8. Aggressive mouth care and skin care as per protocol. On 11/25, early mucositis on lower lip, no ulcers. Continue topical care.   9. Receiving transfusion and electrolyte support.    11/25-Chemotherapy induced nausea and diarrhea managed with antiemetics and antidiarrheals. Now patient states stool is loose rather than watery.   Continue Cipro, Acyclovir, Diflucan prophylaxis. OOB/ambulate. She received HPC transplant today without complications. Begin Zarxio daily.  11/26/22: continues to have loose stools overnight, this am with epistaxis lasting ~ 5 minutes. To receive platelets. Remains fatigued 57 year old female with peripheral T cell lymphoma s/p CHOEP x 6 admitted for autologous pbsct with high dose CBV prep regimen.   Today is Day +2  Problem/Plan -   1:  Problem: Stem cell transplant   ·  Plan: 1. Admit to BMTU   2. TLC placement in IR   3. Day -9 - day -2 - antiemetics   4. Day -9 & day -8, VP16 2400mg/m2 IV x 34 hours (final concentration 0.4mg /mL).   5. Strict I&O, daily weights, prn diuresis   6. After completion of VP16- start CTX hydration (D51/2NS + 10mEq KCl @ 150ml / m2) - continue for 24 hours post infusion of CTX   7. Day -7 - day -4 - CTX 1800 mg / m2 daily over 2 hours. Follow SMA7, mag, phos 6hours post CT . [place]  12mg / kg - hemorrhagic cystitis ppx   8. Day -3 - BCNU 400mg / m2   9. Day -2 - day -1 - rest days  10. Day 0(11/25/22) - HPC transplant. Start Zarxio 480mcg SQ QD, continue through engraftment. Kepivance on days 0, 1 & 2   11. Anxiolytics, antinausea, antidiarrhea medications as needed   12. Lasix PRN while being aggressively hydrated to avoid VOD   13. Nutritional support, pain management.    Problem/Plan - 2:  ·  Problem: Need for prophylactic measure.   ·  Plan: 1. VOD prophylaxis - low dose heparin gtt (dosed at 100 units / kg / day), glutamine supplementation, Actigall BID   2. PCP prophylaxis - Bactrim DS through day -2    3. Antiviral prophylaxis - Acyclovir 400mg po TID to start day -1   4. Antifungal prophylaxis- Diflucan 400 mg po daily.  5. GI prophylaxis - Protonix po QD   6. Antibacterial prophylaxis - when ANC < 500, start Cipro 500mg po BID. If becomes febrile, pan cx, CXR and change Cipro to Cefepime 2g IV q 8 hours. Continue until count recovery  7. Kepivance for prevention of mucositis- days 0, +1, +2  8. Aggressive mouth care and skin care as per protocol. On 11/25, early mucositis on lower lip, no ulcers. Continue topical care.   9. Receiving transfusion and electrolyte support.    11/25-Chemotherapy induced nausea and diarrhea managed with antiemetics and antidiarrheals. Now patient states stool is loose rather than watery.   Continue Cipro, Acyclovir, Diflucan prophylaxis. OOB/ambulate. She received HPC transplant today without complications. Begin Zarxio daily.  11/26/22: continues to have loose stools overnight, this am with epistaxis lasting ~ 5 minutes. To receive platelets. Remains fatigued

## 2022-11-27 NOTE — PROGRESS NOTE ADULT - NS ATTEND OPT1A GEN_ALL_CORE
History/Exam/Medical decision making
Exam/Medical decision making
Exam/Medical decision making
History/Exam/Medical decision making

## 2022-11-27 NOTE — PROGRESS NOTE ADULT - SUBJECTIVE AND OBJECTIVE BOX
HPC Transplant Team                                                      Critical / Counseling Time Provided: 30 minutes                                                                                                                                                        Chief Complaint: Autologous peripheral blood stem cell transplant with high dose CBV prep regimen for treatment of peripheral T cell lymphoma    S: Patient seen and examined with HPC Transplant Team:   +fatigue  +loose stools  All other ROS negative    O: Vitals:   Vital Signs Last 24 Hrs  T(C): 37.5 (2022 05:38), Max: 37.6 (2022 21:16)  T(F): 99.5 (2022 05:38), Max: 99.7 (2022 21:16)  HR: 82 (2022 05:38) (82 - 96)  BP: 102/65 (2022 05:38) (95/65 - 110/59)  BP(mean): --  RR: 18 (2022 05:38) (18 - 18)  SpO2: 98% (2022 05:38) (97% - 99%)    Parameters below as of 2022 05:38  Patient On (Oxygen Delivery Method): room air        Admit weight:   Daily     Daily Weight in k.1 (2022 09:25)    Intake / Output:    @ 07:01  -   @ 07:00  --------------------------------------------------------  IN: 3274.4 mL / OUT: 3100 mL / NET: 174.4 mL      PE:   Oropharynx: (+) 1 erythema on bottom lip no ulcerations  Oral Mucositis:            +                                          ndGndrndanddndend:nd nd2nd CVS: S1, S2 RRR   Lungs: CTA throughout bilaterally   Abdomen: + BS x 4, soft, NT, ND   Extremities: no edema   Gastric Mucositis:       -                                           Grade: n/a   Intestinal Mucositis:     -                                         Grade: n/a   Skin: + macular erythema to chest, upper back and neck post kepivance; unchanged   TLC: CDI   Neuro: A&O x 3   Pain: 0    Labs:   CBC Full  -  ( 2022 06:58 )  WBC Count : 0.03 K/uL  Hemoglobin : 8.0 g/dL  Hematocrit : 25.1 %  Platelet Count - Automated : 26 K/uL  Mean Cell Volume : 93.7 fl  Mean Cell Hemoglobin : 29.9 pg  Mean Cell Hemoglobin Concentration : 31.9 gm/dL  Auto Neutrophil # : x  Auto Lymphocyte # : x  Auto Monocyte # : x  Auto Eosinophil # : x  Auto Basophil # : x  Auto Neutrophil % : x  Auto Lymphocyte % : x  Auto Monocyte % : x  Auto Eosinophil % : x  Auto Basophil % : x                          8.0    0.03  )-----------(        ( 2022 06:58 )             25.1         x   |  x   |  x   ----------------------------<  x   4.1   |  x   |  x     Ca    8.4      2022 06:57  Phos  3.4       Mg     1.9         TPro  5.7<L>  /  Alb  3.5  /  TBili  0.2  /  DBili  x   /  AST  22  /  ALT  34  /  AlkPhos  70        LIVER FUNCTIONS - ( 2022 06:57 )  Alb: 3.5 g/dL / Pro: 5.7 g/dL / ALK PHOS: 70 U/L / ALT: 34 U/L / AST: 22 U/L / GGT: x             Meds:   Antimicrobials:   acyclovir   Oral Tab/Cap 400 milliGRAM(s) Oral every 8 hours  ciprofloxacin     Tablet 500 milliGRAM(s) Oral every 12 hours  clotrimazole Lozenge 1 Lozenge Oral five times a day  fluconAZOLE   Tablet 400 milliGRAM(s) Oral daily      Heme / Onc:   heparin  Infusion 334 Unit(s)/Hr IV Continuous <Continuous>      GI:  pantoprazole    Tablet 40 milliGRAM(s) Oral before breakfast  polyethylene glycol 3350 17 Gram(s) Oral daily PRN  senna 2 Tablet(s) Oral at bedtime PRN  sodium bicarbonate Mouth Rinse 10 milliLiter(s) Swish and Spit five times a day  ursodiol Capsule 300 milliGRAM(s) Oral two times a day      Cardiovascular:       Immunologic:   filgrastim-sndz (ZARXIO) Injectable 480 MICROGram(s) SubCutaneous every 24 hours      Other medications:   acetaminophen     Tablet .. 650 milliGRAM(s) Oral every 6 hours  Biotene Dry Mouth Oral Rinse 5 milliLiter(s) Swish and Spit five times a day  chlorhexidine 4% Liquid 1 Application(s) Topical <User Schedule>  FIRST- Mouthwash  BLM 10 milliLiter(s) Swish and Spit every 6 hours  FLUoxetine 20 milliGRAM(s) Oral daily  folic acid 1 milliGRAM(s) Oral daily  lidocaine/prilocaine Cream 1 Application(s) Topical daily  loratadine 10 milliGRAM(s) Oral daily  multivitamin 1 Tablet(s) Oral daily  nystatin Powder 1 Application(s) Topical two times a day  palifermin Injectable  (eMAR) 4800 MICROGram(s) IV Push every 24 hours  sodium chloride 0.45% 1000 milliLiter(s) IV Continuous <Continuous>  sodium chloride 0.9%. 1000 milliLiter(s) IV Continuous <Continuous>      PRN:   acetaminophen     Tablet .. 650 milliGRAM(s) Oral every 6 hours PRN  AQUAPHOR (petrolatum Ointment) 1 Application(s) Topical two times a day PRN  metoclopramide Injectable 10 milliGRAM(s) IV Push every 6 hours PRN  polyethylene glycol 3350 17 Gram(s) Oral daily PRN  senna 2 Tablet(s) Oral at bedtime PRN  sodium chloride 0.9% lock flush 10 milliLiter(s) IV Push every 1 hour PRN  zinc oxide 40% Paste 1 Application(s) Topical three times a day PRN      A/P: 57 year old female with a history of  peripheral T cell lymphoma   Pre  :  Autologous PBSCT day +2  - Completed  -16 24 hour continuous infusion, strict I&O, daily weights, prn diuresis    - Cytoxan day 3/.  - CTX - continue CTX hydration for 24 hours post infusion of last dose. Strict I&O, daily weights, prn diuresis. Start cipro 500mg po BID when ANC < 500    Neutropenic. Started Cipro for prophylaxis. If febrile, panculture and start Cefepime (has tolerated in the past)   - Kepivance day 2/3  11/27- Kepivance day 3/3    1. Infectious Disease:   acyclovir   Oral Tab/Cap 400 milliGRAM(s) Oral every 8 hours  ciprofloxacin     Tablet 500 milliGRAM(s) Oral every 12 hours  clotrimazole Lozenge 1 Lozenge Oral five times a day  fluconAZOLE   Tablet 400 milliGRAM(s) Oral daily    2. VOD Prophylaxis: Actigall, Glutamine, Heparin (dosed at 100 units / kg / day) -  heparin  Infusion 334 Unit(s)/Hr IV Continuous <Continuous>    3. GI Prophylaxis:    pantoprazole    Tablet 40 milliGRAM(s) Oral before breakfast    4. Mouth care - NS / NaHCO3 rinses, Mycelex, Biotene; Skin care     5. GVHD prophylaxis - n/a     6. Transfuse & replete electrolytes prn     7. IV hydration, daily weights, strict I&O, prn diuresis     8. PO intake as tolerated, nutrition follow up as needed, MVI, folic acid     9. Antiemetics, anti-diarrhea medications:   LORazepam   Injectable 1 milliGRAM(s) IV Push every 6 hours PRN  metoclopramide Injectable 10 milliGRAM(s) IV Push every 6 hours PRN  aprepitant 80 milliGRAM(s) Oral every 24 hours  dexAMETHasone  IVPB 10 milliGRAM(s) IV Intermittent every 24 hours  LORazepam   Injectable 1 milliGRAM(s) IV Push daily  ondansetron Injectable 8 milliGRAM(s) IV Push every 8 hours    10. OOB as tolerated, physical therapy consult if needed     11. Monitor coags / fibrinogen 2x week, vitamin K as needed     12. Monitor closely for clinical changes, monitor for fevers     13. Emotional support provided, plan of care discussed and questions addressed     14. Patient education done regarding chemotherapy prep, plan of care, restrictions and discharge planning     15. Continue regular social work input        I have written the above note for Dr. Da Silva who performed service with me in the room.   Elizabeth Zendejas [facility] ([phone])    I have seen and examined patient with NP, I agree with above note as scribed.                                 I have written the above note for Dr. Da Silva who performed service with me in the room.   Elizabeth Zendejas [facility] ([phone])    I have seen and examined patient with NP, I agree with above note as scribed.                      HPC Transplant Team                                                      Critical / Counseling Time Provided: 30 minutes                                                                                                                                                        Chief Complaint: Autologous peripheral blood stem cell transplant with high dose CBV prep regimen for treatment of peripheral T cell lymphoma    S: Patient seen and examined with HPC Transplant Team:   +fatigue  +loose stools  All other ROS negative    O: Vitals:   Vital Signs Last 24 Hrs  T(C): 37.5 (2022 05:38), Max: 37.6 (2022 21:16)  T(F): 99.5 (2022 05:38), Max: 99.7 (2022 21:16)  HR: 82 (2022 05:38) (82 - 96)  BP: 102/65 (2022 05:38) (95/65 - 110/59)  BP(mean): --  RR: 18 (2022 05:38) (18 - 18)  SpO2: 98% (2022 05:38) (97% - 99%)    Parameters below as of 2022 05:38  Patient On (Oxygen Delivery Method): room air        Admit weight:   Daily     Daily Weight in k.1 (2022 09:25)    Intake / Output:    @ 07:01  -   @ 07:00  --------------------------------------------------------  IN: 3274.4 mL / OUT: 3100 mL / NET: 174.4 mL      PE:   Oropharynx: (+) 1 erythema on bottom lip no ulcerations  Oral Mucositis:            +                                          ndGndrndanddndend:nd nd2nd CVS: S1, S2 RRR   Lungs: CTA throughout bilaterally   Abdomen: + BS x 4, soft, NT, ND   Extremities: no edema   Gastric Mucositis:       -                                           Grade: n/a   Intestinal Mucositis:     -                                         Grade: n/a   Skin: + macular erythema to chest, upper back and neck post kepivance, worsened from yesterday  TLC: CDI   Neuro: A&O x 3   Pain: 0    Labs:   CBC Full  -  ( 2022 06:58 )  WBC Count : 0.03 K/uL  Hemoglobin : 8.0 g/dL  Hematocrit : 25.1 %  Platelet Count - Automated : 26 K/uL  Mean Cell Volume : 93.7 fl  Mean Cell Hemoglobin : 29.9 pg  Mean Cell Hemoglobin Concentration : 31.9 gm/dL  Auto Neutrophil # : x  Auto Lymphocyte # : x  Auto Monocyte # : x  Auto Eosinophil # : x  Auto Basophil # : x  Auto Neutrophil % : x  Auto Lymphocyte % : x  Auto Monocyte % : x  Auto Eosinophil % : x  Auto Basophil % : x                          8.0    0.03  )-----------(        ( 2022 06:58 )             25.1         x   |  x   |  x   ----------------------------<  x   4.1   |  x   |  x     Ca    8.4      2022 06:57  Phos  3.4       Mg     1.9         TPro  5.7<L>  /  Alb  3.5  /  TBili  0.2  /  DBili  x   /  AST  22  /  ALT  34  /  AlkPhos  70        LIVER FUNCTIONS - ( 2022 06:57 )  Alb: 3.5 g/dL / Pro: 5.7 g/dL / ALK PHOS: 70 U/L / ALT: 34 U/L / AST: 22 U/L / GGT: x             Meds:   Antimicrobials:   acyclovir   Oral Tab/Cap 400 milliGRAM(s) Oral every 8 hours  ciprofloxacin     Tablet 500 milliGRAM(s) Oral every 12 hours  clotrimazole Lozenge 1 Lozenge Oral five times a day  fluconAZOLE   Tablet 400 milliGRAM(s) Oral daily      Heme / Onc:   heparin  Infusion 334 Unit(s)/Hr IV Continuous <Continuous>      GI:  pantoprazole    Tablet 40 milliGRAM(s) Oral before breakfast  polyethylene glycol 3350 17 Gram(s) Oral daily PRN  senna 2 Tablet(s) Oral at bedtime PRN  sodium bicarbonate Mouth Rinse 10 milliLiter(s) Swish and Spit five times a day  ursodiol Capsule 300 milliGRAM(s) Oral two times a day      Cardiovascular:       Immunologic:   filgrastim-sndz (ZARXIO) Injectable 480 MICROGram(s) SubCutaneous every 24 hours      Other medications:   acetaminophen     Tablet .. 650 milliGRAM(s) Oral every 6 hours  Biotene Dry Mouth Oral Rinse 5 milliLiter(s) Swish and Spit five times a day  chlorhexidine 4% Liquid 1 Application(s) Topical <User Schedule>  FIRST- Mouthwash  BLM 10 milliLiter(s) Swish and Spit every 6 hours  FLUoxetine 20 milliGRAM(s) Oral daily  folic acid 1 milliGRAM(s) Oral daily  lidocaine/prilocaine Cream 1 Application(s) Topical daily  loratadine 10 milliGRAM(s) Oral daily  multivitamin 1 Tablet(s) Oral daily  nystatin Powder 1 Application(s) Topical two times a day  palifermin Injectable  (eMAR) 4800 MICROGram(s) IV Push every 24 hours  sodium chloride 0.45% 1000 milliLiter(s) IV Continuous <Continuous>  sodium chloride 0.9%. 1000 milliLiter(s) IV Continuous <Continuous>      PRN:   acetaminophen     Tablet .. 650 milliGRAM(s) Oral every 6 hours PRN  AQUAPHOR (petrolatum Ointment) 1 Application(s) Topical two times a day PRN  metoclopramide Injectable 10 milliGRAM(s) IV Push every 6 hours PRN  polyethylene glycol 3350 17 Gram(s) Oral daily PRN  senna 2 Tablet(s) Oral at bedtime PRN  sodium chloride 0.9% lock flush 10 milliLiter(s) IV Push every 1 hour PRN  zinc oxide 40% Paste 1 Application(s) Topical three times a day PRN      A/P: 57 year old female with a history of  peripheral T cell lymphoma   Pre  :  Autologous PBSCT day +2  - Completed  -16 24 hour continuous infusion, strict I&O, daily weights, prn diuresis    - Cytoxan day 3.  - CTX - continue CTX hydration for 24 hours post infusion of last dose. Strict I&O, daily weights, prn diuresis. Start cipro 500mg po BID when ANC < 500    Neutropenic. Started Cipro for prophylaxis. If febrile, panculture and start Cefepime (has tolerated in the past)   - Kepivance day 2/3  11/27- Kepivance day 3/3- HELD due to Kepivance rash and oral erythema     1. Infectious Disease:   acyclovir   Oral Tab/Cap 400 milliGRAM(s) Oral every 8 hours  ciprofloxacin     Tablet 500 milliGRAM(s) Oral every 12 hours  clotrimazole Lozenge 1 Lozenge Oral five times a day  fluconAZOLE   Tablet 400 milliGRAM(s) Oral daily    2. VOD Prophylaxis: Actigall, Glutamine, Heparin (dosed at 100 units / kg / day) -  heparin  Infusion 334 Unit(s)/Hr IV Continuous <Continuous>    3. GI Prophylaxis:    pantoprazole    Tablet 40 milliGRAM(s) Oral before breakfast    4. Mouth care - NS / NaHCO3 rinses, Mycelex, Biotene; Skin care     5. GVHD prophylaxis - n/a     6. Transfuse & replete electrolytes prn     7. IV hydration, daily weights, strict I&O, prn diuresis     8. PO intake as tolerated, nutrition follow up as needed, MVI, folic acid     9. Antiemetics, anti-diarrhea medications:   LORazepam   Injectable 1 milliGRAM(s) IV Push every 6 hours PRN  metoclopramide Injectable 10 milliGRAM(s) IV Push every 6 hours PRN  aprepitant 80 milliGRAM(s) Oral every 24 hours  dexAMETHasone  IVPB 10 milliGRAM(s) IV Intermittent every 24 hours  LORazepam   Injectable 1 milliGRAM(s) IV Push daily  ondansetron Injectable 8 milliGRAM(s) IV Push every 8 hours    10. OOB as tolerated, physical therapy consult if needed     11. Monitor coags / fibrinogen 2x week, vitamin K as needed     12. Monitor closely for clinical changes, monitor for fevers     13. Emotional support provided, plan of care discussed and questions addressed     14. Patient education done regarding chemotherapy prep, plan of care, restrictions and discharge planning     15. Continue regular social work input        I have written the above note for Dr. Da Silva who performed service with me in the room.   Elizabeth Zendejas [facility] ([phone])    I have seen and examined patient with NP, I agree with above note as scribed.                                 I have written the above note for Dr. Da Silva who performed service with me in the room.   Elizabeth Zendejas [facility] ([phone])    I have seen and examined patient with NP, I agree with above note as scribed.                      HPC Transplant Team                                                      Critical / Counseling Time Provided: 30 minutes                                                                                                                                                        Chief Complaint: Autologous peripheral blood stem cell transplant with high dose CBV prep regimen for treatment of peripheral T cell lymphoma    S: Patient seen and examined with HPC Transplant Team:   +fatigue  +loose stools  All other ROS negative    O: Vitals:   Vital Signs Last 24 Hrs  T(C): 37.5 (27 Nov 2022 05:38), Max: 37.6 (26 Nov 2022 21:16)  T(F): 99.5 (27 Nov 2022 05:38), Max: 99.7 (26 Nov 2022 21:16)  HR: 82 (27 Nov 2022 05:38) (82 - 96)  BP: 102/65 (27 Nov 2022 05:38) (95/65 - 110/59)  BP(mean): --  RR: 18 (27 Nov 2022 05:38) (18 - 18)  SpO2: 98% (27 Nov 2022 05:38) (97% - 99%)    Parameters below as of 27 Nov 2022 05:38  Patient On (Oxygen Delivery Method): room air        Admit weight:   Daily     Today's weight: 76.3kg    Intake / Output:   11-26 @ 07:01  -  11-27 @ 07:00  --------------------------------------------------------  IN: 3274.4 mL / OUT: 3100 mL / NET: 174.4 mL      PE:   Oropharynx: (+) 1 erythema on bottom lip no ulcerations  Oral Mucositis:            +                                          ndGndrndanddndend:nd nd2nd CVS: S1, S2 RRR   Lungs: CTA throughout bilaterally   Abdomen: + BS x 4, soft, NT, ND   Extremities: no edema   Gastric Mucositis:       -                                           Grade: n/a   Intestinal Mucositis:     -                                         Grade: n/a   Skin: + macular erythema to chest, upper back and neck post kepivance, worsened from yesterday  TLC: CDI   Neuro: A&O x 3   Pain: 0    Labs:                         8.0    <0.1  )-----------( 12       ( 27 Nov 2022 07:44 )             25.3     27 Nov 2022 07:44    139    |  101    |  13     ----------------------------<  121    3.7     |  27     |  0.43     Ca    8.8        27 Nov 2022 07:44  Phos  3.4       27 Nov 2022 07:44  Mg     1.9       27 Nov 2022 07:44    TPro  6.2    /  Alb  3.8    /  TBili  0.3    /  DBili  x      /  AST  23     /  ALT  43     /  AlkPhos  82     27 Nov 2022 07:44    LIVER FUNCTIONS - ( 27 Nov 2022 07:44 )  Alb: 3.8 g/dL / Pro: 6.2 g/dL / ALK PHOS: 82 U/L / ALT: 43 U/L / AST: 23 U/L / GGT: x           Meds:   Antimicrobials:   acyclovir   Oral Tab/Cap 400 milliGRAM(s) Oral every 8 hours  ciprofloxacin     Tablet 500 milliGRAM(s) Oral every 12 hours  clotrimazole Lozenge 1 Lozenge Oral five times a day  fluconAZOLE   Tablet 400 milliGRAM(s) Oral daily      Heme / Onc:   heparin  Infusion 334 Unit(s)/Hr IV Continuous <Continuous>      GI:  pantoprazole    Tablet 40 milliGRAM(s) Oral before breakfast  polyethylene glycol 3350 17 Gram(s) Oral daily PRN  senna 2 Tablet(s) Oral at bedtime PRN  sodium bicarbonate Mouth Rinse 10 milliLiter(s) Swish and Spit five times a day  ursodiol Capsule 300 milliGRAM(s) Oral two times a day      Cardiovascular:       Immunologic:   filgrastim-sndz (ZARXIO) Injectable 480 MICROGram(s) SubCutaneous every 24 hours      Other medications:   acetaminophen     Tablet .. 650 milliGRAM(s) Oral every 6 hours  Biotene Dry Mouth Oral Rinse 5 milliLiter(s) Swish and Spit five times a day  chlorhexidine 4% Liquid 1 Application(s) Topical <User Schedule>  FIRST- Mouthwash  BLM 10 milliLiter(s) Swish and Spit every 6 hours  FLUoxetine 20 milliGRAM(s) Oral daily  folic acid 1 milliGRAM(s) Oral daily  lidocaine/prilocaine Cream 1 Application(s) Topical daily  loratadine 10 milliGRAM(s) Oral daily  multivitamin 1 Tablet(s) Oral daily  nystatin Powder 1 Application(s) Topical two times a day  palifermin Injectable  (eMAR) 4800 MICROGram(s) IV Push every 24 hours  sodium chloride 0.45% 1000 milliLiter(s) IV Continuous <Continuous>  sodium chloride 0.9%. 1000 milliLiter(s) IV Continuous <Continuous>      PRN:   acetaminophen     Tablet .. 650 milliGRAM(s) Oral every 6 hours PRN  AQUAPHOR (petrolatum Ointment) 1 Application(s) Topical two times a day PRN  metoclopramide Injectable 10 milliGRAM(s) IV Push every 6 hours PRN  polyethylene glycol 3350 17 Gram(s) Oral daily PRN  senna 2 Tablet(s) Oral at bedtime PRN  sodium chloride 0.9% lock flush 10 milliLiter(s) IV Push every 1 hour PRN  zinc oxide 40% Paste 1 Application(s) Topical three times a day PRN      A/P: 57 year old female with a history of  peripheral T cell lymphoma   Pre  :  Autologous PBSCT day +2  11/17- Completed  -16 24 hour continuous infusion, strict I&O, daily weights, prn diuresis   11/20 - Cytoxan day 3/4.  11/21- CTX 4/4- continue CTX hydration for 24 hours post infusion of last dose. Strict I&O, daily weights, prn diuresis. Start cipro 500mg po BID when ANC < 500   11/23 Neutropenic. Started Cipro for prophylaxis. If febrile, panculture and start Cefepime (has tolerated in the past)   11/26- Kepivance day 2/3  11/27- Kepivance day 3/3- HELD due to Kepivance rash and oral erythema     1. Infectious Disease:   acyclovir   Oral Tab/Cap 400 milliGRAM(s) Oral every 8 hours  ciprofloxacin     Tablet 500 milliGRAM(s) Oral every 12 hours  clotrimazole Lozenge 1 Lozenge Oral five times a day  fluconAZOLE   Tablet 400 milliGRAM(s) Oral daily    2. VOD Prophylaxis: Actigall, Glutamine, Heparin (dosed at 100 units / kg / day) -  heparin  Infusion 334 Unit(s)/Hr IV Continuous <Continuous>    3. GI Prophylaxis:    pantoprazole    Tablet 40 milliGRAM(s) Oral before breakfast    4. Mouth care - NS / NaHCO3 rinses, Mycelex, Biotene; Skin care     5. GVHD prophylaxis - n/a     6. Transfuse & replete electrolytes prn     7. IV hydration, daily weights, strict I&O, prn diuresis     8. PO intake as tolerated, nutrition follow up as needed, MVI, folic acid     9. Antiemetics, anti-diarrhea medications:   LORazepam   Injectable 1 milliGRAM(s) IV Push every 6 hours PRN  metoclopramide Injectable 10 milliGRAM(s) IV Push every 6 hours PRN  aprepitant 80 milliGRAM(s) Oral every 24 hours  dexAMETHasone  IVPB 10 milliGRAM(s) IV Intermittent every 24 hours  LORazepam   Injectable 1 milliGRAM(s) IV Push daily  ondansetron Injectable 8 milliGRAM(s) IV Push every 8 hours    10. OOB as tolerated, physical therapy consult if needed     11. Monitor coags / fibrinogen 2x week, vitamin K as needed     12. Monitor closely for clinical changes, monitor for fevers     13. Emotional support provided, plan of care discussed and questions addressed     14. Patient education done regarding chemotherapy prep, plan of care, restrictions and discharge planning     15. Continue regular social work input        I have written the above note for Dr. Da Silva who performed service with me in the room.   Elizabeth Zendejas [facility] ([phone])    I have seen and examined patient with NP, I agree with above note as scribed.                                 I have written the above note for Dr. Da Silva who performed service with me in the room.   Elizabeth Zendejas [facility] ([phone])    I have seen and examined patient with NP, I agree with above note as scribed.                      HPC Transplant Team                                                      Critical / Counseling Time Provided: 30 minutes                                                                                                                                                        Chief Complaint: Autologous peripheral blood stem cell transplant with high dose CBV prep regimen for treatment of peripheral T cell lymphoma    S: Patient seen and examined with HPC Transplant Team:   +fatigue  +loose stools  All other ROS negative    O: Vitals:   Vital Signs Last 24 Hrs  T(C): 37.5 (27 Nov 2022 05:38), Max: 37.6 (26 Nov 2022 21:16)  T(F): 99.5 (27 Nov 2022 05:38), Max: 99.7 (26 Nov 2022 21:16)  HR: 82 (27 Nov 2022 05:38) (82 - 96)  BP: 102/65 (27 Nov 2022 05:38) (95/65 - 110/59)  BP(mean): --  RR: 18 (27 Nov 2022 05:38) (18 - 18)  SpO2: 98% (27 Nov 2022 05:38) (97% - 99%)    Parameters below as of 27 Nov 2022 05:38  Patient On (Oxygen Delivery Method): room air        Admit weight:   Daily     Today's weight: 76.3kg    Intake / Output:   11-26 @ 07:01  -  11-27 @ 07:00  --------------------------------------------------------  IN: 3274.4 mL / OUT: 3100 mL / NET: 174.4 mL      PE:   Oropharynx: (+) 1 erythema on bottom lip no ulcerations  Oral Mucositis:            +                                          ndGndrndanddndend:nd nd2nd CVS: S1, S2 RRR   Lungs: CTA throughout bilaterally   Abdomen: + BS x 4, soft, NT, ND   Extremities: no edema   Gastric Mucositis:       -                                           Grade: n/a   Intestinal Mucositis:     -                                         Grade: n/a   Skin: + macular erythema to chest, upper back and neck post kepivance, worsened from yesterday  TLC: CDI   Neuro: A&O x 3   Pain: 0    Labs:                         8.0    <0.1  )-----------( 12       ( 27 Nov 2022 07:44 )             25.3     27 Nov 2022 07:44    139    |  101    |  13     ----------------------------<  121    3.7     |  27     |  0.43     Ca    8.8        27 Nov 2022 07:44  Phos  3.4       27 Nov 2022 07:44  Mg     1.9       27 Nov 2022 07:44    TPro  6.2    /  Alb  3.8    /  TBili  0.3    /  DBili  x      /  AST  23     /  ALT  43     /  AlkPhos  82     27 Nov 2022 07:44    LIVER FUNCTIONS - ( 27 Nov 2022 07:44 )  Alb: 3.8 g/dL / Pro: 6.2 g/dL / ALK PHOS: 82 U/L / ALT: 43 U/L / AST: 23 U/L / GGT: x           Meds:   Antimicrobials:   acyclovir   Oral Tab/Cap 400 milliGRAM(s) Oral every 8 hours  ciprofloxacin     Tablet 500 milliGRAM(s) Oral every 12 hours  clotrimazole Lozenge 1 Lozenge Oral five times a day  fluconAZOLE   Tablet 400 milliGRAM(s) Oral daily      Heme / Onc:   heparin  Infusion 334 Unit(s)/Hr IV Continuous <Continuous>      GI:  pantoprazole    Tablet 40 milliGRAM(s) Oral before breakfast  polyethylene glycol 3350 17 Gram(s) Oral daily PRN  senna 2 Tablet(s) Oral at bedtime PRN  sodium bicarbonate Mouth Rinse 10 milliLiter(s) Swish and Spit five times a day  ursodiol Capsule 300 milliGRAM(s) Oral two times a day      Cardiovascular:       Immunologic:   filgrastim-sndz (ZARXIO) Injectable 480 MICROGram(s) SubCutaneous every 24 hours      Other medications:   acetaminophen     Tablet .. 650 milliGRAM(s) Oral every 6 hours  Biotene Dry Mouth Oral Rinse 5 milliLiter(s) Swish and Spit five times a day  chlorhexidine 4% Liquid 1 Application(s) Topical <User Schedule>  FIRST- Mouthwash  BLM 10 milliLiter(s) Swish and Spit every 6 hours  FLUoxetine 20 milliGRAM(s) Oral daily  folic acid 1 milliGRAM(s) Oral daily  lidocaine/prilocaine Cream 1 Application(s) Topical daily  loratadine 10 milliGRAM(s) Oral daily  multivitamin 1 Tablet(s) Oral daily  nystatin Powder 1 Application(s) Topical two times a day  palifermin Injectable  (eMAR) 4800 MICROGram(s) IV Push every 24 hours  sodium chloride 0.45% 1000 milliLiter(s) IV Continuous <Continuous>  sodium chloride 0.9%. 1000 milliLiter(s) IV Continuous <Continuous>      PRN:   acetaminophen     Tablet .. 650 milliGRAM(s) Oral every 6 hours PRN  AQUAPHOR (petrolatum Ointment) 1 Application(s) Topical two times a day PRN  metoclopramide Injectable 10 milliGRAM(s) IV Push every 6 hours PRN  polyethylene glycol 3350 17 Gram(s) Oral daily PRN  senna 2 Tablet(s) Oral at bedtime PRN  sodium chloride 0.9% lock flush 10 milliLiter(s) IV Push every 1 hour PRN  zinc oxide 40% Paste 1 Application(s) Topical three times a day PRN      A/P: 57 year old female with a history of  peripheral T cell lymphoma   Pre  :  Autologous PBSCT day +2  11/17- Completed  -16 24 hour continuous infusion, strict I&O, daily weights, prn diuresis   11/20 - Cytoxan day 3/4.  11/21- CTX 4/4- continue CTX hydration for 24 hours post infusion of last dose. Strict I&O, daily weights, prn diuresis. Start cipro 500mg po BID when ANC < 500   11/23 Neutropenic. Started Cipro for prophylaxis. If febrile, panculture and start Cefepime (has tolerated in the past)   11/26- Kepivance day 2/3  11/27- Kepivance day 3/3- HELD due to Kepivance rash and oral erythema worsening    1. Infectious Disease:   acyclovir   Oral Tab/Cap 400 milliGRAM(s) Oral every 8 hours  ciprofloxacin     Tablet 500 milliGRAM(s) Oral every 12 hours  clotrimazole Lozenge 1 Lozenge Oral five times a day  fluconAZOLE   Tablet 400 milliGRAM(s) Oral daily    2. VOD Prophylaxis: Actigall, Glutamine, Heparin (dosed at 100 units / kg / day) -  heparin  Infusion 334 Unit(s)/Hr IV Continuous <Continuous>    3. GI Prophylaxis:    pantoprazole    Tablet 40 milliGRAM(s) Oral before breakfast    4. Mouth care - NS / NaHCO3 rinses, Mycelex, Biotene; Skin care     5. GVHD prophylaxis - n/a     6. Transfuse & replete electrolytes prn     7. IV hydration, daily weights, strict I&O, prn diuresis     8. PO intake as tolerated, nutrition follow up as needed, MVI, folic acid     9. Antiemetics, anti-diarrhea medications:   LORazepam   Injectable 1 milliGRAM(s) IV Push every 6 hours PRN  metoclopramide Injectable 10 milliGRAM(s) IV Push every 6 hours PRN  aprepitant 80 milliGRAM(s) Oral every 24 hours  dexAMETHasone  IVPB 10 milliGRAM(s) IV Intermittent every 24 hours  LORazepam   Injectable 1 milliGRAM(s) IV Push daily  ondansetron Injectable 8 milliGRAM(s) IV Push every 8 hours    10. OOB as tolerated, physical therapy consult if needed     11. Monitor coags / fibrinogen 2x week, vitamin K as needed     12. Monitor closely for clinical changes, monitor for fevers     13. Emotional support provided, plan of care discussed and questions addressed     14. Patient education done regarding chemotherapy prep, plan of care, restrictions and discharge planning     15. Continue regular social work input        I have written the above note for Dr. Da Silva who performed service with me in the room.   Elizabeth Zendejas [facility] ([phone])    I have seen and examined patient with NP, I agree with above note as scribed.                                 I have written the above note for Dr. Da Silva who performed service with me in the room.   Elizabeth Zendejas [facility] ([phone])    I have seen and examined patient with NP, I agree with above note as scribed.

## 2022-11-28 LAB
ALBUMIN SERPL ELPH-MCNC: 3.8 G/DL — SIGNIFICANT CHANGE UP (ref 3.3–5)
ALP SERPL-CCNC: 81 U/L — SIGNIFICANT CHANGE UP (ref 40–120)
ALT FLD-CCNC: 54 U/L — HIGH (ref 10–45)
ANION GAP SERPL CALC-SCNC: 8 MMOL/L — SIGNIFICANT CHANGE UP (ref 5–17)
APPEARANCE UR: CLEAR — SIGNIFICANT CHANGE UP
APTT BLD: 24.4 SEC — LOW (ref 27.5–35.5)
AST SERPL-CCNC: 30 U/L — SIGNIFICANT CHANGE UP (ref 10–40)
BACTERIA # UR AUTO: NEGATIVE — SIGNIFICANT CHANGE UP
BILIRUB SERPL-MCNC: 0.3 MG/DL — SIGNIFICANT CHANGE UP (ref 0.2–1.2)
BILIRUB UR-MCNC: NEGATIVE — SIGNIFICANT CHANGE UP
BLD GP AB SCN SERPL QL: NEGATIVE — SIGNIFICANT CHANGE UP
BUN SERPL-MCNC: 9 MG/DL — SIGNIFICANT CHANGE UP (ref 7–23)
CALCIUM SERPL-MCNC: 9.1 MG/DL — SIGNIFICANT CHANGE UP (ref 8.4–10.5)
CHLORIDE SERPL-SCNC: 102 MMOL/L — SIGNIFICANT CHANGE UP (ref 96–108)
CO2 SERPL-SCNC: 28 MMOL/L — SIGNIFICANT CHANGE UP (ref 22–31)
COLOR SPEC: YELLOW — SIGNIFICANT CHANGE UP
CREAT SERPL-MCNC: 0.37 MG/DL — LOW (ref 0.5–1.3)
DIFF PNL FLD: NEGATIVE — SIGNIFICANT CHANGE UP
EGFR: 119 ML/MIN/1.73M2 — SIGNIFICANT CHANGE UP
EPI CELLS # UR: 1 /HPF — SIGNIFICANT CHANGE UP
FIBRINOGEN PPP-MCNC: 392 MG/DL — SIGNIFICANT CHANGE UP (ref 200–445)
GLUCOSE SERPL-MCNC: 127 MG/DL — HIGH (ref 70–99)
GLUCOSE UR QL: ABNORMAL
HCT VFR BLD CALC: 22.6 % — LOW (ref 34.5–45)
HGB BLD-MCNC: 7.4 G/DL — LOW (ref 11.5–15.5)
HYALINE CASTS # UR AUTO: 1 /LPF — SIGNIFICANT CHANGE UP (ref 0–2)
INR BLD: 1.13 RATIO — SIGNIFICANT CHANGE UP (ref 0.88–1.16)
KETONES UR-MCNC: NEGATIVE — SIGNIFICANT CHANGE UP
LDH SERPL L TO P-CCNC: 153 U/L — SIGNIFICANT CHANGE UP (ref 50–242)
LEUKOCYTE ESTERASE UR-ACNC: NEGATIVE — SIGNIFICANT CHANGE UP
MAGNESIUM SERPL-MCNC: 2 MG/DL — SIGNIFICANT CHANGE UP (ref 1.6–2.6)
MCHC RBC-ENTMCNC: 30 PG — SIGNIFICANT CHANGE UP (ref 27–34)
MCHC RBC-ENTMCNC: 32.7 GM/DL — SIGNIFICANT CHANGE UP (ref 32–36)
MCV RBC AUTO: 91.5 FL — SIGNIFICANT CHANGE UP (ref 80–100)
NITRITE UR-MCNC: NEGATIVE — SIGNIFICANT CHANGE UP
NRBC # BLD: 0 /100 WBCS — SIGNIFICANT CHANGE UP (ref 0–0)
PH UR: 7.5 — SIGNIFICANT CHANGE UP (ref 5–8)
PHOSPHATE SERPL-MCNC: 3 MG/DL — SIGNIFICANT CHANGE UP (ref 2.5–4.5)
PLATELET # BLD AUTO: 4 K/UL — CRITICAL LOW (ref 150–400)
POTASSIUM SERPL-MCNC: 3.7 MMOL/L — SIGNIFICANT CHANGE UP (ref 3.5–5.3)
POTASSIUM SERPL-SCNC: 3.7 MMOL/L — SIGNIFICANT CHANGE UP (ref 3.5–5.3)
PROT SERPL-MCNC: 6.3 G/DL — SIGNIFICANT CHANGE UP (ref 6–8.3)
PROT UR-MCNC: ABNORMAL
PROTHROM AB SERPL-ACNC: 13 SEC — SIGNIFICANT CHANGE UP (ref 10.5–13.4)
RBC # BLD: 2.47 M/UL — LOW (ref 3.8–5.2)
RBC # FLD: 13.1 % — SIGNIFICANT CHANGE UP (ref 10.3–14.5)
RBC CASTS # UR COMP ASSIST: 6 /HPF — HIGH (ref 0–4)
RH IG SCN BLD-IMP: POSITIVE — SIGNIFICANT CHANGE UP
SODIUM SERPL-SCNC: 138 MMOL/L — SIGNIFICANT CHANGE UP (ref 135–145)
SP GR SPEC: 1.02 — SIGNIFICANT CHANGE UP (ref 1.01–1.02)
UROBILINOGEN FLD QL: NEGATIVE — SIGNIFICANT CHANGE UP
WBC # BLD: 0.02 K/UL — CRITICAL LOW (ref 3.8–10.5)
WBC # FLD AUTO: 0.02 K/UL — CRITICAL LOW (ref 3.8–10.5)
WBC UR QL: 2 /HPF — SIGNIFICANT CHANGE UP (ref 0–5)

## 2022-11-28 PROCEDURE — 71045 X-RAY EXAM CHEST 1 VIEW: CPT | Mod: 26

## 2022-11-28 PROCEDURE — 99291 CRITICAL CARE FIRST HOUR: CPT

## 2022-11-28 RX ORDER — HYDROMORPHONE HYDROCHLORIDE 2 MG/ML
0.5 INJECTION INTRAMUSCULAR; INTRAVENOUS; SUBCUTANEOUS EVERY 6 HOURS
Refills: 0 | Status: DISCONTINUED | OUTPATIENT
Start: 2022-11-28 | End: 2022-11-28

## 2022-11-28 RX ORDER — CEFEPIME 1 G/1
2000 INJECTION, POWDER, FOR SOLUTION INTRAMUSCULAR; INTRAVENOUS EVERY 8 HOURS
Refills: 0 | Status: DISCONTINUED | OUTPATIENT
Start: 2022-11-28 | End: 2022-12-07

## 2022-11-28 RX ORDER — OXYCODONE HYDROCHLORIDE 5 MG/1
5 TABLET ORAL EVERY 6 HOURS
Refills: 0 | Status: DISCONTINUED | OUTPATIENT
Start: 2022-11-28 | End: 2022-11-28

## 2022-11-28 RX ADMIN — Medication 5 MILLILITER(S): at 12:36

## 2022-11-28 RX ADMIN — Medication 650 MILLIGRAM(S): at 06:04

## 2022-11-28 RX ADMIN — CHLORHEXIDINE GLUCONATE 1 APPLICATION(S): 213 SOLUTION TOPICAL at 08:41

## 2022-11-28 RX ADMIN — Medication 5 MILLILITER(S): at 00:06

## 2022-11-28 RX ADMIN — Medication 20 MILLIGRAM(S): at 12:37

## 2022-11-28 RX ADMIN — CEFEPIME 100 MILLIGRAM(S): 1 INJECTION, POWDER, FOR SOLUTION INTRAMUSCULAR; INTRAVENOUS at 21:03

## 2022-11-28 RX ADMIN — Medication 650 MILLIGRAM(S): at 17:31

## 2022-11-28 RX ADMIN — Medication 400 MILLIGRAM(S): at 21:03

## 2022-11-28 RX ADMIN — NYSTATIN CREAM 1 APPLICATION(S): 100000 CREAM TOPICAL at 17:21

## 2022-11-28 RX ADMIN — URSODIOL 300 MILLIGRAM(S): 250 TABLET, FILM COATED ORAL at 17:22

## 2022-11-28 RX ADMIN — Medication 400 MILLIGRAM(S): at 13:16

## 2022-11-28 RX ADMIN — FLUCONAZOLE 400 MILLIGRAM(S): 150 TABLET ORAL at 12:38

## 2022-11-28 RX ADMIN — DIPHENHYDRAMINE HYDROCHLORIDE AND LIDOCAINE HYDROCHLORIDE AND ALUMINUM HYDROXIDE AND MAGNESIUM HYDRO 10 MILLILITER(S): KIT at 06:23

## 2022-11-28 RX ADMIN — HEPARIN SODIUM 3.34 UNIT(S)/HR: 5000 INJECTION INTRAVENOUS; SUBCUTANEOUS at 06:04

## 2022-11-28 RX ADMIN — Medication 1 MILLIGRAM(S): at 12:38

## 2022-11-28 RX ADMIN — Medication 480 MICROGRAM(S): at 17:25

## 2022-11-28 RX ADMIN — Medication 5 MILLILITER(S): at 08:31

## 2022-11-28 RX ADMIN — Medication 400 MILLIGRAM(S): at 06:04

## 2022-11-28 RX ADMIN — Medication 5 MILLILITER(S): at 20:45

## 2022-11-28 RX ADMIN — Medication 1 LOZENGE: at 00:13

## 2022-11-28 RX ADMIN — NYSTATIN CREAM 1 APPLICATION(S): 100000 CREAM TOPICAL at 06:05

## 2022-11-28 RX ADMIN — DIPHENHYDRAMINE HYDROCHLORIDE AND LIDOCAINE HYDROCHLORIDE AND ALUMINUM HYDROXIDE AND MAGNESIUM HYDRO 10 MILLILITER(S): KIT at 00:06

## 2022-11-28 RX ADMIN — Medication 1 LOZENGE: at 16:54

## 2022-11-28 RX ADMIN — Medication 10 MILLILITER(S): at 08:32

## 2022-11-28 RX ADMIN — CEFEPIME 100 MILLIGRAM(S): 1 INJECTION, POWDER, FOR SOLUTION INTRAMUSCULAR; INTRAVENOUS at 06:23

## 2022-11-28 RX ADMIN — LORATADINE 10 MILLIGRAM(S): 10 TABLET ORAL at 12:40

## 2022-11-28 RX ADMIN — Medication 10 MILLILITER(S): at 20:45

## 2022-11-28 RX ADMIN — Medication 1 LOZENGE: at 20:45

## 2022-11-28 RX ADMIN — Medication 650 MILLIGRAM(S): at 06:06

## 2022-11-28 RX ADMIN — PANTOPRAZOLE SODIUM 40 MILLIGRAM(S): 20 TABLET, DELAYED RELEASE ORAL at 06:04

## 2022-11-28 RX ADMIN — CEFEPIME 100 MILLIGRAM(S): 1 INJECTION, POWDER, FOR SOLUTION INTRAMUSCULAR; INTRAVENOUS at 13:16

## 2022-11-28 RX ADMIN — Medication 5 MILLILITER(S): at 16:54

## 2022-11-28 RX ADMIN — Medication 10 MILLILITER(S): at 00:06

## 2022-11-28 RX ADMIN — DIPHENHYDRAMINE HYDROCHLORIDE AND LIDOCAINE HYDROCHLORIDE AND ALUMINUM HYDROXIDE AND MAGNESIUM HYDRO 10 MILLILITER(S): KIT at 13:01

## 2022-11-28 RX ADMIN — SODIUM CHLORIDE 20 MILLILITER(S): 9 INJECTION INTRAMUSCULAR; INTRAVENOUS; SUBCUTANEOUS at 06:05

## 2022-11-28 RX ADMIN — Medication 10 MILLILITER(S): at 16:54

## 2022-11-28 RX ADMIN — Medication 1 LOZENGE: at 12:37

## 2022-11-28 RX ADMIN — DIPHENHYDRAMINE HYDROCHLORIDE AND LIDOCAINE HYDROCHLORIDE AND ALUMINUM HYDROXIDE AND MAGNESIUM HYDRO 10 MILLILITER(S): KIT at 17:22

## 2022-11-28 RX ADMIN — URSODIOL 300 MILLIGRAM(S): 250 TABLET, FILM COATED ORAL at 06:04

## 2022-11-28 RX ADMIN — Medication 10 MILLILITER(S): at 12:36

## 2022-11-28 RX ADMIN — Medication 1 TABLET(S): at 12:39

## 2022-11-28 RX ADMIN — Medication 1 LOZENGE: at 08:31

## 2022-11-28 NOTE — CHART NOTE - NSCHARTNOTEFT_GEN_A_CORE
MEDICINE PA    Notified by RN patient with temperature 100.4 . Seen and examined patient at bedside. Patient is alert, NAD. Denies HA, CP, SOB, cough, N/V, or abd pain.    VITAL SIGNS:  T(C): 38 (11-28-22 @ 05:41), Max: 38 (11-28-22 @ 05:41)  HR: 104 (11-28-22 @ 05:41) (93 - 104)  BP: 117/73 (11-28-22 @ 05:41) (99/65 - 117/73)  RR: 20 (11-28-22 @ 05:41) (18 - 20)  SpO2: 98% (11-28-22 @ 05:41) (96% - 99%)  Wt(kg): --      LABORATORY:                          8.0    <0.1  )-----------( 12       ( 27 Nov 2022 07:44 )             25.3       11-27    139  |  101  |  13  ----------------------------<  121<H>  3.7   |  27  |  0.43<L>    Ca    8.8      27 Nov 2022 07:44  Phos  3.4     11-27  Mg     1.9     11-27    TPro  6.2  /  Alb  3.8  /  TBili  0.3  /  DBili  x   /  AST  23  /  ALT  43  /  AlkPhos  82  11-27          PHYSICAL EXAM:    Constitutional: AOx3. NAD.    Respiratory: clear lungs bilaterally. No wheezing, rhonchi, or crackles.    Cardiovascular: S1 S2. No murmurs.    Gastrointestinal: BS X4 active. soft. nontender.    Extremities/Vascular: +2 pulses bilaterally. No BLE edema.      ASSESSMENT/PLAN:   HPI:  A/P: 57 year old female with a history of  peripheral T cell lymphoma   Pre  :  Autologous PBSCT day +7  Now with fever.      1) ?Engraftment Fever  -tylenol and cooling measures prn for pyrexia  -BC x2, UA/UC  -CXR   -Cipro changed to cefepime per heme onc team direction  -F/U primary team in AM    Debbie Linder PA-C   Department of Medicine  [id number]

## 2022-11-28 NOTE — PROGRESS NOTE ADULT - CRITICAL CARE ATTENDING COMMENT
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   Today is Day +3  Problem/Plan -   1:  Problem: Stem cell transplant   ·  Plan: 1. Admit to BMTU   2. TLC placement in IR   3. Day -9 - day -2 - antiemetics   4. Day -9 & day -8, VP16 2400mg/m2 IV x 34 hours (final concentration 0.4mg /mL).   5. Strict I&O, daily weights, prn diuresis   6. After completion of VP16- start CTX hydration (D51/2NS + 10mEq KCl @ 150ml / m2) - continue for 24 hours post infusion of CTX   7. Day -7 - day -4 - CTX 1800 mg / m2 daily over 2 hours. Follow SMA7, mag, phos 6hours post CT . Mesna  12mg / kg - hemorrhagic cystitis ppx   8. Day -3 - BCNU 400mg / m2   9. Day -2 - day -1 - rest days  10. Day 0(11/25/22) - HPC transplant. Start Zarxio 480mcg SQ QD, continue through engraftment. Kepivance on days 0, 1 & 2   11. Anxiolytics, antinausea, antidiarrhea medications as needed   12. Lasix PRN while being aggressively hydrated to avoid VOD   13. Nutritional support, pain management.    Problem/Plan - 2:  ·  Problem: Need for prophylactic measure.   ·  Plan: 1. VOD prophylaxis - low dose heparin gtt (dosed at 100 units / kg / day), glutamine supplementation, Actigall BID   2. PCP prophylaxis - Bactrim DS through day -2    3. Antiviral prophylaxis - Acyclovir 400mg po TID to start day -1   4. Antifungal prophylaxis- Diflucan 400 mg po daily.  5. GI prophylaxis - Protonix po QD   6. Antibacterial prophylaxis - when ANC < 500, start Cipro 500mg po BID. If becomes febrile, pan cx, CXR and change Cipro to Cefepime 2g IV q 8 hours. Continue until count recovery  7. Kepivance for prevention of mucositis- days 0, +1, +2  8. Aggressive mouth care and skin care as per protocol. On 11/25, early mucositis on lower lip, no ulcers. Continue topical care.   9. Receiving transfusion and electrolyte support.    11/25-Chemotherapy induced nausea and diarrhea managed with antiemetics and antidiarrheals. Now patient states stool is loose rather than watery.   Continue Cipro, Acyclovir, Diflucan prophylaxis. OOB/ambulate. She received HPC transplant today without complications. Begin Zarxio daily.  11/26/22: continues to have loose stools overnight, this am with epistaxis lasting ~ 5 minutes. To receive platelets. Remains fatigued. 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   Today is Day +3  Problem/Plan -   1:  Problem: Stem cell transplant   ·  Plan: 1. Admit to BMTU   2. TLC placement in IR   3. Day -9 - day -2 - antiemetics   4. Day -9 & day -8, VP16 2400mg/m2 IV x 34 hours (final concentration 0.4mg /mL).   5. Strict I&O, daily weights, prn diuresis   6. After completion of VP16- start CTX hydration (D51/2NS + 10mEq KCl @ 150ml / m2) - continue for 24 hours post infusion of CTX   7. Day -7 - day -4 - CTX 1800 mg / m2 daily over 2 hours. Follow SMA7, mag, phos 6hours post CT . Mesna  12mg / kg - hemorrhagic cystitis ppx   8. Day -3 - BCNU 400mg / m2   9. Day -2 - day -1 - rest days  10. Day 0(11/25/22) - HPC transplant. Start Zarxio 480mcg SQ QD, continue through engraftment. Kepivance on days 0, 1 & 2   11. Anxiolytics, antinausea, antidiarrhea medications as needed   12. Lasix PRN while being aggressively hydrated to avoid VOD   13. Nutritional support, pain management.    Problem/Plan - 2:  ·  Problem: Need for prophylactic measure.   ·  Plan: 1. VOD prophylaxis - low dose heparin gtt (dosed at 100 units / kg / day), glutamine supplementation, Actigall BID   2. PCP prophylaxis - Bactrim DS through day -2    3. Antiviral prophylaxis - Acyclovir 400mg po TID to start day -1   4. Antifungal prophylaxis- Diflucan 400 mg po daily.  5. GI prophylaxis - Protonix po QD   6. Antibacterial prophylaxis - when ANC < 500, start Cipro 500mg po BID. If becomes febrile, pan cx, CXR and change Cipro to Cefepime 2g IV q 8 hours. Continue until count recovery  7. Kepivance for prevention of mucositis- days 0, +1, +2  8. Aggressive mouth care and skin care as per protocol. On 11/25, early mucositis on lower lip, no ulcers. Continue topical care.   9. Receiving transfusion and electrolyte support.    11/25-Chemotherapy induced nausea and diarrhea managed with antiemetics and antidiarrheals. Now patient states stool is loose rather than watery.   Continue Cipro, Acyclovir, Diflucan prophylaxis. OOB/ambulate. She received HPC transplant today without complications. Begin Zarxio daily.  11/26/22: continues to have loose stools overnight, this am with epistaxis lasting ~ 5 minutes. To receive platelets. Remains fatigued.  11/28: Neutropenic fevers- cultures sent, CXR; Cefepime started. Continue Acyclovir, Diflucan. Continue Zarxio daily. Grade 2 chemotherapy induced oral mucositis- aggressive mouth care.

## 2022-11-28 NOTE — PROGRESS NOTE ADULT - SUBJECTIVE AND OBJECTIVE BOX
HPC Transplant Team                                                      Critical / Counseling Time Provided: 30 minutes                                                                                                                                                        Chief Complaint: Autologous peripheral blood stem cell transplant with high dose CBV prep regimen for treatment of peripheral T cell lymphoma    S: Patient seen and examined with HPC Transplant Team:   +fatigue  +loose stools  All other ROS negative    O: Vitals:   Vital Signs Last 24 Hrs  T(C): 38 (2022 05:41), Max: 38 (2022 05:41)  T(F): 100.4 (2022 05:41), Max: 100.4 (2022 05:41)  HR: 104 (2022 05:41) (93 - 104)  BP: 117/73 (2022 05:41) (99/65 - 117/73)  BP(mean): --  RR: 20 (2022 05:41) (18 - 20)  SpO2: 98% (2022 05:41) (96% - 99%)    Parameters below as of 2022 05:41  Patient On (Oxygen Delivery Method): room air        Admit weight:   Daily     Daily Weight in k.3 (2022 09:20)    Intake / Output:    @ 07:01  -   @ 07:00  --------------------------------------------------------  IN: 1651.5 mL / OUT: 2150 mL / NET: -498.5 mL      PE:   Oropharynx: (+) 1 erythema on bottom lip no ulcerations  Oral Mucositis:            +                                          ndGndrndanddndend:nd nd2nd CVS: S1, S2 RRR   Lungs: CTA throughout bilaterally   Abdomen: + BS x 4, soft, NT, ND   Extremities: no edema   Gastric Mucositis:       -                                           Grade: n/a   Intestinal Mucositis:     -                                         Grade: n/a   Skin: + macular erythema to chest, upper back and neck post kepivance, worsened from yesterday  TLC: CDI   Neuro: A&O x 3   Pain: 0      Labs:   CBC Full  -  ( 2022 06:55 )  WBC Count : 0.02 K/uL  Hemoglobin : 7.4 g/dL  Hematocrit : 22.6 %  Platelet Count - Automated : 4 K/uL  Mean Cell Volume : 91.5 fl  Mean Cell Hemoglobin : 30.0 pg  Mean Cell Hemoglobin Concentration : 32.7 gm/dL  Auto Neutrophil # : x  Auto Lymphocyte # : x  Auto Monocyte # : x  Auto Eosinophil # : x  Auto Basophil # : x  Auto Neutrophil % : x  Auto Lymphocyte % : x  Auto Monocyte % : x  Auto Eosinophil % : x  Auto Basophil % : x                          7.4    0.02  )-----------( 4        ( 2022 06:55 )             22.6         138  |  102  |  9   ----------------------------<  127<H>  3.7   |  28  |  0.37<L>    Ca    9.1      2022 06:55  Phos  3.0       Mg     2.0         TPro  6.3  /  Alb  3.8  /  TBili  0.3  /  DBili  x   /  AST  30  /  ALT  54<H>  /  AlkPhos  81        LIVER FUNCTIONS - ( 2022 06:55 )  Alb: 3.8 g/dL / Pro: 6.3 g/dL / ALK PHOS: 81 U/L / ALT: 54 U/L / AST: 30 U/L / GGT: x           Lactate Dehydrogenase, Serum: 153 U/L ( @ 06:55)      Cultures:   cultures pending from 22      Radiology:   CXR 22 pending official read - no obvious consolidations       Meds:   Antimicrobials:   acyclovir   Oral Tab/Cap 400 milliGRAM(s) Oral every 8 hours  cefepime   IVPB 2000 milliGRAM(s) IV Intermittent every 8 hours  clotrimazole Lozenge 1 Lozenge Oral five times a day  fluconAZOLE   Tablet 400 milliGRAM(s) Oral daily      Heme / Onc:   heparin  Infusion 334 Unit(s)/Hr IV Continuous <Continuous>      GI:  pantoprazole    Tablet 40 milliGRAM(s) Oral before breakfast  polyethylene glycol 3350 17 Gram(s) Oral daily PRN  senna 2 Tablet(s) Oral at bedtime PRN  sodium bicarbonate Mouth Rinse 10 milliLiter(s) Swish and Spit five times a day  ursodiol Capsule 300 milliGRAM(s) Oral two times a day      Cardiovascular:       Immunologic:   filgrastim-sndz (ZARXIO) Injectable 480 MICROGram(s) SubCutaneous every 24 hours      Other medications:   acetaminophen     Tablet .. 650 milliGRAM(s) Oral every 6 hours  Biotene Dry Mouth Oral Rinse 5 milliLiter(s) Swish and Spit five times a day  chlorhexidine 4% Liquid 1 Application(s) Topical <User Schedule>  FIRST- Mouthwash  BLM 10 milliLiter(s) Swish and Spit every 6 hours  FLUoxetine 20 milliGRAM(s) Oral daily  folic acid 1 milliGRAM(s) Oral daily  lidocaine/prilocaine Cream 1 Application(s) Topical daily  loratadine 10 milliGRAM(s) Oral daily  multivitamin 1 Tablet(s) Oral daily  nystatin Powder 1 Application(s) Topical two times a day  sodium chloride 0.45% 1000 milliLiter(s) IV Continuous <Continuous>  sodium chloride 0.9%. 1000 milliLiter(s) IV Continuous <Continuous>      PRN:   acetaminophen     Tablet .. 650 milliGRAM(s) Oral every 6 hours PRN  AQUAPHOR (petrolatum Ointment) 1 Application(s) Topical two times a day PRN  metoclopramide Injectable 10 milliGRAM(s) IV Push every 6 hours PRN  polyethylene glycol 3350 17 Gram(s) Oral daily PRN  senna 2 Tablet(s) Oral at bedtime PRN  sodium chloride 0.9% lock flush 10 milliLiter(s) IV Push every 1 hour PRN  zinc oxide 40% Paste 1 Application(s) Topical three times a day PRN      A/P: 57 year old female with a history of  peripheral T cell lymphoma   Pre  :  Autologous PBSCT day +3  - Completed  -16 24 hour continuous infusion, strict I&O, daily weights, prn diuresis    - Cytoxan day 3/.  - CTX /- continue CTX hydration for 24 hours post infusion of last dose. Strict I&O, daily weights, prn diuresis. Start cipro 500mg po BID when ANC < 500    Neutropenic. Started Cipro for prophylaxis. If febrile, panculture and start Cefepime (has tolerated in the past)   - Kepivance day 2/3  - Kepivance day 3/3- HELD due to Kepivance rash and oral erythema worsening  - Febrile overnight, tmax 100.4. CXR no obvious consolidations. Cultures pending. Started on cefepime.     1. Infectious Disease:   acyclovir   Oral Tab/Cap 400 milliGRAM(s) Oral every 8 hours  cefepime   IVPB 2000 milliGRAM(s) IV Intermittent every 8 hours  clotrimazole Lozenge 1 Lozenge Oral five times a day  fluconAZOLE   Tablet 400 milliGRAM(s) Oral daily    2. VOD Prophylaxis: Actigall, Glutamine, Heparin (dosed at 100 units / kg / day)     3. GI Prophylaxis:    pantoprazole    Tablet 40 milliGRAM(s) Oral before breakfast    4. Mouthcare - NS / NaHCO3 rinses, Mycelex, Biotene; Skin care     5. GVHD prophylaxis - n/a     6. Transfuse & replete electrolytes prn   1 bag platelets     7. IV hydration, daily weights, strict I&O, prn diuresis     8. PO intake as tolerated, nutrition follow up as needed, MVI, folic acid     9. Antiemetics, anti-diarrhea medications:   metoclopramide Injectable 10 milliGRAM(s) IV Push every 6 hours PRN    10. OOB as tolerated, physical therapy consult if needed     11. Monitor coags / fibrinogen 2x week, vitamin K as needed     12. Monitor closely for clinical changes, monitor for fevers     13. Emotional support provided, plan of care discussed and questions addressed     14. Patient education done regarding  plan of care, restrictions and discharge planning     15. Continue regular social work input     I have written the above note for Dr. Cabrales who performed service with me in the room.   Leena Ibrahim NP-C ([phone])    I have seen and examined patient with NP, I agree with above note as scribed.                    HPC Transplant Team                                                      Critical / Counseling Time Provided: 30 minutes                                                                                                                                                        Chief Complaint: Autologous peripheral blood stem cell transplant with high dose CBV prep regimen for treatment of peripheral T cell lymphoma    S: Patient seen and examined with HPC Transplant Team:   +fatigue  +loose stools  + mouth / throat pain 6/10  All other ROS negative    O: Vitals:   Vital Signs Last 24 Hrs  T(C): 38 (2022 05:41), Max: 38 (2022 05:41)  T(F): 100.4 (2022 05:41), Max: 100.4 (2022 05:41)  HR: 104 (2022 05:41) (93 - 104)  BP: 117/73 (2022 05:41) (99/65 - 117/73)  BP(mean): --  RR: 20 (2022 05:41) (18 - 20)  SpO2: 98% (2022 05:41) (96% - 99%)    Parameters below as of 2022 05:41  Patient On (Oxygen Delivery Method): room air        Admit weight: 80.3kg   Daily Weight in k.3 (2022 09:20)  Today's weight: 76.6kg     Intake / Output:    @ 07:01  -   @ 07:00  --------------------------------------------------------  IN: 1651.5 mL / OUT: 2150 mL / NET: -498.5 mL      PE:   Oropharynx: patchy erythema and small ulceration on hard palate and bilateral buccal mucosa   Oral Mucositis:            +                                          stGstrstastdstest:st st1st CVS: S1, S2 RRR   Lungs: CTA throughout bilaterally   Abdomen: + BS x 4, soft, NT, ND, mildly hyperactive   Extremities: no edema   Gastric Mucositis:       -                                           Grade: n/a   Intestinal Mucositis:     -                                         Grade: n/a   Skin: + macular erythema to chest, upper back and neck post kepivance, worsened from yesterday  TLC: CDI   Neuro: A&O x 3   Pain: 0      Labs:   CBC Full  -  ( 2022 06:55 )  WBC Count : 0.02 K/uL  Hemoglobin : 7.4 g/dL  Hematocrit : 22.6 %  Platelet Count - Automated : 4 K/uL  Mean Cell Volume : 91.5 fl  Mean Cell Hemoglobin : 30.0 pg  Mean Cell Hemoglobin Concentration : 32.7 gm/dL  Auto Neutrophil # : x  Auto Lymphocyte # : x  Auto Monocyte # : x  Auto Eosinophil # : x  Auto Basophil # : x  Auto Neutrophil % : x  Auto Lymphocyte % : x  Auto Monocyte % : x  Auto Eosinophil % : x  Auto Basophil % : x                          7.4    0.02  )-----------( 4        ( 2022 06:55 )             22.6         138  |  102  |  9   ----------------------------<  127<H>  3.7   |  28  |  0.37<L>    Ca    9.1      2022 06:55  Phos  3.0       Mg     2.0         TPro  6.3  /  Alb  3.8  /  TBili  0.3  /  DBili  x   /  AST  30  /  ALT  54<H>  /  AlkPhos  81        LIVER FUNCTIONS - ( 2022 06:55 )  Alb: 3.8 g/dL / Pro: 6.3 g/dL / ALK PHOS: 81 U/L / ALT: 54 U/L / AST: 30 U/L / GGT: x           Lactate Dehydrogenase, Serum: 153 U/L ( @ 06:55)      Cultures:   cultures pending from 22      Radiology:   CXR 22 pending official read - no obvious consolidations       Meds:   Antimicrobials:   acyclovir   Oral Tab/Cap 400 milliGRAM(s) Oral every 8 hours  cefepime   IVPB 2000 milliGRAM(s) IV Intermittent every 8 hours  clotrimazole Lozenge 1 Lozenge Oral five times a day  fluconAZOLE   Tablet 400 milliGRAM(s) Oral daily      Heme / Onc:   heparin  Infusion 334 Unit(s)/Hr IV Continuous <Continuous>      GI:  pantoprazole    Tablet 40 milliGRAM(s) Oral before breakfast  polyethylene glycol 3350 17 Gram(s) Oral daily PRN  senna 2 Tablet(s) Oral at bedtime PRN  sodium bicarbonate Mouth Rinse 10 milliLiter(s) Swish and Spit five times a day  ursodiol Capsule 300 milliGRAM(s) Oral two times a day      Cardiovascular:       Immunologic:   filgrastim-sndz (ZARXIO) Injectable 480 MICROGram(s) SubCutaneous every 24 hours      Other medications:   acetaminophen     Tablet .. 650 milliGRAM(s) Oral every 6 hours  Biotene Dry Mouth Oral Rinse 5 milliLiter(s) Swish and Spit five times a day  chlorhexidine 4% Liquid 1 Application(s) Topical <User Schedule>  FIRST- Mouthwash  BLM 10 milliLiter(s) Swish and Spit every 6 hours  FLUoxetine 20 milliGRAM(s) Oral daily  folic acid 1 milliGRAM(s) Oral daily  lidocaine/prilocaine Cream 1 Application(s) Topical daily  loratadine 10 milliGRAM(s) Oral daily  multivitamin 1 Tablet(s) Oral daily  nystatin Powder 1 Application(s) Topical two times a day  sodium chloride 0.45% 1000 milliLiter(s) IV Continuous <Continuous>  sodium chloride 0.9%. 1000 milliLiter(s) IV Continuous <Continuous>      PRN:   acetaminophen     Tablet .. 650 milliGRAM(s) Oral every 6 hours PRN  AQUAPHOR (petrolatum Ointment) 1 Application(s) Topical two times a day PRN  metoclopramide Injectable 10 milliGRAM(s) IV Push every 6 hours PRN  polyethylene glycol 3350 17 Gram(s) Oral daily PRN  senna 2 Tablet(s) Oral at bedtime PRN  sodium chloride 0.9% lock flush 10 milliLiter(s) IV Push every 1 hour PRN  zinc oxide 40% Paste 1 Application(s) Topical three times a day PRN      A/P: 57 year old female with a history of  peripheral T cell lymphoma   Pre  :  Autologous PBSCT day +3  - Completed  -16 24 hour continuous infusion, strict I&O, daily weights, prn diuresis    - Cytoxan day 3/.  - CTX /- continue CTX hydration for 24 hours post infusion of last dose. Strict I&O, daily weights, prn diuresis. Start cipro 500mg po BID when ANC < 500    Neutropenic. Started Cipro for prophylaxis. If febrile, panculture and start Cefepime (has tolerated in the past)   - Kepivance day 2/3  11/27- Kepivance day 3/3- HELD due to Kepivance rash and oral erythema worsening  - Febrile overnight, tmax 100.4. CXR no obvious consolidations. Cultures pending. Started on cefepime. Grade 2 chemotherapy induced oral mucositis. Continue supportive measures.     1. Infectious Disease:   acyclovir   Oral Tab/Cap 400 milliGRAM(s) Oral every 8 hours  cefepime   IVPB 2000 milliGRAM(s) IV Intermittent every 8 hours  clotrimazole Lozenge 1 Lozenge Oral five times a day  fluconAZOLE   Tablet 400 milliGRAM(s) Oral daily    2. VOD Prophylaxis: Actigall, Glutamine, Heparin (dosed at 100 units / kg / day)     3. GI Prophylaxis:    pantoprazole    Tablet 40 milliGRAM(s) Oral before breakfast    4. Mouthcare - NS / NaHCO3 rinses, Mycelex, Biotene; Skin care     5. GVHD prophylaxis - n/a     6. Transfuse & replete electrolytes prn   1 bag platelets     7. IV hydration, daily weights, strict I&O, prn diuresis     8. PO intake as tolerated, nutrition follow up as needed, MVI, folic acid     9. Antiemetics, anti-diarrhea medications:   metoclopramide Injectable 10 milliGRAM(s) IV Push every 6 hours PRN    10. OOB as tolerated, physical therapy consult if needed     11. Monitor coags / fibrinogen 2x week, vitamin K as needed     12. Monitor closely for clinical changes, monitor for fevers     13. Emotional support provided, plan of care discussed and questions addressed     14. Patient education done regarding  plan of care, restrictions and discharge planning     15. Continue regular social work input     I have written the above note for Dr. Cabrales who performed service with me in the room.   Leena Ibrahim NP-C ([phone])    I have seen and examined patient with NP, I agree with above note as scribed.                    HPC Transplant Team                                                      Critical / Counseling Time Provided: 30 minutes                                                                                                                                                        Chief Complaint: Autologous peripheral blood stem cell transplant with high dose CBV prep regimen for treatment of peripheral T cell lymphoma    S: Patient seen and examined with HPC Transplant Team:   +fatigue  +loose stools  + mouth / throat pain 6/10  All other ROS negative    O: Vitals:   Vital Signs Last 24 Hrs  T(C): 38 (2022 05:41), Max: 38 (2022 05:41)  T(F): 100.4 (2022 05:41), Max: 100.4 (2022 05:41)  HR: 104 (2022 05:41) (93 - 104)  BP: 117/73 (2022 05:41) (99/65 - 117/73)  BP(mean): --  RR: 20 (2022 05:41) (18 - 20)  SpO2: 98% (2022 05:41) (96% - 99%)    Parameters below as of 2022 05:41  Patient On (Oxygen Delivery Method): room air        Admit weight: 80.3kg   Daily Weight in k.3 (2022 09:20)  Today's weight: 76.6kg     Intake / Output:    @ 07:01  -   @ 07:00  --------------------------------------------------------  IN: 1651.5 mL / OUT: 2150 mL / NET: -498.5 mL      PE:   Oropharynx: patchy erythema and small ulcerations on hard palate and bilateral buccal mucosa   Oral Mucositis:            +                                          stGstrstastdstest:st st1st CVS: S1, S2 RRR   Lungs: CTA throughout bilaterally   Abdomen: + BS x 4, soft, NT, ND, mildly hyperactive   Extremities: no edema   Gastric Mucositis:       -                                           Grade: n/a   Intestinal Mucositis:     -                                         Grade: n/a   Skin: + macular erythema to chest, upper back and neck post kepivance, worsened from yesterday  TLC: CDI   Neuro: A&O x 3   Pain: 6/10(mouth/throat)      Labs:   CBC Full  -  ( 2022 06:55 )  WBC Count : 0.02 K/uL  Hemoglobin : 7.4 g/dL  Hematocrit : 22.6 %  Platelet Count - Automated : 4 K/uL  Mean Cell Volume : 91.5 fl  Mean Cell Hemoglobin : 30.0 pg  Mean Cell Hemoglobin Concentration : 32.7 gm/dL  Auto Neutrophil # : x  Auto Lymphocyte # : x  Auto Monocyte # : x  Auto Eosinophil # : x  Auto Basophil # : x  Auto Neutrophil % : x  Auto Lymphocyte % : x  Auto Monocyte % : x  Auto Eosinophil % : x  Auto Basophil % : x                          7.4    0.02  )-----------( 4        ( 2022 06:55 )             22.6         138  |  102  |  9   ----------------------------<  127<H>  3.7   |  28  |  0.37<L>    Ca    9.1      2022 06:55  Phos  3.0       Mg     2.0         TPro  6.3  /  Alb  3.8  /  TBili  0.3  /  DBili  x   /  AST  30  /  ALT  54<H>  /  AlkPhos  81        LIVER FUNCTIONS - ( 2022 06:55 )  Alb: 3.8 g/dL / Pro: 6.3 g/dL / ALK PHOS: 81 U/L / ALT: 54 U/L / AST: 30 U/L / GGT: x           Lactate Dehydrogenase, Serum: 153 U/L ( @ 06:55)      Cultures:   cultures pending from 22      Radiology:   CXR 22 pending official read - no obvious consolidations       Meds:   Antimicrobials:   acyclovir   Oral Tab/Cap 400 milliGRAM(s) Oral every 8 hours  cefepime   IVPB 2000 milliGRAM(s) IV Intermittent every 8 hours  clotrimazole Lozenge 1 Lozenge Oral five times a day  fluconAZOLE   Tablet 400 milliGRAM(s) Oral daily      Heme / Onc:   heparin  Infusion 334 Unit(s)/Hr IV Continuous <Continuous>      GI:  pantoprazole    Tablet 40 milliGRAM(s) Oral before breakfast  polyethylene glycol 3350 17 Gram(s) Oral daily PRN  senna 2 Tablet(s) Oral at bedtime PRN  sodium bicarbonate Mouth Rinse 10 milliLiter(s) Swish and Spit five times a day  ursodiol Capsule 300 milliGRAM(s) Oral two times a day      Cardiovascular:       Immunologic:   filgrastim-sndz (ZARXIO) Injectable 480 MICROGram(s) SubCutaneous every 24 hours      Other medications:   acetaminophen     Tablet .. 650 milliGRAM(s) Oral every 6 hours  Biotene Dry Mouth Oral Rinse 5 milliLiter(s) Swish and Spit five times a day  chlorhexidine 4% Liquid 1 Application(s) Topical <User Schedule>  FIRST- Mouthwash  BLM 10 milliLiter(s) Swish and Spit every 6 hours  FLUoxetine 20 milliGRAM(s) Oral daily  folic acid 1 milliGRAM(s) Oral daily  lidocaine/prilocaine Cream 1 Application(s) Topical daily  loratadine 10 milliGRAM(s) Oral daily  multivitamin 1 Tablet(s) Oral daily  nystatin Powder 1 Application(s) Topical two times a day  sodium chloride 0.45% 1000 milliLiter(s) IV Continuous <Continuous>  sodium chloride 0.9%. 1000 milliLiter(s) IV Continuous <Continuous>      PRN:   acetaminophen     Tablet .. 650 milliGRAM(s) Oral every 6 hours PRN  AQUAPHOR (petrolatum Ointment) 1 Application(s) Topical two times a day PRN  metoclopramide Injectable 10 milliGRAM(s) IV Push every 6 hours PRN  polyethylene glycol 3350 17 Gram(s) Oral daily PRN  senna 2 Tablet(s) Oral at bedtime PRN  sodium chloride 0.9% lock flush 10 milliLiter(s) IV Push every 1 hour PRN  zinc oxide 40% Paste 1 Application(s) Topical three times a day PRN      A/P: 57 year old female with a history of  peripheral T cell lymphoma   Post  :  Autologous PBSCT day +3  - Completed  -16 24 hour continuous infusion, strict I&O, daily weights, prn diuresis    - Cytoxan day 3/.  - CTX - continue CTX hydration for 24 hours post infusion of last dose. Strict I&O, daily weights, prn diuresis. Start cipro 500mg po BID when ANC < 500    Neutropenic. Started Cipro for prophylaxis. If febrile, panculture and start Cefepime (has tolerated in the past)   - Kepivance day 2/3  11/27- Kepivance day 3/3- HELD due to Kepivance rash and oral erythema worsening  - Febrile overnight, tmax 100.4. CXR no obvious consolidations. Cultures pending. Started on cefepime. Grade 2 chemotherapy induced oral mucositis. Continue supportive measures.     1. Infectious Disease:   acyclovir   Oral Tab/Cap 400 milliGRAM(s) Oral every 8 hours  cefepime   IVPB 2000 milliGRAM(s) IV Intermittent every 8 hours  clotrimazole Lozenge 1 Lozenge Oral five times a day  fluconAZOLE   Tablet 400 milliGRAM(s) Oral daily    2. VOD Prophylaxis: Actigall, Glutamine, Heparin (dosed at 100 units / kg / day)     3. GI Prophylaxis:    pantoprazole    Tablet 40 milliGRAM(s) Oral before breakfast    4. Mouthcare - NS / NaHCO3 rinses, Mycelex, Biotene; Skin care     5. GVHD prophylaxis - n/a     6. Transfuse & replete electrolytes prn   1 bag platelets     7. IV hydration, daily weights, strict I&O, prn diuresis     8. PO intake as tolerated, nutrition follow up as needed, MVI, folic acid     9. Antiemetics, anti-diarrhea medications:   metoclopramide Injectable 10 milliGRAM(s) IV Push every 6 hours PRN    10. OOB as tolerated, physical therapy consult if needed     11. Monitor coags / fibrinogen 2x week, vitamin K as needed     12. Monitor closely for clinical changes, monitor for fevers     13. Emotional support provided, plan of care discussed and questions addressed     14. Patient education done regarding  plan of care, restrictions and discharge planning     15. Continue regular social work input     I have written the above note for Dr. Cabrales who performed service with me in the room.   Leena Ibrahim NP-C ([phone])    I have seen and examined patient with NP, I agree with above note as scribed.

## 2022-11-29 LAB
ALBUMIN SERPL ELPH-MCNC: 3.7 G/DL — SIGNIFICANT CHANGE UP (ref 3.3–5)
ALP SERPL-CCNC: 83 U/L — SIGNIFICANT CHANGE UP (ref 40–120)
ALT FLD-CCNC: 61 U/L — HIGH (ref 10–45)
ANION GAP SERPL CALC-SCNC: 10 MMOL/L — SIGNIFICANT CHANGE UP (ref 5–17)
AST SERPL-CCNC: 28 U/L — SIGNIFICANT CHANGE UP (ref 10–40)
BILIRUB SERPL-MCNC: 0.3 MG/DL — SIGNIFICANT CHANGE UP (ref 0.2–1.2)
BUN SERPL-MCNC: 9 MG/DL — SIGNIFICANT CHANGE UP (ref 7–23)
CALCIUM SERPL-MCNC: 9 MG/DL — SIGNIFICANT CHANGE UP (ref 8.4–10.5)
CHLORIDE SERPL-SCNC: 103 MMOL/L — SIGNIFICANT CHANGE UP (ref 96–108)
CO2 SERPL-SCNC: 26 MMOL/L — SIGNIFICANT CHANGE UP (ref 22–31)
CREAT SERPL-MCNC: 0.38 MG/DL — LOW (ref 0.5–1.3)
CULTURE RESULTS: SIGNIFICANT CHANGE UP
EGFR: 118 ML/MIN/1.73M2 — SIGNIFICANT CHANGE UP
GLUCOSE SERPL-MCNC: 112 MG/DL — HIGH (ref 70–99)
HCT VFR BLD CALC: 21.3 % — LOW (ref 34.5–45)
HGB BLD-MCNC: 6.8 G/DL — CRITICAL LOW (ref 11.5–15.5)
LDH SERPL L TO P-CCNC: 138 U/L — SIGNIFICANT CHANGE UP (ref 50–242)
MAGNESIUM SERPL-MCNC: 2.2 MG/DL — SIGNIFICANT CHANGE UP (ref 1.6–2.6)
MCHC RBC-ENTMCNC: 29.7 PG — SIGNIFICANT CHANGE UP (ref 27–34)
MCHC RBC-ENTMCNC: 31.9 GM/DL — LOW (ref 32–36)
MCV RBC AUTO: 93 FL — SIGNIFICANT CHANGE UP (ref 80–100)
NRBC # BLD: 0 /100 WBCS — SIGNIFICANT CHANGE UP (ref 0–0)
PHOSPHATE SERPL-MCNC: 3.2 MG/DL — SIGNIFICANT CHANGE UP (ref 2.5–4.5)
PLATELET # BLD AUTO: 22 K/UL — LOW (ref 150–400)
POTASSIUM SERPL-MCNC: 3.7 MMOL/L — SIGNIFICANT CHANGE UP (ref 3.5–5.3)
POTASSIUM SERPL-SCNC: 3.7 MMOL/L — SIGNIFICANT CHANGE UP (ref 3.5–5.3)
PROT SERPL-MCNC: 6.3 G/DL — SIGNIFICANT CHANGE UP (ref 6–8.3)
RBC # BLD: 2.29 M/UL — LOW (ref 3.8–5.2)
RBC # FLD: 12.6 % — SIGNIFICANT CHANGE UP (ref 10.3–14.5)
SODIUM SERPL-SCNC: 139 MMOL/L — SIGNIFICANT CHANGE UP (ref 135–145)
SPECIMEN SOURCE: SIGNIFICANT CHANGE UP
WBC # BLD: 0.02 K/UL — CRITICAL LOW (ref 3.8–10.5)
WBC # FLD AUTO: 0.02 K/UL — CRITICAL LOW (ref 3.8–10.5)

## 2022-11-29 PROCEDURE — 99291 CRITICAL CARE FIRST HOUR: CPT

## 2022-11-29 RX ORDER — SODIUM CHLORIDE 0.65 %
1 AEROSOL, SPRAY (ML) NASAL
Refills: 0 | Status: DISCONTINUED | OUTPATIENT
Start: 2022-11-29 | End: 2022-12-16

## 2022-11-29 RX ADMIN — Medication 400 MILLIGRAM(S): at 14:16

## 2022-11-29 RX ADMIN — CEFEPIME 100 MILLIGRAM(S): 1 INJECTION, POWDER, FOR SOLUTION INTRAMUSCULAR; INTRAVENOUS at 22:03

## 2022-11-29 RX ADMIN — Medication 650 MILLIGRAM(S): at 02:08

## 2022-11-29 RX ADMIN — Medication 400 MILLIGRAM(S): at 22:03

## 2022-11-29 RX ADMIN — Medication 10 MILLILITER(S): at 16:44

## 2022-11-29 RX ADMIN — DIPHENHYDRAMINE HYDROCHLORIDE AND LIDOCAINE HYDROCHLORIDE AND ALUMINUM HYDROXIDE AND MAGNESIUM HYDRO 10 MILLILITER(S): KIT at 00:40

## 2022-11-29 RX ADMIN — Medication 1 TABLET(S): at 12:59

## 2022-11-29 RX ADMIN — Medication 10 MILLILITER(S): at 08:56

## 2022-11-29 RX ADMIN — Medication 1 LOZENGE: at 20:31

## 2022-11-29 RX ADMIN — Medication 5 MILLILITER(S): at 20:31

## 2022-11-29 RX ADMIN — PANTOPRAZOLE SODIUM 40 MILLIGRAM(S): 20 TABLET, DELAYED RELEASE ORAL at 05:38

## 2022-11-29 RX ADMIN — Medication 1 MILLIGRAM(S): at 12:59

## 2022-11-29 RX ADMIN — CEFEPIME 100 MILLIGRAM(S): 1 INJECTION, POWDER, FOR SOLUTION INTRAMUSCULAR; INTRAVENOUS at 14:16

## 2022-11-29 RX ADMIN — CEFEPIME 100 MILLIGRAM(S): 1 INJECTION, POWDER, FOR SOLUTION INTRAMUSCULAR; INTRAVENOUS at 05:37

## 2022-11-29 RX ADMIN — Medication 650 MILLIGRAM(S): at 01:00

## 2022-11-29 RX ADMIN — Medication 5 MILLILITER(S): at 12:58

## 2022-11-29 RX ADMIN — DIPHENHYDRAMINE HYDROCHLORIDE AND LIDOCAINE HYDROCHLORIDE AND ALUMINUM HYDROXIDE AND MAGNESIUM HYDRO 10 MILLILITER(S): KIT at 05:38

## 2022-11-29 RX ADMIN — Medication 10 MILLILITER(S): at 00:40

## 2022-11-29 RX ADMIN — Medication 10 MILLILITER(S): at 20:30

## 2022-11-29 RX ADMIN — Medication 1 LOZENGE: at 08:54

## 2022-11-29 RX ADMIN — Medication 5 MILLILITER(S): at 08:55

## 2022-11-29 RX ADMIN — Medication 1 LOZENGE: at 12:58

## 2022-11-29 RX ADMIN — CHLORHEXIDINE GLUCONATE 1 APPLICATION(S): 213 SOLUTION TOPICAL at 08:56

## 2022-11-29 RX ADMIN — Medication 480 MICROGRAM(S): at 17:38

## 2022-11-29 RX ADMIN — DIPHENHYDRAMINE HYDROCHLORIDE AND LIDOCAINE HYDROCHLORIDE AND ALUMINUM HYDROXIDE AND MAGNESIUM HYDRO 10 MILLILITER(S): KIT at 17:36

## 2022-11-29 RX ADMIN — Medication 5 MILLILITER(S): at 00:40

## 2022-11-29 RX ADMIN — Medication 10 MILLILITER(S): at 12:58

## 2022-11-29 RX ADMIN — URSODIOL 300 MILLIGRAM(S): 250 TABLET, FILM COATED ORAL at 05:38

## 2022-11-29 RX ADMIN — Medication 1 LOZENGE: at 16:43

## 2022-11-29 RX ADMIN — Medication 400 MILLIGRAM(S): at 05:38

## 2022-11-29 RX ADMIN — Medication 5 MILLILITER(S): at 16:43

## 2022-11-29 RX ADMIN — URSODIOL 300 MILLIGRAM(S): 250 TABLET, FILM COATED ORAL at 17:37

## 2022-11-29 RX ADMIN — NYSTATIN CREAM 1 APPLICATION(S): 100000 CREAM TOPICAL at 17:37

## 2022-11-29 RX ADMIN — LORATADINE 10 MILLIGRAM(S): 10 TABLET ORAL at 13:00

## 2022-11-29 RX ADMIN — Medication 1 LOZENGE: at 00:40

## 2022-11-29 RX ADMIN — DIPHENHYDRAMINE HYDROCHLORIDE AND LIDOCAINE HYDROCHLORIDE AND ALUMINUM HYDROXIDE AND MAGNESIUM HYDRO 10 MILLILITER(S): KIT at 12:59

## 2022-11-29 RX ADMIN — Medication 20 MILLIGRAM(S): at 13:00

## 2022-11-29 RX ADMIN — NYSTATIN CREAM 1 APPLICATION(S): 100000 CREAM TOPICAL at 05:38

## 2022-11-29 RX ADMIN — FLUCONAZOLE 400 MILLIGRAM(S): 150 TABLET ORAL at 13:00

## 2022-11-29 NOTE — PROGRESS NOTE ADULT - SUBJECTIVE AND OBJECTIVE BOX
HPC Transplant Team                                                      Critical / Counseling Time Provided: 30 minutes                                                                                                                                                        Chief Complaint: Autologous peripheral blood stem cell transplant with high dose CBV prep regimen for treatment of peripheral T cell lymphoma    S: Patient seen and examined with HPC Transplant Team:   +fatigue  +loose stools  + mouth / throat pain 6/10  All other ROS negative    O: Vitals:   Vital Signs Last 24 Hrs  T(C): 37.1 (2022 05:25), Max: 38.4 (2022 18:21)  T(F): 98.8 (2022 05:25), Max: 101.1 (2022 18:21)  HR: 88 (2022 05:25) (88 - 110)  BP: 103/68 (2022 05:25) (103/68 - 116/75)  BP(mean): --  RR: 18 (2022 05:25) (18 - 18)  SpO2: 98% (2022 05:25) (97% - 100%)    Parameters below as of 2022 05:25  Patient On (Oxygen Delivery Method): room air        Admit weight:   Daily     Daily Weight in k.6 (2022 09:20)    Intake / Output:    @ 07:01  -   @ 07:00  --------------------------------------------------------  IN:  mL / OUT: 2850 mL / NET: -839 mL    PE:   Oropharynx: patchy erythema and small ulceration on hard palate and bilateral buccal mucosa   Oral Mucositis:            +                                          stGstrstastdstest:st st1st CVS: S1, S2 RRR   Lungs: CTA throughout bilaterally   Abdomen: + BS x 4, soft, NT, ND, mildly hyperactive   Extremities: no edema   Gastric Mucositis:       -                                           Grade: n/a   Intestinal Mucositis:     -                                         Grade: n/a   Skin: + macular erythema to chest, upper back and neck post kepivance, worsened from yesterday  TLC: CDI   Neuro: A&O x 3   Pain: 0      Labs:                     Cultures:   cultures pending from 22      Radiology:   CXR 22 pending official read - no obvious consolidations     Meds:   Antimicrobials:   acyclovir   Oral Tab/Cap 400 milliGRAM(s) Oral every 8 hours  cefepime   IVPB 2000 milliGRAM(s) IV Intermittent every 8 hours  clotrimazole Lozenge 1 Lozenge Oral five times a day  fluconAZOLE   Tablet 400 milliGRAM(s) Oral daily      Heme / Onc:   heparin  Infusion 334 Unit(s)/Hr IV Continuous <Continuous>      GI:  pantoprazole    Tablet 40 milliGRAM(s) Oral before breakfast  polyethylene glycol 3350 17 Gram(s) Oral daily PRN  senna 2 Tablet(s) Oral at bedtime PRN  sodium bicarbonate Mouth Rinse 10 milliLiter(s) Swish and Spit five times a day  ursodiol Capsule 300 milliGRAM(s) Oral two times a day      Cardiovascular:       Immunologic:   filgrastim-sndz (ZARXIO) Injectable 480 MICROGram(s) SubCutaneous every 24 hours      Other medications:   acetaminophen     Tablet .. 650 milliGRAM(s) Oral every 6 hours  Biotene Dry Mouth Oral Rinse 5 milliLiter(s) Swish and Spit five times a day  chlorhexidine 4% Liquid 1 Application(s) Topical <User Schedule>  FIRST- Mouthwash  BLM 10 milliLiter(s) Swish and Spit every 6 hours  FLUoxetine 20 milliGRAM(s) Oral daily  folic acid 1 milliGRAM(s) Oral daily  lidocaine/prilocaine Cream 1 Application(s) Topical daily  loratadine 10 milliGRAM(s) Oral daily  multivitamin 1 Tablet(s) Oral daily  nystatin Powder 1 Application(s) Topical two times a day  sodium chloride 0.45% 1000 milliLiter(s) IV Continuous <Continuous>  sodium chloride 0.9%. 1000 milliLiter(s) IV Continuous <Continuous>      PRN:   acetaminophen     Tablet .. 650 milliGRAM(s) Oral every 6 hours PRN  AQUAPHOR (petrolatum Ointment) 1 Application(s) Topical two times a day PRN  HYDROmorphone  Injectable 0.5 milliGRAM(s) IV Push every 6 hours PRN  metoclopramide Injectable 10 milliGRAM(s) IV Push every 6 hours PRN  oxyCODONE    IR 5 milliGRAM(s) Oral every 6 hours PRN  polyethylene glycol 3350 17 Gram(s) Oral daily PRN  senna 2 Tablet(s) Oral at bedtime PRN  sodium chloride 0.9% lock flush 10 milliLiter(s) IV Push every 1 hour PRN  zinc oxide 40% Paste 1 Application(s) Topical three times a day PRN    A/P: 57 year old female with a history of  peripheral T cell lymphoma   Pre  :  Autologous PBSCT day +4  - Completed  -16 24 hour continuous infusion, strict I&O, daily weights, prn diuresis    - Cytoxan day 3/.  - CTX - continue CTX hydration for 24 hours post infusion of last dose. Strict I&O, daily weights, prn diuresis. Start cipro 500mg po BID when ANC < 500    Neutropenic. Started Cipro for prophylaxis. If febrile, panculture and start Cefepime (has tolerated in the past)   - Kepivance day 2/3  11/27- Kepivance day 3/3- HELD due to Kepivance rash and oral erythema worsening  - Febrile overnight, tmax 100.4. CXR no obvious consolidations. Cultures pending. Started on cefepime. Grade 2 chemotherapy induced oral mucositis. Continue supportive measures.     1. Infectious Disease:   acyclovir   Oral Tab/Cap 400 milliGRAM(s) Oral every 8 hours  cefepime   IVPB 2000 milliGRAM(s) IV Intermittent every 8 hours  clotrimazole Lozenge 1 Lozenge Oral five times a day  fluconAZOLE   Tablet 400 milliGRAM(s) Oral daily    2. VOD Prophylaxis: Actigall, Glutamine, Heparin (dosed at 100 units / kg / day)     3. GI Prophylaxis:    pantoprazole    Tablet 40 milliGRAM(s) Oral before breakfast    4. Mouthcare - NS / NaHCO3 rinses, Mycelex, Biotene; Skin care     5. GVHD prophylaxis - n/a     6. Transfuse & replete electrolytes prn     7. IV hydration, daily weights, strict I&O, prn diuresis     8. PO intake as tolerated, nutrition follow up as needed, MVI, folic acid     9. Antiemetics, anti-diarrhea medications:   metoclopramide Injectable 10 milliGRAM(s) IV Push every 6 hours PRN    10. OOB as tolerated, physical therapy consult if needed     11. Monitor coags / fibrinogen 2x week, vitamin K as needed     12. Monitor closely for clinical changes, monitor for fevers     13. Emotional support provided, plan of care discussed and questions addressed     14. Patient education done regarding  plan of care, restrictions and discharge planning     15. Continue regular social work input     I have written the above note for Dr. Cabrales who performed service with me in the room.   Leena Ibrahim NP-C ([phone])    I have seen and examined patient with NP, I agree with above note as scribed.                              HPC Transplant Team                                                      Critical / Counseling Time Provided: 30 minutes                                                                                                                                                        Chief Complaint: Autologous peripheral blood stem cell transplant with high dose CBV prep regimen for treatment of peripheral T cell lymphoma    S: Patient seen and examined with HPC Transplant Team:   +fatigue  +loose stools  + mouth / throat pain 6/10  All other ROS negative    O: Vitals:   Vital Signs Last 24 Hrs  T(C): 37.1 (2022 05:25), Max: 38.4 (2022 18:21)  T(F): 98.8 (2022 05:25), Max: 101.1 (2022 18:21)  HR: 88 (2022 05:25) (88 - 110)  BP: 103/68 (2022 05:25) (103/68 - 116/75)  BP(mean): --  RR: 18 (2022 05:25) (18 - 18)  SpO2: 98% (2022 05:25) (97% - 100%)    Parameters below as of 2022 05:25  Patient On (Oxygen Delivery Method): room air        Admit weight:   Daily     Daily Weight in k.6 (2022 09:20)    Intake / Output:    @ 07:01  -   @ 07:00  --------------------------------------------------------  IN:  mL / OUT: 2850 mL / NET: -839 mL    PE:   Oropharynx: patchy erythema and small ulceration on hard palate and bilateral buccal mucosa   Oral Mucositis:            +                                          rdGrdrrdarddrderd:rd rd3rd CVS: S1, S2 RRR   Lungs: CTA throughout bilaterally   Abdomen: + BS x 4, soft, NT, ND, mildly hyperactive   Extremities: no edema   Gastric Mucositis:       -                                           Grade: n/a   Intestinal Mucositis:     -                                         Grade: n/a   Skin: + macular erythema to chest, upper back and neck post kepivance, worsened from yesterday  TLC: CDI   Neuro: A&O x 3   Pain: 0      Labs:                  6.8    0.02  )-----------( 22       ( 2022 07:19 )             21.3     2022 07:20    139    |  103    |  9      ----------------------------<  112    3.7     |  26     |  0.38     Ca    9.0        2022 07:20  Phos  3.2       2022 07:20  Mg     2.2       2022 07:20    TPro  6.3    /  Alb  3.7    /  TBili  0.3    /  DBili  x      /  AST  28     /  ALT  61     /  AlkPhos  83     2022 07:20    PT/INR - ( 2022 07:04 )   PT: 13.0 sec;   INR: 1.13 ratio         PTT - ( 2022 07:04 )  PTT:24.4 sec  CAPILLARY BLOOD GLUCOSE        LIVER FUNCTIONS - ( 2022 07:20 )  Alb: 3.7 g/dL / Pro: 6.3 g/dL / ALK PHOS: 83 U/L / ALT: 61 U/L / AST: 28 U/L / GGT: x           Urinalysis Basic - ( 2022 08:17 )  Color: Yellow / Appearance: Clear / S.016 / pH: x  Gluc: x / Ketone: Negative  / Bili: Negative / Urobili: Negative   Blood: x / Protein: Trace / Nitrite: Negative   Leuk Esterase: Negative / RBC: 6 /hpf / WBC 2 /HPF   Sq Epi: x / Non Sq Epi: 1 /hpf / Bacteria: Negative      Cultures:   cultures pending from 22      Radiology:   CXR 22 pending official read - no obvious consolidations     Meds:   Antimicrobials:   acyclovir   Oral Tab/Cap 400 milliGRAM(s) Oral every 8 hours  cefepime   IVPB 2000 milliGRAM(s) IV Intermittent every 8 hours  clotrimazole Lozenge 1 Lozenge Oral five times a day  fluconAZOLE   Tablet 400 milliGRAM(s) Oral daily      Heme / Onc:   heparin  Infusion 334 Unit(s)/Hr IV Continuous <Continuous>      GI:  pantoprazole    Tablet 40 milliGRAM(s) Oral before breakfast  polyethylene glycol 3350 17 Gram(s) Oral daily PRN  senna 2 Tablet(s) Oral at bedtime PRN  sodium bicarbonate Mouth Rinse 10 milliLiter(s) Swish and Spit five times a day  ursodiol Capsule 300 milliGRAM(s) Oral two times a day      Cardiovascular:       Immunologic:   filgrastim-sndz (ZARXIO) Injectable 480 MICROGram(s) SubCutaneous every 24 hours      Other medications:   acetaminophen     Tablet .. 650 milliGRAM(s) Oral every 6 hours  Biotene Dry Mouth Oral Rinse 5 milliLiter(s) Swish and Spit five times a day  chlorhexidine 4% Liquid 1 Application(s) Topical <User Schedule>  FIRST- Mouthwash  BLM 10 milliLiter(s) Swish and Spit every 6 hours  FLUoxetine 20 milliGRAM(s) Oral daily  folic acid 1 milliGRAM(s) Oral daily  lidocaine/prilocaine Cream 1 Application(s) Topical daily  loratadine 10 milliGRAM(s) Oral daily  multivitamin 1 Tablet(s) Oral daily  nystatin Powder 1 Application(s) Topical two times a day  sodium chloride 0.45% 1000 milliLiter(s) IV Continuous <Continuous>  sodium chloride 0.9%. 1000 milliLiter(s) IV Continuous <Continuous>      PRN:   acetaminophen     Tablet .. 650 milliGRAM(s) Oral every 6 hours PRN  AQUAPHOR (petrolatum Ointment) 1 Application(s) Topical two times a day PRN  HYDROmorphone  Injectable 0.5 milliGRAM(s) IV Push every 6 hours PRN  metoclopramide Injectable 10 milliGRAM(s) IV Push every 6 hours PRN  oxyCODONE    IR 5 milliGRAM(s) Oral every 6 hours PRN  polyethylene glycol 3350 17 Gram(s) Oral daily PRN  senna 2 Tablet(s) Oral at bedtime PRN  sodium chloride 0.9% lock flush 10 milliLiter(s) IV Push every 1 hour PRN  zinc oxide 40% Paste 1 Application(s) Topical three times a day PRN    A/P: 57 year old female with a history of  peripheral T cell lymphoma   Pre  :  Autologous PBSCT day +4  - Completed  -16 24 hour continuous infusion, strict I&O, daily weights, prn diuresis    - Cytoxan day 3/.  - CTX - continue CTX hydration for 24 hours post infusion of last dose. Strict I&O, daily weights, prn diuresis. Start cipro 500mg po BID when ANC < 500    Neutropenic. Started Cipro for prophylaxis. If febrile, panculture and start Cefepime (has tolerated in the past)   - Kepivance day 2/3  - Kepivance day 3/3- HELD due to Kepivance rash and oral erythema worsening  - Febrile overnight, tmax 100.4. CXR no obvious consolidations. Cultures pending. Started on cefepime. Grade 2 chemotherapy induced oral mucositis. Continue supportive measures.     1. Infectious Disease:   acyclovir   Oral Tab/Cap 400 milliGRAM(s) Oral every 8 hours  cefepime   IVPB 2000 milliGRAM(s) IV Intermittent every 8 hours  clotrimazole Lozenge 1 Lozenge Oral five times a day  fluconAZOLE   Tablet 400 milliGRAM(s) Oral daily    2. VOD Prophylaxis: Actigall, Glutamine, Heparin (dosed at 100 units / kg / day)     3. GI Prophylaxis:    pantoprazole    Tablet 40 milliGRAM(s) Oral before breakfast    4. Mouthcare - NS / NaHCO3 rinses, Mycelex, Biotene; Skin care     5. GVHD prophylaxis - n/a     6. Transfuse & replete electrolytes prn     7. IV hydration, daily weights, strict I&O, prn diuresis     8. PO intake as tolerated, nutrition follow up as needed, MVI, folic acid     9. Antiemetics, anti-diarrhea medications:   metoclopramide Injectable 10 milliGRAM(s) IV Push every 6 hours PRN    10. OOB as tolerated, physical therapy consult if needed     11. Monitor coags / fibrinogen 2x week, vitamin K as needed     12. Monitor closely for clinical changes, monitor for fevers     13. Emotional support provided, plan of care discussed and questions addressed     14. Patient education done regarding  plan of care, restrictions and discharge planning     15. Continue regular social work input     I have written the above note for Dr. Cabrales who performed service with me in the room.   Leena Ibrahim NP-C ([phone])    I have seen and examined patient with NP, I agree with above note as scribed.                              HPC Transplant Team                                                      Critical / Counseling Time Provided: 30 minutes                                                                                                                                                        Chief Complaint: Autologous peripheral blood stem cell transplant with high dose CBV prep regimen for treatment of peripheral T cell lymphoma    S: Patient seen and examined with HPC Transplant Team:   +fatigue  +loose stools  + mouth / throat pain 6/10  All other ROS negative    O: Vitals:   Vital Signs Last 24 Hrs  T(C): 37.1 (2022 05:25), Max: 38.4 (2022 18:21)  T(F): 98.8 (2022 05:25), Max: 101.1 (2022 18:21)  HR: 88 (2022 05:25) (88 - 110)  BP: 103/68 (2022 05:25) (103/68 - 116/75)  BP(mean): --  RR: 18 (2022 05:25) (18 - 18)  SpO2: 98% (2022 05:25) (97% - 100%)    Parameters below as of 2022 05:25  Patient On (Oxygen Delivery Method): room air        Admit weight:   Daily     Today's Weight in k.9kg    Intake / Output:    @ 07:01  -   @ 07:00  --------------------------------------------------------  IN:  mL / OUT: 2850 mL / NET: -839 mL    PE:   Oropharynx: patchy erythema and small ulceration on hard palate and bilateral buccal mucosa   Oral Mucositis:            +                                          stGstrstastdstest:st st1st CVS: S1, S2 RRR   Lungs: CTA throughout bilaterally   Abdomen: + BS x 4, soft, NT, ND, mildly hyperactive   Extremities: no edema   Gastric Mucositis:       -                                           Grade: n/a   Intestinal Mucositis:     -                                         Grade: n/a   Skin: + macular erythema to chest, upper back and neck post kepivance, worsened from yesterday  TLC: CDI   Neuro: A&O x 3   Pain: 0      Labs:                  6.8    0.02  )-----------( 22       ( 2022 07:19 )             21.3     2022 07:20    139    |  103    |  9      ----------------------------<  112    3.7     |  26     |  0.38     Ca    9.0        2022 07:20  Phos  3.2       2022 07:20  Mg     2.2       2022 07:20    TPro  6.3    /  Alb  3.7    /  TBili  0.3    /  DBili  x      /  AST  28     /  ALT  61     /  AlkPhos  83     2022 07:20    PT/INR - ( 2022 07:04 )   PT: 13.0 sec;   INR: 1.13 ratio         PTT - ( 2022 07:04 )  PTT:24.4 sec  CAPILLARY BLOOD GLUCOSE        LIVER FUNCTIONS - ( 2022 07:20 )  Alb: 3.7 g/dL / Pro: 6.3 g/dL / ALK PHOS: 83 U/L / ALT: 61 U/L / AST: 28 U/L / GGT: x           Urinalysis Basic - ( 2022 08:17 )  Color: Yellow / Appearance: Clear / S.016 / pH: x  Gluc: x / Ketone: Negative  / Bili: Negative / Urobili: Negative   Blood: x / Protein: Trace / Nitrite: Negative   Leuk Esterase: Negative / RBC: 6 /hpf / WBC 2 /HPF   Sq Epi: x / Non Sq Epi: 1 /hpf / Bacteria: Negative      Cultures:   cultures pending from 22      Radiology:   CXR 22 pending official read - no obvious consolidations     Meds:   Antimicrobials:   acyclovir   Oral Tab/Cap 400 milliGRAM(s) Oral every 8 hours  cefepime   IVPB 2000 milliGRAM(s) IV Intermittent every 8 hours  clotrimazole Lozenge 1 Lozenge Oral five times a day  fluconAZOLE   Tablet 400 milliGRAM(s) Oral daily      Heme / Onc:   heparin  Infusion 334 Unit(s)/Hr IV Continuous <Continuous>      GI:  pantoprazole    Tablet 40 milliGRAM(s) Oral before breakfast  polyethylene glycol 3350 17 Gram(s) Oral daily PRN  senna 2 Tablet(s) Oral at bedtime PRN  sodium bicarbonate Mouth Rinse 10 milliLiter(s) Swish and Spit five times a day  ursodiol Capsule 300 milliGRAM(s) Oral two times a day      Cardiovascular:       Immunologic:   filgrastim-sndz (ZARXIO) Injectable 480 MICROGram(s) SubCutaneous every 24 hours      Other medications:   acetaminophen     Tablet .. 650 milliGRAM(s) Oral every 6 hours  Biotene Dry Mouth Oral Rinse 5 milliLiter(s) Swish and Spit five times a day  chlorhexidine 4% Liquid 1 Application(s) Topical <User Schedule>  FIRST- Mouthwash  BLM 10 milliLiter(s) Swish and Spit every 6 hours  FLUoxetine 20 milliGRAM(s) Oral daily  folic acid 1 milliGRAM(s) Oral daily  lidocaine/prilocaine Cream 1 Application(s) Topical daily  loratadine 10 milliGRAM(s) Oral daily  multivitamin 1 Tablet(s) Oral daily  nystatin Powder 1 Application(s) Topical two times a day  sodium chloride 0.45% 1000 milliLiter(s) IV Continuous <Continuous>  sodium chloride 0.9%. 1000 milliLiter(s) IV Continuous <Continuous>      PRN:   acetaminophen     Tablet .. 650 milliGRAM(s) Oral every 6 hours PRN  AQUAPHOR (petrolatum Ointment) 1 Application(s) Topical two times a day PRN  HYDROmorphone  Injectable 0.5 milliGRAM(s) IV Push every 6 hours PRN  metoclopramide Injectable 10 milliGRAM(s) IV Push every 6 hours PRN  oxyCODONE    IR 5 milliGRAM(s) Oral every 6 hours PRN  polyethylene glycol 3350 17 Gram(s) Oral daily PRN  senna 2 Tablet(s) Oral at bedtime PRN  sodium chloride 0.9% lock flush 10 milliLiter(s) IV Push every 1 hour PRN  zinc oxide 40% Paste 1 Application(s) Topical three times a day PRN    A/P: 57 year old female with a history of  peripheral T cell lymphoma   Pre  :  Autologous PBSCT day +4  - Completed  -16 24 hour continuous infusion, strict I&O, daily weights, prn diuresis    - Cytoxan day 3/.  - CTX - continue CTX hydration for 24 hours post infusion of last dose. Strict I&O, daily weights, prn diuresis. Start cipro 500mg po BID when ANC < 500    Neutropenic. Started Cipro for prophylaxis. If febrile, panculture and start Cefepime (has tolerated in the past)   - Kepivance day 2/3  - Kepivance day 3/3- HELD due to Kepivance rash and oral erythema worsening  - Febrile overnight, tmax 100.4. CXR no obvious consolidations. Cultures pending. Started on cefepime. Grade 2 chemotherapy induced oral mucositis. Continue supportive measures.     1. Infectious Disease:   acyclovir   Oral Tab/Cap 400 milliGRAM(s) Oral every 8 hours  cefepime   IVPB 2000 milliGRAM(s) IV Intermittent every 8 hours  clotrimazole Lozenge 1 Lozenge Oral five times a day  fluconAZOLE   Tablet 400 milliGRAM(s) Oral daily    2. VOD Prophylaxis: Actigall, Glutamine, Heparin (dosed at 100 units / kg / day)     3. GI Prophylaxis:    pantoprazole    Tablet 40 milliGRAM(s) Oral before breakfast    4. Mouthcare - NS / NaHCO3 rinses, Mycelex, Biotene; Skin care     5. GVHD prophylaxis - n/a     6. Transfuse & replete electrolytes prn     7. IV hydration, daily weights, strict I&O, prn diuresis     8. PO intake as tolerated, nutrition follow up as needed, MVI, folic acid     9. Antiemetics, anti-diarrhea medications:   metoclopramide Injectable 10 milliGRAM(s) IV Push every 6 hours PRN    10. OOB as tolerated, physical therapy consult if needed     11. Monitor coags / fibrinogen 2x week, vitamin K as needed     12. Monitor closely for clinical changes, monitor for fevers     13. Emotional support provided, plan of care discussed and questions addressed     14. Patient education done regarding  plan of care, restrictions and discharge planning     15. Continue regular social work input     I have written the above note for Dr. Cabrales who performed service with me in the room.   Leena Ibrahim NP-C ([phone])    I have seen and examined patient with NP, I agree with above note as scribed.                              HPC Transplant Team                                                      Critical / Counseling Time Provided: 30 minutes                                                                                                                                                        Chief Complaint: Autologous peripheral blood stem cell transplant with high dose CBV prep regimen for treatment of peripheral T cell lymphoma    S: Patient seen and examined with HPC Transplant Team:   +fatigue  +loose stools  + mouth / throat pain 3/10- improving  All other ROS negative    O: Vitals:   Vital Signs Last 24 Hrs  T(C): 37.1 (2022 05:25), Max: 38.4 (2022 18:21)  T(F): 98.8 (2022 05:25), Max: 101.1 (2022 18:21)  HR: 88 (2022 05:25) (88 - 110)  BP: 103/68 (2022 05:25) (103/68 - 116/75)  BP(mean): --  RR: 18 (2022 05:25) (18 - 18)  SpO2: 98% (2022 05:25) (97% - 100%)    Parameters below as of 2022 05:25  Patient On (Oxygen Delivery Method): room air    Admit weight:   Daily     Today's Weight in k.9kg    Intake / Output:    @ 07:01  -   @ 07:00  --------------------------------------------------------  IN:  mL / OUT: 2850 mL / NET: -839 mL    PE:   Oropharynx: patchy erythema on hard palate, and small ulceration on hard palate and bilateral buccal mucosa, small ulcers on each side of the tongue  Oral Mucositis:            +                                          rdGrdrrdarddrderd:rd rd3rd CVS: S1, S2 RRR   Lungs: CTA throughout bilaterally   Abdomen: + BS x 4 normoactive, soft, NT, ND  Extremities: no edema   Gastric Mucositis:       -                                           Grade: n/a   Intestinal Mucositis:     -                                         Grade: n/a   Skin: + macular erythema to chest, upper back and neck post Kepivance, improving  TLC: CDI   Neuro: A&O x 3   Pain: 3/10- mouth, improving      Labs:                  6.8    0.02  )-----------( 22       ( 2022 07:19 )             21.3     2022 07:20    139    |  103    |  9      ----------------------------<  112    3.7     |  26     |  0.38     Ca    9.0        2022 07:20  Phos  3.2       2022 07:20  Mg     2.2       2022 07:20    TPro  6.3    /  Alb  3.7    /  TBili  0.3    /  DBili  x      /  AST  28     /  ALT  61     /  AlkPhos  83     2022 07:20    PT/INR - ( 2022 07:04 )   PT: 13.0 sec;   INR: 1.13 ratio         PTT - ( 2022 07:04 )  PTT:24.4 sec  CAPILLARY BLOOD GLUCOSE        LIVER FUNCTIONS - ( 2022 07:20 )  Alb: 3.7 g/dL / Pro: 6.3 g/dL / ALK PHOS: 83 U/L / ALT: 61 U/L / AST: 28 U/L / GGT: x           Urinalysis Basic - ( 2022 08:17 )  Color: Yellow / Appearance: Clear / S.016 / pH: x  Gluc: x / Ketone: Negative  / Bili: Negative / Urobili: Negative   Blood: x / Protein: Trace / Nitrite: Negative   Leuk Esterase: Negative / RBC: 6 /hpf / WBC 2 /HPF   Sq Epi: x / Non Sq Epi: 1 /hpf / Bacteria: Negative      Cultures:   cultures pending from 22      Radiology:   CXR 22 pending official read - no obvious consolidations     Meds:   Antimicrobials:   acyclovir   Oral Tab/Cap 400 milliGRAM(s) Oral every 8 hours  cefepime   IVPB 2000 milliGRAM(s) IV Intermittent every 8 hours  clotrimazole Lozenge 1 Lozenge Oral five times a day  fluconAZOLE   Tablet 400 milliGRAM(s) Oral daily      Heme / Onc:   heparin  Infusion 334 Unit(s)/Hr IV Continuous <Continuous>      GI:  pantoprazole    Tablet 40 milliGRAM(s) Oral before breakfast  polyethylene glycol 3350 17 Gram(s) Oral daily PRN  senna 2 Tablet(s) Oral at bedtime PRN  sodium bicarbonate Mouth Rinse 10 milliLiter(s) Swish and Spit five times a day  ursodiol Capsule 300 milliGRAM(s) Oral two times a day      Cardiovascular:       Immunologic:   filgrastim-sndz (ZARXIO) Injectable 480 MICROGram(s) SubCutaneous every 24 hours      Other medications:   acetaminophen     Tablet .. 650 milliGRAM(s) Oral every 6 hours  Biotene Dry Mouth Oral Rinse 5 milliLiter(s) Swish and Spit five times a day  chlorhexidine 4% Liquid 1 Application(s) Topical <User Schedule>  FIRST- Mouthwash  BLM 10 milliLiter(s) Swish and Spit every 6 hours  FLUoxetine 20 milliGRAM(s) Oral daily  folic acid 1 milliGRAM(s) Oral daily  lidocaine/prilocaine Cream 1 Application(s) Topical daily  loratadine 10 milliGRAM(s) Oral daily  multivitamin 1 Tablet(s) Oral daily  nystatin Powder 1 Application(s) Topical two times a day  sodium chloride 0.45% 1000 milliLiter(s) IV Continuous <Continuous>  sodium chloride 0.9%. 1000 milliLiter(s) IV Continuous <Continuous>      PRN:   acetaminophen     Tablet .. 650 milliGRAM(s) Oral every 6 hours PRN  AQUAPHOR (petrolatum Ointment) 1 Application(s) Topical two times a day PRN  HYDROmorphone  Injectable 0.5 milliGRAM(s) IV Push every 6 hours PRN  metoclopramide Injectable 10 milliGRAM(s) IV Push every 6 hours PRN  oxyCODONE    IR 5 milliGRAM(s) Oral every 6 hours PRN  polyethylene glycol 3350 17 Gram(s) Oral daily PRN  senna 2 Tablet(s) Oral at bedtime PRN  sodium chloride 0.9% lock flush 10 milliLiter(s) IV Push every 1 hour PRN  zinc oxide 40% Paste 1 Application(s) Topical three times a day PRN    A/P: 57 year old female with a history of  peripheral T cell lymphoma   Pre  :  Autologous PBSCT day +4  - Completed  -16 24 hour continuous infusion, strict I&O, daily weights, prn diuresis    - Cytoxan day 3/.  - CTX - continue CTX hydration for 24 hours post infusion of last dose. Strict I&O, daily weights, prn diuresis. Start cipro 500mg po BID when ANC < 500    Neutropenic. Started Cipro for prophylaxis. If febrile, panculture and start Cefepime (has tolerated in the past)   - Kepivance day 2/3  11/27- Kepivance day 3/3- HELD due to Kepivance rash and oral erythema worsening  - Febrile overnight, tmax 100.4. CXR no obvious consolidations. Cultures pending. Started on cefepime. Grade 2 chemotherapy induced oral mucositis. Continue supportive measures.     1. Infectious Disease:   acyclovir   Oral Tab/Cap 400 milliGRAM(s) Oral every 8 hours  cefepime   IVPB 2000 milliGRAM(s) IV Intermittent every 8 hours  clotrimazole Lozenge 1 Lozenge Oral five times a day  fluconAZOLE   Tablet 400 milliGRAM(s) Oral daily    2. VOD Prophylaxis: Actigall, Glutamine, Heparin (dosed at 100 units / kg / day)     3. GI Prophylaxis:    pantoprazole    Tablet 40 milliGRAM(s) Oral before breakfast    4. Mouthcare - NS / NaHCO3 rinses, Mycelex, Biotene; Skin care     5. GVHD prophylaxis - n/a     6. Transfuse & replete electrolytes prn     7. IV hydration, daily weights, strict I&O, prn diuresis     8. PO intake as tolerated, nutrition follow up as needed, MVI, folic acid     9. Antiemetics, anti-diarrhea medications:   metoclopramide Injectable 10 milliGRAM(s) IV Push every 6 hours PRN    10. OOB as tolerated, physical therapy consult if needed     11. Monitor coags / fibrinogen 2x week, vitamin K as needed     12. Monitor closely for clinical changes, monitor for fevers     13. Emotional support provided, plan of care discussed and questions addressed     14. Patient education done regarding  plan of care, restrictions and discharge planning     15. Continue regular social work input     I have written the above note for Dr. Cabrales who performed service with me in the room.   Leena Ibrahim NP-C ([phone])    I have seen and examined patient with NP, I agree with above note as scribed.                              [facility] Transplant Team                                                      Critical / Counseling Time Provided: 30 minutes                                                                                                                                                        Chief Complaint: Autologous peripheral blood stem cell transplant with high dose CBV prep regimen for treatment of peripheral T cell lymphoma    S: Patient seen and examined with HPC Transplant Team:   +fatigue  +loose stools  + mouth / throat pain 3/10- improving  All other ROS negative    O: Vitals:   Vital Signs Last 24 Hrs  T(C): 37.1 (2022 05:25), Max: 38.4 (2022 18:21)  T(F): 98.8 (2022 05:25), Max: 101.1 (2022 18:21)  HR: 88 (2022 05:25) (88 - 110)  BP: 103/68 (2022 05:25) (103/68 - 116/75)  BP(mean): --  RR: 18 (2022 05:25) (18 - 18)  SpO2: 98% (2022 05:25) (97% - 100%)    Parameters below as of 2022 05:25  Patient On (Oxygen Delivery Method): room air    Admit weight:   Daily     Today's Weight in k.9kg    Intake / Output:    @ 07:01  -   @ 07:00  --------------------------------------------------------  IN:  mL / OUT: 2850 mL / NET: -839 mL    PE:   Oropharynx: patchy erythema on hard palate, and small ulceration on hard palate and bilateral buccal mucosa, small ulcers on each side of the tongue  Oral Mucositis:            +                                          stGstrstastdstest:st st1st CVS: S1, S2 RRR   Lungs: CTA throughout bilaterally   Abdomen: + BS x 4 normoactive, soft, NT, ND  Extremities: no edema   Gastric Mucositis:       -                                           Grade: n/a   Intestinal Mucositis:     -                                         Grade: n/a   Skin: + macular erythema to chest, upper back and neck post Kepivance, improving  TLC: CDI   Neuro: A&O x 3   Pain: 3/10- mouth, improving      Labs:                  6.8    0.02  )-----------( 22       ( 2022 07:19 )             21.3     2022 07:20    139    |  103    |  9      ----------------------------<  112    3.7     |  26     |  0.38     Ca    9.0        2022 07:20  Phos  3.2       2022 07:20  Mg     2.2       2022 07:20    TPro  6.3    /  Alb  3.7    /  TBili  0.3    /  DBili  x      /  AST  28     /  ALT  61     /  AlkPhos  83     2022 07:20    PT/INR - ( 2022 07:04 )   PT: 13.0 sec;   INR: 1.13 ratio         PTT - ( 2022 07:04 )  PTT:24.4 sec  CAPILLARY BLOOD GLUCOSE        LIVER FUNCTIONS - ( 2022 07:20 )  Alb: 3.7 g/dL / Pro: 6.3 g/dL / ALK PHOS: 83 U/L / ALT: 61 U/L / AST: 28 U/L / GGT: x           Urinalysis Basic - ( 2022 08:17 )  Color: Yellow / Appearance: Clear / S.016 / pH: x  Gluc: x / Ketone: Negative  / Bili: Negative / Urobili: Negative   Blood: x / Protein: Trace / Nitrite: Negative   Leuk Esterase: Negative / RBC: 6 /hpf / WBC 2 /HPF   Sq Epi: x / Non Sq Epi: 1 /hpf / Bacteria: Negative      Cultures:     Culture - Blood (22 @ 05:50)    Specimen Source: .Blood Blood    Culture Results:   No growth to date.        Radiology:   CXR 22 pending official read - no obvious consolidations     Meds:   Antimicrobials:   acyclovir   Oral Tab/Cap 400 milliGRAM(s) Oral every 8 hours  cefepime   IVPB 2000 milliGRAM(s) IV Intermittent every 8 hours  clotrimazole Lozenge 1 Lozenge Oral five times a day  fluconAZOLE   Tablet 400 milliGRAM(s) Oral daily      Heme / Onc:   heparin  Infusion 334 Unit(s)/Hr IV Continuous <Continuous>      GI:  pantoprazole    Tablet 40 milliGRAM(s) Oral before breakfast  polyethylene glycol 3350 17 Gram(s) Oral daily PRN  senna 2 Tablet(s) Oral at bedtime PRN  sodium bicarbonate Mouth Rinse 10 milliLiter(s) Swish and Spit five times a day  ursodiol Capsule 300 milliGRAM(s) Oral two times a day      Cardiovascular:       Immunologic:   filgrastim-sndz (ZARXIO) Injectable 480 MICROGram(s) SubCutaneous every 24 hours      Other medications:   acetaminophen     Tablet .. 650 milliGRAM(s) Oral every 6 hours  Biotene Dry Mouth Oral Rinse 5 milliLiter(s) Swish and Spit five times a day  chlorhexidine 4% Liquid 1 Application(s) Topical <User Schedule>  FIRST- Mouthwash  BLM 10 milliLiter(s) Swish and Spit every 6 hours  FLUoxetine 20 milliGRAM(s) Oral daily  folic acid 1 milliGRAM(s) Oral daily  lidocaine/prilocaine Cream 1 Application(s) Topical daily  loratadine 10 milliGRAM(s) Oral daily  multivitamin 1 Tablet(s) Oral daily  nystatin Powder 1 Application(s) Topical two times a day  sodium chloride 0.45% 1000 milliLiter(s) IV Continuous <Continuous>  sodium chloride 0.9%. 1000 milliLiter(s) IV Continuous <Continuous>      PRN:   acetaminophen     Tablet .. 650 milliGRAM(s) Oral every 6 hours PRN  AQUAPHOR (petrolatum Ointment) 1 Application(s) Topical two times a day PRN  HYDROmorphone  Injectable 0.5 milliGRAM(s) IV Push every 6 hours PRN  metoclopramide Injectable 10 milliGRAM(s) IV Push every 6 hours PRN  oxyCODONE    IR 5 milliGRAM(s) Oral every 6 hours PRN  polyethylene glycol 3350 17 Gram(s) Oral daily PRN  senna 2 Tablet(s) Oral at bedtime PRN  sodium chloride 0.9% lock flush 10 milliLiter(s) IV Push every 1 hour PRN  zinc oxide 40% Paste 1 Application(s) Topical three times a day PRN    A/P: 57 year old female with a history of  peripheral T cell lymphoma   Pre  :  Autologous PBSCT day +4  - Completed  -16 24 hour continuous infusion, strict I&O, daily weights, prn diuresis    - Cytoxan day 3/.  - CTX /- continue CTX hydration for 24 hours post infusion of last dose. Strict I&O, daily weights, prn diuresis. Start cipro 500mg po BID when ANC < 500    Neutropenic. Started Cipro for prophylaxis. If febrile, panculture and start Cefepime (has tolerated in the past)   - Kepivance day 2/3  11/27- Kepivance day 3/3- HELD due to Kepivance rash and oral erythema worsening  - Febrile overnight, tmax 100.4. CXR no obvious consolidations. Cultures pending. Started on cefepime. Grade 2 chemotherapy induced oral mucositis. Continue supportive measures.     1. Infectious Disease:   acyclovir   Oral Tab/Cap 400 milliGRAM(s) Oral every 8 hours  cefepime   IVPB 2000 milliGRAM(s) IV Intermittent every 8 hours  clotrimazole Lozenge 1 Lozenge Oral five times a day  fluconAZOLE   Tablet 400 milliGRAM(s) Oral daily    2. VOD Prophylaxis: Actigall, Glutamine, Heparin (dosed at 100 units / kg / day)     3. GI Prophylaxis:    pantoprazole    Tablet 40 milliGRAM(s) Oral before breakfast    4. Mouthcare - NS / NaHCO3 rinses, Mycelex, Biotene; Skin care     5. GVHD prophylaxis - n/a     6. Transfuse & replete electrolytes prn     7. IV hydration, daily weights, strict I&O, prn diuresis     8. PO intake as tolerated, nutrition follow up as needed, MVI, folic acid     9. Antiemetics, anti-diarrhea medications:   metoclopramide Injectable 10 milliGRAM(s) IV Push every 6 hours PRN    10. OOB as tolerated, physical therapy consult if needed     11. Monitor coags / fibrinogen 2x week, vitamin K as needed     12. Monitor closely for clinical changes, monitor for fevers     13. Emotional support provided, plan of care discussed and questions addressed     14. Patient education done regarding  plan of care, restrictions and discharge planning     15. Continue regular social work input     I have written the above note for Dr. Cabrales who performed service with me in the room.   Leena Ibrahim NP-C ([phone])    I have seen and examined patient with NP, I agree with above note as scribed.                              [facility] Transplant Team                                                      Critical / Counseling Time Provided: 30 minutes                                                                                                                                                        Chief Complaint: Autologous peripheral blood stem cell transplant with high dose CBV prep regimen for treatment of peripheral T cell lymphoma    S: Patient seen and examined with HPC Transplant Team:   +fatigue  +loose stools  + mouth / throat pain 3/10- improving  All other ROS negative    O: Vitals:   Vital Signs Last 24 Hrs  T(C): 37.1 (2022 05:25), Max: 38.4 (2022 18:21)  T(F): 98.8 (2022 05:25), Max: 101.1 (2022 18:21)  HR: 88 (2022 05:25) (88 - 110)  BP: 103/68 (2022 05:25) (103/68 - 116/75)  BP(mean): --  RR: 18 (2022 05:25) (18 - 18)  SpO2: 98% (2022 05:25) (97% - 100%)    Parameters below as of 2022 05:25  Patient On (Oxygen Delivery Method): room air    Admit weight:   Daily     Today's Weight in k.9kg    Intake / Output:    @ 07:01  -   @ 07:00  --------------------------------------------------------  IN:  mL / OUT: 2850 mL / NET: -839 mL    PE:   Oropharynx: patchy erythema on hard palate, and small ulceration on hard palate and bilateral buccal mucosa, small ulcers on each side of the tongue  Oral Mucositis:            +                                          stGstrstastdstest:st st1st CVS: S1, S2 RRR   Lungs: CTA throughout bilaterally   Abdomen: + BS x 4 normoactive, soft, NT, ND  Extremities: no edema   Gastric Mucositis:       -                                           Grade: n/a   Intestinal Mucositis:     -                                         Grade: n/a   Skin: + macular erythema to chest, upper back and neck post Kepivance, improving  TLC: CDI   Neuro: A&O x 3   Pain: 3/10- mouth, improving      Labs:                  6.8    0.02  )-----------( 22       ( 2022 07:19 )             21.3     2022 07:20    139    |  103    |  9      ----------------------------<  112    3.7     |  26     |  0.38     Ca    9.0        2022 07:20  Phos  3.2       2022 07:20  Mg     2.2       2022 07:20    TPro  6.3    /  Alb  3.7    /  TBili  0.3    /  DBili  x      /  AST  28     /  ALT  61     /  AlkPhos  83     2022 07:20    PT/INR - ( 2022 07:04 )   PT: 13.0 sec;   INR: 1.13 ratio         PTT - ( 2022 07:04 )  PTT:24.4 sec  CAPILLARY BLOOD GLUCOSE        LIVER FUNCTIONS - ( 2022 07:20 )  Alb: 3.7 g/dL / Pro: 6.3 g/dL / ALK PHOS: 83 U/L / ALT: 61 U/L / AST: 28 U/L / GGT: x           Urinalysis Basic - ( 2022 08:17 )  Color: Yellow / Appearance: Clear / S.016 / pH: x  Gluc: x / Ketone: Negative  / Bili: Negative / Urobili: Negative   Blood: x / Protein: Trace / Nitrite: Negative   Leuk Esterase: Negative / RBC: 6 /hpf / WBC 2 /HPF   Sq Epi: x / Non Sq Epi: 1 /hpf / Bacteria: Negative      Cultures:     Culture - Urine (22 @ 08:19)    Specimen Source: Clean Catch Clean Catch (Midstream)    Culture Results:   <10,000 CFU/mL Normal Urogenital Maryanne    Culture - Blood (22 @ 05:50)    Specimen Source: .Blood Blood    Culture Results:   No growth to date.        Radiology:   CXR 22 pending official read - no obvious consolidations     Meds:   Antimicrobials:   acyclovir   Oral Tab/Cap 400 milliGRAM(s) Oral every 8 hours  cefepime   IVPB 2000 milliGRAM(s) IV Intermittent every 8 hours  clotrimazole Lozenge 1 Lozenge Oral five times a day  fluconAZOLE   Tablet 400 milliGRAM(s) Oral daily      Heme / Onc:   heparin  Infusion 334 Unit(s)/Hr IV Continuous <Continuous>      GI:  pantoprazole    Tablet 40 milliGRAM(s) Oral before breakfast  polyethylene glycol 3350 17 Gram(s) Oral daily PRN  senna 2 Tablet(s) Oral at bedtime PRN  sodium bicarbonate Mouth Rinse 10 milliLiter(s) Swish and Spit five times a day  ursodiol Capsule 300 milliGRAM(s) Oral two times a day      Cardiovascular:       Immunologic:   filgrastim-sndz (ZARXIO) Injectable 480 MICROGram(s) SubCutaneous every 24 hours      Other medications:   acetaminophen     Tablet .. 650 milliGRAM(s) Oral every 6 hours  Biotene Dry Mouth Oral Rinse 5 milliLiter(s) Swish and Spit five times a day  chlorhexidine 4% Liquid 1 Application(s) Topical <User Schedule>  FIRST- Mouthwash  BLM 10 milliLiter(s) Swish and Spit every 6 hours  FLUoxetine 20 milliGRAM(s) Oral daily  folic acid 1 milliGRAM(s) Oral daily  lidocaine/prilocaine Cream 1 Application(s) Topical daily  loratadine 10 milliGRAM(s) Oral daily  multivitamin 1 Tablet(s) Oral daily  nystatin Powder 1 Application(s) Topical two times a day  sodium chloride 0.45% 1000 milliLiter(s) IV Continuous <Continuous>  sodium chloride 0.9%. 1000 milliLiter(s) IV Continuous <Continuous>      PRN:   acetaminophen     Tablet .. 650 milliGRAM(s) Oral every 6 hours PRN  AQUAPHOR (petrolatum Ointment) 1 Application(s) Topical two times a day PRN  HYDROmorphone  Injectable 0.5 milliGRAM(s) IV Push every 6 hours PRN  metoclopramide Injectable 10 milliGRAM(s) IV Push every 6 hours PRN  oxyCODONE    IR 5 milliGRAM(s) Oral every 6 hours PRN  polyethylene glycol 3350 17 Gram(s) Oral daily PRN  senna 2 Tablet(s) Oral at bedtime PRN  sodium chloride 0.9% lock flush 10 milliLiter(s) IV Push every 1 hour PRN  zinc oxide 40% Paste 1 Application(s) Topical three times a day PRN    A/P: 57 year old female with a history of  peripheral T cell lymphoma   Pre  :  Autologous PBSCT day +4  - Completed  -16 24 hour continuous infusion, strict I&O, daily weights, prn diuresis    - Cytoxan day 3/.  - CTX - continue CTX hydration for 24 hours post infusion of last dose. Strict I&O, daily weights, prn diuresis. Start cipro 500mg po BID when ANC < 500    Neutropenic. Started Cipro for prophylaxis. If febrile, panculture and start Cefepime (has tolerated in the past)   - Kepivance day 2/3  11/27- Kepivance day 3/3- HELD due to Kepivance rash and oral erythema worsening  - Febrile overnight, tmax 100.4. CXR no obvious consolidations. Cultures pending. Started on cefepime. Grade 2 chemotherapy induced oral mucositis. Continue supportive measures.     1. Infectious Disease:   acyclovir   Oral Tab/Cap 400 milliGRAM(s) Oral every 8 hours  cefepime   IVPB 2000 milliGRAM(s) IV Intermittent every 8 hours  clotrimazole Lozenge 1 Lozenge Oral five times a day  fluconAZOLE   Tablet 400 milliGRAM(s) Oral daily    2. VOD Prophylaxis: Actigall, Glutamine, Heparin (dosed at 100 units / kg / day)     3. GI Prophylaxis:    pantoprazole    Tablet 40 milliGRAM(s) Oral before breakfast    4. Mouthcare - NS / NaHCO3 rinses, Mycelex, Biotene; Skin care     5. GVHD prophylaxis - n/a     6. Transfuse & replete electrolytes prn     7. IV hydration, daily weights, strict I&O, prn diuresis     8. PO intake as tolerated, nutrition follow up as needed, MVI, folic acid     9. Antiemetics, anti-diarrhea medications:   metoclopramide Injectable 10 milliGRAM(s) IV Push every 6 hours PRN    10. OOB as tolerated, physical therapy consult if needed     11. Monitor coags / fibrinogen 2x week, vitamin K as needed     12. Monitor closely for clinical changes, monitor for fevers     13. Emotional support provided, plan of care discussed and questions addressed     14. Patient education done regarding  plan of care, restrictions and discharge planning     15. Continue regular social work input     I have written the above note for Dr. Cabrales who performed service with me in the room.   Leena Ibrahim NP-C ([phone])    I have seen and examined patient with NP, I agree with above note as scribed.                              [facility]PC Transplant Team                                                      Critical / Counseling Time Provided: 30 minutes                                                                                                                                                        Chief Complaint: Autologous peripheral blood stem cell transplant with high dose CBV prep regimen for treatment of peripheral T cell lymphoma    S: Patient seen and examined with HPC Transplant Team:   +fatigue  +loose stools  + mouth / throat pain 3/10- improving  All other ROS negative    O: Vitals:   Vital Signs Last 24 Hrs  T(C): 37.1 (2022 05:25), Max: 38.4 (2022 18:21)  T(F): 98.8 (2022 05:25), Max: 101.1 (2022 18:21)  HR: 88 (2022 05:25) (88 - 110)  BP: 103/68 (2022 05:25) (103/68 - 116/75)  BP(mean): --  RR: 18 (2022 05:25) (18 - 18)  SpO2: 98% (2022 05:25) (97% - 100%)    Parameters below as of 2022 05:25  Patient On (Oxygen Delivery Method): room air    Admit weight:   Daily     Today's Weight in k.9kg    Intake / Output:    @ 07:01  -   @ 07:00  --------------------------------------------------------  IN:  mL / OUT: 2850 mL / NET: -839 mL    PE:   Oropharynx: patchy erythema on hard palate, and small ulcerations on hard palate and bilateral buccal mucosa, small ulcers on each side of the tongue  Oral Mucositis:            +                                          stGstrstastdstest:st st1st CVS: S1, S2 RRR   Lungs: CTA throughout bilaterally   Abdomen: + BS x 4 normoactive, soft, NT, ND  Extremities: no edema   Gastric Mucositis:       -                                           Grade: n/a   Intestinal Mucositis:     -                                         Grade: n/a   Skin: + macular erythema to chest, upper back and neck post Kepivance, improving  TLC: CDI   Neuro: A&O x 3   Pain: 3/10- mouth, improving      Labs:                  6.8    0.02  )-----------( 22       ( 2022 07:19 )             21.3     2022 07:20    139    |  103    |  9      ----------------------------<  112    3.7     |  26     |  0.38     Ca    9.0        2022 07:20  Phos  3.2       2022 07:20  Mg     2.2       2022 07:20    TPro  6.3    /  Alb  3.7    /  TBili  0.3    /  DBili  x      /  AST  28     /  ALT  61     /  AlkPhos  83     2022 07:20    PT/INR - ( 2022 07:04 )   PT: 13.0 sec;   INR: 1.13 ratio         PTT - ( 2022 07:04 )  PTT:24.4 sec  CAPILLARY BLOOD GLUCOSE        LIVER FUNCTIONS - ( 2022 07:20 )  Alb: 3.7 g/dL / Pro: 6.3 g/dL / ALK PHOS: 83 U/L / ALT: 61 U/L / AST: 28 U/L / GGT: x           Urinalysis Basic - ( 2022 08:17 )  Color: Yellow / Appearance: Clear / S.016 / pH: x  Gluc: x / Ketone: Negative  / Bili: Negative / Urobili: Negative   Blood: x / Protein: Trace / Nitrite: Negative   Leuk Esterase: Negative / RBC: 6 /hpf / WBC 2 /HPF   Sq Epi: x / Non Sq Epi: 1 /hpf / Bacteria: Negative      Cultures:     Culture - Urine (22 @ 08:19)    Specimen Source: Clean Catch Clean Catch (Midstream)    Culture Results:   <10,000 CFU/mL Normal Urogenital Maryanne    Culture - Blood (22 @ 05:50)    Specimen Source: .Blood Blood    Culture Results:   No growth to date.        Radiology:   CXR 22 pending official read - no obvious consolidations     Meds:   Antimicrobials:   acyclovir   Oral Tab/Cap 400 milliGRAM(s) Oral every 8 hours  cefepime   IVPB 2000 milliGRAM(s) IV Intermittent every 8 hours  clotrimazole Lozenge 1 Lozenge Oral five times a day  fluconAZOLE   Tablet 400 milliGRAM(s) Oral daily      Heme / Onc:   heparin  Infusion 334 Unit(s)/Hr IV Continuous <Continuous>      GI:  pantoprazole    Tablet 40 milliGRAM(s) Oral before breakfast  polyethylene glycol 3350 17 Gram(s) Oral daily PRN  senna 2 Tablet(s) Oral at bedtime PRN  sodium bicarbonate Mouth Rinse 10 milliLiter(s) Swish and Spit five times a day  ursodiol Capsule 300 milliGRAM(s) Oral two times a day      Cardiovascular:       Immunologic:   filgrastim-sndz (ZARXIO) Injectable 480 MICROGram(s) SubCutaneous every 24 hours      Other medications:   acetaminophen     Tablet .. 650 milliGRAM(s) Oral every 6 hours  Biotene Dry Mouth Oral Rinse 5 milliLiter(s) Swish and Spit five times a day  chlorhexidine 4% Liquid 1 Application(s) Topical <User Schedule>  FIRST- Mouthwash  BLM 10 milliLiter(s) Swish and Spit every 6 hours  FLUoxetine 20 milliGRAM(s) Oral daily  folic acid 1 milliGRAM(s) Oral daily  lidocaine/prilocaine Cream 1 Application(s) Topical daily  loratadine 10 milliGRAM(s) Oral daily  multivitamin 1 Tablet(s) Oral daily  nystatin Powder 1 Application(s) Topical two times a day  sodium chloride 0.45% 1000 milliLiter(s) IV Continuous <Continuous>  sodium chloride 0.9%. 1000 milliLiter(s) IV Continuous <Continuous>      PRN:   acetaminophen     Tablet .. 650 milliGRAM(s) Oral every 6 hours PRN  AQUAPHOR (petrolatum Ointment) 1 Application(s) Topical two times a day PRN  HYDROmorphone  Injectable 0.5 milliGRAM(s) IV Push every 6 hours PRN  metoclopramide Injectable 10 milliGRAM(s) IV Push every 6 hours PRN  oxyCODONE    IR 5 milliGRAM(s) Oral every 6 hours PRN  polyethylene glycol 3350 17 Gram(s) Oral daily PRN  senna 2 Tablet(s) Oral at bedtime PRN  sodium chloride 0.9% lock flush 10 milliLiter(s) IV Push every 1 hour PRN  zinc oxide 40% Paste 1 Application(s) Topical three times a day PRN    A/P: 57 year old female with a history of  peripheral T cell lymphoma   Pre  :  Autologous PBSCT day +4  - Completed  -16 24 hour continuous infusion, strict I&O, daily weights, prn diuresis    - Cytoxan day 3/.  - CTX - continue CTX hydration for 24 hours post infusion of last dose. Strict I&O, daily weights, prn diuresis. Start cipro 500mg po BID when ANC < 500    Neutropenic. Started Cipro for prophylaxis. If febrile, panculture and start Cefepime (has tolerated in the past)   - Kepivance day 2/3  11/27- Kepivance day 3/3- HELD due to Kepivance rash and oral erythema worsening  - Febrile overnight, tmax 100.4. CXR no obvious consolidations. Cultures pending. Started on cefepime. Grade 2 chemotherapy induced oral mucositis. Continue supportive measures.     1. Infectious Disease:   acyclovir   Oral Tab/Cap 400 milliGRAM(s) Oral every 8 hours  cefepime   IVPB 2000 milliGRAM(s) IV Intermittent every 8 hours  clotrimazole Lozenge 1 Lozenge Oral five times a day  fluconAZOLE   Tablet 400 milliGRAM(s) Oral daily    2. VOD Prophylaxis: Actigall, Glutamine, Heparin (dosed at 100 units / kg / day)     3. GI Prophylaxis:    pantoprazole    Tablet 40 milliGRAM(s) Oral before breakfast    4. Mouthcare - NS / NaHCO3 rinses, Mycelex, Biotene; Skin care     5. GVHD prophylaxis - n/a     6. Transfuse & replete electrolytes prn     7. IV hydration, daily weights, strict I&O, prn diuresis     8. PO intake as tolerated, nutrition follow up as needed, MVI, folic acid     9. Antiemetics, anti-diarrhea medications:   metoclopramide Injectable 10 milliGRAM(s) IV Push every 6 hours PRN    10. OOB as tolerated, physical therapy consult if needed     11. Monitor coags / fibrinogen 2x week, vitamin K as needed     12. Monitor closely for clinical changes, monitor for fevers     13. Emotional support provided, plan of care discussed and questions addressed     14. Patient education done regarding  plan of care, restrictions and discharge planning     15. Continue regular social work input     I have written the above note for Dr. Cabrales who performed service with me in the room.   Leena Ibrahim NP-C ([phone])    I have seen and examined patient with NP, I agree with above note as scribed.

## 2022-11-29 NOTE — CHART NOTE - NSCHARTNOTEFT_GEN_A_CORE
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 Day +4"    Source: [x] Patient       [x] EMR        [] RN        [] Family at bedside       [] Other:    Diet Order:   Diet, Regular:   GlutaSolve(Glutamine) 15gm pkg     Qty per Day:  1 (22)    - Is current order appropriate/adequate? [] Yes  [X]  No: decreased appetite/PO intake, wants to trial oral nutritional supplement     - PO intake :   [] >75%  Adequate    [x] 50-75%  Fair       [] <50%  Poor    - Nutrition-related concerns:      - Pt reports variable appetite/PO intake, overall likely meeting <75% EER daily x > 1 week      - Endorses eating nutrient dense snacks between meals to optimize PO intake.       - Of note, ordering texture modified foods PRN in setting of mouth pain.       - Amenable to trial oral nutritional supplement: Ensure Shake 1x/day chocolate      - Micronutrient/Other supplementation: multivitamin, folic acid        GI:    - Reports difficulty chewing/swallowing in setting of mouth pain  - Last BM -. No diarrhea/constipation reported.   Bowel Regimen: senna   - Denies N/V at time of RD Visit (ordered for [facility])    Nutrition focused physical exam:   Subcutaneous fat loss: [mild] Orbital fat pads region, [mild ]Buccal fat region  Muscle wasting: [ mild]Temples region    Weights:   Dosing weight in k.3 (11-16)  Daily Weight in k.9 (11-29), Weight in k.6 (11-28), Weight in k.3 (11-27), Weight in k.1 (11-26), Weight in k.5 (11-25), Weight in k.6 (11-24), Weight in k (11-23)  ** Weight fluctuating x 1 week (overall 5% weight loss x ~2 weeks) In setting of IVF and diuretics this admission. Weight changes may be partially secondary to fluid shifts. RD to continue to monitor weight trends as able.     Nutritionally Pertinent MEDICATIONS  (STANDING):  acyclovir   Oral Tab/Cap  cefepime   IVPB  clotrimazole Lozenge  fluconAZOLE   Tablet  folic acid  multivitamin  pantoprazole    Tablet  sodium bicarbonate Mouth Rinse  sodium chloride 0.45%  sodium chloride 0.9%.  ursodiol Capsule    Pertinent Labs:  @ 07:20: Na 139, BUN 9, Cr 0.38<L>, <H>, K+ 3.7, Phos 3.2, Mg 2.2, Alk Phos 83, ALT/SGPT 61<H>, AST/SGOT 28, HbA1c --    Skin per nursing documentation: No pressure injuries noted per flow sheets   Edema: no edema noted per flow sheets    Estimated Needs:   [x] no change since previous assessment   ** () based on CBW 177lb/80.2kg :  25-30 kcal/ [id number] kcal  1-1.5 Gm pro/ k.2-112.28 Gm pro   Defer fluid needs to medical team    Previous Nutrition Diagnosis #1: Predicted inadequate protein intake  Nutrition Diagnosis is: [x] upgraded    Previous Nutrition Diagnosis #2: inadequate protein intake  Nutrition Diagnosis is: [x] upgraded    New Nutrition Diagnosis: [x] moderate acute Malnutrition RT decreased ability to consume adequate protein-energy in setting of increased physiological demand for nutrients AEB <75% EER x >1  week, 5% weight loss x 2 weeks, mild muscle/fat depletion     Nutrition Care Plan:  [x] In Progress   [] Achieved  [] Not applicable    Nutrition Interventions:     Education Provided:       [x] Yes: Discussed importance of adequate consumption of meals/supplements to optimize protein-energy intake. Encouraged small/frequent meals, nutrient dense snacks, prioritizing protein foods at meal time. Reviewed mucositis nutrition therapy        Recommendations:         [X] Continue current diet order: regular. Allow Pt to order texture modified foods PRN.             [X] Add oral nutrition supplement: Ensure Shake HP 1x/day     [X] Encourage adequate consumption of meals/supplements to optimize protein-energy intake.      [X] New malnutrition notification sent.      [X] Continue current micronutrient supplementation: multivitamin, folic acid      [X] Continue Glutasolve 1x/day.     Monitoring and Evaluation:   Continue to monitor nutritional intake, tolerance to diet prescription, weights, labs, skin integrity    RD remains available upon request and will follow up per protocol  SHAHAB Novak [facility] Pager #[phone]

## 2022-11-29 NOTE — PROGRESS NOTE ADULT - CRITICAL CARE ATTENDING COMMENT
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   Today is Day +3  Problem/Plan -   1:  Problem: Stem cell transplant   ·  Plan: 1. Admit to BMTU   2. TLC placement in IR   3. Day -9 - day -2 - antiemetics   4. Day -9 & day -8, VP16 2400mg/m2 IV x 34 hours (final concentration 0.4mg /mL).   5. Strict I&O, daily weights, prn diuresis   6. After completion of VP16- start CTX hydration (D51/2NS + 10mEq KCl @ 150ml / m2) - continue for 24 hours post infusion of CTX   7. Day -7 - day -4 - CTX 1800 mg / m2 daily over 2 hours. Follow SMA7, mag, phos 6hours post CT . Mesna  12mg / kg - hemorrhagic cystitis ppx   8. Day -3 - BCNU 400mg / m2   9. Day -2 - day -1 - rest days  10. Day 0(11/25/22) - HPC transplant. Start Zarxio 480mcg SQ QD, continue through engraftment. Kepivance on days 0, 1 & 2   11. Anxiolytics, antinausea, antidiarrhea medications as needed   12. Lasix PRN while being aggressively hydrated to avoid VOD   13. Nutritional support, pain management.    Problem/Plan - 2:  ·  Problem: Need for prophylactic measure.   ·  Plan: 1. VOD prophylaxis - low dose heparin gtt (dosed at 100 units / kg / day), glutamine supplementation, Actigall BID   2. PCP prophylaxis - Bactrim DS through day -2    3. Antiviral prophylaxis - Acyclovir 400mg po TID to start day -1   4. Antifungal prophylaxis- Diflucan 400 mg po daily.  5. GI prophylaxis - Protonix po QD   6. Antibacterial prophylaxis - when ANC < 500, start Cipro 500mg po BID. If becomes febrile, pan cx, CXR and change Cipro to Cefepime 2g IV q 8 hours. Continue until count recovery  7. Kepivance for prevention of mucositis- days 0, +1, +2  8. Aggressive mouth care and skin care as per protocol. On 11/25, early mucositis on lower lip, no ulcers. Continue topical care.   9. Receiving transfusion and electrolyte support.    11/25-Chemotherapy induced nausea and diarrhea managed with antiemetics and antidiarrheals. Now patient states stool is loose rather than watery.   Continue Cipro, Acyclovir, Diflucan prophylaxis. OOB/ambulate. She received HPC transplant today without complications. Begin Zarxio daily.  11/26/22: continues to have loose stools overnight, this am with epistaxis lasting ~ 5 minutes. To receive platelets. Remains fatigued. 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   Today is Day +4  Problem/Plan -   1:  Problem: Stem cell transplant   ·  Plan: 1. Admit to BMTU   2. TLC placement in IR   3. Day -9 - day -2 - antiemetics   4. Day -9 & day -8, VP16 2400mg/m2 IV x 34 hours (final concentration 0.4mg /mL).   5. Strict I&O, daily weights, prn diuresis   6. After completion of VP16- start CTX hydration (D51/2NS + 10mEq KCl @ 150ml / m2) - continue for 24 hours post infusion of CTX   7. Day -7 - day -4 - CTX 1800 mg / m2 daily over 2 hours. Follow SMA7, mag, phos 6hours post CT . Mesna  12mg / kg - hemorrhagic cystitis ppx   8. Day -3 - BCNU 400mg / m2   9. Day -2 - day -1 - rest days  10. Day 0(11/25/22) - HPC transplant. Start Zarxio 480mcg SQ QD, continue through engraftment. Kepivance on days 0, 1 & 2   11. Anxiolytics, antinausea, antidiarrhea medications as needed   12. Lasix PRN while being aggressively hydrated to avoid VOD   13. Nutritional support, pain management.    Problem/Plan - 2:  ·  Problem: Need for prophylactic measure.   ·  Plan: 1. VOD prophylaxis - low dose heparin gtt (dosed at 100 units / kg / day), glutamine supplementation, Actigall BID   2. PCP prophylaxis - Bactrim DS through day -2    3. Antiviral prophylaxis - Acyclovir 400mg po TID to start day -1   4. Antifungal prophylaxis- Diflucan 400 mg po daily.  5. GI prophylaxis - Protonix po QD   6. Antibacterial prophylaxis - when ANC < 500, start Cipro 500mg po BID. If becomes febrile, pan cx, CXR and change Cipro to Cefepime 2g IV q 8 hours. Continue until count recovery  7. Kepivance for prevention of mucositis- days 0, +1, +2  8. Aggressive mouth care and skin care as per protocol. On 11/25, early mucositis on lower lip, no ulcers. Continue topical care.   9. Receiving transfusion and electrolyte support.    11/25-Chemotherapy induced nausea and diarrhea managed with antiemetics and antidiarrheals. Now patient states stool is loose rather than watery.   Continue Cipro, Acyclovir, Diflucan prophylaxis. OOB/ambulate. She received HPC transplant today without complications. Begin Zarxio daily.  11/26/22: continues to have loose stools overnight, this am with epistaxis lasting ~ 5 minutes. To receive platelets. Remains fatigued. 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   Today is Day +4  Problem/Plan -   1:  Problem: Stem cell transplant   ·  Plan: 1. Admit to BMTU   2. TLC placement in IR   3. Day -9 - day -2 - antiemetics   4. Day -9 & day -8, VP16 2400mg/m2 IV x 34 hours (final concentration 0.4mg /mL).   5. Strict I&O, daily weights, prn diuresis   6. After completion of VP16- start CTX hydration (D51/2NS + 10mEq KCl @ 150ml / m2) - continue for 24 hours post infusion of CTX   7. Day -7 - day -4 - CTX 1800 mg / m2 daily over 2 hours. Follow SMA7, mag, phos 6hours post CT . Mesna  12mg / kg - hemorrhagic cystitis ppx   8. Day -3 - BCNU 400mg / m2   9. Day -2 - day -1 - rest days  10. Day 0(11/25/22) - HPC transplant. Start Zarxio 480mcg SQ QD, continue through engraftment. Kepivance on days 0, 1 & 2   11. Anxiolytics, antinausea, antidiarrhea medications as needed   12. Lasix PRN while being aggressively hydrated to avoid VOD   13. Nutritional support, pain management.    Problem/Plan - 2:  ·  Problem: Need for prophylactic measure.   ·  Plan: 1. VOD prophylaxis - low dose heparin gtt (dosed at 100 units / kg / day), glutamine supplementation, Actigall BID   2. PCP prophylaxis - Bactrim DS through day -2    3. Antiviral prophylaxis - Acyclovir 400mg po TID to start day -1   4. Antifungal prophylaxis- Diflucan 400 mg po daily.  5. GI prophylaxis - Protonix po QD   6. Antibacterial prophylaxis - when ANC < 500, start Cipro 500mg po BID. If becomes febrile, pan cx, CXR and change Cipro to Cefepime 2g IV q 8 hours. Continue until count recovery  7. Kepivance for prevention of mucositis- days 0, +1, +2  8. Aggressive mouth care and skin care as per protocol. On 11/25, early mucositis on lower lip, no ulcers. Continue topical care.   9. Receiving transfusion and electrolyte support.    11/25-Chemotherapy induced nausea and diarrhea managed with antiemetics and antidiarrheals. Now patient states stool is loose rather than watery.   Continue Cipro, Acyclovir, Diflucan prophylaxis. OOB/ambulate. She received HPC transplant today without complications. Begin Zarxio daily.  11/26/22: continues to have loose stools overnight, this am with epistaxis lasting ~ 5 minutes. To receive platelets. Remains fatigued.  11/28- Febrile overnight, tmax 100.4. CXR- no consolidations. Cultures pending. Started on Cefepime. Grade 2 chemotherapy induced oral mucositis. Continue supportive care.

## 2022-11-30 LAB
ALBUMIN SERPL ELPH-MCNC: 3.8 G/DL — SIGNIFICANT CHANGE UP (ref 3.3–5)
ALP SERPL-CCNC: 82 U/L — SIGNIFICANT CHANGE UP (ref 40–120)
ALT FLD-CCNC: 59 U/L — HIGH (ref 10–45)
ANION GAP SERPL CALC-SCNC: 10 MMOL/L — SIGNIFICANT CHANGE UP (ref 5–17)
AST SERPL-CCNC: 28 U/L — SIGNIFICANT CHANGE UP (ref 10–40)
BILIRUB SERPL-MCNC: 0.4 MG/DL — SIGNIFICANT CHANGE UP (ref 0.2–1.2)
BLD GP AB SCN SERPL QL: NEGATIVE — SIGNIFICANT CHANGE UP
BUN SERPL-MCNC: 11 MG/DL — SIGNIFICANT CHANGE UP (ref 7–23)
CALCIUM SERPL-MCNC: 9.2 MG/DL — SIGNIFICANT CHANGE UP (ref 8.4–10.5)
CHLORIDE SERPL-SCNC: 102 MMOL/L — SIGNIFICANT CHANGE UP (ref 96–108)
CO2 SERPL-SCNC: 27 MMOL/L — SIGNIFICANT CHANGE UP (ref 22–31)
CREAT SERPL-MCNC: 0.37 MG/DL — LOW (ref 0.5–1.3)
EGFR: 119 ML/MIN/1.73M2 — SIGNIFICANT CHANGE UP
GLUCOSE SERPL-MCNC: 121 MG/DL — HIGH (ref 70–99)
HCT VFR BLD CALC: 25.5 % — LOW (ref 34.5–45)
HGB BLD-MCNC: 8.4 G/DL — LOW (ref 11.5–15.5)
LDH SERPL L TO P-CCNC: 134 U/L — SIGNIFICANT CHANGE UP (ref 50–242)
MAGNESIUM SERPL-MCNC: 2.1 MG/DL — SIGNIFICANT CHANGE UP (ref 1.6–2.6)
MCHC RBC-ENTMCNC: 29.2 PG — SIGNIFICANT CHANGE UP (ref 27–34)
MCHC RBC-ENTMCNC: 32.9 GM/DL — SIGNIFICANT CHANGE UP (ref 32–36)
MCV RBC AUTO: 88.5 FL — SIGNIFICANT CHANGE UP (ref 80–100)
NRBC # BLD: 0 /100 WBCS — SIGNIFICANT CHANGE UP (ref 0–0)
PHOSPHATE SERPL-MCNC: 3.2 MG/DL — SIGNIFICANT CHANGE UP (ref 2.5–4.5)
PLATELET # BLD AUTO: 20 K/UL — CRITICAL LOW (ref 150–400)
POTASSIUM SERPL-MCNC: 3.8 MMOL/L — SIGNIFICANT CHANGE UP (ref 3.5–5.3)
POTASSIUM SERPL-SCNC: 3.8 MMOL/L — SIGNIFICANT CHANGE UP (ref 3.5–5.3)
PROT SERPL-MCNC: 6.3 G/DL — SIGNIFICANT CHANGE UP (ref 6–8.3)
RBC # BLD: 2.88 M/UL — LOW (ref 3.8–5.2)
RBC # FLD: 13.7 % — SIGNIFICANT CHANGE UP (ref 10.3–14.5)
RH IG SCN BLD-IMP: POSITIVE — SIGNIFICANT CHANGE UP
SODIUM SERPL-SCNC: 139 MMOL/L — SIGNIFICANT CHANGE UP (ref 135–145)
WBC # BLD: 0.03 K/UL — CRITICAL LOW (ref 3.8–10.5)
WBC # FLD AUTO: 0.03 K/UL — CRITICAL LOW (ref 3.8–10.5)

## 2022-11-30 PROCEDURE — 99291 CRITICAL CARE FIRST HOUR: CPT

## 2022-11-30 RX ADMIN — PANTOPRAZOLE SODIUM 40 MILLIGRAM(S): 20 TABLET, DELAYED RELEASE ORAL at 06:15

## 2022-11-30 RX ADMIN — Medication 10 MILLILITER(S): at 09:07

## 2022-11-30 RX ADMIN — Medication 10 MILLILITER(S): at 20:29

## 2022-11-30 RX ADMIN — DIPHENHYDRAMINE HYDROCHLORIDE AND LIDOCAINE HYDROCHLORIDE AND ALUMINUM HYDROXIDE AND MAGNESIUM HYDRO 10 MILLILITER(S): KIT at 06:16

## 2022-11-30 RX ADMIN — URSODIOL 300 MILLIGRAM(S): 250 TABLET, FILM COATED ORAL at 17:47

## 2022-11-30 RX ADMIN — Medication 5 MILLILITER(S): at 23:35

## 2022-11-30 RX ADMIN — Medication 1 LOZENGE: at 15:54

## 2022-11-30 RX ADMIN — CHLORHEXIDINE GLUCONATE 1 APPLICATION(S): 213 SOLUTION TOPICAL at 09:07

## 2022-11-30 RX ADMIN — Medication 10 MILLILITER(S): at 23:35

## 2022-11-30 RX ADMIN — DIPHENHYDRAMINE HYDROCHLORIDE AND LIDOCAINE HYDROCHLORIDE AND ALUMINUM HYDROXIDE AND MAGNESIUM HYDRO 10 MILLILITER(S): KIT at 00:48

## 2022-11-30 RX ADMIN — Medication 1 TABLET(S): at 12:30

## 2022-11-30 RX ADMIN — Medication 1 MILLIGRAM(S): at 12:30

## 2022-11-30 RX ADMIN — Medication 480 MICROGRAM(S): at 13:15

## 2022-11-30 RX ADMIN — Medication 5 MILLILITER(S): at 20:29

## 2022-11-30 RX ADMIN — Medication 400 MILLIGRAM(S): at 21:12

## 2022-11-30 RX ADMIN — Medication 400 MILLIGRAM(S): at 06:15

## 2022-11-30 RX ADMIN — Medication 10 MILLILITER(S): at 17:47

## 2022-11-30 RX ADMIN — NYSTATIN CREAM 1 APPLICATION(S): 100000 CREAM TOPICAL at 06:16

## 2022-11-30 RX ADMIN — Medication 10 MILLILITER(S): at 13:16

## 2022-11-30 RX ADMIN — DIPHENHYDRAMINE HYDROCHLORIDE AND LIDOCAINE HYDROCHLORIDE AND ALUMINUM HYDROXIDE AND MAGNESIUM HYDRO 10 MILLILITER(S): KIT at 12:29

## 2022-11-30 RX ADMIN — CEFEPIME 100 MILLIGRAM(S): 1 INJECTION, POWDER, FOR SOLUTION INTRAMUSCULAR; INTRAVENOUS at 13:18

## 2022-11-30 RX ADMIN — Medication 1 LOZENGE: at 12:30

## 2022-11-30 RX ADMIN — Medication 1 LOZENGE: at 00:47

## 2022-11-30 RX ADMIN — Medication 1 LOZENGE: at 09:07

## 2022-11-30 RX ADMIN — FLUCONAZOLE 400 MILLIGRAM(S): 150 TABLET ORAL at 12:30

## 2022-11-30 RX ADMIN — Medication 5 MILLILITER(S): at 12:29

## 2022-11-30 RX ADMIN — Medication 20 MILLIGRAM(S): at 12:31

## 2022-11-30 RX ADMIN — HEPARIN SODIUM 3.34 UNIT(S)/HR: 5000 INJECTION INTRAVENOUS; SUBCUTANEOUS at 06:16

## 2022-11-30 RX ADMIN — LORATADINE 10 MILLIGRAM(S): 10 TABLET ORAL at 12:30

## 2022-11-30 RX ADMIN — Medication 5 MILLILITER(S): at 09:07

## 2022-11-30 RX ADMIN — SODIUM CHLORIDE 20 MILLILITER(S): 9 INJECTION INTRAMUSCULAR; INTRAVENOUS; SUBCUTANEOUS at 06:14

## 2022-11-30 RX ADMIN — NYSTATIN CREAM 1 APPLICATION(S): 100000 CREAM TOPICAL at 17:47

## 2022-11-30 RX ADMIN — Medication 400 MILLIGRAM(S): at 13:17

## 2022-11-30 RX ADMIN — CEFEPIME 100 MILLIGRAM(S): 1 INJECTION, POWDER, FOR SOLUTION INTRAMUSCULAR; INTRAVENOUS at 06:14

## 2022-11-30 RX ADMIN — DIPHENHYDRAMINE HYDROCHLORIDE AND LIDOCAINE HYDROCHLORIDE AND ALUMINUM HYDROXIDE AND MAGNESIUM HYDRO 10 MILLILITER(S): KIT at 17:48

## 2022-11-30 RX ADMIN — URSODIOL 300 MILLIGRAM(S): 250 TABLET, FILM COATED ORAL at 06:15

## 2022-11-30 RX ADMIN — Medication 1 LOZENGE: at 23:35

## 2022-11-30 RX ADMIN — LIDOCAINE AND PRILOCAINE CREAM 1 APPLICATION(S): 25; 25 CREAM TOPICAL at 13:16

## 2022-11-30 RX ADMIN — Medication 5 MILLILITER(S): at 15:53

## 2022-11-30 RX ADMIN — CEFEPIME 100 MILLIGRAM(S): 1 INJECTION, POWDER, FOR SOLUTION INTRAMUSCULAR; INTRAVENOUS at 21:12

## 2022-11-30 RX ADMIN — Medication 5 MILLILITER(S): at 00:47

## 2022-11-30 RX ADMIN — Medication 10 MILLILITER(S): at 00:47

## 2022-11-30 RX ADMIN — DIPHENHYDRAMINE HYDROCHLORIDE AND LIDOCAINE HYDROCHLORIDE AND ALUMINUM HYDROXIDE AND MAGNESIUM HYDRO 10 MILLILITER(S): KIT at 23:35

## 2022-11-30 RX ADMIN — Medication 1 LOZENGE: at 20:29

## 2022-11-30 NOTE — PROGRESS NOTE ADULT - CRITICAL CARE ATTENDING COMMENT
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   Today is Day + 5  Problem/Plan -   1:  Problem: Stem cell transplant   ·  Plan: 1. Admit to BMTU   2. TLC placement in IR   3. Day -9 - day -2 - antiemetics   4. Day -9 & day -8, VP16 2400mg/m2 IV x 34 hours (final concentration 0.4mg /mL).   5. Strict I&O, daily weights, prn diuresis   6. After completion of VP16- start CTX hydration (D51/2NS + 10mEq KCl @ 150ml / m2) - continue for 24 hours post infusion of CTX   7. Day -7 - day -4 - CTX 1800 mg / m2 daily over 2 hours. Follow SMA7, mag, phos 6hours post CT . [place]  12mg / kg - hemorrhagic cystitis ppx   8. Day -3 - BCNU 400mg / m2   9. Day -2 - day -1 - rest days  10. Day 0(11/25/22) - HPC transplant. Start Zarxio 480mcg SQ QD, continue through engraftment. Kepivance on days 0, 1 & 2   11. Anxiolytics, antinausea, antidiarrhea medications as needed   12. Lasix PRN while being aggressively hydrated to avoid VOD   13. Nutritional support, pain management.    Problem/Plan - 2:  ·  Problem: Need for prophylactic measure.   ·  Plan: 1. VOD prophylaxis - low dose heparin gtt (dosed at 100 units / kg / day), glutamine supplementation, Actigall BID   2. PCP prophylaxis - Bactrim DS through day -2    3. Antiviral prophylaxis - Acyclovir 400mg po TID to start day -1   4. Antifungal prophylaxis- Diflucan 400 mg po daily.  5. GI prophylaxis - Protonix po QD   6. Antibacterial prophylaxis - when ANC < 500, start Cipro 500mg po BID. If becomes febrile, pan cx, CXR and change Cipro to Cefepime 2g IV q 8 hours. Continue until count recovery  7. Kepivance for prevention of mucositis- days 0, +1, +2  8. Aggressive mouth care and skin care as per protocol. On 11/25, early mucositis on lower lip, no ulcers. Continue topical care.   9. Receiving transfusion and electrolyte support.    11/25-Chemotherapy induced nausea and diarrhea managed with antiemetics and antidiarrheals. Now patient states stool is loose rather than watery.   Continue Cipro, Acyclovir, Diflucan prophylaxis. OOB/ambulate. She received HPC transplant today without complications. Begin Zarxio daily.  11/26/22: continues to have loose stools overnight, this am with epistaxis lasting ~ 5 minutes. To receive platelets. Remains fatigued.  11/28- Febrile overnight, tmax 100.4. CXR- no consolidations. Cultures pending. Started on Cefepime. Grade 2 chemotherapy induced oral mucositis. Continue supportive care.

## 2022-12-01 LAB
ALBUMIN SERPL ELPH-MCNC: 3.8 G/DL — SIGNIFICANT CHANGE UP (ref 3.3–5)
ALP SERPL-CCNC: 86 U/L — SIGNIFICANT CHANGE UP (ref 40–120)
ALT FLD-CCNC: 64 U/L — HIGH (ref 10–45)
ANION GAP SERPL CALC-SCNC: 10 MMOL/L — SIGNIFICANT CHANGE UP (ref 5–17)
APTT BLD: 24.9 SEC — LOW (ref 27.5–35.5)
AST SERPL-CCNC: 29 U/L — SIGNIFICANT CHANGE UP (ref 10–40)
BILIRUB SERPL-MCNC: 0.3 MG/DL — SIGNIFICANT CHANGE UP (ref 0.2–1.2)
BUN SERPL-MCNC: 11 MG/DL — SIGNIFICANT CHANGE UP (ref 7–23)
CALCIUM SERPL-MCNC: 9.3 MG/DL — SIGNIFICANT CHANGE UP (ref 8.4–10.5)
CHLORIDE SERPL-SCNC: 102 MMOL/L — SIGNIFICANT CHANGE UP (ref 96–108)
CO2 SERPL-SCNC: 26 MMOL/L — SIGNIFICANT CHANGE UP (ref 22–31)
CREAT SERPL-MCNC: 0.35 MG/DL — LOW (ref 0.5–1.3)
EGFR: 121 ML/MIN/1.73M2 — SIGNIFICANT CHANGE UP
FIBRINOGEN PPP-MCNC: 403 MG/DL — SIGNIFICANT CHANGE UP (ref 200–445)
GLUCOSE SERPL-MCNC: 123 MG/DL — HIGH (ref 70–99)
HCT VFR BLD CALC: 24.9 % — LOW (ref 34.5–45)
HGB BLD-MCNC: 8.2 G/DL — LOW (ref 11.5–15.5)
INR BLD: 1.12 RATIO — SIGNIFICANT CHANGE UP (ref 0.88–1.16)
LDH SERPL L TO P-CCNC: 128 U/L — SIGNIFICANT CHANGE UP (ref 50–242)
MAGNESIUM SERPL-MCNC: 2.1 MG/DL — SIGNIFICANT CHANGE UP (ref 1.6–2.6)
MCHC RBC-ENTMCNC: 29.6 PG — SIGNIFICANT CHANGE UP (ref 27–34)
MCHC RBC-ENTMCNC: 32.9 GM/DL — SIGNIFICANT CHANGE UP (ref 32–36)
MCV RBC AUTO: 89.9 FL — SIGNIFICANT CHANGE UP (ref 80–100)
NRBC # BLD: 0 /100 WBCS — SIGNIFICANT CHANGE UP (ref 0–0)
PHOSPHATE SERPL-MCNC: 3.5 MG/DL — SIGNIFICANT CHANGE UP (ref 2.5–4.5)
PLATELET # BLD AUTO: 15 K/UL — CRITICAL LOW (ref 150–400)
POTASSIUM SERPL-MCNC: 3.6 MMOL/L — SIGNIFICANT CHANGE UP (ref 3.5–5.3)
POTASSIUM SERPL-SCNC: 3.6 MMOL/L — SIGNIFICANT CHANGE UP (ref 3.5–5.3)
PROT SERPL-MCNC: 6.2 G/DL — SIGNIFICANT CHANGE UP (ref 6–8.3)
PROTHROM AB SERPL-ACNC: 13 SEC — SIGNIFICANT CHANGE UP (ref 10.5–13.4)
RBC # BLD: 2.77 M/UL — LOW (ref 3.8–5.2)
RBC # FLD: 13.2 % — SIGNIFICANT CHANGE UP (ref 10.3–14.5)
SODIUM SERPL-SCNC: 138 MMOL/L — SIGNIFICANT CHANGE UP (ref 135–145)
WBC # BLD: 0.04 K/UL — CRITICAL LOW (ref 3.8–10.5)
WBC # FLD AUTO: 0.04 K/UL — CRITICAL LOW (ref 3.8–10.5)

## 2022-12-01 PROCEDURE — 99291 CRITICAL CARE FIRST HOUR: CPT

## 2022-12-01 RX ADMIN — Medication 1 LOZENGE: at 16:34

## 2022-12-01 RX ADMIN — CEFEPIME 100 MILLIGRAM(S): 1 INJECTION, POWDER, FOR SOLUTION INTRAMUSCULAR; INTRAVENOUS at 13:00

## 2022-12-01 RX ADMIN — DIPHENHYDRAMINE HYDROCHLORIDE AND LIDOCAINE HYDROCHLORIDE AND ALUMINUM HYDROXIDE AND MAGNESIUM HYDRO 10 MILLILITER(S): KIT at 06:33

## 2022-12-01 RX ADMIN — Medication 10 MILLILITER(S): at 20:05

## 2022-12-01 RX ADMIN — NYSTATIN CREAM 1 APPLICATION(S): 100000 CREAM TOPICAL at 17:03

## 2022-12-01 RX ADMIN — Medication 400 MILLIGRAM(S): at 06:33

## 2022-12-01 RX ADMIN — CHLORHEXIDINE GLUCONATE 1 APPLICATION(S): 213 SOLUTION TOPICAL at 09:52

## 2022-12-01 RX ADMIN — URSODIOL 300 MILLIGRAM(S): 250 TABLET, FILM COATED ORAL at 06:33

## 2022-12-01 RX ADMIN — FLUCONAZOLE 400 MILLIGRAM(S): 150 TABLET ORAL at 11:26

## 2022-12-01 RX ADMIN — LIDOCAINE AND PRILOCAINE CREAM 1 APPLICATION(S): 25; 25 CREAM TOPICAL at 11:20

## 2022-12-01 RX ADMIN — Medication 1 TABLET(S): at 11:26

## 2022-12-01 RX ADMIN — Medication 1 MILLIGRAM(S): at 11:26

## 2022-12-01 RX ADMIN — Medication 1 LOZENGE: at 20:06

## 2022-12-01 RX ADMIN — CEFEPIME 100 MILLIGRAM(S): 1 INJECTION, POWDER, FOR SOLUTION INTRAMUSCULAR; INTRAVENOUS at 06:34

## 2022-12-01 RX ADMIN — DIPHENHYDRAMINE HYDROCHLORIDE AND LIDOCAINE HYDROCHLORIDE AND ALUMINUM HYDROXIDE AND MAGNESIUM HYDRO 10 MILLILITER(S): KIT at 11:19

## 2022-12-01 RX ADMIN — Medication 5 MILLILITER(S): at 11:19

## 2022-12-01 RX ADMIN — Medication 5 MILLILITER(S): at 20:05

## 2022-12-01 RX ADMIN — Medication 5 MILLILITER(S): at 09:51

## 2022-12-01 RX ADMIN — Medication 10 MILLILITER(S): at 16:33

## 2022-12-01 RX ADMIN — Medication 480 MICROGRAM(S): at 12:32

## 2022-12-01 RX ADMIN — PANTOPRAZOLE SODIUM 40 MILLIGRAM(S): 20 TABLET, DELAYED RELEASE ORAL at 06:33

## 2022-12-01 RX ADMIN — Medication 1 LOZENGE: at 11:19

## 2022-12-01 RX ADMIN — Medication 10 MILLILITER(S): at 11:18

## 2022-12-01 RX ADMIN — Medication 5 MILLILITER(S): at 16:34

## 2022-12-01 RX ADMIN — Medication 1 LOZENGE: at 09:51

## 2022-12-01 RX ADMIN — CEFEPIME 100 MILLIGRAM(S): 1 INJECTION, POWDER, FOR SOLUTION INTRAMUSCULAR; INTRAVENOUS at 21:22

## 2022-12-01 RX ADMIN — LORATADINE 10 MILLIGRAM(S): 10 TABLET ORAL at 11:26

## 2022-12-01 RX ADMIN — Medication 400 MILLIGRAM(S): at 21:21

## 2022-12-01 RX ADMIN — NYSTATIN CREAM 1 APPLICATION(S): 100000 CREAM TOPICAL at 06:34

## 2022-12-01 RX ADMIN — Medication 10 MILLILITER(S): at 09:51

## 2022-12-01 RX ADMIN — URSODIOL 300 MILLIGRAM(S): 250 TABLET, FILM COATED ORAL at 17:03

## 2022-12-01 RX ADMIN — DIPHENHYDRAMINE HYDROCHLORIDE AND LIDOCAINE HYDROCHLORIDE AND ALUMINUM HYDROXIDE AND MAGNESIUM HYDRO 10 MILLILITER(S): KIT at 17:03

## 2022-12-01 RX ADMIN — Medication 400 MILLIGRAM(S): at 12:59

## 2022-12-01 RX ADMIN — Medication 20 MILLIGRAM(S): at 12:31

## 2022-12-01 NOTE — PROGRESS NOTE ADULT - SUBJECTIVE AND OBJECTIVE BOX
HPC Transplant Team                                                      Critical / Counseling Time Provided: 30 minutes                                                                                                                                                        Chief Complaint: Autologous peripheral blood stem cell transplant with high dose CBV prep regimen for treatment of peripheral T cell lymphoma    S: Patient seen and examined with HPC Transplant Team:     All other ROS  negative     O: Vitals:   Vital Signs Last 24 Hrs  T(C): 37.1 (01 Dec 2022 05:30), Max: 37.4 (2022 17:08)  T(F): 98.8 (01 Dec 2022 05:30), Max: 99.3 (2022 17:08)  HR: 90 (01 Dec 2022 05:30) (78 - 95)  BP: 104/68 (01 Dec 2022 05:30) (95/65 - 107/72)  BP(mean): --  RR: 18 (01 Dec 2022 05:30) (17 - 18)  SpO2: 97% (01 Dec 2022 05:30) (97% - 99%)    Parameters below as of 01 Dec 2022 05:30  Patient On (Oxygen Delivery Method): room air    Admit weight: 80.3kg   Daily Weight in k (2022 09:16)  Today's weight:     Intake / Output:    @ 07:01  -  12-01 @ 07:00  --------------------------------------------------------  IN: 2135.3 mL / OUT: 2800 mL / NET: -664.7 mL        PE:   Oropharynx: patchy erythema on hard palate, and small ulcerations on hard palate and bilateral buccal mucosa, small ulcers on each side of the tongue  Oral Mucositis:            +                                          stGstrstastdstest:st st1st CVS: S1, S2 RRR   Lungs: CTA throughout bilaterally   Abdomen: + BS x 4 normoactive, soft, NT, ND  Extremities: no edema   Gastric Mucositis:       -                                           Grade: n/a   Intestinal Mucositis:     -                                         Grade: n/a   Skin: + macular erythema to chest, upper back and neck post Kepivance, improving  TLC: CDI   Neuro: A&O x 3   Pain: 1-2/10- mouth, improving    Labs:     Cultures:   Culture Results:   <10,000 CFU/mL Normal Urogenital Maryanne (22 @ 08:19)    Culture Results:   No growth to date. (22 @ 06:10)    Culture Results:   No growth to date. (22 @ 05:50)      Radiology:   ACC: [id number] EXAM:  XR CHEST PORTABLE URGENT 1V                        PROCEDURE DATE:  2022    Impression:  No focal consolidations.    Meds:   Antimicrobials:   acyclovir   Oral Tab/Cap 400 milliGRAM(s) Oral every 8 hours  cefepime   IVPB 2000 milliGRAM(s) IV Intermittent every 8 hours  clotrimazole Lozenge 1 Lozenge Oral five times a day  fluconAZOLE   Tablet 400 milliGRAM(s) Oral daily      Heme / Onc:   heparin  Infusion 334 Unit(s)/Hr IV Continuous <Continuous>      GI:  pantoprazole    Tablet 40 milliGRAM(s) Oral before breakfast  polyethylene glycol 3350 17 Gram(s) Oral daily PRN  senna 2 Tablet(s) Oral at bedtime PRN  sodium bicarbonate Mouth Rinse 10 milliLiter(s) Swish and Spit five times a day  ursodiol Capsule 300 milliGRAM(s) Oral two times a day      Cardiovascular:       Immunologic:   filgrastim-sndz (ZARXIO) Injectable 480 MICROGram(s) SubCutaneous every 24 hours      Other medications:   acetaminophen     Tablet .. 650 milliGRAM(s) Oral every 6 hours  Biotene Dry Mouth Oral Rinse 5 milliLiter(s) Swish and Spit five times a day  chlorhexidine 4% Liquid 1 Application(s) Topical <User Schedule>  FIRST- Mouthwash  BLM 10 milliLiter(s) Swish and Spit every 6 hours  FLUoxetine 20 milliGRAM(s) Oral daily  folic acid 1 milliGRAM(s) Oral daily  lidocaine/prilocaine Cream 1 Application(s) Topical daily  loratadine 10 milliGRAM(s) Oral daily  multivitamin 1 Tablet(s) Oral daily  nystatin Powder 1 Application(s) Topical two times a day  sodium chloride 0.45% 1000 milliLiter(s) IV Continuous <Continuous>  sodium chloride 0.9%. 1000 milliLiter(s) IV Continuous <Continuous>      PRN:   acetaminophen     Tablet .. 650 milliGRAM(s) Oral every 6 hours PRN  AQUAPHOR (petrolatum Ointment) 1 Application(s) Topical two times a day PRN  HYDROmorphone  Injectable 0.5 milliGRAM(s) IV Push every 6 hours PRN  metoclopramide Injectable 10 milliGRAM(s) IV Push every 6 hours PRN  oxyCODONE    IR 5 milliGRAM(s) Oral every 6 hours PRN  polyethylene glycol 3350 17 Gram(s) Oral daily PRN  senna 2 Tablet(s) Oral at bedtime PRN  sodium chloride 0.65% Nasal 1 Spray(s) Both Nostrils four times a day PRN  sodium chloride 0.9% lock flush 10 milliLiter(s) IV Push every 1 hour PRN  zinc oxide 40% Paste 1 Application(s) Topical three times a day PRN    A/P: 57 year old female with a history of  peripheral T cell lymphoma   Pre  :  Autologous PBSCT day + 6  - Completed  -16 24 hour continuous infusion, strict I&O, daily weights, prn diuresis    - Cytoxan day 3/4.  - CTX - continue CTX hydration for 24 hours post infusion of last dose. Strict I&O, daily weights, prn diuresis. Start cipro 500mg po BID when ANC < 500    Neutropenic. Started Cipro for prophylaxis. If febrile, panculture and start Cefepime (has tolerated in the past)   - Kepivance day 2/3  11/27- Kepivance day 3/3- HELD due to Kepivance rash and oral erythema worsening  - Febrile overnight, tmax 100.4. CXR no obvious consolidations. Cultures pending. Started on cefepime. Grade 2 chemotherapy induced oral mucositis. Continue supportive measures.     1. Infectious Disease:   acyclovir   Oral Tab/Cap 400 milliGRAM(s) Oral every 8 hours  cefepime   IVPB 2000 milliGRAM(s) IV Intermittent every 8 hours  clotrimazole Lozenge 1 Lozenge Oral five times a day  fluconAZOLE   Tablet 400 milliGRAM(s) Oral daily    2. VOD Prophylaxis: Actigall, Glutamine, Heparin (dosed at 100 units / kg / day)     3. GI Prophylaxis:    pantoprazole    Tablet 40 milliGRAM(s) Oral before breakfast    4. Mouthcare - NS / NaHCO3 rinses, Mycelex, Biotene; Skin care     5. GVHD prophylaxis - n/a     6. Transfuse & replete electrolytes prn     7. IV hydration, daily weights, strict I&O, prn diuresis     8. PO intake as tolerated, nutrition follow up as needed, MVI, folic acid     9. Antiemetics, anti-diarrhea medications:   metoclopramide Injectable 10 milliGRAM(s) IV Push every 6 hours PRN    10. OOB as tolerated, physical therapy consult if needed     11. Monitor coags / fibrinogen 2x week, vitamin K as needed     12. Monitor closely for clinical changes, monitor for fevers     13. Emotional support provided, plan of care discussed and questions addressed     14. Patient education done regarding plan of care, restrictions and discharge planning     15. Continue regular social work input     I have written the above note for Dr. Cabrales who performed service with me in the room.   Leena Ibrahim NP-C ([phone])    I have seen and examined patient with NP, I agree with above note as scribed.                    HPC Transplant Team                                                      Critical / Counseling Time Provided: 30 minutes                                                                                                                                                        Chief Complaint: Autologous peripheral blood stem cell transplant with high dose CBV prep regimen for treatment of peripheral T cell lymphoma    S: Patient seen and examined with HPC Transplant Team:     All other ROS  negative     O: Vitals:   Vital Signs Last 24 Hrs  T(C): 37.1 (01 Dec 2022 05:30), Max: 37.4 (2022 17:08)  T(F): 98.8 (01 Dec 2022 05:30), Max: 99.3 (2022 17:08)  HR: 90 (01 Dec 2022 05:30) (78 - 95)  BP: 104/68 (01 Dec 2022 05:30) (95/65 - 107/72)  BP(mean): --  RR: 18 (01 Dec 2022 05:30) (17 - 18)  SpO2: 97% (01 Dec 2022 05:30) (97% - 99%)    Parameters below as of 01 Dec 2022 05:30  Patient On (Oxygen Delivery Method): room air    Admit weight: 80.3kg   Daily Weight in k (2022 09:16)  Today's weight:     Intake / Output:    @ 07:01  -  12-01 @ 07:00  --------------------------------------------------------  IN: 2135.3 mL / OUT: 2800 mL / NET: -664.7 mL        PE:   Oropharynx: patchy erythema on hard palate, and small ulcerations on hard palate and bilateral buccal mucosa, small ulcers on each side of the tongue  Oral Mucositis:            +                                          stGstrstastdstest:st st1st CVS: S1, S2 RRR   Lungs: CTA throughout bilaterally   Abdomen: + BS x 4 normoactive, soft, NT, ND  Extremities: no edema   Gastric Mucositis:       -                                           Grade: n/a   Intestinal Mucositis:     -                                         Grade: n/a   Skin: + macular erythema to chest, upper back and neck post Kepivance, improving  TLC: CDI   Neuro: A&O x 3   Pain: 1-2/10- mouth, improving    Labs:                         8.2    0.04  )-----------( 15       ( 01 Dec 2022 07:05 )             24.9     12    138  |  102  |  11  ----------------------------<  123<H>  3.6   |  26  |  0.35<L>    Ca    9.3      01 Dec 2022 07:05  Phos  3.5       Mg     2.1         TPro  6.2  /  Alb  3.8  /  TBili  0.3  /  DBili  x   /  AST  29  /  ALT  64<H>  /  AlkPhos  86        Cultures:   Culture Results:   <10,000 CFU/mL Normal Urogenital Maryanne (22 @ 08:19)    Culture Results:   No growth to date. (22 @ 06:10)    Culture Results:   No growth to date. (22 @ 05:50)      Radiology:   ACC: [id number] EXAM:  XR CHEST PORTABLE URGENT 1V                        PROCEDURE DATE:  2022    Impression:  No focal consolidations.    Meds:   Antimicrobials:   acyclovir   Oral Tab/Cap 400 milliGRAM(s) Oral every 8 hours  cefepime   IVPB 2000 milliGRAM(s) IV Intermittent every 8 hours  clotrimazole Lozenge 1 Lozenge Oral five times a day  fluconAZOLE   Tablet 400 milliGRAM(s) Oral daily      Heme / Onc:   heparin  Infusion 334 Unit(s)/Hr IV Continuous <Continuous>      GI:  pantoprazole    Tablet 40 milliGRAM(s) Oral before breakfast  polyethylene glycol 3350 17 Gram(s) Oral daily PRN  senna 2 Tablet(s) Oral at bedtime PRN  sodium bicarbonate Mouth Rinse 10 milliLiter(s) Swish and Spit five times a day  ursodiol Capsule 300 milliGRAM(s) Oral two times a day      Cardiovascular:       Immunologic:   filgrastim-sndz (ZARXIO) Injectable 480 MICROGram(s) SubCutaneous every 24 hours      Other medications:   acetaminophen     Tablet .. 650 milliGRAM(s) Oral every 6 hours  Biotene Dry Mouth Oral Rinse 5 milliLiter(s) Swish and Spit five times a day  chlorhexidine 4% Liquid 1 Application(s) Topical <User Schedule>  FIRST- Mouthwash  BLM 10 milliLiter(s) Swish and Spit every 6 hours  FLUoxetine 20 milliGRAM(s) Oral daily  folic acid 1 milliGRAM(s) Oral daily  lidocaine/prilocaine Cream 1 Application(s) Topical daily  loratadine 10 milliGRAM(s) Oral daily  multivitamin 1 Tablet(s) Oral daily  nystatin Powder 1 Application(s) Topical two times a day  sodium chloride 0.45% 1000 milliLiter(s) IV Continuous <Continuous>  sodium chloride 0.9%. 1000 milliLiter(s) IV Continuous <Continuous>      PRN:   acetaminophen     Tablet .. 650 milliGRAM(s) Oral every 6 hours PRN  AQUAPHOR (petrolatum Ointment) 1 Application(s) Topical two times a day PRN  HYDROmorphone  Injectable 0.5 milliGRAM(s) IV Push every 6 hours PRN  metoclopramide Injectable 10 milliGRAM(s) IV Push every 6 hours PRN  oxyCODONE    IR 5 milliGRAM(s) Oral every 6 hours PRN  polyethylene glycol 3350 17 Gram(s) Oral daily PRN  senna 2 Tablet(s) Oral at bedtime PRN  sodium chloride 0.65% Nasal 1 Spray(s) Both Nostrils four times a day PRN  sodium chloride 0.9% lock flush 10 milliLiter(s) IV Push every 1 hour PRN  zinc oxide 40% Paste 1 Application(s) Topical three times a day PRN    A/P: 57 year old female with a history of  peripheral T cell lymphoma   Pre  :  Autologous PBSCT day + 6  - Completed  -16 24 hour continuous infusion, strict I&O, daily weights, prn diuresis    - Cytoxan day 3/4.  - CTX - continue CTX hydration for 24 hours post infusion of last dose. Strict I&O, daily weights, prn diuresis. Start cipro 500mg po BID when ANC < 500    Neutropenic. Started Cipro for prophylaxis. If febrile, panculture and start Cefepime (has tolerated in the past)   - Kepivance day 2/3  11/27- Kepivance day 3/3- HELD due to Kepivance rash and oral erythema worsening  - Febrile overnight, tmax 100.4. CXR no obvious consolidations. Cultures pending. Started on cefepime. Grade 2 chemotherapy induced oral mucositis. Continue supportive measures.     1. Infectious Disease:   acyclovir   Oral Tab/Cap 400 milliGRAM(s) Oral every 8 hours  cefepime   IVPB 2000 milliGRAM(s) IV Intermittent every 8 hours  clotrimazole Lozenge 1 Lozenge Oral five times a day  fluconAZOLE   Tablet 400 milliGRAM(s) Oral daily    2. VOD Prophylaxis: Actigall, Glutamine, Heparin (dosed at 100 units / kg / day)     3. GI Prophylaxis:    pantoprazole    Tablet 40 milliGRAM(s) Oral before breakfast    4. Mouthcare - NS / NaHCO3 rinses, Mycelex, Biotene; Skin care     5. GVHD prophylaxis - n/a     6. Transfuse & replete electrolytes prn     7. IV hydration, daily weights, strict I&O, prn diuresis     8. PO intake as tolerated, nutrition follow up as needed, MVI, folic acid     9. Antiemetics, anti-diarrhea medications:   metoclopramide Injectable 10 milliGRAM(s) IV Push every 6 hours PRN    10. OOB as tolerated, physical therapy consult if needed     11. Monitor coags / fibrinogen 2x week, vitamin K as needed     12. Monitor closely for clinical changes, monitor for fevers     13. Emotional support provided, plan of care discussed and questions addressed     14. Patient education done regarding plan of care, restrictions and discharge planning     15. Continue regular social work input     I have written the above note for Dr. Cabrales who performed service with me in the room.   Leena Ibrahim NP-C ([phone])    I have seen and examined patient with NP, I agree with above note as scribed.                    [facility]PC Transplant Team                                                      Critical / Counseling Time Provided: 30 minutes                                                                                                                                                        Chief Complaint: Autologous peripheral blood stem cell transplant with high dose CBV prep regimen for treatment of peripheral T cell lymphoma    S: Patient seen and examined with HPC Transplant Team:   + fatigue   + loose stool   + mouth pain 3-4/10  All other ROS  negative [id number]    O: Vitals:   Vital Signs Last 24 Hrs  T(C): 37.1 (01 Dec 2022 05:30), Max: 37.4 (2022 17:08)  T(F): 98.8 (01 Dec 2022 05:30), Max: 99.3 (2022 17:08)  HR: 90 (01 Dec 2022 05:30) (78 - 95)  BP: 104/68 (01 Dec 2022 05:30) (95/65 - 107/72)  BP(mean): --  RR: 18 (01 Dec 2022 05:30) (17 - 18)  SpO2: 97% (01 Dec 2022 05:30) (97% - 99%)    Parameters below as of 01 Dec 2022 05:30  Patient On (Oxygen Delivery Method): room air    Admit weight: 80.3kg   Daily Weight in k (2022 09:16)  Today's weight: 76.1kg     Intake / Output:   - @ 07:01  -  - @ 07:00  --------------------------------------------------------  IN: 2135.3 mL / OUT: 2800 mL / NET: -664.7 mL        PE:   Oropharynx: patchy erythema on hard palate, and small ulcerations on hard palate and bilateral buccal mucosa, small ulcers on each side of the tongue  Oral Mucositis:            +                                          rdGrdrrdarddrderd:rd rd3rd CVS: S1, S2 RRR   Lungs: CTA throughout bilaterally   Abdomen: + BS x 4 normoactive, soft, NT, ND  Extremities: no edema   Gastric Mucositis:       -                                           Grade: n/a   Intestinal Mucositis:     -                                         Grade: n/a   Skin: + macular erythema to chest, upper back and neck post Kepivance, improving  TLC: CDI   Neuro: A&O x 3   Pain: 3-4/10- mouth, improving    Labs:                         8.2    0.04  )-----------( 15       ( 01 Dec 2022 07:05 )             24.9     12    138  |  102  |  11  ----------------------------<  123<H>  3.6   |  26  |  0.35<L>    Ca    9.3      01 Dec 2022 07:05  Phos  3.5       Mg     2.1         TPro  6.2  /  Alb  3.8  /  TBili  0.3  /  DBili  x   /  AST  29  /  ALT  64<H>  /  AlkPhos  86        Cultures:   Culture Results:   <10,000 CFU/mL Normal Urogenital Maryanne (22 @ 08:19)    Culture Results:   No growth to date. (22 @ 06:10)    Culture Results:   No growth to date. (22 @ 05:50)      Radiology:   ACC: [id number] EXAM:  XR CHEST PORTABLE URGENT 1V                        PROCEDURE DATE:  2022    Impression:  No focal consolidations.    Meds:   Antimicrobials:   acyclovir   Oral Tab/Cap 400 milliGRAM(s) Oral every 8 hours  cefepime   IVPB 2000 milliGRAM(s) IV Intermittent every 8 hours  clotrimazole Lozenge 1 Lozenge Oral five times a day  fluconAZOLE   Tablet 400 milliGRAM(s) Oral daily      Heme / Onc:   heparin  Infusion 334 Unit(s)/Hr IV Continuous <Continuous>      GI:  pantoprazole    Tablet 40 milliGRAM(s) Oral before breakfast  polyethylene glycol 3350 17 Gram(s) Oral daily PRN  senna 2 Tablet(s) Oral at bedtime PRN  sodium bicarbonate Mouth Rinse 10 milliLiter(s) Swish and Spit five times a day  ursodiol Capsule 300 milliGRAM(s) Oral two times a day      Cardiovascular:       Immunologic:   filgrastim-sndz (ZARXIO) Injectable 480 MICROGram(s) SubCutaneous every 24 hours      Other medications:   acetaminophen     Tablet .. 650 milliGRAM(s) Oral every 6 hours  Biotene Dry Mouth Oral Rinse 5 milliLiter(s) Swish and Spit five times a day  chlorhexidine 4% Liquid 1 Application(s) Topical <User Schedule>  FIRST- Mouthwash  BLM 10 milliLiter(s) Swish and Spit every 6 hours  FLUoxetine 20 milliGRAM(s) Oral daily  folic acid 1 milliGRAM(s) Oral daily  lidocaine/prilocaine Cream 1 Application(s) Topical daily  loratadine 10 milliGRAM(s) Oral daily  multivitamin 1 Tablet(s) Oral daily  nystatin Powder 1 Application(s) Topical two times a day  sodium chloride 0.45% 1000 milliLiter(s) IV Continuous <Continuous>  sodium chloride 0.9%. 1000 milliLiter(s) IV Continuous <Continuous>      PRN:   acetaminophen     Tablet .. 650 milliGRAM(s) Oral every 6 hours PRN  AQUAPHOR (petrolatum Ointment) 1 Application(s) Topical two times a day PRN  HYDROmorphone  Injectable 0.5 milliGRAM(s) IV Push every 6 hours PRN  metoclopramide Injectable 10 milliGRAM(s) IV Push every 6 hours PRN  oxyCODONE    IR 5 milliGRAM(s) Oral every 6 hours PRN  polyethylene glycol 3350 17 Gram(s) Oral daily PRN  senna 2 Tablet(s) Oral at bedtime PRN  sodium chloride 0.65% Nasal 1 Spray(s) Both Nostrils four times a day PRN  sodium chloride 0.9% lock flush 10 milliLiter(s) IV Push every 1 hour PRN  zinc oxide 40% Paste 1 Application(s) Topical three times a day PRN    A/P: 57 year old female with a history of  peripheral T cell lymphoma   Pre  :  Autologous PBSCT day + 6  - Completed  -16 24 hour continuous infusion, strict I&O, daily weights, prn diuresis    - Cytoxan day 3.  - CTX - continue CTX hydration for 24 hours post infusion of last dose. Strict I&O, daily weights, prn diuresis. Start cipro 500mg po BID when ANC < 500    Neutropenic. Started Cipro for prophylaxis. If febrile, panculture and start Cefepime (has tolerated in the past)   - Kepivance day 2/3  - Kepivance day 3/3- HELD due to Kepivance rash and oral erythema worsening  - Febrile overnight, tmax 100.4. CXR no obvious consolidations. Cultures pending. Started on cefepime. Grade 2 chemotherapy induced oral mucositis. Continue supportive measures.     1. Infectious Disease:   acyclovir   Oral Tab/Cap 400 milliGRAM(s) Oral every 8 hours  cefepime   IVPB 2000 milliGRAM(s) IV Intermittent every 8 hours  clotrimazole Lozenge 1 Lozenge Oral five times a day  fluconAZOLE   Tablet 400 milliGRAM(s) Oral daily    2. VOD Prophylaxis: Actigall, Glutamine, Heparin (dosed at 100 units / kg / day)     3. GI Prophylaxis:    pantoprazole    Tablet 40 milliGRAM(s) Oral before breakfast    4. Mouthcare - NS / NaHCO3 rinses, Mycelex, Biotene; Skin care     5. GVHD prophylaxis - n/a     6. Transfuse & replete electrolytes prn     7. IV hydration, daily weights, strict I&O, prn diuresis     8. PO intake as tolerated, nutrition follow up as needed, MVI, folic acid     9. Antiemetics, anti-diarrhea medications:   metoclopramide Injectable 10 milliGRAM(s) IV Push every 6 hours PRN    10. OOB as tolerated, physical therapy consult if needed     11. Monitor coags / fibrinogen 2x week, vitamin K as needed     12. Monitor closely for clinical changes, monitor for fevers     13. Emotional support provided, plan of care discussed and questions addressed     14. Patient education done regarding plan of care, restrictions and discharge planning     15. Continue regular social work input     I have written the above note for Dr. Cabrales who performed service with me in the room.   Leena Ibrahim NP-C ([phone])    I have seen and examined patient with NP, I agree with above note as scribed.                    [facility]PC Transplant Team                                                      Critical / Counseling Time Provided: 30 minutes                                                                                                                                                        Chief Complaint: Autologous peripheral blood stem cell transplant with high dose CBV prep regimen for treatment of peripheral T cell lymphoma    S: Patient seen and examined with HPC Transplant Team:   + fatigue   + loose stool   + mouth pain 3-4/10  All other ROS  negative     O: Vitals:   Vital Signs Last 24 Hrs  T(C): 37.1 (01 Dec 2022 05:30), Max: 37.4 (2022 17:08)  T(F): 98.8 (01 Dec 2022 05:30), Max: 99.3 (2022 17:08)  HR: 90 (01 Dec 2022 05:30) (78 - 95)  BP: 104/68 (01 Dec 2022 05:30) (95/65 - 107/72)  BP(mean): --  RR: 18 (01 Dec 2022 05:30) (17 - 18)  SpO2: 97% (01 Dec 2022 05:30) (97% - 99%)    Parameters below as of 01 Dec 2022 05:30  Patient On (Oxygen Delivery Method): room air    Admit weight: 80.3kg   Daily Weight in k (2022 09:16)  Today's weight: 76.1kg     Intake / Output:    @ 07:01  -  - @ 07:00  --------------------------------------------------------  IN: 2135.3 mL / OUT: 2800 mL / NET: -664.7 mL        PE:   Oropharynx: patchy erythema on hard palate, and small ulcerations on hard palate and bilateral buccal mucosa, small ulcers on each side of the tongue  Oral Mucositis:            +                                          rdGrdrrdarddrderd:rd rd3rd CVS: S1, S2 RRR   Lungs: CTA throughout bilaterally   Abdomen: + BS x 4 normoactive, soft, NT, ND  Extremities: no edema   Gastric Mucositis:       -                                           Grade: n/a   Intestinal Mucositis:     -                                         Grade: n/a   Skin: + macular erythema to chest, upper back and neck post Kepivance, improving  TLC: CDI   Neuro: A&O x 3   Pain: 3-4/10- mouth, improving    Labs:                         8.2    0.04  )-----------( 15       ( 01 Dec 2022 07:05 )             24.9     12    138  |  102  |  11  ----------------------------<  123<H>  3.6   |  26  |  0.35<L>    Ca    9.3      01 Dec 2022 07:05  Phos  3.5       Mg     2.1         TPro  6.2  /  Alb  3.8  /  TBili  0.3  /  DBili  x   /  AST  29  /  ALT  64<H>  /  AlkPhos  86        Cultures:   Culture Results:   <10,000 CFU/mL Normal Urogenital Maryanne (22 @ 08:19)    Culture Results:   No growth to date. (22 @ 06:10)    Culture Results:   No growth to date. (22 @ 05:50)      Radiology:   ACC: [id number] EXAM:  XR CHEST PORTABLE URGENT 1V                        PROCEDURE DATE:  2022    Impression:  No focal consolidations.    Meds:   Antimicrobials:   acyclovir   Oral Tab/Cap 400 milliGRAM(s) Oral every 8 hours  cefepime   IVPB 2000 milliGRAM(s) IV Intermittent every 8 hours  clotrimazole Lozenge 1 Lozenge Oral five times a day  fluconAZOLE   Tablet 400 milliGRAM(s) Oral daily      Heme / Onc:   heparin  Infusion 334 Unit(s)/Hr IV Continuous <Continuous>      GI:  pantoprazole    Tablet 40 milliGRAM(s) Oral before breakfast  polyethylene glycol 3350 17 Gram(s) Oral daily PRN  senna 2 Tablet(s) Oral at bedtime PRN  sodium bicarbonate Mouth Rinse 10 milliLiter(s) Swish and Spit five times a day  ursodiol Capsule 300 milliGRAM(s) Oral two times a day      Cardiovascular:       Immunologic:   filgrastim-sndz (ZARXIO) Injectable 480 MICROGram(s) SubCutaneous every 24 hours      Other medications:   acetaminophen     Tablet .. 650 milliGRAM(s) Oral every 6 hours  Biotene Dry Mouth Oral Rinse 5 milliLiter(s) Swish and Spit five times a day  chlorhexidine 4% Liquid 1 Application(s) Topical <User Schedule>  FIRST- Mouthwash  BLM 10 milliLiter(s) Swish and Spit every 6 hours  FLUoxetine 20 milliGRAM(s) Oral daily  folic acid 1 milliGRAM(s) Oral daily  lidocaine/prilocaine Cream 1 Application(s) Topical daily  loratadine 10 milliGRAM(s) Oral daily  multivitamin 1 Tablet(s) Oral daily  nystatin Powder 1 Application(s) Topical two times a day  sodium chloride 0.45% 1000 milliLiter(s) IV Continuous <Continuous>  sodium chloride 0.9%. 1000 milliLiter(s) IV Continuous <Continuous>      PRN:   acetaminophen     Tablet .. 650 milliGRAM(s) Oral every 6 hours PRN  AQUAPHOR (petrolatum Ointment) 1 Application(s) Topical two times a day PRN  HYDROmorphone  Injectable 0.5 milliGRAM(s) IV Push every 6 hours PRN  metoclopramide Injectable 10 milliGRAM(s) IV Push every 6 hours PRN  oxyCODONE    IR 5 milliGRAM(s) Oral every 6 hours PRN  polyethylene glycol 3350 17 Gram(s) Oral daily PRN  senna 2 Tablet(s) Oral at bedtime PRN  sodium chloride 0.65% Nasal 1 Spray(s) Both Nostrils four times a day PRN  sodium chloride 0.9% lock flush 10 milliLiter(s) IV Push every 1 hour PRN  zinc oxide 40% Paste 1 Application(s) Topical three times a day PRN    A/P: 57 year old female with a history of  peripheral T cell lymphoma   Post :  Autologous PBSCT day + 6  - Completed  -16 24 hour continuous infusion, strict I&O, daily weights, prn diuresis    - Cytoxan day 3/4.  - CTX - continue CTX hydration for 24 hours post infusion of last dose. Strict I&O, daily weights, prn diuresis. Start cipro 500mg po BID when ANC < 500    Neutropenic. Started Cipro for prophylaxis. If febrile, panculture and start Cefepime (has tolerated in the past)   - Kepivance day 2/3  11/27- Kepivance day 3/3- HELD due to Kepivance rash and oral erythema worsening  - Febrile overnight, tmax 100.4. CXR no obvious consolidations. Cultures pending. Started on cefepime. Grade 2 chemotherapy induced oral mucositis. Continue supportive measures.     1. Infectious Disease:   acyclovir   Oral Tab/Cap 400 milliGRAM(s) Oral every 8 hours  cefepime   IVPB 2000 milliGRAM(s) IV Intermittent every 8 hours  clotrimazole Lozenge 1 Lozenge Oral five times a day  fluconAZOLE   Tablet 400 milliGRAM(s) Oral daily    2. VOD Prophylaxis: Actigall, Glutamine, Heparin (dosed at 100 units / kg / day)     3. GI Prophylaxis:    pantoprazole    Tablet 40 milliGRAM(s) Oral before breakfast    4. Mouthcare - NS / NaHCO3 rinses, Mycelex, Biotene; Skin care     5. GVHD prophylaxis - n/a     6. Transfuse & replete electrolytes prn     7. IV hydration, daily weights, strict I&O, prn diuresis     8. PO intake as tolerated, nutrition follow up as needed, MVI, folic acid     9. Antiemetics, anti-diarrhea medications:   metoclopramide Injectable 10 milliGRAM(s) IV Push every 6 hours PRN    10. OOB as tolerated, physical therapy consult if needed     11. Monitor coags / fibrinogen 2x week, vitamin K as needed     12. Monitor closely for clinical changes, monitor for fevers     13. Emotional support provided, plan of care discussed and questions addressed     14. Patient education done regarding plan of care, restrictions and discharge planning     15. Continue regular social work input     I have written the above note for Dr. Cabrales who performed service with me in the room.   Leena Ibrahim NP-C ([phone])    I have seen and examined patient with NP, I agree with above note as scribed.

## 2022-12-01 NOTE — PROGRESS NOTE ADULT - CRITICAL CARE ATTENDING COMMENT
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   Today is Day + 6  Problem/Plan -   1:  Problem: Stem cell transplant   ·  Plan: 1. Admit to BMTU   2. TLC placement in IR   3. Day -9 - day -2 - antiemetics   4. Day -9 & day -8, VP16 2400mg/m2 IV x 34 hours (final concentration 0.4mg /mL).   5. Strict I&O, daily weights, prn diuresis   6. After completion of VP16- start CTX hydration (D51/2NS + 10mEq KCl @ 150ml / m2) - continue for 24 hours post infusion of CTX   7. Day -7 - day -4 - CTX 1800 mg / m2 daily over 2 hours. Follow SMA7, mag, phos 6hours post CT . [place]  12mg / kg - hemorrhagic cystitis ppx   8. Day -3 - BCNU 400mg / m2   9. Day -2 - day -1 - rest days  10. Day 0(11/25/22) - HPC transplant. Start Zarxio 480mcg SQ QD, continue through engraftment. Kepivance on days 0, 1 & 2   11. Anxiolytics, antinausea, antidiarrhea medications as needed   12. Lasix PRN while being aggressively hydrated to avoid VOD   13. Nutritional support, pain management.    Problem/Plan - 2:  ·  Problem: Need for prophylactic measure.   ·  Plan: 1. VOD prophylaxis - low dose heparin gtt (dosed at 100 units / kg / day), glutamine supplementation, Actigall BID   2. PCP prophylaxis - Bactrim DS through day -2    3. Antiviral prophylaxis - Acyclovir 400mg po TID to start day -1   4. Antifungal prophylaxis- Diflucan 400 mg po daily.  5. GI prophylaxis - Protonix po QD   6. Antibacterial prophylaxis - when ANC < 500, start Cipro 500mg po BID. If becomes febrile, pan cx, CXR and change Cipro to Cefepime 2g IV q 8 hours. Continue until count recovery  7. Kepivance for prevention of mucositis- days 0, +1, +2  8. Aggressive mouth care and skin care as per protocol. On 11/25, early mucositis on lower lip, no ulcers. Continue topical care.   9. Receiving transfusion and electrolyte support.    11/25-Chemotherapy induced nausea and diarrhea managed with antiemetics and antidiarrheals. Now patient states stool is loose rather than watery.   Continue Cipro, Acyclovir, Diflucan prophylaxis. OOB/ambulate. She received HPC transplant today without complications. Begin Zarxio daily.  11/26/22: continues to have loose stools overnight, this am with epistaxis lasting ~ 5 minutes. To receive platelets. Remains fatigued.  11/28- Febrile overnight, tmax 100.4. CXR- no consolidations. Cultures pending. Started on Cefepime. Grade 2 chemotherapy induced oral mucositis. Continue supportive care.

## 2022-12-02 LAB
ALBUMIN SERPL ELPH-MCNC: 3.8 G/DL — SIGNIFICANT CHANGE UP (ref 3.3–5)
ALP SERPL-CCNC: 92 U/L — SIGNIFICANT CHANGE UP (ref 40–120)
ALT FLD-CCNC: 71 U/L — HIGH (ref 10–45)
ANION GAP SERPL CALC-SCNC: 10 MMOL/L — SIGNIFICANT CHANGE UP (ref 5–17)
AST SERPL-CCNC: 31 U/L — SIGNIFICANT CHANGE UP (ref 10–40)
BILIRUB SERPL-MCNC: 0.2 MG/DL — SIGNIFICANT CHANGE UP (ref 0.2–1.2)
BLD GP AB SCN SERPL QL: NEGATIVE — SIGNIFICANT CHANGE UP
BUN SERPL-MCNC: 14 MG/DL — SIGNIFICANT CHANGE UP (ref 7–23)
CALCIUM SERPL-MCNC: 9.2 MG/DL — SIGNIFICANT CHANGE UP (ref 8.4–10.5)
CHLORIDE SERPL-SCNC: 103 MMOL/L — SIGNIFICANT CHANGE UP (ref 96–108)
CO2 SERPL-SCNC: 26 MMOL/L — SIGNIFICANT CHANGE UP (ref 22–31)
CREAT SERPL-MCNC: 0.42 MG/DL — LOW (ref 0.5–1.3)
EGFR: 115 ML/MIN/1.73M2 — SIGNIFICANT CHANGE UP
GLUCOSE SERPL-MCNC: 112 MG/DL — HIGH (ref 70–99)
HCT VFR BLD CALC: 24.7 % — LOW (ref 34.5–45)
HGB BLD-MCNC: 8.1 G/DL — LOW (ref 11.5–15.5)
LDH SERPL L TO P-CCNC: 144 U/L — SIGNIFICANT CHANGE UP (ref 50–242)
MAGNESIUM SERPL-MCNC: 2 MG/DL — SIGNIFICANT CHANGE UP (ref 1.6–2.6)
MCHC RBC-ENTMCNC: 29.8 PG — SIGNIFICANT CHANGE UP (ref 27–34)
MCHC RBC-ENTMCNC: 32.8 GM/DL — SIGNIFICANT CHANGE UP (ref 32–36)
MCV RBC AUTO: 90.8 FL — SIGNIFICANT CHANGE UP (ref 80–100)
NRBC # BLD: 0 /100 WBCS — SIGNIFICANT CHANGE UP (ref 0–0)
PHOSPHATE SERPL-MCNC: 3.3 MG/DL — SIGNIFICANT CHANGE UP (ref 2.5–4.5)
PLATELET # BLD AUTO: 14 K/UL — CRITICAL LOW (ref 150–400)
POTASSIUM SERPL-MCNC: 3.8 MMOL/L — SIGNIFICANT CHANGE UP (ref 3.5–5.3)
POTASSIUM SERPL-SCNC: 3.8 MMOL/L — SIGNIFICANT CHANGE UP (ref 3.5–5.3)
PROT SERPL-MCNC: 6.4 G/DL — SIGNIFICANT CHANGE UP (ref 6–8.3)
RBC # BLD: 2.72 M/UL — LOW (ref 3.8–5.2)
RBC # FLD: 12.9 % — SIGNIFICANT CHANGE UP (ref 10.3–14.5)
RH IG SCN BLD-IMP: POSITIVE — SIGNIFICANT CHANGE UP
SODIUM SERPL-SCNC: 139 MMOL/L — SIGNIFICANT CHANGE UP (ref 135–145)
WBC # BLD: 0.05 K/UL — CRITICAL LOW (ref 3.8–10.5)
WBC # FLD AUTO: 0.05 K/UL — CRITICAL LOW (ref 3.8–10.5)

## 2022-12-02 PROCEDURE — 99291 CRITICAL CARE FIRST HOUR: CPT

## 2022-12-02 RX ADMIN — Medication 10 MILLILITER(S): at 12:47

## 2022-12-02 RX ADMIN — DIPHENHYDRAMINE HYDROCHLORIDE AND LIDOCAINE HYDROCHLORIDE AND ALUMINUM HYDROXIDE AND MAGNESIUM HYDRO 10 MILLILITER(S): KIT at 05:53

## 2022-12-02 RX ADMIN — URSODIOL 300 MILLIGRAM(S): 250 TABLET, FILM COATED ORAL at 18:17

## 2022-12-02 RX ADMIN — Medication 5 MILLILITER(S): at 16:59

## 2022-12-02 RX ADMIN — DIPHENHYDRAMINE HYDROCHLORIDE AND LIDOCAINE HYDROCHLORIDE AND ALUMINUM HYDROXIDE AND MAGNESIUM HYDRO 10 MILLILITER(S): KIT at 18:16

## 2022-12-02 RX ADMIN — PANTOPRAZOLE SODIUM 40 MILLIGRAM(S): 20 TABLET, DELAYED RELEASE ORAL at 06:29

## 2022-12-02 RX ADMIN — Medication 650 MILLIGRAM(S): at 23:55

## 2022-12-02 RX ADMIN — Medication 5 MILLILITER(S): at 00:18

## 2022-12-02 RX ADMIN — Medication 10 MILLILITER(S): at 20:00

## 2022-12-02 RX ADMIN — CEFEPIME 100 MILLIGRAM(S): 1 INJECTION, POWDER, FOR SOLUTION INTRAMUSCULAR; INTRAVENOUS at 05:53

## 2022-12-02 RX ADMIN — Medication 10 MILLILITER(S): at 16:59

## 2022-12-02 RX ADMIN — CHLORHEXIDINE GLUCONATE 1 APPLICATION(S): 213 SOLUTION TOPICAL at 12:46

## 2022-12-02 RX ADMIN — Medication 1 TABLET(S): at 12:48

## 2022-12-02 RX ADMIN — Medication 400 MILLIGRAM(S): at 21:06

## 2022-12-02 RX ADMIN — Medication 1 LOZENGE: at 00:19

## 2022-12-02 RX ADMIN — CEFEPIME 100 MILLIGRAM(S): 1 INJECTION, POWDER, FOR SOLUTION INTRAMUSCULAR; INTRAVENOUS at 14:00

## 2022-12-02 RX ADMIN — Medication 5 MILLILITER(S): at 20:00

## 2022-12-02 RX ADMIN — DIPHENHYDRAMINE HYDROCHLORIDE AND LIDOCAINE HYDROCHLORIDE AND ALUMINUM HYDROXIDE AND MAGNESIUM HYDRO 10 MILLILITER(S): KIT at 12:47

## 2022-12-02 RX ADMIN — HEPARIN SODIUM 3.34 UNIT(S)/HR: 5000 INJECTION INTRAVENOUS; SUBCUTANEOUS at 06:03

## 2022-12-02 RX ADMIN — Medication 10 MILLILITER(S): at 08:39

## 2022-12-02 RX ADMIN — Medication 1 LOZENGE: at 20:00

## 2022-12-02 RX ADMIN — Medication 10 MILLILITER(S): at 00:19

## 2022-12-02 RX ADMIN — NYSTATIN CREAM 1 APPLICATION(S): 100000 CREAM TOPICAL at 18:17

## 2022-12-02 RX ADMIN — SODIUM CHLORIDE 20 MILLILITER(S): 9 INJECTION INTRAMUSCULAR; INTRAVENOUS; SUBCUTANEOUS at 06:03

## 2022-12-02 RX ADMIN — LORATADINE 10 MILLIGRAM(S): 10 TABLET ORAL at 12:52

## 2022-12-02 RX ADMIN — Medication 5 MILLILITER(S): at 12:46

## 2022-12-02 RX ADMIN — Medication 400 MILLIGRAM(S): at 14:00

## 2022-12-02 RX ADMIN — FLUCONAZOLE 400 MILLIGRAM(S): 150 TABLET ORAL at 12:48

## 2022-12-02 RX ADMIN — Medication 1 MILLIGRAM(S): at 12:47

## 2022-12-02 RX ADMIN — Medication 1 LOZENGE: at 08:38

## 2022-12-02 RX ADMIN — CEFEPIME 100 MILLIGRAM(S): 1 INJECTION, POWDER, FOR SOLUTION INTRAMUSCULAR; INTRAVENOUS at 21:06

## 2022-12-02 RX ADMIN — Medication 1 LOZENGE: at 12:46

## 2022-12-02 RX ADMIN — DIPHENHYDRAMINE HYDROCHLORIDE AND LIDOCAINE HYDROCHLORIDE AND ALUMINUM HYDROXIDE AND MAGNESIUM HYDRO 10 MILLILITER(S): KIT at 00:18

## 2022-12-02 RX ADMIN — Medication 5 MILLILITER(S): at 08:39

## 2022-12-02 RX ADMIN — Medication 1 LOZENGE: at 16:59

## 2022-12-02 RX ADMIN — Medication 480 MICROGRAM(S): at 18:18

## 2022-12-02 RX ADMIN — NYSTATIN CREAM 1 APPLICATION(S): 100000 CREAM TOPICAL at 05:54

## 2022-12-02 RX ADMIN — Medication 20 MILLIGRAM(S): at 12:48

## 2022-12-02 RX ADMIN — URSODIOL 300 MILLIGRAM(S): 250 TABLET, FILM COATED ORAL at 05:52

## 2022-12-02 RX ADMIN — Medication 400 MILLIGRAM(S): at 05:53

## 2022-12-02 NOTE — PROGRESS NOTE ADULT - CRITICAL CARE ATTENDING COMMENT
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   Today is Day + 6  Problem/Plan -   1:  Problem: Stem cell transplant   ·  Plan: 1. Admit to BMTU   2. TLC placement in IR   3. Day -9 - day -2 - antiemetics   4. Day -9 & day -8, VP16 2400mg/m2 IV x 34 hours (final concentration 0.4mg /mL).   5. Strict I&O, daily weights, prn diuresis   6. After completion of VP16- start CTX hydration (D51/2NS + 10mEq KCl @ 150ml / m2) - continue for 24 hours post infusion of CTX   7. Day -7 - day -4 - CTX 1800 mg / m2 daily over 2 hours. Follow SMA7, mag, phos 6hours post CT . [place]  12mg / kg - hemorrhagic cystitis ppx   8. Day -3 - BCNU 400mg / m2   9. Day -2 - day -1 - rest days  10. Day 0(11/25/22) - HPC transplant. Start Zarxio 480mcg SQ QD, continue through engraftment. Kepivance on days 0, 1 & 2   11. Anxiolytics, antinausea, antidiarrhea medications as needed   12. Lasix PRN while being aggressively hydrated to avoid VOD   13. Nutritional support, pain management.    Problem/Plan - 2:  ·  Problem: Need for prophylactic measure.   ·  Plan: 1. VOD prophylaxis - low dose heparin gtt (dosed at 100 units / kg / day), glutamine supplementation, Actigall BID   2. PCP prophylaxis - Bactrim DS through day -2    3. Antiviral prophylaxis - Acyclovir 400mg po TID to start day -1   4. Antifungal prophylaxis- Diflucan 400 mg po daily.  5. GI prophylaxis - Protonix po QD   6. Antibacterial prophylaxis - when ANC < 500, start Cipro 500mg po BID. If becomes febrile, pan cx, CXR and change Cipro to Cefepime 2g IV q 8 hours. Continue until count recovery  7. Kepivance for prevention of mucositis- days 0, +1, +2  8. Aggressive mouth care and skin care as per protocol. On 11/25, early mucositis on lower lip, no ulcers. Continue topical care.   9. Receiving transfusion and electrolyte support.    11/25-Chemotherapy induced nausea and diarrhea managed with antiemetics and antidiarrheals. Now patient states stool is loose rather than watery.   Continue Cipro, Acyclovir, Diflucan prophylaxis. OOB/ambulate. She received HPC transplant today without complications. Begin Zarxio daily.  11/26/22: continues to have loose stools overnight, this am with epistaxis lasting ~ 5 minutes. To receive platelets. Remains fatigued.  11/28- Febrile overnight, tmax 100.4. CXR- no consolidations. Cultures pending. Started on Cefepime. Grade 2 chemotherapy induced oral mucositis. Continue supportive care. 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   Today is Day + 7  Problem/Plan -   1:  Problem: Stem cell transplant   ·  Plan: 1. Admit to BMTU   2. TLC placement in IR   3. Day -9 - day -2 - antiemetics   4. Day -9 & day -8, VP16 2400mg/m2 IV x 34 hours (final concentration 0.4mg /mL).   5. Strict I&O, daily weights, prn diuresis   6. After completion of VP16- start CTX hydration (D51/2NS + 10mEq KCl @ 150ml / m2) - continue for 24 hours post infusion of CTX   7. Day -7 - day -4 - CTX 1800 mg / m2 daily over 2 hours. Follow SMA7, mag, phos 6hours post CT . [place]  12mg / kg - hemorrhagic cystitis ppx   8. Day -3 - BCNU 400mg / m2   9. Day -2 - day -1 - rest days  10. Day 0(11/25/22) - HPC transplant. Start Zarxio 480mcg SQ QD, continue through engraftment. Kepivance on days 0, 1 & 2   11. Anxiolytics, antinausea, antidiarrhea medications as needed   12. Lasix PRN while being aggressively hydrated to avoid VOD   13. Nutritional support, pain management.    Problem/Plan - 2:  ·  Problem: Need for prophylactic measure.   ·  Plan: 1. VOD prophylaxis - low dose heparin gtt (dosed at 100 units / kg / day), glutamine supplementation, Actigall BID   2. PCP prophylaxis - Bactrim DS through day -2    3. Antiviral prophylaxis - Acyclovir 400mg po TID to start day -1   4. Antifungal prophylaxis- Diflucan 400 mg po daily.  5. GI prophylaxis - Protonix po QD   6. Antibacterial prophylaxis - when ANC < 500, start Cipro 500mg po BID. If becomes febrile, pan cx, CXR and change Cipro to Cefepime 2g IV q 8 hours. Continue until count recovery  7. Kepivance for prevention of mucositis- days 0, +1, +2  8. Aggressive mouth care and skin care as per protocol. On 11/25, early mucositis on lower lip, no ulcers. Continue topical care.   9. Receiving transfusion and electrolyte support.    11/25-Chemotherapy induced nausea and diarrhea managed with antiemetics and antidiarrheals. Now patient states stool is loose rather than watery.   Continue Cipro, Acyclovir, Diflucan prophylaxis. OOB/ambulate. She received HPC transplant today without complications. Begin Zarxio daily.  11/26/22: continues to have loose stools overnight, this am with epistaxis lasting ~ 5 minutes. To receive platelets. Remains fatigued.  11/28- Febrile overnight, tmax 100.4. CXR- no consolidations. Cultures pending. Started on Cefepime. Grade 2 chemotherapy induced oral mucositis. Continue supportive care.  12/2- patient now afebrile; 11/28 cultures negative and CXR- negative. Continue Cefepime; on Acyclovir, Diflucan prophylaxis. Continue Zarxio daily, await engraftment.

## 2022-12-02 NOTE — PROGRESS NOTE ADULT - SUBJECTIVE AND OBJECTIVE BOX
HPC Transplant Team                                                      Critical / Counseling Time Provided: 30 minutes                                                                                                                                                        Chief Complaint: Autologous peripheral blood stem cell transplant with high dose CBV prep regimen for treatment of peripheral T cell lymphoma    S: Patient seen and examined with HPC Transplant Team:   + fatigue   + loose stool   + mouth pain 3-4/10  All other ROS  negative     O: Vitals:   Vital Signs Last 24 Hrs  T(C): 37 (02 Dec 2022 06:39), Max: 37.3 (01 Dec 2022 17:34)  T(F): 98.6 (02 Dec 2022 06:39), Max: 99.1 (01 Dec 2022 17:34)  HR: 84 (02 Dec 2022 06:39) (73 - 92)  BP: 112/72 (02 Dec 2022 06:39) (105/68 - 112/72)  BP(mean): --  RR: 18 (02 Dec 2022 06:39) (18 - 18)  SpO2: 100% (02 Dec 2022 06:39) (98% - 100%)    Parameters below as of 02 Dec 2022 06:39  Patient On (Oxygen Delivery Method): room air        Admit weight:   Daily     Daily Weight in k.1 (01 Dec 2022 09:06)    Intake / Output:    @ 07:01  -   @ 07:00  --------------------------------------------------------  IN: 2071.3 mL / OUT: 1700 mL / NET: 371.3 mL          PE:   Oropharynx: patchy erythema on hard palate, and small ulcerations on hard palate and bilateral buccal mucosa, small ulcers on each side of the tongue  Oral Mucositis:            +                                          rdGrdrrdarddrderd:rd rd3rd CVS: S1, S2 RRR   Lungs: CTA throughout bilaterally   Abdomen: + BS x 4 normoactive, soft, NT, ND  Extremities: no edema   Gastric Mucositis:       -                                           Grade: n/a   Intestinal Mucositis:     -                                         Grade: n/a   Skin: + macular erythema to chest, upper back and neck post Kepivance, improving  TLC: CDI   Neuro: A&O x 3   Pain: 3-4/10- mouth, improving    Labs:           Cultures:         Radiology:       Meds:   Antimicrobials:   acyclovir   Oral Tab/Cap 400 milliGRAM(s) Oral every 8 hours  cefepime   IVPB 2000 milliGRAM(s) IV Intermittent every 8 hours  clotrimazole Lozenge 1 Lozenge Oral five times a day  fluconAZOLE   Tablet 400 milliGRAM(s) Oral daily      Heme / Onc:   heparin  Infusion 334 Unit(s)/Hr IV Continuous <Continuous>      GI:  pantoprazole    Tablet 40 milliGRAM(s) Oral before breakfast  polyethylene glycol 3350 17 Gram(s) Oral daily PRN  senna 2 Tablet(s) Oral at bedtime PRN  sodium bicarbonate Mouth Rinse 10 milliLiter(s) Swish and Spit five times a day  ursodiol Capsule 300 milliGRAM(s) Oral two times a day    Immunologic:   filgrastim-sndz (ZARXIO) Injectable 480 MICROGram(s) SubCutaneous every 24 hours      Other medications:   acetaminophen     Tablet .. 650 milliGRAM(s) Oral every 6 hours  Biotene Dry Mouth Oral Rinse 5 milliLiter(s) Swish and Spit five times a day  chlorhexidine 4% Liquid 1 Application(s) Topical <User Schedule>  FIRST- Mouthwash  BLM 10 milliLiter(s) Swish and Spit every 6 hours  FLUoxetine 20 milliGRAM(s) Oral daily  folic acid 1 milliGRAM(s) Oral daily  lidocaine/prilocaine Cream 1 Application(s) Topical daily  loratadine 10 milliGRAM(s) Oral daily  multivitamin 1 Tablet(s) Oral daily  nystatin Powder 1 Application(s) Topical two times a day  sodium chloride 0.45% 1000 milliLiter(s) IV Continuous <Continuous>  sodium chloride 0.9%. 1000 milliLiter(s) IV Continuous <Continuous>      PRN:   acetaminophen     Tablet .. 650 milliGRAM(s) Oral every 6 hours PRN  AQUAPHOR (petrolatum Ointment) 1 Application(s) Topical two times a day PRN  HYDROmorphone  Injectable 0.5 milliGRAM(s) IV Push every 6 hours PRN  metoclopramide Injectable 10 milliGRAM(s) IV Push every 6 hours PRN  oxyCODONE    IR 5 milliGRAM(s) Oral every 6 hours PRN  polyethylene glycol 3350 17 Gram(s) Oral daily PRN  senna 2 Tablet(s) Oral at bedtime PRN  sodium chloride 0.65% Nasal 1 Spray(s) Both Nostrils four times a day PRN  sodium chloride 0.9% lock flush 10 milliLiter(s) IV Push every 1 hour PRN  zinc oxide 40% Paste 1 Application(s) Topical three times a day PRN    A/P:     57 year old female with a history of  peripheral T cell lymphoma   Post :  Autologous PBSCT day + 7  - Completed  -16 24 hour continuous infusion, strict I&O, daily weights, prn diuresis    - Cytoxan day 3/4.  - CTX - continue CTX hydration for 24 hours post infusion of last dose. Strict I&O, daily weights, prn diuresis. Start cipro 500mg po BID when ANC < 500    Neutropenic. Started Cipro for prophylaxis. If febrile, panculture and start Cefepime (has tolerated in the past)   - Kepivance day 2/3  11/27- Kepivance day 3/3- HELD due to Kepivance rash and oral erythema worsening  - Febrile overnight, tmax 100.4. CXR no obvious consolidations. Cultures pending. Started on cefepime. Grade 2 chemotherapy induced oral mucositis. Continue supportive measures.     1. Infectious Disease:   acyclovir   Oral Tab/Cap 400 milliGRAM(s) Oral every 8 hours  cefepime   IVPB 2000 milliGRAM(s) IV Intermittent every 8 hours  clotrimazole Lozenge 1 Lozenge Oral five times a day  fluconAZOLE   Tablet 400 milliGRAM(s) Oral daily    2. VOD Prophylaxis: Actigall, Glutamine, Heparin (dosed at 100 units / kg / day)     3. GI Prophylaxis:    pantoprazole    Tablet 40 milliGRAM(s) Oral before breakfast    4. Mouthcare - NS / NaHCO3 rinses, Mycelex, Biotene; Skin care     5. GVHD prophylaxis - n/a     6. Transfuse & replete electrolytes prn     7. IV hydration, daily weights, strict I&O, prn diuresis     8. PO intake as tolerated, nutrition follow up as needed, MVI, folic acid     9. Antiemetics, anti-diarrhea medications:   metoclopramide Injectable 10 milliGRAM(s) IV Push every 6 hours PRN    10. OOB as tolerated, physical therapy consult if needed     11. Monitor coags / fibrinogen 2x week, vitamin K as needed     12. Monitor closely for clinical changes, monitor for fevers     13. Emotional support provided, plan of care discussed and questions addressed     14. Patient education done regarding plan of care, restrictions and discharge planning     15. Continue regular social work input    I have written the above note for Dr. Cabrales who performed service with me in the room.   Maday Becker  NP-C ([phone])    I have seen and examined patient with NP, I agree with above note as scribed.                    HPC Transplant Team                                                      Critical / Counseling Time Provided: 30 minutes                                                                                                                                                        Chief Complaint: Autologous peripheral blood stem cell transplant with high dose CBV prep regimen for treatment of peripheral T cell lymphoma    S: Patient seen and examined with HPC Transplant Team:   + fatigue   + loose stool   + mouth/throat pain improving 3/10  All other ROS  negative     O: Vitals:   Vital Signs Last 24 Hrs  T(C): 37 (02 Dec 2022 06:39), Max: 37.3 (01 Dec 2022 17:34)  T(F): 98.6 (02 Dec 2022 06:39), Max: 99.1 (01 Dec 2022 17:34)  HR: 84 (02 Dec 2022 06:39) (73 - 92)  BP: 112/72 (02 Dec 2022 06:39) (105/68 - 112/72)  BP(mean): --  RR: 18 (02 Dec 2022 06:39) (18 - 18)  SpO2: 100% (02 Dec 2022 06:39) (98% - 100%)    Parameters below as of 02 Dec 2022 06:39  Patient On (Oxygen Delivery Method): room air        Admit weight:   Daily     Daily Weight in k.1 (01 Dec 2022 09:06)    Intake / Output:    @ 07:01  -   @ 07:00  --------------------------------------------------------  IN: 2071.3 mL / OUT: 1700 mL / NET: 371.3 mL          PE:   Oropharynx: patchy erythema on hard palate, small ulcerations on hard palate and bilateral buccal mucosa slowly resolving  Oral Mucositis:            +                                          stGstrstastdstest:st st1st CVS: S1, S2 RRR   Lungs: CTA throughout bilaterally   Abdomen: + BS x 4 normoactive, soft, NT, ND  Extremities: no edema   Gastric Mucositis:       -                                           Grade: n/a   Intestinal Mucositis:     -                                         Grade: n/a   Skin: + macular erythema to chest, upper back and neck post Kepivance, improving  TLC: CDI   Neuro: A&O x 3   Pain: 3-4/10- mouth, improving    Labs:           Cultures:         Radiology:       Meds:   Antimicrobials:   acyclovir   Oral Tab/Cap 400 milliGRAM(s) Oral every 8 hours  cefepime   IVPB 2000 milliGRAM(s) IV Intermittent every 8 hours  clotrimazole Lozenge 1 Lozenge Oral five times a day  fluconAZOLE   Tablet 400 milliGRAM(s) Oral daily      Heme / Onc:   heparin  Infusion 334 Unit(s)/Hr IV Continuous <Continuous>      GI:  pantoprazole    Tablet 40 milliGRAM(s) Oral before breakfast  polyethylene glycol 3350 17 Gram(s) Oral daily PRN  senna 2 Tablet(s) Oral at bedtime PRN  sodium bicarbonate Mouth Rinse 10 milliLiter(s) Swish and Spit five times a day  ursodiol Capsule 300 milliGRAM(s) Oral two times a day    Immunologic:   filgrastim-sndz (ZARXIO) Injectable 480 MICROGram(s) SubCutaneous every 24 hours      Other medications:   acetaminophen     Tablet .. 650 milliGRAM(s) Oral every 6 hours  Biotene Dry Mouth Oral Rinse 5 milliLiter(s) Swish and Spit five times a day  chlorhexidine 4% Liquid 1 Application(s) Topical <User Schedule>  FIRST- Mouthwash  BLM 10 milliLiter(s) Swish and Spit every 6 hours  FLUoxetine 20 milliGRAM(s) Oral daily  folic acid 1 milliGRAM(s) Oral daily  lidocaine/prilocaine Cream 1 Application(s) Topical daily  loratadine 10 milliGRAM(s) Oral daily  multivitamin 1 Tablet(s) Oral daily  nystatin Powder 1 Application(s) Topical two times a day  sodium chloride 0.45% 1000 milliLiter(s) IV Continuous <Continuous>  sodium chloride 0.9%. 1000 milliLiter(s) IV Continuous <Continuous>      PRN:   acetaminophen     Tablet .. 650 milliGRAM(s) Oral every 6 hours PRN  AQUAPHOR (petrolatum Ointment) 1 Application(s) Topical two times a day PRN  HYDROmorphone  Injectable 0.5 milliGRAM(s) IV Push every 6 hours PRN  metoclopramide Injectable 10 milliGRAM(s) IV Push every 6 hours PRN  oxyCODONE    IR 5 milliGRAM(s) Oral every 6 hours PRN  polyethylene glycol 3350 17 Gram(s) Oral daily PRN  senna 2 Tablet(s) Oral at bedtime PRN  sodium chloride 0.65% Nasal 1 Spray(s) Both Nostrils four times a day PRN  sodium chloride 0.9% lock flush 10 milliLiter(s) IV Push every 1 hour PRN  zinc oxide 40% Paste 1 Application(s) Topical three times a day PRN    A/P:     57 year old female with a history of  peripheral T cell lymphoma   Post :  Autologous PBSCT day + 7  - Completed  -16 24 hour continuous infusion, strict I&O, daily weights, prn diuresis    - Cytoxan day 3/4.  - CTX - continue CTX hydration for 24 hours post infusion of last dose. Strict I&O, daily weights, prn diuresis. Start cipro 500mg po BID when ANC < 500    Neutropenic. Started Cipro for prophylaxis. If febrile, panculture and start Cefepime (has tolerated in the past)   - Kepivance day 2/3  11/27- Kepivance day 3/3- HELD due to Kepivance rash and oral erythema worsening  - Febrile overnight, tmax 100.4. CXR no obvious consolidations. Cultures pending. Started on cefepime. Grade 2 chemotherapy induced oral mucositis. Continue supportive measures.     1. Infectious Disease:   acyclovir   Oral Tab/Cap 400 milliGRAM(s) Oral every 8 hours  cefepime   IVPB 2000 milliGRAM(s) IV Intermittent every 8 hours  clotrimazole Lozenge 1 Lozenge Oral five times a day  fluconAZOLE   Tablet 400 milliGRAM(s) Oral daily    2. VOD Prophylaxis: Actigall, Glutamine, Heparin (dosed at 100 units / kg / day)     3. GI Prophylaxis:    pantoprazole    Tablet 40 milliGRAM(s) Oral before breakfast    4. Mouthcare - NS / NaHCO3 rinses, Mycelex, Biotene; Skin care     5. GVHD prophylaxis - n/a     6. Transfuse & replete electrolytes prn     7. IV hydration, daily weights, strict I&O, prn diuresis     8. PO intake as tolerated, nutrition follow up as needed, MVI, folic acid     9. Antiemetics, anti-diarrhea medications:   metoclopramide Injectable 10 milliGRAM(s) IV Push every 6 hours PRN    10. OOB as tolerated, physical therapy consult if needed     11. Monitor coags / fibrinogen 2x week, vitamin K as needed     12. Monitor closely for clinical changes, monitor for fevers     13. Emotional support provided, plan of care discussed and questions addressed     14. Patient education done regarding plan of care, restrictions and discharge planning     15. Continue regular social work input    I have written the above note for Dr. Cabrales who performed service with me in the room.   Maday Becker  NP-C ([phone])    I have seen and examined patient with NP, I agree with above note as scribed.                    HPC Transplant Team                                                      Critical / Counseling Time Provided: 30 minutes                                                                                                                                                        Chief Complaint: Autologous peripheral blood stem cell transplant with high dose CBV prep regimen for treatment of peripheral T cell lymphoma    S: Patient seen and examined with HPC Transplant Team:   + fatigue   + loose stool   + mouth/throat pain improving 3/10  All other ROS  negative     O: Vitals:   Vital Signs Last 24 Hrs  T(C): 37 (02 Dec 2022 06:39), Max: 37.3 (01 Dec 2022 17:34)  T(F): 98.6 (02 Dec 2022 06:39), Max: 99.1 (01 Dec 2022 17:34)  HR: 84 (02 Dec 2022 06:39) (73 - 92)  BP: 112/72 (02 Dec 2022 06:39) (105/68 - 112/72)  BP(mean): --  RR: 18 (02 Dec 2022 06:39) (18 - 18)  SpO2: 100% (02 Dec 2022 06:39) (98% - 100%)    Parameters below as of 02 Dec 2022 06:39  Patient On (Oxygen Delivery Method): room air    Admit weight: 80.3kg   Daily Weight in k.4 kg    Intake / Output:    @ 07:01  -  -02 @ 07:00  --------------------------------------------------------  IN: 2071.3 mL / OUT: 1700 mL / NET: 371.3 mL    PE:   Oropharynx: patchy erythema on hard palate, small ulcerations on hard palate and bilateral buccal mucosa slowly resolving  Oral Mucositis:            +                                          rdGrdrrdarddrderd:rd rd3rd CVS: S1, S2 RRR   Lungs: CTA throughout bilaterally   Abdomen: + BS x 4 normoactive, soft, NT, ND  Extremities: no edema   Gastric Mucositis:       -                                           Grade: n/a   Intestinal Mucositis:     -                                         Grade: n/a   Skin: + macular erythema to chest, upper back and neck post Kepivance, improving  TLC: CDI   Neuro: A&O x 3   Pain: 3-4/10- mouth, improving    Labs:                         8.1    0.05  )-----------( 14       ( 02 Dec 2022 09:10 )             24.7     Mean Cell Volume : 90.8 fl  Mean Cell Hemoglobin : 29.8 pg  Mean Cell Hemoglobin Concentration : 32.8 gm/dL  Auto Neutrophil # : x  Auto Lymphocyte # : x  Auto Monocyte # : x  Auto Eosinophil # : x  Auto Basophil # : x  Auto Neutrophil % : x  Auto Lymphocyte % : x  Auto Monocyte % : x  Auto Eosinophil % : x  Auto Basophil % : x    12-02    139  |  103  |  14  ----------------------------<  112<H>  3.8   |  26  |  0.42<L>    Ca    9.2      02 Dec 2022 09:10  Phos  3.3     12-02  Mg     2.0     12-02    TPro  6.4  /  Alb  3.8  /  TBili  0.2  /  DBili  x   /  AST  31  /  ALT  71<H>  /  AlkPhos  92  12-02  Mg 2.0  Phos 3.3  PT/INR - ( 01 Dec 2022 07:04 )   PT: 13.0 sec;   INR: 1.12 ratio    PTT - ( 01 Dec 2022 07:04 )  PTT:24.9 sec    Uric Acid --    Cultures:   Culture - Blood (22 @ 06:10)    Specimen Source: .Blood Blood    Culture Results:   No growth to date.    Culture - Urine (22 @ 08:19)    Specimen Source: Clean Catch Clean Catch (Midstream)    Culture Results:   <10,000 CFU/mL Normal Urogenital Maryanne    Radiology:   Xray Chest 1 View- PORTABLE-Urgent (Xray Chest 1 View- PORTABLE-Urgent .) (22 @ 07:18) >  No focal consolidations.    Meds:   Antimicrobials:   acyclovir   Oral Tab/Cap 400 milliGRAM(s) Oral every 8 hours  cefepime   IVPB 2000 milliGRAM(s) IV Intermittent every 8 hours  clotrimazole Lozenge 1 Lozenge Oral five times a day  fluconAZOLE   Tablet 400 milliGRAM(s) Oral daily    Heme / Onc:   heparin  Infusion 334 Unit(s)/Hr IV Continuous <Continuous>    GI:  pantoprazole    Tablet 40 milliGRAM(s) Oral before breakfast  polyethylene glycol 3350 17 Gram(s) Oral daily PRN  senna 2 Tablet(s) Oral at bedtime PRN  sodium bicarbonate Mouth Rinse 10 milliLiter(s) Swish and Spit five times a day  ursodiol Capsule 300 milliGRAM(s) Oral two times a day    Immunologic:   filgrastim-sndz (ZARXIO) Injectable 480 MICROGram(s) SubCutaneous every 24 hours    Other medications:   acetaminophen     Tablet .. 650 milliGRAM(s) Oral every 6 hours  Biotene Dry Mouth Oral Rinse 5 milliLiter(s) Swish and Spit five times a day  chlorhexidine 4% Liquid 1 Application(s) Topical <User Schedule>  FIRST- Mouthwash  BLM 10 milliLiter(s) Swish and Spit every 6 hours  FLUoxetine 20 milliGRAM(s) Oral daily  folic acid 1 milliGRAM(s) Oral daily  lidocaine/prilocaine Cream 1 Application(s) Topical daily  loratadine 10 milliGRAM(s) Oral daily  multivitamin 1 Tablet(s) Oral daily  nystatin Powder 1 Application(s) Topical two times a day  sodium chloride 0.45% 1000 milliLiter(s) IV Continuous <Continuous>  sodium chloride 0.9%. 1000 milliLiter(s) IV Continuous <Continuous>    PRN:   acetaminophen     Tablet .. 650 milliGRAM(s) Oral every 6 hours PRN  AQUAPHOR (petrolatum Ointment) 1 Application(s) Topical two times a day PRN  HYDROmorphone  Injectable 0.5 milliGRAM(s) IV Push every 6 hours PRN  metoclopramide Injectable 10 milliGRAM(s) IV Push every 6 hours PRN  oxyCODONE    IR 5 milliGRAM(s) Oral every 6 hours PRN  polyethylene glycol 3350 17 Gram(s) Oral daily PRN  senna 2 Tablet(s) Oral at bedtime PRN  sodium chloride 0.65% Nasal 1 Spray(s) Both Nostrils four times a day PRN  sodium chloride 0.9% lock flush 10 milliLiter(s) IV Push every 1 hour PRN  zinc oxide 40% Paste 1 Application(s) Topical three times a day PRN    A/P:     57 year old female with a history of  peripheral T cell lymphoma   Post :  Autologous PBSCT day + 7  - Completed  -16 24 hour continuous infusion, strict I&O, daily weights, prn diuresis    - Cytoxan day 3/.  - CTX /- continue CTX hydration for 24 hours post infusion of last dose. Strict I&O, daily weights, prn diuresis. Start cipro 500mg po BID when ANC < 500    Neutropenic. Started Cipro for prophylaxis. If febrile, panculture and start Cefepime (has tolerated in the past)   - Kepivance day 2/3  - Kepivance day 3/3- HELD due to Kepivance rash and oral erythema worsening  - Febrile overnight, tmax 100.4. CXR no obvious consolidations. Cultures pending. Started on cefepime. Grade 2 chemotherapy induced oral mucositis. Continue supportive measures.    - Mild transaminitis, if continue to rise lower Diflucan dose.    1. Infectious Disease:   acyclovir   Oral Tab/Cap 400 milliGRAM(s) Oral every 8 hours  cefepime   IVPB 2000 milliGRAM(s) IV Intermittent every 8 hours  clotrimazole Lozenge 1 Lozenge Oral five times a day  fluconAZOLE   Tablet 400 milliGRAM(s) Oral daily    2. VOD Prophylaxis: Actigall, Glutamine, Heparin (dosed at 100 units / kg / day)     3. GI Prophylaxis:    pantoprazole    Tablet 40 milliGRAM(s) Oral before breakfast    4. Mouthcare - NS / NaHCO3 rinses, Mycelex, Biotene; Skin care     5. GVHD prophylaxis - n/a     6. Transfuse & replete electrolytes prn     7. IV hydration, daily weights, strict I&O, prn diuresis     8. PO intake as tolerated, nutrition follow up as needed, MVI, folic acid     9. Antiemetics, anti-diarrhea medications:   metoclopramide Injectable 10 milliGRAM(s) IV Push every 6 hours PRN    10. OOB as tolerated, physical therapy consult if needed     11. Monitor coags / fibrinogen 2x week, vitamin K as needed     12. Monitor closely for clinical changes, monitor for fevers     13. Emotional support provided, plan of care discussed and questions addressed     14. Patient education done regarding plan of care, restrictions and discharge planning     15. Continue regular social work input    I have written the above note for Dr. Cabrales who performed service with me in the room.   Maday Becker  NP-C ([phone])    I have seen and examined patient with NP, I agree with above note as scribed.                    HPC Transplant Team                                                      Critical / Counseling Time Provided: 30 minutes                                                                                                                                                        Chief Complaint: Autologous peripheral blood stem cell transplant with high dose CBV prep regimen for treatment of peripheral T cell lymphoma    S: Patient seen and examined with HPC Transplant Team:   + fatigue   + loose stool   + mouth/throat pain improving 3/10  All other ROS  negative     O: Vitals:   Vital Signs Last 24 Hrs  T(C): 37 (02 Dec 2022 06:39), Max: 37.3 (01 Dec 2022 17:34)  T(F): 98.6 (02 Dec 2022 06:39), Max: 99.1 (01 Dec 2022 17:34)  HR: 84 (02 Dec 2022 06:39) (73 - 92)  BP: 112/72 (02 Dec 2022 06:39) (105/68 - 112/72)  BP(mean): --  RR: 18 (02 Dec 2022 06:39) (18 - 18)  SpO2: 100% (02 Dec 2022 06:39) (98% - 100%)    Parameters below as of 02 Dec 2022 06:39  Patient On (Oxygen Delivery Method): room air    Admit weight: 80.3kg   Daily Weight in k.4 kg    Intake / Output:    @ 07:01  -  -02 @ 07:00  --------------------------------------------------------  IN: 2071.3 mL / OUT: 1700 mL / NET: 371.3 mL    PE:   Oropharynx: patchy erythema on hard palate, small ulcerations on hard palate and bilateral buccal mucosa slowly resolving  Oral Mucositis:            +                                          rdGrdrrdarddrderd:rd rd3rd CVS: S1, S2 RRR   Lungs: CTA throughout bilaterally   Abdomen: + BS x 4 normoactive, soft, NT, ND  Extremities: no edema   Gastric Mucositis:       -                                           Grade: n/a   Intestinal Mucositis:     -                                         Grade: n/a   Skin: + macular erythema to chest, upper back and neck post Kepivance, improving  TLC: CDI   Neuro: A&O x 3   Pain: 3/10- mouth, improving    Labs:                         8.1    0.05  )-----------( 14       ( 02 Dec 2022 09:10 )             24.7     Mean Cell Volume : 90.8 fl  Mean Cell Hemoglobin : 29.8 pg  Mean Cell Hemoglobin Concentration : 32.8 gm/dL  Auto Neutrophil # : x  Auto Lymphocyte # : x  Auto Monocyte # : x  Auto Eosinophil # : x  Auto Basophil # : x  Auto Neutrophil % : x  Auto Lymphocyte % : x  Auto Monocyte % : x  Auto Eosinophil % : x  Auto Basophil % : x    12-    139  |  103  |  14  ----------------------------<  112<H>  3.8   |  26  |  0.42<L>    Ca    9.2      02 Dec 2022 09:10  Phos  3.3     12-02  Mg     2.0     12-02    TPro  6.4  /  Alb  3.8  /  TBili  0.2  /  DBili  x   /  AST  31  /  ALT  71<H>  /  AlkPhos  92  12-02  Mg 2.0  Phos 3.3  PT/INR - ( 01 Dec 2022 07:04 )   PT: 13.0 sec;   INR: 1.12 ratio    PTT - ( 01 Dec 2022 07:04 )  PTT:24.9 sec    Uric Acid --    Cultures:   Culture - Blood (22 @ 06:10)    Specimen Source: .Blood Blood    Culture Results:   No growth to date.    Culture - Urine (22 @ 08:19)    Specimen Source: Clean Catch Clean Catch (Midstream)    Culture Results:   <10,000 CFU/mL Normal Urogenital Maryanne    Radiology:   Xray Chest 1 View- PORTABLE-Urgent (Xray Chest 1 View- PORTABLE-Urgent .) (22 @ 07:18) >  No focal consolidations.    Meds:   Antimicrobials:   acyclovir   Oral Tab/Cap 400 milliGRAM(s) Oral every 8 hours  cefepime   IVPB 2000 milliGRAM(s) IV Intermittent every 8 hours  clotrimazole Lozenge 1 Lozenge Oral five times a day  fluconAZOLE   Tablet 400 milliGRAM(s) Oral daily    Heme / Onc:   heparin  Infusion 334 Unit(s)/Hr IV Continuous <Continuous>    GI:  pantoprazole    Tablet 40 milliGRAM(s) Oral before breakfast  polyethylene glycol 3350 17 Gram(s) Oral daily PRN  senna 2 Tablet(s) Oral at bedtime PRN  sodium bicarbonate Mouth Rinse 10 milliLiter(s) Swish and Spit five times a day  ursodiol Capsule 300 milliGRAM(s) Oral two times a day    Immunologic:   filgrastim-sndz (ZARXIO) Injectable 480 MICROGram(s) SubCutaneous every 24 hours    Other medications:   acetaminophen     Tablet .. 650 milliGRAM(s) Oral every 6 hours  Biotene Dry Mouth Oral Rinse 5 milliLiter(s) Swish and Spit five times a day  chlorhexidine 4% Liquid 1 Application(s) Topical <User Schedule>  FIRST- Mouthwash  BLM 10 milliLiter(s) Swish and Spit every 6 hours  FLUoxetine 20 milliGRAM(s) Oral daily  folic acid 1 milliGRAM(s) Oral daily  lidocaine/prilocaine Cream 1 Application(s) Topical daily  loratadine 10 milliGRAM(s) Oral daily  multivitamin 1 Tablet(s) Oral daily  nystatin Powder 1 Application(s) Topical two times a day  sodium chloride 0.45% 1000 milliLiter(s) IV Continuous <Continuous>  sodium chloride 0.9%. 1000 milliLiter(s) IV Continuous <Continuous>    PRN:   acetaminophen     Tablet .. 650 milliGRAM(s) Oral every 6 hours PRN  AQUAPHOR (petrolatum Ointment) 1 Application(s) Topical two times a day PRN  HYDROmorphone  Injectable 0.5 milliGRAM(s) IV Push every 6 hours PRN  metoclopramide Injectable 10 milliGRAM(s) IV Push every 6 hours PRN  oxyCODONE    IR 5 milliGRAM(s) Oral every 6 hours PRN  polyethylene glycol 3350 17 Gram(s) Oral daily PRN  senna 2 Tablet(s) Oral at bedtime PRN  sodium chloride 0.65% Nasal 1 Spray(s) Both Nostrils four times a day PRN  sodium chloride 0.9% lock flush 10 milliLiter(s) IV Push every 1 hour PRN  zinc oxide 40% Paste 1 Application(s) Topical three times a day PRN    A/P:     57 year old female with a history of  peripheral T cell lymphoma   Post :  Autologous PBSCT day + 7  - Completed  -16 24 hour continuous infusion, strict I&O, daily weights, prn diuresis    - Cytoxan day 3/.  - CTX /- continue CTX hydration for 24 hours post infusion of last dose. Strict I&O, daily weights, prn diuresis. Start cipro 500mg po BID when ANC < 500    Neutropenic. Started Cipro for prophylaxis. If febrile, panculture and start Cefepime (has tolerated in the past)   - Kepivance day 2/3  - Kepivance day 3/3- HELD due to Kepivance rash and oral erythema worsening  - Febrile overnight, tmax 100.4. CXR no obvious consolidations. Cultures pending. Started on cefepime. Grade 2 chemotherapy induced oral mucositis. Continue supportive measures.    - Mild transaminitis, if continue to rise lower Diflucan dose.    1. Infectious Disease:   acyclovir   Oral Tab/Cap 400 milliGRAM(s) Oral every 8 hours  cefepime   IVPB 2000 milliGRAM(s) IV Intermittent every 8 hours  clotrimazole Lozenge 1 Lozenge Oral five times a day  fluconAZOLE   Tablet 400 milliGRAM(s) Oral daily    2. VOD Prophylaxis: Actigall, Glutamine, Heparin (dosed at 100 units / kg / day)     3. GI Prophylaxis:    pantoprazole    Tablet 40 milliGRAM(s) Oral before breakfast    4. Mouthcare - NS / NaHCO3 rinses, Mycelex, Biotene; Skin care     5. GVHD prophylaxis - n/a     6. Transfuse & replete electrolytes prn     7. IV hydration, daily weights, strict I&O, prn diuresis     8. PO intake as tolerated, nutrition follow up as needed, MVI, folic acid     9. Antiemetics, anti-diarrhea medications:   metoclopramide Injectable 10 milliGRAM(s) IV Push every 6 hours PRN    10. OOB as tolerated, physical therapy consult if needed     11. Monitor coags / fibrinogen 2x week, vitamin K as needed     12. Monitor closely for clinical changes, monitor for fevers     13. Emotional support provided, plan of care discussed and questions addressed     14. Patient education done regarding plan of care, restrictions and discharge planning     15. Continue regular social work input    I have written the above note for Dr. Cabrales who performed service with me in the room.   Maday Becker  NP-C ([phone])    I have seen and examined patient with NP, I agree with above note as scribed.

## 2022-12-03 LAB
ALBUMIN SERPL ELPH-MCNC: 3.9 G/DL — SIGNIFICANT CHANGE UP (ref 3.3–5)
ALP SERPL-CCNC: 98 U/L — SIGNIFICANT CHANGE UP (ref 40–120)
ALT FLD-CCNC: 69 U/L — HIGH (ref 10–45)
ANION GAP SERPL CALC-SCNC: 10 MMOL/L — SIGNIFICANT CHANGE UP (ref 5–17)
AST SERPL-CCNC: 29 U/L — SIGNIFICANT CHANGE UP (ref 10–40)
BILIRUB SERPL-MCNC: 0.3 MG/DL — SIGNIFICANT CHANGE UP (ref 0.2–1.2)
BUN SERPL-MCNC: 10 MG/DL — SIGNIFICANT CHANGE UP (ref 7–23)
CALCIUM SERPL-MCNC: 9.6 MG/DL — SIGNIFICANT CHANGE UP (ref 8.4–10.5)
CHLORIDE SERPL-SCNC: 103 MMOL/L — SIGNIFICANT CHANGE UP (ref 96–108)
CO2 SERPL-SCNC: 26 MMOL/L — SIGNIFICANT CHANGE UP (ref 22–31)
CREAT SERPL-MCNC: 0.37 MG/DL — LOW (ref 0.5–1.3)
CULTURE RESULTS: SIGNIFICANT CHANGE UP
CULTURE RESULTS: SIGNIFICANT CHANGE UP
EGFR: 119 ML/MIN/1.73M2 — SIGNIFICANT CHANGE UP
GLUCOSE SERPL-MCNC: 118 MG/DL — HIGH (ref 70–99)
HCT VFR BLD CALC: 24.2 % — LOW (ref 34.5–45)
HGB BLD-MCNC: 8 G/DL — LOW (ref 11.5–15.5)
LDH SERPL L TO P-CCNC: 135 U/L — SIGNIFICANT CHANGE UP (ref 50–242)
MAGNESIUM SERPL-MCNC: 2 MG/DL — SIGNIFICANT CHANGE UP (ref 1.6–2.6)
MCHC RBC-ENTMCNC: 29.6 PG — SIGNIFICANT CHANGE UP (ref 27–34)
MCHC RBC-ENTMCNC: 33.1 GM/DL — SIGNIFICANT CHANGE UP (ref 32–36)
MCV RBC AUTO: 89.6 FL — SIGNIFICANT CHANGE UP (ref 80–100)
NRBC # BLD: 0 /100 WBCS — SIGNIFICANT CHANGE UP (ref 0–0)
PHOSPHATE SERPL-MCNC: 3.5 MG/DL — SIGNIFICANT CHANGE UP (ref 2.5–4.5)
PLATELET # BLD AUTO: 15 K/UL — CRITICAL LOW (ref 150–400)
POTASSIUM SERPL-MCNC: 3.8 MMOL/L — SIGNIFICANT CHANGE UP (ref 3.5–5.3)
POTASSIUM SERPL-SCNC: 3.8 MMOL/L — SIGNIFICANT CHANGE UP (ref 3.5–5.3)
PROT SERPL-MCNC: 6.3 G/DL — SIGNIFICANT CHANGE UP (ref 6–8.3)
RBC # BLD: 2.7 M/UL — LOW (ref 3.8–5.2)
RBC # FLD: 12.5 % — SIGNIFICANT CHANGE UP (ref 10.3–14.5)
SODIUM SERPL-SCNC: 139 MMOL/L — SIGNIFICANT CHANGE UP (ref 135–145)
SPECIMEN SOURCE: SIGNIFICANT CHANGE UP
SPECIMEN SOURCE: SIGNIFICANT CHANGE UP
WBC # BLD: 0.09 K/UL — CRITICAL LOW (ref 3.8–10.5)
WBC # FLD AUTO: 0.09 K/UL — CRITICAL LOW (ref 3.8–10.5)

## 2022-12-03 PROCEDURE — 99291 CRITICAL CARE FIRST HOUR: CPT

## 2022-12-03 RX ADMIN — Medication 1 TABLET(S): at 12:02

## 2022-12-03 RX ADMIN — Medication 400 MILLIGRAM(S): at 05:46

## 2022-12-03 RX ADMIN — Medication 5 MILLILITER(S): at 16:16

## 2022-12-03 RX ADMIN — Medication 400 MILLIGRAM(S): at 13:22

## 2022-12-03 RX ADMIN — Medication 1 LOZENGE: at 23:51

## 2022-12-03 RX ADMIN — LORATADINE 10 MILLIGRAM(S): 10 TABLET ORAL at 12:05

## 2022-12-03 RX ADMIN — URSODIOL 300 MILLIGRAM(S): 250 TABLET, FILM COATED ORAL at 17:47

## 2022-12-03 RX ADMIN — Medication 1 LOZENGE: at 08:56

## 2022-12-03 RX ADMIN — CEFEPIME 100 MILLIGRAM(S): 1 INJECTION, POWDER, FOR SOLUTION INTRAMUSCULAR; INTRAVENOUS at 05:45

## 2022-12-03 RX ADMIN — Medication 10 MILLILITER(S): at 21:12

## 2022-12-03 RX ADMIN — DIPHENHYDRAMINE HYDROCHLORIDE AND LIDOCAINE HYDROCHLORIDE AND ALUMINUM HYDROXIDE AND MAGNESIUM HYDRO 10 MILLILITER(S): KIT at 17:47

## 2022-12-03 RX ADMIN — URSODIOL 300 MILLIGRAM(S): 250 TABLET, FILM COATED ORAL at 05:46

## 2022-12-03 RX ADMIN — Medication 5 MILLILITER(S): at 08:56

## 2022-12-03 RX ADMIN — Medication 10 MILLILITER(S): at 08:56

## 2022-12-03 RX ADMIN — Medication 5 MILLILITER(S): at 23:51

## 2022-12-03 RX ADMIN — Medication 1 LOZENGE: at 21:12

## 2022-12-03 RX ADMIN — Medication 5 MILLILITER(S): at 01:00

## 2022-12-03 RX ADMIN — DIPHENHYDRAMINE HYDROCHLORIDE AND LIDOCAINE HYDROCHLORIDE AND ALUMINUM HYDROXIDE AND MAGNESIUM HYDRO 10 MILLILITER(S): KIT at 12:04

## 2022-12-03 RX ADMIN — NYSTATIN CREAM 1 APPLICATION(S): 100000 CREAM TOPICAL at 05:44

## 2022-12-03 RX ADMIN — Medication 1 LOZENGE: at 12:01

## 2022-12-03 RX ADMIN — FLUCONAZOLE 400 MILLIGRAM(S): 150 TABLET ORAL at 12:04

## 2022-12-03 RX ADMIN — CEFEPIME 100 MILLIGRAM(S): 1 INJECTION, POWDER, FOR SOLUTION INTRAMUSCULAR; INTRAVENOUS at 13:22

## 2022-12-03 RX ADMIN — CEFEPIME 100 MILLIGRAM(S): 1 INJECTION, POWDER, FOR SOLUTION INTRAMUSCULAR; INTRAVENOUS at 21:11

## 2022-12-03 RX ADMIN — Medication 1 MILLIGRAM(S): at 12:03

## 2022-12-03 RX ADMIN — NYSTATIN CREAM 1 APPLICATION(S): 100000 CREAM TOPICAL at 17:48

## 2022-12-03 RX ADMIN — Medication 5 MILLILITER(S): at 21:12

## 2022-12-03 RX ADMIN — Medication 10 MILLILITER(S): at 01:00

## 2022-12-03 RX ADMIN — Medication 10 MILLILITER(S): at 12:03

## 2022-12-03 RX ADMIN — DIPHENHYDRAMINE HYDROCHLORIDE AND LIDOCAINE HYDROCHLORIDE AND ALUMINUM HYDROXIDE AND MAGNESIUM HYDRO 10 MILLILITER(S): KIT at 23:52

## 2022-12-03 RX ADMIN — Medication 1 LOZENGE: at 17:25

## 2022-12-03 RX ADMIN — Medication 10 MILLILITER(S): at 16:15

## 2022-12-03 RX ADMIN — DIPHENHYDRAMINE HYDROCHLORIDE AND LIDOCAINE HYDROCHLORIDE AND ALUMINUM HYDROXIDE AND MAGNESIUM HYDRO 10 MILLILITER(S): KIT at 01:01

## 2022-12-03 RX ADMIN — Medication 5 MILLILITER(S): at 12:05

## 2022-12-03 RX ADMIN — Medication 650 MILLIGRAM(S): at 00:30

## 2022-12-03 RX ADMIN — Medication 10 MILLILITER(S): at 23:51

## 2022-12-03 RX ADMIN — HEPARIN SODIUM 3.34 UNIT(S)/HR: 5000 INJECTION INTRAVENOUS; SUBCUTANEOUS at 05:45

## 2022-12-03 RX ADMIN — Medication 480 MICROGRAM(S): at 17:46

## 2022-12-03 RX ADMIN — Medication 1 LOZENGE: at 01:00

## 2022-12-03 RX ADMIN — PANTOPRAZOLE SODIUM 40 MILLIGRAM(S): 20 TABLET, DELAYED RELEASE ORAL at 06:19

## 2022-12-03 RX ADMIN — DIPHENHYDRAMINE HYDROCHLORIDE AND LIDOCAINE HYDROCHLORIDE AND ALUMINUM HYDROXIDE AND MAGNESIUM HYDRO 10 MILLILITER(S): KIT at 05:44

## 2022-12-03 RX ADMIN — Medication 20 MILLIGRAM(S): at 12:04

## 2022-12-03 RX ADMIN — Medication 400 MILLIGRAM(S): at 21:11

## 2022-12-03 RX ADMIN — SODIUM CHLORIDE 20 MILLILITER(S): 9 INJECTION INTRAMUSCULAR; INTRAVENOUS; SUBCUTANEOUS at 05:45

## 2022-12-03 RX ADMIN — CHLORHEXIDINE GLUCONATE 1 APPLICATION(S): 213 SOLUTION TOPICAL at 08:56

## 2022-12-03 NOTE — PROGRESS NOTE ADULT - CRITICAL CARE ATTENDING COMMENT
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   Today is Day + 7  Problem/Plan -   1:  Problem: Stem cell transplant   ·  Plan: 1. Admit to BMTU   2. TLC placement in IR   3. Day -9 - day -2 - antiemetics   4. Day -9 & day -8, VP16 2400mg/m2 IV x 34 hours (final concentration 0.4mg /mL).   5. Strict I&O, daily weights, prn diuresis   6. After completion of VP16- start CTX hydration (D51/2NS + 10mEq KCl @ 150ml / m2) - continue for 24 hours post infusion of CTX   7. Day -7 - day -4 - CTX 1800 mg / m2 daily over 2 hours. Follow SMA7, mag, phos 6hours post CT . [place]  12mg / kg - hemorrhagic cystitis ppx   8. Day -3 - BCNU 400mg / m2   9. Day -2 - day -1 - rest days  10. Day 0(11/25/22) - HPC transplant. Start Zarxio 480mcg SQ QD, continue through engraftment. Kepivance on days 0, 1 & 2   11. Anxiolytics, antinausea, antidiarrhea medications as needed   12. Lasix PRN while being aggressively hydrated to avoid VOD   13. Nutritional support, pain management.    Problem/Plan - 2:  ·  Problem: Need for prophylactic measure.   ·  Plan: 1. VOD prophylaxis - low dose heparin gtt (dosed at 100 units / kg / day), glutamine supplementation, Actigall BID   2. PCP prophylaxis - Bactrim DS through day -2    3. Antiviral prophylaxis - Acyclovir 400mg po TID to start day -1   4. Antifungal prophylaxis- Diflucan 400 mg po daily.  5. GI prophylaxis - Protonix po QD   6. Antibacterial prophylaxis - when ANC < 500, start Cipro 500mg po BID. If becomes febrile, pan cx, CXR and change Cipro to Cefepime 2g IV q 8 hours. Continue until count recovery  7. Kepivance for prevention of mucositis- days 0, +1, +2  8. Aggressive mouth care and skin care as per protocol. On 11/25, early mucositis on lower lip, no ulcers. Continue topical care.   9. Receiving transfusion and electrolyte support.    11/25-Chemotherapy induced nausea and diarrhea managed with antiemetics and antidiarrheals. Now patient states stool is loose rather than watery.   Continue Cipro, Acyclovir, Diflucan prophylaxis. OOB/ambulate. She received HPC transplant today without complications. Begin Zarxio daily.  11/26/22: continues to have loose stools overnight, this am with epistaxis lasting ~ 5 minutes. To receive platelets. Remains fatigued.  11/28- Febrile overnight, tmax 100.4. CXR- no consolidations. Cultures pending. Started on Cefepime. Grade 2 chemotherapy induced oral mucositis. Continue supportive care.  12/2- patient now afebrile; 11/28 cultures negative and CXR- negative. Continue Cefepime; on Acyclovir, Diflucan prophylaxis. Continue Zarxio daily, await engraftment. 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   Today is Day + 8  Problem/Plan -   1:  Problem: Stem cell transplant   ·  Plan: 1. Admit to BMTU   2. TLC placement in IR   3. Day -9 - day -2 - antiemetics   4. Day -9 & day -8, VP16 2400mg/m2 IV x 34 hours (final concentration 0.4mg /mL).   5. Strict I&O, daily weights, prn diuresis   6. After completion of VP16- start CTX hydration (D51/2NS + 10mEq KCl @ 150ml / m2) - continue for 24 hours post infusion of CTX   7. Day -7 - day -4 - CTX 1800 mg / m2 daily over 2 hours. Follow SMA7, mag, phos 6hours post CT . [place]  12mg / kg - hemorrhagic cystitis ppx   8. Day -3 - BCNU 400mg / m2   9. Day -2 - day -1 - rest days  10. Day 0(11/25/22) - HPC transplant. Start Zarxio 480mcg SQ QD, continue through engraftment. Kepivance on days 0, 1 & 2   11. Anxiolytics, antinausea, antidiarrhea medications as needed   12. Lasix PRN while being aggressively hydrated to avoid VOD   13. Nutritional support, pain management.    Problem/Plan - 2:  ·  Problem: Need for prophylactic measure.   ·  Plan: 1. VOD prophylaxis - low dose heparin gtt (dosed at 100 units / kg / day), glutamine supplementation, Actigall BID   2. PCP prophylaxis - Bactrim DS through day -2    3. Antiviral prophylaxis - Acyclovir 400mg po TID to start day -1   4. Antifungal prophylaxis- Diflucan 400 mg po daily.  5. GI prophylaxis - Protonix po QD   6. Antibacterial prophylaxis - when ANC < 500, start Cipro 500mg po BID. If becomes febrile, pan cx, CXR and change Cipro to Cefepime 2g IV q 8 hours. Continue until count recovery  7. Kepivance for prevention of mucositis- days 0, +1, +2  8. Aggressive mouth care and skin care as per protocol. On 11/25, early mucositis on lower lip, no ulcers. Continue topical care.   9. Receiving transfusion and electrolyte support.    11/25-Chemotherapy induced nausea and diarrhea managed with antiemetics and antidiarrheals. Now patient states stool is loose rather than watery.   Continue Cipro, Acyclovir, Diflucan prophylaxis. OOB/ambulate. She received HPC transplant today without complications. Begin Zarxio daily.  11/26/22: continues to have loose stools overnight, this am with epistaxis lasting ~ 5 minutes. To receive platelets. Remains fatigued.  11/28- Febrile overnight, tmax 100.4. CXR- no consolidations. Cultures pending. Started on Cefepime. Grade 2 chemotherapy induced oral mucositis. Continue supportive care.  12/2- patient now afebrile; 11/28 cultures negative and CXR- negative. Continue Cefepime; on Acyclovir, Diflucan prophylaxis. Continue Zarxio daily, await engraftment.

## 2022-12-03 NOTE — PROVIDER CONTACT NOTE (CRITICAL VALUE NOTIFICATION) - RECOMMENDATIONS
transfusion 1 unit of plts w/pre meds
no transfusion at this time/pt monitored
no transfusion at this time, monitor pt
Notify NP. Monitor for s/s of bleeding.
Notify NP. Monitor for s/s of bleeding.
transfuse 1 unit of PRBC w/pre meds

## 2022-12-03 NOTE — PROGRESS NOTE ADULT - SUBJECTIVE AND OBJECTIVE BOX
[facility] Transplant Team                                                      Critical / Counseling Time Provided: 30 minutes                                                                                                                                                        Chief Complaint:     S: Patient seen and examined with [facility] Transplant Team:   Denies mouth / tongue / throat pain, dyspnea, cough, nausea, vomiting, diarrhea, abdominal pain     O: Vitals:   Vital Signs Last 24 Hrs  T(C): 37.3 (03 Dec 2022 06:20), Max: 37.3 (03 Dec 2022 06:20)  T(F): 99.1 (03 Dec 2022 06:20), Max: 99.1 (03 Dec 2022 06:20)  HR: 88 (03 Dec 2022 06:20) (81 - 89)  BP: 108/71 (03 Dec 2022 06:20) (103/63 - 119/77)  BP(mean): --  RR: 18 (03 Dec 2022 06:20) (18 - 18)  SpO2: 99% (03 Dec 2022 06:20) (98% - 100%)    Parameters below as of 03 Dec 2022 06:20  Patient On (Oxygen Delivery Method): room air        Admit weight:   Daily     Daily Weight in k.4 (02 Dec 2022 09:15)    Intake / Output:    @ 07:01  -  -03 @ 07:00  --------------------------------------------------------  IN: 1639.4 mL / OUT: 2750 mL / NET: -1110.6 mL          PE:   Oropharynx:   Oral Mucositis:                                                        Grade:   CVS:   Lungs:   Abdomen:  Extremities:   Gastric Mucositis:                                                  Grade:   Intestinal Mucositis:                                              Grade:   Skin:   TLC:   Neuro:   Pain:     Labs:   CBC Full  -  ( 03 Dec 2022 06:46 )  WBC Count : 0.09 K/uL  Hemoglobin : 8.0 g/dL  Hematocrit : 24.2 %  Platelet Count - Automated : 15 K/uL  Mean Cell Volume : 89.6 fl  Mean Cell Hemoglobin : 29.6 pg  Mean Cell Hemoglobin Concentration : 33.1 gm/dL  Auto Neutrophil # : x  Auto Lymphocyte # : x  Auto Monocyte # : x  Auto Eosinophil # : x  Auto Basophil # : x  Auto Neutrophil % : x  Auto Lymphocyte % : x  Auto Monocyte % : x  Auto Eosinophil % : x  Auto Basophil % : x                          8.0    0.09  )-----------( 15       ( 03 Dec 2022 06:46 )             24.2         139  |  103  |  10  ----------------------------<  118<H>  3.8   |  26  |  0.37<L>    Ca    9.6      03 Dec 2022 06:45  Phos  3.5       Mg     2.0         TPro  6.3  /  Alb  3.9  /  TBili  0.3  /  DBili  x   /  AST  29  /  ALT  69<H>  /  AlkPhos  98        LIVER FUNCTIONS - ( 03 Dec 2022 06:45 )  Alb: 3.9 g/dL / Pro: 6.3 g/dL / ALK PHOS: 98 U/L / ALT: 69 U/L / AST: 29 U/L / GGT: x           Lactate Dehydrogenase, Serum: 135 U/L ( @ 06:45)  Lactate Dehydrogenase, Serum: 144 U/L ( @ 09:10)          Karnofsky / Lansky Scale:   GVHD:   Skin:   Liver:   Gut:   Overall Grade:       Cultures:         Radiology:       Meds:   Antimicrobials:   acyclovir   Oral Tab/Cap 400 milliGRAM(s) Oral every 8 hours  cefepime   IVPB 2000 milliGRAM(s) IV Intermittent every 8 hours  clotrimazole Lozenge 1 Lozenge Oral five times a day  fluconAZOLE   Tablet 400 milliGRAM(s) Oral daily      Heme / Onc:   heparin  Infusion 334 Unit(s)/Hr IV Continuous <Continuous>      GI:  pantoprazole    Tablet 40 milliGRAM(s) Oral before breakfast  polyethylene glycol 3350 17 Gram(s) Oral daily PRN  senna 2 Tablet(s) Oral at bedtime PRN  sodium bicarbonate Mouth Rinse 10 milliLiter(s) Swish and Spit five times a day  ursodiol Capsule 300 milliGRAM(s) Oral two times a day      Cardiovascular:       Immunologic:   filgrastim-sndz (ZARXIO) Injectable 480 MICROGram(s) SubCutaneous every 24 hours      Other medications:   acetaminophen     Tablet .. 650 milliGRAM(s) Oral every 6 hours  Biotene Dry Mouth Oral Rinse 5 milliLiter(s) Swish and Spit five times a day  chlorhexidine 4% Liquid 1 Application(s) Topical <User Schedule>  FIRST- Mouthwash  BLM 10 milliLiter(s) Swish and Spit every 6 hours  FLUoxetine 20 milliGRAM(s) Oral daily  folic acid 1 milliGRAM(s) Oral daily  lidocaine/prilocaine Cream 1 Application(s) Topical daily  loratadine 10 milliGRAM(s) Oral daily  multivitamin 1 Tablet(s) Oral daily  nystatin Powder 1 Application(s) Topical two times a day  sodium chloride 0.45% 1000 milliLiter(s) IV Continuous <Continuous>  sodium chloride 0.9%. 1000 milliLiter(s) IV Continuous <Continuous>      PRN:   acetaminophen     Tablet .. 650 milliGRAM(s) Oral every 6 hours PRN  AQUAPHOR (petrolatum Ointment) 1 Application(s) Topical two times a day PRN  HYDROmorphone  Injectable 0.5 milliGRAM(s) IV Push every 6 hours PRN  metoclopramide Injectable 10 milliGRAM(s) IV Push every 6 hours PRN  oxyCODONE    IR 5 milliGRAM(s) Oral every 6 hours PRN  polyethylene glycol 3350 17 Gram(s) Oral daily PRN  senna 2 Tablet(s) Oral at bedtime PRN  sodium chloride 0.65% Nasal 1 Spray(s) Both Nostrils four times a day PRN  sodium chloride 0.9% lock flush 10 milliLiter(s) IV Push every 1 hour PRN  zinc oxide 40% Paste 1 Application(s) Topical three times a day PRN      A/P:   ___ year old ___  with a history of ______________________  Pre / Status Post :  Autologous / Allogeneic PBSCT / BMT day ____________    1. Infectious Disease:   Fluconazole, Acyclovir     2. VOD Prophylaxis: Actigall, Glutamine, Heparin (dosed at 100 units / kg / day)     3. GI Prophylaxis:  Protonix    4. Mouthcare - NS / NaHCO3 rinses, Mycelex, Caphosol, skin care     5. GVHD prophylaxis     6. Transfuse & replete electrolytes prn     7. IV hydration, daily weights, strict I&O, prn diuresis     8. PO intake as tolerated, nutrition follow up as needed, MVI, folic acid     9. Antiemetics, anti-diarrhea medications:   Reglan, Ativan    10. OOB as tolerated, physical therapy consult if needed     11. Monitor coags / fibrinogen 2x week, vitamin K as needed     12. Monitor closely for clinical changes, monitor for fevers     13. Emotional support provided, plan of care discussed with patient and family, questions addressed     14. Patient education done regarding chemotherapy prep, plan of care, restrictions and discharge planning     15. Continue regular social work input     I have written the above note for Dr. Cabrales who performed service with me in the room.   Maday Becker  NP-C ([phone])    I have seen and examined patient with NP, I agree with above note as scribed.                    [facility]                                                      Critical / Counseling Time Provided: 30 minutes                                                                                                                                                        Chief Complaint: Autologous peripheral blood stem cell transplant with high dose CBV prep regimen for treatment of peripheral T cell lymphoma    S: Patient seen and examined with [facility] Team:   + fatigue   + mouth/throat pain   All other ROS  negative       O: Vitals:   Vital Signs Last 24 Hrs  T(C): 37.3 (03 Dec 2022 06:20), Max: 37.3 (03 Dec 2022 06:20)  T(F): 99.1 (03 Dec 2022 06:20), Max: 99.1 (03 Dec 2022 06:20)  HR: 88 (03 Dec 2022 06:20) (81 - 89)  BP: 108/71 (03 Dec 2022 06:20) (103/63 - 119/77)  BP(mean): --  RR: 18 (03 Dec 2022 06:20) (18 - 18)  SpO2: 99% (03 Dec 2022 06:20) (98% - 100%)    Parameters below as of 03 Dec 2022 06:20  Patient On (Oxygen Delivery Method): room air        Admit weight:   Daily     Daily Weight in k.4 (02 Dec 2022 09:15)    Intake / Output:    @ 07:01  -  12-03 @ 07:00  --------------------------------------------------------  IN: 1639.4 mL / OUT: 2750 mL / NET: -1110.6 mL          PE:   Oropharynx: patchy erythema on hard palate, small ulcerations on hard palate and bilateral buccal mucosa slowly resolving  Oral Mucositis:            +                                          rdGrdrrdarddrderd:rd rd3rd CVS: S1, S2 RRR   Lungs: CTA throughout bilaterally   Abdomen: + BS x 4 normoactive, soft, NT, ND  Extremities: no edema   Gastric Mucositis:       -                                           Grade: n/a   Intestinal Mucositis:     -                                         Grade: n/a   Skin: + macular erythema to chest, upper back and neck post Kepivance, improving  TLC: CDI   Neuro: A&O x 3   Pain: 3/10- mouth, improving        Labs:   CBC Full  -  ( 03 Dec 2022 06:46 )  WBC Count : 0.09 K/uL  Hemoglobin : 8.0 g/dL  Hematocrit : 24.2 %  Platelet Count - Automated : 15 K/uL  Mean Cell Volume : 89.6 fl  Mean Cell Hemoglobin : 29.6 pg  Mean Cell Hemoglobin Concentration : 33.1 gm/dL  Auto Neutrophil # : x  Auto Lymphocyte # : x  Auto Monocyte # : x  Auto Eosinophil # : x  Auto Basophil # : x  Auto Neutrophil % : x  Auto Lymphocyte % : x  Auto Monocyte % : x  Auto Eosinophil % : x  Auto Basophil % : x                          8.0    0.09  )-----------( 15       ( 03 Dec 2022 06:46 )             24.2         139  |  103  |  10  ----------------------------<  118<H>  3.8   |  26  |  0.37<L>    Ca    9.6      03 Dec 2022 06:45  Phos  3.5       Mg     2.0         TPro  6.3  /  Alb  3.9  /  TBili  0.3  /  DBili  x   /  AST  29  /  ALT  69<H>  /  AlkPhos  98        LIVER FUNCTIONS - ( 03 Dec 2022 06:45 )  Alb: 3.9 g/dL / Pro: 6.3 g/dL / ALK PHOS: 98 U/L / ALT: 69 U/L / AST: 29 U/L / GGT: x           Lactate Dehydrogenase, Serum: 135 U/L ( @ 06:45)  Lactate Dehydrogenase, Serum: 144 U/L ( @ 09:10)              Cultures:         Radiology:       Meds:   Antimicrobials:   acyclovir   Oral Tab/Cap 400 milliGRAM(s) Oral every 8 hours  cefepime   IVPB 2000 milliGRAM(s) IV Intermittent every 8 hours  clotrimazole Lozenge 1 Lozenge Oral five times a day  fluconAZOLE   Tablet 400 milliGRAM(s) Oral daily      Heme / Onc:   heparin  Infusion 334 Unit(s)/Hr IV Continuous <Continuous>      GI:  pantoprazole    Tablet 40 milliGRAM(s) Oral before breakfast  polyethylene glycol 3350 17 Gram(s) Oral daily PRN  senna 2 Tablet(s) Oral at bedtime PRN  sodium bicarbonate Mouth Rinse 10 milliLiter(s) Swish and Spit five times a day  ursodiol Capsule 300 milliGRAM(s) Oral two times a day      Cardiovascular:       Immunologic:   filgrastim-sndz (ZARXIO) Injectable 480 MICROGram(s) SubCutaneous every 24 hours      Other medications:   acetaminophen     Tablet .. 650 milliGRAM(s) Oral every 6 hours  Biotene Dry Mouth Oral Rinse 5 milliLiter(s) Swish and Spit five times a day  chlorhexidine 4% Liquid 1 Application(s) Topical <User Schedule>  FIRST- Mouthwash  BLM 10 milliLiter(s) Swish and Spit every 6 hours  FLUoxetine 20 milliGRAM(s) Oral daily  folic acid 1 milliGRAM(s) Oral daily  lidocaine/prilocaine Cream 1 Application(s) Topical daily  loratadine 10 milliGRAM(s) Oral daily  multivitamin 1 Tablet(s) Oral daily  nystatin Powder 1 Application(s) Topical two times a day  sodium chloride 0.45% 1000 milliLiter(s) IV Continuous <Continuous>  sodium chloride 0.9%. 1000 milliLiter(s) IV Continuous <Continuous>      PRN:   acetaminophen     Tablet .. 650 milliGRAM(s) Oral every 6 hours PRN  AQUAPHOR (petrolatum Ointment) 1 Application(s) Topical two times a day PRN  HYDROmorphone  Injectable 0.5 milliGRAM(s) IV Push every 6 hours PRN  metoclopramide Injectable 10 milliGRAM(s) IV Push every 6 hours PRN  oxyCODONE    IR 5 milliGRAM(s) Oral every 6 hours PRN  polyethylene glycol 3350 17 Gram(s) Oral daily PRN  senna 2 Tablet(s) Oral at bedtime PRN  sodium chloride 0.65% Nasal 1 Spray(s) Both Nostrils four times a day PRN  sodium chloride 0.9% lock flush 10 milliLiter(s) IV Push every 1 hour PRN  zinc oxide 40% Paste 1 Application(s) Topical three times a day PRN      A/P:   ___ year old ___  with a history of ______________________  Pre / Status Post :  Autologous / Allogeneic PBSCT / BMT day ____________    1. Infectious Disease:   Fluconazole, Acyclovir     2. VOD Prophylaxis: Actigall, Glutamine, Heparin (dosed at 100 units / kg / day)     3. GI Prophylaxis:  Protonix    4. Mouthcare - NS / NaHCO3 rinses, Mycelex, Caphosol, skin care     5. GVHD prophylaxis     6. Transfuse & replete electrolytes prn     7. IV hydration, daily weights, strict I&O, prn diuresis     8. PO intake as tolerated, nutrition follow up as needed, MVI, folic acid     9. Antiemetics, anti-diarrhea medications:   Reglan, Ativan    10. OOB as tolerated, physical therapy consult if needed     11. Monitor coags / fibrinogen 2x week, vitamin K as needed     12. Monitor closely for clinical changes, monitor for fevers     13. Emotional support provided, plan of care discussed with patient and family, questions addressed     14. Patient education done regarding chemotherapy prep, plan of care, restrictions and discharge planning     15. Continue regular social work input     I have written the above note for Dr. Cabrales who performed service with me in the room.   Maday Becker  NP-C ([phone])    I have seen and examined patient with NP, I agree with above note as scribed.                    [facility]                                                      Critical / Counseling Time Provided: 30 minutes                                                                                                                                                        Chief Complaint: Autologous peripheral blood stem cell transplant with high dose CBV prep regimen for treatment of peripheral T cell lymphoma    S: Patient seen and examined with [facility] Team:   + fatigue   + mouth/throat pain   All other ROS  negative     O: Vitals:   Vital Signs Last 24 Hrs  T(C): 37.3 (03 Dec 2022 06:20), Max: 37.3 (03 Dec 2022 06:20)  T(F): 99.1 (03 Dec 2022 06:20), Max: 99.1 (03 Dec 2022 06:20)  HR: 88 (03 Dec 2022 06:20) (81 - 89)  BP: 108/71 (03 Dec 2022 06:20) (103/63 - 119/77)  BP(mean): --  RR: 18 (03 Dec 2022 06:20) (18 - 18)  SpO2: 99% (03 Dec 2022 06:20) (98% - 100%)    Parameters below as of 03 Dec 2022 06:20  Patient On (Oxygen Delivery Method): room air      Admit weight: 80.3kg   Daily Weight in k.5 kg    Intake / Output:    @ 07:01  -  -03 @ 07:00  --------------------------------------------------------  IN: 1639.4 mL / OUT: 2750 mL / NET: -1110.6 mL    PE:   Oropharynx: patchy erythema on hard palate, small ulcerations on hard palate and bilateral buccal mucosa slowly resolving  Oral Mucositis:            +                                          rdGrdrrdarddrderd:rd rd3rd CVS: S1, S2 RRR   Lungs: CTA throughout bilaterally   Abdomen: + BS x 4 normoactive, soft, NT, ND  Extremities: no edema   Gastric Mucositis:       -                                           Grade: n/a   Intestinal Mucositis:     -                                         Grade: n/a   Skin: + macular erythema to chest, upper back and neck post Kepivance, improving  TLC: CDI   Neuro: A&O x 3   Pain: 3/10- mouth, improving    Labs:   CBC Full  -  ( 03 Dec 2022 06:46 )  WBC Count : 0.09 K/uL  Hemoglobin : 8.0 g/dL  Hematocrit : 24.2 %  Platelet Count - Automated : 15 K/uL  Mean Cell Volume : 89.6 fl  Mean Cell Hemoglobin : 29.6 pg  Mean Cell Hemoglobin Concentration : 33.1 gm/dL  Auto Neutrophil # : x  Auto Lymphocyte # : x  Auto Monocyte # : x  Auto Eosinophil # : x  Auto Basophil # : x  Auto Neutrophil % : x  Auto Lymphocyte % : x  Auto Monocyte % : x  Auto Eosinophil % : x  Auto Basophil % : x                          8.0    0.09  )-----------( 15       ( 03 Dec 2022 06:46 )             24.2         139  |  103  |  10  ----------------------------<  118<H>  3.8   |  26  |  0.37<L>    Ca    9.6      03 Dec 2022 06:45  Phos  3.5       Mg     2.0         TPro  6.3  /  Alb  3.9  /  TBili  0.3  /  DBili  x   /  AST  29  /  ALT  69<H>  /  AlkPhos  98    LIVER FUNCTIONS - ( 03 Dec 2022 06:45 )  Alb: 3.9 g/dL / Pro: 6.3 g/dL / ALK PHOS: 98 U/L / ALT: 69 U/L / AST: 29 U/L / GGT: x           Lactate Dehydrogenase, Serum: 135 U/L ( @ 06:45)  Lactate Dehydrogenase, Serum: 144 U/L ( @ 09:10)    Cultures:   Culture - Blood (22 @ 06:10)    Specimen Source: .Blood Blood    Culture Results:   No growth to date.    Culture - Urine (22 @ 08:19)    Specimen Source: Clean Catch Clean Catch (Midstream)    Culture Results:   <10,000 CFU/mL Normal Urogenital Maryanne    Radiology:    Xray Chest 1 View- PORTABLE-Urgent (Xray Chest 1 View- PORTABLE-Urgent .) (22 @ 07:18) >  No focal consolidations.    Meds:   Antimicrobials:   acyclovir   Oral Tab/Cap 400 milliGRAM(s) Oral every 8 hours  cefepime   IVPB 2000 milliGRAM(s) IV Intermittent every 8 hours  clotrimazole Lozenge 1 Lozenge Oral five times a day  fluconAZOLE   Tablet 400 milliGRAM(s) Oral daily      Heme / Onc:   heparin  Infusion 334 Unit(s)/Hr IV Continuous <Continuous>      GI:  pantoprazole    Tablet 40 milliGRAM(s) Oral before breakfast  polyethylene glycol 3350 17 Gram(s) Oral daily PRN  senna 2 Tablet(s) Oral at bedtime PRN  sodium bicarbonate Mouth Rinse 10 milliLiter(s) Swish and Spit five times a day  ursodiol Capsule 300 milliGRAM(s) Oral two times a day      Cardiovascular:       Immunologic:   filgrastim-sndz (ZARXIO) Injectable 480 MICROGram(s) SubCutaneous every 24 hours      Other medications:   acetaminophen     Tablet .. 650 milliGRAM(s) Oral every 6 hours  Biotene Dry Mouth Oral Rinse 5 milliLiter(s) Swish and Spit five times a day  chlorhexidine 4% Liquid 1 Application(s) Topical <User Schedule>  FIRST- Mouthwash  BLM 10 milliLiter(s) Swish and Spit every 6 hours  FLUoxetine 20 milliGRAM(s) Oral daily  folic acid 1 milliGRAM(s) Oral daily  lidocaine/prilocaine Cream 1 Application(s) Topical daily  loratadine 10 milliGRAM(s) Oral daily  multivitamin 1 Tablet(s) Oral daily  nystatin Powder 1 Application(s) Topical two times a day  sodium chloride 0.45% 1000 milliLiter(s) IV Continuous <Continuous>  sodium chloride 0.9%. 1000 milliLiter(s) IV Continuous <Continuous>      PRN:   acetaminophen     Tablet .. 650 milliGRAM(s) Oral every 6 hours PRN  AQUAPHOR (petrolatum Ointment) 1 Application(s) Topical two times a day PRN  HYDROmorphone  Injectable 0.5 milliGRAM(s) IV Push every 6 hours PRN  metoclopramide Injectable 10 milliGRAM(s) IV Push every 6 hours PRN  oxyCODONE    IR 5 milliGRAM(s) Oral every 6 hours PRN  polyethylene glycol 3350 17 Gram(s) Oral daily PRN  senna 2 Tablet(s) Oral at bedtime PRN  sodium chloride 0.65% Nasal 1 Spray(s) Both Nostrils four times a day PRN  sodium chloride 0.9% lock flush 10 milliLiter(s) IV Push every 1 hour PRN  zinc oxide 40% Paste 1 Application(s) Topical three times a day PRN      A/P:   ___ year old ___  with a history of ______________________  Pre / Status Post :  Autologous / Allogeneic PBSCT / BMT day ____________    1. Infectious Disease:   Fluconazole, Acyclovir     2. VOD Prophylaxis: Actigall, Glutamine, Heparin (dosed at 100 units / kg / day)     3. GI Prophylaxis:  Protonix    4. Mouthcare - NS / NaHCO3 rinses, Mycelex, Caphosol, skin care     5. GVHD prophylaxis     6. Transfuse & replete electrolytes prn     7. IV hydration, daily weights, strict I&O, prn diuresis     8. PO intake as tolerated, nutrition follow up as needed, MVI, folic acid     9. Antiemetics, anti-diarrhea medications:   Reglan, Ativan    10. OOB as tolerated, physical therapy consult if needed     11. Monitor coags / fibrinogen 2x week, vitamin K as needed     12. Monitor closely for clinical changes, monitor for fevers     13. Emotional support provided, plan of care discussed with patient and family, questions addressed     14. Patient education done regarding chemotherapy prep, plan of care, restrictions and discharge planning     15. Continue regular social work input     I have written the above note for Dr. Cabrales who performed service with me in the room.   Maday Becker  NP-C ([phone])    I have seen and examined patient with NP, I agree with above note as scribed.                    [facility] Team                                                      Critical / Counseling Time Provided: 30 minutes                                                                                                                                                        Chief Complaint: Autologous peripheral blood stem cell transplant with high dose CBV prep regimen for treatment of peripheral T cell lymphoma    S: Patient seen and examined with [facility] Team:   + fatigue   + mouth/throat pain   + poor appetite  All other ROS  negative     O: Vitals:   Vital Signs Last 24 Hrs  T(C): 37.3 (03 Dec 2022 06:20), Max: 37.3 (03 Dec 2022 06:20)  T(F): 99.1 (03 Dec 2022 06:20), Max: 99.1 (03 Dec 2022 06:20)  HR: 88 (03 Dec 2022 06:20) (81 - 89)  BP: 108/71 (03 Dec 2022 06:20) (103/63 - 119/77)  BP(mean): --  RR: 18 (03 Dec 2022 06:20) (18 - 18)  SpO2: 99% (03 Dec 2022 06:20) (98% - 100%)    Parameters below as of 03 Dec 2022 06:20  Patient On (Oxygen Delivery Method): room air      Admit weight: 80.3kg   Daily Weight in k.5 kg    Intake / Output:    @ 07:01  -  12-03 @ 07:00  --------------------------------------------------------  IN: 1639.4 mL / OUT: 2750 mL / NET: -1110.6 mL    PE:   Oropharynx: patchy erythema on hard palate, small ulcerations on hard palate and bilateral buccal mucosa slowly resolving  Oral Mucositis:            +                                          stGstrstastdstest:st st1st CVS: S1, S2 RRR   Lungs: CTA throughout bilaterally   Abdomen: + BS x 4 normoactive, soft, NT, ND  Extremities: no edema   Gastric Mucositis:       -                                           Grade: n/a   Intestinal Mucositis:     -                                         Grade: n/a   Skin: + macular erythema to chest, upper back and neck post Kepivance, improving  TLC: CDI   Neuro: A&O x 3   Pain: 3/10- mouth, improving    Labs:   CBC Full  -  ( 03 Dec 2022 06:46 )  WBC Count : 0.09 K/uL  Hemoglobin : 8.0 g/dL  Hematocrit : 24.2 %  Platelet Count - Automated : 15 K/uL  Mean Cell Volume : 89.6 fl  Mean Cell Hemoglobin : 29.6 pg  Mean Cell Hemoglobin Concentration : 33.1 gm/dL  Auto Neutrophil # : x  Auto Lymphocyte # : x  Auto Monocyte # : x  Auto Eosinophil # : x  Auto Basophil # : x  Auto Neutrophil % : x  Auto Lymphocyte % : x  Auto Monocyte % : x  Auto Eosinophil % : x  Auto Basophil % : x                          8.0    0.09  )-----------( 15       ( 03 Dec 2022 06:46 )             24.2         139  |  103  |  10  ----------------------------<  118<H>  3.8   |  26  |  0.37<L>    Ca    9.6      03 Dec 2022 06:45  Phos  3.5       Mg     2.0         TPro  6.3  /  Alb  3.9  /  TBili  0.3  /  DBili  x   /  AST  29  /  ALT  69<H>  /  AlkPhos  98    LIVER FUNCTIONS - ( 03 Dec 2022 06:45 )  Alb: 3.9 g/dL / Pro: 6.3 g/dL / ALK PHOS: 98 U/L / ALT: 69 U/L / AST: 29 U/L / GGT: x           Lactate Dehydrogenase, Serum: 135 U/L ( @ 06:45)  Lactate Dehydrogenase, Serum: 144 U/L ( @ 09:10)    Cultures:   Culture - Blood (22 @ 06:10)    Specimen Source: .Blood Blood    Culture Results:   No growth to date.    Culture - Urine (22 @ 08:19)    Specimen Source: Clean Catch Clean Catch (Midstream)    Culture Results:   <10,000 CFU/mL Normal Urogenital Maryanne    Radiology:    Xray Chest 1 View- PORTABLE-Urgent (Xray Chest 1 View- PORTABLE-Urgent .) (22 @ 07:18) >  No focal consolidations.    Meds:   Antimicrobials:   acyclovir   Oral Tab/Cap 400 milliGRAM(s) Oral every 8 hours  cefepime   IVPB 2000 milliGRAM(s) IV Intermittent every 8 hours  clotrimazole Lozenge 1 Lozenge Oral five times a day  fluconAZOLE   Tablet 400 milliGRAM(s) Oral daily    Heme / Onc:   heparin  Infusion 334 Unit(s)/Hr IV Continuous <Continuous>    GI:  pantoprazole    Tablet 40 milliGRAM(s) Oral before breakfast  polyethylene glycol 3350 17 Gram(s) Oral daily PRN  senna 2 Tablet(s) Oral at bedtime PRN  sodium bicarbonate Mouth Rinse 10 milliLiter(s) Swish and Spit five times a day  ursodiol Capsule 300 milliGRAM(s) Oral two times a day    Immunologic:   filgrastim-sndz (ZARXIO) Injectable 480 MICROGram(s) SubCutaneous every 24 hours    Other medications:   acetaminophen     Tablet .. 650 milliGRAM(s) Oral every 6 hours  Biotene Dry Mouth Oral Rinse 5 milliLiter(s) Swish and Spit five times a day  chlorhexidine 4% Liquid 1 Application(s) Topical <User Schedule>  FIRST- Mouthwash  BLM 10 milliLiter(s) Swish and Spit every 6 hours  FLUoxetine 20 milliGRAM(s) Oral daily  folic acid 1 milliGRAM(s) Oral daily  lidocaine/prilocaine Cream 1 Application(s) Topical daily  loratadine 10 milliGRAM(s) Oral daily  multivitamin 1 Tablet(s) Oral daily  nystatin Powder 1 Application(s) Topical two times a day  sodium chloride 0.45% 1000 milliLiter(s) IV Continuous <Continuous>  sodium chloride 0.9%. 1000 milliLiter(s) IV Continuous <Continuous>    PRN:   acetaminophen     Tablet .. 650 milliGRAM(s) Oral every 6 hours PRN  AQUAPHOR (petrolatum Ointment) 1 Application(s) Topical two times a day PRN  HYDROmorphone  Injectable 0.5 milliGRAM(s) IV Push every 6 hours PRN  metoclopramide Injectable 10 milliGRAM(s) IV Push every 6 hours PRN  oxyCODONE    IR 5 milliGRAM(s) Oral every 6 hours PRN  polyethylene glycol 3350 17 Gram(s) Oral daily PRN  senna 2 Tablet(s) Oral at bedtime PRN  sodium chloride 0.65% Nasal 1 Spray(s) Both Nostrils four times a day PRN  sodium chloride 0.9% lock flush 10 milliLiter(s) IV Push every 1 hour PRN  zinc oxide 40% Paste 1 Application(s) Topical three times a day PRN    A/P:      57 year old female with a history of  peripheral T cell lymphoma   Post :  Autologous PBSCT day + 8  - Completed  -16 24 hour continuous infusion, strict I&O, daily weights, prn diuresis    - Cytoxan day 3.  - CTX - continue CTX hydration for 24 hours post infusion of last dose. Strict I&O, daily weights, prn diuresis. Start cipro 500mg po BID when ANC < 500    Neutropenic. Started Cipro for prophylaxis. If febrile, panculture and start Cefepime (has tolerated in the past)   - Kepivance day 2/3  11/27- Kepivance day 3/3- HELD due to Kepivance rash and oral erythema worsening  - Febrile overnight, tmax 100.4. CXR no obvious consolidations. Cultures pending. Started on cefepime. Grade 2 chemotherapy induced oral mucositis. Continue supportive measures.    - Mild transaminitis, if continue to rise lower Diflucan dose.    1. Infectious Disease:   acyclovir   Oral Tab/Cap 400 milliGRAM(s) Oral every 8 hours  cefepime   IVPB 2000 milliGRAM(s) IV Intermittent every 8 hours  clotrimazole Lozenge 1 Lozenge Oral five times a day  fluconAZOLE   Tablet 400 milliGRAM(s) Oral daily    2. VOD Prophylaxis: Actigall, Glutamine, Heparin (dosed at 100 units / kg / day)     3. GI Prophylaxis:    pantoprazole    Tablet 40 milliGRAM(s) Oral before breakfast    4. Mouth care - NS / NaHCO3 rinses, Mycelex, Biotene; Skin care     5. GVHD prophylaxis - n/a     6. Transfuse & replete electrolytes prn     7. IV hydration, daily weights, strict I&O, prn diuresis     8. PO intake as tolerated, nutrition follow up as needed, MVI, folic acid     9. Antiemetics, anti-diarrhea medications:   metoclopramide Injectable 10 milliGRAM(s) IV Push every 6 hours PRN    10. OOB as tolerated, physical therapy consult if needed     11. Monitor coags / fibrinogen 2x week, vitamin K as needed     12. Monitor closely for clinical changes, monitor for fevers     13. Emotional support provided, plan of care discussed and questions addressed     14. Patient education done regarding plan of care, restrictions and discharge planning     15. Continue regular social work input    I have written the above note for Dr. Feldman who performed service with me in the room.   Maday Becker  NP-C ([phone])    I have seen and examined patient with NP, I agree with above note as scribed.

## 2022-12-04 LAB
ALBUMIN SERPL ELPH-MCNC: 3.9 G/DL — SIGNIFICANT CHANGE UP (ref 3.3–5)
ALP SERPL-CCNC: 100 U/L — SIGNIFICANT CHANGE UP (ref 40–120)
ALT FLD-CCNC: 65 U/L — HIGH (ref 10–45)
ANION GAP SERPL CALC-SCNC: 9 MMOL/L — SIGNIFICANT CHANGE UP (ref 5–17)
AST SERPL-CCNC: 25 U/L — SIGNIFICANT CHANGE UP (ref 10–40)
BILIRUB SERPL-MCNC: 0.2 MG/DL — SIGNIFICANT CHANGE UP (ref 0.2–1.2)
BUN SERPL-MCNC: 12 MG/DL — SIGNIFICANT CHANGE UP (ref 7–23)
CALCIUM SERPL-MCNC: 9.5 MG/DL — SIGNIFICANT CHANGE UP (ref 8.4–10.5)
CHLORIDE SERPL-SCNC: 103 MMOL/L — SIGNIFICANT CHANGE UP (ref 96–108)
CO2 SERPL-SCNC: 27 MMOL/L — SIGNIFICANT CHANGE UP (ref 22–31)
CREAT SERPL-MCNC: 0.37 MG/DL — LOW (ref 0.5–1.3)
EGFR: 119 ML/MIN/1.73M2 — SIGNIFICANT CHANGE UP
GLUCOSE SERPL-MCNC: 126 MG/DL — HIGH (ref 70–99)
HCT VFR BLD CALC: 23.2 % — LOW (ref 34.5–45)
HGB BLD-MCNC: 7.7 G/DL — LOW (ref 11.5–15.5)
LDH SERPL L TO P-CCNC: 129 U/L — SIGNIFICANT CHANGE UP (ref 50–242)
MAGNESIUM SERPL-MCNC: 2 MG/DL — SIGNIFICANT CHANGE UP (ref 1.6–2.6)
MCHC RBC-ENTMCNC: 29.5 PG — SIGNIFICANT CHANGE UP (ref 27–34)
MCHC RBC-ENTMCNC: 33.2 GM/DL — SIGNIFICANT CHANGE UP (ref 32–36)
MCV RBC AUTO: 88.9 FL — SIGNIFICANT CHANGE UP (ref 80–100)
NRBC # BLD: 0 /100 WBCS — SIGNIFICANT CHANGE UP (ref 0–0)
PHOSPHATE SERPL-MCNC: 3.2 MG/DL — SIGNIFICANT CHANGE UP (ref 2.5–4.5)
PLATELET # BLD AUTO: 16 K/UL — CRITICAL LOW (ref 150–400)
POTASSIUM SERPL-MCNC: 3.8 MMOL/L — SIGNIFICANT CHANGE UP (ref 3.5–5.3)
POTASSIUM SERPL-SCNC: 3.8 MMOL/L — SIGNIFICANT CHANGE UP (ref 3.5–5.3)
PROT SERPL-MCNC: 6.5 G/DL — SIGNIFICANT CHANGE UP (ref 6–8.3)
RBC # BLD: 2.61 M/UL — LOW (ref 3.8–5.2)
RBC # FLD: 12.6 % — SIGNIFICANT CHANGE UP (ref 10.3–14.5)
SODIUM SERPL-SCNC: 139 MMOL/L — SIGNIFICANT CHANGE UP (ref 135–145)
WBC # BLD: 0.19 K/UL — CRITICAL LOW (ref 3.8–10.5)
WBC # FLD AUTO: 0.19 K/UL — CRITICAL LOW (ref 3.8–10.5)

## 2022-12-04 PROCEDURE — 99291 CRITICAL CARE FIRST HOUR: CPT

## 2022-12-04 RX ADMIN — Medication 480 MICROGRAM(S): at 18:33

## 2022-12-04 RX ADMIN — CEFEPIME 100 MILLIGRAM(S): 1 INJECTION, POWDER, FOR SOLUTION INTRAMUSCULAR; INTRAVENOUS at 06:21

## 2022-12-04 RX ADMIN — FLUCONAZOLE 400 MILLIGRAM(S): 150 TABLET ORAL at 13:12

## 2022-12-04 RX ADMIN — CEFEPIME 100 MILLIGRAM(S): 1 INJECTION, POWDER, FOR SOLUTION INTRAMUSCULAR; INTRAVENOUS at 13:17

## 2022-12-04 RX ADMIN — Medication 10 MILLILITER(S): at 23:08

## 2022-12-04 RX ADMIN — Medication 5 MILLILITER(S): at 13:09

## 2022-12-04 RX ADMIN — LORATADINE 10 MILLIGRAM(S): 10 TABLET ORAL at 13:15

## 2022-12-04 RX ADMIN — Medication 1 LOZENGE: at 23:08

## 2022-12-04 RX ADMIN — Medication 1 LOZENGE: at 19:43

## 2022-12-04 RX ADMIN — DIPHENHYDRAMINE HYDROCHLORIDE AND LIDOCAINE HYDROCHLORIDE AND ALUMINUM HYDROXIDE AND MAGNESIUM HYDRO 10 MILLILITER(S): KIT at 18:31

## 2022-12-04 RX ADMIN — URSODIOL 300 MILLIGRAM(S): 250 TABLET, FILM COATED ORAL at 18:32

## 2022-12-04 RX ADMIN — CEFEPIME 100 MILLIGRAM(S): 1 INJECTION, POWDER, FOR SOLUTION INTRAMUSCULAR; INTRAVENOUS at 20:43

## 2022-12-04 RX ADMIN — Medication 1 LOZENGE: at 13:10

## 2022-12-04 RX ADMIN — Medication 5 MILLILITER(S): at 16:51

## 2022-12-04 RX ADMIN — Medication 1 LOZENGE: at 16:52

## 2022-12-04 RX ADMIN — NYSTATIN CREAM 1 APPLICATION(S): 100000 CREAM TOPICAL at 18:33

## 2022-12-04 RX ADMIN — Medication 5 MILLILITER(S): at 23:07

## 2022-12-04 RX ADMIN — DIPHENHYDRAMINE HYDROCHLORIDE AND LIDOCAINE HYDROCHLORIDE AND ALUMINUM HYDROXIDE AND MAGNESIUM HYDRO 10 MILLILITER(S): KIT at 13:11

## 2022-12-04 RX ADMIN — LIDOCAINE AND PRILOCAINE CREAM 1 APPLICATION(S): 25; 25 CREAM TOPICAL at 13:14

## 2022-12-04 RX ADMIN — Medication 400 MILLIGRAM(S): at 06:21

## 2022-12-04 RX ADMIN — Medication 1 TABLET(S): at 13:12

## 2022-12-04 RX ADMIN — Medication 5 MILLILITER(S): at 09:26

## 2022-12-04 RX ADMIN — Medication 400 MILLIGRAM(S): at 20:42

## 2022-12-04 RX ADMIN — Medication 10 MILLILITER(S): at 09:26

## 2022-12-04 RX ADMIN — URSODIOL 300 MILLIGRAM(S): 250 TABLET, FILM COATED ORAL at 06:21

## 2022-12-04 RX ADMIN — DIPHENHYDRAMINE HYDROCHLORIDE AND LIDOCAINE HYDROCHLORIDE AND ALUMINUM HYDROXIDE AND MAGNESIUM HYDRO 10 MILLILITER(S): KIT at 23:08

## 2022-12-04 RX ADMIN — Medication 10 MILLILITER(S): at 13:10

## 2022-12-04 RX ADMIN — PANTOPRAZOLE SODIUM 40 MILLIGRAM(S): 20 TABLET, DELAYED RELEASE ORAL at 06:21

## 2022-12-04 RX ADMIN — Medication 20 MILLIGRAM(S): at 13:13

## 2022-12-04 RX ADMIN — Medication 10 MILLILITER(S): at 16:51

## 2022-12-04 RX ADMIN — CHLORHEXIDINE GLUCONATE 1 APPLICATION(S): 213 SOLUTION TOPICAL at 09:27

## 2022-12-04 RX ADMIN — Medication 1 MILLIGRAM(S): at 13:12

## 2022-12-04 RX ADMIN — HEPARIN SODIUM 3.34 UNIT(S)/HR: 5000 INJECTION INTRAVENOUS; SUBCUTANEOUS at 19:43

## 2022-12-04 RX ADMIN — Medication 5 MILLILITER(S): at 19:43

## 2022-12-04 RX ADMIN — Medication 1 LOZENGE: at 09:27

## 2022-12-04 RX ADMIN — Medication 400 MILLIGRAM(S): at 13:15

## 2022-12-04 RX ADMIN — Medication 10 MILLILITER(S): at 19:43

## 2022-12-04 RX ADMIN — NYSTATIN CREAM 1 APPLICATION(S): 100000 CREAM TOPICAL at 06:21

## 2022-12-04 RX ADMIN — SODIUM CHLORIDE 20 MILLILITER(S): 9 INJECTION INTRAMUSCULAR; INTRAVENOUS; SUBCUTANEOUS at 19:43

## 2022-12-04 RX ADMIN — DIPHENHYDRAMINE HYDROCHLORIDE AND LIDOCAINE HYDROCHLORIDE AND ALUMINUM HYDROXIDE AND MAGNESIUM HYDRO 10 MILLILITER(S): KIT at 06:22

## 2022-12-04 NOTE — PROGRESS NOTE ADULT - CRITICAL CARE ATTENDING COMMENT
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   Today is Day + 8  Problem/Plan -   1:  Problem: Stem cell transplant   ·  Plan: 1. Admit to BMTU   2. TLC placement in IR   3. Day -9 - day -2 - antiemetics   4. Day -9 & day -8, VP16 2400mg/m2 IV x 34 hours (final concentration 0.4mg /mL).   5. Strict I&O, daily weights, prn diuresis   6. After completion of VP16- start CTX hydration (D51/2NS + 10mEq KCl @ 150ml / m2) - continue for 24 hours post infusion of CTX   7. Day -7 - day -4 - CTX 1800 mg / m2 daily over 2 hours. Follow SMA7, mag, phos 6hours post CT . Mesna  12mg / kg - hemorrhagic cystitis ppx   8. Day -3 - BCNU 400mg / m2   9. Day -2 - day -1 - rest days  10. Day 0(11/25/22) - HPC transplant. Start Zarxio 480mcg SQ QD, continue through engraftment. Kepivance on days 0, 1 & 2   11. Anxiolytics, antinausea, antidiarrhea medications as needed   12. Lasix PRN while being aggressively hydrated to avoid VOD   13. Nutritional support, pain management.    Problem/Plan - 2:  ·  Problem: Need for prophylactic measure.   ·  Plan: 1. VOD prophylaxis - low dose heparin gtt (dosed at 100 units / kg / day), glutamine supplementation, Actigall BID   2. PCP prophylaxis - Bactrim DS through day -2    3. Antiviral prophylaxis - Acyclovir 400mg po TID to start day -1   4. Antifungal prophylaxis- Diflucan 400 mg po daily.  5. GI prophylaxis - Protonix po QD   6. Antibacterial prophylaxis - when ANC < 500, start Cipro 500mg po BID. If becomes febrile, pan cx, CXR and change Cipro to Cefepime 2g IV q 8 hours. Continue until count recovery  7. Kepivance for prevention of mucositis- days 0, +1, +2  8. Aggressive mouth care and skin care as per protocol. On 11/25, early mucositis on lower lip, no ulcers. Continue topical care.   9. Receiving transfusion and electrolyte support.    11/25-Chemotherapy induced nausea and diarrhea managed with antiemetics and antidiarrheals. Now patient states stool is loose rather than watery.   Continue Cipro, Acyclovir, Diflucan prophylaxis. OOB/ambulate. She received HPC transplant today without complications. Begin Zarxio daily.  11/26/22: continues to have loose stools overnight, this am with epistaxis lasting ~ 5 minutes. To receive platelets. Remains fatigued.  11/28- Febrile overnight, tmax 100.4. CXR- no consolidations. Cultures pending. Started on Cefepime. Grade 2 chemotherapy induced oral mucositis. Continue supportive care.  12/2- patient now afebrile; 11/28 cultures negative and CXR- negative. Continue Cefepime; on Acyclovir, Diflucan prophylaxis. Continue Zarxio daily, await engraftment. 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   Today is Day + 9  Problem/Plan -   1:  Problem: Stem cell transplant   ·  Plan: 1. Admit to BMTU   2. TLC placement in IR   3. Day -9 - day -2 - antiemetics   4. Day -9 & day -8, VP16 2400mg/m2 IV x 34 hours (final concentration 0.4mg /mL).   5. Strict I&O, daily weights, prn diuresis   6. After completion of VP16- start CTX hydration (D51/2NS + 10mEq KCl @ 150ml / m2) - continue for 24 hours post infusion of CTX   7. Day -7 - day -4 - CTX 1800 mg / m2 daily over 2 hours. Follow SMA7, mag, phos 6hours post CT . Mesna  12mg / kg - hemorrhagic cystitis ppx   8. Day -3 - BCNU 400mg / m2   9. Day -2 - day -1 - rest days  10. Day 0(11/25/22) - HPC transplant. Start Zarxio 480mcg SQ QD, continue through engraftment. Kepivance on days 0, 1 & 2   11. Anxiolytics, antinausea, antidiarrhea medications as needed   12. Lasix PRN while being aggressively hydrated to avoid VOD   13. Nutritional support, pain management.    Problem/Plan - 2:  ·  Problem: Need for prophylactic measure.   ·  Plan: 1. VOD prophylaxis - low dose heparin gtt (dosed at 100 units / kg / day), glutamine supplementation, Actigall BID   2. PCP prophylaxis - Bactrim DS through day -2    3. Antiviral prophylaxis - Acyclovir 400mg po TID to start day -1   4. Antifungal prophylaxis- Diflucan 400 mg po daily.  5. GI prophylaxis - Protonix po QD   6. Antibacterial prophylaxis - when ANC < 500, start Cipro 500mg po BID. If becomes febrile, pan cx, CXR and change Cipro to Cefepime 2g IV q 8 hours. Continue until count recovery  7. Kepivance for prevention of mucositis- days 0, +1, +2  8. Aggressive mouth care and skin care as per protocol. On 11/25, early mucositis on lower lip, no ulcers. Continue topical care.   9. Receiving transfusion and electrolyte support.    11/25-Chemotherapy induced nausea and diarrhea managed with antiemetics and antidiarrheals. Now patient states stool is loose rather than watery.   Continue Cipro, Acyclovir, Diflucan prophylaxis. OOB/ambulate. She received HPC transplant today without complications. Begin Zarxio daily.  11/26/22: continues to have loose stools overnight, this am with epistaxis lasting ~ 5 minutes. To receive platelets. Remains fatigued.  11/28- Febrile overnight, tmax 100.4. CXR- no consolidations. Cultures pending. Started on Cefepime. Grade 2 chemotherapy induced oral mucositis. Continue supportive care.  12/2- patient now afebrile; 11/28 cultures negative and CXR- negative. Continue Cefepime; on Acyclovir, Diflucan prophylaxis. Continue Zarxio daily, await engraftment.

## 2022-12-04 NOTE — PROGRESS NOTE ADULT - SUBJECTIVE AND OBJECTIVE BOX
HPC Transplant Team                                                      Critical / Counseling Time Provided: 30 minutes                                                                                                                                                        Chief Complaint: Autologous peripheral blood stem cell transplant with high dose CBV prep regimen for treatment of peripheral T cell lymphoma    S: Patient seen and examined with HPC Transplant Team:   + fatigue   + mouth/throat pain   + poor appetite  All other ROS  negative     O: Vitals:   Vital Signs Last 24 Hrs  T(C): 37.5 (04 Dec 2022 05:25), Max: 37.6 (03 Dec 2022 18:15)  T(F): 99.5 (04 Dec 2022 05:25), Max: 99.7 (03 Dec 2022 18:15)  HR: 94 (04 Dec 2022 05:25) (84 - 99)  BP: 100/68 (04 Dec 2022 05:25) (100/65 - 110/73)  BP(mean): --  RR: 18 (04 Dec 2022 05:25) (18 - 18)  SpO2: 98% (04 Dec 2022 05:25) (98% - 100%)    Parameters below as of 04 Dec 2022 05:25  Patient On (Oxygen Delivery Method): room air    Admit weight: 80.3kg   Daily Weight in k.5 kg      Intake / Output:   - @ 07:01  -  12-04 @ 07:00  --------------------------------------------------------  IN: 2085 mL / OUT: 2700 mL / NET: -615 mL      PE:   Oropharynx: patchy erythema on hard palate, small ulcerations on hard palate and bilateral buccal mucosa slowly resolving  Oral Mucositis:            +                                          stGstrstastdstest:st st1st CVS: S1, S2 RRR   Lungs: CTA throughout bilaterally   Abdomen: + BS x 4 normoactive, soft, NT, ND  Extremities: no edema   Gastric Mucositis:       -                                           Grade: n/a   Intestinal Mucositis:     -                                         Grade: n/a   Skin: + macular erythema to chest, upper back and neck post Kepivance, improving  TLC: CDI   Neuro: A&O x 3   Pain: 3/10- mouth, improving    Labs:         Cultures:   Culture - Blood (22 @ 06:10)    Specimen Source: .Blood Blood    Culture Results:   No Growth Final    Culture - Urine (22 @ 08:19)    Specimen Source: Clean Catch Clean Catch (Midstream)    Culture Results:   <10,000 CFU/mL Normal Urogenital Maryanne    Radiology:    Xray Chest 1 View- PORTABLE-Urgent (Xray Chest 1 View- PORTABLE-Urgent .) (22 @ 07:18) >  Impression:  No focal consolidations.    Meds:   Antimicrobials:   acyclovir   Oral Tab/Cap 400 milliGRAM(s) Oral every 8 hours  cefepime   IVPB 2000 milliGRAM(s) IV Intermittent every 8 hours  clotrimazole Lozenge 1 Lozenge Oral five times a day  fluconAZOLE   Tablet 400 milliGRAM(s) Oral daily    Heme / Onc:   heparin  Infusion 334 Unit(s)/Hr IV Continuous <Continuous>    GI:  pantoprazole    Tablet 40 milliGRAM(s) Oral before breakfast  polyethylene glycol 3350 17 Gram(s) Oral daily PRN  senna 2 Tablet(s) Oral at bedtime PRN  sodium bicarbonate Mouth Rinse 10 milliLiter(s) Swish and Spit five times a day  ursodiol Capsule 300 milliGRAM(s) Oral two times a day    Immunologic:   filgrastim-sndz (ZARXIO) Injectable 480 MICROGram(s) SubCutaneous every 24 hours    Other medications:   acetaminophen     Tablet .. 650 milliGRAM(s) Oral every 6 hours  Biotene Dry Mouth Oral Rinse 5 milliLiter(s) Swish and Spit five times a day  chlorhexidine 4% Liquid 1 Application(s) Topical <User Schedule>  FIRST- Mouthwash  BLM 10 milliLiter(s) Swish and Spit every 6 hours  FLUoxetine 20 milliGRAM(s) Oral daily  folic acid 1 milliGRAM(s) Oral daily  lidocaine/prilocaine Cream 1 Application(s) Topical daily  loratadine 10 milliGRAM(s) Oral daily  multivitamin 1 Tablet(s) Oral daily  nystatin Powder 1 Application(s) Topical two times a day  sodium chloride 0.45% 1000 milliLiter(s) IV Continuous <Continuous>  sodium chloride 0.9%. 1000 milliLiter(s) IV Continuous <Continuous>    PRN:   acetaminophen     Tablet .. 650 milliGRAM(s) Oral every 6 hours PRN  AQUAPHOR (petrolatum Ointment) 1 Application(s) Topical two times a day PRN  HYDROmorphone  Injectable 0.5 milliGRAM(s) IV Push every 6 hours PRN  metoclopramide Injectable 10 milliGRAM(s) IV Push every 6 hours PRN  oxyCODONE    IR 5 milliGRAM(s) Oral every 6 hours PRN  polyethylene glycol 3350 17 Gram(s) Oral daily PRN  senna 2 Tablet(s) Oral at bedtime PRN  sodium chloride 0.65% Nasal 1 Spray(s) Both Nostrils four times a day PRN  sodium chloride 0.9% lock flush 10 milliLiter(s) IV Push every 1 hour PRN  zinc oxide 40% Paste 1 Application(s) Topical three times a day PRN    A/P   57 year old female with a history of  peripheral T cell lymphoma   Post :  Autologous PBSCT day + 9  - Completed  -16 24 hour continuous infusion, strict I&O, daily weights, prn diuresis    - Cytoxan day 3/4.  - CTX - continue CTX hydration for 24 hours post infusion of last dose. Strict I&O, daily weights, prn diuresis. Start cipro 500mg po BID when ANC < 500    Neutropenic. Started Cipro for prophylaxis. If febrile, panculture and start Cefepime (has tolerated in the past)   - Kepivance day 2/3  11/27- Kepivance day 3/3- HELD due to Kepivance rash and oral erythema worsening  - Febrile overnight, tmax 100.4. CXR no obvious consolidations. Cultures pending. Started on cefepime. Grade 2 chemotherapy induced oral mucositis. Continue supportive measures.    - Mild transaminitis, if continue to rise lower Diflucan dose.    1. Infectious Disease:   acyclovir   Oral Tab/Cap 400 milliGRAM(s) Oral every 8 hours  cefepime   IVPB 2000 milliGRAM(s) IV Intermittent every 8 hours  clotrimazole Lozenge 1 Lozenge Oral five times a day  fluconAZOLE   Tablet 400 milliGRAM(s) Oral daily    2. VOD Prophylaxis: Actigall, Glutamine, Heparin (dosed at 100 units / kg / day)     3. GI Prophylaxis:    pantoprazole    Tablet 40 milliGRAM(s) Oral before breakfast    4. Mouth care - NS / NaHCO3 rinses, Mycelex, Biotene; Skin care     5. GVHD prophylaxis - n/a     6. Transfuse & replete electrolytes prn     7. IV hydration, daily weights, strict I&O, prn diuresis     8. PO intake as tolerated, nutrition follow up as needed, MVI, folic acid     9. Antiemetics, anti-diarrhea medications:   metoclopramide Injectable 10 milliGRAM(s) IV Push every 6 hours PRN    10. OOB as tolerated, physical therapy consult if needed     11. Monitor coags / fibrinogen 2x week, vitamin K as needed     12. Monitor closely for clinical changes, monitor for fevers     13. Emotional support provided, plan of care discussed and questions addressed     14. Patient education done regarding plan of care, restrictions and discharge planning     15. Continue regular social work input    I have written the above note for Dr. Feldman who performed service with me in the room.   Maday Becker  NP-C ([phone])    I have seen and examined patient with NP, I agree with above note as scribed.                    HPC Transplant Team                                                      Critical / Counseling Time Provided: 30 minutes                                                                                                                                                        Chief Complaint: Autologous peripheral blood stem cell transplant with high dose CBV prep regimen for treatment of peripheral T cell lymphoma    S: Patient seen and examined with HPC Transplant Team:   + generalized weakness, fatigue   + mouth/throat pain   + poor appetite  All other ROS  negative     O: Vitals:   Vital Signs Last 24 Hrs  T(C): 37.5 (04 Dec 2022 05:25), Max: 37.6 (03 Dec 2022 18:15)  T(F): 99.5 (04 Dec 2022 05:25), Max: 99.7 (03 Dec 2022 18:15)  HR: 94 (04 Dec 2022 05:25) (84 - 99)  BP: 100/68 (04 Dec 2022 05:25) (100/65 - 110/73)  BP(mean): --  RR: 18 (04 Dec 2022 05:25) (18 - 18)  SpO2: 98% (04 Dec 2022 05:25) (98% - 100%)    Parameters below as of 04 Dec 2022 05:25  Patient On (Oxygen Delivery Method): room air    Admit weight: 80.3kg   Daily Weight in k.2 kg    Intake / Output:    @ 07:01  -  -04 @ 07:00  --------------------------------------------------------  IN: 2085 mL / OUT: 2700 mL / NET: -615 mL    PE:   Oropharynx: patchy erythema on hard palate, small ulcerations on hard palate and bilateral buccal mucosa slowly resolving  Oral Mucositis:            +                                          stGstrstastdstest:st st1st CVS: S1, S2 RRR   Lungs: CTA throughout bilaterally   Abdomen: + BS x 4 normoactive, soft, NT, ND  Extremities: no edema   Gastric Mucositis:       -                                           Grade: n/a   Intestinal Mucositis:     -                                         Grade: n/a   Skin: + macular erythema to chest, upper back and neck post Kepivance, improving  TLC: CDI   Neuro: A&O x 3   Pain: 3/10- mouth, improving    Labs:                         7.7    0.19  )-----------( 16       ( 04 Dec 2022 07:06 )             23.2     Mean Cell Volume : 88.9 fl  Mean Cell Hemoglobin : 29.5 pg  Mean Cell Hemoglobin Concentration : 33.2 gm/dL  Auto Neutrophil # : x  Auto Lymphocyte # : x  Auto Monocyte # : x  Auto Eosinophil # : x  Auto Basophil # : x  Auto Neutrophil % : x  Auto Lymphocyte % : x  Auto Monocyte % : x  Auto Eosinophil % : x  Auto Basophil % : x    12-04    139  |  103  |  12  ----------------------------<  126<H>  3.8   |  27  |  0.37<L>    Ca    9.5      04 Dec 2022 07:10  Phos  3.2     12-04  Mg     2.0     12-04    TPro  6.5  /  Alb  3.9  /  TBili  0.2  /  DBili  x   /  AST  25  /  ALT  65<H>  /  AlkPhos  100  12-04  Mg 2.0  Phos 3.2    Uric Acid --    Cultures:   Culture - Blood (22 @ 06:10)    Specimen Source: .Blood Blood    Culture Results:   No Growth Final    Culture - Urine (22 @ 08:19)    Specimen Source: Clean Catch Clean Catch (Midstream)    Culture Results:   <10,000 CFU/mL Normal Urogenital Maryanne    Radiology:    Xray Chest 1 View- PORTABLE-Urgent (Xray Chest 1 View- PORTABLE-Urgent .) (22 @ 07:18) >  Impression:  No focal consolidations.    Meds:   Antimicrobials:   acyclovir   Oral Tab/Cap 400 milliGRAM(s) Oral every 8 hours  cefepime   IVPB 2000 milliGRAM(s) IV Intermittent every 8 hours  clotrimazole Lozenge 1 Lozenge Oral five times a day  fluconAZOLE   Tablet 400 milliGRAM(s) Oral daily    Heme / Onc:   heparin  Infusion 334 Unit(s)/Hr IV Continuous <Continuous>    GI:  pantoprazole    Tablet 40 milliGRAM(s) Oral before breakfast  polyethylene glycol 3350 17 Gram(s) Oral daily PRN  senna 2 Tablet(s) Oral at bedtime PRN  sodium bicarbonate Mouth Rinse 10 milliLiter(s) Swish and Spit five times a day  ursodiol Capsule 300 milliGRAM(s) Oral two times a day    Immunologic:   filgrastim-sndz (ZARXIO) Injectable 480 MICROGram(s) SubCutaneous every 24 hours    Other medications:   acetaminophen     Tablet .. 650 milliGRAM(s) Oral every 6 hours  Biotene Dry Mouth Oral Rinse 5 milliLiter(s) Swish and Spit five times a day  chlorhexidine 4% Liquid 1 Application(s) Topical <User Schedule>  FIRST- Mouthwash  BLM 10 milliLiter(s) Swish and Spit every 6 hours  FLUoxetine 20 milliGRAM(s) Oral daily  folic acid 1 milliGRAM(s) Oral daily  lidocaine/prilocaine Cream 1 Application(s) Topical daily  loratadine 10 milliGRAM(s) Oral daily  multivitamin 1 Tablet(s) Oral daily  nystatin Powder 1 Application(s) Topical two times a day  sodium chloride 0.45% 1000 milliLiter(s) IV Continuous <Continuous>  sodium chloride 0.9%. 1000 milliLiter(s) IV Continuous <Continuous>    PRN:   acetaminophen     Tablet .. 650 milliGRAM(s) Oral every 6 hours PRN  AQUAPHOR (petrolatum Ointment) 1 Application(s) Topical two times a day PRN  HYDROmorphone  Injectable 0.5 milliGRAM(s) IV Push every 6 hours PRN  metoclopramide Injectable 10 milliGRAM(s) IV Push every 6 hours PRN  oxyCODONE    IR 5 milliGRAM(s) Oral every 6 hours PRN  polyethylene glycol 3350 17 Gram(s) Oral daily PRN  senna 2 Tablet(s) Oral at bedtime PRN  sodium chloride 0.65% Nasal 1 Spray(s) Both Nostrils four times a day PRN  sodium chloride 0.9% lock flush 10 milliLiter(s) IV Push every 1 hour PRN  zinc oxide 40% Paste 1 Application(s) Topical three times a day PRN    A/P   57 year old female with a history of  peripheral T cell lymphoma   Post :  Autologous PBSCT day + 9  - Completed  -16 24 hour continuous infusion, strict I&O, daily weights, prn diuresis    - Cytoxan day 3/.  - CTX /- continue CTX hydration for 24 hours post infusion of last dose. Strict I&O, daily weights, prn diuresis. Start cipro 500mg po BID when ANC < 500    Neutropenic. Started Cipro for prophylaxis. If febrile, panculture and start Cefepime (has tolerated in the past)   - Kepivance day 2/3  - Kepivance day 3/3- HELD due to Kepivance rash and oral erythema worsening  - Febrile overnight, tmax 100.4. CXR no obvious consolidations. Cultures pending. Started on cefepime. Grade 2 chemotherapy induced oral mucositis. Continue supportive measures.    - Mild transaminitis, if continue to rise lower Diflucan dose.    1. Infectious Disease:   acyclovir   Oral Tab/Cap 400 milliGRAM(s) Oral every 8 hours  cefepime   IVPB 2000 milliGRAM(s) IV Intermittent every 8 hours  clotrimazole Lozenge 1 Lozenge Oral five times a day  fluconAZOLE   Tablet 400 milliGRAM(s) Oral daily    2. VOD Prophylaxis: Actigall, Glutamine, Heparin (dosed at 100 units / kg / day) = heparin  Infusion 334 Unit(s)/Hr IV Continuous <Continuous>    3. GI Prophylaxis:    pantoprazole    Tablet 40 milliGRAM(s) Oral before breakfast    4. Mouth care - NS / NaHCO3 rinses, Mycelex, Biotene; Skin care     5. GVHD prophylaxis - n/a     6. Transfuse & replete electrolytes prn     7. IV hydration, daily weights, strict I&O, prn diuresis     8. PO intake as tolerated, nutrition follow up as needed, MVI, folic acid     9. Antiemetics, anti-diarrhea medications:   metoclopramide Injectable 10 milliGRAM(s) IV Push every 6 hours PRN    10. OOB as tolerated, physical therapy consult if needed     11. Monitor coags / fibrinogen 2x week, vitamin K as needed     12. Monitor closely for clinical changes, monitor for fevers     13. Emotional support provided, plan of care discussed and questions addressed     14. Patient education done regarding plan of care, restrictions and discharge planning     15. Continue regular social work input    I have written the above note for Dr. Feldman who performed service with me in the room.   Maday Becker NP-C ([phone])    I have seen and examined patient with NP, I agree with above note as scribed.

## 2022-12-04 NOTE — PROVIDER CONTACT NOTE (CRITICAL VALUE NOTIFICATION) - ASSESSMENT
A&O x4, vs stable and afebrile
stable, afebrile
stable/ afebrile
A&O x4, vs stable and afebrile, no bleeding
A&O x4, vs stable and afebrile
plts = 16
A&O x4, vs stable but w/fever, no bleeding

## 2022-12-04 NOTE — PROVIDER CONTACT NOTE (CRITICAL VALUE NOTIFICATION) - ACTION/TREATMENT ORDERED:
NP aware and notified. No transfusion at this time.  Will monitor for s/s of bleeding.
monitor for s/s bleeding.
transfuse 1 unit of PRBC w/pre meds
NP aware and notified. No transfusion at this time. Will monitor for s/s of bleeding.
no transfusion at this time/pt monitored
no transfusion at this time, monitor pt
transfusion 1 unit of plts w/pre meds

## 2022-12-04 NOTE — PROVIDER CONTACT NOTE (CRITICAL VALUE NOTIFICATION) - NAME OF MD/NP/PA/DO NOTIFIED:
SHAE Dwyer
SHAE Becker
naomi barcenas
NP Leena Ibrahim
martha david np
kitty barcenas
martha david np

## 2022-12-04 NOTE — PROVIDER CONTACT NOTE (CRITICAL VALUE NOTIFICATION) - BACKGROUND
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
day +8 for auto transplant for T cell lymphoma
T cell lymphoma day + 5 s/p auto stem cell transplant
day +3 for auto transplant for T Cell lymphoma
day + 7 for auto transplant for T-cell lymphoma
s/p stem cell transplant.
T cell lymphoma day + 2 s/p stem cell transplant

## 2022-12-05 ENCOUNTER — RX RENEWAL (OUTPATIENT)
Age: 57
End: 2022-12-05

## 2022-12-05 LAB
ALBUMIN SERPL ELPH-MCNC: 3.9 G/DL — SIGNIFICANT CHANGE UP (ref 3.3–5)
ALP SERPL-CCNC: 101 U/L — SIGNIFICANT CHANGE UP (ref 40–120)
ALT FLD-CCNC: 60 U/L — HIGH (ref 10–45)
ANION GAP SERPL CALC-SCNC: 10 MMOL/L — SIGNIFICANT CHANGE UP (ref 5–17)
APTT BLD: 24.6 SEC — LOW (ref 27.5–35.5)
AST SERPL-CCNC: 25 U/L — SIGNIFICANT CHANGE UP (ref 10–40)
BILIRUB SERPL-MCNC: 0.2 MG/DL — SIGNIFICANT CHANGE UP (ref 0.2–1.2)
BLD GP AB SCN SERPL QL: NEGATIVE — SIGNIFICANT CHANGE UP
BUN SERPL-MCNC: 11 MG/DL — SIGNIFICANT CHANGE UP (ref 7–23)
CALCIUM SERPL-MCNC: 9.6 MG/DL — SIGNIFICANT CHANGE UP (ref 8.4–10.5)
CHLORIDE SERPL-SCNC: 103 MMOL/L — SIGNIFICANT CHANGE UP (ref 96–108)
CO2 SERPL-SCNC: 27 MMOL/L — SIGNIFICANT CHANGE UP (ref 22–31)
CREAT SERPL-MCNC: 0.4 MG/DL — LOW (ref 0.5–1.3)
EGFR: 117 ML/MIN/1.73M2 — SIGNIFICANT CHANGE UP
FIBRINOGEN PPP-MCNC: 433 MG/DL — SIGNIFICANT CHANGE UP (ref 200–445)
GLUCOSE SERPL-MCNC: 119 MG/DL — HIGH (ref 70–99)
HCT VFR BLD CALC: 23.6 % — LOW (ref 34.5–45)
HGB BLD-MCNC: 7.7 G/DL — LOW (ref 11.5–15.5)
INR BLD: 1.13 RATIO — SIGNIFICANT CHANGE UP (ref 0.88–1.16)
LDH SERPL L TO P-CCNC: 149 U/L — SIGNIFICANT CHANGE UP (ref 50–242)
MAGNESIUM SERPL-MCNC: 1.9 MG/DL — SIGNIFICANT CHANGE UP (ref 1.6–2.6)
MCHC RBC-ENTMCNC: 29.2 PG — SIGNIFICANT CHANGE UP (ref 27–34)
MCHC RBC-ENTMCNC: 32.6 GM/DL — SIGNIFICANT CHANGE UP (ref 32–36)
MCV RBC AUTO: 89.4 FL — SIGNIFICANT CHANGE UP (ref 80–100)
NRBC # BLD: 0 /100 WBCS — SIGNIFICANT CHANGE UP (ref 0–0)
PHOSPHATE SERPL-MCNC: 3.4 MG/DL — SIGNIFICANT CHANGE UP (ref 2.5–4.5)
PLATELET # BLD AUTO: 23 K/UL — LOW (ref 150–400)
POTASSIUM SERPL-MCNC: 3.9 MMOL/L — SIGNIFICANT CHANGE UP (ref 3.5–5.3)
POTASSIUM SERPL-SCNC: 3.9 MMOL/L — SIGNIFICANT CHANGE UP (ref 3.5–5.3)
PROT SERPL-MCNC: 6.3 G/DL — SIGNIFICANT CHANGE UP (ref 6–8.3)
PROTHROM AB SERPL-ACNC: 13.1 SEC — SIGNIFICANT CHANGE UP (ref 10.5–13.4)
RBC # BLD: 2.64 M/UL — LOW (ref 3.8–5.2)
RBC # FLD: 12.4 % — SIGNIFICANT CHANGE UP (ref 10.3–14.5)
RH IG SCN BLD-IMP: POSITIVE — SIGNIFICANT CHANGE UP
SODIUM SERPL-SCNC: 140 MMOL/L — SIGNIFICANT CHANGE UP (ref 135–145)
WBC # BLD: 0.44 K/UL — CRITICAL LOW (ref 3.8–10.5)
WBC # FLD AUTO: 0.44 K/UL — CRITICAL LOW (ref 3.8–10.5)

## 2022-12-05 PROCEDURE — 99291 CRITICAL CARE FIRST HOUR: CPT

## 2022-12-05 RX ORDER — ONDANSETRON 8 MG/1
1 TABLET, FILM COATED ORAL
Qty: 90 | Refills: 0
Start: 2022-12-05 | End: 2023-01-03

## 2022-12-05 RX ORDER — LINACLOTIDE 145 UG/1
1 CAPSULE, GELATIN COATED ORAL
Qty: 0 | Refills: 0 | DISCHARGE

## 2022-12-05 RX ORDER — FLUOXETINE HCL 10 MG
1 CAPSULE ORAL
Qty: 0 | Refills: 0 | DISCHARGE
Start: 2022-12-05

## 2022-12-05 RX ORDER — FLUOXETINE HCL 10 MG
1 CAPSULE ORAL
Qty: 0 | Refills: 0 | DISCHARGE

## 2022-12-05 RX ORDER — ATOVAQUONE 750 MG/5ML
5 SUSPENSION ORAL
Qty: 300 | Refills: 3
Start: 2022-12-05 | End: 2023-04-03

## 2022-12-05 RX ORDER — ACYCLOVIR SODIUM 500 MG
1 VIAL (EA) INTRAVENOUS
Qty: 90 | Refills: 3
Start: 2022-12-05 | End: 2023-04-03

## 2022-12-05 RX ORDER — FLUCONAZOLE 150 MG/1
2 TABLET ORAL
Qty: 60 | Refills: 3
Start: 2022-12-05 | End: 2023-04-03

## 2022-12-05 RX ORDER — ACETAMINOPHEN 500 MG
2 TABLET ORAL
Qty: 240 | Refills: 0

## 2022-12-05 RX ORDER — PANTOPRAZOLE SODIUM 20 MG/1
1 TABLET, DELAYED RELEASE ORAL
Qty: 30 | Refills: 3
Start: 2022-12-05 | End: 2023-04-03

## 2022-12-05 RX ORDER — ASPIRIN/CALCIUM CARB/MAGNESIUM 324 MG
0 TABLET ORAL
Qty: 0 | Refills: 0 | DISCHARGE

## 2022-12-05 RX ORDER — FOLIC ACID 0.8 MG
1 TABLET ORAL
Qty: 30 | Refills: 3
Start: 2022-12-05 | End: 2023-04-03

## 2022-12-05 RX ADMIN — Medication 10 MILLILITER(S): at 12:29

## 2022-12-05 RX ADMIN — Medication 10 MILLILITER(S): at 08:44

## 2022-12-05 RX ADMIN — CEFEPIME 100 MILLIGRAM(S): 1 INJECTION, POWDER, FOR SOLUTION INTRAMUSCULAR; INTRAVENOUS at 21:16

## 2022-12-05 RX ADMIN — Medication 1 TABLET(S): at 12:28

## 2022-12-05 RX ADMIN — Medication 5 MILLILITER(S): at 12:26

## 2022-12-05 RX ADMIN — Medication 1 LOZENGE: at 08:44

## 2022-12-05 RX ADMIN — DIPHENHYDRAMINE HYDROCHLORIDE AND LIDOCAINE HYDROCHLORIDE AND ALUMINUM HYDROXIDE AND MAGNESIUM HYDRO 10 MILLILITER(S): KIT at 12:30

## 2022-12-05 RX ADMIN — LORATADINE 10 MILLIGRAM(S): 10 TABLET ORAL at 12:27

## 2022-12-05 RX ADMIN — Medication 5 MILLILITER(S): at 08:45

## 2022-12-05 RX ADMIN — DIPHENHYDRAMINE HYDROCHLORIDE AND LIDOCAINE HYDROCHLORIDE AND ALUMINUM HYDROXIDE AND MAGNESIUM HYDRO 10 MILLILITER(S): KIT at 05:00

## 2022-12-05 RX ADMIN — Medication 480 MICROGRAM(S): at 17:41

## 2022-12-05 RX ADMIN — Medication 1 MILLIGRAM(S): at 12:28

## 2022-12-05 RX ADMIN — Medication 1 LOZENGE: at 12:28

## 2022-12-05 RX ADMIN — Medication 20 MILLIGRAM(S): at 12:28

## 2022-12-05 RX ADMIN — Medication 400 MILLIGRAM(S): at 14:30

## 2022-12-05 RX ADMIN — Medication 10 MILLILITER(S): at 20:33

## 2022-12-05 RX ADMIN — URSODIOL 300 MILLIGRAM(S): 250 TABLET, FILM COATED ORAL at 05:01

## 2022-12-05 RX ADMIN — Medication 1 LOZENGE: at 20:33

## 2022-12-05 RX ADMIN — Medication 1 LOZENGE: at 16:55

## 2022-12-05 RX ADMIN — Medication 10 MILLILITER(S): at 16:55

## 2022-12-05 RX ADMIN — FLUCONAZOLE 400 MILLIGRAM(S): 150 TABLET ORAL at 12:28

## 2022-12-05 RX ADMIN — Medication 5 MILLILITER(S): at 16:55

## 2022-12-05 RX ADMIN — Medication 400 MILLIGRAM(S): at 21:16

## 2022-12-05 RX ADMIN — PANTOPRAZOLE SODIUM 40 MILLIGRAM(S): 20 TABLET, DELAYED RELEASE ORAL at 05:01

## 2022-12-05 RX ADMIN — CEFEPIME 100 MILLIGRAM(S): 1 INJECTION, POWDER, FOR SOLUTION INTRAMUSCULAR; INTRAVENOUS at 05:00

## 2022-12-05 RX ADMIN — URSODIOL 300 MILLIGRAM(S): 250 TABLET, FILM COATED ORAL at 17:42

## 2022-12-05 RX ADMIN — DIPHENHYDRAMINE HYDROCHLORIDE AND LIDOCAINE HYDROCHLORIDE AND ALUMINUM HYDROXIDE AND MAGNESIUM HYDRO 10 MILLILITER(S): KIT at 17:42

## 2022-12-05 RX ADMIN — CHLORHEXIDINE GLUCONATE 1 APPLICATION(S): 213 SOLUTION TOPICAL at 12:26

## 2022-12-05 RX ADMIN — Medication 5 MILLILITER(S): at 20:33

## 2022-12-05 RX ADMIN — CEFEPIME 100 MILLIGRAM(S): 1 INJECTION, POWDER, FOR SOLUTION INTRAMUSCULAR; INTRAVENOUS at 14:29

## 2022-12-05 RX ADMIN — NYSTATIN CREAM 1 APPLICATION(S): 100000 CREAM TOPICAL at 05:00

## 2022-12-05 RX ADMIN — Medication 400 MILLIGRAM(S): at 05:00

## 2022-12-05 RX ADMIN — NYSTATIN CREAM 1 APPLICATION(S): 100000 CREAM TOPICAL at 17:42

## 2022-12-05 NOTE — PROGRESS NOTE ADULT - CRITICAL CARE ATTENDING COMMENT
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   Today is Day + 10  Problem/Plan -   1:  Problem: Stem cell transplant   ·  Plan: 1. Admit to BMTU   2. TLC placement in IR   3. Day -9 - day -2 - antiemetics   4. Day -9 & day -8, VP16 2400mg/m2 IV x 34 hours (final concentration 0.4mg /mL).   5. Strict I&O, daily weights, prn diuresis   6. After completion of VP16- start CTX hydration (D51/2NS + 10mEq KCl @ 150ml / m2) - continue for 24 hours post infusion of CTX   7. Day -7 - day -4 - CTX 1800 mg / m2 daily over 2 hours. Follow SMA7, mag, phos 6hours post CT . Mesna  12mg / kg - hemorrhagic cystitis ppx   8. Day -3 - BCNU 400mg / m2   9. Day -2 - day -1 - rest days  10. Day 0(11/25/22) - HPC transplant. Start Zarxio 480mcg SQ QD, continue through engraftment. Kepivance on days 0, 1 & 2   11. Anxiolytics, antinausea, antidiarrhea medications as needed   12. Lasix PRN while being aggressively hydrated to avoid VOD   13. Nutritional support, pain management.    Problem/Plan - 2:  ·  Problem: Need for prophylactic measure.   ·  Plan: 1. VOD prophylaxis - low dose heparin gtt (dosed at 100 units / kg / day), glutamine supplementation, Actigall BID   2. PCP prophylaxis - Bactrim DS through day -2    3. Antiviral prophylaxis - Acyclovir 400mg po TID to start day -1   4. Antifungal prophylaxis- Diflucan 400 mg po daily.  5. GI prophylaxis - Protonix po QD   6. Antibacterial prophylaxis - when ANC < 500, start Cipro 500mg po BID. If becomes febrile, pan cx, CXR and change Cipro to Cefepime 2g IV q 8 hours. Continue until count recovery  7. Kepivance for prevention of mucositis- days 0, +1, +2  8. Aggressive mouth care and skin care as per protocol. On 11/25, early mucositis on lower lip, no ulcers. Continue topical care.   9. Receiving transfusion and electrolyte support.    11/25-Chemotherapy induced nausea and diarrhea managed with antiemetics and antidiarrheals. Now patient states stool is loose rather than watery.   Continue Cipro, Acyclovir, Diflucan prophylaxis. OOB/ambulate. She received HPC transplant today without complications. Begin Zarxio daily.  11/26/22: continues to have loose stools overnight, this am with epistaxis lasting ~ 5 minutes. To receive platelets. Remains fatigued.  11/28- Febrile overnight, tmax 100.4. CXR- no consolidations. Cultures pending. Started on Cefepime. Grade 2 chemotherapy induced oral mucositis. Continue supportive care.  12/2- patient now afebrile; 11/28 cultures negative and CXR- negative. Continue Cefepime; on Acyclovir, Diflucan prophylaxis. Continue Zarxio daily, await engraftment. 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   Today is Day + 10  Problem/Plan -   1:  Problem: Stem cell transplant   ·  Plan: 1. Admit to BMTU   2. TLC placement in IR   3. Day -9 - day -2 - antiemetics   4. Day -9 & day -8, VP16 2400mg/m2 IV x 34 hours (final concentration 0.4mg /mL).   5. Strict I&O, daily weights, prn diuresis   6. After completion of VP16- start CTX hydration (D51/2NS + 10mEq KCl @ 150ml / m2) - continue for 24 hours post infusion of CTX   7. Day -7 - day -4 - CTX 1800 mg / m2 daily over 2 hours. Follow SMA7, mag, phos 6hours post CT . Mesna  12mg / kg - hemorrhagic cystitis ppx   8. Day -3 - BCNU 400mg / m2   9. Day -2 - day -1 - rest days  10. Day 0(11/25/22) - HPC transplant. Start Zarxio 480mcg SQ QD, continue through engraftment. Kepivance on days 0, 1 & 2   11. Anxiolytics, antinausea, antidiarrhea medications as needed   12. Lasix PRN while being aggressively hydrated to avoid VOD   13. Nutritional support, pain management.    Problem/Plan - 2:  ·  Problem: Need for prophylactic measure.   ·  Plan: 1. VOD prophylaxis - low dose heparin gtt (dosed at 100 units / kg / day), glutamine supplementation, Actigall BID   2. PCP prophylaxis - Bactrim DS through day -2    3. Antiviral prophylaxis - Acyclovir 400mg po TID to start day -1   4. Antifungal prophylaxis- Diflucan 400 mg po daily.  5. GI prophylaxis - Protonix po QD   6. Antibacterial prophylaxis - when ANC < 500, start Cipro 500mg po BID. If becomes febrile, pan cx, CXR and change Cipro to Cefepime 2g IV q 8 hours. Continue until count recovery  7. Kepivance for prevention of mucositis- days 0, +1, +2  8. Aggressive mouth care and skin care as per protocol. On 11/25, early mucositis on lower lip, no ulcers. Continue topical care.   9. Receiving transfusion and electrolyte support.    11/25-Chemotherapy induced nausea and diarrhea managed with antiemetics and antidiarrheals. Now patient states stool is loose rather than watery.   Continue Cipro, Acyclovir, Diflucan prophylaxis. OOB/ambulate. She received HPC transplant today without complications. Begin Zarxio daily.  11/26/22: continues to have loose stools overnight, this am with epistaxis lasting ~ 5 minutes. To receive platelets. Remains fatigued.  11/28- Febrile overnight, tmax 100.4. CXR- no consolidations. Cultures pending. Started on Cefepime. Grade 2 chemotherapy induced oral mucositis. Continue supportive care.  12/2- patient now afebrile; 11/28 cultures negative and CXR- negative. Continue Cefepime; on Acyclovir, Diflucan prophylaxis. Continue Zarxio daily, await engraftment.  12/5- early engraftment, continue Zarxio.

## 2022-12-05 NOTE — CHART NOTE - NSCHARTNOTEFT_GEN_A_CORE
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 Day +10"    Source: [x] Patient       [x] EMR        [] RN        [] Family at bedside       [] Other:    Diet, Regular:   GlutaSolve(Glutamine) 15gm pkg     Qty per Day:  1  Supplement Feeding Modality:  Oral  Ensure Enlive Cans or Servings Per Day:  1       Frequency:  Daily (22 @ 13:12) [Active]    - Is current order appropriate/adequate? [X] Yes  []  No:     - PO intake :   [] >75%  Adequate    [x] 50-75%  Fair       [] <50%  Poor    - Nutrition-related concerns:      - Pt reports fair appetite/PO intake, eating 50-75% of meals daily. Of note, ordering texture modified foods PRN in setting of mucositis pain (improving).       - Drinking Ensure Shake 1x (occasionally), endorses eating protein-rich snacks (i.e. yogurt, PB + crackers) or oral nutritional supplement for small/frequent meals to optimize PO intake.       - K+ Phos and Mg WNL today . Micronutrient/Other supplementation: multivitamin, folic acid        GI:    - Reports difficulty chewing/swallowing in setting of mouth pain is improving.   - Last BM , No diarrhea/constipation reported.   Bowel Regimen: senna   - Denies N/V at time of RD Visit (ordered for Reglan)    Weights:   Dosing weight in k.3 (11-16)  Daily Weight in k.4 (), Weight in k.2 (), Weight in k.5 (), Weight in k.4 (-), Weight in k.1 (), Weight in k (-30), Weight in k.9 (-)  ** Weight stable x 1 week (overall 5% weight loss x ~3 weeks) In setting of IVF and diuretics this admission. Weight changes may be partially secondary to fluid shifts. RD to continue to monitor weight trends as able.     Daily Weight in kG:   Nutritionally Pertinent MEDICATIONS  (STANDING):  acyclovir   Oral Tab/Cap  cefepime   IVPB  clotrimazole Lozenge  fluconAZOLE   Tablet  folic acid  multivitamin  pantoprazole    Tablet  sodium bicarbonate Mouth Rinse  sodium chloride 0.45%  sodium chloride 0.9%.  ursodiol Capsule    Pertinent Labs:  @ 06:45: Na 140, BUN 11, Cr 0.40<L>, <H>, K+ 3.9, Phos 3.4, Mg 1.9, Alk Phos 101, ALT/SGPT 60<H>, AST/SGOT 25, HbA1c --    Skin per nursing documentation: No pressure injuries noted per flow sheets   Edema: 1+ sofia. feet    Estimated Needs:   [x] no change since previous assessment   ** (-) based on CBW 177lb/80.2kg :  25-30 kcal/ [id number] kcal  1-1.5 Gm pro/ k.2-112.28 Gm pro   Defer fluid needs to medical team    Previous Nutrition Diagnosis #1: Predicted inadequate protein intake  Nutrition Diagnosis is: [x] upgraded    Previous Nutrition Diagnosis #2: inadequate protein intake  Nutrition Diagnosis is: [x] upgraded    Previous Nutrition Diagnosis #3: moderate acute Malnutrition   Nutrition Diagnosis is: [x] ongoing  - being addressed with PO intake, oral nutritional supplements, food preferences     New Nutrition Diagnosis: [x] n/a     Nutrition Care Plan:  [x] In Progress   [] Achieved  [] Not applicable    Nutrition Interventions:     Education Provided:       [x] Yes: Discussed importance of adequate consumption of meals/supplements to optimize protein-energy intake. Encouraged small/frequent meals, nutrient dense snacks, prioritizing protein foods at meal time. Reviewed mucositis nutrition therapy        Recommendations:         [X] Continue current diet order: regular. Allow Pt to order texture modified foods PRN.             [X] Continue oral nutrition supplement: Ensure Shake HP 1x/day     [X] Encourage PO intake, obtain food preferences, provide feeding assistance as needed.      [X] Continue current micronutrient supplementation: multivitamin, folic acid     Monitoring and Evaluation:   Continue to monitor nutritional intake, tolerance to diet prescription, weights, labs, skin integrity    RD remains available upon request and will follow up per protocol  Gloria Dallas RDN [facility] Pager #[phone]

## 2022-12-05 NOTE — PROGRESS NOTE ADULT - SUBJECTIVE AND OBJECTIVE BOX
HPC Transplant Team                                                      Critical / Counseling Time Provided: 30 minutes                                                                                                                                                        Chief Complaint: Autologous peripheral blood stem cell transplant with high dose CBV prep regimen for treatment of peripheral T cell lymphoma    S: Patient seen and examined with HPC Transplant Team:     All other ROS negative     O: Vitals:   Vital Signs Last 24 Hrs  T(C): 37 (05 Dec 2022 05:00), Max: 37.3 (04 Dec 2022 09:47)  T(F): 98.6 (05 Dec 2022 05:00), Max: 99.1 (04 Dec 2022 09:47)  HR: 91 (05 Dec 2022 05:00) (83 - 98)  BP: 99/63 (05 Dec 2022 05:00) (91/60 - 103/66)  BP(mean): --  RR: 18 (05 Dec 2022 05:00) (16 - 19)  SpO2: 98% (05 Dec 2022 05:00) (96% - 100%)    Parameters below as of 05 Dec 2022 05:00  Patient On (Oxygen Delivery Method): room air      Admit weight: 80.3kg   Daily Weight in k.2 (04 Dec 2022 09:47)  Today's weight:     Intake / Output:   - @ 07:01  -  12-05 @ 07:00  --------------------------------------------------------  IN: 1497.2 mL / OUT: 2850 mL / NET: -1352.8 mL      PE:   Oropharynx: patchy erythema on hard palate, small ulcerations on hard palate and bilateral buccal mucosa slowly resolving  Oral Mucositis:            +                                          rdGrdrrdarddrderd:rd rd3rd CVS: S1, S2 RRR   Lungs: CTA throughout bilaterally   Abdomen: + BS x 4 normoactive, soft, NT, ND  Extremities: no edema   Gastric Mucositis:       -                                           Grade: n/a   Intestinal Mucositis:     -                                         Grade: n/a   Skin: + macular erythema to chest, upper back and neck post Kepivance, improving  TLC: CDI   Neuro: A&O x 3   Pain: 3/10- mouth, improving    Labs:   CBC Full  -  ( 05 Dec 2022 06:45 )  WBC Count : 0.44 K/uL  Hemoglobin : 7.7 g/dL  Hematocrit : 23.6 %  Platelet Count - Automated : 23 K/uL  Mean Cell Volume : 89.4 fl  Mean Cell Hemoglobin : 29.2 pg  Mean Cell Hemoglobin Concentration : 32.6 gm/dL  Auto Neutrophil # : x  Auto Lymphocyte # : x  Auto Monocyte # : x  Auto Eosinophil # : x  Auto Basophil # : x  Auto Neutrophil % : x  Auto Lymphocyte % : x  Auto Monocyte % : x  Auto Eosinophil % : x  Auto Basophil % : x                          7.7    0.44  )-----------( 23       ( 05 Dec 2022 06:45 )             23.6     12    140  |  103  |  11  ----------------------------<  119<H>  3.9   |  27  |  0.40<L>    Ca    9.6      05 Dec 2022 06:45  Phos  3.4       Mg     1.9         TPro  6.3  /  Alb  3.9  /  TBili  0.2  /  DBili  x   /  AST  25  /  ALT  60<H>  /  AlkPhos  101  12-    PT/INR - ( 05 Dec 2022 06:45 )   PT: 13.1 sec;   INR: 1.13 ratio         PTT - ( 05 Dec 2022 06:45 )  PTT:24.6 sec  LIVER FUNCTIONS - ( 05 Dec 2022 06:45 )  Alb: 3.9 g/dL / Pro: 6.3 g/dL / ALK PHOS: 101 U/L / ALT: 60 U/L / AST: 25 U/L / GGT: x           Lactate Dehydrogenase, Serum: 149 U/L ( @ 06:45)      Cultures:   Culture - Blood (22 @ 06:10)    Specimen Source: .Blood Blood    Culture Results:   No Growth Final    Culture - Urine (22 @ 08:19)    Specimen Source: Clean Catch Clean Catch (Midstream)    Culture Results:   <10,000 CFU/mL Normal Urogenital Maryanne    Radiology:    Xray Chest 1 View- PORTABLE-Urgent (Xray Chest 1 View- PORTABLE-Urgent .) (22 @ 07:18) >  Impression:  No focal consolidations.      Meds:   Antimicrobials:   acyclovir   Oral Tab/Cap 400 milliGRAM(s) Oral every 8 hours  cefepime   IVPB 2000 milliGRAM(s) IV Intermittent every 8 hours  clotrimazole Lozenge 1 Lozenge Oral five times a day  fluconAZOLE   Tablet 400 milliGRAM(s) Oral daily      Heme / Onc:   heparin  Infusion 334 Unit(s)/Hr IV Continuous <Continuous>      GI:  pantoprazole    Tablet 40 milliGRAM(s) Oral before breakfast  polyethylene glycol 3350 17 Gram(s) Oral daily PRN  senna 2 Tablet(s) Oral at bedtime PRN  sodium bicarbonate Mouth Rinse 10 milliLiter(s) Swish and Spit five times a day  ursodiol Capsule 300 milliGRAM(s) Oral two times a day      Cardiovascular:       Immunologic:   filgrastim-sndz (ZARXIO) Injectable 480 MICROGram(s) SubCutaneous every 24 hours      Other medications:   acetaminophen     Tablet .. 650 milliGRAM(s) Oral every 6 hours  Biotene Dry Mouth Oral Rinse 5 milliLiter(s) Swish and Spit five times a day  chlorhexidine 4% Liquid 1 Application(s) Topical <User Schedule>  FIRST- Mouthwash  BLM 10 milliLiter(s) Swish and Spit every 6 hours  FLUoxetine 20 milliGRAM(s) Oral daily  folic acid 1 milliGRAM(s) Oral daily  lidocaine/prilocaine Cream 1 Application(s) Topical daily  loratadine 10 milliGRAM(s) Oral daily  multivitamin 1 Tablet(s) Oral daily  nystatin Powder 1 Application(s) Topical two times a day  sodium chloride 0.45% 1000 milliLiter(s) IV Continuous <Continuous>  sodium chloride 0.9%. 1000 milliLiter(s) IV Continuous <Continuous>      PRN:   acetaminophen     Tablet .. 650 milliGRAM(s) Oral every 6 hours PRN  AQUAPHOR (petrolatum Ointment) 1 Application(s) Topical two times a day PRN  HYDROmorphone  Injectable 0.5 milliGRAM(s) IV Push every 6 hours PRN  metoclopramide Injectable 10 milliGRAM(s) IV Push every 6 hours PRN  oxyCODONE    IR 5 milliGRAM(s) Oral every 6 hours PRN  polyethylene glycol 3350 17 Gram(s) Oral daily PRN  senna 2 Tablet(s) Oral at bedtime PRN  sodium chloride 0.65% Nasal 1 Spray(s) Both Nostrils four times a day PRN  sodium chloride 0.9% lock flush 10 milliLiter(s) IV Push every 1 hour PRN  zinc oxide 40% Paste 1 Application(s) Topical three times a day PRN    A/P:  57 year old female with a history of  peripheral T cell lymphoma   Post :  Autologous PBSCT day + 10  - Completed  -16 24 hour continuous infusion, strict I&O, daily weights, prn diuresis    - Cytoxan day 3/.  - CTX - continue CTX hydration for 24 hours post infusion of last dose. Strict I&O, daily weights, prn diuresis. Start cipro 500mg po BID when ANC < 500    Neutropenic. Started Cipro for prophylaxis. If febrile, panculture and start Cefepime (has tolerated in the past)   - Kepivance day 2/3  11/27- Kepivance day 3/3- HELD due to Kepivance rash and oral erythema worsening  - Febrile overnight, tmax 100.4. CXR no obvious consolidations. Cultures pending. Started on cefepime. Grade 2 chemotherapy induced oral mucositis. Continue supportive measures.    - Mild transaminitis, if continue to rise lower Diflucan dose.  - early engraftment. Continue Cefepime, zarxio for now     1. Infectious Disease:   acyclovir   Oral Tab/Cap 400 milliGRAM(s) Oral every 8 hours  cefepime   IVPB 2000 milliGRAM(s) IV Intermittent every 8 hours  clotrimazole Lozenge 1 Lozenge Oral five times a day  fluconAZOLE   Tablet 400 milliGRAM(s) Oral daily    2. VOD Prophylaxis: Actigall, Glutamine, Heparin (dosed at 100 units / kg / day)     3. GI Prophylaxis:    pantoprazole    Tablet 40 milliGRAM(s) Oral before breakfast    4. Mouthcare - NS / NaHCO3 rinses, Mycelex, Biotene; Skin care     5. GVHD prophylaxis - n/a     6. Transfuse & replete electrolytes prn     7. IV hydration, daily weights, strict I&O, prn diuresis     8. PO intake as tolerated, nutrition follow up as needed, MVI, folic acid     9. Antiemetics, anti-diarrhea medications:   metoclopramide Injectable 10 milliGRAM(s) IV Push every 6 hours PRN    10. OOB as tolerated, physical therapy consult if needed     11. Monitor coags / fibrinogen 2x week, vitamin K as needed     12. Monitor closely for clinical changes, monitor for fevers     13. Emotional support provided, plan of care discussed and questions addressed     14. Patient education done regarding plan of care, restrictions and discharge planning     15. Continue regular social work input     I have written the above note for Dr. Cabrales who performed service with me in the room.   Leena Ibrahim NP-C ([phone])    I have seen and examined patient with NP, I agree with above note as scribed.                    HPC Transplant Team                                                      Critical / Counseling Time Provided: 30 minutes                                                                                                                                                        Chief Complaint: Autologous peripheral blood stem cell transplant with high dose CBV prep regimen for treatment of peripheral T cell lymphoma    S: Patient seen and examined with HPC Transplant Team:   + mouth pain, improved   + fatigue   All other ROS negative     O: Vitals:   Vital Signs Last 24 Hrs  T(C): 37 (05 Dec 2022 05:00), Max: 37.3 (04 Dec 2022 09:47)  T(F): 98.6 (05 Dec 2022 05:00), Max: 99.1 (04 Dec 2022 09:47)  HR: 91 (05 Dec 2022 05:00) (83 - 98)  BP: 99/63 (05 Dec 2022 05:00) (91/60 - 103/66)  BP(mean): --  RR: 18 (05 Dec 2022 05:00) (16 - 19)  SpO2: 98% (05 Dec 2022 05:00) (96% - 100%)    Parameters below as of 05 Dec 2022 05:00  Patient On (Oxygen Delivery Method): room air      Admit weight: 80.3kg   Daily Weight in k.2 (04 Dec 2022 09:47)  Today's weight: 75.4kg    Intake / Output:    @ 07:01  -  - @ 07:00  --------------------------------------------------------  IN: 1497.2 mL / OUT: 2850 mL / NET: -1352.8 mL      PE:   Oropharynx: + ulceration, left lateral tongue, ulcerations on hard palate resolving   Oral Mucositis:            +                                          stGstrstastdstest:st st1st CVS: S1, S2 RRR   Lungs: CTA throughout bilaterally   Abdomen: + BS x 4 normoactive, soft, NT, ND  Extremities: no edema   Gastric Mucositis:       -                                           Grade: n/a   Intestinal Mucositis:     -                                         Grade: n/a   Skin: + macular erythema to chest, upper back and neck post Kepivance, improving  TLC: CDI   Neuro: A&O x 3   Pain: 1/10- mouth, improving    Labs:   CBC Full  -  ( 05 Dec 2022 06:45 )  WBC Count : 0.44 K/uL  Hemoglobin : 7.7 g/dL  Hematocrit : 23.6 %  Platelet Count - Automated : 23 K/uL  Mean Cell Volume : 89.4 fl  Mean Cell Hemoglobin : 29.2 pg  Mean Cell Hemoglobin Concentration : 32.6 gm/dL  Auto Neutrophil # : x  Auto Lymphocyte # : x  Auto Monocyte # : x  Auto Eosinophil # : x  Auto Basophil # : x  Auto Neutrophil % : x  Auto Lymphocyte % : x  Auto Monocyte % : x  Auto Eosinophil % : x  Auto Basophil % : x                          7.7    0.44  )-----------( 23       ( 05 Dec 2022 06:45 )             23.6     12    140  |  103  |  11  ----------------------------<  119<H>  3.9   |  27  |  0.40<L>    Ca    9.6      05 Dec 2022 06:45  Phos  3.4       Mg     1.9         TPro  6.3  /  Alb  3.9  /  TBili  0.2  /  DBili  x   /  AST  25  /  ALT  60<H>  /  AlkPhos  101  12-    PT/INR - ( 05 Dec 2022 06:45 )   PT: 13.1 sec;   INR: 1.13 ratio         PTT - ( 05 Dec 2022 06:45 )  PTT:24.6 sec  LIVER FUNCTIONS - ( 05 Dec 2022 06:45 )  Alb: 3.9 g/dL / Pro: 6.3 g/dL / ALK PHOS: 101 U/L / ALT: 60 U/L / AST: 25 U/L / GGT: x           Lactate Dehydrogenase, Serum: 149 U/L ( @ 06:45)      Cultures:   Culture - Blood (22 @ 06:10)    Specimen Source: .Blood Blood    Culture Results:   No Growth Final    Culture - Urine (22 @ 08:19)    Specimen Source: Clean Catch Clean Catch (Midstream)    Culture Results:   <10,000 CFU/mL Normal Urogenital Maryanne    Radiology:    Xray Chest 1 View- PORTABLE-Urgent (Xray Chest 1 View- PORTABLE-Urgent .) (22 @ 07:18) >  Impression:  No focal consolidations.      Meds:   Antimicrobials:   acyclovir   Oral Tab/Cap 400 milliGRAM(s) Oral every 8 hours  cefepime   IVPB 2000 milliGRAM(s) IV Intermittent every 8 hours  clotrimazole Lozenge 1 Lozenge Oral five times a day  fluconAZOLE   Tablet 400 milliGRAM(s) Oral daily      Heme / Onc:   heparin  Infusion 334 Unit(s)/Hr IV Continuous <Continuous>      GI:  pantoprazole    Tablet 40 milliGRAM(s) Oral before breakfast  polyethylene glycol 3350 17 Gram(s) Oral daily PRN  senna 2 Tablet(s) Oral at bedtime PRN  sodium bicarbonate Mouth Rinse 10 milliLiter(s) Swish and Spit five times a day  ursodiol Capsule 300 milliGRAM(s) Oral two times a day      Cardiovascular:       Immunologic:   filgrastim-sndz (ZARXIO) Injectable 480 MICROGram(s) SubCutaneous every 24 hours      Other medications:   acetaminophen     Tablet .. 650 milliGRAM(s) Oral every 6 hours  Biotene Dry Mouth Oral Rinse 5 milliLiter(s) Swish and Spit five times a day  chlorhexidine 4% Liquid 1 Application(s) Topical <User Schedule>  FIRST- Mouthwash  BLM 10 milliLiter(s) Swish and Spit every 6 hours  FLUoxetine 20 milliGRAM(s) Oral daily  folic acid 1 milliGRAM(s) Oral daily  lidocaine/prilocaine Cream 1 Application(s) Topical daily  loratadine 10 milliGRAM(s) Oral daily  multivitamin 1 Tablet(s) Oral daily  nystatin Powder 1 Application(s) Topical two times a day  sodium chloride 0.45% 1000 milliLiter(s) IV Continuous <Continuous>  sodium chloride 0.9%. 1000 milliLiter(s) IV Continuous <Continuous>      PRN:   acetaminophen     Tablet .. 650 milliGRAM(s) Oral every 6 hours PRN  AQUAPHOR (petrolatum Ointment) 1 Application(s) Topical two times a day PRN  HYDROmorphone  Injectable 0.5 milliGRAM(s) IV Push every 6 hours PRN  metoclopramide Injectable 10 milliGRAM(s) IV Push every 6 hours PRN  oxyCODONE    IR 5 milliGRAM(s) Oral every 6 hours PRN  polyethylene glycol 3350 17 Gram(s) Oral daily PRN  senna 2 Tablet(s) Oral at bedtime PRN  sodium chloride 0.65% Nasal 1 Spray(s) Both Nostrils four times a day PRN  sodium chloride 0.9% lock flush 10 milliLiter(s) IV Push every 1 hour PRN  zinc oxide 40% Paste 1 Application(s) Topical three times a day PRN    A/P:  57 year old female with a history of  peripheral T cell lymphoma   Post :  Autologous PBSCT day + 10  - Completed  -16 24 hour continuous infusion, strict I&O, daily weights, prn diuresis    - Cytoxan day 3/.  - CTX - continue CTX hydration for 24 hours post infusion of last dose. Strict I&O, daily weights, prn diuresis. Start cipro 500mg po BID when ANC < 500    Neutropenic. Started Cipro for prophylaxis. If febrile, panculture and start Cefepime (has tolerated in the past)   - Kepivance day 2/3  11/27- Kepivance day 3/3- HELD due to Kepivance rash and oral erythema worsening  - Febrile overnight, tmax 100.4. CXR no obvious consolidations. Cultures pending. Started on cefepime. Grade 2 chemotherapy induced oral mucositis. Continue supportive measures.    - Mild transaminitis, if continue to rise lower Diflucan dose.  - early engraftment. Continue Cefepime, zarxio for now     1. Infectious Disease:   acyclovir   Oral Tab/Cap 400 milliGRAM(s) Oral every 8 hours  cefepime   IVPB 2000 milliGRAM(s) IV Intermittent every 8 hours  clotrimazole Lozenge 1 Lozenge Oral five times a day  fluconAZOLE   Tablet 400 milliGRAM(s) Oral daily    2. VOD Prophylaxis: Actigall, Glutamine, Heparin (dosed at 100 units / kg / day)     3. GI Prophylaxis:    pantoprazole    Tablet 40 milliGRAM(s) Oral before breakfast    4. Mouthcare - NS / NaHCO3 rinses, Mycelex, Biotene; Skin care     5. GVHD prophylaxis - n/a     6. Transfuse & replete electrolytes prn     7. IV hydration, daily weights, strict I&O, prn diuresis     8. PO intake as tolerated, nutrition follow up as needed, MVI, folic acid     9. Antiemetics, anti-diarrhea medications:   metoclopramide Injectable 10 milliGRAM(s) IV Push every 6 hours PRN    10. OOB as tolerated, physical therapy consult if needed     11. Monitor coags / fibrinogen 2x week, vitamin K as needed     12. Monitor closely for clinical changes, monitor for fevers     13. Emotional support provided, plan of care discussed and questions addressed     14. Patient education done regarding plan of care, restrictions and discharge planning     15. Continue regular social work input     I have written the above note for Dr. Cabraels who performed service with me in the room.   Leena Ibrahim NP-C ([phone])    I have seen and examined patient with NP, I agree with above note as scribed.

## 2022-12-06 LAB
ALBUMIN SERPL ELPH-MCNC: 3.7 G/DL — SIGNIFICANT CHANGE UP (ref 3.3–5)
ALP SERPL-CCNC: 108 U/L — SIGNIFICANT CHANGE UP (ref 40–120)
ALT FLD-CCNC: 63 U/L — HIGH (ref 10–45)
ANION GAP SERPL CALC-SCNC: 12 MMOL/L — SIGNIFICANT CHANGE UP (ref 5–17)
AST SERPL-CCNC: 25 U/L — SIGNIFICANT CHANGE UP (ref 10–40)
BASOPHILS # BLD AUTO: 0 K/UL — SIGNIFICANT CHANGE UP (ref 0–0.2)
BASOPHILS NFR BLD AUTO: 0 % — SIGNIFICANT CHANGE UP (ref 0–2)
BILIRUB SERPL-MCNC: 0.1 MG/DL — LOW (ref 0.2–1.2)
BUN SERPL-MCNC: 14 MG/DL — SIGNIFICANT CHANGE UP (ref 7–23)
CALCIUM SERPL-MCNC: 9.2 MG/DL — SIGNIFICANT CHANGE UP (ref 8.4–10.5)
CHLORIDE SERPL-SCNC: 103 MMOL/L — SIGNIFICANT CHANGE UP (ref 96–108)
CO2 SERPL-SCNC: 26 MMOL/L — SIGNIFICANT CHANGE UP (ref 22–31)
CREAT SERPL-MCNC: 0.37 MG/DL — LOW (ref 0.5–1.3)
DACRYOCYTES BLD QL SMEAR: SLIGHT — SIGNIFICANT CHANGE UP
EGFR: 119 ML/MIN/1.73M2 — SIGNIFICANT CHANGE UP
EOSINOPHIL # BLD AUTO: 0 K/UL — SIGNIFICANT CHANGE UP (ref 0–0.5)
EOSINOPHIL NFR BLD AUTO: 0 % — SIGNIFICANT CHANGE UP (ref 0–6)
GLUCOSE SERPL-MCNC: 112 MG/DL — HIGH (ref 70–99)
HCT VFR BLD CALC: 22.9 % — LOW (ref 34.5–45)
HGB BLD-MCNC: 7.5 G/DL — LOW (ref 11.5–15.5)
LDH SERPL L TO P-CCNC: 136 U/L — SIGNIFICANT CHANGE UP (ref 50–242)
LYMPHOCYTES # BLD AUTO: 0.25 K/UL — LOW (ref 1–3.3)
LYMPHOCYTES # BLD AUTO: 27.4 % — SIGNIFICANT CHANGE UP (ref 13–44)
MAGNESIUM SERPL-MCNC: 1.9 MG/DL — SIGNIFICANT CHANGE UP (ref 1.6–2.6)
MANUAL SMEAR VERIFICATION: SIGNIFICANT CHANGE UP
MCHC RBC-ENTMCNC: 29.4 PG — SIGNIFICANT CHANGE UP (ref 27–34)
MCHC RBC-ENTMCNC: 32.8 GM/DL — SIGNIFICANT CHANGE UP (ref 32–36)
MCV RBC AUTO: 89.8 FL — SIGNIFICANT CHANGE UP (ref 80–100)
MONOCYTES # BLD AUTO: 0.05 K/UL — SIGNIFICANT CHANGE UP (ref 0–0.9)
MONOCYTES NFR BLD AUTO: 5.9 % — SIGNIFICANT CHANGE UP (ref 2–14)
MYELOCYTES NFR BLD: 2 % — HIGH (ref 0–0)
NEUTROPHILS # BLD AUTO: 0.6 K/UL — LOW (ref 1.8–7.4)
NEUTROPHILS NFR BLD AUTO: 62.7 % — SIGNIFICANT CHANGE UP (ref 43–77)
NEUTS BAND # BLD: 2 % — SIGNIFICANT CHANGE UP (ref 0–8)
PHOSPHATE SERPL-MCNC: 3.5 MG/DL — SIGNIFICANT CHANGE UP (ref 2.5–4.5)
PLAT MORPH BLD: NORMAL — SIGNIFICANT CHANGE UP
PLATELET # BLD AUTO: 29 K/UL — LOW (ref 150–400)
POIKILOCYTOSIS BLD QL AUTO: SLIGHT — SIGNIFICANT CHANGE UP
POLYCHROMASIA BLD QL SMEAR: SLIGHT — SIGNIFICANT CHANGE UP
POTASSIUM SERPL-MCNC: 3.8 MMOL/L — SIGNIFICANT CHANGE UP (ref 3.5–5.3)
POTASSIUM SERPL-SCNC: 3.8 MMOL/L — SIGNIFICANT CHANGE UP (ref 3.5–5.3)
PROT SERPL-MCNC: 6.3 G/DL — SIGNIFICANT CHANGE UP (ref 6–8.3)
RBC # BLD: 2.55 M/UL — LOW (ref 3.8–5.2)
RBC # FLD: 12.6 % — SIGNIFICANT CHANGE UP (ref 10.3–14.5)
RBC BLD AUTO: ABNORMAL
SODIUM SERPL-SCNC: 141 MMOL/L — SIGNIFICANT CHANGE UP (ref 135–145)
TOXIC GRANULES BLD QL SMEAR: PRESENT — SIGNIFICANT CHANGE UP
WBC # BLD: 0.93 K/UL — CRITICAL LOW (ref 3.8–10.5)
WBC # FLD AUTO: 0.93 K/UL — CRITICAL LOW (ref 3.8–10.5)

## 2022-12-06 PROCEDURE — 99291 CRITICAL CARE FIRST HOUR: CPT

## 2022-12-06 RX ORDER — ALTEPLASE 100 MG
2 KIT INTRAVENOUS ONCE
Refills: 0 | Status: COMPLETED | OUTPATIENT
Start: 2022-12-06 | End: 2022-12-06

## 2022-12-06 RX ADMIN — Medication 1 LOZENGE: at 23:08

## 2022-12-06 RX ADMIN — Medication 10 MILLILITER(S): at 19:32

## 2022-12-06 RX ADMIN — Medication 10 MILLILITER(S): at 12:43

## 2022-12-06 RX ADMIN — CEFEPIME 100 MILLIGRAM(S): 1 INJECTION, POWDER, FOR SOLUTION INTRAMUSCULAR; INTRAVENOUS at 21:03

## 2022-12-06 RX ADMIN — DIPHENHYDRAMINE HYDROCHLORIDE AND LIDOCAINE HYDROCHLORIDE AND ALUMINUM HYDROXIDE AND MAGNESIUM HYDRO 10 MILLILITER(S): KIT at 12:42

## 2022-12-06 RX ADMIN — URSODIOL 300 MILLIGRAM(S): 250 TABLET, FILM COATED ORAL at 05:22

## 2022-12-06 RX ADMIN — Medication 1 TABLET(S): at 12:41

## 2022-12-06 RX ADMIN — CEFEPIME 100 MILLIGRAM(S): 1 INJECTION, POWDER, FOR SOLUTION INTRAMUSCULAR; INTRAVENOUS at 05:22

## 2022-12-06 RX ADMIN — Medication 20 MILLIGRAM(S): at 12:41

## 2022-12-06 RX ADMIN — Medication 10 MILLILITER(S): at 00:10

## 2022-12-06 RX ADMIN — LORATADINE 10 MILLIGRAM(S): 10 TABLET ORAL at 12:41

## 2022-12-06 RX ADMIN — SODIUM CHLORIDE 20 MILLILITER(S): 9 INJECTION INTRAMUSCULAR; INTRAVENOUS; SUBCUTANEOUS at 00:11

## 2022-12-06 RX ADMIN — Medication 5 MILLILITER(S): at 00:10

## 2022-12-06 RX ADMIN — Medication 1 LOZENGE: at 00:09

## 2022-12-06 RX ADMIN — SODIUM CHLORIDE 20 MILLILITER(S): 9 INJECTION INTRAMUSCULAR; INTRAVENOUS; SUBCUTANEOUS at 19:32

## 2022-12-06 RX ADMIN — CHLORHEXIDINE GLUCONATE 1 APPLICATION(S): 213 SOLUTION TOPICAL at 08:16

## 2022-12-06 RX ADMIN — NYSTATIN CREAM 1 APPLICATION(S): 100000 CREAM TOPICAL at 05:22

## 2022-12-06 RX ADMIN — URSODIOL 300 MILLIGRAM(S): 250 TABLET, FILM COATED ORAL at 17:39

## 2022-12-06 RX ADMIN — Medication 5 MILLILITER(S): at 19:31

## 2022-12-06 RX ADMIN — Medication 400 MILLIGRAM(S): at 21:03

## 2022-12-06 RX ADMIN — HEPARIN SODIUM 3.34 UNIT(S)/HR: 5000 INJECTION INTRAVENOUS; SUBCUTANEOUS at 00:10

## 2022-12-06 RX ADMIN — Medication 5 MILLILITER(S): at 12:42

## 2022-12-06 RX ADMIN — Medication 10 MILLILITER(S): at 23:08

## 2022-12-06 RX ADMIN — DIPHENHYDRAMINE HYDROCHLORIDE AND LIDOCAINE HYDROCHLORIDE AND ALUMINUM HYDROXIDE AND MAGNESIUM HYDRO 10 MILLILITER(S): KIT at 05:22

## 2022-12-06 RX ADMIN — Medication 5 MILLILITER(S): at 08:15

## 2022-12-06 RX ADMIN — Medication 5 MILLILITER(S): at 16:29

## 2022-12-06 RX ADMIN — Medication 1 MILLIGRAM(S): at 12:42

## 2022-12-06 RX ADMIN — Medication 10 MILLILITER(S): at 16:29

## 2022-12-06 RX ADMIN — PANTOPRAZOLE SODIUM 40 MILLIGRAM(S): 20 TABLET, DELAYED RELEASE ORAL at 05:22

## 2022-12-06 RX ADMIN — Medication 400 MILLIGRAM(S): at 05:22

## 2022-12-06 RX ADMIN — FLUCONAZOLE 400 MILLIGRAM(S): 150 TABLET ORAL at 12:41

## 2022-12-06 RX ADMIN — DIPHENHYDRAMINE HYDROCHLORIDE AND LIDOCAINE HYDROCHLORIDE AND ALUMINUM HYDROXIDE AND MAGNESIUM HYDRO 10 MILLILITER(S): KIT at 23:07

## 2022-12-06 RX ADMIN — Medication 1 LOZENGE: at 19:32

## 2022-12-06 RX ADMIN — NYSTATIN CREAM 1 APPLICATION(S): 100000 CREAM TOPICAL at 17:45

## 2022-12-06 RX ADMIN — Medication 5 MILLILITER(S): at 23:07

## 2022-12-06 RX ADMIN — Medication 480 MICROGRAM(S): at 18:08

## 2022-12-06 RX ADMIN — Medication 1 LOZENGE: at 16:29

## 2022-12-06 RX ADMIN — ALTEPLASE 2 MILLIGRAM(S): KIT at 11:09

## 2022-12-06 RX ADMIN — HEPARIN SODIUM 3.34 UNIT(S)/HR: 5000 INJECTION INTRAVENOUS; SUBCUTANEOUS at 19:32

## 2022-12-06 RX ADMIN — DIPHENHYDRAMINE HYDROCHLORIDE AND LIDOCAINE HYDROCHLORIDE AND ALUMINUM HYDROXIDE AND MAGNESIUM HYDRO 10 MILLILITER(S): KIT at 00:14

## 2022-12-06 RX ADMIN — DIPHENHYDRAMINE HYDROCHLORIDE AND LIDOCAINE HYDROCHLORIDE AND ALUMINUM HYDROXIDE AND MAGNESIUM HYDRO 10 MILLILITER(S): KIT at 17:45

## 2022-12-06 RX ADMIN — Medication 1 LOZENGE: at 08:16

## 2022-12-06 RX ADMIN — CEFEPIME 100 MILLIGRAM(S): 1 INJECTION, POWDER, FOR SOLUTION INTRAMUSCULAR; INTRAVENOUS at 14:53

## 2022-12-06 RX ADMIN — Medication 1 LOZENGE: at 12:41

## 2022-12-06 RX ADMIN — Medication 10 MILLILITER(S): at 08:15

## 2022-12-06 RX ADMIN — Medication 400 MILLIGRAM(S): at 14:52

## 2022-12-06 NOTE — PROGRESS NOTE ADULT - SUBJECTIVE AND OBJECTIVE BOX
HPC Transplant Team                                                      Critical / Counseling Time Provided: 30 minutes                                                                                                                                                        Chief Complaint: Autologous peripheral blood stem cell transplant with high dose CBV prep regimen for treatment of peripheral T cell lymphoma    S: Patient seen and examined with HPC Transplant Team:   + mouth pain, improved   + fatigue   All other ROS negative       O: Vitals:   Vital Signs Last 24 Hrs  T(C): 37.2 (06 Dec 2022 05:25), Max: 37.4 (06 Dec 2022 00:50)  T(F): 99 (06 Dec 2022 05:25), Max: 99.3 (06 Dec 2022 00:50)  HR: 88 (06 Dec 2022 05:25) (72 - 108)  BP: 100/69 (06 Dec 2022 05:25) (100/69 - 119/61)  BP(mean): --  RR: 18 (06 Dec 2022 05:25) (18 - 18)  SpO2: 97% (06 Dec 2022 05:25) (97% - 100%)    Parameters below as of 06 Dec 2022 05:25  Patient On (Oxygen Delivery Method): room air      Admit weight: 80.3 kg  Daily     Daily Weight in k.4 (05 Dec 2022 09:15)    Intake / Output:   - @ 07:01  -  12-06 @ 07:00  --------------------------------------------------------  IN: 1855 mL / OUT: 2300 mL / NET: -445 mL        PE:   Oropharynx: + ulceration, left lateral tongue, ulcerations on hard palate resolving   Oral Mucositis:            +                                          stGstrstastdstest:st st1st CVS: S1, S2 RRR   Lungs: CTA throughout bilaterally   Abdomen: + BS x 4 normoactive, soft, NT, ND  Extremities: no edema   Gastric Mucositis:       -                                           Grade: n/a   Intestinal Mucositis:     -                                         Grade: n/a   Skin: + macular erythema to chest, upper back and neck post Kepivance, improving  TLC: CDI   Neuro: A&O x 3   Pain: 1/10- mouth, improving            Labs:   CBC Full  -  ( 06 Dec 2022 06:54 )  WBC Count : 0.93 K/uL  Hemoglobin : 7.5 g/dL  Hematocrit : 22.9 %  Platelet Count - Automated : 29 K/uL  Mean Cell Volume : 89.8 fl  Mean Cell Hemoglobin : 29.4 pg  Mean Cell Hemoglobin Concentration : 32.8 gm/dL  Auto Neutrophil # : x  Auto Lymphocyte # : x  Auto Monocyte # : x  Auto Eosinophil # : x  Auto Basophil # : x  Auto Neutrophil % : x  Auto Lymphocyte % : x  Auto Monocyte % : x  Auto Eosinophil % : x  Auto Basophil % : x                          7.5    0.93  )-----------( 29       ( 06 Dec 2022 06:54 )             22.9     12-    140  |  103  |  11  ----------------------------<  119<H>  3.9   |  27  |  0.40<L>    Ca    9.6      05 Dec 2022 06:45  Phos  3.4     12-  Mg     1.9     12-    TPro  6.3  /  Alb  3.9  /  TBili  0.2  /  DBili  x   /  AST  25  /  ALT  60<H>  /  AlkPhos  101  12-05    PT/INR - ( 05 Dec 2022 06:45 )   PT: 13.1 sec;   INR: 1.13 ratio         PTT - ( 05 Dec 2022 06:45 )  PTT:24.6 sec  LIVER FUNCTIONS - ( 05 Dec 2022 06:45 )  Alb: 3.9 g/dL / Pro: 6.3 g/dL / ALK PHOS: 101 U/L / ALT: 60 U/L / AST: 25 U/L / GGT: x                       Cultures:         Radiology:      Xray Chest 1 View- PORTABLE-Urgent (Xray Chest 1 View- PORTABLE-Urgent .) (11.28.22 @ 07:18) >  Impression:  No focal consolidations.          Meds:   Antimicrobials:   acyclovir   Oral Tab/Cap 400 milliGRAM(s) Oral every 8 hours  cefepime   IVPB 2000 milliGRAM(s) IV Intermittent every 8 hours  clotrimazole Lozenge 1 Lozenge Oral five times a day  fluconAZOLE   Tablet 400 milliGRAM(s) Oral daily      Heme / Onc:   alteplase for catheter clearance 2 milliGRAM(s) Catheter once  heparin  Infusion 334 Unit(s)/Hr IV Continuous <Continuous>      GI:  pantoprazole    Tablet 40 milliGRAM(s) Oral before breakfast  polyethylene glycol 3350 17 Gram(s) Oral daily PRN  senna 2 Tablet(s) Oral at bedtime PRN  sodium bicarbonate Mouth Rinse 10 milliLiter(s) Swish and Spit five times a day  ursodiol Capsule 300 milliGRAM(s) Oral two times a day      Immunologic:   filgrastim-sndz (ZARXIO) Injectable 480 MICROGram(s) SubCutaneous every 24 hours      Other medications:   acetaminophen     Tablet .. 650 milliGRAM(s) Oral every 6 hours  Biotene Dry Mouth Oral Rinse 5 milliLiter(s) Swish and Spit five times a day  chlorhexidine 4% Liquid 1 Application(s) Topical <User Schedule>  FIRST- Mouthwash  BLM 10 milliLiter(s) Swish and Spit every 6 hours  FLUoxetine 20 milliGRAM(s) Oral daily  folic acid 1 milliGRAM(s) Oral daily  lidocaine/prilocaine Cream 1 Application(s) Topical daily  loratadine 10 milliGRAM(s) Oral daily  multivitamin 1 Tablet(s) Oral daily  nystatin Powder 1 Application(s) Topical two times a day  sodium chloride 0.45% 1000 milliLiter(s) IV Continuous <Continuous>  sodium chloride 0.9%. 1000 milliLiter(s) IV Continuous <Continuous>      PRN:   acetaminophen     Tablet .. 650 milliGRAM(s) Oral every 6 hours PRN  AQUAPHOR (petrolatum Ointment) 1 Application(s) Topical two times a day PRN  metoclopramide Injectable 10 milliGRAM(s) IV Push every 6 hours PRN  polyethylene glycol 3350 17 Gram(s) Oral daily PRN  senna 2 Tablet(s) Oral at bedtime PRN  sodium chloride 0.65% Nasal 1 Spray(s) Both Nostrils four times a day PRN  sodium chloride 0.9% lock flush 10 milliLiter(s) IV Push every 1 hour PRN  zinc oxide 40% Paste 1 Application(s) Topical three times a day PRN      A/P:  57 year old female with a history of  peripheral T cell lymphoma   Post :  Autologous PBSCT day + - Completed  -16 24 hour continuous infusion, strict I&O, daily weights, prn diuresis    - Cytoxan day 3/.  - CTX /- continue CTX hydration for 24 hours post infusion of last dose. Strict I&O, daily weights, prn diuresis. Start cipro 500mg po BID when ANC < 500    Neutropenic. Started Cipro for prophylaxis. If febrile, panculture and start Cefepime (has tolerated in the past)   - Kepivance day 2/3  11/27- Kepivance day 3/3- HELD due to Kepivance rash and oral erythema worsening  - Febrile overnight, tmax 100.4. CXR no obvious consolidations. Cultures pending. Started on cefepime. Grade 2 chemotherapy induced oral mucositis. Continue supportive measures.    - Mild transaminitis, if continue to rise lower Diflucan dose.  - early engraftment. Continue Cefepime, zarxio for now     1. Infectious Disease:   acyclovir   Oral Tab/Cap 400 milliGRAM(s) Oral every 8 hours  cefepime   IVPB 2000 milliGRAM(s) IV Intermittent every 8 hours  clotrimazole Lozenge 1 Lozenge Oral five times a day  fluconAZOLE   Tablet 400 milliGRAM(s) Oral daily    2. VOD Prophylaxis: Actigall, Glutamine, Heparin (dosed at 100 units / kg / day)     3. GI Prophylaxis:    pantoprazole    Tablet 40 milliGRAM(s) Oral before breakfast    4. Mouthcare - NS / NaHCO3 rinses, Mycelex, Biotene; Skin care     5. GVHD prophylaxis - n/a     6. Transfuse & replete electrolytes prn     7. IV hydration, daily weights, strict I&O, prn diuresis     8. PO intake as tolerated, nutrition follow up as needed, MVI, folic acid     9. Antiemetics, anti-diarrhea medications:   metoclopramide Injectable 10 milliGRAM(s) IV Push every 6 hours PRN    10. OOB as tolerated, physical therapy consult if needed     11. Monitor coags / fibrinogen 2x week, vitamin K as needed     12. Monitor closely for clinical changes, monitor for fevers     13. Emotional support provided, plan of care discussed and questions addressed     14. Patient education done regarding plan of care, restrictions and discharge planning     15. Continue regular social work input     I have written the above note for Dr. Cabrales who performed service with me in the room.   Leena Ibrahim NP-C ([phone])    I have seen and examined patient with NP, I agree with above note as scribed.                          HPC Transplant Team                                                      Critical / Counseling Time Provided: 30 minutes                                                                                                                                                        Chief Complaint: Autologous peripheral blood stem cell transplant with high dose CBV prep regimen for treatment of peripheral T cell lymphoma    S: Patient seen and examined with HPC Transplant Team:     + fatigue   All other ROS negative       O: Vitals:   Vital Signs Last 24 Hrs  T(C): 37.2 (06 Dec 2022 05:25), Max: 37.4 (06 Dec 2022 00:50)  T(F): 99 (06 Dec 2022 05:25), Max: 99.3 (06 Dec 2022 00:50)  HR: 88 (06 Dec 2022 05:25) (72 - 108)  BP: 100/69 (06 Dec 2022 05:25) (100/69 - 119/61)  BP(mean): --  RR: 18 (06 Dec 2022 05:25) (18 - 18)  SpO2: 97% (06 Dec 2022 05:25) (97% - 100%)    Parameters below as of 06 Dec 2022 05:25  Patient On (Oxygen Delivery Method): room air      Admit weight: 80.3 kg  Daily     Daily Weight in k.4 (05 Dec 2022 09:15)    Intake / Output:   - @ 07:01  -  12-06 @ 07:00  --------------------------------------------------------  IN: 1855 mL / OUT: 2300 mL / NET: -445 mL        PE:   Oropharynx: + ulceration, left lateral tongue, ulcerations on hard palate resolving   Oral Mucositis:            +                                          stGstrstastdstest:st st1st CVS: S1, S2 RRR   Lungs: CTA throughout bilaterally   Abdomen: + BS x 4 normoactive, soft, NT, ND  Extremities: no edema   Gastric Mucositis:       -                                           Grade: n/a   Intestinal Mucositis:     -                                         Grade: n/a   Skin: + macular erythema to chest, upper back and neck post Kepivance, improving  TLC: CDI   Neuro: A&O x 3   Pain: denies      LABS:                          7.5    0.93  )-----------( 29       ( 06 Dec 2022 06:54 )             22.9         Mean Cell Volume : 89.8 fl  Mean Cell Hemoglobin : 29.4 pg  Mean Cell Hemoglobin Concentration : 32.8 gm/dL  Auto Neutrophil # : 0.60 K/uL  Auto Lymphocyte # : 0.25 K/uL  Auto Monocyte # : 0.05 K/uL  Auto Eosinophil # : 0.00 K/uL  Auto Basophil # : 0.00 K/uL  Auto Neutrophil % : 62.7 %  Auto Lymphocyte % : 27.4 %  Auto Monocyte % : 5.9 %  Auto Eosinophil % : 0.0 %  Auto Basophil % : 0.0 %          141  |  103  |  14  ----------------------------<  112<H>  3.8   |  26  |  0.37<L>    Ca    9.2      06 Dec 2022 06:54  Phos  3.5       Mg     1.9         TPro  6.3  /  Alb  3.7  /  TBili  0.1<L>  /  DBili  x   /  AST  25  /  ALT  63<H>  /  AlkPhos  108        PT/INR - ( 05 Dec 2022 06:45 )   PT: 13.1 sec;   INR: 1.13 ratio         PTT - ( 05 Dec 2022 06:45 )  PTT:24.6 sec      Uric Acid --          Radiology:        Xray Chest 1 View- PORTABLE-Urgent (Xray Chest 1 View- PORTABLE-Urgent .) (11.28.22 @ 07:18) >  Impression:  No focal consolidations.          Meds:   Antimicrobials:   acyclovir   Oral Tab/Cap 400 milliGRAM(s) Oral every 8 hours  cefepime   IVPB 2000 milliGRAM(s) IV Intermittent every 8 hours  clotrimazole Lozenge 1 Lozenge Oral five times a day  fluconAZOLE   Tablet 400 milliGRAM(s) Oral daily      Heme / Onc:   alteplase for catheter clearance 2 milliGRAM(s) Catheter once  heparin  Infusion 334 Unit(s)/Hr IV Continuous <Continuous>      GI:  pantoprazole    Tablet 40 milliGRAM(s) Oral before breakfast  polyethylene glycol 3350 17 Gram(s) Oral daily PRN  senna 2 Tablet(s) Oral at bedtime PRN  sodium bicarbonate Mouth Rinse 10 milliLiter(s) Swish and Spit five times a day  ursodiol Capsule 300 milliGRAM(s) Oral two times a day      Immunologic:   filgrastim-sndz (ZARXIO) Injectable 480 MICROGram(s) SubCutaneous every 24 hours      Other medications:   acetaminophen     Tablet .. 650 milliGRAM(s) Oral every 6 hours  Biotene Dry Mouth Oral Rinse 5 milliLiter(s) Swish and Spit five times a day  chlorhexidine 4% Liquid 1 Application(s) Topical <User Schedule>  FIRST- Mouthwash  BLM 10 milliLiter(s) Swish and Spit every 6 hours  FLUoxetine 20 milliGRAM(s) Oral daily  folic acid 1 milliGRAM(s) Oral daily  lidocaine/prilocaine Cream 1 Application(s) Topical daily  loratadine 10 milliGRAM(s) Oral daily  multivitamin 1 Tablet(s) Oral daily  nystatin Powder 1 Application(s) Topical two times a day  sodium chloride 0.45% 1000 milliLiter(s) IV Continuous <Continuous>  sodium chloride 0.9%. 1000 milliLiter(s) IV Continuous <Continuous>      PRN:   acetaminophen     Tablet .. 650 milliGRAM(s) Oral every 6 hours PRN  AQUAPHOR (petrolatum Ointment) 1 Application(s) Topical two times a day PRN  metoclopramide Injectable 10 milliGRAM(s) IV Push every 6 hours PRN  polyethylene glycol 3350 17 Gram(s) Oral daily PRN  senna 2 Tablet(s) Oral at bedtime PRN  sodium chloride 0.65% Nasal 1 Spray(s) Both Nostrils four times a day PRN  sodium chloride 0.9% lock flush 10 milliLiter(s) IV Push every 1 hour PRN  zinc oxide 40% Paste 1 Application(s) Topical three times a day PRN      A/P:  57 year old female with a history of  peripheral T cell lymphoma   Post :  Autologous PBSCT day + - Completed  -16 24 hour continuous infusion, strict I&O, daily weights, prn diuresis    - Cytoxan day 3/4.  - CTX - continue CTX hydration for 24 hours post infusion of last dose. Strict I&O, daily weights, prn diuresis. Start cipro 500mg po BID when ANC < 500    Neutropenic. Started Cipro for prophylaxis. If febrile, panculture and start Cefepime (has tolerated in the past)   - Kepivance day 2/3  11/27- Kepivance day 3/3- HELD due to Kepivance rash and oral erythema worsening  - Febrile overnight, tmax 100.4. CXR no obvious consolidations. Cultures pending. Started on cefepime. Grade 2 chemotherapy induced oral mucositis. Continue supportive measures.    - Mild transaminitis, if continue to rise lower Diflucan dose.  - early engraftment. Continue Cefepime, zarxio for now     1. Infectious Disease:   acyclovir   Oral Tab/Cap 400 milliGRAM(s) Oral every 8 hours  cefepime   IVPB 2000 milliGRAM(s) IV Intermittent every 8 hours  clotrimazole Lozenge 1 Lozenge Oral five times a day  fluconAZOLE   Tablet 400 milliGRAM(s) Oral daily    2. VOD Prophylaxis: Actigall, Glutamine, Heparin (dosed at 100 units / kg / day)     3. GI Prophylaxis:    pantoprazole    Tablet 40 milliGRAM(s) Oral before breakfast    4. Mouthcare - NS / NaHCO3 rinses, Mycelex, Biotene; Skin care     5. GVHD prophylaxis - n/a     6. Transfuse & replete electrolytes prn     7. IV hydration, daily weights, strict I&O, prn diuresis     8. PO intake as tolerated, nutrition follow up as needed, MVI, folic acid     9. Antiemetics, anti-diarrhea medications:   metoclopramide Injectable 10 milliGRAM(s) IV Push every 6 hours PRN    10. OOB as tolerated, physical therapy consult if needed     11. Monitor coags / fibrinogen 2x week, vitamin K as needed     12. Monitor closely for clinical changes, monitor for fevers     13. Emotional support provided, plan of care discussed and questions addressed     14. Patient education done regarding plan of care, restrictions and discharge planning     15. Continue regular social work input     I have written the above note for Dr. Cabrales who performed service with me in the room.   Leena Ibrahim NP-C ([phone])    I have seen and examined patient with NP, I agree with above note as scribed.                          HPC Transplant Team                                                      Critical / Counseling Time Provided: 30 minutes                                                                                                                                                        Chief Complaint: Autologous peripheral blood stem cell transplant with high dose CBV prep regimen for treatment of peripheral T cell lymphoma    S: Patient seen and examined with HPC Transplant Team:     + fatigue   All other ROS negative       O: Vitals:   Vital Signs Last 24 Hrs  T(C): 37.2 (06 Dec 2022 05:25), Max: 37.4 (06 Dec 2022 00:50)  T(F): 99 (06 Dec 2022 05:25), Max: 99.3 (06 Dec 2022 00:50)  HR: 88 (06 Dec 2022 05:25) (72 - 108)  BP: 100/69 (06 Dec 2022 05:25) (100/69 - 119/61)  BP(mean): --  RR: 18 (06 Dec 2022 05:25) (18 - 18)  SpO2: 97% (06 Dec 2022 05:25) (97% - 100%)    Parameters below as of 06 Dec 2022 05:25  Patient On (Oxygen Delivery Method): room air      Admit weight: 80.3 kg  Daily   75.7 today  Daily Weight in k.4 (05 Dec 2022 09:15)    Intake / Output:   - @ 07:01  -  12-06 @ 07:00  --------------------------------------------------------  IN: 1855 mL / OUT: 2300 mL / NET: -445 mL        PE:   Oropharynx: + ulceration, left lateral tongue, ulcerations on hard palate resolving   Oral Mucositis:            +                                          stGstrstastdstest:st st1st CVS: S1, S2 RRR   Lungs: CTA throughout bilaterally   Abdomen: + BS x 4 normoactive, soft, NT, ND  Extremities: no edema   Gastric Mucositis:       -                                           Grade: n/a   Intestinal Mucositis:     -                                         Grade: n/a   Skin: + macular erythema to chest, upper back and neck post Kepivance, improving  TLC: CDI   Neuro: A&O x 3   Pain: denies      LABS:                          7.5    0.93  )-----------( 29       ( 06 Dec 2022 06:54 )             22.9         Mean Cell Volume : 89.8 fl  Mean Cell Hemoglobin : 29.4 pg  Mean Cell Hemoglobin Concentration : 32.8 gm/dL  Auto Neutrophil # : 0.60 K/uL  Auto Lymphocyte # : 0.25 K/uL  Auto Monocyte # : 0.05 K/uL  Auto Eosinophil # : 0.00 K/uL  Auto Basophil # : 0.00 K/uL  Auto Neutrophil % : 62.7 %  Auto Lymphocyte % : 27.4 %  Auto Monocyte % : 5.9 %  Auto Eosinophil % : 0.0 %  Auto Basophil % : 0.0 %          141  |  103  |  14  ----------------------------<  112<H>  3.8   |  26  |  0.37<L>    Ca    9.2      06 Dec 2022 06:54  Phos  3.5       Mg     1.9         TPro  6.3  /  Alb  3.7  /  TBili  0.1<L>  /  DBili  x   /  AST  25  /  ALT  63<H>  /  AlkPhos  108        PT/INR - ( 05 Dec 2022 06:45 )   PT: 13.1 sec;   INR: 1.13 ratio         PTT - ( 05 Dec 2022 06:45 )  PTT:24.6 sec      Uric Acid --          Radiology:        Xray Chest 1 View- PORTABLE-Urgent (Xray Chest 1 View- PORTABLE-Urgent .) (11.28.22 @ 07:18) >  Impression:  No focal consolidations.          Meds:   Antimicrobials:   acyclovir   Oral Tab/Cap 400 milliGRAM(s) Oral every 8 hours  cefepime   IVPB 2000 milliGRAM(s) IV Intermittent every 8 hours  clotrimazole Lozenge 1 Lozenge Oral five times a day  fluconAZOLE   Tablet 400 milliGRAM(s) Oral daily      Heme / Onc:   alteplase for catheter clearance 2 milliGRAM(s) Catheter once  heparin  Infusion 334 Unit(s)/Hr IV Continuous <Continuous>      GI:  pantoprazole    Tablet 40 milliGRAM(s) Oral before breakfast  polyethylene glycol 3350 17 Gram(s) Oral daily PRN  senna 2 Tablet(s) Oral at bedtime PRN  sodium bicarbonate Mouth Rinse 10 milliLiter(s) Swish and Spit five times a day  ursodiol Capsule 300 milliGRAM(s) Oral two times a day      Immunologic:   filgrastim-sndz (ZARXIO) Injectable 480 MICROGram(s) SubCutaneous every 24 hours      Other medications:   acetaminophen     Tablet .. 650 milliGRAM(s) Oral every 6 hours  Biotene Dry Mouth Oral Rinse 5 milliLiter(s) Swish and Spit five times a day  chlorhexidine 4% Liquid 1 Application(s) Topical <User Schedule>  FIRST- Mouthwash  BLM 10 milliLiter(s) Swish and Spit every 6 hours  FLUoxetine 20 milliGRAM(s) Oral daily  folic acid 1 milliGRAM(s) Oral daily  lidocaine/prilocaine Cream 1 Application(s) Topical daily  loratadine 10 milliGRAM(s) Oral daily  multivitamin 1 Tablet(s) Oral daily  nystatin Powder 1 Application(s) Topical two times a day  sodium chloride 0.45% 1000 milliLiter(s) IV Continuous <Continuous>  sodium chloride 0.9%. 1000 milliLiter(s) IV Continuous <Continuous>      PRN:   acetaminophen     Tablet .. 650 milliGRAM(s) Oral every 6 hours PRN  AQUAPHOR (petrolatum Ointment) 1 Application(s) Topical two times a day PRN  metoclopramide Injectable 10 milliGRAM(s) IV Push every 6 hours PRN  polyethylene glycol 3350 17 Gram(s) Oral daily PRN  senna 2 Tablet(s) Oral at bedtime PRN  sodium chloride 0.65% Nasal 1 Spray(s) Both Nostrils four times a day PRN  sodium chloride 0.9% lock flush 10 milliLiter(s) IV Push every 1 hour PRN  zinc oxide 40% Paste 1 Application(s) Topical three times a day PRN      A/P:  57 year old female with a history of  peripheral T cell lymphoma   Post :  Autologous PBSCT day + 11  - Completed  -16 24 hour continuous infusion, strict I&O, daily weights, prn diuresis    - Cytoxan day 3/.  - CTX - continue CTX hydration for 24 hours post infusion of last dose. Strict I&O, daily weights, prn diuresis. Start cipro 500mg po BID when ANC < 500    Neutropenic. Started Cipro for prophylaxis. If febrile, panculture and start Cefepime (has tolerated in the past)   - Kepivance day 2/3  11/27- Kepivance day 3/3- HELD due to Kepivance rash and oral erythema worsening  - Febrile overnight, tmax 100.4. CXR no obvious consolidations. Cultures pending. Started on cefepime. Grade 2 chemotherapy induced oral mucositis. Continue supportive measures.    - Mild transaminitis, if continue to rise lower Diflucan dose.  - early engraftment. Continue Cefepime, zarxio for now     1. Infectious Disease:   acyclovir   Oral Tab/Cap 400 milliGRAM(s) Oral every 8 hours  cefepime   IVPB 2000 milliGRAM(s) IV Intermittent every 8 hours  clotrimazole Lozenge 1 Lozenge Oral five times a day  fluconAZOLE   Tablet 400 milliGRAM(s) Oral daily    2. VOD Prophylaxis: Actigall, Glutamine, Heparin (dosed at 100 units / kg / day)     3. GI Prophylaxis:    pantoprazole    Tablet 40 milliGRAM(s) Oral before breakfast    4. Mouthcare - NS / NaHCO3 rinses, Mycelex, Biotene; Skin care     5. GVHD prophylaxis - n/a     6. Transfuse & replete electrolytes prn     7. IV hydration, daily weights, strict I&O, prn diuresis     8. PO intake as tolerated, nutrition follow up as needed, MVI, folic acid     9. Antiemetics, anti-diarrhea medications:   metoclopramide Injectable 10 milliGRAM(s) IV Push every 6 hours PRN    10. OOB as tolerated, physical therapy consult if needed     11. Monitor coags / fibrinogen 2x week, vitamin K as needed     12. Monitor closely for clinical changes, monitor for fevers     13. Emotional support provided, plan of care discussed and questions addressed     14. Patient education done regarding plan of care, restrictions and discharge planning     15. Continue regular social work input     I have written the above note for Dr. Cabrales who performed service with me in the room.   Leena Ibrahim NP-C ([phone])    I have seen and examined patient with NP, I agree with above note as scribed.                          HPC Transplant Team                                                      Critical / Counseling Time Provided: 30 minutes                                                                                                                                                        Chief Complaint: Autologous peripheral blood stem cell transplant with high dose CBV prep regimen for treatment of peripheral T cell lymphoma    S: Patient seen and examined with HPC Transplant Team:     + fatigue   All other ROS negative       O: Vitals:   Vital Signs Last 24 Hrs  T(C): 37.2 (06 Dec 2022 05:25), Max: 37.4 (06 Dec 2022 00:50)  T(F): 99 (06 Dec 2022 05:25), Max: 99.3 (06 Dec 2022 00:50)  HR: 88 (06 Dec 2022 05:25) (72 - 108)  BP: 100/69 (06 Dec 2022 05:25) (100/69 - 119/61)  BP(mean): --  RR: 18 (06 Dec 2022 05:25) (18 - 18)  SpO2: 97% (06 Dec 2022 05:25) (97% - 100%)    Parameters below as of 06 Dec 2022 05:25  Patient On (Oxygen Delivery Method): room air      Admit weight: 80.3 kg  Daily   75.7 today  Daily Weight in k.4 (05 Dec 2022 09:15)    Intake / Output:   - @ 07:01  -  12-06 @ 07:00  --------------------------------------------------------  IN: 1855 mL / OUT: 2300 mL / NET: -445 mL        PE:   Oropharynx: + ulceration, left lateral tongue, ulcerations on hard palate resolving   Oral Mucositis:            +                                          rdGrdrrdarddrderd:rd rd3rd CVS: S1, S2 RRR   Lungs: CTA throughout bilaterally   Abdomen: + BS x 4 normoactive, soft, NT, ND  Extremities: no edema   Gastric Mucositis:       -                                           Grade: n/a   Intestinal Mucositis:     -                                         Grade: n/a   Skin: + macular erythema to chest, upper back and neck post Kepivance, improving  TLC: CDI   Neuro: A&O x 3   Pain: denies      LABS:                          7.5    0.93  )-----------( 29       ( 06 Dec 2022 06:54 )             22.9         Mean Cell Volume : 89.8 fl  Mean Cell Hemoglobin : 29.4 pg  Mean Cell Hemoglobin Concentration : 32.8 gm/dL  Auto Neutrophil # : 0.60 K/uL  Auto Lymphocyte # : 0.25 K/uL  Auto Monocyte # : 0.05 K/uL  Auto Eosinophil # : 0.00 K/uL  Auto Basophil # : 0.00 K/uL  Auto Neutrophil % : 62.7 %  Auto Lymphocyte % : 27.4 %  Auto Monocyte % : 5.9 %  Auto Eosinophil % : 0.0 %  Auto Basophil % : 0.0 %          141  |  103  |  14  ----------------------------<  112<H>  3.8   |  26  |  0.37<L>    Ca    9.2      06 Dec 2022 06:54  Phos  3.5       Mg     1.9         TPro  6.3  /  Alb  3.7  /  TBili  0.1<L>  /  DBili  x   /  AST  25  /  ALT  63<H>  /  AlkPhos  108        PT/INR - ( 05 Dec 2022 06:45 )   PT: 13.1 sec;   INR: 1.13 ratio         PTT - ( 05 Dec 2022 06:45 )  PTT:24.6 sec      Uric Acid --          Radiology:        Xray Chest 1 View- PORTABLE-Urgent (Xray Chest 1 View- PORTABLE-Urgent .) (11.28.22 @ 07:18) >  Impression:  No focal consolidations.          Meds:   Antimicrobials:   acyclovir   Oral Tab/Cap 400 milliGRAM(s) Oral every 8 hours  cefepime   IVPB 2000 milliGRAM(s) IV Intermittent every 8 hours  clotrimazole Lozenge 1 Lozenge Oral five times a day  fluconAZOLE   Tablet 400 milliGRAM(s) Oral daily      Heme / Onc:   alteplase for catheter clearance 2 milliGRAM(s) Catheter once  heparin  Infusion 334 Unit(s)/Hr IV Continuous <Continuous>      GI:  pantoprazole    Tablet 40 milliGRAM(s) Oral before breakfast  polyethylene glycol 3350 17 Gram(s) Oral daily PRN  senna 2 Tablet(s) Oral at bedtime PRN  sodium bicarbonate Mouth Rinse 10 milliLiter(s) Swish and Spit five times a day  ursodiol Capsule 300 milliGRAM(s) Oral two times a day      Immunologic:   filgrastim-sndz (ZARXIO) Injectable 480 MICROGram(s) SubCutaneous every 24 hours      Other medications:   acetaminophen     Tablet .. 650 milliGRAM(s) Oral every 6 hours  Biotene Dry Mouth Oral Rinse 5 milliLiter(s) Swish and Spit five times a day  chlorhexidine 4% Liquid 1 Application(s) Topical <User Schedule>  FIRST- Mouthwash  BLM 10 milliLiter(s) Swish and Spit every 6 hours  FLUoxetine 20 milliGRAM(s) Oral daily  folic acid 1 milliGRAM(s) Oral daily  lidocaine/prilocaine Cream 1 Application(s) Topical daily  loratadine 10 milliGRAM(s) Oral daily  multivitamin 1 Tablet(s) Oral daily  nystatin Powder 1 Application(s) Topical two times a day  sodium chloride 0.45% 1000 milliLiter(s) IV Continuous <Continuous>  sodium chloride 0.9%. 1000 milliLiter(s) IV Continuous <Continuous>      PRN:   acetaminophen     Tablet .. 650 milliGRAM(s) Oral every 6 hours PRN  AQUAPHOR (petrolatum Ointment) 1 Application(s) Topical two times a day PRN  metoclopramide Injectable 10 milliGRAM(s) IV Push every 6 hours PRN  polyethylene glycol 3350 17 Gram(s) Oral daily PRN  senna 2 Tablet(s) Oral at bedtime PRN  sodium chloride 0.65% Nasal 1 Spray(s) Both Nostrils four times a day PRN  sodium chloride 0.9% lock flush 10 milliLiter(s) IV Push every 1 hour PRN  zinc oxide 40% Paste 1 Application(s) Topical three times a day PRN      A/P:  57 year old female with a history of  peripheral T cell lymphoma   Post :  Autologous PBSCT day + 11  - Completed  -16 24 hour continuous infusion, strict I&O, daily weights, prn diuresis    - Cytoxan day 3/.  - CTX - continue CTX hydration for 24 hours post infusion of last dose. Strict I&O, daily weights, prn diuresis. Start cipro 500mg po BID when ANC < 500    Neutropenic. Started Cipro for prophylaxis. If febrile, panculture and start Cefepime (has tolerated in the past)   - Kepivance day 2/3  11/27- Kepivance day 3/3- HELD due to Kepivance rash and oral erythema worsening  - Febrile overnight, tmax 100.4. CXR no obvious consolidations. Cultures pending. Started on cefepime. Grade 2 chemotherapy induced oral mucositis. Continue supportive measures.    - Mild transaminitis, if continue to rise lower Diflucan dose.  - early engraftment. Continue Cefepime, zarxio for now    mouth pain resolved     1. Infectious Disease:   acyclovir   Oral Tab/Cap 400 milliGRAM(s) Oral every 8 hours  cefepime   IVPB 2000 milliGRAM(s) IV Intermittent every 8 hours  clotrimazole Lozenge 1 Lozenge Oral five times a day  fluconAZOLE   Tablet 400 milliGRAM(s) Oral daily    2. VOD Prophylaxis: Actigall, Glutamine, Heparin (dosed at 100 units / kg / day)     3. GI Prophylaxis:    pantoprazole    Tablet 40 milliGRAM(s) Oral before breakfast    4. Mouthcare - NS / NaHCO3 rinses, Mycelex, Biotene; Skin care     5. GVHD prophylaxis - n/a     6. Transfuse & replete electrolytes prn     7. IV hydration, daily weights, strict I&O, prn diuresis     8. PO intake as tolerated, nutrition follow up as needed, MVI, folic acid     9. Antiemetics, anti-diarrhea medications:   metoclopramide Injectable 10 milliGRAM(s) IV Push every 6 hours PRN    10. OOB as tolerated, physical therapy consult if needed     11. Monitor coags / fibrinogen 2x week, vitamin K as needed     12. Monitor closely for clinical changes, monitor for fevers     13. Emotional support provided, plan of care discussed and questions addressed     14. Patient education done regarding plan of care, restrictions and discharge planning     15. Continue regular social work input     I have written the above note for Dr. Cabrales who performed service with me in the room.   Leena Ibrahim NP-C ([phone])    I have seen and examined patient with NP, I agree with above note as scribed.                          HPC Transplant Team                                                      Critical / Counseling Time Provided: 30 minutes                                                                                                                                                        Chief Complaint: Autologous peripheral blood stem cell transplant with high dose CBV prep regimen for treatment of peripheral T cell lymphoma    S: Patient seen and examined with HPC Transplant Team:     + fatigue   All other ROS negative       O: Vitals:   Vital Signs Last 24 Hrs  T(C): 37.2 (06 Dec 2022 05:25), Max: 37.4 (06 Dec 2022 00:50)  T(F): 99 (06 Dec 2022 05:25), Max: 99.3 (06 Dec 2022 00:50)  HR: 88 (06 Dec 2022 05:25) (72 - 108)  BP: 100/69 (06 Dec 2022 05:25) (100/69 - 119/61)  BP(mean): --  RR: 18 (06 Dec 2022 05:25) (18 - 18)  SpO2: 97% (06 Dec 2022 05:25) (97% - 100%)    Parameters below as of 06 Dec 2022 05:25  Patient On (Oxygen Delivery Method): room air      Admit weight: 80.3 kg  Daily   75.7 today  Daily Weight in k.4 (05 Dec 2022 09:15)    Intake / Output:   - @ 07:01  -  12-06 @ 07:00  --------------------------------------------------------  IN: 1855 mL / OUT: 2300 mL / NET: -445 mL        PE:   Oropharynx: + ulceration, left lateral tongue, ulcerations on hard palate resolving   Oral Mucositis:            +                                          rdGrdrrdarddrderd:rd rd3rd CVS: S1, S2 RRR   Lungs: CTA throughout bilaterally   Abdomen: + BS x 4 normoactive, soft, NT, ND  Extremities: no edema   Gastric Mucositis:       -                                           Grade: n/a   Intestinal Mucositis:     -                                         Grade: n/a   Skin: + macular erythema to chest, upper back and neck post Kepivance, improving  TLC: CDI   Neuro: A&O x 3   Pain: denies      LABS:                          7.5    0.93  )-----------( 29       ( 06 Dec 2022 06:54 )             22.9         Mean Cell Volume : 89.8 fl  Mean Cell Hemoglobin : 29.4 pg  Mean Cell Hemoglobin Concentration : 32.8 gm/dL  Auto Neutrophil # : 0.60 K/uL  Auto Lymphocyte # : 0.25 K/uL  Auto Monocyte # : 0.05 K/uL  Auto Eosinophil # : 0.00 K/uL  Auto Basophil # : 0.00 K/uL  Auto Neutrophil % : 62.7 %  Auto Lymphocyte % : 27.4 %  Auto Monocyte % : 5.9 %  Auto Eosinophil % : 0.0 %  Auto Basophil % : 0.0 %          141  |  103  |  14  ----------------------------<  112<H>  3.8   |  26  |  0.37<L>    Ca    9.2      06 Dec 2022 06:54  Phos  3.5       Mg     1.9         TPro  6.3  /  Alb  3.7  /  TBili  0.1<L>  /  DBili  x   /  AST  25  /  ALT  63<H>  /  AlkPhos  108        PT/INR - ( 05 Dec 2022 06:45 )   PT: 13.1 sec;   INR: 1.13 ratio         PTT - ( 05 Dec 2022 06:45 )  PTT:24.6 sec      Uric Acid --      Radiology:        Xray Chest 1 View- PORTABLE-Urgent (Xray Chest 1 View- PORTABLE-Urgent .) (11.28.22 @ 07:18) >  Impression:  No focal consolidations.          Meds:   Antimicrobials:   acyclovir   Oral Tab/Cap 400 milliGRAM(s) Oral every 8 hours  cefepime   IVPB 2000 milliGRAM(s) IV Intermittent every 8 hours  clotrimazole Lozenge 1 Lozenge Oral five times a day  fluconAZOLE   Tablet 400 milliGRAM(s) Oral daily      Heme / Onc:   alteplase for catheter clearance 2 milliGRAM(s) Catheter once  heparin  Infusion 334 Unit(s)/Hr IV Continuous <Continuous>      GI:  pantoprazole    Tablet 40 milliGRAM(s) Oral before breakfast  polyethylene glycol 3350 17 Gram(s) Oral daily PRN  senna 2 Tablet(s) Oral at bedtime PRN  sodium bicarbonate Mouth Rinse 10 milliLiter(s) Swish and Spit five times a day  ursodiol Capsule 300 milliGRAM(s) Oral two times a day      Immunologic:   filgrastim-sndz (ZARXIO) Injectable 480 MICROGram(s) SubCutaneous every 24 hours      Other medications:   acetaminophen     Tablet .. 650 milliGRAM(s) Oral every 6 hours  Biotene Dry Mouth Oral Rinse 5 milliLiter(s) Swish and Spit five times a day  chlorhexidine 4% Liquid 1 Application(s) Topical <User Schedule>  FIRST- Mouthwash  BLM 10 milliLiter(s) Swish and Spit every 6 hours  FLUoxetine 20 milliGRAM(s) Oral daily  folic acid 1 milliGRAM(s) Oral daily  lidocaine/prilocaine Cream 1 Application(s) Topical daily  loratadine 10 milliGRAM(s) Oral daily  multivitamin 1 Tablet(s) Oral daily  nystatin Powder 1 Application(s) Topical two times a day  sodium chloride 0.45% 1000 milliLiter(s) IV Continuous <Continuous>  sodium chloride 0.9%. 1000 milliLiter(s) IV Continuous <Continuous>      PRN:   acetaminophen     Tablet .. 650 milliGRAM(s) Oral every 6 hours PRN  AQUAPHOR (petrolatum Ointment) 1 Application(s) Topical two times a day PRN  metoclopramide Injectable 10 milliGRAM(s) IV Push every 6 hours PRN  polyethylene glycol 3350 17 Gram(s) Oral daily PRN  senna 2 Tablet(s) Oral at bedtime PRN  sodium chloride 0.65% Nasal 1 Spray(s) Both Nostrils four times a day PRN  sodium chloride 0.9% lock flush 10 milliLiter(s) IV Push every 1 hour PRN  zinc oxide 40% Paste 1 Application(s) Topical three times a day PRN      A/P:  57 year old female with a history of  peripheral T cell lymphoma   Post :  Autologous PBSCT day + 11  - Completed  -16 24 hour continuous infusion, strict I&O, daily weights, prn diuresis    - Cytoxan day 3/.  - CTX - continue CTX hydration for 24 hours post infusion of last dose. Strict I&O, daily weights, prn diuresis. Start cipro 500mg po BID when ANC < 500    Neutropenic. Started Cipro for prophylaxis. If febrile, panculture and start Cefepime (has tolerated in the past)   - Kepivance day 2/3  11/27- Kepivance day 3/3- HELD due to Kepivance rash and oral erythema worsening  - Febrile overnight, tmax 100.4. CXR no obvious consolidations. Cultures pending. Started on cefepime. Grade 2 chemotherapy induced oral mucositis. Continue supportive measures.    - Mild transaminitis, if continue to rise lower Diflucan dose.  - early engraftment. Continue Cefepime, zarxio for now    mouth pain resolved     1. Infectious Disease:   acyclovir   Oral Tab/Cap 400 milliGRAM(s) Oral every 8 hours  cefepime   IVPB 2000 milliGRAM(s) IV Intermittent every 8 hours  clotrimazole Lozenge 1 Lozenge Oral five times a day  fluconAZOLE   Tablet 400 milliGRAM(s) Oral daily    2. VOD Prophylaxis: Actigall, Glutamine, Heparin (dosed at 100 units / kg / day)     3. GI Prophylaxis:    pantoprazole    Tablet 40 milliGRAM(s) Oral before breakfast    4. Mouthcare - NS / NaHCO3 rinses, Mycelex, Biotene; Skin care     5. GVHD prophylaxis - n/a     6. Transfuse & replete electrolytes prn     7. IV hydration, daily weights, strict I&O, prn diuresis     8. PO intake as tolerated, nutrition follow up as needed, MVI, folic acid     9. Antiemetics, anti-diarrhea medications:   metoclopramide Injectable 10 milliGRAM(s) IV Push every 6 hours PRN    10. OOB as tolerated, physical therapy consult if needed     11. Monitor coags / fibrinogen 2x week, vitamin K as needed     12. Monitor closely for clinical changes, monitor for fevers     13. Emotional support provided, plan of care discussed and questions addressed     14. Patient education done regarding plan of care, restrictions and discharge planning     15. Continue regular social work input     I have written the above note for Dr. Cabrales who performed service with me in the room.   Leena Ibrahim NP-C ([phone])    I have seen and examined patient with NP, I agree with above note as scribed.

## 2022-12-06 NOTE — PROGRESS NOTE ADULT - CRITICAL CARE ATTENDING COMMENT
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   Today is Day + 10  Problem/Plan -   1:  Problem: Stem cell transplant   ·  Plan: 1. Admit to BMTU   2. TLC placement in IR   3. Day -9 - day -2 - antiemetics   4. Day -9 & day -8, VP16 2400mg/m2 IV x 34 hours (final concentration 0.4mg /mL).   5. Strict I&O, daily weights, prn diuresis   6. After completion of VP16- start CTX hydration (D51/2NS + 10mEq KCl @ 150ml / m2) - continue for 24 hours post infusion of CTX   7. Day -7 - day -4 - CTX 1800 mg / m2 daily over 2 hours. Follow SMA7, mag, phos 6hours post CT . Mesna  12mg / kg - hemorrhagic cystitis ppx   8. Day -3 - BCNU 400mg / m2   9. Day -2 - day -1 - rest days  10. Day 0(11/25/22) - HPC transplant. Start Zarxio 480mcg SQ QD, continue through engraftment. Kepivance on days 0, 1 & 2   11. Anxiolytics, antinausea, antidiarrhea medications as needed   12. Lasix PRN while being aggressively hydrated to avoid VOD   13. Nutritional support, pain management.    Problem/Plan - 2:  ·  Problem: Need for prophylactic measure.   ·  Plan: 1. VOD prophylaxis - low dose heparin gtt (dosed at 100 units / kg / day), glutamine supplementation, Actigall BID   2. PCP prophylaxis - Bactrim DS through day -2    3. Antiviral prophylaxis - Acyclovir 400mg po TID to start day -1   4. Antifungal prophylaxis- Diflucan 400 mg po daily.  5. GI prophylaxis - Protonix po QD   6. Antibacterial prophylaxis - when ANC < 500, start Cipro 500mg po BID. If becomes febrile, pan cx, CXR and change Cipro to Cefepime 2g IV q 8 hours. Continue until count recovery  7. Kepivance for prevention of mucositis- days 0, +1, +2  8. Aggressive mouth care and skin care as per protocol. On 11/25, early mucositis on lower lip, no ulcers. Continue topical care.   9. Receiving transfusion and electrolyte support.    11/25-Chemotherapy induced nausea and diarrhea managed with antiemetics and antidiarrheals. Now patient states stool is loose rather than watery.   Continue Cipro, Acyclovir, Diflucan prophylaxis. OOB/ambulate. She received HPC transplant today without complications. Begin Zarxio daily.  11/26/22: continues to have loose stools overnight, this am with epistaxis lasting ~ 5 minutes. To receive platelets. Remains fatigued.  11/28- Febrile overnight, tmax 100.4. CXR- no consolidations. Cultures pending. Started on Cefepime. Grade 2 chemotherapy induced oral mucositis. Continue supportive care.  12/2- patient now afebrile; 11/28 cultures negative and CXR- negative. Continue Cefepime; on Acyclovir, Diflucan prophylaxis. Continue Zarxio daily, await engraftment.  12/5- early engraftment, continue Zarxio. 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   Today is Day + 11  Problem/Plan -   1:  Problem: Stem cell transplant   ·  Plan: 1. Admit to BMTU   2. TLC placement in IR   3. Day -9 - day -2 - antiemetics   4. Day -9 & day -8, VP16 2400mg/m2 IV x 34 hours (final concentration 0.4mg /mL).   5. Strict I&O, daily weights, prn diuresis   6. After completion of VP16- start CTX hydration (D51/2NS + 10mEq KCl @ 150ml / m2) - continue for 24 hours post infusion of CTX   7. Day -7 - day -4 - CTX 1800 mg / m2 daily over 2 hours. Follow SMA7, mag, phos 6hours post CT . Mesna  12mg / kg - hemorrhagic cystitis ppx   8. Day -3 - BCNU 400mg / m2   9. Day -2 - day -1 - rest days  10. Day 0(11/25/22) - HPC transplant. Start Zarxio 480mcg SQ QD, continue through engraftment. Kepivance on days 0, 1 & 2   11. Anxiolytics, antinausea, antidiarrhea medications as needed   12. Lasix PRN while being aggressively hydrated to avoid VOD   13. Nutritional support, pain management.    Problem/Plan - 2:  ·  Problem: Need for prophylactic measure.   ·  Plan: 1. VOD prophylaxis - low dose heparin gtt (dosed at 100 units / kg / day), glutamine supplementation, Actigall BID   2. PCP prophylaxis - Bactrim DS through day -2    3. Antiviral prophylaxis - Acyclovir 400mg po TID to start day -1   4. Antifungal prophylaxis- Diflucan 400 mg po daily.  5. GI prophylaxis - Protonix po QD   6. Antibacterial prophylaxis - when ANC < 500, start Cipro 500mg po BID. If becomes febrile, pan cx, CXR and change Cipro to Cefepime 2g IV q 8 hours. Continue until count recovery  7. Kepivance for prevention of mucositis- days 0, +1, +2  8. Aggressive mouth care and skin care as per protocol. On 11/25, early mucositis on lower lip, no ulcers. Continue topical care.   9. Receiving transfusion and electrolyte support.    11/25-Chemotherapy induced nausea and diarrhea managed with antiemetics and antidiarrheals. Now patient states stool is loose rather than watery.   Continue Cipro, Acyclovir, Diflucan prophylaxis. OOB/ambulate. She received HPC transplant today without complications. Begin Zarxio daily.  11/26/22: continues to have loose stools overnight, this am with epistaxis lasting ~ 5 minutes. To receive platelets. Remains fatigued.  11/28- Febrile overnight, tmax 100.4. CXR- no consolidations. Cultures pending. Started on Cefepime. Grade 2 chemotherapy induced oral mucositis. Continue supportive care.  12/2- patient now afebrile; 11/28 cultures negative and CXR- negative. Continue Cefepime; on Acyclovir, Diflucan prophylaxis. Continue Zarxio daily, await engraftment.  12/5- early engraftment, continue Zarxio.

## 2022-12-07 LAB
ALBUMIN SERPL ELPH-MCNC: 3.9 G/DL — SIGNIFICANT CHANGE UP (ref 3.3–5)
ALP SERPL-CCNC: 103 U/L — SIGNIFICANT CHANGE UP (ref 40–120)
ALT FLD-CCNC: 51 U/L — HIGH (ref 10–45)
ANION GAP SERPL CALC-SCNC: 13 MMOL/L — SIGNIFICANT CHANGE UP (ref 5–17)
AST SERPL-CCNC: 22 U/L — SIGNIFICANT CHANGE UP (ref 10–40)
BASOPHILS # BLD AUTO: 0 K/UL — SIGNIFICANT CHANGE UP (ref 0–0.2)
BASOPHILS NFR BLD AUTO: 0 % — SIGNIFICANT CHANGE UP (ref 0–2)
BILIRUB SERPL-MCNC: 0.1 MG/DL — LOW (ref 0.2–1.2)
BLD GP AB SCN SERPL QL: NEGATIVE — SIGNIFICANT CHANGE UP
BUN SERPL-MCNC: 11 MG/DL — SIGNIFICANT CHANGE UP (ref 7–23)
CALCIUM SERPL-MCNC: 9.5 MG/DL — SIGNIFICANT CHANGE UP (ref 8.4–10.5)
CHLORIDE SERPL-SCNC: 102 MMOL/L — SIGNIFICANT CHANGE UP (ref 96–108)
CO2 SERPL-SCNC: 26 MMOL/L — SIGNIFICANT CHANGE UP (ref 22–31)
CREAT SERPL-MCNC: 0.42 MG/DL — LOW (ref 0.5–1.3)
EGFR: 115 ML/MIN/1.73M2 — SIGNIFICANT CHANGE UP
EOSINOPHIL # BLD AUTO: 0 K/UL — SIGNIFICANT CHANGE UP (ref 0–0.5)
EOSINOPHIL NFR BLD AUTO: 0 % — SIGNIFICANT CHANGE UP (ref 0–6)
GLUCOSE SERPL-MCNC: 113 MG/DL — HIGH (ref 70–99)
HCT VFR BLD CALC: 23 % — LOW (ref 34.5–45)
HGB BLD-MCNC: 7.4 G/DL — LOW (ref 11.5–15.5)
LDH SERPL L TO P-CCNC: 144 U/L — SIGNIFICANT CHANGE UP (ref 50–242)
LYMPHOCYTES # BLD AUTO: 0.17 K/UL — LOW (ref 1–3.3)
LYMPHOCYTES # BLD AUTO: 9.6 % — LOW (ref 13–44)
MAGNESIUM SERPL-MCNC: 1.8 MG/DL — SIGNIFICANT CHANGE UP (ref 1.6–2.6)
MANUAL SMEAR VERIFICATION: SIGNIFICANT CHANGE UP
MCHC RBC-ENTMCNC: 28.9 PG — SIGNIFICANT CHANGE UP (ref 27–34)
MCHC RBC-ENTMCNC: 32.2 GM/DL — SIGNIFICANT CHANGE UP (ref 32–36)
MCV RBC AUTO: 89.8 FL — SIGNIFICANT CHANGE UP (ref 80–100)
METAMYELOCYTES # FLD: 0.9 % — HIGH (ref 0–0)
MONOCYTES # BLD AUTO: 0.14 K/UL — SIGNIFICANT CHANGE UP (ref 0–0.9)
MONOCYTES NFR BLD AUTO: 7.9 % — SIGNIFICANT CHANGE UP (ref 2–14)
MYELOCYTES NFR BLD: 0.9 % — HIGH (ref 0–0)
NEUTROPHILS # BLD AUTO: 1.42 K/UL — LOW (ref 1.8–7.4)
NEUTROPHILS NFR BLD AUTO: 78.9 % — HIGH (ref 43–77)
NEUTS BAND # BLD: 0.9 % — SIGNIFICANT CHANGE UP (ref 0–8)
PHOSPHATE SERPL-MCNC: 3.6 MG/DL — SIGNIFICANT CHANGE UP (ref 2.5–4.5)
PLAT MORPH BLD: NORMAL — SIGNIFICANT CHANGE UP
PLATELET # BLD AUTO: 49 K/UL — LOW (ref 150–400)
POTASSIUM SERPL-MCNC: 3.8 MMOL/L — SIGNIFICANT CHANGE UP (ref 3.5–5.3)
POTASSIUM SERPL-SCNC: 3.8 MMOL/L — SIGNIFICANT CHANGE UP (ref 3.5–5.3)
PROMYELOCYTES # FLD: 0.9 % — HIGH (ref 0–0)
PROT SERPL-MCNC: 6.4 G/DL — SIGNIFICANT CHANGE UP (ref 6–8.3)
RBC # BLD: 2.56 M/UL — LOW (ref 3.8–5.2)
RBC # FLD: 12.7 % — SIGNIFICANT CHANGE UP (ref 10.3–14.5)
RBC BLD AUTO: SIGNIFICANT CHANGE UP
RH IG SCN BLD-IMP: POSITIVE — SIGNIFICANT CHANGE UP
SODIUM SERPL-SCNC: 141 MMOL/L — SIGNIFICANT CHANGE UP (ref 135–145)
TOXIC GRANULES BLD QL SMEAR: PRESENT — SIGNIFICANT CHANGE UP
WBC # BLD: 1.78 K/UL — LOW (ref 3.8–10.5)
WBC # FLD AUTO: 1.78 K/UL — LOW (ref 3.8–10.5)

## 2022-12-07 PROCEDURE — 99291 CRITICAL CARE FIRST HOUR: CPT

## 2022-12-07 RX ADMIN — Medication 10 MILLILITER(S): at 20:07

## 2022-12-07 RX ADMIN — Medication 650 MILLIGRAM(S): at 05:30

## 2022-12-07 RX ADMIN — Medication 1 TABLET(S): at 12:55

## 2022-12-07 RX ADMIN — DIPHENHYDRAMINE HYDROCHLORIDE AND LIDOCAINE HYDROCHLORIDE AND ALUMINUM HYDROXIDE AND MAGNESIUM HYDRO 10 MILLILITER(S): KIT at 12:53

## 2022-12-07 RX ADMIN — Medication 1 LOZENGE: at 12:54

## 2022-12-07 RX ADMIN — PANTOPRAZOLE SODIUM 40 MILLIGRAM(S): 20 TABLET, DELAYED RELEASE ORAL at 05:26

## 2022-12-07 RX ADMIN — DIPHENHYDRAMINE HYDROCHLORIDE AND LIDOCAINE HYDROCHLORIDE AND ALUMINUM HYDROXIDE AND MAGNESIUM HYDRO 10 MILLILITER(S): KIT at 18:05

## 2022-12-07 RX ADMIN — DIPHENHYDRAMINE HYDROCHLORIDE AND LIDOCAINE HYDROCHLORIDE AND ALUMINUM HYDROXIDE AND MAGNESIUM HYDRO 10 MILLILITER(S): KIT at 05:26

## 2022-12-07 RX ADMIN — NYSTATIN CREAM 1 APPLICATION(S): 100000 CREAM TOPICAL at 05:26

## 2022-12-07 RX ADMIN — Medication 1 MILLIGRAM(S): at 12:55

## 2022-12-07 RX ADMIN — Medication 480 MICROGRAM(S): at 18:07

## 2022-12-07 RX ADMIN — Medication 20 MILLIGRAM(S): at 12:55

## 2022-12-07 RX ADMIN — Medication 400 MILLIGRAM(S): at 14:00

## 2022-12-07 RX ADMIN — Medication 5 MILLILITER(S): at 09:01

## 2022-12-07 RX ADMIN — Medication 1 LOZENGE: at 16:52

## 2022-12-07 RX ADMIN — CEFEPIME 100 MILLIGRAM(S): 1 INJECTION, POWDER, FOR SOLUTION INTRAMUSCULAR; INTRAVENOUS at 05:25

## 2022-12-07 RX ADMIN — CHLORHEXIDINE GLUCONATE 1 APPLICATION(S): 213 SOLUTION TOPICAL at 09:01

## 2022-12-07 RX ADMIN — Medication 10 MILLILITER(S): at 12:53

## 2022-12-07 RX ADMIN — Medication 5 MILLILITER(S): at 12:51

## 2022-12-07 RX ADMIN — FLUCONAZOLE 400 MILLIGRAM(S): 150 TABLET ORAL at 12:55

## 2022-12-07 RX ADMIN — URSODIOL 300 MILLIGRAM(S): 250 TABLET, FILM COATED ORAL at 18:07

## 2022-12-07 RX ADMIN — Medication 1 LOZENGE: at 20:07

## 2022-12-07 RX ADMIN — Medication 10 MILLILITER(S): at 09:01

## 2022-12-07 RX ADMIN — URSODIOL 300 MILLIGRAM(S): 250 TABLET, FILM COATED ORAL at 05:26

## 2022-12-07 RX ADMIN — NYSTATIN CREAM 1 APPLICATION(S): 100000 CREAM TOPICAL at 18:05

## 2022-12-07 RX ADMIN — Medication 400 MILLIGRAM(S): at 05:25

## 2022-12-07 RX ADMIN — Medication 1 LOZENGE: at 09:01

## 2022-12-07 RX ADMIN — Medication 5 MILLILITER(S): at 16:51

## 2022-12-07 RX ADMIN — Medication 10 MILLILITER(S): at 16:52

## 2022-12-07 RX ADMIN — LORATADINE 10 MILLIGRAM(S): 10 TABLET ORAL at 12:56

## 2022-12-07 RX ADMIN — Medication 400 MILLIGRAM(S): at 21:42

## 2022-12-07 RX ADMIN — Medication 5 MILLILITER(S): at 20:07

## 2022-12-07 NOTE — PROGRESS NOTE ADULT - CRITICAL CARE ATTENDING COMMENT
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   Today is Day + 12  Problem/Plan -   1:  Problem: Stem cell transplant   ·  Plan: 1. Admit to BMTU   2. TLC placement in IR   3. Day -9 - day -2 - antiemetics   4. Day -9 & day -8, VP16 2400mg/m2 IV x 34 hours (final concentration 0.4mg /mL).   5. Strict I&O, daily weights, prn diuresis   6. After completion of VP16- start CTX hydration (D51/2NS + 10mEq KCl @ 150ml / m2) - continue for 24 hours post infusion of CTX   7. Day -7 - day -4 - CTX 1800 mg / m2 daily over 2 hours. Follow SMA7, mag, phos 6hours post CT . Mesna  12mg / kg - hemorrhagic cystitis ppx   8. Day -3 - BCNU 400mg / m2   9. Day -2 - day -1 - rest days  10. Day 0(11/25/22) - HPC transplant. Start Zarxio 480mcg SQ QD, continue through engraftment. Kepivance on days 0, 1 & 2   11. Anxiolytics, antinausea, antidiarrhea medications as needed   12. Lasix PRN while being aggressively hydrated to avoid VOD   13. Nutritional support, pain management.    Problem/Plan - 2:  ·  Problem: Need for prophylactic measure.   ·  Plan: 1. VOD prophylaxis - low dose heparin gtt (dosed at 100 units / kg / day), glutamine supplementation, Actigall BID   2. PCP prophylaxis - Bactrim DS through day -2    3. Antiviral prophylaxis - Acyclovir 400mg po TID to start day -1   4. Antifungal prophylaxis- Diflucan 400 mg po daily.  5. GI prophylaxis - Protonix po QD   6. Antibacterial prophylaxis - when ANC < 500, start Cipro 500mg po BID. If becomes febrile, pan cx, CXR and change Cipro to Cefepime 2g IV q 8 hours. Continue until count recovery  7. Kepivance for prevention of mucositis- days 0, +1, +2  8. Aggressive mouth care and skin care as per protocol. On 11/25, early mucositis on lower lip, no ulcers. Continue topical care.   9. Receiving transfusion and electrolyte support.    11/25-Chemotherapy induced nausea and diarrhea managed with antiemetics and antidiarrheals. Now patient states stool is loose rather than watery.   Continue Cipro, Acyclovir, Diflucan prophylaxis. OOB/ambulate. She received HPC transplant today without complications. Begin Zarxio daily.  11/26/22: continues to have loose stools overnight, this am with epistaxis lasting ~ 5 minutes. To receive platelets. Remains fatigued.  11/28- Febrile overnight, tmax 100.4. CXR- no consolidations. Cultures pending. Started on Cefepime. Grade 2 chemotherapy induced oral mucositis. Continue supportive care.  12/2- patient now afebrile; 11/28 cultures negative and CXR- negative. Continue Cefepime; on Acyclovir, Diflucan prophylaxis. Continue Zarxio daily, await engraftment.  12/5- early engraftment, continue Zarxio. 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   Today is Day + 12  Problem/Plan -   1:  Problem: Stem cell transplant   ·  Plan: 1. Admit to BMTU   2. TLC placement in IR   3. Day -9 - day -2 - antiemetics   4. Day -9 & day -8, VP16 2400mg/m2 IV x 34 hours (final concentration 0.4mg /mL).   5. Strict I&O, daily weights, prn diuresis   6. After completion of VP16- start CTX hydration (D51/2NS + 10mEq KCl @ 150ml / m2) - continue for 24 hours post infusion of CTX   7. Day -7 - day -4 - CTX 1800 mg / m2 daily over 2 hours. Follow SMA7, mag, phos 6hours post CT . Mesna  12mg / kg - hemorrhagic cystitis ppx   8. Day -3 - BCNU 400mg / m2   9. Day -2 - day -1 - rest days  10. Day 0(11/25/22) - HPC transplant. Start Zarxio 480mcg SQ QD, continue through engraftment. Kepivance on days 0, 1 & 2   11. Anxiolytics, antinausea, antidiarrhea medications as needed   12. Lasix PRN while being aggressively hydrated to avoid VOD   13. Nutritional support, pain management.    Problem/Plan - 2:  ·  Problem: Need for prophylactic measure.   ·  Plan: 1. VOD prophylaxis - low dose heparin gtt (dosed at 100 units / kg / day), glutamine supplementation, Actigall BID   2. PCP prophylaxis - Bactrim DS through day -2    3. Antiviral prophylaxis - Acyclovir 400mg po TID to start day -1   4. Antifungal prophylaxis- Diflucan 400 mg po daily.  5. GI prophylaxis - Protonix po QD   6. Antibacterial prophylaxis - when ANC < 500, start Cipro 500mg po BID. If becomes febrile, pan cx, CXR and change Cipro to Cefepime 2g IV q 8 hours. Continue until count recovery  7. Kepivance for prevention of mucositis- days 0, +1, +2  8. Aggressive mouth care and skin care as per protocol. On 11/25, early mucositis on lower lip, no ulcers. Continue topical care.   9. Receiving transfusion and electrolyte support.    11/25-Chemotherapy induced nausea and diarrhea managed with antiemetics and antidiarrheals. Now patient states stool is loose rather than watery.   Continue Cipro, Acyclovir, Diflucan prophylaxis. OOB/ambulate. She received HPC transplant today without complications. Begin Zarxio daily.  11/26/22: continues to have loose stools overnight, this am with epistaxis lasting ~ 5 minutes. To receive platelets. Remains fatigued.  11/28- Febrile overnight, tmax 100.4. CXR- no consolidations. Cultures pending. Started on Cefepime. Grade 2 chemotherapy induced oral mucositis. Continue supportive care.  12/2- patient now afebrile; 11/28 cultures negative and CXR- negative. Continue Cefepime; on Acyclovir, Diflucan prophylaxis. Continue Zarxio daily, await engraftment.  12/5- early engraftment, continue Zarxio.  12/7- Neutrophil count > 1.0, afebrile, cultures negative. D/C Cefepime. Continue Zarxio daily.

## 2022-12-07 NOTE — PROGRESS NOTE ADULT - SUBJECTIVE AND OBJECTIVE BOX
HPC Transplant Team                                                      Critical / Counseling Time Provided: 30 minutes                                                                                                                                                        Chief Complaint: Autologous peripheral blood stem cell transplant with high dose CBV prep regimen for treatment of peripheral T cell lymphoma    S: Patient seen and examined with HPC Transplant Team:         O: Vitals:   Vital Signs Last 24 Hrs  T(C): 37.4 (07 Dec 2022 05:15), Max: 37.5 (07 Dec 2022 01:06)  T(F): 99.3 (07 Dec 2022 05:15), Max: 99.5 (07 Dec 2022 01:06)  HR: 99 (07 Dec 2022 05:15) (88 - 99)  BP: 104/68 (07 Dec 2022 05:15) (91/58 - 106/67)  BP(mean): --  RR: 18 (07 Dec 2022 05:15) (16 - 19)  SpO2: 97% (07 Dec 2022 05:15) (97% - 100%)    Parameters below as of 07 Dec 2022 05:15  Patient On (Oxygen Delivery Method): room air      Admit weight: 80.3kg   Daily Weight in k.7 (06 Dec 2022 11:17)  Today's weight:     Intake / Output:    @ 07:01  -   @ 07:00  --------------------------------------------------------  IN: 2019.9 mL / OUT: 2550 mL / NET: -530.1 mL    PE:   Oropharynx: + ulceration, left lateral tongue, ulcerations on hard palate resolving   Oral Mucositis:            +                                          rdGrdrrdarddrderd:rd rd3rd CVS: S1, S2 RRR   Lungs: CTA throughout bilaterally   Abdomen: + BS x 4 normoactive, soft, NT, ND  Extremities: no edema   Gastric Mucositis:       -                                           Grade: n/a   Intestinal Mucositis:     -                                         Grade: n/a   Skin: + macular erythema to chest, upper back and neck post Kepivance, improving  TLC: CDI   Neuro: A&O x 3   Pain: denies    Labs:     Cultures:   Culture - Blood (22 @ 06:10)    Specimen Source: .Blood Blood    Culture Results:   No Growth Final    Culture - Urine (22 @ 08:19)    Specimen Source: Clean Catch Clean Catch (Midstream)    Culture Results:   <10,000 CFU/mL Normal Urogenital Maryanne    Radiology:    Xray Chest 1 View- PORTABLE-Urgent (Xray Chest 1 View- PORTABLE-Urgent .) (22 @ 07:18) >  Impression:  No focal consolidations.      Meds:   Antimicrobials:   acyclovir   Oral Tab/Cap 400 milliGRAM(s) Oral every 8 hours  cefepime   IVPB 2000 milliGRAM(s) IV Intermittent every 8 hours  clotrimazole Lozenge 1 Lozenge Oral five times a day  fluconAZOLE   Tablet 400 milliGRAM(s) Oral daily      Heme / Onc:   heparin  Infusion 334 Unit(s)/Hr IV Continuous <Continuous>      GI:  pantoprazole    Tablet 40 milliGRAM(s) Oral before breakfast  polyethylene glycol 3350 17 Gram(s) Oral daily PRN  senna 2 Tablet(s) Oral at bedtime PRN  sodium bicarbonate Mouth Rinse 10 milliLiter(s) Swish and Spit five times a day  ursodiol Capsule 300 milliGRAM(s) Oral two times a day      Cardiovascular:       Immunologic:   filgrastim-sndz (ZARXIO) Injectable 480 MICROGram(s) SubCutaneous every 24 hours      Other medications:   acetaminophen     Tablet .. 650 milliGRAM(s) Oral every 6 hours  Biotene Dry Mouth Oral Rinse 5 milliLiter(s) Swish and Spit five times a day  chlorhexidine 4% Liquid 1 Application(s) Topical <User Schedule>  FIRST- Mouthwash  BLM 10 milliLiter(s) Swish and Spit every 6 hours  FLUoxetine 20 milliGRAM(s) Oral daily  folic acid 1 milliGRAM(s) Oral daily  lidocaine/prilocaine Cream 1 Application(s) Topical daily  loratadine 10 milliGRAM(s) Oral daily  multivitamin 1 Tablet(s) Oral daily  nystatin Powder 1 Application(s) Topical two times a day  sodium chloride 0.45% 1000 milliLiter(s) IV Continuous <Continuous>  sodium chloride 0.9%. 1000 milliLiter(s) IV Continuous <Continuous>      PRN:   acetaminophen     Tablet .. 650 milliGRAM(s) Oral every 6 hours PRN  AQUAPHOR (petrolatum Ointment) 1 Application(s) Topical two times a day PRN  metoclopramide Injectable 10 milliGRAM(s) IV Push every 6 hours PRN  polyethylene glycol 3350 17 Gram(s) Oral daily PRN  senna 2 Tablet(s) Oral at bedtime PRN  sodium chloride 0.65% Nasal 1 Spray(s) Both Nostrils four times a day PRN  sodium chloride 0.9% lock flush 10 milliLiter(s) IV Push every 1 hour PRN  zinc oxide 40% Paste 1 Application(s) Topical three times a day PRN    A/P:  57 year old female with a history of  peripheral T cell lymphoma   Post :  Autologous PBSCT day + 12  - Completed  -16 24 hour continuous infusion, strict I&O, daily weights, prn diuresis    - Cytoxan day 3/4.  - CTX - continue CTX hydration for 24 hours post infusion of last dose. Strict I&O, daily weights, prn diuresis. Start cipro 500mg po BID when ANC < 500    Neutropenic. Started Cipro for prophylaxis. If febrile, panculture and start Cefepime (has tolerated in the past)   - Kepivance day 2/3  11/27- Kepivance day 3/3- HELD due to Kepivance rash and oral erythema worsening  - Febrile overnight, tmax 100.4. CXR no obvious consolidations. Cultures pending. Started on cefepime. Grade 2 chemotherapy induced oral mucositis. Continue supportive measures.    - Mild transaminitis, if continue to rise lower Diflucan dose.  - early engraftment. Continue Cefepime, zarxio for now    mouth pain resolved     1. Infectious Disease:   acyclovir   Oral Tab/Cap 400 milliGRAM(s) Oral every 8 hours  cefepime   IVPB 2000 milliGRAM(s) IV Intermittent every 8 hours  clotrimazole Lozenge 1 Lozenge Oral five times a day  fluconAZOLE   Tablet 400 milliGRAM(s) Oral daily    2. VOD Prophylaxis: Actigall, Glutamine, Heparin (dosed at 100 units / kg / day)     3. GI Prophylaxis:    pantoprazole    Tablet 40 milliGRAM(s) Oral before breakfast    4. Mouthcare - NS / NaHCO3 rinses, Mycelex, Biotene; Skin care     5. GVHD prophylaxis - n/a     6. Transfuse & replete electrolytes prn     7. IV hydration, daily weights, strict I&O, prn diuresis     8. PO intake as tolerated, nutrition follow up as needed, MVI, folic acid     9. Antiemetics, anti-diarrhea medications:   metoclopramide Injectable 10 milliGRAM(s) IV Push every 6 hours PRN    10. OOB as tolerated, physical therapy consult if needed     11. Monitor coags / fibrinogen 2x week, vitamin K as needed     12. Monitor closely for clinical changes, monitor for fevers     13. Emotional support provided, plan of care discussed and questions addressed     14. Patient education done regarding plan of care, restrictions and discharge planning     15. Continue regular social work input     I have written the above note for Dr. Cabrales who performed service with me in the room.   Leena Ibrahim NP-C ([phone])    I have seen and examined patient with NP, I agree with above note as scribed.                    HPC Transplant Team                                                      Critical / Counseling Time Provided: 30 minutes                                                                                                                                                        Chief Complaint: Autologous peripheral blood stem cell transplant with high dose CBV prep regimen for treatment of peripheral T cell lymphoma    S: Patient seen and examined with HPC Transplant Team:         O: Vitals:   Vital Signs Last 24 Hrs  T(C): 37.4 (07 Dec 2022 05:15), Max: 37.5 (07 Dec 2022 01:06)  T(F): 99.3 (07 Dec 2022 05:15), Max: 99.5 (07 Dec 2022 01:06)  HR: 99 (07 Dec 2022 05:15) (88 - 99)  BP: 104/68 (07 Dec 2022 05:15) (91/58 - 106/67)  BP(mean): --  RR: 18 (07 Dec 2022 05:15) (16 - 19)  SpO2: 97% (07 Dec 2022 05:15) (97% - 100%)    Parameters below as of 07 Dec 2022 05:15  Patient On (Oxygen Delivery Method): room air      Admit weight: 80.3kg   Daily Weight in k.7 (06 Dec 2022 11:17)  Today's weight:     Intake / Output:    @ 07:01  -   @ 07:00  --------------------------------------------------------  IN: 2019.9 mL / OUT: 2550 mL / NET: -530.1 mL    PE:   Oropharynx: + ulceration, left lateral tongue, ulcerations on hard palate resolving   Oral Mucositis:            +                                          rdGrdrrdarddrderd:rd rd3rd CVS: S1, S2 RRR   Lungs: CTA throughout bilaterally   Abdomen: + BS x 4 normoactive, soft, NT, ND  Extremities: no edema   Gastric Mucositis:       -                                           Grade: n/a   Intestinal Mucositis:     -                                         Grade: n/a   Skin: + macular erythema to chest, upper back and neck post Kepivance, improving  TLC: CDI   Neuro: A&O x 3   Pain: denies    Labs:                         7.4    1.78  )-----------( 49       ( 07 Dec 2022 07:23 )             23.0       141  |  102  |  11  ----------------------------<  113<H>  3.8   |  26  |  0.42<L>    Ca    9.5      07 Dec 2022 07:19  Phos  3.6       Mg     1.8         TPro  6.4  /  Alb  3.9  /  TBili  0.1<L>  /  DBili  x   /  AST  22  /  ALT  51<H>  /  AlkPhos  103      Cultures:   Culture - Blood (22 @ 06:10)    Specimen Source: .Blood Blood    Culture Results:   No Growth Final    Culture - Urine (22 @ 08:19)    Specimen Source: Clean Catch Clean Catch (Midstream)    Culture Results:   <10,000 CFU/mL Normal Urogenital Maryanne    Radiology:    Xray Chest 1 View- PORTABLE-Urgent (Xray Chest 1 View- PORTABLE-Urgent .) (22 @ 07:18) >  Impression:  No focal consolidations.      Meds:   Antimicrobials:   acyclovir   Oral Tab/Cap 400 milliGRAM(s) Oral every 8 hours  cefepime   IVPB 2000 milliGRAM(s) IV Intermittent every 8 hours  clotrimazole Lozenge 1 Lozenge Oral five times a day  fluconAZOLE   Tablet 400 milliGRAM(s) Oral daily      Heme / Onc:   heparin  Infusion 334 Unit(s)/Hr IV Continuous <Continuous>      GI:  pantoprazole    Tablet 40 milliGRAM(s) Oral before breakfast  polyethylene glycol 3350 17 Gram(s) Oral daily PRN  senna 2 Tablet(s) Oral at bedtime PRN  sodium bicarbonate Mouth Rinse 10 milliLiter(s) Swish and Spit five times a day  ursodiol Capsule 300 milliGRAM(s) Oral two times a day      Cardiovascular:       Immunologic:   filgrastim-sndz (ZARXIO) Injectable 480 MICROGram(s) SubCutaneous every 24 hours      Other medications:   acetaminophen     Tablet .. 650 milliGRAM(s) Oral every 6 hours  Biotene Dry Mouth Oral Rinse 5 milliLiter(s) Swish and Spit five times a day  chlorhexidine 4% Liquid 1 Application(s) Topical <User Schedule>  FIRST- Mouthwash  BLM 10 milliLiter(s) Swish and Spit every 6 hours  FLUoxetine 20 milliGRAM(s) Oral daily  folic acid 1 milliGRAM(s) Oral daily  lidocaine/prilocaine Cream 1 Application(s) Topical daily  loratadine 10 milliGRAM(s) Oral daily  multivitamin 1 Tablet(s) Oral daily  nystatin Powder 1 Application(s) Topical two times a day  sodium chloride 0.45% 1000 milliLiter(s) IV Continuous <Continuous>  sodium chloride 0.9%. 1000 milliLiter(s) IV Continuous <Continuous>      PRN:   acetaminophen     Tablet .. 650 milliGRAM(s) Oral every 6 hours PRN  AQUAPHOR (petrolatum Ointment) 1 Application(s) Topical two times a day PRN  metoclopramide Injectable 10 milliGRAM(s) IV Push every 6 hours PRN  polyethylene glycol 3350 17 Gram(s) Oral daily PRN  senna 2 Tablet(s) Oral at bedtime PRN  sodium chloride 0.65% Nasal 1 Spray(s) Both Nostrils four times a day PRN  sodium chloride 0.9% lock flush 10 milliLiter(s) IV Push every 1 hour PRN  zinc oxide 40% Paste 1 Application(s) Topical three times a day PRN    A/P:  57 year old female with a history of  peripheral T cell lymphoma   Post :  Autologous PBSCT day + 12  - Completed  -16 24 hour continuous infusion, strict I&O, daily weights, prn diuresis    - Cytoxan day 3/.  - CTX - continue CTX hydration for 24 hours post infusion of last dose. Strict I&O, daily weights, prn diuresis. Start cipro 500mg po BID when ANC < 500    Neutropenic. Started Cipro for prophylaxis. If febrile, panculture and start Cefepime (has tolerated in the past)   - Kepivance day 2/3  11/27- Kepivance day 3/3- HELD due to Kepivance rash and oral erythema worsening  - Febrile overnight, tmax 100.4. CXR no obvious consolidations. Cultures pending. Started on cefepime. Grade 2 chemotherapy induced oral mucositis. Continue supportive measures.    - Mild transaminitis, if continue to rise lower Diflucan dose.  - early engraftment. Continue Cefepime, zarxio for now    mouth pain resolved   / engrafted. D/C cefepime (infectious work up has been negative), continue zarxio for now    1. Infectious Disease:   acyclovir   Oral Tab/Cap 400 milliGRAM(s) Oral every 8 hours  cefepime   IVPB 2000 milliGRAM(s) IV Intermittent every 8 hours  clotrimazole Lozenge 1 Lozenge Oral five times a day  fluconAZOLE   Tablet 400 milliGRAM(s) Oral daily    2. VOD Prophylaxis: Actigall, Glutamine, Heparin (dosed at 100 units / kg / day)     3. GI Prophylaxis:    pantoprazole    Tablet 40 milliGRAM(s) Oral before breakfast    4. Mouthcare - NS / NaHCO3 rinses, Mycelex, Biotene; Skin care     5. GVHD prophylaxis - n/a     6. Transfuse & replete electrolytes prn     7. IV hydration, daily weights, strict I&O, prn diuresis     8. PO intake as tolerated, nutrition follow up as needed, MVI, folic acid     9. Antiemetics, anti-diarrhea medications:   metoclopramide Injectable 10 milliGRAM(s) IV Push every 6 hours PRN    10. OOB as tolerated, physical therapy consult if needed     11. Monitor coags / fibrinogen 2x week, vitamin K as needed     12. Monitor closely for clinical changes, monitor for fevers     13. Emotional support provided, plan of care discussed and questions addressed     14. Patient education done regarding plan of care, restrictions and discharge planning     15. Continue regular social work input     I have written the above note for Dr. Cabrales who performed service with me in the room.   Leena Ibrahim NP-C ([phone])    I have seen and examined patient with NP, I agree with above note as scribed.                    [facility]PC Transplant Team                                                      Critical / Counseling Time Provided: 30 minutes                                                                                                                                                        Chief Complaint: Autologous peripheral blood stem cell transplant with high dose CBV prep regimen for treatment of peripheral T cell lymphoma    S: Patient seen and examined with HPC Transplant Team:   + fatigue   + loose stool   mouth pain improved       O: Vitals:   Vital Signs Last 24 Hrs  T(C): 37.4 (07 Dec 2022 05:15), Max: 37.5 (07 Dec 2022 01:06)  T(F): 99.3 (07 Dec 2022 05:15), Max: 99.5 (07 Dec 2022 01:06)  HR: 99 (07 Dec 2022 05:15) (88 - 99)  BP: 104/68 (07 Dec 2022 05:15) (91/58 - 106/67)  BP(mean): --  RR: 18 (07 Dec 2022 05:15) (16 - 19)  SpO2: 97% (07 Dec 2022 05:15) (97% - 100%)    Parameters below as of 07 Dec 2022 05:15  Patient On (Oxygen Delivery Method): room air      Admit weight: 80.3kg   Daily Weight in k.7 (06 Dec 2022 11:17)  Today's weight: 75.5kg     Intake / Output:    @ 07:01  -   @ 07:00  --------------------------------------------------------  IN: 2019.9 mL / OUT: 2550 mL / NET: -530.1 mL    PE:   Oropharynx: + ulceration, left lateral tongue, ulcerations on hard palate resolving   Oral Mucositis:            +                                          stGstrstastdstest:st st1st CVS: S1, S2 RRR   Lungs: CTA throughout bilaterally   Abdomen: + BS x 4 normoactive, soft, NT, ND  Extremities: no edema   Gastric Mucositis:       -                                           Grade: n/a   Intestinal Mucositis:     -                                         Grade: n/a   Skin: + macular erythema to chest, upper back and neck post Kepivance, improving  TLC: CDI   Neuro: A&O x 3   Pain: denies    Labs:                         7.4    1.78  )-----------( 49       ( 07 Dec 2022 07:23 )             23.0   12-07    141  |  102  |  11  ----------------------------<  113<H>  3.8   |  26  |  0.42<L>    Ca    9.5      07 Dec 2022 07:19  Phos  3.6       Mg     1.8         TPro  6.4  /  Alb  3.9  /  TBili  0.1<L>  /  DBili  x   /  AST  22  /  ALT  51<H>  /  AlkPhos  103      Cultures:   Culture - Blood (22 @ 06:10)    Specimen Source: .Blood Blood    Culture Results:   No Growth Final    Culture - Urine (22 @ 08:19)    Specimen Source: Clean Catch Clean Catch (Midstream)    Culture Results:   <10,000 CFU/mL Normal Urogenital Maryanne    Radiology:    Xray Chest 1 View- PORTABLE-Urgent (Xray Chest 1 View- PORTABLE-Urgent .) (22 @ 07:18) >  Impression:  No focal consolidations.      Meds:   Antimicrobials:   acyclovir   Oral Tab/Cap 400 milliGRAM(s) Oral every 8 hours  cefepime   IVPB 2000 milliGRAM(s) IV Intermittent every 8 hours  clotrimazole Lozenge 1 Lozenge Oral five times a day  fluconAZOLE   Tablet 400 milliGRAM(s) Oral daily      Heme / Onc:   heparin  Infusion 334 Unit(s)/Hr IV Continuous <Continuous>      GI:  pantoprazole    Tablet 40 milliGRAM(s) Oral before breakfast  polyethylene glycol 3350 17 Gram(s) Oral daily PRN  senna 2 Tablet(s) Oral at bedtime PRN  sodium bicarbonate Mouth Rinse 10 milliLiter(s) Swish and Spit five times a day  ursodiol Capsule 300 milliGRAM(s) Oral two times a day      Cardiovascular:       Immunologic:   filgrastim-sndz (ZARXIO) Injectable 480 MICROGram(s) SubCutaneous every 24 hours      Other medications:   acetaminophen     Tablet .. 650 milliGRAM(s) Oral every 6 hours  Biotene Dry Mouth Oral Rinse 5 milliLiter(s) Swish and Spit five times a day  chlorhexidine 4% Liquid 1 Application(s) Topical <User Schedule>  FIRST- Mouthwash  BLM 10 milliLiter(s) Swish and Spit every 6 hours  FLUoxetine 20 milliGRAM(s) Oral daily  folic acid 1 milliGRAM(s) Oral daily  lidocaine/prilocaine Cream 1 Application(s) Topical daily  loratadine 10 milliGRAM(s) Oral daily  multivitamin 1 Tablet(s) Oral daily  nystatin Powder 1 Application(s) Topical two times a day  sodium chloride 0.45% 1000 milliLiter(s) IV Continuous <Continuous>  sodium chloride 0.9%. 1000 milliLiter(s) IV Continuous <Continuous>      PRN:   acetaminophen     Tablet .. 650 milliGRAM(s) Oral every 6 hours PRN  AQUAPHOR (petrolatum Ointment) 1 Application(s) Topical two times a day PRN  metoclopramide Injectable 10 milliGRAM(s) IV Push every 6 hours PRN  polyethylene glycol 3350 17 Gram(s) Oral daily PRN  senna 2 Tablet(s) Oral at bedtime PRN  sodium chloride 0.65% Nasal 1 Spray(s) Both Nostrils four times a day PRN  sodium chloride 0.9% lock flush 10 milliLiter(s) IV Push every 1 hour PRN  zinc oxide 40% Paste 1 Application(s) Topical three times a day PRN    A/P:  57 year old female with a history of  peripheral T cell lymphoma   Post :  Autologous PBSCT day + 12  - Completed  -16 24 hour continuous infusion, strict I&O, daily weights, prn diuresis    - Cytoxan day 3/.  - CTX - continue CTX hydration for 24 hours post infusion of last dose. Strict I&O, daily weights, prn diuresis. Start cipro 500mg po BID when ANC < 500    Neutropenic. Started Cipro for prophylaxis. If febrile, panculture and start Cefepime (has tolerated in the past)   - Kepivance day 2/3  11/27- Kepivance day 3/3- HELD due to Kepivance rash and oral erythema worsening  - Febrile overnight, tmax 100.4. CXR no obvious consolidations. Cultures pending. Started on cefepime. Grade 2 chemotherapy induced oral mucositis. Continue supportive measures.    - Mild transaminitis, if continue to rise lower Diflucan dose.  - early engraftment. Continue Cefepime, zarxio for now   12/6 mouth pain resolved   12/- engrafted. D/C cefepime (infectious work up has been negative), continue zarxio for now    1. Infectious Disease:   acyclovir   Oral Tab/Cap 400 milliGRAM(s) Oral every 8 hours  cefepime   IVPB 2000 milliGRAM(s) IV Intermittent every 8 hours  clotrimazole Lozenge 1 Lozenge Oral five times a day  fluconAZOLE   Tablet 400 milliGRAM(s) Oral daily    2. VOD Prophylaxis: Actigall, Glutamine, Heparin (dosed at 100 units / kg / day)     3. GI Prophylaxis:    pantoprazole    Tablet 40 milliGRAM(s) Oral before breakfast    4. Mouthcare - NS / NaHCO3 rinses, Mycelex, Biotene; Skin care     5. GVHD prophylaxis - n/a     6. Transfuse & replete electrolytes prn     7. IV hydration, daily weights, strict I&O, prn diuresis     8. PO intake as tolerated, nutrition follow up as needed, MVI, folic acid     9. Antiemetics, anti-diarrhea medications:   metoclopramide Injectable 10 milliGRAM(s) IV Push every 6 hours PRN    10. OOB as tolerated, physical therapy consult if needed     11. Monitor coags / fibrinogen 2x week, vitamin K as needed     12. Monitor closely for clinical changes, monitor for fevers     13. Emotional support provided, plan of care discussed and questions addressed     14. Patient education done regarding plan of care, restrictions and discharge planning     15. Continue regular social work input     I have written the above note for Dr. Cabrales who performed service with me in the room.   Leena Ibrahim NP-C ([phone])    I have seen and examined patient with NP, I agree with above note as scribed.

## 2022-12-08 LAB
ALBUMIN SERPL ELPH-MCNC: 3.9 G/DL — SIGNIFICANT CHANGE UP (ref 3.3–5)
ALP SERPL-CCNC: 117 U/L — SIGNIFICANT CHANGE UP (ref 40–120)
ALT FLD-CCNC: 44 U/L — SIGNIFICANT CHANGE UP (ref 10–45)
ANION GAP SERPL CALC-SCNC: 13 MMOL/L — SIGNIFICANT CHANGE UP (ref 5–17)
APPEARANCE UR: CLEAR — SIGNIFICANT CHANGE UP
APTT BLD: 25 SEC — LOW (ref 27.5–35.5)
AST SERPL-CCNC: 23 U/L — SIGNIFICANT CHANGE UP (ref 10–40)
BACTERIA # UR AUTO: NEGATIVE — SIGNIFICANT CHANGE UP
BASOPHILS # BLD AUTO: 0 K/UL — SIGNIFICANT CHANGE UP (ref 0–0.2)
BASOPHILS NFR BLD AUTO: 0 % — SIGNIFICANT CHANGE UP (ref 0–2)
BILIRUB SERPL-MCNC: 0.2 MG/DL — SIGNIFICANT CHANGE UP (ref 0.2–1.2)
BILIRUB UR-MCNC: NEGATIVE — SIGNIFICANT CHANGE UP
BUN SERPL-MCNC: 9 MG/DL — SIGNIFICANT CHANGE UP (ref 7–23)
CALCIUM SERPL-MCNC: 9.7 MG/DL — SIGNIFICANT CHANGE UP (ref 8.4–10.5)
CHLORIDE SERPL-SCNC: 101 MMOL/L — SIGNIFICANT CHANGE UP (ref 96–108)
CO2 SERPL-SCNC: 26 MMOL/L — SIGNIFICANT CHANGE UP (ref 22–31)
COD CRY URNS QL: ABNORMAL
COLOR SPEC: YELLOW — SIGNIFICANT CHANGE UP
CREAT SERPL-MCNC: 0.44 MG/DL — LOW (ref 0.5–1.3)
DIFF PNL FLD: ABNORMAL
EGFR: 114 ML/MIN/1.73M2 — SIGNIFICANT CHANGE UP
EOSINOPHIL # BLD AUTO: 0 K/UL — SIGNIFICANT CHANGE UP (ref 0–0.5)
EOSINOPHIL NFR BLD AUTO: 0 % — SIGNIFICANT CHANGE UP (ref 0–6)
EPI CELLS # UR: 1 /HPF — SIGNIFICANT CHANGE UP
FIBRINOGEN PPP-MCNC: 383 MG/DL — SIGNIFICANT CHANGE UP (ref 200–445)
GLUCOSE SERPL-MCNC: 110 MG/DL — HIGH (ref 70–99)
GLUCOSE UR QL: NEGATIVE — SIGNIFICANT CHANGE UP
HCT VFR BLD CALC: 22.5 % — LOW (ref 34.5–45)
HGB BLD-MCNC: 7.3 G/DL — LOW (ref 11.5–15.5)
HYALINE CASTS # UR AUTO: 1 /LPF — SIGNIFICANT CHANGE UP (ref 0–2)
INR BLD: 1.14 RATIO — SIGNIFICANT CHANGE UP (ref 0.88–1.16)
KETONES UR-MCNC: NEGATIVE — SIGNIFICANT CHANGE UP
LDH SERPL L TO P-CCNC: 182 U/L — SIGNIFICANT CHANGE UP (ref 50–242)
LEUKOCYTE ESTERASE UR-ACNC: NEGATIVE — SIGNIFICANT CHANGE UP
LYMPHOCYTES # BLD AUTO: 0.27 K/UL — LOW (ref 1–3.3)
LYMPHOCYTES # BLD AUTO: 8.8 % — LOW (ref 13–44)
MAGNESIUM SERPL-MCNC: 1.8 MG/DL — SIGNIFICANT CHANGE UP (ref 1.6–2.6)
MANUAL SMEAR VERIFICATION: SIGNIFICANT CHANGE UP
MCHC RBC-ENTMCNC: 29.2 PG — SIGNIFICANT CHANGE UP (ref 27–34)
MCHC RBC-ENTMCNC: 32.4 GM/DL — SIGNIFICANT CHANGE UP (ref 32–36)
MCV RBC AUTO: 90 FL — SIGNIFICANT CHANGE UP (ref 80–100)
METAMYELOCYTES # FLD: 1.7 % — HIGH (ref 0–0)
MONOCYTES # BLD AUTO: 0.22 K/UL — SIGNIFICANT CHANGE UP (ref 0–0.9)
MONOCYTES NFR BLD AUTO: 7 % — SIGNIFICANT CHANGE UP (ref 2–14)
NEUTROPHILS # BLD AUTO: 2.55 K/UL — SIGNIFICANT CHANGE UP (ref 1.8–7.4)
NEUTROPHILS NFR BLD AUTO: 78.1 % — HIGH (ref 43–77)
NEUTS BAND # BLD: 4.4 % — SIGNIFICANT CHANGE UP (ref 0–8)
NITRITE UR-MCNC: NEGATIVE — SIGNIFICANT CHANGE UP
PH UR: 6.5 — SIGNIFICANT CHANGE UP (ref 5–8)
PHOSPHATE SERPL-MCNC: 4.4 MG/DL — SIGNIFICANT CHANGE UP (ref 2.5–4.5)
PLAT MORPH BLD: NORMAL — SIGNIFICANT CHANGE UP
PLATELET # BLD AUTO: 76 K/UL — LOW (ref 150–400)
POTASSIUM SERPL-MCNC: 3.6 MMOL/L — SIGNIFICANT CHANGE UP (ref 3.5–5.3)
POTASSIUM SERPL-SCNC: 3.6 MMOL/L — SIGNIFICANT CHANGE UP (ref 3.5–5.3)
PROT SERPL-MCNC: 6.4 G/DL — SIGNIFICANT CHANGE UP (ref 6–8.3)
PROT UR-MCNC: ABNORMAL
PROTHROM AB SERPL-ACNC: 13.3 SEC — SIGNIFICANT CHANGE UP (ref 10.5–13.4)
RBC # BLD: 2.5 M/UL — LOW (ref 3.8–5.2)
RBC # FLD: 13.1 % — SIGNIFICANT CHANGE UP (ref 10.3–14.5)
RBC BLD AUTO: SIGNIFICANT CHANGE UP
RBC CASTS # UR COMP ASSIST: 1 /HPF — SIGNIFICANT CHANGE UP (ref 0–4)
SODIUM SERPL-SCNC: 140 MMOL/L — SIGNIFICANT CHANGE UP (ref 135–145)
SP GR SPEC: 1.02 — SIGNIFICANT CHANGE UP (ref 1.01–1.02)
TOXIC GRANULES BLD QL SMEAR: PRESENT — SIGNIFICANT CHANGE UP
UROBILINOGEN FLD QL: NEGATIVE — SIGNIFICANT CHANGE UP
WBC # BLD: 3.09 K/UL — LOW (ref 3.8–10.5)
WBC # FLD AUTO: 3.09 K/UL — LOW (ref 3.8–10.5)
WBC UR QL: 2 /HPF — SIGNIFICANT CHANGE UP (ref 0–5)

## 2022-12-08 PROCEDURE — 99291 CRITICAL CARE FIRST HOUR: CPT

## 2022-12-08 PROCEDURE — 71045 X-RAY EXAM CHEST 1 VIEW: CPT | Mod: 26

## 2022-12-08 RX ORDER — CEFEPIME 1 G/1
INJECTION, POWDER, FOR SOLUTION INTRAMUSCULAR; INTRAVENOUS
Refills: 0 | Status: DISCONTINUED | OUTPATIENT
Start: 2022-12-08 | End: 2022-12-08

## 2022-12-08 RX ORDER — CEFEPIME 1 G/1
2000 INJECTION, POWDER, FOR SOLUTION INTRAMUSCULAR; INTRAVENOUS ONCE
Refills: 0 | Status: COMPLETED | OUTPATIENT
Start: 2022-12-08 | End: 2022-12-08

## 2022-12-08 RX ORDER — CEFEPIME 1 G/1
2000 INJECTION, POWDER, FOR SOLUTION INTRAMUSCULAR; INTRAVENOUS EVERY 8 HOURS
Refills: 0 | Status: DISCONTINUED | OUTPATIENT
Start: 2022-12-08 | End: 2022-12-08

## 2022-12-08 RX ADMIN — HEPARIN SODIUM 3.34 UNIT(S)/HR: 5000 INJECTION INTRAVENOUS; SUBCUTANEOUS at 19:27

## 2022-12-08 RX ADMIN — Medication 10 MILLILITER(S): at 23:06

## 2022-12-08 RX ADMIN — Medication 5 MILLILITER(S): at 08:05

## 2022-12-08 RX ADMIN — Medication 10 MILLILITER(S): at 19:27

## 2022-12-08 RX ADMIN — URSODIOL 300 MILLIGRAM(S): 250 TABLET, FILM COATED ORAL at 17:31

## 2022-12-08 RX ADMIN — URSODIOL 300 MILLIGRAM(S): 250 TABLET, FILM COATED ORAL at 05:50

## 2022-12-08 RX ADMIN — Medication 5 MILLILITER(S): at 15:05

## 2022-12-08 RX ADMIN — LIDOCAINE AND PRILOCAINE CREAM 1 APPLICATION(S): 25; 25 CREAM TOPICAL at 12:04

## 2022-12-08 RX ADMIN — DIPHENHYDRAMINE HYDROCHLORIDE AND LIDOCAINE HYDROCHLORIDE AND ALUMINUM HYDROXIDE AND MAGNESIUM HYDRO 10 MILLILITER(S): KIT at 12:05

## 2022-12-08 RX ADMIN — Medication 650 MILLIGRAM(S): at 08:05

## 2022-12-08 RX ADMIN — Medication 400 MILLIGRAM(S): at 21:01

## 2022-12-08 RX ADMIN — Medication 650 MILLIGRAM(S): at 01:45

## 2022-12-08 RX ADMIN — Medication 1 LOZENGE: at 12:02

## 2022-12-08 RX ADMIN — DIPHENHYDRAMINE HYDROCHLORIDE AND LIDOCAINE HYDROCHLORIDE AND ALUMINUM HYDROXIDE AND MAGNESIUM HYDRO 10 MILLILITER(S): KIT at 17:32

## 2022-12-08 RX ADMIN — FLUCONAZOLE 400 MILLIGRAM(S): 150 TABLET ORAL at 12:03

## 2022-12-08 RX ADMIN — Medication 1 LOZENGE: at 00:22

## 2022-12-08 RX ADMIN — Medication 1 MILLIGRAM(S): at 12:04

## 2022-12-08 RX ADMIN — Medication 10 MILLILITER(S): at 15:05

## 2022-12-08 RX ADMIN — HEPARIN SODIUM 3.34 UNIT(S)/HR: 5000 INJECTION INTRAVENOUS; SUBCUTANEOUS at 05:51

## 2022-12-08 RX ADMIN — CEFEPIME 100 MILLIGRAM(S): 1 INJECTION, POWDER, FOR SOLUTION INTRAMUSCULAR; INTRAVENOUS at 01:19

## 2022-12-08 RX ADMIN — Medication 1 LOZENGE: at 08:06

## 2022-12-08 RX ADMIN — Medication 5 MILLILITER(S): at 23:06

## 2022-12-08 RX ADMIN — NYSTATIN CREAM 1 APPLICATION(S): 100000 CREAM TOPICAL at 17:31

## 2022-12-08 RX ADMIN — Medication 10 MILLILITER(S): at 12:02

## 2022-12-08 RX ADMIN — Medication 650 MILLIGRAM(S): at 00:55

## 2022-12-08 RX ADMIN — DIPHENHYDRAMINE HYDROCHLORIDE AND LIDOCAINE HYDROCHLORIDE AND ALUMINUM HYDROXIDE AND MAGNESIUM HYDRO 10 MILLILITER(S): KIT at 23:07

## 2022-12-08 RX ADMIN — Medication 400 MILLIGRAM(S): at 12:08

## 2022-12-08 RX ADMIN — Medication 20 MILLIGRAM(S): at 12:08

## 2022-12-08 RX ADMIN — CHLORHEXIDINE GLUCONATE 1 APPLICATION(S): 213 SOLUTION TOPICAL at 12:03

## 2022-12-08 RX ADMIN — LORATADINE 10 MILLIGRAM(S): 10 TABLET ORAL at 12:04

## 2022-12-08 RX ADMIN — Medication 5 MILLILITER(S): at 12:02

## 2022-12-08 RX ADMIN — Medication 10 MILLILITER(S): at 08:05

## 2022-12-08 RX ADMIN — SODIUM CHLORIDE 20 MILLILITER(S): 9 INJECTION INTRAMUSCULAR; INTRAVENOUS; SUBCUTANEOUS at 19:30

## 2022-12-08 RX ADMIN — Medication 650 MILLIGRAM(S): at 14:04

## 2022-12-08 RX ADMIN — Medication 5 MILLILITER(S): at 00:21

## 2022-12-08 RX ADMIN — Medication 1 TABLET(S): at 12:04

## 2022-12-08 RX ADMIN — Medication 5 MILLILITER(S): at 19:30

## 2022-12-08 RX ADMIN — Medication 1 LOZENGE: at 19:27

## 2022-12-08 RX ADMIN — Medication 1 LOZENGE: at 15:05

## 2022-12-08 RX ADMIN — Medication 650 MILLIGRAM(S): at 19:30

## 2022-12-08 RX ADMIN — Medication 480 MICROGRAM(S): at 12:19

## 2022-12-08 RX ADMIN — PANTOPRAZOLE SODIUM 40 MILLIGRAM(S): 20 TABLET, DELAYED RELEASE ORAL at 06:40

## 2022-12-08 RX ADMIN — Medication 650 MILLIGRAM(S): at 19:00

## 2022-12-08 RX ADMIN — NYSTATIN CREAM 1 APPLICATION(S): 100000 CREAM TOPICAL at 05:51

## 2022-12-08 RX ADMIN — DIPHENHYDRAMINE HYDROCHLORIDE AND LIDOCAINE HYDROCHLORIDE AND ALUMINUM HYDROXIDE AND MAGNESIUM HYDRO 10 MILLILITER(S): KIT at 00:22

## 2022-12-08 RX ADMIN — Medication 10 MILLILITER(S): at 00:21

## 2022-12-08 RX ADMIN — Medication 400 MILLIGRAM(S): at 05:50

## 2022-12-08 RX ADMIN — DIPHENHYDRAMINE HYDROCHLORIDE AND LIDOCAINE HYDROCHLORIDE AND ALUMINUM HYDROXIDE AND MAGNESIUM HYDRO 10 MILLILITER(S): KIT at 05:51

## 2022-12-08 RX ADMIN — Medication 1 LOZENGE: at 23:06

## 2022-12-08 NOTE — CHART NOTE - NSCHARTNOTEFT_GEN_A_CORE
Medicine PA Fever Note    Patient is a 57y old  Female who presents with a chief complaint of Autologous peripheral blood stem cell transplant with high dose CBV prep regimen for treatment of peripheral T cell lymphoma (07 Dec 2022 07:30)      Event Summary:   Notified by RN patient with fever, Temp 38.1C.   Patient seen and examined patient at bedside.  Patient is alert, c/o nausea otherwise denies HA, visual changes, CP, SOB, cough, dysuria, or abd pain.    >VITAL SIGNS:  T(C): 38.1 (12-08-22 @ 00:31), Max: 38.1 (12-08-22 @ 00:31)  T(F): 100.6 (12-08-22 @ 00:31), Max: 100.6 (12-08-22 @ 00:31)  HR: 108 (12-08-22 @ 00:31) (90 - 108)  BP: 108/70 (12-08-22 @ 00:31) (104/68 - 126/73)  RR: 19 (12-08-22 @ 00:31) (18 - 19)  SpO2: 98% (12-08-22 @ 00:31) (97% - 100%)      >Review Of Systems:   CONSTITUTIONAL:  No fever.   No chills  EYES: No visual changes.    ENT:  No  throat pain.  No neck stiffness  RESPIRATORY: No cough, wheezing, hemoptysis; No shortness of breath  CARDIOVASCULAR: No chest pain or palpitations  GASTROINTESTINAL: No abdominal or epigastric pain.  No diarrhea.    GENITOURINARY: No dysuria, frequency or hematuria  NEUROLOGICAL: No numbness or weakness  SKIN: No itching, burning, rashes, or lesions       >PHYSICAL EXAM:  Constitutional: AOx3. NAD.  Neuro:  Grossly intact   Cardiovascular: +S1 S2. tachycardic. No murmurs.  No LE edema.  Respiratory:  Even, unlabored.  Clear lungs bilaterally. No wheezing, rhonchi, or crackles.  Gastrointestinal: +BS X4 active. Soft. Nontender. Nondistended   Extremities/Vascular: +2 pulses bilaterally.   Skin:  +Feverish to touch     >MEDICATIONS:    MEDICATIONS  (STANDING):  acetaminophen     Tablet .. 650 milliGRAM(s) Oral every 6 hours  acyclovir   Oral Tab/Cap 400 milliGRAM(s) Oral every 8 hours  Biotene Dry Mouth Oral Rinse 5 milliLiter(s) Swish and Spit five times a day  cefepime   IVPB      cefepime   IVPB 2000 milliGRAM(s) IV Intermittent once  cefepime   IVPB 2000 milliGRAM(s) IV Intermittent every 8 hours  chlorhexidine 4% Liquid 1 Application(s) Topical <User Schedule>  clotrimazole Lozenge 1 Lozenge Oral five times a day  [id number] (ZARXIO) Injectable 480 MICROGram(s) SubCutaneous every 24 hours  FIRST- Mouthwash  BLM 10 milliLiter(s) Swish and Spit every 6 hours  fluconAZOLE   Tablet 400 milliGRAM(s) Oral daily  FLUoxetine 20 milliGRAM(s) Oral daily  folic acid 1 milliGRAM(s) Oral daily  heparin  Infusion 334 Unit(s)/Hr (3.34 mL/Hr) IV Continuous <Continuous>  lidocaine/prilocaine Cream 1 Application(s) Topical daily  loratadine 10 milliGRAM(s) Oral daily  multivitamin 1 Tablet(s) Oral daily  nystatin Powder 1 Application(s) Topical two times a day  pantoprazole    Tablet 40 milliGRAM(s) Oral before breakfast  sodium bicarbonate Mouth Rinse 10 milliLiter(s) Swish and Spit five times a day  sodium chloride 0.45% 1000 milliLiter(s) (150 mL/Hr) IV Continuous <Continuous>  sodium chloride 0.9%. 1000 milliLiter(s) (20 mL/Hr) IV Continuous <Continuous>  ursodiol Capsule 300 milliGRAM(s) Oral two times a day      >LABORATORY:                          7.4    1.78  )-----------( 49       ( 07 Dec 2022 07:23 )             23.0       12-07    141  |  102  |  11  ----------------------------<  113<H>  3.8   |  26  |  0.42<L>    Ca    9.5      07 Dec 2022 07:19  Phos  3.6     12-07  Mg     1.8     12-07    TPro  6.4  /  Alb  3.9  /  TBili  0.1<L>  /  DBili  x   /  AST  22  /  ALT  51<H>  /  AlkPhos  103  12-07          >MICROBIOLOGY:     Urine Culture: PENDING  Blood Culture: PENDING    >RADIOLOGY:    CXR: PENDING    >ASSESSMENT/PLAN:   HPI:  This is a 57 year old female with peripheral T cell lymphoma status post CHOEP x 6 admitted for an autologous peripheral blood stem cell transplant with high dose CBV prep regimen. Hematologic history as follows: initially presented on 3/4/22 with right axilla mass to general surgeon. Biopsy on 3/22/22 showed atypical lymphoid infiltrate. She later had an excisional biopsy on 5/16/22 which confirmed peripheral T cell lymphoma. She has no complaints on admission.  (16 Nov 2022 12:00)    Now w/ new fever of 38.1C.    1) Fever - New  - Tylenol / Cooling measures prn for Pyrexia  - BC x2, UA/UC  - CXR  - C/W Reglan for nausea  - case d/w Dr. Chun, will restart Cefepime 2g q8  - Monitor VS  - F/U Primary  care team in [facility] Treviño PA-C  Department of Medicine  [facility] [id number]

## 2022-12-08 NOTE — PROGRESS NOTE ADULT - CRITICAL CARE ATTENDING COMMENT
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   Today is Day + 12  Problem/Plan -   1:  Problem: Stem cell transplant   ·  Plan: 1. Admit to BMTU   2. TLC placement in IR   3. Day -9 - day -2 - antiemetics   4. Day -9 & day -8, VP16 2400mg/m2 IV x 34 hours (final concentration 0.4mg /mL).   5. Strict I&O, daily weights, prn diuresis   6. After completion of VP16- start CTX hydration (D51/2NS + 10mEq KCl @ 150ml / m2) - continue for 24 hours post infusion of CTX   7. Day -7 - day -4 - CTX 1800 mg / m2 daily over 2 hours. Follow SMA7, mag, phos 6hours post CT . [place]  12mg / kg - hemorrhagic cystitis ppx   8. Day -3 - BCNU 400mg / m2   9. Day -2 - day -1 - rest days  10. Day 0(11/25/22) - HPC transplant. Start Zarxio 480mcg SQ QD, continue through engraftment. Kepivance on days 0, 1 & 2   11. Anxiolytics, antinausea, antidiarrhea medications as needed   12. Lasix PRN while being aggressively hydrated to avoid VOD   13. Nutritional support, pain management.    Problem/Plan - 2:  ·  Problem: Need for prophylactic measure.   ·  Plan: 1. VOD prophylaxis - low dose heparin gtt (dosed at 100 units / kg / day), glutamine supplementation, Actigall BID   2. PCP prophylaxis - Bactrim DS through day -2    3. Antiviral prophylaxis - Acyclovir 400mg po TID to start day -1   4. Antifungal prophylaxis- Diflucan 400 mg po daily.  5. GI prophylaxis - Protonix po QD   6. Antibacterial prophylaxis - when ANC < 500, start Cipro 500mg po BID. If becomes febrile, pan cx, CXR and change Cipro to Cefepime 2g IV q 8 hours. Continue until count recovery  7. Kepivance for prevention of mucositis- days 0, +1, +2  8. Aggressive mouth care and skin care as per protocol. On 11/25, early mucositis on lower lip, no ulcers. Continue topical care.   9. Receiving transfusion and electrolyte support.    11/25-Chemotherapy induced nausea and diarrhea managed with antiemetics and antidiarrheals. Now patient states stool is loose rather than watery.   Continue Cipro, Acyclovir, Diflucan prophylaxis. OOB/ambulate. She received HPC transplant today without complications. Begin Zarxio daily.  11/26/22: continues to have loose stools overnight, this am with epistaxis lasting ~ 5 minutes. To receive platelets. Remains fatigued.  11/28- Febrile overnight, tmax 100.4. CXR- no consolidations. Cultures pending. Started on Cefepime. Grade 2 chemotherapy induced oral mucositis. Continue supportive care.  12/2- patient now afebrile; 11/28 cultures negative and CXR- negative. Continue Cefepime; on Acyclovir, Diflucan prophylaxis. Continue Zarxio daily, await engraftment.  12/5- early engraftment, continue Zarxio.  12/7- Neutrophil count > 1.0, afebrile, cultures negative. D/C Cefepime. Continue Zarxio daily. 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   Today is Day + 13  Problem/Plan -   1:  Problem: Stem cell transplant   ·  Plan: 1. Admit to BMTU   2. TLC placement in IR   3. Day -9 - day -2 - antiemetics   4. Day -9 & day -8, VP16 2400mg/m2 IV x 34 hours (final concentration 0.4mg /mL).   5. Strict I&O, daily weights, prn diuresis   6. After completion of VP16- start CTX hydration (D51/2NS + 10mEq KCl @ 150ml / m2) - continue for 24 hours post infusion of CTX   7. Day -7 - day -4 - CTX 1800 mg / m2 daily over 2 hours. Follow SMA7, mag, phos 6hours post CT . [place]  12mg / kg - hemorrhagic cystitis ppx   8. Day -3 - BCNU 400mg / m2   9. Day -2 - day -1 - rest days  10. Day 0(11/25/22) - HPC transplant. Start Zarxio 480mcg SQ QD, continue through engraftment. Kepivance on days 0, 1 & 2   11. Anxiolytics, antinausea, antidiarrhea medications as needed   12. Lasix PRN while being aggressively hydrated to avoid VOD   13. Nutritional support, pain management.    Problem/Plan - 2:  ·  Problem: Need for prophylactic measure.   ·  Plan: 1. VOD prophylaxis - low dose heparin gtt (dosed at 100 units / kg / day), glutamine supplementation, Actigall BID   2. PCP prophylaxis - Bactrim DS through day -2    3. Antiviral prophylaxis - Acyclovir 400mg po TID to start day -1   4. Antifungal prophylaxis- Diflucan 400 mg po daily.  5. GI prophylaxis - Protonix po QD   6. Antibacterial prophylaxis - when ANC < 500, start Cipro 500mg po BID. If becomes febrile, pan cx, CXR and change Cipro to Cefepime 2g IV q 8 hours. Continue until count recovery  7. Kepivance for prevention of mucositis- days 0, +1, +2  8. Aggressive mouth care and skin care as per protocol. On 11/25, early mucositis on lower lip, no ulcers. Continue topical care.   9. Receiving transfusion and electrolyte support.    11/25-Chemotherapy induced nausea and diarrhea managed with antiemetics and antidiarrheals. Now patient states stool is loose rather than watery.   Continue Cipro, Acyclovir, Diflucan prophylaxis. OOB/ambulate. She received HPC transplant today without complications. Begin Zarxio daily.  11/26/22: continues to have loose stools overnight, this am with epistaxis lasting ~ 5 minutes. To receive platelets. Remains fatigued.  11/28- Febrile overnight, tmax 100.4. CXR- no consolidations. Cultures pending. Started on Cefepime. Grade 2 chemotherapy induced oral mucositis. Continue supportive care.  12/2- patient now afebrile; 11/28 cultures negative and CXR- negative. Continue Cefepime; on Acyclovir, Diflucan prophylaxis. Continue Zarxio daily, await engraftment.  12/5- early engraftment, continue Zarxio.  12/7- Neutrophil count > 1.0, afebrile, cultures negative. D/C Cefepime. Continue Zarxio daily.

## 2022-12-08 NOTE — PROGRESS NOTE ADULT - SUBJECTIVE AND OBJECTIVE BOX
HPC Transplant Team                                                      Critical / Counseling Time Provided: 30 minutes                                                                                                                                                        Chief Complaint: Autologous peripheral blood stem cell transplant with high dose CBV prep regimen for treatment of peripheral T cell lymphoma    S: Patient seen and examined with HPC Transplant Team:   O:     Vital Signs Last 24 Hrs  T(C): 37.5 (08 Dec 2022 06:05), Max: 38.1 (08 Dec 2022 00:31)  T(F): 99.5 (08 Dec 2022 06:05), Max: 100.6 (08 Dec 2022 00:31)  HR: 96 (08 Dec 2022 06:05) (90 - 108)  BP: 108/68 (08 Dec 2022 06:05) (105/70 - 126/73)  BP(mean): --  RR: 18 (08 Dec 2022 06:05) (18 - 19)  SpO2: 98% (08 Dec 2022 06:05) (98% - 100%)    Parameters below as of 08 Dec 2022 06:05  Patient On (Oxygen Delivery Method): room air      Admit weight: 80.3kg  Daily Weight in k.5 (07 Dec 2022 11:02)    Intake / Output:    @ 07:01  -  08 @ 07:00  --------------------------------------------------------  IN: 1457.6 mL / OUT: 2650 mL / NET: -1192.4 mL    Oropharynx: + ulceration, left lateral tongue, ulcerations on hard palate resolving   Oral Mucositis:            +                                          stGstrstastdstest:st st1st CVS: S1, S2 RRR   Lungs: CTA throughout bilaterally   Abdomen: + BS x 4 normoactive, soft, NT, ND  Extremities: no edema   Gastric Mucositis:       -                                           Grade: n/a   Intestinal Mucositis:     -                                         Grade: n/a   Skin: + macular erythema to chest, upper back and neck post Kepivance, improving  TLC: CDI   Neuro: A&O x 3   Pain: denies      Labs:   CBC Full  -  ( 08 Dec 2022 06:51 )  WBC Count : x  Hemoglobin : 7.3 g/dL  Hematocrit : 22.5 %  Platelet Count - Automated : x  Mean Cell Volume : 90.0 fl  Mean Cell Hemoglobin : 29.2 pg  Mean Cell Hemoglobin Concentration : 32.4 gm/dL  Auto Neutrophil # : x  Auto Lymphocyte # : x  Auto Monocyte # : x  Auto Eosinophil # : x  Auto Basophil # : x  Auto Neutrophil % : x  Auto Lymphocyte % : x  Auto Monocyte % : x  Auto Eosinophil % : x  Auto Basophil % : x                          7.3    x     )-----------( x        ( 08 Dec 2022 06:51 )             22.5         140  |  101  |  9   ----------------------------<  110<H>  3.6   |  26  |  0.44<L>    Ca    9.7      08 Dec 2022 06:51  Phos  4.4       Mg     1.8         TPro  6.4  /  Alb  3.9  /  TBili  0.2  /  DBili  x   /  AST  23  /  ALT  44  /  AlkPhos  117        LIVER FUNCTIONS - ( 08 Dec 2022 06:51 )  Alb: 3.9 g/dL / Pro: 6.4 g/dL / ALK PHOS: 117 U/L / ALT: 44 U/L / AST: 23 U/L / GGT: x           Lactate Dehydrogenase, Serum: 182 U/L ( @ 06:51)          Cultures:      Blood cx - p      Radiology:     CXR () - p    Meds:   Antimicrobials:   acyclovir   Oral Tab/Cap 400 milliGRAM(s) Oral every 8 hours  clotrimazole Lozenge 1 Lozenge Oral five times a day  fluconAZOLE   Tablet 400 milliGRAM(s) Oral daily      Heme / Onc:   heparin  Infusion 334 Unit(s)/Hr IV Continuous <Continuous>      GI:  pantoprazole    Tablet 40 milliGRAM(s) Oral before breakfast  polyethylene glycol 3350 17 Gram(s) Oral daily PRN  senna 2 Tablet(s) Oral at bedtime PRN  sodium bicarbonate Mouth Rinse 10 milliLiter(s) Swish and Spit five times a day  ursodiol Capsule 300 milliGRAM(s) Oral two times a day      Cardiovascular:       Immunologic:   filgrastim-sndz (ZARXIO) Injectable 480 MICROGram(s) SubCutaneous every 24 hours      Other medications:   acetaminophen     Tablet .. 650 milliGRAM(s) Oral every 6 hours  Biotene Dry Mouth Oral Rinse 5 milliLiter(s) Swish and Spit five times a day  chlorhexidine 4% Liquid 1 Application(s) Topical <User Schedule>  FIRST- Mouthwash  BLM 10 milliLiter(s) Swish and Spit every 6 hours  FLUoxetine 20 milliGRAM(s) Oral daily  folic acid 1 milliGRAM(s) Oral daily  lidocaine/prilocaine Cream 1 Application(s) Topical daily  loratadine 10 milliGRAM(s) Oral daily  multivitamin 1 Tablet(s) Oral daily  nystatin Powder 1 Application(s) Topical two times a day  sodium chloride 0.45% 1000 milliLiter(s) IV Continuous <Continuous>  sodium chloride 0.9%. 1000 milliLiter(s) IV Continuous <Continuous>      PRN:   acetaminophen     Tablet .. 650 milliGRAM(s) Oral every 6 hours PRN  AQUAPHOR (petrolatum Ointment) 1 Application(s) Topical two times a day PRN  metoclopramide Injectable 10 milliGRAM(s) IV Push every 6 hours PRN  polyethylene glycol 3350 17 Gram(s) Oral daily PRN  senna 2 Tablet(s) Oral at bedtime PRN  sodium chloride 0.65% Nasal 1 Spray(s) Both Nostrils four times a day PRN  sodium chloride 0.9% lock flush 10 milliLiter(s) IV Push every 1 hour PRN  zinc oxide 40% Paste 1 Application(s) Topical three times a day PRN    A/P:  57 year old female with a history of  peripheral T cell lymphoma   Post :  Autologous PBSCT day + 13  - Completed  -16 24 hour continuous infusion, strict I&O, daily weights, prn diuresis    - Cytoxan day 3.  - CTX - continue CTX hydration for 24 hours post infusion of last dose. Strict I&O, daily weights, prn diuresis. Start cipro 500mg po BID when ANC < 500    Neutropenic. Started Cipro for prophylaxis. If febrile, panculture and start Cefepime (has tolerated in the past)   - Kepivance day 2/3  - Kepivance day 3/3- HELD due to Kepivance rash and oral erythema worsening  - Febrile overnight, tmax 100.4. CXR no obvious consolidations. Cultures pending. Started on cefepime. Grade 2 chemotherapy induced oral mucositis. Continue supportive measures.   12/ - Mild transaminitis, if continue to rise lower Diflucan dose.  - early engraftment. Continue Cefepime, zarxio for now   / mouth pain resolved   12/- engrafted. D/C cefepime (infectious work up has been negative), continue zarxio for now    1. Infectious Disease:   acyclovir   Oral Tab/Cap 400 milliGRAM(s) Oral every 8 hours  clotrimazole Lozenge 1 Lozenge Oral five times a day  fluconAZOLE   Tablet 400 milliGRAM(s) Oral daily    2. VOD Prophylaxis: Actigall, Glutamine, Heparin (dosed at 100 units / kg / day)     3. GI Prophylaxis:    pantoprazole    Tablet 40 milliGRAM(s) Oral before breakfast    4. Mouthcare - NS / NaHCO3 rinses, Mycelex, Biotene; Skin care     5. GVHD prophylaxis - n/a     6. Transfuse & replete electrolytes prn     7. IV hydration, daily weights, strict I&O, prn diuresis     8. PO intake as tolerated, nutrition follow up as needed, MVI, folic acid     9. Antiemetics, anti-diarrhea medications:   metoclopramide Injectable 10 milliGRAM(s) IV Push every 6 hours PRN    10. OOB as tolerated, physical therapy consult if needed     11. Monitor coags / fibrinogen 2x week, vitamin K as needed     12. Monitor closely for clinical changes, monitor for fevers     13. Emotional support provided, plan of care discussed and questions addressed     14. Patient education done regarding plan of care, restrictions and discharge planning     15. Continue regular social work input     I have written the above note for Dr. Cabrales who performed service with me in the room.   Bill Castaneda PA-C ([phone])    I have seen and examined patient with NP, I agree with above note as scribed.                        HPC Transplant Team                                                      Critical / Counseling Time Provided: 30 minutes                                                                                                                                                        Chief Complaint: Autologous peripheral blood stem cell transplant with high dose CBV prep regimen for treatment of peripheral T cell lymphoma    S: Patient seen and examined with HPC Transplant Team:   +nausea  +Loose stool  +muscle aches    O: Rest of ROS negative    Vital Signs Last 24 Hrs  T(C): 37.5 (08 Dec 2022 06:05), Max: 38.1 (08 Dec 2022 00:31)  T(F): 99.5 (08 Dec 2022 06:05), Max: 100.6 (08 Dec 2022 00:31)  HR: 96 (08 Dec 2022 06:05) (90 - 108)  BP: 108/68 (08 Dec 2022 06:05) (105/70 - 126/73)  BP(mean): --  RR: 18 (08 Dec 2022 06:05) (18 - 19)  SpO2: 98% (08 Dec 2022 06:05) (98% - 100%)    Parameters below as of 08 Dec 2022 06:05  Patient On (Oxygen Delivery Method): room air      Admit weight: 80.3kg  Daily Weight in k.5 (07 Dec 2022 11:02)    Intake / Output:    @ 07:01  -  12-08 @ 07:00  --------------------------------------------------------  IN: 1457.6 mL / OUT: 2650 mL / NET: -1192.4 mL    Oropharynx: + ulceration, left lateral tongue, ulcerations on hard palate resolving   Oral Mucositis:            +                                          stGstrstastdstest:st st1st CVS: S1, S2 RRR   Lungs: CTA throughout bilaterally   Abdomen: + BS x 4 normoactive, soft, NT, ND  Extremities: no edema   Gastric Mucositis:       -                                           Grade: n/a   Intestinal Mucositis:     -                                         Grade: n/a   Skin: no rashes  TLC: CDI   Neuro: A&O x 3   Pain: denies      Labs:   CBC Full  -  ( 08 Dec 2022 06:51 )  WBC Count : x  Hemoglobin : 7.3 g/dL  Hematocrit : 22.5 %  Platelet Count - Automated : x  Mean Cell Volume : 90.0 fl  Mean Cell Hemoglobin : 29.2 pg  Mean Cell Hemoglobin Concentration : 32.4 gm/dL  Auto Neutrophil # : x  Auto Lymphocyte # : x  Auto Monocyte # : x  Auto Eosinophil # : x  Auto Basophil # : x  Auto Neutrophil % : x  Auto Lymphocyte % : x  Auto Monocyte % : x  Auto Eosinophil % : x  Auto Basophil % : x                          7.3    x     )-----------( x        ( 08 Dec 2022 06:51 )             22.5         140  |  101  |  9   ----------------------------<  110<H>  3.6   |  26  |  0.44<L>    Ca    9.7      08 Dec 2022 06:51  Phos  4.4       Mg     1.8         TPro  6.4  /  Alb  3.9  /  TBili  0.2  /  DBili  x   /  AST  23  /  ALT  44  /  AlkPhos  117        LIVER FUNCTIONS - ( 08 Dec 2022 06:51 )  Alb: 3.9 g/dL / Pro: 6.4 g/dL / ALK PHOS: 117 U/L / ALT: 44 U/L / AST: 23 U/L / GGT: x           Lactate Dehydrogenase, Serum: 182 U/L ( @ 06:51)        Cultures:      Blood cx - p      Radiology:     CXR () - p    Meds:   Antimicrobials:   acyclovir   Oral Tab/Cap 400 milliGRAM(s) Oral every 8 hours  clotrimazole Lozenge 1 Lozenge Oral five times a day  fluconAZOLE   Tablet 400 milliGRAM(s) Oral daily      Heme / Onc:   heparin  Infusion 334 Unit(s)/Hr IV Continuous <Continuous>      GI:  pantoprazole    Tablet 40 milliGRAM(s) Oral before breakfast  polyethylene glycol 3350 17 Gram(s) Oral daily PRN  senna 2 Tablet(s) Oral at bedtime PRN  sodium bicarbonate Mouth Rinse 10 milliLiter(s) Swish and Spit five times a day  ursodiol Capsule 300 milliGRAM(s) Oral two times a day      Cardiovascular:       Immunologic:   filgrastim-sndz (ZARXIO) Injectable 480 MICROGram(s) SubCutaneous every 24 hours      Other medications:   acetaminophen     Tablet .. 650 milliGRAM(s) Oral every 6 hours  Biotene Dry Mouth Oral Rinse 5 milliLiter(s) Swish and Spit five times a day  chlorhexidine 4% Liquid 1 Application(s) Topical <User Schedule>  FIRST- Mouthwash  BLM 10 milliLiter(s) Swish and Spit every 6 hours  FLUoxetine 20 milliGRAM(s) Oral daily  folic acid 1 milliGRAM(s) Oral daily  lidocaine/prilocaine Cream 1 Application(s) Topical daily  loratadine 10 milliGRAM(s) Oral daily  multivitamin 1 Tablet(s) Oral daily  nystatin Powder 1 Application(s) Topical two times a day  sodium chloride 0.45% 1000 milliLiter(s) IV Continuous <Continuous>  sodium chloride 0.9%. 1000 milliLiter(s) IV Continuous <Continuous>      PRN:   acetaminophen     Tablet .. 650 milliGRAM(s) Oral every 6 hours PRN  AQUAPHOR (petrolatum Ointment) 1 Application(s) Topical two times a day PRN  metoclopramide Injectable 10 milliGRAM(s) IV Push every 6 hours PRN  polyethylene glycol 3350 17 Gram(s) Oral daily PRN  senna 2 Tablet(s) Oral at bedtime PRN  sodium chloride 0.65% Nasal 1 Spray(s) Both Nostrils four times a day PRN  sodium chloride 0.9% lock flush 10 milliLiter(s) IV Push every 1 hour PRN  zinc oxide 40% Paste 1 Application(s) Topical three times a day PRN    A/P:  57 year old female with a history of  peripheral T cell lymphoma   Post :  Autologous PBSCT day + 13  - Completed  -16 24 hour continuous infusion, strict I&O, daily weights, prn diuresis    - Cytoxan day 3/4.  - CTX - continue CTX hydration for 24 hours post infusion of last dose. Strict I&O, daily weights, prn diuresis. Start cipro 500mg po BID when ANC < 500    Neutropenic. Started Cipro for prophylaxis. If febrile, panculture and start Cefepime (has tolerated in the past)   - Kepivance day 2/3  - Kepivance day 3/3- HELD due to Kepivance rash and oral erythema worsening  - Febrile overnight, tmax 100.4. CXR no obvious consolidations. Cultures pending. Started on cefepime. Grade 2 chemotherapy induced oral mucositis. Continue supportive measures.    - Mild transaminitis, if continue to rise lower Diflucan dose.  - early engraftment. Continue Cefepime, zarxio for now    mouth pain resolved    engrafted. D/C cefepime (infectious work up has been negative), continue zarxio for now    1. Infectious Disease:   acyclovir   Oral Tab/Cap 400 milliGRAM(s) Oral every 8 hours  clotrimazole Lozenge 1 Lozenge Oral five times a day  fluconAZOLE   Tablet 400 milliGRAM(s) Oral daily    2. VOD Prophylaxis: Actigall, Glutamine, Heparin (dosed at 100 units / kg / day)     3. GI Prophylaxis:    pantoprazole    Tablet 40 milliGRAM(s) Oral before breakfast    4. Mouthcare - NS / NaHCO3 rinses, Mycelex, Biotene; Skin care     5. GVHD prophylaxis - n/a     6. Transfuse & replete electrolytes prn     7. IV hydration, daily weights, strict I&O, prn diuresis     8. PO intake as tolerated, nutrition follow up as needed, MVI, folic acid     9. Antiemetics, anti-diarrhea medications:   metoclopramide Injectable 10 milliGRAM(s) IV Push every 6 hours PRN    10. OOB as tolerated, physical therapy consult if needed     11. Monitor coags / fibrinogen 2x week, vitamin K as needed     12. Monitor closely for clinical changes, monitor for fevers     13. Emotional support provided, plan of care discussed and questions addressed     14. Patient education done regarding plan of care, restrictions and discharge planning     15. Continue regular social work input     I have written the above note for Dr. Cabrales who performed service with me in the room.   Bill Castaneda PA-C ([phone])    I have seen and examined patient with NP, I agree with above note as scribed.                        [facility]PC Transplant Team                                                      Critical / Counseling Time Provided: 30 minutes                                                                                                                                                        Chief Complaint: Autologous peripheral blood stem cell transplant with high dose CBV prep regimen for treatment of peripheral T cell lymphoma    S: Patient seen and examined with HPC Transplant Team:   +nausea  +Loose stool  +muscle aches    O: Rest of ROS negative    Vital Signs Last 24 Hrs  T(C): 37.5 (08 Dec 2022 06:05), Max: 38.1 (08 Dec 2022 00:31)  T(F): 99.5 (08 Dec 2022 06:05), Max: 100.6 (08 Dec 2022 00:31)  HR: 96 (08 Dec 2022 06:05) (90 - 108)  BP: 108/68 (08 Dec 2022 06:05) (105/70 - 126/73)  BP(mean): --  RR: 18 (08 Dec 2022 06:05) (18 - 19)  SpO2: 98% (08 Dec 2022 06:05) (98% - 100%)    Parameters below as of 08 Dec 2022 06:05  Patient On (Oxygen Delivery Method): room air      Admit weight: 80.3kg  Daily Weight in k.5 (07 Dec 2022 11:02)    Intake / Output:    @ 07:01  -  12-08 @ 07:00  --------------------------------------------------------  IN: 1457.6 mL / OUT: 2650 mL / NET: -1192.4 mL    Oropharynx: + ulceration, left lateral tongue, ulcerations on hard palate resolving   Oral Mucositis:            +                                          rdGrdrrdarddrderd:rd rd3rd CVS: S1, S2 RRR   Lungs: CTA throughout bilaterally   Abdomen: + BS x 4 normoactive, soft, NT, ND  Extremities: no edema   Gastric Mucositis:       -                                           Grade: n/a   Intestinal Mucositis:     -                                         Grade: n/a   Skin: no rashes  TLC: CDI   Neuro: A&O x 3   Pain: denies      Labs:   CBC Full  -  ( 08 Dec 2022 06:51 )  WBC Count : x  Hemoglobin : 7.3 g/dL  Hematocrit : 22.5 %  Platelet Count - Automated : x  Mean Cell Volume : 90.0 fl  Mean Cell Hemoglobin : 29.2 pg  Mean Cell Hemoglobin Concentration : 32.4 gm/dL  Auto Neutrophil # : x  Auto Lymphocyte # : x  Auto Monocyte # : x  Auto Eosinophil # : x  Auto Basophil # : x  Auto Neutrophil % : x  Auto Lymphocyte % : x  Auto Monocyte % : x  Auto Eosinophil % : x  Auto Basophil % : x                          7.3    x     )-----------( x        ( 08 Dec 2022 06:51 )             22.5         140  |  101  |  9   ----------------------------<  110<H>  3.6   |  26  |  0.44<L>    Ca    9.7      08 Dec 2022 06:51  Phos  4.4       Mg     1.8         TPro  6.4  /  Alb  3.9  /  TBili  0.2  /  DBili  x   /  AST  23  /  ALT  44  /  AlkPhos  117        LIVER FUNCTIONS - ( 08 Dec 2022 06:51 )  Alb: 3.9 g/dL / Pro: 6.4 g/dL / ALK PHOS: 117 U/L / ALT: 44 U/L / AST: 23 U/L / GGT: x           Lactate Dehydrogenase, Serum: 182 U/L ( @ 06:51)        Cultures:      Blood cx - p      Radiology:     CXR () - p    Meds:   Antimicrobials:   acyclovir   Oral Tab/Cap 400 milliGRAM(s) Oral every 8 hours  clotrimazole Lozenge 1 Lozenge Oral five times a day  fluconAZOLE   Tablet 400 milliGRAM(s) Oral daily      Heme / Onc:   heparin  Infusion 334 Unit(s)/Hr IV Continuous <Continuous>      GI:  pantoprazole    Tablet 40 milliGRAM(s) Oral before breakfast  polyethylene glycol 3350 17 Gram(s) Oral daily PRN  senna 2 Tablet(s) Oral at bedtime PRN  sodium bicarbonate Mouth Rinse 10 milliLiter(s) Swish and Spit five times a day  ursodiol Capsule 300 milliGRAM(s) Oral two times a day      Cardiovascular:       Immunologic:   filgrastim-sndz (ZARXIO) Injectable 480 MICROGram(s) SubCutaneous every 24 hours      Other medications:   acetaminophen     Tablet .. 650 milliGRAM(s) Oral every 6 hours  Biotene Dry Mouth Oral Rinse 5 milliLiter(s) Swish and Spit five times a day  chlorhexidine 4% Liquid 1 Application(s) Topical <User Schedule>  FIRST- Mouthwash  BLM 10 milliLiter(s) Swish and Spit every 6 hours  FLUoxetine 20 milliGRAM(s) Oral daily  folic acid 1 milliGRAM(s) Oral daily  lidocaine/prilocaine Cream 1 Application(s) Topical daily  loratadine 10 milliGRAM(s) Oral daily  multivitamin 1 Tablet(s) Oral daily  nystatin Powder 1 Application(s) Topical two times a day  sodium chloride 0.45% 1000 milliLiter(s) IV Continuous <Continuous>  sodium chloride 0.9%. 1000 milliLiter(s) IV Continuous <Continuous>      PRN:   acetaminophen     Tablet .. 650 milliGRAM(s) Oral every 6 hours PRN  AQUAPHOR (petrolatum Ointment) 1 Application(s) Topical two times a day PRN  metoclopramide Injectable 10 milliGRAM(s) IV Push every 6 hours PRN  polyethylene glycol 3350 17 Gram(s) Oral daily PRN  senna 2 Tablet(s) Oral at bedtime PRN  sodium chloride 0.65% Nasal 1 Spray(s) Both Nostrils four times a day PRN  sodium chloride 0.9% lock flush 10 milliLiter(s) IV Push every 1 hour PRN  zinc oxide 40% Paste 1 Application(s) Topical three times a day PRN    A/P:  57 year old female with a history of  peripheral T cell lymphoma   Post :  Autologous PBSCT day + 13  - Completed  -16 24 hour continuous infusion, strict I&O, daily weights, prn diuresis    - Cytoxan day 3/4.  - CTX - continue CTX hydration for 24 hours post infusion of last dose. Strict I&O, daily weights, prn diuresis. Start cipro 500mg po BID when ANC < 500    Neutropenic. Started Cipro for prophylaxis. If febrile, panculture and start Cefepime (has tolerated in the past)   - Kepivance day 2/3  - Kepivance day 3/3- HELD due to Kepivance rash and oral erythema worsening  - Febrile overnight, tmax 100.4. CXR no obvious consolidations. Cultures pending. Started on cefepime. Grade 2 chemotherapy induced oral mucositis. Continue supportive measures.    - Mild transaminitis, if continue to rise lower Diflucan dose.  - early engraftment. Continue Cefepime, zarxio for now    mouth pain resolved    engrafted. D/C cefepime (infectious work up has been negative), continue zarxio for now    1. Infectious Disease:   acyclovir   Oral Tab/Cap 400 milliGRAM(s) Oral every 8 hours  clotrimazole Lozenge 1 Lozenge Oral five times a day  fluconAZOLE   Tablet 400 milliGRAM(s) Oral daily    2. VOD Prophylaxis: Actigall, Glutamine, Heparin (dosed at 100 units / kg / day)     3. GI Prophylaxis:    pantoprazole    Tablet 40 milliGRAM(s) Oral before breakfast    4. Mouthcare - NS / NaHCO3 rinses, Mycelex, Biotene; Skin care     5. GVHD prophylaxis - n/a     6. Transfuse & replete electrolytes prn     7. IV hydration, daily weights, strict I&O, prn diuresis     8. PO intake as tolerated, nutrition follow up as needed, MVI, folic acid     9. Antiemetics, anti-diarrhea medications:   metoclopramide Injectable 10 milliGRAM(s) IV Push every 6 hours PRN    10. OOB as tolerated, physical therapy consult if needed     11. Monitor coags / fibrinogen 2x week, vitamin K as needed     12. Monitor closely for clinical changes, monitor for fevers     13. Emotional support provided, plan of care discussed and questions addressed     14. Patient education done regarding plan of care, restrictions and discharge planning     15. Continue regular social work input     I have written the above note for Dr. Cabrales who performed service with me in the room.   Bill Castaneda PA-C ([phone])    I have seen and examined patient with NP, I agree with above note as scribed.                        HPC Transplant Team                                                      Critical / Counseling Time Provided: 30 minutes                                                                                                                                                        Chief Complaint: Autologous peripheral blood stem cell transplant with high dose CBV prep regimen for treatment of peripheral T cell lymphoma    S: Patient seen and examined with HPC Transplant Team: +Febrile, chills overnight  +nausea  +Loose stool  +muscle aches    O: Rest of ROS negative    Vital Signs Last 24 Hrs  T(C): 37.5 (08 Dec 2022 06:05), Max: 38.1 (08 Dec 2022 00:31)  T(F): 99.5 (08 Dec 2022 06:05), Max: 100.6 (08 Dec 2022 00:31)  HR: 96 (08 Dec 2022 06:05) (90 - 108)  BP: 108/68 (08 Dec 2022 06:05) (105/70 - 126/73)  BP(mean): --  RR: 18 (08 Dec 2022 06:05) (18 - 19)  SpO2: 98% (08 Dec 2022 06:05) (98% - 100%)    Parameters below as of 08 Dec 2022 06:05  Patient On (Oxygen Delivery Method): room air      Admit weight: 80.3kg  Daily Weight in k.5 (07 Dec 2022 11:02)    Intake / Output:    @ 07:01  -  -08 @ 07:00  --------------------------------------------------------  IN: 1457.6 mL / OUT: 2650 mL / NET: -1192.4 mL    Oropharynx: + ulceration, left lateral tongue, ulcerations on hard palate resolving   Oral Mucositis:            +                                          rdGrdrrdarddrderd:rd rd3rd CVS: S1, S2 RRR   Lungs: CTA throughout bilaterally   Abdomen: + BS x 4 normoactive, soft, NT, ND  Extremities: no edema   Gastric Mucositis:       -                                           Grade: n/a   Intestinal Mucositis:     -                                         Grade: n/a   Skin: no rashes  TLC: CDI   Neuro: A&O x 3   Pain: denies      Labs:   CBC Full  -  ( 08 Dec 2022 06:51 )  WBC Count : x  Hemoglobin : 7.3 g/dL  Hematocrit : 22.5 %  Platelet Count - Automated : x  Mean Cell Volume : 90.0 fl  Mean Cell Hemoglobin : 29.2 pg  Mean Cell Hemoglobin Concentration : 32.4 gm/dL  Auto Neutrophil # : x  Auto Lymphocyte # : x  Auto Monocyte # : x  Auto Eosinophil # : x  Auto Basophil # : x  Auto Neutrophil % : x  Auto Lymphocyte % : x  Auto Monocyte % : x  Auto Eosinophil % : x  Auto Basophil % : x                          7.3    x     )-----------( x        ( 08 Dec 2022 06:51 )             22.5         140  |  101  |  9   ----------------------------<  110<H>  3.6   |  26  |  0.44<L>    Ca    9.7      08 Dec 2022 06:51  Phos  4.4       Mg     1.8         TPro  6.4  /  Alb  3.9  /  TBili  0.2  /  DBili  x   /  AST  23  /  ALT  44  /  AlkPhos  117        LIVER FUNCTIONS - ( 08 Dec 2022 06:51 )  Alb: 3.9 g/dL / Pro: 6.4 g/dL / ALK PHOS: 117 U/L / ALT: 44 U/L / AST: 23 U/L / GGT: x           Lactate Dehydrogenase, Serum: 182 U/L ( @ 06:51)        Cultures:      Blood cx - p      Radiology:     CXR () - CLEAR    Meds:   Antimicrobials:   acyclovir   Oral Tab/Cap 400 milliGRAM(s) Oral every 8 hours  clotrimazole Lozenge 1 Lozenge Oral five times a day  fluconAZOLE   Tablet 400 milliGRAM(s) Oral daily      Heme / Onc:   heparin  Infusion 334 Unit(s)/Hr IV Continuous <Continuous>      GI:  pantoprazole    Tablet 40 milliGRAM(s) Oral before breakfast  polyethylene glycol 3350 17 Gram(s) Oral daily PRN  senna 2 Tablet(s) Oral at bedtime PRN  sodium bicarbonate Mouth Rinse 10 milliLiter(s) Swish and Spit five times a day  ursodiol Capsule 300 milliGRAM(s) Oral two times a day      Cardiovascular:       Immunologic:   filgrastim-sndz (ZARXIO) Injectable 480 MICROGram(s) SubCutaneous every 24 hours      Other medications:   acetaminophen     Tablet .. 650 milliGRAM(s) Oral every 6 hours  Biotene Dry Mouth Oral Rinse 5 milliLiter(s) Swish and Spit five times a day  chlorhexidine 4% Liquid 1 Application(s) Topical <User Schedule>  FIRST- Mouthwash  BLM 10 milliLiter(s) Swish and Spit every 6 hours  FLUoxetine 20 milliGRAM(s) Oral daily  folic acid 1 milliGRAM(s) Oral daily  lidocaine/prilocaine Cream 1 Application(s) Topical daily  loratadine 10 milliGRAM(s) Oral daily  multivitamin 1 Tablet(s) Oral daily  nystatin Powder 1 Application(s) Topical two times a day  sodium chloride 0.45% 1000 milliLiter(s) IV Continuous <Continuous>  sodium chloride 0.9%. 1000 milliLiter(s) IV Continuous <Continuous>      PRN:   acetaminophen     Tablet .. 650 milliGRAM(s) Oral every 6 hours PRN  AQUAPHOR (petrolatum Ointment) 1 Application(s) Topical two times a day PRN  metoclopramide Injectable 10 milliGRAM(s) IV Push every 6 hours PRN  polyethylene glycol 3350 17 Gram(s) Oral daily PRN  senna 2 Tablet(s) Oral at bedtime PRN  sodium chloride 0.65% Nasal 1 Spray(s) Both Nostrils four times a day PRN  sodium chloride 0.9% lock flush 10 milliLiter(s) IV Push every 1 hour PRN  zinc oxide 40% Paste 1 Application(s) Topical three times a day PRN    A/P:  57 year old female with a history of  peripheral T cell lymphoma   Post :  Autologous PBSCT day + 13  - Completed  -16 24 hour continuous infusion, strict I&O, daily weights, prn diuresis    - Cytoxan day 3.  - CTX - continue CTX hydration for 24 hours post infusion of last dose. Strict I&O, daily weights, prn diuresis. Start cipro 500mg po BID when ANC < 500    Neutropenic. Started Cipro for prophylaxis. If febrile, panculture and start Cefepime (has tolerated in the past)   - Kepivance day 2/3  - Kepivance day 3/3- HELD due to Kepivance rash and oral erythema worsening  - Febrile overnight, tmax 100.4. CXR no obvious consolidations. Cultures pending. Started on cefepime. Grade 2 chemotherapy induced oral mucositis. Continue supportive measures.   12 - Mild transaminitis, if continue to rise lower Diflucan dose.  - early engraftment. Continue Cefepime, zarxio for now    mouth pain resolved    engrafted. D/C cefepime (infectious work up has been negative), continue zarxio for now    1. Infectious Disease:   acyclovir   Oral Tab/Cap 400 milliGRAM(s) Oral every 8 hours  clotrimazole Lozenge 1 Lozenge Oral five times a day  fluconAZOLE   Tablet 400 milliGRAM(s) Oral daily    2. VOD Prophylaxis: Actigall, Glutamine, Heparin (dosed at 100 units / kg / day)     3. GI Prophylaxis:    pantoprazole    Tablet 40 milliGRAM(s) Oral before breakfast    4. Mouthcare - NS / NaHCO3 rinses, Mycelex, Biotene; Skin care     5. GVHD prophylaxis - n/a     6. Transfuse & replete electrolytes prn     7. IV hydration, daily weights, strict I&O, prn diuresis     8. PO intake as tolerated, nutrition follow up as needed, MVI, folic acid     9. Antiemetics, anti-diarrhea medications:   metoclopramide Injectable 10 milliGRAM(s) IV Push every 6 hours PRN    10. OOB as tolerated, physical therapy consult if needed     11. Monitor coags / fibrinogen 2x week, vitamin K as needed     12. Monitor closely for clinical changes, monitor for fevers     13. Emotional support provided, plan of care discussed and questions addressed     14. Patient education done regarding plan of care, restrictions and discharge planning     15. Continue regular social work input     I have written the above note for Dr. Cabrales who performed service with me in the room.   Bill Castaneda PA-C ([phone])    I have seen and examined patient with NP, I agree with above note as scribed.

## 2022-12-09 ENCOUNTER — TRANSCRIPTION ENCOUNTER (OUTPATIENT)
Age: 57
End: 2022-12-09

## 2022-12-09 LAB
ALBUMIN SERPL ELPH-MCNC: 3.9 G/DL — SIGNIFICANT CHANGE UP (ref 3.3–5)
ALP SERPL-CCNC: 132 U/L — HIGH (ref 40–120)
ALT FLD-CCNC: 45 U/L — SIGNIFICANT CHANGE UP (ref 10–45)
ANION GAP SERPL CALC-SCNC: 12 MMOL/L — SIGNIFICANT CHANGE UP (ref 5–17)
AST SERPL-CCNC: 29 U/L — SIGNIFICANT CHANGE UP (ref 10–40)
BASOPHILS # BLD AUTO: 0 K/UL — SIGNIFICANT CHANGE UP (ref 0–0.2)
BASOPHILS NFR BLD AUTO: 0 % — SIGNIFICANT CHANGE UP (ref 0–2)
BILIRUB SERPL-MCNC: 0.1 MG/DL — LOW (ref 0.2–1.2)
BLD GP AB SCN SERPL QL: NEGATIVE — SIGNIFICANT CHANGE UP
BUN SERPL-MCNC: 8 MG/DL — SIGNIFICANT CHANGE UP (ref 7–23)
CALCIUM SERPL-MCNC: 9.6 MG/DL — SIGNIFICANT CHANGE UP (ref 8.4–10.5)
CHLORIDE SERPL-SCNC: 101 MMOL/L — SIGNIFICANT CHANGE UP (ref 96–108)
CO2 SERPL-SCNC: 27 MMOL/L — SIGNIFICANT CHANGE UP (ref 22–31)
CREAT SERPL-MCNC: 0.52 MG/DL — SIGNIFICANT CHANGE UP (ref 0.5–1.3)
CULTURE RESULTS: NO GROWTH — SIGNIFICANT CHANGE UP
CULTURE RESULTS: SIGNIFICANT CHANGE UP
EGFR: 110 ML/MIN/1.73M2 — SIGNIFICANT CHANGE UP
EOSINOPHIL # BLD AUTO: 0 K/UL — SIGNIFICANT CHANGE UP (ref 0–0.5)
EOSINOPHIL NFR BLD AUTO: 0 % — SIGNIFICANT CHANGE UP (ref 0–6)
GIANT PLATELETS BLD QL SMEAR: PRESENT — SIGNIFICANT CHANGE UP
GLUCOSE SERPL-MCNC: 98 MG/DL — SIGNIFICANT CHANGE UP (ref 70–99)
HCT VFR BLD CALC: 24.3 % — LOW (ref 34.5–45)
HGB BLD-MCNC: 7.7 G/DL — LOW (ref 11.5–15.5)
LDH SERPL L TO P-CCNC: 252 U/L — HIGH (ref 50–242)
LYMPHOCYTES # BLD AUTO: 0.26 K/UL — LOW (ref 1–3.3)
LYMPHOCYTES # BLD AUTO: 5.3 % — LOW (ref 13–44)
MAGNESIUM SERPL-MCNC: 1.7 MG/DL — SIGNIFICANT CHANGE UP (ref 1.6–2.6)
MANUAL SMEAR VERIFICATION: SIGNIFICANT CHANGE UP
MCHC RBC-ENTMCNC: 29.1 PG — SIGNIFICANT CHANGE UP (ref 27–34)
MCHC RBC-ENTMCNC: 31.7 GM/DL — LOW (ref 32–36)
MCV RBC AUTO: 91.7 FL — SIGNIFICANT CHANGE UP (ref 80–100)
METAMYELOCYTES # FLD: 1.8 % — HIGH (ref 0–0)
MONOCYTES # BLD AUTO: 0.48 K/UL — SIGNIFICANT CHANGE UP (ref 0–0.9)
MONOCYTES NFR BLD AUTO: 9.7 % — SIGNIFICANT CHANGE UP (ref 2–14)
MYELOCYTES NFR BLD: 1.8 % — HIGH (ref 0–0)
NEUTROPHILS # BLD AUTO: 3.96 K/UL — SIGNIFICANT CHANGE UP (ref 1.8–7.4)
NEUTROPHILS NFR BLD AUTO: 74.3 % — SIGNIFICANT CHANGE UP (ref 43–77)
NEUTS BAND # BLD: 5.3 % — SIGNIFICANT CHANGE UP (ref 0–8)
NRBC # BLD: 1 /100 — HIGH (ref 0–0)
PHOSPHATE SERPL-MCNC: 3.4 MG/DL — SIGNIFICANT CHANGE UP (ref 2.5–4.5)
PLAT MORPH BLD: NORMAL — SIGNIFICANT CHANGE UP
PLATELET # BLD AUTO: 112 K/UL — LOW (ref 150–400)
POTASSIUM SERPL-MCNC: 3.7 MMOL/L — SIGNIFICANT CHANGE UP (ref 3.5–5.3)
POTASSIUM SERPL-SCNC: 3.7 MMOL/L — SIGNIFICANT CHANGE UP (ref 3.5–5.3)
PROMYELOCYTES # FLD: 1.8 % — HIGH (ref 0–0)
PROT SERPL-MCNC: 6.3 G/DL — SIGNIFICANT CHANGE UP (ref 6–8.3)
RBC # BLD: 2.65 M/UL — LOW (ref 3.8–5.2)
RBC # FLD: 13.2 % — SIGNIFICANT CHANGE UP (ref 10.3–14.5)
RBC BLD AUTO: SIGNIFICANT CHANGE UP
RH IG SCN BLD-IMP: POSITIVE — SIGNIFICANT CHANGE UP
SODIUM SERPL-SCNC: 140 MMOL/L — SIGNIFICANT CHANGE UP (ref 135–145)
SPECIMEN SOURCE: SIGNIFICANT CHANGE UP
SPECIMEN SOURCE: SIGNIFICANT CHANGE UP
TOXIC GRANULES BLD QL SMEAR: PRESENT — SIGNIFICANT CHANGE UP
WBC # BLD: 4.97 K/UL — SIGNIFICANT CHANGE UP (ref 3.8–10.5)
WBC # FLD AUTO: 4.97 K/UL — SIGNIFICANT CHANGE UP (ref 3.8–10.5)

## 2022-12-09 PROCEDURE — 71045 X-RAY EXAM CHEST 1 VIEW: CPT | Mod: 26

## 2022-12-09 PROCEDURE — 99291 CRITICAL CARE FIRST HOUR: CPT

## 2022-12-09 RX ORDER — DEXAMETHASONE 0.5 MG/5ML
4 ELIXIR ORAL DAILY
Refills: 0 | Status: DISCONTINUED | OUTPATIENT
Start: 2022-12-09 | End: 2022-12-14

## 2022-12-09 RX ORDER — ACETAMINOPHEN 500 MG
1000 TABLET ORAL ONCE
Refills: 0 | Status: COMPLETED | OUTPATIENT
Start: 2022-12-09 | End: 2022-12-09

## 2022-12-09 RX ORDER — SIMETHICONE 80 MG/1
80 TABLET, CHEWABLE ORAL EVERY 4 HOURS
Refills: 0 | Status: DISCONTINUED | OUTPATIENT
Start: 2022-12-09 | End: 2022-12-16

## 2022-12-09 RX ORDER — ONDANSETRON 8 MG/1
4 TABLET, FILM COATED ORAL EVERY 6 HOURS
Refills: 0 | Status: DISCONTINUED | OUTPATIENT
Start: 2022-12-09 | End: 2022-12-16

## 2022-12-09 RX ADMIN — LIDOCAINE AND PRILOCAINE CREAM 1 APPLICATION(S): 25; 25 CREAM TOPICAL at 13:03

## 2022-12-09 RX ADMIN — Medication 1 MILLIGRAM(S): at 13:02

## 2022-12-09 RX ADMIN — Medication 10 MILLILITER(S): at 07:40

## 2022-12-09 RX ADMIN — Medication 1 LOZENGE: at 07:40

## 2022-12-09 RX ADMIN — Medication 650 MILLIGRAM(S): at 00:50

## 2022-12-09 RX ADMIN — FLUCONAZOLE 400 MILLIGRAM(S): 150 TABLET ORAL at 13:01

## 2022-12-09 RX ADMIN — DIPHENHYDRAMINE HYDROCHLORIDE AND LIDOCAINE HYDROCHLORIDE AND ALUMINUM HYDROXIDE AND MAGNESIUM HYDRO 10 MILLILITER(S): KIT at 17:28

## 2022-12-09 RX ADMIN — Medication 10 MILLILITER(S): at 15:53

## 2022-12-09 RX ADMIN — NYSTATIN CREAM 1 APPLICATION(S): 100000 CREAM TOPICAL at 17:28

## 2022-12-09 RX ADMIN — Medication 5 MILLILITER(S): at 15:53

## 2022-12-09 RX ADMIN — Medication 1 LOZENGE: at 21:01

## 2022-12-09 RX ADMIN — Medication 20 MILLIGRAM(S): at 13:02

## 2022-12-09 RX ADMIN — Medication 650 MILLIGRAM(S): at 06:05

## 2022-12-09 RX ADMIN — Medication 650 MILLIGRAM(S): at 00:20

## 2022-12-09 RX ADMIN — Medication 5 MILLILITER(S): at 21:05

## 2022-12-09 RX ADMIN — Medication 400 MILLIGRAM(S): at 21:22

## 2022-12-09 RX ADMIN — SIMETHICONE 80 MILLIGRAM(S): 80 TABLET, CHEWABLE ORAL at 14:19

## 2022-12-09 RX ADMIN — Medication 4 MILLIGRAM(S): at 17:49

## 2022-12-09 RX ADMIN — Medication 10 MILLIGRAM(S): at 05:40

## 2022-12-09 RX ADMIN — DIPHENHYDRAMINE HYDROCHLORIDE AND LIDOCAINE HYDROCHLORIDE AND ALUMINUM HYDROXIDE AND MAGNESIUM HYDRO 10 MILLILITER(S): KIT at 13:01

## 2022-12-09 RX ADMIN — Medication 1 TABLET(S): at 13:02

## 2022-12-09 RX ADMIN — Medication 400 MILLIGRAM(S): at 13:05

## 2022-12-09 RX ADMIN — Medication 650 MILLIGRAM(S): at 11:00

## 2022-12-09 RX ADMIN — CHLORHEXIDINE GLUCONATE 1 APPLICATION(S): 213 SOLUTION TOPICAL at 07:40

## 2022-12-09 RX ADMIN — LORATADINE 10 MILLIGRAM(S): 10 TABLET ORAL at 13:02

## 2022-12-09 RX ADMIN — NYSTATIN CREAM 1 APPLICATION(S): 100000 CREAM TOPICAL at 05:11

## 2022-12-09 RX ADMIN — Medication 400 MILLIGRAM(S): at 05:10

## 2022-12-09 RX ADMIN — URSODIOL 300 MILLIGRAM(S): 250 TABLET, FILM COATED ORAL at 05:13

## 2022-12-09 RX ADMIN — Medication 1 LOZENGE: at 15:54

## 2022-12-09 RX ADMIN — Medication 10 MILLILITER(S): at 13:01

## 2022-12-09 RX ADMIN — URSODIOL 300 MILLIGRAM(S): 250 TABLET, FILM COATED ORAL at 17:28

## 2022-12-09 RX ADMIN — PANTOPRAZOLE SODIUM 40 MILLIGRAM(S): 20 TABLET, DELAYED RELEASE ORAL at 05:11

## 2022-12-09 RX ADMIN — Medication 650 MILLIGRAM(S): at 05:35

## 2022-12-09 RX ADMIN — DIPHENHYDRAMINE HYDROCHLORIDE AND LIDOCAINE HYDROCHLORIDE AND ALUMINUM HYDROXIDE AND MAGNESIUM HYDRO 10 MILLILITER(S): KIT at 05:10

## 2022-12-09 RX ADMIN — Medication 400 MILLIGRAM(S): at 16:47

## 2022-12-09 RX ADMIN — Medication 1 LOZENGE: at 14:20

## 2022-12-09 RX ADMIN — Medication 10 MILLILITER(S): at 21:04

## 2022-12-09 RX ADMIN — Medication 5 MILLILITER(S): at 13:01

## 2022-12-09 RX ADMIN — Medication 5 MILLILITER(S): at 07:40

## 2022-12-09 NOTE — DISCHARGE NOTE PROVIDER - NSDCMRMEDTOKEN_GEN_ALL_CORE_FT
acyclovir 400 mg oral tablet: 1 tab(s) orally every 8 hours  ALPRAZolam 0.5 mg oral tablet: 1  orally , As Needed  atovaquone 750 mg/5 mL oral suspension: 5 milliliter(s) orally 2 times a day  fluconazole 200 mg oral tablet: 2 tab(s) orally once a day  folic acid 1 mg oral tablet: 1 tab(s) orally once a day  Multiple Vitamins oral tablet: 1 tab(s) orally once a day  ondansetron 8 mg oral tablet: 1 tab(s) orally 3 times a day, As Needed  pantoprazole 40 mg oral delayed release tablet: 1 tab(s) orally once a day (before a meal)  PROzac 20 mg oral capsule: 1 cap(s) orally once a day

## 2022-12-09 NOTE — DISCHARGE NOTE PROVIDER - NSDCFUADDAPPT_GEN_ALL_CORE_FT
You have the following appointments at the [facility]:   Tuesday 12/20 @ 10:20AM with Akash Reed NP   Tuesday 12/27 @ 11:30AM with Patricia Banks NP   Tuesday 01/03 @ 11:40AM with Dr. Acevedo

## 2022-12-09 NOTE — DISCHARGE NOTE PROVIDER - NSDCACTIVITY_GEN_ALL_CORE
Addended by: ELIZABETH YANG on: 4/22/2021 01:05 PM     Modules accepted: Orders, SmartSet     Bathing allowed/Showering allowed/Stairs allowed/Walking - Indoors allowed/Walking - Outdoors allowed

## 2022-12-09 NOTE — DISCHARGE NOTE PROVIDER - NSDCCPCAREPLAN_GEN_ALL_CORE_FT
PRINCIPAL DISCHARGE DIAGNOSIS  Diagnosis: Stem cells transplant status  Assessment and Plan of Treatment: 1. Diet and activities as per [facility] discharge guidelines and safe food handling guidelines. NO RESTAURANT OR TAKE OUT FOOD AT THIS TIME, ONLY HOME COOKED PREPARED/FROZEN FOODS. You are allowed to have fresh baked pizza right out of the oven. This is the ONLY takeout food at this time.  2. Notify your physician if bleeding; swelling; persistent nausea and vomiting; unable to urinate; pain not relieved by medications; fever; numbness, tingling; excessive diarrhea, inability to tolerate liquids or foods; increased irritability or sluggishness, rash        SECONDARY DISCHARGE DIAGNOSES  Diagnosis: Need for prophylactic measure  Assessment and Plan of Treatment: 1. Continue your prophylactic medications as directed by your doctor   Acyclovir - antiviral prophylaxis   Fluconazole- antifungal prophylaxis  Atovaquone - PCP prophylaxis   Protonix - GI prophylaxis

## 2022-12-09 NOTE — DISCHARGE NOTE PROVIDER - HOSPITAL COURSE
This is a 57 year old female with peripheral T cell lymphoma status post CHOEP x 6 admitted for an autologous peripheral blood stem cell transplant with high dose CBV prep regimen. Hematologic history as follows: initially presented on 3/4/22 with right axilla mass to general surgeon. Biopsy on 3/22/22 showed atypical lymphoid infiltrate. She later had an excisional biopsy on 5/16/22 which confirmed peripheral T cell lymphoma.     Upon admission, a TLC was placed in IR. Ms. Johnston received IV hydration, pain management, nutritional support and antibacterial / antiviral / antifungal / GI / PCP and VOD (SOS) prophylaxis. Labs were monitored on a daily basis and she received electrolyte repletion and transfusional support as needed.     While admitted Ms. Johnston experienced pancytopenia related to the high dose chemotherapy prep regimen. When she became neutropenic she was started on prophylactic ciprofloxacin. She also experienced neutropenic fevers. When she became febrile, blood and urine cultures were sent, a CXR completed and the ciprofloxacin was changed to cefepime. The infectious work up remained negative.  Ms. Johnston had a diffuse, macular, erythematous rash secondary to kepivance. The 3rd dose of kepivance was held post transplant.     On 11/25/22, after pre-medication, Ms. Johnston received 316ml of autologous, pooled, thawed, washed, mobilized, plasma reduced, HPC apheresis over approximately 1 hour. Cell counts as follows:   Total MNCs (x10^8/kg) = 6.67  CD34+ cells (10^6/kg) = 5.46  Cell Viability (%) = 80%  She tolerated the infusion well with no adverse reactions noted.    Engraftment was noted on 12/7/22. The cefepime was discontinued 12/7/22. The zarxio was discontinued 12/9/22. Post engraftment, Ms. Johnston remained intermittently febrile, likely secondary to engraftment. She was observed off antibiotics. On 12/9/22, the mediport was accessed, cultures sent, and the TLC discontinued.      This is a 57 year old female with peripheral T cell lymphoma status post CHOEP x 6 admitted for an autologous peripheral blood stem cell transplant with high dose CBV prep regimen. Hematologic history as follows: initially presented on 3/4/22 with right axilla mass to general surgeon. Biopsy on 3/22/22 showed atypical lymphoid infiltrate. She later had an excisional biopsy on 5/16/22 which confirmed peripheral T cell lymphoma.     Upon admission, a TLC was placed in IR. Ms. Johnston received IV hydration, pain management, nutritional support and antibacterial / antiviral / antifungal / GI / PCP and VOD (SOS) prophylaxis. Labs were monitored on a daily basis and she received electrolyte repletion and transfusional support as needed.     While admitted Ms. Johnston experienced pancytopenia related to the high dose chemotherapy prep regimen. When she became neutropenic she was started on prophylactic ciprofloxacin. She also experienced neutropenic fevers. When she became febrile, blood and urine cultures were sent, a CXR completed and the ciprofloxacin was changed to cefepime. The infectious work up remained negative.  Ms. Johnston had a diffuse, macular, erythematous rash secondary to kepivance. The 3rd dose of kepivance was held post transplant.     On 11/25/22, after pre-medication, Ms. Johnston received 316ml of autologous, pooled, thawed, washed, mobilized, plasma reduced, HPC apheresis over approximately 1 hour. Cell counts as follows:   Total MNCs (x10^8/kg) = 6.67  CD34+ cells (10^6/kg) = 5.46  Cell Viability (%) = 80%  She tolerated the infusion well with no adverse reactions noted.    Engraftment was noted on 12/7/22. The cefepime was discontinued 12/7/22. The zarxio was discontinued 12/9/22. Post engraftment, Ms. Johnston remained intermittently febrile, likely secondary to engraftment. She was observed off antibiotics. On 12/9/22, the mediport was accessed, cultures sent, and the TLC discontinued. She remained intermittently febrile with a negative infectious work up. The source of the fever was thought to be engraftment, and she was treated with a short course of dexamethasone with good effect.     Currently, Ms. Johnston is stable for discharge home with outpatient follow up at the [facility].    This is a 57 year old female with peripheral T cell lymphoma status post CHOEP x 6 admitted for an autologous peripheral blood stem cell transplant with high dose CBV prep regimen. Hematologic history as follows: initially presented on 3/4/22 with right axilla mass to general surgeon. Biopsy on 3/22/22 showed atypical lymphoid infiltrate. She later had an excisional biopsy on 5/16/22 which confirmed peripheral T cell lymphoma.     Upon admission, a TLC was placed in IR. Ms. Johnston received IV hydration, pain management, nutritional support and antibacterial / antiviral / antifungal / GI / PCP and VOD (SOS) prophylaxis. Labs were monitored on a daily basis and she received electrolyte repletion and transfusional support as needed.     While admitted Ms. Johnston experienced pancytopenia related to the high dose chemotherapy prep regimen. When she became neutropenic she was started on prophylactic ciprofloxacin. She also experienced neutropenic fevers. When she became febrile, blood and urine cultures were sent, a CXR completed and the ciprofloxacin was changed to cefepime. The infectious work up remained negative.  Ms. Johnston had a diffuse, macular, erythematous rash secondary to kepivance. The 3rd dose of kepivance was held post transplant.     On 11/25/22, after pre-medication, Ms. Johnston received 316ml of autologous, pooled, thawed, washed, mobilized, plasma reduced, HPC apheresis over approximately 1 hour. Cell counts as follows:   Total MNCs (x10^8/kg) = 6.67  CD34+ cells (10^6/kg) = 5.46  Cell Viability (%) = 80%  She tolerated the infusion well with no adverse reactions noted.    Engraftment was noted on 12/7/22. The cefepime was discontinued 12/7/22. The zarxio was discontinued 12/9/22. Post engraftment, Ms. Johnston remained intermittently febrile, likely secondary to engraftment. She was observed off antibiotics. On 12/9/22, the mediport was accessed, cultures sent, and the TLC discontinued. She remained intermittently febrile with a negative infectious work up. The source of the fever was thought to be engraftment, and she was treated with a short course of dexamethasone with good effect.     Currently, Ms. Johnston is stable for discharge home with outpatient follow up at the [facility]. The mediport was flushed with hep-lock prior to discharge 12/16/22.

## 2022-12-09 NOTE — DISCHARGE NOTE PROVIDER - CARE PROVIDER_API CALL
Fozia Acevedo)  Medical Oncology  [address] [place] [address]  Phone: [phone]  Fax: [phone]  Follow Up Time:

## 2022-12-09 NOTE — DISCHARGE NOTE PROVIDER - INSTRUCTIONS
Diet and activities as per [facility] discharge guidelines and safe food handling guidelines. NO RESTAURANT OR TAKE OUT FOOD AT THIS TIME, ONLY HOME COOKED PREPARED/FROZEN FOODS. You are allowed to have fresh baked pizza right out of the oven. This is the ONLY takeout food at this time.

## 2022-12-09 NOTE — PROGRESS NOTE ADULT - SUBJECTIVE AND OBJECTIVE BOX
HPC Transplant Team                                                      Critical / Counseling Time Provided: 30 minutes                                                                                                                                                        Chief Complaint: Autologous peripheral blood stem cell transplant with high dose CBV prep regimen for treatment of peripheral T cell lymphoma    S: Patient seen and examined with HPC Transplant Team:     O:     Vital Signs Last 24 Hrs  T(C): 38 (09 Dec 2022 07:53), Max: 38.4 (09 Dec 2022 06:23)  T(F): 100.4 (09 Dec 2022 07:53), Max: 101.1 (09 Dec 2022 06:23)  HR: 99 (09 Dec 2022 05:15) (90 - 106)  BP: 101/68 (09 Dec 2022 05:15) (92/55 - 110/70)  BP(mean): --  RR: 18 (09 Dec 2022 05:15) (16 - 20)  SpO2: 100% (09 Dec 2022 05:15) (96% - 100%)    Parameters below as of 09 Dec 2022 05:15  Patient On (Oxygen Delivery Method): room air    Admit weight: 80.3kg  Daily Weight in kG:     Intake / Output:   12-08 @ 07:01  -  12-09 @ 07:00  --------------------------------------------------------  IN: 2385 mL / OUT: 2550 mL / NET: -165 mL      Oropharynx: + ulceration, left lateral tongue, ulcerations on hard palate resolving   Oral Mucositis:            +                                          stGstrstastdstest:st st1st CVS: S1, S2 RRR   Lungs: CTA throughout bilaterally   Abdomen: + BS x 4 normoactive, soft, NT, ND  Extremities: no edema   Gastric Mucositis:       -                                           Grade: n/a   Intestinal Mucositis:     -                                         Grade: n/a   Skin: no rashes  TLC: CDI   Neuro: A&O x 3   Pain: denies    Labs:   CBC Full  -  ( 09 Dec 2022 07:08 )  WBC Count : 4.97 K/uL  Hemoglobin : 7.7 g/dL  Hematocrit : 24.3 %  Platelet Count - Automated : 112 K/uL  Mean Cell Volume : 91.7 fl  Mean Cell Hemoglobin : 29.1 pg  Mean Cell Hemoglobin Concentration : 31.7 gm/dL  Auto Neutrophil # : x  Auto Lymphocyte # : x  Auto Monocyte # : x  Auto Eosinophil # : x  Auto Basophil # : x  Auto Neutrophil % : x  Auto Lymphocyte % : x  Auto Monocyte % : x  Auto Eosinophil % : x  Auto Basophil % : x                          7.7    4.97  )-----------( 112      ( 09 Dec 2022 07:08 )             24.3     12-09    140  |  101  |  8   ----------------------------<  98  3.7   |  27  |  0.52    Ca    9.6      09 Dec 2022 07:08  Phos  3.4     12-09  Mg     1.7     12-09    TPro  6.3  /  Alb  3.9  /  TBili  0.1<L>  /  DBili  x   /  AST  29  /  ALT  45  /  AlkPhos  132<H>  12-09    PT/INR - ( 08 Dec 2022 06:51 )   PT: 13.3 sec;   INR: 1.14 ratio         PTT - ( 08 Dec 2022 06:51 )  PTT:25.0 sec  LIVER FUNCTIONS - ( 09 Dec 2022 07:08 )  Alb: 3.9 g/dL / Pro: 6.3 g/dL / ALK PHOS: 132 U/L / ALT: 45 U/L / AST: 29 U/L / GGT: x           Lactate Dehydrogenase, Serum: 252 U/L (12-09 @ 07:08)        Cultures:     Blood cx (12/8) -     Ucx (12/8) -       Radiology:     < from: Xray Chest 1 View- PORTABLE-Urgent (Xray Chest 1 View- PORTABLE-Urgent .) (12.08.22 @ 04:44) >  IMPRESSION:  Clear lungs.        Meds:   Antimicrobials:   acyclovir   Oral Tab/Cap 400 milliGRAM(s) Oral every 8 hours  clotrimazole Lozenge 1 Lozenge Oral five times a day  fluconAZOLE   Tablet 400 milliGRAM(s) Oral daily      Heme / Onc:   heparin  Infusion 334 Unit(s)/Hr IV Continuous <Continuous>      GI:  pantoprazole    Tablet 40 milliGRAM(s) Oral before breakfast  polyethylene glycol 3350 17 Gram(s) Oral daily PRN  senna 2 Tablet(s) Oral at bedtime PRN  sodium bicarbonate Mouth Rinse 10 milliLiter(s) Swish and Spit five times a day  ursodiol Capsule 300 milliGRAM(s) Oral two times a day      Cardiovascular:       Immunologic:   filgrastim-sndz (ZARXIO) Injectable 480 MICROGram(s) SubCutaneous every 24 hours      Other medications:   acetaminophen     Tablet .. 650 milliGRAM(s) Oral every 6 hours  Biotene Dry Mouth Oral Rinse 5 milliLiter(s) Swish and Spit five times a day  chlorhexidine 4% Liquid 1 Application(s) Topical <User Schedule>  FIRST- Mouthwash  BLM 10 milliLiter(s) Swish and Spit every 6 hours  FLUoxetine 20 milliGRAM(s) Oral daily  folic acid 1 milliGRAM(s) Oral daily  lidocaine/prilocaine Cream 1 Application(s) Topical daily  loratadine 10 milliGRAM(s) Oral daily  multivitamin 1 Tablet(s) Oral daily  nystatin Powder 1 Application(s) Topical two times a day  sodium chloride 0.45% 1000 milliLiter(s) IV Continuous <Continuous>  sodium chloride 0.9%. 1000 milliLiter(s) IV Continuous <Continuous>      PRN:   acetaminophen     Tablet .. 650 milliGRAM(s) Oral every 6 hours PRN  AQUAPHOR (petrolatum Ointment) 1 Application(s) Topical two times a day PRN  metoclopramide Injectable 10 milliGRAM(s) IV Push every 6 hours PRN  polyethylene glycol 3350 17 Gram(s) Oral daily PRN  senna 2 Tablet(s) Oral at bedtime PRN  sodium chloride 0.65% Nasal 1 Spray(s) Both Nostrils four times a day PRN  sodium chloride 0.9% lock flush 10 milliLiter(s) IV Push every 1 hour PRN  zinc oxide 40% Paste 1 Application(s) Topical three times a day PRN      A/P:  57 year old female with a history of  peripheral T cell lymphoma   Post :  Autologous PBSCT day + 14  11/17- Completed  -16 24 hour continuous infusion, strict I&O, daily weights, prn diuresis   11/20 - Cytoxan day 3/4.  11/21- CTX 4/4- continue CTX hydration for 24 hours post infusion of last dose. Strict I&O, daily weights, prn diuresis. Start cipro 500mg po BID when ANC < 500   11/23 Neutropenic. Started Cipro for prophylaxis. If febrile, panculture and start Cefepime (has tolerated in the past)   11/26- Kepivance day 2/3  11/27- Kepivance day 3/3- HELD due to Kepivance rash and oral erythema worsening  11/28- Febrile overnight, tmax 100.4. CXR no obvious consolidations. Cultures pending. Started on cefepime. Grade 2 chemotherapy induced oral mucositis. Continue supportive measures.   12/2 - Mild transaminitis, if continue to rise lower Diflucan dose.  12/5- early engraftment. Continue Cefepime, zarxio for now   12/6 mouth pain resolved   12/8 engrafted. D/C cefepime (infectious work up has been negative), continue zarxio for now    1. Infectious Disease:   acyclovir   Oral Tab/Cap 400 milliGRAM(s) Oral every 8 hours  clotrimazole Lozenge 1 Lozenge Oral five times a day  fluconAZOLE   Tablet 400 milliGRAM(s) Oral daily    2. VOD Prophylaxis: Actigall, Glutamine, Heparin (dosed at 100 units / kg / day)     3. GI Prophylaxis:    pantoprazole    Tablet 40 milliGRAM(s) Oral before breakfast    4. Mouthcare - NS / NaHCO3 rinses, Mycelex, Biotene; Skin care     5. GVHD prophylaxis - n/a     6. Transfuse & replete electrolytes prn     7. IV hydration, daily weights, strict I&O, prn diuresis     8. PO intake as tolerated, nutrition follow up as needed, MVI, folic acid     9. Antiemetics, anti-diarrhea medications:   metoclopramide Injectable 10 milliGRAM(s) IV Push every 6 hours PRN    10. OOB as tolerated, physical therapy consult if needed     11. Monitor coags / fibrinogen 2x week, vitamin K as needed     12. Monitor closely for clinical changes, monitor for fevers     13. Emotional support provided, plan of care discussed and questions addressed     14. Patient education done regarding plan of care, restrictions and discharge planning     15. Continue regular social work input     I have written the above note for Dr. Cabrales who performed service with me in the room.   Bill Castaneda PA-C ([phone])    I have seen and examined patient with PA, I agree with above note as scribed.                    HPC Transplant Team                                                      Critical / Counseling Time Provided: 30 minutes                                                                                                                                                        Chief Complaint: Autologous peripheral blood stem cell transplant with high dose CBV prep regimen for treatment of peripheral T cell lymphoma    S: Patient seen and examined with HPC Transplant Team: +Febrile  +weakness  +chills  +nausea  +loose stool    O: Rest of ROS negative    Vital Signs Last 24 Hrs  T(C): 38 (09 Dec 2022 07:53), Max: 38.4 (09 Dec 2022 06:23)  T(F): 100.4 (09 Dec 2022 07:53), Max: 101.1 (09 Dec 2022 06:23)  HR: 99 (09 Dec 2022 05:15) (90 - 106)  BP: 101/68 (09 Dec 2022 05:15) (92/55 - 110/70)  BP(mean): --  RR: 18 (09 Dec 2022 05:15) (16 - 20)  SpO2: 100% (09 Dec 2022 05:15) (96% - 100%)    Parameters below as of 09 Dec 2022 05:15  Patient On (Oxygen Delivery Method): room air    Admit weight: 80.3kg  Daily Weight in k.5kg    Intake / Output:    @ 07:01  -  09 @ 07:00  --------------------------------------------------------  IN: 2385 mL / OUT: 2550 mL / NET: -165 mL      Oropharynx: No erythema or ulcers  Oral Mucositis:                                                     stGstrstastdstest:st st1st CVS: S1, S2 RRR   Lungs: CTA throughout bilaterally   Abdomen: + BS x 4 normoactive, soft, NT, ND  Extremities: no edema   Gastric Mucositis:       -                                           Grade: n/a   Intestinal Mucositis:     -                                         Grade: n/a   Skin: +erythematous macular rash to neck, upper chest and back   TLC: CDI   Neuro: A&O x 3   Pain: denies    Labs:   CBC Full  -  ( 09 Dec 2022 07:08 )  WBC Count : 4.97 K/uL  Hemoglobin : 7.7 g/dL  Hematocrit : 24.3 %  Platelet Count - Automated : 112 K/uL  Mean Cell Volume : 91.7 fl  Mean Cell Hemoglobin : 29.1 pg  Mean Cell Hemoglobin Concentration : 31.7 gm/dL  Auto Neutrophil # : x  Auto Lymphocyte # : x  Auto Monocyte # : x  Auto Eosinophil # : x  Auto Basophil # : x  Auto Neutrophil % : x  Auto Lymphocyte % : x  Auto Monocyte % : x  Auto Eosinophil % : x  Auto Basophil % : x                          7.7    4.97  )-----------( 112      ( 09 Dec 2022 07:08 )             24.3         140  |  101  |  8   ----------------------------<  98  3.7   |  27  |  0.52    Ca    9.6      09 Dec 2022 07:08  Phos  3.4       Mg     1.7         TPro  6.3  /  Alb  3.9  /  TBili  0.1<L>  /  DBili  x   /  AST  29  /  ALT  45  /  AlkPhos  132<H>      PT/INR - ( 08 Dec 2022 06:51 )   PT: 13.3 sec;   INR: 1.14 ratio         PTT - ( 08 Dec 2022 06:51 )  PTT:25.0 sec  LIVER FUNCTIONS - ( 09 Dec 2022 07:08 )  Alb: 3.9 g/dL / Pro: 6.3 g/dL / ALK PHOS: 132 U/L / ALT: 45 U/L / AST: 29 U/L / GGT: x           Lactate Dehydrogenase, Serum: 252 U/L ( @ 07:08)        Cultures:     Blood cx () - NTD x2    Ucx () - Negative      Radiology:     < from: Xray Chest 1 View- PORTABLE-Urgent (Xray Chest 1 View- PORTABLE-Urgent .) (22 @ 04:44) >  IMPRESSION:  Clear lungs.        Meds:   Antimicrobials:   acyclovir   Oral Tab/Cap 400 milliGRAM(s) Oral every 8 hours  clotrimazole Lozenge 1 Lozenge Oral five times a day  fluconAZOLE   Tablet 400 milliGRAM(s) Oral daily      Heme / Onc:   heparin  Infusion 334 Unit(s)/Hr IV Continuous <Continuous>      GI:  pantoprazole    Tablet 40 milliGRAM(s) Oral before breakfast  polyethylene glycol 3350 17 Gram(s) Oral daily PRN  senna 2 Tablet(s) Oral at bedtime PRN  sodium bicarbonate Mouth Rinse 10 milliLiter(s) Swish and Spit five times a day  ursodiol Capsule 300 milliGRAM(s) Oral two times a day      Cardiovascular:       Immunologic:   filgrastim-sndz (ZARXIO) Injectable 480 MICROGram(s) SubCutaneous every 24 hours      Other medications:   acetaminophen     Tablet .. 650 milliGRAM(s) Oral every 6 hours  Biotene Dry Mouth Oral Rinse 5 milliLiter(s) Swish and Spit five times a day  chlorhexidine 4% Liquid 1 Application(s) Topical <User Schedule>  FIRST- Mouthwash  BLM 10 milliLiter(s) Swish and Spit every 6 hours  FLUoxetine 20 milliGRAM(s) Oral daily  folic acid 1 milliGRAM(s) Oral daily  lidocaine/prilocaine Cream 1 Application(s) Topical daily  loratadine 10 milliGRAM(s) Oral daily  multivitamin 1 Tablet(s) Oral daily  nystatin Powder 1 Application(s) Topical two times a day  sodium chloride 0.45% 1000 milliLiter(s) IV Continuous <Continuous>  sodium chloride 0.9%. 1000 milliLiter(s) IV Continuous <Continuous>      PRN:   acetaminophen     Tablet .. 650 milliGRAM(s) Oral every 6 hours PRN  AQUAPHOR (petrolatum Ointment) 1 Application(s) Topical two times a day PRN  metoclopramide Injectable 10 milliGRAM(s) IV Push every 6 hours PRN  polyethylene glycol 3350 17 Gram(s) Oral daily PRN  senna 2 Tablet(s) Oral at bedtime PRN  sodium chloride 0.65% Nasal 1 Spray(s) Both Nostrils four times a day PRN  sodium chloride 0.9% lock flush 10 milliLiter(s) IV Push every 1 hour PRN  zinc oxide 40% Paste 1 Application(s) Topical three times a day PRN      A/P:  57 year old female with a history of  peripheral T cell lymphoma   Post :  Autologous PBSCT day + 14  - Completed  -16 24 hour continuous infusion, strict I&O, daily weights, prn diuresis    - Cytoxan day 3/.  - CTX /- continue CTX hydration for 24 hours post infusion of last dose. Strict I&O, daily weights, prn diuresis. Start cipro 500mg po BID when ANC < 500    Neutropenic. Started Cipro for prophylaxis. If febrile, panculture and start Cefepime (has tolerated in the past)   - Kepivance day 2/3  11/27- Kepivance day 3/3- HELD due to Kepivance rash and oral erythema worsening  - Febrile overnight, tmax 100.4. CXR no obvious consolidations. Cultures pending. Started on cefepime. Grade 2 chemotherapy induced oral mucositis. Continue supportive measures.    - Mild transaminitis, if continue to rise lower Diflucan dose.  - early engraftment. Continue Cefepime, zarxio for now    mouth pain resolved    engrafted. D/C cefepime (infectious work up has been negative), continue zarxio for now   D/C'd Zarxio, pulling TLC today, access mediport f/u cx's    1. Infectious Disease:   acyclovir   Oral Tab/Cap 400 milliGRAM(s) Oral every 8 hours  clotrimazole Lozenge 1 Lozenge Oral five times a day  fluconAZOLE   Tablet 400 milliGRAM(s) Oral daily    2. VOD Prophylaxis: Actigall, Glutamine, Heparin (dosed at 100 units / kg / day)     3. GI Prophylaxis:    pantoprazole    Tablet 40 milliGRAM(s) Oral before breakfast    4. Mouthcare - NS / NaHCO3 rinses, Mycelex, Biotene; Skin care     5. GVHD prophylaxis - n/a     6. Transfuse & replete electrolytes prn     7. IV hydration, daily weights, strict I&O, prn diuresis     8. PO intake as tolerated, nutrition follow up as needed, MVI, folic acid     9. Antiemetics, anti-diarrhea medications:   metoclopramide Injectable 10 milliGRAM(s) IV Push every 6 hours PRN    10. OOB as tolerated, physical therapy consult if needed     11. Monitor coags / fibrinogen 2x week, vitamin K as needed     12. Monitor closely for clinical changes, monitor for fevers     13. Emotional support provided, plan of care discussed and questions addressed     14. Patient education done regarding plan of care, restrictions and discharge planning     15. Continue regular social work input     I have written the above note for Dr. Cabrales who performed service with me in the room.   Bill Castaneda PA-C ([phone])    I have seen and examined patient with PA, I agree with above note as scribed.                    HPC Transplant Team                                                      Critical / Counseling Time Provided: 30 minutes                                                                                                                                                        Chief Complaint: Autologous peripheral blood stem cell transplant with high dose CBV prep regimen for treatment of peripheral T cell lymphoma    S: Patient seen and examined with HPC Transplant Team: +Febrile  +weakness  +chills  +nausea  +loose stool    Rest of ROS negative    Vital Signs Last 24 Hrs  T(C): 38 (09 Dec 2022 07:53), Max: 38.4 (09 Dec 2022 06:23)  T(F): 100.4 (09 Dec 2022 07:53), Max: 101.1 (09 Dec 2022 06:23)  HR: 99 (09 Dec 2022 05:15) (90 - 106)  BP: 101/68 (09 Dec 2022 05:15) (92/55 - 110/70)  BP(mean): --  RR: 18 (09 Dec 2022 05:15) (16 - 20)  SpO2: 100% (09 Dec 2022 05:15) (96% - 100%)    Parameters below as of 09 Dec 2022 05:15  Patient On (Oxygen Delivery Method): room air    Admit weight: 80.3kg  Daily Weight in k.5kg    Intake / Output:    @ 07:01  -   @ 07:00  --------------------------------------------------------  IN: 2385 mL / OUT: 2550 mL / NET: -165 mL      Oropharynx: No erythema or ulcers  Oral Mucositis:                                                     stGstrstastdstest:st st1st CVS: S1, S2 RRR   Lungs: CTA throughout bilaterally   Abdomen: + BS x 4 normoactive, soft, NT, ND  Extremities: no edema   Gastric Mucositis:       -                                           Grade: n/a   Intestinal Mucositis:     -                                         Grade: n/a   Skin: +erythematous macular rash to neck, upper chest and back   TLC: CDI   Neuro: A&O x 3   Pain: denies    Labs:   CBC Full  -  ( 09 Dec 2022 07:08 )  WBC Count : 4.97 K/uL  Hemoglobin : 7.7 g/dL  Hematocrit : 24.3 %  Platelet Count - Automated : 112 K/uL  Mean Cell Volume : 91.7 fl  Mean Cell Hemoglobin : 29.1 pg  Mean Cell Hemoglobin Concentration : 31.7 gm/dL  Auto Neutrophil # : x  Auto Lymphocyte # : x  Auto Monocyte # : x  Auto Eosinophil # : x  Auto Basophil # : x  Auto Neutrophil % : x  Auto Lymphocyte % : x  Auto Monocyte % : x  Auto Eosinophil % : x  Auto Basophil % : x                          7.7    4.97  )-----------( 112      ( 09 Dec 2022 07:08 )             24.3         140  |  101  |  8   ----------------------------<  98  3.7   |  27  |  0.52    Ca    9.6      09 Dec 2022 07:08  Phos  3.4       Mg     1.7         TPro  6.3  /  Alb  3.9  /  TBili  0.1<L>  /  DBili  x   /  AST  29  /  ALT  45  /  AlkPhos  132<H>      PT/INR - ( 08 Dec 2022 06:51 )   PT: 13.3 sec;   INR: 1.14 ratio         PTT - ( 08 Dec 2022 06:51 )  PTT:25.0 sec  LIVER FUNCTIONS - ( 09 Dec 2022 07:08 )  Alb: 3.9 g/dL / Pro: 6.3 g/dL / ALK PHOS: 132 U/L / ALT: 45 U/L / AST: 29 U/L / GGT: x           Lactate Dehydrogenase, Serum: 252 U/L ( @ 07:08)        Cultures:     Blood cx () - NTD x2    Ucx () - Negative      Radiology:     < from: Xray Chest 1 View- PORTABLE-Urgent (Xray Chest 1 View- PORTABLE-Urgent .) (22 @ 04:44) >  IMPRESSION:  Clear lungs.        Meds:   Antimicrobials:   acyclovir   Oral Tab/Cap 400 milliGRAM(s) Oral every 8 hours  clotrimazole Lozenge 1 Lozenge Oral five times a day  fluconAZOLE   Tablet 400 milliGRAM(s) Oral daily      Heme / Onc:   heparin  Infusion 334 Unit(s)/Hr IV Continuous <Continuous>      GI:  pantoprazole    Tablet 40 milliGRAM(s) Oral before breakfast  polyethylene glycol 3350 17 Gram(s) Oral daily PRN  senna 2 Tablet(s) Oral at bedtime PRN  sodium bicarbonate Mouth Rinse 10 milliLiter(s) Swish and Spit five times a day  ursodiol Capsule 300 milliGRAM(s) Oral two times a day      Cardiovascular:       Immunologic:   filgrastim-sndz (ZARXIO) Injectable 480 MICROGram(s) SubCutaneous every 24 hours      Other medications:   acetaminophen     Tablet .. 650 milliGRAM(s) Oral every 6 hours  Biotene Dry Mouth Oral Rinse 5 milliLiter(s) Swish and Spit five times a day  chlorhexidine 4% Liquid 1 Application(s) Topical <User Schedule>  FIRST- Mouthwash  BLM 10 milliLiter(s) Swish and Spit every 6 hours  FLUoxetine 20 milliGRAM(s) Oral daily  folic acid 1 milliGRAM(s) Oral daily  lidocaine/prilocaine Cream 1 Application(s) Topical daily  loratadine 10 milliGRAM(s) Oral daily  multivitamin 1 Tablet(s) Oral daily  nystatin Powder 1 Application(s) Topical two times a day  sodium chloride 0.45% 1000 milliLiter(s) IV Continuous <Continuous>  sodium chloride 0.9%. 1000 milliLiter(s) IV Continuous <Continuous>      PRN:   acetaminophen     Tablet .. 650 milliGRAM(s) Oral every 6 hours PRN  AQUAPHOR (petrolatum Ointment) 1 Application(s) Topical two times a day PRN  metoclopramide Injectable 10 milliGRAM(s) IV Push every 6 hours PRN  polyethylene glycol 3350 17 Gram(s) Oral daily PRN  senna 2 Tablet(s) Oral at bedtime PRN  sodium chloride 0.65% Nasal 1 Spray(s) Both Nostrils four times a day PRN  sodium chloride 0.9% lock flush 10 milliLiter(s) IV Push every 1 hour PRN  zinc oxide 40% Paste 1 Application(s) Topical three times a day PRN      A/P:  57 year old female with a history of  peripheral T cell lymphoma   Post :  Autologous PBSCT day + 14  - Completed  -16 24 hour continuous infusion, strict I&O, daily weights, prn diuresis    - Cytoxan day 3/.  - CTX - continue CTX hydration for 24 hours post infusion of last dose. Strict I&O, daily weights, prn diuresis. Start cipro 500mg po BID when ANC < 500    Neutropenic. Started Cipro for prophylaxis. If febrile, panculture and start Cefepime (has tolerated in the past)   - Kepivance day 2/3  11/27- Kepivance day 3/3- HELD due to Kepivance rash and oral erythema worsening  - Febrile overnight, tmax 100.4. CXR no obvious consolidations. Cultures pending. Started on cefepime. Grade 2 chemotherapy induced oral mucositis. Continue supportive measures.    - Mild transaminitis, if continue to rise lower Diflucan dose.  - early engraftment. Continue Cefepime, zarxio for now    mouth pain resolved    engrafted. D/C cefepime (infectious work up has been negative), continue zarxio for now   D/C'd Zarxio, pulling TLC today, access mediport f/u cx's    1. Infectious Disease:   acyclovir   Oral Tab/Cap 400 milliGRAM(s) Oral every 8 hours  clotrimazole Lozenge 1 Lozenge Oral five times a day  fluconAZOLE   Tablet 400 milliGRAM(s) Oral daily    2. VOD Prophylaxis: Actigall, Glutamine, Heparin (dosed at 100 units / kg / day)     3. GI Prophylaxis:    pantoprazole    Tablet 40 milliGRAM(s) Oral before breakfast    4. Mouthcare - NS / NaHCO3 rinses, Mycelex, Biotene; Skin care     5. GVHD prophylaxis - n/a     6. Transfuse & replete electrolytes prn     7. IV hydration, daily weights, strict I&O, prn diuresis     8. PO intake as tolerated, nutrition follow up as needed, MVI, folic acid     9. Antiemetics, anti-diarrhea medications:   metoclopramide Injectable 10 milliGRAM(s) IV Push every 6 hours PRN    10. OOB as tolerated, physical therapy consult if needed     11. Monitor coags / fibrinogen 2x week, vitamin K as needed     12. Monitor closely for clinical changes, monitor for fevers     13. Emotional support provided, plan of care discussed and questions addressed     14. Patient education done regarding plan of care, restrictions and discharge planning     15. Continue regular social work input     I have written the above note for Dr. Cabrales who performed service with me in the room.   Bill Castaneda PA-C ([phone])    I have seen and examined patient with PA, I agree with above note as scribed.

## 2022-12-09 NOTE — DISCHARGE NOTE PROVIDER - NSDCFUSCHEDAPPT_GEN_ALL_CORE_FT
[facility]  [facility]  Scheduled Appointment: 12/28/2022    [facility]  [facility]  Scheduled Appointment: 02/08/2023

## 2022-12-09 NOTE — PROGRESS NOTE ADULT - CRITICAL CARE ATTENDING COMMENT
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   Today is Day + 13  Problem/Plan -   1:  Problem: Stem cell transplant   ·  Plan: 1. Admit to BMTU   2. TLC placement in IR   3. Day -9 - day -2 - antiemetics   4. Day -9 & day -8, VP16 2400mg/m2 IV x 34 hours (final concentration 0.4mg /mL).   5. Strict I&O, daily weights, prn diuresis   6. After completion of VP16- start CTX hydration (D51/2NS + 10mEq KCl @ 150ml / m2) - continue for 24 hours post infusion of CTX   7. Day -7 - day -4 - CTX 1800 mg / m2 daily over 2 hours. Follow SMA7, mag, phos 6hours post CT . Mesna  12mg / kg - hemorrhagic cystitis ppx   8. Day -3 - BCNU 400mg / m2   9. Day -2 - day -1 - rest days  10. Day 0(11/25/22) - HPC transplant. Start Zarxio 480mcg SQ QD, continue through engraftment. Kepivance on days 0, 1 & 2   11. Anxiolytics, antinausea, antidiarrhea medications as needed   12. Lasix PRN while being aggressively hydrated to avoid VOD   13. Nutritional support, pain management.    Problem/Plan - 2:  ·  Problem: Need for prophylactic measure.   ·  Plan: 1. VOD prophylaxis - low dose heparin gtt (dosed at 100 units / kg / day), glutamine supplementation, Actigall BID   2. PCP prophylaxis - Bactrim DS through day -2    3. Antiviral prophylaxis - Acyclovir 400mg po TID to start day -1   4. Antifungal prophylaxis- Diflucan 400 mg po daily.  5. GI prophylaxis - Protonix po QD   6. Antibacterial prophylaxis - when ANC < 500, start Cipro 500mg po BID. If becomes febrile, pan cx, CXR and change Cipro to Cefepime 2g IV q 8 hours. Continue until count recovery  7. Kepivance for prevention of mucositis- days 0, +1, +2  8. Aggressive mouth care and skin care as per protocol. On 11/25, early mucositis on lower lip, no ulcers. Continue topical care.   9. Receiving transfusion and electrolyte support.    11/25-Chemotherapy induced nausea and diarrhea managed with antiemetics and antidiarrheals. Now patient states stool is loose rather than watery.   Continue Cipro, Acyclovir, Diflucan prophylaxis. OOB/ambulate. She received HPC transplant today without complications. Begin Zarxio daily.  11/26/22: continues to have loose stools overnight, this am with epistaxis lasting ~ 5 minutes. To receive platelets. Remains fatigued.  11/28- Febrile overnight, tmax 100.4. CXR- no consolidations. Cultures pending. Started on Cefepime. Grade 2 chemotherapy induced oral mucositis. Continue supportive care.  12/2- patient now afebrile; 11/28 cultures negative and CXR- negative. Continue Cefepime; on Acyclovir, Diflucan prophylaxis. Continue Zarxio daily, await engraftment.  12/5- early engraftment, continue Zarxio.  12/7- Neutrophil count > 1.0, afebrile, cultures negative. D/C Cefepime. Continue Zarxio daily. 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   Today is Day + 14  Problem/Plan -   1:  Problem: Stem cell transplant   ·  Plan: 1. Admit to BMTU   2. TLC placement in IR   3. Day -9 - day -2 - antiemetics   4. Day -9 & day -8, VP16 2400mg/m2 IV x 34 hours (final concentration 0.4mg /mL).   5. Strict I&O, daily weights, prn diuresis   6. After completion of VP16- start CTX hydration (D51/2NS + 10mEq KCl @ 150ml / m2) - continue for 24 hours post infusion of CTX   7. Day -7 - day -4 - CTX 1800 mg / m2 daily over 2 hours. Follow SMA7, mag, phos 6hours post CT . Mesna  12mg / kg - hemorrhagic cystitis ppx   8. Day -3 - BCNU 400mg / m2   9. Day -2 - day -1 - rest days  10. Day 0(11/25/22) - HPC transplant. Start Zarxio 480mcg SQ QD, continue through engraftment. Kepivance on days 0, 1 & 2   11. Anxiolytics, antinausea, antidiarrhea medications as needed   12. Lasix PRN while being aggressively hydrated to avoid VOD   13. Nutritional support, pain management.    Problem/Plan - 2:  ·  Problem: Need for prophylactic measure.   ·  Plan: 1. VOD prophylaxis - low dose heparin gtt (dosed at 100 units / kg / day), glutamine supplementation, Actigall BID   2. PCP prophylaxis - Bactrim DS through day -2    3. Antiviral prophylaxis - Acyclovir 400mg po TID to start day -1   4. Antifungal prophylaxis- Diflucan 400 mg po daily.  5. GI prophylaxis - Protonix po QD   6. Antibacterial prophylaxis - when ANC < 500, start Cipro 500mg po BID. If becomes febrile, pan cx, CXR and change Cipro to Cefepime 2g IV q 8 hours. Continue until count recovery  7. Kepivance for prevention of mucositis- days 0, +1, +2  8. Aggressive mouth care and skin care as per protocol. On 11/25, early mucositis on lower lip, no ulcers. Continue topical care.   9. Receiving transfusion and electrolyte support.    11/25-Chemotherapy induced nausea and diarrhea managed with antiemetics and antidiarrheals. Now patient states stool is loose rather than watery.   Continue Cipro, Acyclovir, Diflucan prophylaxis. OOB/ambulate. She received HPC transplant today without complications. Begin Zarxio daily.  11/26/22: continues to have loose stools overnight, this am with epistaxis lasting ~ 5 minutes. To receive platelets. Remains fatigued.  11/28- Febrile overnight, tmax 100.4. CXR- no consolidations. Cultures pending. Started on Cefepime. Grade 2 chemotherapy induced oral mucositis. Continue supportive care.  12/2- patient now afebrile; 11/28 cultures negative and CXR- negative. Continue Cefepime; on Acyclovir, Diflucan prophylaxis. Continue Zarxio daily, await engraftment.  12/5- early engraftment, continue Zarxio.  12/7- Neutrophil count > 1.0, afebrile, cultures negative. D/C Cefepime. Continue Zarxio daily.  12/9- Fevers with chills, ? engraftment fever. Check cultures, access Mediport and D/C TLC. Discontinue Zarxio with WBC recovery.

## 2022-12-10 LAB
ALBUMIN SERPL ELPH-MCNC: 3.8 G/DL — SIGNIFICANT CHANGE UP (ref 3.3–5)
ALP SERPL-CCNC: 126 U/L — HIGH (ref 40–120)
ALT FLD-CCNC: 48 U/L — HIGH (ref 10–45)
ANION GAP SERPL CALC-SCNC: 14 MMOL/L — SIGNIFICANT CHANGE UP (ref 5–17)
AST SERPL-CCNC: 32 U/L — SIGNIFICANT CHANGE UP (ref 10–40)
BASOPHILS # BLD AUTO: 0 K/UL — SIGNIFICANT CHANGE UP (ref 0–0.2)
BASOPHILS NFR BLD AUTO: 0 % — SIGNIFICANT CHANGE UP (ref 0–2)
BILIRUB SERPL-MCNC: 0.2 MG/DL — SIGNIFICANT CHANGE UP (ref 0.2–1.2)
BLASTS # FLD: 1.8 % — HIGH (ref 0–0)
BUN SERPL-MCNC: 7 MG/DL — SIGNIFICANT CHANGE UP (ref 7–23)
CALCIUM SERPL-MCNC: 9.3 MG/DL — SIGNIFICANT CHANGE UP (ref 8.4–10.5)
CHLORIDE SERPL-SCNC: 100 MMOL/L — SIGNIFICANT CHANGE UP (ref 96–108)
CO2 SERPL-SCNC: 24 MMOL/L — SIGNIFICANT CHANGE UP (ref 22–31)
CREAT SERPL-MCNC: 0.47 MG/DL — LOW (ref 0.5–1.3)
EGFR: 112 ML/MIN/1.73M2 — SIGNIFICANT CHANGE UP
EOSINOPHIL # BLD AUTO: 0 K/UL — SIGNIFICANT CHANGE UP (ref 0–0.5)
EOSINOPHIL NFR BLD AUTO: 0 % — SIGNIFICANT CHANGE UP (ref 0–6)
GLUCOSE SERPL-MCNC: 102 MG/DL — HIGH (ref 70–99)
HCT VFR BLD CALC: 22.9 % — LOW (ref 34.5–45)
HGB BLD-MCNC: 7.3 G/DL — LOW (ref 11.5–15.5)
LDH SERPL L TO P-CCNC: 286 U/L — HIGH (ref 50–242)
LYMPHOCYTES # BLD AUTO: 0.25 K/UL — LOW (ref 1–3.3)
LYMPHOCYTES # BLD AUTO: 5.4 % — LOW (ref 13–44)
MAGNESIUM SERPL-MCNC: 1.8 MG/DL — SIGNIFICANT CHANGE UP (ref 1.6–2.6)
MANUAL SMEAR VERIFICATION: SIGNIFICANT CHANGE UP
MCHC RBC-ENTMCNC: 29.1 PG — SIGNIFICANT CHANGE UP (ref 27–34)
MCHC RBC-ENTMCNC: 31.9 GM/DL — LOW (ref 32–36)
MCV RBC AUTO: 91.2 FL — SIGNIFICANT CHANGE UP (ref 80–100)
METAMYELOCYTES # FLD: 1.8 % — HIGH (ref 0–0)
MONOCYTES # BLD AUTO: 0.41 K/UL — SIGNIFICANT CHANGE UP (ref 0–0.9)
MONOCYTES NFR BLD AUTO: 9 % — SIGNIFICANT CHANGE UP (ref 2–14)
MYELOCYTES NFR BLD: 2.7 % — HIGH (ref 0–0)
NEUTROPHILS # BLD AUTO: 3.53 K/UL — SIGNIFICANT CHANGE UP (ref 1.8–7.4)
NEUTROPHILS NFR BLD AUTO: 73.9 % — SIGNIFICANT CHANGE UP (ref 43–77)
NEUTS BAND # BLD: 3.6 % — SIGNIFICANT CHANGE UP (ref 0–8)
PHOSPHATE SERPL-MCNC: 3.1 MG/DL — SIGNIFICANT CHANGE UP (ref 2.5–4.5)
PLAT MORPH BLD: NORMAL — SIGNIFICANT CHANGE UP
PLATELET # BLD AUTO: 165 K/UL — SIGNIFICANT CHANGE UP (ref 150–400)
POTASSIUM SERPL-MCNC: 3.7 MMOL/L — SIGNIFICANT CHANGE UP (ref 3.5–5.3)
POTASSIUM SERPL-SCNC: 3.7 MMOL/L — SIGNIFICANT CHANGE UP (ref 3.5–5.3)
PROMYELOCYTES # FLD: 1.8 % — HIGH (ref 0–0)
PROT SERPL-MCNC: 6.2 G/DL — SIGNIFICANT CHANGE UP (ref 6–8.3)
RBC # BLD: 2.51 M/UL — LOW (ref 3.8–5.2)
RBC # FLD: 13.3 % — SIGNIFICANT CHANGE UP (ref 10.3–14.5)
RBC BLD AUTO: SIGNIFICANT CHANGE UP
SODIUM SERPL-SCNC: 138 MMOL/L — SIGNIFICANT CHANGE UP (ref 135–145)
TOXIC GRANULES BLD QL SMEAR: PRESENT — SIGNIFICANT CHANGE UP
WBC # BLD: 4.56 K/UL — SIGNIFICANT CHANGE UP (ref 3.8–10.5)
WBC # FLD AUTO: 4.56 K/UL — SIGNIFICANT CHANGE UP (ref 3.8–10.5)

## 2022-12-10 PROCEDURE — 99291 CRITICAL CARE FIRST HOUR: CPT

## 2022-12-10 RX ORDER — CALAMINE AND ZINC OXIDE AND PHENOL 160; 10 MG/ML; MG/ML
1 LOTION TOPICAL ONCE
Refills: 0 | Status: COMPLETED | OUTPATIENT
Start: 2022-12-10 | End: 2022-12-11

## 2022-12-10 RX ORDER — ATOVAQUONE 750 MG/5ML
750 SUSPENSION ORAL
Refills: 0 | Status: DISCONTINUED | OUTPATIENT
Start: 2022-12-10 | End: 2022-12-16

## 2022-12-10 RX ADMIN — Medication 5 MILLILITER(S): at 16:48

## 2022-12-10 RX ADMIN — Medication 5 MILLILITER(S): at 09:01

## 2022-12-10 RX ADMIN — Medication 10 MILLILITER(S): at 00:06

## 2022-12-10 RX ADMIN — Medication 1 LOZENGE: at 00:06

## 2022-12-10 RX ADMIN — ONDANSETRON 4 MILLIGRAM(S): 8 TABLET, FILM COATED ORAL at 16:47

## 2022-12-10 RX ADMIN — Medication 4 MILLIGRAM(S): at 05:47

## 2022-12-10 RX ADMIN — Medication 400 MILLIGRAM(S): at 21:15

## 2022-12-10 RX ADMIN — Medication 1 LOZENGE: at 13:09

## 2022-12-10 RX ADMIN — Medication 1 LOZENGE: at 09:02

## 2022-12-10 RX ADMIN — Medication 1 LOZENGE: at 20:57

## 2022-12-10 RX ADMIN — URSODIOL 300 MILLIGRAM(S): 250 TABLET, FILM COATED ORAL at 17:24

## 2022-12-10 RX ADMIN — Medication 1 MILLIGRAM(S): at 13:12

## 2022-12-10 RX ADMIN — DIPHENHYDRAMINE HYDROCHLORIDE AND LIDOCAINE HYDROCHLORIDE AND ALUMINUM HYDROXIDE AND MAGNESIUM HYDRO 10 MILLILITER(S): KIT at 13:10

## 2022-12-10 RX ADMIN — Medication 10 MILLILITER(S): at 16:48

## 2022-12-10 RX ADMIN — Medication 400 MILLIGRAM(S): at 05:47

## 2022-12-10 RX ADMIN — LORATADINE 10 MILLIGRAM(S): 10 TABLET ORAL at 13:13

## 2022-12-10 RX ADMIN — NYSTATIN CREAM 1 APPLICATION(S): 100000 CREAM TOPICAL at 05:47

## 2022-12-10 RX ADMIN — FLUCONAZOLE 400 MILLIGRAM(S): 150 TABLET ORAL at 13:11

## 2022-12-10 RX ADMIN — CHLORHEXIDINE GLUCONATE 1 APPLICATION(S): 213 SOLUTION TOPICAL at 09:02

## 2022-12-10 RX ADMIN — DIPHENHYDRAMINE HYDROCHLORIDE AND LIDOCAINE HYDROCHLORIDE AND ALUMINUM HYDROXIDE AND MAGNESIUM HYDRO 10 MILLILITER(S): KIT at 17:24

## 2022-12-10 RX ADMIN — Medication 10 MILLILITER(S): at 20:57

## 2022-12-10 RX ADMIN — PANTOPRAZOLE SODIUM 40 MILLIGRAM(S): 20 TABLET, DELAYED RELEASE ORAL at 06:16

## 2022-12-10 RX ADMIN — NYSTATIN CREAM 1 APPLICATION(S): 100000 CREAM TOPICAL at 17:25

## 2022-12-10 RX ADMIN — Medication 1 TABLET(S): at 13:12

## 2022-12-10 RX ADMIN — Medication 650 MILLIGRAM(S): at 06:49

## 2022-12-10 RX ADMIN — Medication 5 MILLILITER(S): at 13:09

## 2022-12-10 RX ADMIN — URSODIOL 300 MILLIGRAM(S): 250 TABLET, FILM COATED ORAL at 05:47

## 2022-12-10 RX ADMIN — Medication 5 MILLILITER(S): at 20:57

## 2022-12-10 RX ADMIN — Medication 1 LOZENGE: at 16:48

## 2022-12-10 RX ADMIN — Medication 10 MILLILITER(S): at 13:09

## 2022-12-10 RX ADMIN — Medication 650 MILLIGRAM(S): at 05:46

## 2022-12-10 RX ADMIN — Medication 5 MILLILITER(S): at 00:06

## 2022-12-10 RX ADMIN — Medication 20 MILLIGRAM(S): at 13:13

## 2022-12-10 RX ADMIN — Medication 10 MILLILITER(S): at 09:01

## 2022-12-10 RX ADMIN — Medication 400 MILLIGRAM(S): at 13:14

## 2022-12-10 RX ADMIN — ATOVAQUONE 750 MILLIGRAM(S): 750 SUSPENSION ORAL at 17:26

## 2022-12-10 NOTE — PROGRESS NOTE ADULT - SUBJECTIVE AND OBJECTIVE BOX
HPC Transplant Team                                                      Critical / Counseling Time Provided: 30 minutes                                                                                                                                                        Chief Complaint: Autologous peripheral blood stem cell transplant with high dose CBV prep regimen for treatment of peripheral T cell lymphoma    S: Patient seen and examined with HPC Transplant Team:      Rest of ROS negative      O: Vitals:   Vital Signs Last 24 Hrs  T(C): 37.6 (10 Dec 2022 06:49), Max: 39.7 (09 Dec 2022 16:40)  T(F): 99.7 (10 Dec 2022 06:49), Max: 103.5 (09 Dec 2022 16:40)  HR: 102 (10 Dec 2022 05:40) (91 - 108)  BP: 103/66 (10 Dec 2022 05:40) (96/62 - 107/65)  BP(mean): --  RR: 18 (10 Dec 2022 05:40) (18 - 20)  SpO2: 97% (10 Dec 2022 05:40) (96% - 99%)    Parameters below as of 10 Dec 2022 05:40  Patient On (Oxygen Delivery Method): room air      Admit weight:   Daily     Daily Weight in k.5 (09 Dec 2022 10:00)    Intake / Output:    @ 07:01  -  12-10 @ 07:00  --------------------------------------------------------  IN: 2226 mL / OUT: 3150 mL / NET: -924 mL    Physical Exam:  Oropharynx: No erythema or ulcers  Oral Mucositis:                                                     stGstrstastdstest:st st1st CVS: S1, S2 RRR   Lungs: CTA throughout bilaterally   Abdomen: + BS x 4 normoactive, soft, NT, ND  Extremities: no edema   Gastric Mucositis:       -                                           Grade: n/a   Intestinal Mucositis:     -                                         Grade: n/a   Skin: +erythematous macular rash to neck, upper chest and back   TLC: CDI   Neuro: A&O x 3   Pain: denies      Labs:   CBC Full  -  ( 10 Dec 2022 07:38 )  WBC Count : 4.56 K/uL  Hemoglobin : 7.3 g/dL  Hematocrit : 22.9 %  Platelet Count - Automated : 165 K/uL  Mean Cell Volume : 91.2 fl  Mean Cell Hemoglobin : 29.1 pg  Mean Cell Hemoglobin Concentration : 31.9 gm/dL  Auto Neutrophil # : x  Auto Lymphocyte # : x  Auto Monocyte # : x  Auto Eosinophil # : x  Auto Basophil # : x  Auto Neutrophil % : x  Auto Lymphocyte % : x  Auto Monocyte % : x  Auto Eosinophil % : x  Auto Basophil % : x                          7.3    4.56  )-----------( 165      ( 10 Dec 2022 07:38 )             22.9         140  |  101  |  8   ----------------------------<  98  3.7   |  27  |  0.52    Ca    9.6      09 Dec 2022 07:08  Phos  3.4       Mg     1.7         TPro  6.3  /  Alb  3.9  /  TBili  0.1<L>  /  DBili  x   /  AST  29  /  ALT  45  /  AlkPhos  132<H>        LIVER FUNCTIONS - ( 09 Dec 2022 07:08 )  Alb: 3.9 g/dL / Pro: 6.3 g/dL / ALK PHOS: 132 U/L / ALT: 45 U/L / AST: 29 U/L / GGT: x               Cultures:     Blood cx () - NTD x2    Ucx () - Negative      Radiology:      from: Xray Chest 1 View- PORTABLE-Urgent (Xray Chest 1 View- PORTABLE-Urgent .) (22 @ 04:44)   IMPRESSION:  Clear lungs.    Meds:   Antimicrobials:   acyclovir   Oral Tab/Cap 400 milliGRAM(s) Oral every 8 hours  clotrimazole Lozenge 1 Lozenge Oral five times a day  fluconAZOLE   Tablet 400 milliGRAM(s) Oral daily      Heme / Onc:       GI:  pantoprazole    Tablet 40 milliGRAM(s) Oral before breakfast  polyethylene glycol 3350 17 Gram(s) Oral daily PRN  senna 2 Tablet(s) Oral at bedtime PRN  simethicone 80 milliGRAM(s) Chew every 4 hours PRN  sodium bicarbonate Mouth Rinse 10 milliLiter(s) Swish and Spit five times a day  ursodiol Capsule 300 milliGRAM(s) Oral two times a day      Cardiovascular:       Immunologic:       Other medications:   acetaminophen     Tablet .. 650 milliGRAM(s) Oral every 6 hours  Biotene Dry Mouth Oral Rinse 5 milliLiter(s) Swish and Spit five times a day  chlorhexidine 4% Liquid 1 Application(s) Topical <User Schedule>  dexAMETHasone  Injectable 4 milliGRAM(s) IV Push daily  FIRST- Mouthwash  BLM 10 milliLiter(s) Swish and Spit every 6 hours  FLUoxetine 20 milliGRAM(s) Oral daily  folic acid 1 milliGRAM(s) Oral daily  lidocaine/prilocaine Cream 1 Application(s) Topical daily  loratadine 10 milliGRAM(s) Oral daily  multivitamin 1 Tablet(s) Oral daily  nystatin Powder 1 Application(s) Topical two times a day  sodium chloride 0.45% 1000 milliLiter(s) IV Continuous <Continuous>  sodium chloride 0.9%. 1000 milliLiter(s) IV Continuous <Continuous>      PRN:   acetaminophen     Tablet .. 650 milliGRAM(s) Oral every 6 hours PRN  AQUAPHOR (petrolatum Ointment) 1 Application(s) Topical two times a day PRN  metoclopramide Injectable 10 milliGRAM(s) IV Push every 6 hours PRN  ondansetron Injectable 4 milliGRAM(s) IV Push every 6 hours PRN  polyethylene glycol 3350 17 Gram(s) Oral daily PRN  senna 2 Tablet(s) Oral at bedtime PRN  simethicone 80 milliGRAM(s) Chew every 4 hours PRN  sodium chloride 0.65% Nasal 1 Spray(s) Both Nostrils four times a day PRN  sodium chloride 0.9% lock flush 10 milliLiter(s) IV Push every 1 hour PRN  zinc oxide 40% Paste 1 Application(s) Topical three times a day PRN      A/P:  57 year old female with a history of  peripheral T cell lymphoma   Post :  Autologous PBSCT day + 15  - Completed  -16 24 hour continuous infusion, strict I&O, daily weights, prn diuresis    - Cytoxan day 3/.  - CTX /- continue CTX hydration for 24 hours post infusion of last dose. Strict I&O, daily weights, prn diuresis. Start cipro 500mg po BID when ANC < 500    Neutropenic. Started Cipro for prophylaxis. If febrile, panculture and start Cefepime (has tolerated in the past)   - Kepivance day 2/3  11/27- Kepivance day 3/3- HELD due to Kepivance rash and oral erythema worsening  - Febrile overnight, tmax 100.4. CXR no obvious consolidations. Cultures pending. Started on cefepime. Grade 2 chemotherapy induced oral mucositis. Continue supportive measures.    - Mild transaminitis, if continue to rise lower Diflucan dose.  - early engraftment. Continue Cefepime, zarxio for now    mouth pain resolved    engrafted. D/C cefepime (infectious work up has been negative), continue zarxio for now   D/C'd Zarxio, pulling TLC today, access mediport f/u cx's    1. Infectious Disease:   acyclovir   Oral Tab/Cap 400 milliGRAM(s) Oral every 8 hours  clotrimazole Lozenge 1 Lozenge Oral five times a day  fluconAZOLE   Tablet 400 milliGRAM(s) Oral daily    2. VOD Prophylaxis: Actigall, Glutamine, Heparin (dosed at 100 units / kg / day)     3. GI Prophylaxis:    pantoprazole    Tablet 40 milliGRAM(s) Oral before breakfast    4. Mouthcare - NS / NaHCO3 rinses, Mycelex, Biotene; Skin care     5. GVHD prophylaxis - n/a     6. Transfuse & replete electrolytes prn     7. IV hydration, daily weights, strict I&O, prn diuresis     8. PO intake as tolerated, nutrition follow up as needed, MVI, folic acid     9. Antiemetics, anti-diarrhea medications:   metoclopramide Injectable 10 milliGRAM(s) IV Push every 6 hours PRN    10. OOB as tolerated, physical therapy consult if needed     11. Monitor coags / fibrinogen 2x week, vitamin K as needed     12. Monitor closely for clinical changes, monitor for fevers     13. Emotional support provided, plan of care discussed and questions addressed     14. Patient education done regarding plan of care, restrictions and discharge planning     15. Continue regular social work input       I have written the above note for Dr. Cabrales who performed service with me in the room.   Elizabeth Zendejas [facility] ([phone])    I have seen and examined patient with NP, I agree with above note as scribed.                                    HPC Transplant Team                                                      Critical / Counseling Time Provided: 30 minutes                                                                                                                                                        Chief Complaint: Autologous peripheral blood stem cell transplant with high dose CBV prep regimen for treatment of peripheral T cell lymphoma    S: Patient seen and examined with HPC Transplant Team:  +intermittent nausea  +Loose stool  +dizziness  +generalized body aches  Rest of ROS negative      O: Vitals:   Vital Signs Last 24 Hrs  T(C): 37.6 (10 Dec 2022 06:49), Max: 39.7 (09 Dec 2022 16:40)  T(F): 99.7 (10 Dec 2022 06:49), Max: 103.5 (09 Dec 2022 16:40)  HR: 102 (10 Dec 2022 05:40) (91 - 108)  BP: 103/66 (10 Dec 2022 05:40) (96/62 - 107/65)  BP(mean): --  RR: 18 (10 Dec 2022 05:40) (18 - 20)  SpO2: 97% (10 Dec 2022 05:40) (96% - 99%)    Parameters below as of 10 Dec 2022 05:40  Patient On (Oxygen Delivery Method): room air      Admit weight:   Daily     Daily Weight in k.5 (09 Dec 2022 10:00)    Intake / Output:    @ 07:01  -  12-10 @ 07:00  --------------------------------------------------------  IN: 2226 mL / OUT: 3150 mL / NET: -924 mL    Physical Exam:  Oropharynx: No erythema or ulcers  Oral Mucositis:                -                                     stGstrstastdstest:st st1st CVS: S1, S2 RRR   Lungs: CTA throughout bilaterally   Abdomen: + BS x 4 normoactive, soft, NT, ND  Extremities: no edema   Gastric Mucositis:       -                                           Grade: n/a   Intestinal Mucositis:     -                                         Grade: n/a   Skin: +erythematous macular rash to neck, upper chest and back- improving  Lines: Mediport- clean, dry, intact  Neuro: A&O x 3   Pain: denies      Labs:   CBC Full  -  ( 10 Dec 2022 07:38 )  WBC Count : 4.56 K/uL  Hemoglobin : 7.3 g/dL  Hematocrit : 22.9 %  Platelet Count - Automated : 165 K/uL  Mean Cell Volume : 91.2 fl  Mean Cell Hemoglobin : 29.1 pg  Mean Cell Hemoglobin Concentration : 31.9 gm/dL  Auto Neutrophil # : x  Auto Lymphocyte # : x  Auto Monocyte # : x  Auto Eosinophil # : x  Auto Basophil # : x  Auto Neutrophil % : x  Auto Lymphocyte % : x  Auto Monocyte % : x  Auto Eosinophil % : x  Auto Basophil % : x                          7.3    4.56  )-----------( 165      ( 10 Dec 2022 07:38 )             22.9         140  |  101  |  8   ----------------------------<  98  3.7   |  27  |  0.52    Ca    9.6      09 Dec 2022 07:08  Phos  3.4       Mg     1.7         TPro  6.3  /  Alb  3.9  /  TBili  0.1<L>  /  DBili  x   /  AST  29  /  ALT  45  /  AlkPhos  132<H>        LIVER FUNCTIONS - ( 09 Dec 2022 07:08 )  Alb: 3.9 g/dL / Pro: 6.3 g/dL / ALK PHOS: 132 U/L / ALT: 45 U/L / AST: 29 U/L / GGT: x               Cultures:     Blood cx () - NTD x2    Ucx () - Negative      Radiology:      from: Xray Chest 1 View- PORTABLE-Urgent (Xray Chest 1 View- PORTABLE-Urgent .) (22 @ 04:44)   IMPRESSION:  Clear lungs.    Meds:   Antimicrobials:   acyclovir   Oral Tab/Cap 400 milliGRAM(s) Oral every 8 hours  clotrimazole Lozenge 1 Lozenge Oral five times a day  fluconAZOLE   Tablet 400 milliGRAM(s) Oral daily      Heme / Onc:       GI:  pantoprazole    Tablet 40 milliGRAM(s) Oral before breakfast  polyethylene glycol 3350 17 Gram(s) Oral daily PRN  senna 2 Tablet(s) Oral at bedtime PRN  simethicone 80 milliGRAM(s) Chew every 4 hours PRN  sodium bicarbonate Mouth Rinse 10 milliLiter(s) Swish and Spit five times a day  ursodiol Capsule 300 milliGRAM(s) Oral two times a day      Cardiovascular:       Immunologic:       Other medications:   acetaminophen     Tablet .. 650 milliGRAM(s) Oral every 6 hours  Biotene Dry Mouth Oral Rinse 5 milliLiter(s) Swish and Spit five times a day  chlorhexidine 4% Liquid 1 Application(s) Topical <User Schedule>  dexAMETHasone  Injectable 4 milliGRAM(s) IV Push daily  FIRST- Mouthwash  BLM 10 milliLiter(s) Swish and Spit every 6 hours  FLUoxetine 20 milliGRAM(s) Oral daily  folic acid 1 milliGRAM(s) Oral daily  lidocaine/prilocaine Cream 1 Application(s) Topical daily  loratadine 10 milliGRAM(s) Oral daily  multivitamin 1 Tablet(s) Oral daily  nystatin Powder 1 Application(s) Topical two times a day  sodium chloride 0.45% 1000 milliLiter(s) IV Continuous <Continuous>  sodium chloride 0.9%. 1000 milliLiter(s) IV Continuous <Continuous>      PRN:   acetaminophen     Tablet .. 650 milliGRAM(s) Oral every 6 hours PRN  AQUAPHOR (petrolatum Ointment) 1 Application(s) Topical two times a day PRN  metoclopramide Injectable 10 milliGRAM(s) IV Push every 6 hours PRN  ondansetron Injectable 4 milliGRAM(s) IV Push every 6 hours PRN  polyethylene glycol 3350 17 Gram(s) Oral daily PRN  senna 2 Tablet(s) Oral at bedtime PRN  simethicone 80 milliGRAM(s) Chew every 4 hours PRN  sodium chloride 0.65% Nasal 1 Spray(s) Both Nostrils four times a day PRN  sodium chloride 0.9% lock flush 10 milliLiter(s) IV Push every 1 hour PRN  zinc oxide 40% Paste 1 Application(s) Topical three times a day PRN      A/P:  57 year old female with a history of  peripheral T cell lymphoma   Post :  Autologous PBSCT day + 15  - Completed  -16 24 hour continuous infusion, strict I&O, daily weights, prn diuresis    - Cytoxan day 3/.  - CTX /- continue CTX hydration for 24 hours post infusion of last dose. Strict I&O, daily weights, prn diuresis. Start cipro 500mg po BID when ANC < 500    Neutropenic. Started Cipro for prophylaxis. If febrile, panculture and start Cefepime (has tolerated in the past)   - Kepivance day 2/3  - Kepivance day 3/3- HELD due to Kepivance rash and oral erythema worsening  - Febrile overnight, tmax 100.4. CXR no obvious consolidations. Cultures pending. Started on cefepime. Grade 2 chemotherapy induced oral mucositis. Continue supportive measures.    - Mild transaminitis, if continue to rise lower Diflucan dose.  - early engraftment. Continue Cefepime, zarxio for now    mouth pain resolved    engrafted. D/C cefepime (infectious work up has been negative), continue zarxio for now   D/C'd Zarxio, pulling TLC today, access mediport f/u cx's    1. Infectious Disease:   acyclovir   Oral Tab/Cap 400 milliGRAM(s) Oral every 8 hours  clotrimazole Lozenge 1 Lozenge Oral five times a day  fluconAZOLE   Tablet 400 milliGRAM(s) Oral daily    2. VOD Prophylaxis: Actigall, Glutamine, Heparin (dosed at 100 units / kg / day)     3. GI Prophylaxis:    pantoprazole    Tablet 40 milliGRAM(s) Oral before breakfast    4. Mouthcare - NS / NaHCO3 rinses, Mycelex, Biotene; Skin care     5. GVHD prophylaxis - n/a     6. Transfuse & replete electrolytes prn     7. IV hydration, daily weights, strict I&O, prn diuresis     8. PO intake as tolerated, nutrition follow up as needed, MVI, folic acid     9. Antiemetics, anti-diarrhea medications:   metoclopramide Injectable 10 milliGRAM(s) IV Push every 6 hours PRN    10. OOB as tolerated, physical therapy consult if needed     11. Monitor coags / fibrinogen 2x week, vitamin K as needed     12. Monitor closely for clinical changes, monitor for fevers     13. Emotional support provided, plan of care discussed and questions addressed     14. Patient education done regarding plan of care, restrictions and discharge planning     15. Continue regular social work input       I have written the above note for Dr. Cabrales who performed service with me in the room.   Elizabeth [facility]endejas [facility] ([phone])    I have seen and examined patient with NP, I agree with above note as scribed.

## 2022-12-10 NOTE — PROGRESS NOTE ADULT - CRITICAL CARE ATTENDING COMMENT
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   Today is Day + 14  Problem/Plan -   1:  Problem: Stem cell transplant   ·  Plan: 1. Admit to BMTU   2. TLC placement in IR   3. Day -9 - day -2 - antiemetics   4. Day -9 & day -8, VP16 2400mg/m2 IV x 34 hours (final concentration 0.4mg /mL).   5. Strict I&O, daily weights, prn diuresis   6. After completion of VP16- start CTX hydration (D51/2NS + 10mEq KCl @ 150ml / m2) - continue for 24 hours post infusion of CTX   7. Day -7 - day -4 - CTX 1800 mg / m2 daily over 2 hours. Follow SMA7, mag, phos 6hours post CT . [place]  12mg / kg - hemorrhagic cystitis ppx   8. Day -3 - BCNU 400mg / m2   9. Day -2 - day -1 - rest days  10. Day 0(11/25/22) - HPC transplant. Start Zarxio 480mcg SQ QD, continue through engraftment. Kepivance on days 0, 1 & 2   11. Anxiolytics, antinausea, antidiarrhea medications as needed   12. Lasix PRN while being aggressively hydrated to avoid VOD   13. Nutritional support, pain management.    Problem/Plan - 2:  ·  Problem: Need for prophylactic measure.   ·  Plan: 1. VOD prophylaxis - low dose heparin gtt (dosed at 100 units / kg / day), glutamine supplementation, Actigall BID   2. PCP prophylaxis - Bactrim DS through day -2    3. Antiviral prophylaxis - Acyclovir 400mg po TID to start day -1   4. Antifungal prophylaxis- Diflucan 400 mg po daily.  5. GI prophylaxis - Protonix po QD   6. Antibacterial prophylaxis - when ANC < 500, start Cipro 500mg po BID. If becomes febrile, pan cx, CXR and change Cipro to Cefepime 2g IV q 8 hours. Continue until count recovery  7. Kepivance for prevention of mucositis- days 0, +1, +2  8. Aggressive mouth care and skin care as per protocol. On 11/25, early mucositis on lower lip, no ulcers. Continue topical care.   9. Receiving transfusion and electrolyte support.    11/25-Chemotherapy induced nausea and diarrhea managed with antiemetics and antidiarrheals. Now patient states stool is loose rather than watery.   Continue Cipro, Acyclovir, Diflucan prophylaxis. OOB/ambulate. She received HPC transplant today without complications. Begin Zarxio daily.  11/26/22: continues to have loose stools overnight, this am with epistaxis lasting ~ 5 minutes. To receive platelets. Remains fatigued.  11/28- Febrile overnight, tmax 100.4. CXR- no consolidations. Cultures pending. Started on Cefepime. Grade 2 chemotherapy induced oral mucositis. Continue supportive care.  12/2- patient now afebrile; 11/28 cultures negative and CXR- negative. Continue Cefepime; on Acyclovir, Diflucan prophylaxis. Continue Zarxio daily, await engraftment.  12/5- early engraftment, continue Zarxio.  12/7- Neutrophil count > 1.0, afebrile, cultures negative. D/C Cefepime. Continue Zarxio daily.  12/9- Fevers with chills, ? engraftment fever. Check cultures, access Mediport and D/C TLC. Discontinue Zarxio with WBC recovery. 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   Today is Day + 15  Problem/Plan -   1:  Problem: Stem cell transplant   ·  Plan: 1. Admit to BMTU   2. TLC placement in IR   3. Day -9 - day -2 - antiemetics   4. Day -9 & day -8, VP16 2400mg/m2 IV x 34 hours (final concentration 0.4mg /mL).   5. Strict I&O, daily weights, prn diuresis   6. After completion of VP16- start CTX hydration (D51/2NS + 10mEq KCl @ 150ml / m2) - continue for 24 hours post infusion of CTX   7. Day -7 - day -4 - CTX 1800 mg / m2 daily over 2 hours. Follow SMA7, mag, phos 6hours post CT . [place]  12mg / kg - hemorrhagic cystitis ppx   8. Day -3 - BCNU 400mg / m2   9. Day -2 - day -1 - rest days  10. Day 0(11/25/22) - HPC transplant. Start Zarxio 480mcg SQ QD, continue through engraftment. Kepivance on days 0, 1 & 2   11. Anxiolytics, antinausea, antidiarrhea medications as needed   12. Lasix PRN while being aggressively hydrated to avoid VOD   13. Nutritional support, pain management.    Problem/Plan - 2:  ·  Problem: Need for prophylactic measure.   ·  Plan: 1. VOD prophylaxis - low dose heparin gtt (dosed at 100 units / kg / day), glutamine supplementation, Actigall BID   2. PCP prophylaxis - Bactrim DS through day -2; add Mepron on 12/10    3. Antiviral prophylaxis - Acyclovir 400mg po TID to start day -1   4. Antifungal prophylaxis- Diflucan 400 mg po daily.  5. GI prophylaxis - Protonix po QD   6. Antibacterial prophylaxis - when ANC < 500, start Cipro 500mg po BID. If becomes febrile, pan cx, CXR and change Cipro to Cefepime 2g IV q 8 hours. Continue until count recovery  7. Kepivance for prevention of mucositis- days 0, +1, +2  8. Aggressive mouth care and skin care as per protocol. On 11/25, early mucositis on lower lip, no ulcers. Continue topical care.   9. Receiving transfusion and electrolyte support.    11/25-Chemotherapy induced nausea and diarrhea managed with antiemetics and antidiarrheals. Now patient states stool is loose rather than watery.   Continue Cipro, Acyclovir, Diflucan prophylaxis. OOB/ambulate. She received HPC transplant today without complications. Begin Zarxio daily.  11/26/22: continues to have loose stools overnight, this am with epistaxis lasting ~ 5 minutes. To receive platelets. Remains fatigued.  11/28- Febrile overnight, tmax 100.4. CXR- no consolidations. Cultures pending. Started on Cefepime. Grade 2 chemotherapy induced oral mucositis. Continue supportive care.  12/2- patient now afebrile; 11/28 cultures negative and CXR- negative. Continue Cefepime; on Acyclovir, Diflucan prophylaxis. Continue Zarxio daily, await engraftment.  12/5- early engraftment, continue Zarxio.  12/7- Neutrophil count > 1.0, afebrile, cultures negative. D/C Cefepime. Continue Zarxio daily.  12/9- Fevers with chills, ? engraftment fever. Check cultures, access Mediport and D/C TLC. Discontinue Zarxio with WBC recovery.

## 2022-12-11 LAB
ALBUMIN SERPL ELPH-MCNC: 3.6 G/DL — SIGNIFICANT CHANGE UP (ref 3.3–5)
ALP SERPL-CCNC: 114 U/L — SIGNIFICANT CHANGE UP (ref 40–120)
ALT FLD-CCNC: 47 U/L — HIGH (ref 10–45)
ANION GAP SERPL CALC-SCNC: 11 MMOL/L — SIGNIFICANT CHANGE UP (ref 5–17)
APPEARANCE UR: CLEAR — SIGNIFICANT CHANGE UP
AST SERPL-CCNC: 34 U/L — SIGNIFICANT CHANGE UP (ref 10–40)
BACTERIA # UR AUTO: NEGATIVE — SIGNIFICANT CHANGE UP
BILIRUB SERPL-MCNC: 0.1 MG/DL — LOW (ref 0.2–1.2)
BILIRUB UR-MCNC: NEGATIVE — SIGNIFICANT CHANGE UP
BUN SERPL-MCNC: 10 MG/DL — SIGNIFICANT CHANGE UP (ref 7–23)
CALCIUM SERPL-MCNC: 8.8 MG/DL — SIGNIFICANT CHANGE UP (ref 8.4–10.5)
CHLORIDE SERPL-SCNC: 100 MMOL/L — SIGNIFICANT CHANGE UP (ref 96–108)
CO2 SERPL-SCNC: 26 MMOL/L — SIGNIFICANT CHANGE UP (ref 22–31)
COLOR SPEC: SIGNIFICANT CHANGE UP
CREAT SERPL-MCNC: 0.53 MG/DL — SIGNIFICANT CHANGE UP (ref 0.5–1.3)
DIFF PNL FLD: NEGATIVE — SIGNIFICANT CHANGE UP
EGFR: 109 ML/MIN/1.73M2 — SIGNIFICANT CHANGE UP
EPI CELLS # UR: 1 /HPF — SIGNIFICANT CHANGE UP
GLUCOSE SERPL-MCNC: 124 MG/DL — HIGH (ref 70–99)
GLUCOSE UR QL: ABNORMAL
HCT VFR BLD CALC: 22.9 % — LOW (ref 34.5–45)
HGB BLD-MCNC: 7.7 G/DL — LOW (ref 11.5–15.5)
HYALINE CASTS # UR AUTO: 1 /LPF — SIGNIFICANT CHANGE UP (ref 0–2)
KETONES UR-MCNC: NEGATIVE — SIGNIFICANT CHANGE UP
LDH SERPL L TO P-CCNC: 254 U/L — HIGH (ref 50–242)
LEUKOCYTE ESTERASE UR-ACNC: NEGATIVE — SIGNIFICANT CHANGE UP
MAGNESIUM SERPL-MCNC: 1.6 MG/DL — SIGNIFICANT CHANGE UP (ref 1.6–2.6)
MCHC RBC-ENTMCNC: 30.6 PG — SIGNIFICANT CHANGE UP (ref 27–34)
MCHC RBC-ENTMCNC: 33.6 GM/DL — SIGNIFICANT CHANGE UP (ref 32–36)
MCV RBC AUTO: 90.9 FL — SIGNIFICANT CHANGE UP (ref 80–100)
NITRITE UR-MCNC: NEGATIVE — SIGNIFICANT CHANGE UP
PH UR: 6.5 — SIGNIFICANT CHANGE UP (ref 5–8)
PHOSPHATE SERPL-MCNC: 3.7 MG/DL — SIGNIFICANT CHANGE UP (ref 2.5–4.5)
PLATELET # BLD AUTO: 191 K/UL — SIGNIFICANT CHANGE UP (ref 150–400)
POTASSIUM SERPL-MCNC: 3.5 MMOL/L — SIGNIFICANT CHANGE UP (ref 3.5–5.3)
POTASSIUM SERPL-SCNC: 3.5 MMOL/L — SIGNIFICANT CHANGE UP (ref 3.5–5.3)
PROT SERPL-MCNC: 6.2 G/DL — SIGNIFICANT CHANGE UP (ref 6–8.3)
PROT UR-MCNC: ABNORMAL
RAPID RVP RESULT: SIGNIFICANT CHANGE UP
RBC # BLD: 2.52 M/UL — LOW (ref 3.8–5.2)
RBC # FLD: 13.8 % — SIGNIFICANT CHANGE UP (ref 10.3–14.5)
RBC CASTS # UR COMP ASSIST: 0 /HPF — SIGNIFICANT CHANGE UP (ref 0–4)
SARS-COV-2 RNA SPEC QL NAA+PROBE: SIGNIFICANT CHANGE UP
SODIUM SERPL-SCNC: 137 MMOL/L — SIGNIFICANT CHANGE UP (ref 135–145)
SP GR SPEC: 1.01 — SIGNIFICANT CHANGE UP (ref 1.01–1.02)
UROBILINOGEN FLD QL: NEGATIVE — SIGNIFICANT CHANGE UP
WBC # BLD: 3.88 K/UL — SIGNIFICANT CHANGE UP (ref 3.8–10.5)
WBC # FLD AUTO: 3.88 K/UL — SIGNIFICANT CHANGE UP (ref 3.8–10.5)
WBC UR QL: 5 /HPF — SIGNIFICANT CHANGE UP (ref 0–5)

## 2022-12-11 PROCEDURE — 99233 SBSQ HOSP IP/OBS HIGH 50: CPT

## 2022-12-11 RX ORDER — ACETAMINOPHEN 500 MG
1000 TABLET ORAL ONCE
Refills: 0 | Status: COMPLETED | OUTPATIENT
Start: 2022-12-11 | End: 2022-12-11

## 2022-12-11 RX ADMIN — LIDOCAINE AND PRILOCAINE CREAM 1 APPLICATION(S): 25; 25 CREAM TOPICAL at 12:40

## 2022-12-11 RX ADMIN — Medication 1000 MILLIGRAM(S): at 06:25

## 2022-12-11 RX ADMIN — Medication 5 MILLILITER(S): at 20:13

## 2022-12-11 RX ADMIN — Medication 1 LOZENGE: at 00:14

## 2022-12-11 RX ADMIN — Medication 5 MILLILITER(S): at 00:14

## 2022-12-11 RX ADMIN — Medication 1 TABLET(S): at 12:35

## 2022-12-11 RX ADMIN — Medication 650 MILLIGRAM(S): at 00:15

## 2022-12-11 RX ADMIN — Medication 650 MILLIGRAM(S): at 00:19

## 2022-12-11 RX ADMIN — Medication 20 MILLIGRAM(S): at 15:35

## 2022-12-11 RX ADMIN — DIPHENHYDRAMINE HYDROCHLORIDE AND LIDOCAINE HYDROCHLORIDE AND ALUMINUM HYDROXIDE AND MAGNESIUM HYDRO 10 MILLILITER(S): KIT at 12:35

## 2022-12-11 RX ADMIN — Medication 10 MILLILITER(S): at 12:34

## 2022-12-11 RX ADMIN — SODIUM CHLORIDE 20 MILLILITER(S): 9 INJECTION INTRAMUSCULAR; INTRAVENOUS; SUBCUTANEOUS at 06:07

## 2022-12-11 RX ADMIN — Medication 650 MILLIGRAM(S): at 18:31

## 2022-12-11 RX ADMIN — Medication 1 LOZENGE: at 20:13

## 2022-12-11 RX ADMIN — Medication 400 MILLIGRAM(S): at 06:06

## 2022-12-11 RX ADMIN — Medication 1 LOZENGE: at 12:34

## 2022-12-11 RX ADMIN — Medication 650 MILLIGRAM(S): at 13:28

## 2022-12-11 RX ADMIN — URSODIOL 300 MILLIGRAM(S): 250 TABLET, FILM COATED ORAL at 17:16

## 2022-12-11 RX ADMIN — NYSTATIN CREAM 1 APPLICATION(S): 100000 CREAM TOPICAL at 06:37

## 2022-12-11 RX ADMIN — Medication 650 MILLIGRAM(S): at 18:01

## 2022-12-11 RX ADMIN — ATOVAQUONE 750 MILLIGRAM(S): 750 SUSPENSION ORAL at 17:15

## 2022-12-11 RX ADMIN — Medication 400 MILLIGRAM(S): at 13:04

## 2022-12-11 RX ADMIN — Medication 5 MILLILITER(S): at 08:35

## 2022-12-11 RX ADMIN — Medication 10 MILLILITER(S): at 08:35

## 2022-12-11 RX ADMIN — Medication 10 MILLILITER(S): at 20:13

## 2022-12-11 RX ADMIN — FLUCONAZOLE 400 MILLIGRAM(S): 150 TABLET ORAL at 12:40

## 2022-12-11 RX ADMIN — Medication 1 LOZENGE: at 15:35

## 2022-12-11 RX ADMIN — Medication 400 MILLIGRAM(S): at 22:15

## 2022-12-11 RX ADMIN — NYSTATIN CREAM 1 APPLICATION(S): 100000 CREAM TOPICAL at 17:16

## 2022-12-11 RX ADMIN — Medication 5 MILLILITER(S): at 15:35

## 2022-12-11 RX ADMIN — Medication 5 MILLILITER(S): at 12:34

## 2022-12-11 RX ADMIN — URSODIOL 300 MILLIGRAM(S): 250 TABLET, FILM COATED ORAL at 06:07

## 2022-12-11 RX ADMIN — CHLORHEXIDINE GLUCONATE 1 APPLICATION(S): 213 SOLUTION TOPICAL at 08:36

## 2022-12-11 RX ADMIN — Medication 10 MILLILITER(S): at 00:15

## 2022-12-11 RX ADMIN — Medication 4 MILLIGRAM(S): at 05:42

## 2022-12-11 RX ADMIN — DIPHENHYDRAMINE HYDROCHLORIDE AND LIDOCAINE HYDROCHLORIDE AND ALUMINUM HYDROXIDE AND MAGNESIUM HYDRO 10 MILLILITER(S): KIT at 17:16

## 2022-12-11 RX ADMIN — PANTOPRAZOLE SODIUM 40 MILLIGRAM(S): 20 TABLET, DELAYED RELEASE ORAL at 06:25

## 2022-12-11 RX ADMIN — Medication 400 MILLIGRAM(S): at 05:42

## 2022-12-11 RX ADMIN — Medication 1 LOZENGE: at 08:35

## 2022-12-11 RX ADMIN — ATOVAQUONE 750 MILLIGRAM(S): 750 SUSPENSION ORAL at 06:07

## 2022-12-11 RX ADMIN — CALAMINE AND ZINC OXIDE AND PHENOL 1 APPLICATION(S): 160; 10 LOTION TOPICAL at 00:17

## 2022-12-11 RX ADMIN — ONDANSETRON 4 MILLIGRAM(S): 8 TABLET, FILM COATED ORAL at 12:34

## 2022-12-11 RX ADMIN — Medication 650 MILLIGRAM(S): at 13:30

## 2022-12-11 RX ADMIN — Medication 1 MILLIGRAM(S): at 12:36

## 2022-12-11 RX ADMIN — Medication 10 MILLILITER(S): at 15:36

## 2022-12-11 RX ADMIN — LORATADINE 10 MILLIGRAM(S): 10 TABLET ORAL at 12:47

## 2022-12-11 NOTE — PROGRESS NOTE ADULT - CRITICAL CARE ATTENDING COMMENT
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   Today is Day + 15  Problem/Plan -   1:  Problem: Stem cell transplant   ·  Plan: 1. Admit to BMTU   2. TLC placement in IR   3. Day -9 - day -2 - antiemetics   4. Day -9 & day -8, VP16 2400mg/m2 IV x 34 hours (final concentration 0.4mg /mL).   5. Strict I&O, daily weights, prn diuresis   6. After completion of VP16- start CTX hydration (D51/2NS + 10mEq KCl @ 150ml / m2) - continue for 24 hours post infusion of CTX   7. Day -7 - day -4 - CTX 1800 mg / m2 daily over 2 hours. Follow SMA7, mag, phos 6hours post CT . [place]  12mg / kg - hemorrhagic cystitis ppx   8. Day -3 - BCNU 400mg / m2   9. Day -2 - day -1 - rest days  10. Day 0(11/25/22) - HPC transplant. Start Zarxio 480mcg SQ QD, continue through engraftment. Kepivance on days 0, 1 & 2   11. Anxiolytics, antinausea, antidiarrhea medications as needed   12. Lasix PRN while being aggressively hydrated to avoid VOD   13. Nutritional support, pain management.    Problem/Plan - 2:  ·  Problem: Need for prophylactic measure.   ·  Plan: 1. VOD prophylaxis - low dose heparin gtt (dosed at 100 units / kg / day), glutamine supplementation, Actigall BID   2. PCP prophylaxis - Bactrim DS through day -2; add Mepron on 12/10    3. Antiviral prophylaxis - Acyclovir 400mg po TID to start day -1   4. Antifungal prophylaxis- Diflucan 400 mg po daily.  5. GI prophylaxis - Protonix po QD   6. Antibacterial prophylaxis - when ANC < 500, start Cipro 500mg po BID. If becomes febrile, pan cx, CXR and change Cipro to Cefepime 2g IV q 8 hours. Continue until count recovery  7. Kepivance for prevention of mucositis- days 0, +1, +2  8. Aggressive mouth care and skin care as per protocol. On 11/25, early mucositis on lower lip, no ulcers. Continue topical care.   9. Receiving transfusion and electrolyte support.    11/25-Chemotherapy induced nausea and diarrhea managed with antiemetics and antidiarrheals. Now patient states stool is loose rather than watery.   Continue Cipro, Acyclovir, Diflucan prophylaxis. OOB/ambulate. She received HPC transplant today without complications. Begin Zarxio daily.  11/26/22: continues to have loose stools overnight, this am with epistaxis lasting ~ 5 minutes. To receive platelets. Remains fatigued.  11/28- Febrile overnight, tmax 100.4. CXR- no consolidations. Cultures pending. Started on Cefepime. Grade 2 chemotherapy induced oral mucositis. Continue supportive care.  12/2- patient now afebrile; 11/28 cultures negative and CXR- negative. Continue Cefepime; on Acyclovir, Diflucan prophylaxis. Continue Zarxio daily, await engraftment.  12/5- early engraftment, continue Zarxio.  12/7- Neutrophil count > 1.0, afebrile, cultures negative. D/C Cefepime. Continue Zarxio daily.  12/9- Fevers with chills, ? engraftment fever. Check cultures, access Mediport and D/C TLC. Discontinue Zarxio with WBC recovery. 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   Today is Day + 16  Patient has had fevers to low 38C  Some associated with chills.  No antibiotics at present, and RVP was ordered and is negative.  If clinically worsens, will reintroduce Cefepime.      Problem/Plan -   1:  Problem: Stem cell transplant   ·  Plan: 1. Admit to BMTU   2. TLC placement in IR   3. Day -9 - day -2 - antiemetics   4. Day -9 & day -8, VP16 2400mg/m2 IV x 34 hours (final concentration 0.4mg /mL).   5. Strict I&O, daily weights, prn diuresis   6. After completion of VP16- start CTX hydration (D51/2NS + 10mEq KCl @ 150ml / m2) - continue for 24 hours post infusion of CTX   7. Day -7 - day -4 - CTX 1800 mg / m2 daily over 2 hours. Follow SMA7, mag, phos 6hours post CT . [place]  12mg / kg - hemorrhagic cystitis ppx   8. Day -3 - BCNU 400mg / m2   9. Day -2 - day -1 - rest days  10. Day 0(11/25/22) - HPC transplant. Start Zarxio 480mcg SQ QD, continue through engraftment. Kepivance on days 0, 1 & 2   11. Anxiolytics, antinausea, antidiarrhea medications as needed   12. Lasix PRN while being aggressively hydrated to avoid VOD   13. Nutritional support, pain management.    Problem/Plan - 2:  ·  Problem: Need for prophylactic measure.   ·  Plan: 1. VOD prophylaxis - low dose heparin gtt (dosed at 100 units / kg / day), glutamine supplementation, Actigall BID   2. PCP prophylaxis - Bactrim DS through day -2; add Mepron on 12/10    3. Antiviral prophylaxis - Acyclovir 400mg po TID to start day -1   4. Antifungal prophylaxis- Diflucan 400 mg po daily.  5. GI prophylaxis - Protonix po QD   6. Antibacterial prophylaxis - when ANC < 500, start Cipro 500mg po BID. If becomes febrile, pan cx, CXR and change Cipro to Cefepime 2g IV q 8 hours. Continue until count recovery  7. Kepivance for prevention of mucositis- days 0, +1, +2  8. Aggressive mouth care and skin care as per protocol. On 11/25, early mucositis on lower lip, no ulcers. Continue topical care.   9. Receiving transfusion and electrolyte support.    11/25-Chemotherapy induced nausea and diarrhea managed with antiemetics and antidiarrheals. Now patient states stool is loose rather than watery.   Continue Cipro, Acyclovir, Diflucan prophylaxis. OOB/ambulate. She received HPC transplant today without complications. Begin Zarxio daily.  11/26/22: continues to have loose stools overnight, this am with epistaxis lasting ~ 5 minutes. To receive platelets. Remains fatigued.  11/28- Febrile overnight, tmax 100.4. CXR- no consolidations. Cultures pending. Started on Cefepime. Grade 2 chemotherapy induced oral mucositis. Continue supportive care.  12/2- patient now afebrile; 11/28 cultures negative and CXR- negative. Continue Cefepime; on Acyclovir, Diflucan prophylaxis. Continue Zarxio daily, await engraftment.  12/5- early engraftment, continue Zarxio.  12/7- Neutrophil count > 1.0, afebrile, cultures negative. D/C Cefepime. Continue Zarxio daily.  12/9- Fevers with chills, ? engraftment fever. Check cultures, access Mediport and D/C TLC. Discontinue Zarxio with WBC recovery.

## 2022-12-11 NOTE — PROGRESS NOTE ADULT - SUBJECTIVE AND OBJECTIVE BOX
HPC Transplant Team                                                      Critical / Counseling Time Provided: 30 minutes                                                                                                                                                        Chief Complaint: Autologous peripheral blood stem cell transplant with high dose CBV prep regimen for treatment of peripheral T cell lymphoma    S: Patient seen and examined with HPC Transplant Team:  +intermittent nausea  +Loose stool  +dizziness  +generalized body aches  Rest of ROS negative    O: Vitals:   Vital Signs Last 24 Hrs  T(C): 38.5 (11 Dec 2022 06:30), Max: 38.9 (11 Dec 2022 05:26)  T(F): 101.3 (11 Dec 2022 06:30), Max: 102 (11 Dec 2022 05:26)  HR: 102 (11 Dec 2022 05:) (79 - 102)  BP: 112/75 (11 Dec 2022 05:) (94/65 - 112/75)  BP(mean): --  RR: 20 (11 Dec 2022 05:26) (18 - 20)  SpO2: 99% (11 Dec 2022 05:) (98% - 100%)    Parameters below as of 11 Dec 2022 05:26  Patient On (Oxygen Delivery Method): room air  O2 Flow (L/min): 29      Admit weight:   Daily     Daily Weight in k.7 (10 Dec 2022 09:38)    Intake / Output:   12-10 @ 07:01  -  12- @ 07:00  --------------------------------------------------------  IN: 1349 mL / OUT: 1450 mL / NET: -101 mL      PE:   Oropharynx: No erythema or ulcers  Oral Mucositis:                -                                     stGstrstastdstest:st st1st CVS: S1, S2 RRR   Lungs: CTA throughout bilaterally   Abdomen: + BS x 4 normoactive, soft, NT, ND  Extremities: no edema   Gastric Mucositis:       -                                           Grade: n/a   Intestinal Mucositis:     -                                         Grade: n/a   Skin: +erythematous macular rash to neck, upper chest and back- improving  Lines: Mediport- clean, dry, intact  Neuro: A&O x 3   Pain: denies      Labs:   CBC Full  -  ( 11 Dec 2022 06:54 )  WBC Count : 3.88 K/uL  Hemoglobin : 7.7 g/dL  Hematocrit : 22.9 %  Platelet Count - Automated : 191 K/uL  Mean Cell Volume : 90.9 fl  Mean Cell Hemoglobin : 30.6 pg  Mean Cell Hemoglobin Concentration : 33.6 gm/dL  Auto Neutrophil # : x  Auto Lymphocyte # : x  Auto Monocyte # : x  Auto Eosinophil # : x  Auto Basophil # : x  Auto Neutrophil % : x  Auto Lymphocyte % : x  Auto Monocyte % : x  Auto Eosinophil % : x  Auto Basophil % : x                          7.7    3.88  )-----------( 191      ( 11 Dec 2022 06:54 )             22.9         137  |  100  |  10  ----------------------------<  124<H>  3.5   |  26  |  0.53    Ca    8.8      11 Dec 2022 06:54  Phos  3.7       Mg     1.6         TPro  6.2  /  Alb  3.6  /  TBili  0.1<L>  /  DBili  x   /  AST  34  /  ALT  47<H>  /  AlkPhos  114        LIVER FUNCTIONS - ( 11 Dec 2022 06:54 )  Alb: 3.6 g/dL / Pro: 6.2 g/dL / ALK PHOS: 114 U/L / ALT: 47 U/L / AST: 34 U/L / GGT: x           Lactate Dehydrogenase, Serum: 254 U/L ( @ 06:54)      Cultures:         Radiology:       Meds:   Antimicrobials:   acyclovir   Oral Tab/Cap 400 milliGRAM(s) Oral every 8 hours  atovaquone  Suspension 750 milliGRAM(s) Oral two times a day  clotrimazole Lozenge 1 Lozenge Oral five times a day  fluconAZOLE   Tablet 400 milliGRAM(s) Oral daily      Heme / Onc:       GI:  pantoprazole    Tablet 40 milliGRAM(s) Oral before breakfast  polyethylene glycol 3350 17 Gram(s) Oral daily PRN  senna 2 Tablet(s) Oral at bedtime PRN  simethicone 80 milliGRAM(s) Chew every 4 hours PRN  sodium bicarbonate Mouth Rinse 10 milliLiter(s) Swish and Spit five times a day  ursodiol Capsule 300 milliGRAM(s) Oral two times a day      Cardiovascular:       Immunologic:       Other medications:   acetaminophen     Tablet .. 650 milliGRAM(s) Oral every 6 hours  Biotene Dry Mouth Oral Rinse 5 milliLiter(s) Swish and Spit five times a day  chlorhexidine 4% Liquid 1 Application(s) Topical <User Schedule>  dexAMETHasone  Injectable 4 milliGRAM(s) IV Push daily  FIRST- Mouthwash  BLM 10 milliLiter(s) Swish and Spit every 6 hours  FLUoxetine 20 milliGRAM(s) Oral daily  folic acid 1 milliGRAM(s) Oral daily  lidocaine/prilocaine Cream 1 Application(s) Topical daily  loratadine 10 milliGRAM(s) Oral daily  multivitamin 1 Tablet(s) Oral daily  nystatin Powder 1 Application(s) Topical two times a day  sodium chloride 0.45% 1000 milliLiter(s) IV Continuous <Continuous>  sodium chloride 0.9%. 1000 milliLiter(s) IV Continuous <Continuous>      PRN:   acetaminophen     Tablet .. 650 milliGRAM(s) Oral every 6 hours PRN  AQUAPHOR (petrolatum Ointment) 1 Application(s) Topical two times a day PRN  metoclopramide Injectable 10 milliGRAM(s) IV Push every 6 hours PRN  ondansetron Injectable 4 milliGRAM(s) IV Push every 6 hours PRN  polyethylene glycol 3350 17 Gram(s) Oral daily PRN  senna 2 Tablet(s) Oral at bedtime PRN  simethicone 80 milliGRAM(s) Chew every 4 hours PRN  sodium chloride 0.65% Nasal 1 Spray(s) Both Nostrils four times a day PRN  sodium chloride 0.9% lock flush 10 milliLiter(s) IV Push every 1 hour PRN  zinc oxide 40% Paste 1 Application(s) Topical three times a day PRN      A/P:     57 year old female with a history of  peripheral T cell lymphoma   Post :  Autologous PBSCT day + 16  - Completed  -16 24 hour continuous infusion, strict I&O, daily weights, prn diuresis    - Cytoxan day 3/.  - CTX /- continue CTX hydration for 24 hours post infusion of last dose. Strict I&O, daily weights, prn diuresis. Start cipro 500mg po BID when ANC < 500    Neutropenic. Started Cipro for prophylaxis. If febrile, panculture and start Cefepime (has tolerated in the past)   - Kepivance day 2/3  11/27- Kepivance day 3/3- HELD due to Kepivance rash and oral erythema worsening  - Febrile overnight, tmax 100.4. CXR no obvious consolidations. Cultures pending. Started on cefepime. Grade 2 chemotherapy induced oral mucositis. Continue supportive measures.    - Mild transaminitis, if continue to rise lower Diflucan dose.  - early engraftment. Continue Cefepime, zarxio for now    mouth pain resolved    engrafted. D/C cefepime (infectious work up has been negative), continue zarxio for now   D/C'd Zarxio, pulling TLC today, access mediport f/u cx's    1. Infectious Disease:   acyclovir   Oral Tab/Cap 400 milliGRAM(s) Oral every 8 hours  clotrimazole Lozenge 1 Lozenge Oral five times a day  fluconAZOLE   Tablet 400 milliGRAM(s) Oral daily    2. VOD Prophylaxis: Actigall, Glutamine, Heparin (dosed at 100 units / kg / day)     3. GI Prophylaxis:    pantoprazole    Tablet 40 milliGRAM(s) Oral before breakfast    4. Mouthcare - NS / NaHCO3 rinses, Mycelex, Biotene; Skin care     5. GVHD prophylaxis - n/a     6. Transfuse & replete electrolytes prn     7. IV hydration, daily weights, strict I&O, prn diuresis     8. PO intake as tolerated, nutrition follow up as needed, MVI, folic acid     9. Antiemetics, anti-diarrhea medications:   metoclopramide Injectable 10 milliGRAM(s) IV Push every 6 hours PRN    10. OOB as tolerated, physical therapy consult if needed     11. Monitor coags / fibrinogen 2x week, vitamin K as needed     12. Monitor closely for clinical changes, monitor for fevers     13. Emotional support provided, plan of care discussed and questions addressed     14. Patient education done regarding plan of care, restrictions and discharge planning     15. Continue regular social work input       I have written the above note for Dr. Cabrales who performed service with me in the room.   Maday Becker  NP-C ([phone])    I have seen and examined patient with NP, I agree with above note as scribed.                    HPC Transplant Team                                                      Critical / Counseling Time Provided: 30 minutes                                                                                                                                                        Chief Complaint: Autologous peripheral blood stem cell transplant with high dose CBV prep regimen for treatment of peripheral T cell lymphoma    S: Patient seen and examined with HPC Transplant Team:  + Fever O/N  +intermittent nausea  +Loose stool, abdominal cramps  + rash in th eback  +generalized body aches, runny nose  Rest of ROS negative    O: Vitals:   Vital Signs Last 24 Hrs  T(C): 38.5 (11 Dec 2022 06:30), Max: 38.9 (11 Dec 2022 05:26)  T(F): 101.3 (11 Dec 2022 06:30), Max: 102 (11 Dec 2022 05:26)  HR: 102 (11 Dec 2022 05:) (79 - 102)  BP: 112/75 (11 Dec 2022 05:) (94/65 - 112/75)  BP(mean): --  RR: 20 (11 Dec 2022 05:26) (18 - 20)  SpO2: 99% (11 Dec 2022 05:) (98% - 100%)    Parameters below as of 11 Dec 2022 05:26  Patient On (Oxygen Delivery Method): room air  O2 Flow (L/min): 29      Admit weight:   Daily     Daily Weight in k.7 (10 Dec 2022 09:38)    Intake / Output:   12-10 @ 07:01  -   @ 07:00  --------------------------------------------------------  IN: 1349 mL / OUT: 1450 mL / NET: -101 mL      PE:   Oropharynx: No erythema or ulcers  Oral Mucositis:                -                                     stGstrstastdstest:st st1st CVS: S1, S2 RRR   Lungs: CTA throughout bilaterally   Abdomen: + BS x 4 normoactive, soft, NT, ND  Extremities: no edema   Gastric Mucositis:       -                                           Grade: n/a   Intestinal Mucositis:     -                                         Grade: n/a   Skin: +erythematous macular rash to neck, upper chest and back- improving, dry skin  Lines: Mediport- clean, dry, intact  Neuro: A&O x 3   Pain: denies      Labs:   CBC Full  -  ( 11 Dec 2022 06:54 )  WBC Count : 3.88 K/uL  Hemoglobin : 7.7 g/dL  Hematocrit : 22.9 %  Platelet Count - Automated : 191 K/uL  Mean Cell Volume : 90.9 fl  Mean Cell Hemoglobin : 30.6 pg  Mean Cell Hemoglobin Concentration : 33.6 gm/dL  Auto Neutrophil # : x  Auto Lymphocyte # : x  Auto Monocyte # : x  Auto Eosinophil # : x  Auto Basophil # : x  Auto Neutrophil % : x  Auto Lymphocyte % : x  Auto Monocyte % : x  Auto Eosinophil % : x  Auto Basophil % : x                          7.7    3.88  )-----------( 191      ( 11 Dec 2022 06:54 )             22.9     -    137  |  100  |  10  ----------------------------<  124<H>  3.5   |  26  |  0.53    Ca    8.8      11 Dec 2022 06:54  Phos  3.7       Mg     1.6         TPro  6.2  /  Alb  3.6  /  TBili  0.1<L>  /  DBili  x   /  AST  34  /  ALT  47<H>  /  AlkPhos  114        LIVER FUNCTIONS - ( 11 Dec 2022 06:54 )  Alb: 3.6 g/dL / Pro: 6.2 g/dL / ALK PHOS: 114 U/L / ALT: 47 U/L / AST: 34 U/L / GGT: x           Lactate Dehydrogenase, Serum: 254 U/L ( @ 06:54)      Cultures:         Radiology:       Meds:   Antimicrobials:   acyclovir   Oral Tab/Cap 400 milliGRAM(s) Oral every 8 hours  atovaquone  Suspension 750 milliGRAM(s) Oral two times a day  clotrimazole Lozenge 1 Lozenge Oral five times a day  fluconAZOLE   Tablet 400 milliGRAM(s) Oral daily      Heme / Onc:       GI:  pantoprazole    Tablet 40 milliGRAM(s) Oral before breakfast  polyethylene glycol 3350 17 Gram(s) Oral daily PRN  senna 2 Tablet(s) Oral at bedtime PRN  simethicone 80 milliGRAM(s) Chew every 4 hours PRN  sodium bicarbonate Mouth Rinse 10 milliLiter(s) Swish and Spit five times a day  ursodiol Capsule 300 milliGRAM(s) Oral two times a day      Cardiovascular:       Immunologic:       Other medications:   acetaminophen     Tablet .. 650 milliGRAM(s) Oral every 6 hours  Biotene Dry Mouth Oral Rinse 5 milliLiter(s) Swish and Spit five times a day  chlorhexidine 4% Liquid 1 Application(s) Topical <User Schedule>  dexAMETHasone  Injectable 4 milliGRAM(s) IV Push daily  FIRST- Mouthwash  BLM 10 milliLiter(s) Swish and Spit every 6 hours  FLUoxetine 20 milliGRAM(s) Oral daily  folic acid 1 milliGRAM(s) Oral daily  lidocaine/prilocaine Cream 1 Application(s) Topical daily  loratadine 10 milliGRAM(s) Oral daily  multivitamin 1 Tablet(s) Oral daily  nystatin Powder 1 Application(s) Topical two times a day  sodium chloride 0.45% 1000 milliLiter(s) IV Continuous <Continuous>  sodium chloride 0.9%. 1000 milliLiter(s) IV Continuous <Continuous>      PRN:   acetaminophen     Tablet .. 650 milliGRAM(s) Oral every 6 hours PRN  AQUAPHOR (petrolatum Ointment) 1 Application(s) Topical two times a day PRN  metoclopramide Injectable 10 milliGRAM(s) IV Push every 6 hours PRN  ondansetron Injectable 4 milliGRAM(s) IV Push every 6 hours PRN  polyethylene glycol 3350 17 Gram(s) Oral daily PRN  senna 2 Tablet(s) Oral at bedtime PRN  simethicone 80 milliGRAM(s) Chew every 4 hours PRN  sodium chloride 0.65% Nasal 1 Spray(s) Both Nostrils four times a day PRN  sodium chloride 0.9% lock flush 10 milliLiter(s) IV Push every 1 hour PRN  zinc oxide 40% Paste 1 Application(s) Topical three times a day PRN      A/P:     57 year old female with a history of  peripheral T cell lymphoma   Post :  Autologous PBSCT day + 16  - Completed  -16 24 hour continuous infusion, strict I&O, daily weights, prn diuresis    - Cytoxan day 3/.  - CTX - continue CTX hydration for 24 hours post infusion of last dose. Strict I&O, daily weights, prn diuresis. Start cipro 500mg po BID when ANC < 500    Neutropenic. Started Cipro for prophylaxis. If febrile, panculture and start Cefepime (has tolerated in the past)   - Kepivance day 2/3  11/27- Kepivance day 3/3- HELD due to Kepivance rash and oral erythema worsening  - Febrile overnight, tmax 100.4. CXR no obvious consolidations. Cultures pending. Started on cefepime. Grade 2 chemotherapy induced oral mucositis. Continue supportive measures.    - Mild transaminitis, if continue to rise lower Diflucan dose.  - early engraftment. Continue Cefepime, zarxio for now    mouth pain resolved    engrafted. D/C cefepime (infectious work up has been negative), continue zarxio for now   D/C'd Zarxio, pulling TLC today, access mediport f/u cx's    1. Infectious Disease:   acyclovir   Oral Tab/Cap 400 milliGRAM(s) Oral every 8 hours  clotrimazole Lozenge 1 Lozenge Oral five times a day  fluconAZOLE   Tablet 400 milliGRAM(s) Oral daily    2. VOD Prophylaxis: Actigall, Glutamine, Heparin (dosed at 100 units / kg / day)     3. GI Prophylaxis:    pantoprazole    Tablet 40 milliGRAM(s) Oral before breakfast    4. Mouthcare - NS / NaHCO3 rinses, Mycelex, Biotene; Skin care     5. GVHD prophylaxis - n/a     6. Transfuse & replete electrolytes prn     7. IV hydration, daily weights, strict I&O, prn diuresis     8. PO intake as tolerated, nutrition follow up as needed, MVI, folic acid     9. Antiemetics, anti-diarrhea medications:   metoclopramide Injectable 10 milliGRAM(s) IV Push every 6 hours PRN    10. OOB as tolerated, physical therapy consult if needed     11. Monitor coags / fibrinogen 2x week, vitamin K as needed     12. Monitor closely for clinical changes, monitor for fevers     13. Emotional support provided, plan of care discussed and questions addressed     14. Patient education done regarding plan of care, restrictions and discharge planning     15. Continue regular social work input       I have written the above note for Dr. Cabrales who performed service with me in the room.   Maday Becker  NP-C ([phone])    I have seen and examined patient with NP, I agree with above note as scribed.                    HPC Transplant Team                                                      Critical / Counseling Time Provided: 30 minutes                                                                                                                                                        Chief Complaint: Autologous peripheral blood stem cell transplant with high dose CBV prep regimen for treatment of peripheral T cell lymphoma    S: Patient seen and examined with HPC Transplant Team:  + Fever O/N  +intermittent nausea  +Loose stool, abdominal cramps  + rash in the back  +generalized body aches, runny nose  Rest of ROS negative    O: Vitals:   Vital Signs Last 24 Hrs  T(C): 38.5 (11 Dec 2022 06:30), Max: 38.9 (11 Dec 2022 05:26)  T(F): 101.3 (11 Dec 2022 06:30), Max: 102 (11 Dec 2022 05:26)  HR: 102 (11 Dec 2022 05:) (79 - 102)  BP: 112/75 (11 Dec 2022 05:) (94/65 - 112/75)  BP(mean): --  RR: 20 (11 Dec 2022 05:26) (18 - 20)  SpO2: 99% (11 Dec 2022 05:) (98% - 100%)    Parameters below as of 11 Dec 2022 05:26  Patient On (Oxygen Delivery Method): room air  O2 Flow (L/min): 29    Admit weight: 80.3 kg  Daily Weight in k.2 kg (10 Dec 2022 09:38)    Intake / Output:   -10 @ 07:01  -   @ 07:00  --------------------------------------------------------  IN: 1349 mL / OUT: 1450 mL / NET: -101 mL    PE:   Oropharynx: No erythema or ulcers  Oral Mucositis:                -                                     stGstrstastdstest:st st1st CVS: S1, S2 RRR   Lungs: CTA throughout bilaterally   Abdomen: + BS x 4 normoactive, soft, NT, ND  Extremities: no edema   Gastric Mucositis:       -                                           Grade: n/a   Intestinal Mucositis:     -                                         Grade: n/a   Skin: +erythematous macular rash to neck, upper chest and back- improving, dry skin  Lines: Mediport- clean, dry, intact  Neuro: A&O x 3   Pain: denies    Labs:   CBC Full  -  ( 11 Dec 2022 06:54 )  WBC Count : 3.88 K/uL  Hemoglobin : 7.7 g/dL  Hematocrit : 22.9 %  Platelet Count - Automated : 191 K/uL  Mean Cell Volume : 90.9 fl  Mean Cell Hemoglobin : 30.6 pg  Mean Cell Hemoglobin Concentration : 33.6 gm/dL  Auto Neutrophil # : x  Auto Lymphocyte # : x  Auto Monocyte # : x  Auto Eosinophil # : x  Auto Basophil # : x  Auto Neutrophil % : x  Auto Lymphocyte % : x  Auto Monocyte % : x  Auto Eosinophil % : x  Auto Basophil % : x                        7.7    3.88  )-----------( 191      ( 11 Dec 2022 06:54 )             22.9     12-    137  |  100  |  10  ----------------------------<  124<H>  3.5   |  26  |  0.53    Ca    8.8      11 Dec 2022 06:54  Phos  3.7     12  Mg     1.6         TPro  6.2  /  Alb  3.6  /  TBili  0.1<L>  /  DBili  x   /  AST  34  /  ALT  47<H>  /  AlkPhos  114  -      LIVER FUNCTIONS - ( 11 Dec 2022 06:54 )  Alb: 3.6 g/dL / Pro: 6.2 g/dL / ALK PHOS: 114 U/L / ALT: 47 U/L / AST: 34 U/L / GGT: x           Lactate Dehydrogenase, Serum: 254 U/L ( @ 06:54)    Cultures:   Culture - Blood (22 @ 13:45)    Specimen Source: .Blood Port Device    Culture Results:   No growth to date.    Culture - Blood (22 @ 01:48)    Specimen Source: .Blood Blood-Catheter    Culture Results:   No growth to date.    Radiology:   Xray Chest 1 View- PORTABLE-Urgent (Xray Chest 1 View- PORTABLE-Urgent .) (22 @ 18:23) >  Impression:  Clear lungs.    Meds:   Antimicrobials:   acyclovir   Oral Tab/Cap 400 milliGRAM(s) Oral every 8 hours  atovaquone  Suspension 750 milliGRAM(s) Oral two times a day  clotrimazole Lozenge 1 Lozenge Oral five times a day  fluconAZOLE   Tablet 400 milliGRAM(s) Oral daily    GI:  pantoprazole    Tablet 40 milliGRAM(s) Oral before breakfast  polyethylene glycol 3350 17 Gram(s) Oral daily PRN  senna 2 Tablet(s) Oral at bedtime PRN  simethicone 80 milliGRAM(s) Chew every 4 hours PRN  sodium bicarbonate Mouth Rinse 10 milliLiter(s) Swish and Spit five times a day  ursodiol Capsule 300 milliGRAM(s) Oral two times a day    Other medications:   acetaminophen     Tablet .. 650 milliGRAM(s) Oral every 6 hours  Biotene Dry Mouth Oral Rinse 5 milliLiter(s) Swish and Spit five times a day  chlorhexidine 4% Liquid 1 Application(s) Topical <User Schedule>  dexAMETHasone  Injectable 4 milliGRAM(s) IV Push daily  FIRST- Mouthwash  BLM 10 milliLiter(s) Swish and Spit every 6 hours  FLUoxetine 20 milliGRAM(s) Oral daily  folic acid 1 milliGRAM(s) Oral daily  lidocaine/prilocaine Cream 1 Application(s) Topical daily  loratadine 10 milliGRAM(s) Oral daily  multivitamin 1 Tablet(s) Oral daily  nystatin Powder 1 Application(s) Topical two times a day  sodium chloride 0.45% 1000 milliLiter(s) IV Continuous <Continuous>  sodium chloride 0.9%. 1000 milliLiter(s) IV Continuous <Continuous>    PRN:   acetaminophen     Tablet .. 650 milliGRAM(s) Oral every 6 hours PRN  AQUAPHOR (petrolatum Ointment) 1 Application(s) Topical two times a day PRN  metoclopramide Injectable 10 milliGRAM(s) IV Push every 6 hours PRN  ondansetron Injectable 4 milliGRAM(s) IV Push every 6 hours PRN  polyethylene glycol 3350 17 Gram(s) Oral daily PRN  senna 2 Tablet(s) Oral at bedtime PRN  simethicone 80 milliGRAM(s) Chew every 4 hours PRN  sodium chloride 0.65% Nasal 1 Spray(s) Both Nostrils four times a day PRN  sodium chloride 0.9% lock flush 10 milliLiter(s) IV Push every 1 hour PRN  zinc oxide 40% Paste 1 Application(s) Topical three times a day PRN    A/P:     57 year old female with a history of  peripheral T cell lymphoma   Post :  Autologous PBSCT day + - Completed  -16 24 hour continuous infusion, strict I&O, daily weights, prn diuresis    - Cytoxan day 3/.  - CTX - continue CTX hydration for 24 hours post infusion of last dose. Strict I&O, daily weights, prn diuresis. Start cipro 500mg po BID when ANC < 500    Neutropenic. Started Cipro for prophylaxis. If febrile, panculture and start Cefepime (has tolerated in the past)   - Kepivance day 2/3  11/27- Kepivance day 3/3- HELD due to Kepivance rash and oral erythema worsening  - Febrile overnight, tmax 100.4. CXR no obvious consolidations. Cultures pending. Started on cefepime. Grade 2 chemotherapy induced oral mucositis. Continue supportive measures.    - Mild transaminitis, if continue to rise lower Diflucan dose.  - early engraftment. Continue Cefepime, zarxio for now    mouth pain resolved    engrafted. D/C cefepime (infectious work up has been negative), continue zarxio for now   D/C'd Zarxio, pulling TLC today, access mediport f/u cx's  - Febrile, not neutropenic, RVP/COVID (-).    1. Infectious Disease:   acyclovir   Oral Tab/Cap 400 milliGRAM(s) Oral every 8 hours  atovaquone  Suspension 750 milliGRAM(s) Oral two times a day  clotrimazole Lozenge 1 Lozenge Oral five times a day  fluconAZOLE   Tablet 400 milliGRAM(s) Oral daily    2. VOD Prophylaxis: Actigall, Glutamine.    3. GI Prophylaxis:    pantoprazole    Tablet 40 milliGRAM(s) Oral before breakfast    4. Mouth care - NS / NaHCO3 rinses, Mycelex, Biotene; Skin care     5. GVHD prophylaxis - n/a     6. Transfuse & replete electrolytes prn     7. IV hydration, daily weights, strict I&O, prn diuresis     8. PO intake as tolerated, nutrition follow up as needed, MVI, folic acid     9. Antiemetics, anti-diarrhea medications:   metoclopramide Injectable 10 milliGRAM(s) IV Push every 6 hours PRN    10. OOB as tolerated, physical therapy consult if needed     11. Monitor coags / fibrinogen 2x week, vitamin K as needed     12. Monitor closely for clinical changes, monitor for fevers     13. Emotional support provided, plan of care discussed and questions addressed     14. Patient education done regarding plan of care, restrictions and discharge planning     15. Continue regular social work input     I have written the above note for Dr. Mock who performed service with me in the room.   Maday Becker  NP-C ([phone])    I have seen and examined patient with NP, I agree with above note as scribed.

## 2022-12-11 NOTE — CHART NOTE - NSCHARTNOTEFT_GEN_A_CORE
Fevers overnight  Event Summary:   Notified by RN patient with fever, Temp 38.9C.   Patient seen and examined patient at bedside.  Patient is alert, c/o of chills otherwise denies HA,, CP, SOB, cough, dysuria, visual disturbances or abd pain.      Vital Signs Last 24 Hrs  T(C): 38.5 (11 Dec 2022 06:30), Max: 38.9 (11 Dec 2022 05:26)  T(F): 101.3 (11 Dec 2022 06:30), Max: 102 (11 Dec 2022 05:26)  HR: 102 (11 Dec 2022 05:26) (79 - 102)  BP: 112/75 (11 Dec 2022 05:26) (94/65 - 112/75)  BP(mean): --  RR: 20 (11 Dec 2022 05:26) (18 - 20)  SpO2: 99% (11 Dec 2022 05:26) (98% - 100%)    Parameters below as of 11 Dec 2022 05:26  Patient On (Oxygen Delivery Method): room air  O2 Flow (L/min): 29    Culture - Blood (12.09.22 @ 13:45)   Specimen Source: .Blood Port Device   Culture Results:     Culture - Blood (12.08.22 @ 01:47)   Specimen Source: .Blood Blood-Catheter   Culture Results:   No growth to date.     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 Hematologic history as follows: initially presented on 3/4/22 with right axilla mass to general surgeon. Biopsy on 3/22/22 showed atypical lymphoid infiltrate. She later had an excisional biopsy on 5/16/22 which confirmed peripheral T cell lymphoma. She has no complaints on admission.  (16 Nov 2022 12:00) Patient with fevers and rigors overnight.    Plan  Tylenol IV  cooling measures    Will endorse to primary day team, attending to follow    Wendy Martins NP  [facility] [id number]

## 2022-12-12 LAB
ALBUMIN SERPL ELPH-MCNC: 3.8 G/DL — SIGNIFICANT CHANGE UP (ref 3.3–5)
ALP SERPL-CCNC: 113 U/L — SIGNIFICANT CHANGE UP (ref 40–120)
ALT FLD-CCNC: 52 U/L — HIGH (ref 10–45)
ANION GAP SERPL CALC-SCNC: 11 MMOL/L — SIGNIFICANT CHANGE UP (ref 5–17)
ANISOCYTOSIS BLD QL: SLIGHT — SIGNIFICANT CHANGE UP
APTT BLD: 22.3 SEC — LOW (ref 27.5–35.5)
AST SERPL-CCNC: 43 U/L — HIGH (ref 10–40)
BASOPHILS # BLD AUTO: 0.06 K/UL — SIGNIFICANT CHANGE UP (ref 0–0.2)
BASOPHILS NFR BLD AUTO: 1.7 % — SIGNIFICANT CHANGE UP (ref 0–2)
BILIRUB SERPL-MCNC: 0.2 MG/DL — SIGNIFICANT CHANGE UP (ref 0.2–1.2)
BLASTS # FLD: 1.7 % — HIGH (ref 0–0)
BLD GP AB SCN SERPL QL: NEGATIVE — SIGNIFICANT CHANGE UP
BUN SERPL-MCNC: 7 MG/DL — SIGNIFICANT CHANGE UP (ref 7–23)
CALCIUM SERPL-MCNC: 9.3 MG/DL — SIGNIFICANT CHANGE UP (ref 8.4–10.5)
CHLORIDE SERPL-SCNC: 101 MMOL/L — SIGNIFICANT CHANGE UP (ref 96–108)
CO2 SERPL-SCNC: 27 MMOL/L — SIGNIFICANT CHANGE UP (ref 22–31)
CREAT SERPL-MCNC: 0.54 MG/DL — SIGNIFICANT CHANGE UP (ref 0.5–1.3)
DACRYOCYTES BLD QL SMEAR: SLIGHT — SIGNIFICANT CHANGE UP
EGFR: 109 ML/MIN/1.73M2 — SIGNIFICANT CHANGE UP
EOSINOPHIL # BLD AUTO: 0 K/UL — SIGNIFICANT CHANGE UP (ref 0–0.5)
EOSINOPHIL NFR BLD AUTO: 0 % — SIGNIFICANT CHANGE UP (ref 0–6)
FIBRINOGEN PPP-MCNC: 329 MG/DL — SIGNIFICANT CHANGE UP (ref 200–445)
GLUCOSE SERPL-MCNC: 110 MG/DL — HIGH (ref 70–99)
HCT VFR BLD CALC: 23.4 % — LOW (ref 34.5–45)
HGB BLD-MCNC: 7.5 G/DL — LOW (ref 11.5–15.5)
HYPOCHROMIA BLD QL: SLIGHT — SIGNIFICANT CHANGE UP
INR BLD: 1.15 RATIO — SIGNIFICANT CHANGE UP (ref 0.88–1.16)
LDH SERPL L TO P-CCNC: 266 U/L — HIGH (ref 50–242)
LYMPHOCYTES # BLD AUTO: 0.44 K/UL — LOW (ref 1–3.3)
LYMPHOCYTES # BLD AUTO: 13.1 % — SIGNIFICANT CHANGE UP (ref 13–44)
MAGNESIUM SERPL-MCNC: 1.7 MG/DL — SIGNIFICANT CHANGE UP (ref 1.6–2.6)
MANUAL SMEAR VERIFICATION: SIGNIFICANT CHANGE UP
MCHC RBC-ENTMCNC: 29.5 PG — SIGNIFICANT CHANGE UP (ref 27–34)
MCHC RBC-ENTMCNC: 32.1 GM/DL — SIGNIFICANT CHANGE UP (ref 32–36)
MCV RBC AUTO: 92.1 FL — SIGNIFICANT CHANGE UP (ref 80–100)
METAMYELOCYTES # FLD: 1.7 % — HIGH (ref 0–0)
MICROCYTES BLD QL: SLIGHT — SIGNIFICANT CHANGE UP
MONOCYTES # BLD AUTO: 0.41 K/UL — SIGNIFICANT CHANGE UP (ref 0–0.9)
MONOCYTES NFR BLD AUTO: 12.2 % — SIGNIFICANT CHANGE UP (ref 2–14)
MYELOCYTES NFR BLD: 1.7 % — HIGH (ref 0–0)
NEUTROPHILS # BLD AUTO: 2.28 K/UL — SIGNIFICANT CHANGE UP (ref 1.8–7.4)
NEUTROPHILS NFR BLD AUTO: 60.9 % — SIGNIFICANT CHANGE UP (ref 43–77)
NEUTS BAND # BLD: 7 % — SIGNIFICANT CHANGE UP (ref 0–8)
NRBC # BLD: 1 /100 — HIGH (ref 0–0)
OVALOCYTES BLD QL SMEAR: SLIGHT — SIGNIFICANT CHANGE UP
PHOSPHATE SERPL-MCNC: 4.3 MG/DL — SIGNIFICANT CHANGE UP (ref 2.5–4.5)
PLAT MORPH BLD: NORMAL — SIGNIFICANT CHANGE UP
PLATELET # BLD AUTO: 213 K/UL — SIGNIFICANT CHANGE UP (ref 150–400)
POIKILOCYTOSIS BLD QL AUTO: SLIGHT — SIGNIFICANT CHANGE UP
POTASSIUM SERPL-MCNC: 3.3 MMOL/L — LOW (ref 3.5–5.3)
POTASSIUM SERPL-SCNC: 3.3 MMOL/L — LOW (ref 3.5–5.3)
PROT SERPL-MCNC: 6.3 G/DL — SIGNIFICANT CHANGE UP (ref 6–8.3)
PROTHROM AB SERPL-ACNC: 13.3 SEC — SIGNIFICANT CHANGE UP (ref 10.5–13.4)
RBC # BLD: 2.54 M/UL — LOW (ref 3.8–5.2)
RBC # FLD: 14.4 % — SIGNIFICANT CHANGE UP (ref 10.3–14.5)
RBC BLD AUTO: ABNORMAL
RH IG SCN BLD-IMP: POSITIVE — SIGNIFICANT CHANGE UP
SODIUM SERPL-SCNC: 139 MMOL/L — SIGNIFICANT CHANGE UP (ref 135–145)
TOXIC GRANULES BLD QL SMEAR: PRESENT — SIGNIFICANT CHANGE UP
WBC # BLD: 3.36 K/UL — LOW (ref 3.8–10.5)
WBC # FLD AUTO: 3.36 K/UL — LOW (ref 3.8–10.5)

## 2022-12-12 PROCEDURE — 99291 CRITICAL CARE FIRST HOUR: CPT

## 2022-12-12 PROCEDURE — 71250 CT THORAX DX C-: CPT | Mod: 26

## 2022-12-12 RX ORDER — POTASSIUM CHLORIDE 20 MEQ
20 PACKET (EA) ORAL
Refills: 0 | Status: COMPLETED | OUTPATIENT
Start: 2022-12-12 | End: 2022-12-12

## 2022-12-12 RX ORDER — ACETAMINOPHEN 500 MG
650 TABLET ORAL ONCE
Refills: 0 | Status: COMPLETED | OUTPATIENT
Start: 2022-12-12 | End: 2022-12-12

## 2022-12-12 RX ORDER — ACETAMINOPHEN 500 MG
1000 TABLET ORAL ONCE
Refills: 0 | Status: COMPLETED | OUTPATIENT
Start: 2022-12-12 | End: 2022-12-12

## 2022-12-12 RX ADMIN — Medication 20 MILLIEQUIVALENT(S): at 09:57

## 2022-12-12 RX ADMIN — Medication 20 MILLIEQUIVALENT(S): at 12:05

## 2022-12-12 RX ADMIN — DIPHENHYDRAMINE HYDROCHLORIDE AND LIDOCAINE HYDROCHLORIDE AND ALUMINUM HYDROXIDE AND MAGNESIUM HYDRO 10 MILLILITER(S): KIT at 17:32

## 2022-12-12 RX ADMIN — Medication 5 MILLILITER(S): at 12:04

## 2022-12-12 RX ADMIN — Medication 50 MILLIEQUIVALENT(S): at 16:00

## 2022-12-12 RX ADMIN — SODIUM CHLORIDE 20 MILLILITER(S): 9 INJECTION INTRAMUSCULAR; INTRAVENOUS; SUBCUTANEOUS at 06:02

## 2022-12-12 RX ADMIN — Medication 10 MILLILITER(S): at 16:41

## 2022-12-12 RX ADMIN — Medication 10 MILLILITER(S): at 08:30

## 2022-12-12 RX ADMIN — DIPHENHYDRAMINE HYDROCHLORIDE AND LIDOCAINE HYDROCHLORIDE AND ALUMINUM HYDROXIDE AND MAGNESIUM HYDRO 10 MILLILITER(S): KIT at 12:05

## 2022-12-12 RX ADMIN — NYSTATIN CREAM 1 APPLICATION(S): 100000 CREAM TOPICAL at 06:03

## 2022-12-12 RX ADMIN — Medication 1 LOZENGE: at 16:41

## 2022-12-12 RX ADMIN — Medication 20 MILLIEQUIVALENT(S): at 16:42

## 2022-12-12 RX ADMIN — NYSTATIN CREAM 1 APPLICATION(S): 100000 CREAM TOPICAL at 17:32

## 2022-12-12 RX ADMIN — LORATADINE 10 MILLIGRAM(S): 10 TABLET ORAL at 12:07

## 2022-12-12 RX ADMIN — Medication 650 MILLIGRAM(S): at 15:00

## 2022-12-12 RX ADMIN — Medication 1000 MILLIGRAM(S): at 05:43

## 2022-12-12 RX ADMIN — Medication 10 MILLILITER(S): at 12:04

## 2022-12-12 RX ADMIN — URSODIOL 300 MILLIGRAM(S): 250 TABLET, FILM COATED ORAL at 17:33

## 2022-12-12 RX ADMIN — Medication 20 MILLIGRAM(S): at 12:06

## 2022-12-12 RX ADMIN — Medication 5 MILLILITER(S): at 08:30

## 2022-12-12 RX ADMIN — Medication 650 MILLIGRAM(S): at 05:48

## 2022-12-12 RX ADMIN — Medication 1 LOZENGE: at 20:49

## 2022-12-12 RX ADMIN — Medication 50 MILLIEQUIVALENT(S): at 12:03

## 2022-12-12 RX ADMIN — Medication 1 LOZENGE: at 08:30

## 2022-12-12 RX ADMIN — ATOVAQUONE 750 MILLIGRAM(S): 750 SUSPENSION ORAL at 17:33

## 2022-12-12 RX ADMIN — ATOVAQUONE 750 MILLIGRAM(S): 750 SUSPENSION ORAL at 05:48

## 2022-12-12 RX ADMIN — Medication 50 MILLIEQUIVALENT(S): at 09:56

## 2022-12-12 RX ADMIN — Medication 10 MILLILITER(S): at 00:13

## 2022-12-12 RX ADMIN — Medication 1 LOZENGE: at 12:04

## 2022-12-12 RX ADMIN — Medication 400 MILLIGRAM(S): at 05:47

## 2022-12-12 RX ADMIN — FLUCONAZOLE 400 MILLIGRAM(S): 150 TABLET ORAL at 12:06

## 2022-12-12 RX ADMIN — URSODIOL 300 MILLIGRAM(S): 250 TABLET, FILM COATED ORAL at 05:47

## 2022-12-12 RX ADMIN — Medication 4 MILLIGRAM(S): at 05:47

## 2022-12-12 RX ADMIN — Medication 1 LOZENGE: at 00:13

## 2022-12-12 RX ADMIN — Medication 5 MILLILITER(S): at 20:48

## 2022-12-12 RX ADMIN — CHLORHEXIDINE GLUCONATE 1 APPLICATION(S): 213 SOLUTION TOPICAL at 09:56

## 2022-12-12 RX ADMIN — Medication 5 MILLILITER(S): at 16:40

## 2022-12-12 RX ADMIN — Medication 400 MILLIGRAM(S): at 21:24

## 2022-12-12 RX ADMIN — Medication 400 MILLIGRAM(S): at 14:00

## 2022-12-12 RX ADMIN — Medication 10 MILLILITER(S): at 20:49

## 2022-12-12 RX ADMIN — Medication 1 TABLET(S): at 12:05

## 2022-12-12 RX ADMIN — Medication 400 MILLIGRAM(S): at 00:13

## 2022-12-12 RX ADMIN — PANTOPRAZOLE SODIUM 40 MILLIGRAM(S): 20 TABLET, DELAYED RELEASE ORAL at 06:02

## 2022-12-12 RX ADMIN — Medication 1 MILLIGRAM(S): at 12:05

## 2022-12-12 RX ADMIN — Medication 650 MILLIGRAM(S): at 05:55

## 2022-12-12 RX ADMIN — Medication 5 MILLILITER(S): at 00:13

## 2022-12-12 NOTE — CHART NOTE - NSCHARTNOTEFT_GEN_A_CORE
MEDICINE PA    Notified by RN patient with temperature 102F tympanic. Seen and examined patient at bedside. Patient is alert, nontoxic appearing and in NAD. Denies HA, CP, SOB, cough, N/V, or abd pain.    VITAL SIGNS:  T(C): 38.9 (12-12-22 @ 00:07), Max: 38.9 (12-11-22 @ 05:26)  HR: 100 (12-12-22 @ 00:07) (83 - 102)  BP: 114/77 (12-12-22 @ 00:07) (100/66 - 114/77)  RR: 20 (12-12-22 @ 00:07) (18 - 20)  SpO2: 97% (12-12-22 @ 00:07) (97% - 100%)  Wt(kg): --      LABORATORY:                          7.7    3.88  )-----------( 191      ( 11 Dec 2022 06:54 )             22.9       12-11    137  |  100  |  10  ----------------------------<  124<H>  3.5   |  26  |  0.53    Ca    8.8      11 Dec 2022 06:54  Phos  3.7     12-11  Mg     1.6     12-11    TPro  6.2  /  Alb  3.6  /  TBili  0.1<L>  /  DBili  x   /  AST  34  /  ALT  47<H>  /  AlkPhos  114  12-11          MICROBIOLOGY:     Culture - Blood (collected 09 Dec 2022 13:45)  Source: .Blood Port Device  Preliminary Report (10 Dec 2022 19:01):    No growth to date.        RADIOLOGY:  < from: Xray Chest 1 View- PORTABLE-Urgent (Xray Chest 1 View- PORTABLE-Urgent .) (12.09.22 @ 18:23) >      ACC: [id number] EXAM:  XR CHEST PORTABLE URGENT 1V                          PROCEDURE DATE:  12/09/2022          INTERPRETATION:  INDICATION: Febrile.    COMPARISON: 12/8/22    Technique: AP radiograph of the chest    FINDINGS:  Right chest wall MediPort tip in lower SVC.  Heart/Vascular: Cardiomediastinal silhouette is within normal limits.  Pulmonary: Lungs are clear. No pleural effusion or pneumothorax.  Bones: No acute bony finding.  Other: Right axillary surgical clips redemonstrated.    Impression:  Clear lungs.        --- End of Report ---      JOSE BARNEY MD; Attending Radiologist  This document has been electronically signed. Dec 10 2022  1:52PM    < end of copied text >            PHYSICAL EXAM:    Constitutional: AOx3. NAD.    Respiratory: clear lungs bilaterally. No wheezing, rhonchi, or crackles.    Cardiovascular: S1 S2. No murmurs.    Gastrointestinal: BS X4 active. soft. nontender.    Extremities/Vascular: +2 pulses bilaterally. No BLE edema.      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 Hematologic history as follows: initially presented on 3/4/22 with right axilla mass to general surgeon. Biopsy on 3/22/22 showed atypical lymphoid infiltrate. She later had an excisional biopsy on 5/16/22 which confirmed peripheral T cell lymphoma. She has no complaints on admission.  (16 Nov 2022 12:00)        1) Fever  -tylenol and cooling measures prn for pyrexia  - cultures pending from 12/11/22  -c/w antibiotics overnight  - c/w monitoring overnight  -F/U primary team in [facility]  Spectra #[id number] MEDICINE PA    Notified by RN patient with temperature 102F tympanic. Seen and examined patient at bedside. Patient is alert, nontoxic appearing and in NAD. Denies HA, CP, SOB, cough, N/V, or abd pain.    VITAL SIGNS:  T(C): 38.9 (12-12-22 @ 00:07), Max: 38.9 (12-11-22 @ 05:26)  HR: 100 (12-12-22 @ 00:07) (83 - 102)  BP: 114/77 (12-12-22 @ 00:07) (100/66 - 114/77)  RR: 20 (12-12-22 @ 00:07) (18 - 20)  SpO2: 97% (12-12-22 @ 00:07) (97% - 100%)  Wt(kg): --      LABORATORY:                          7.7    3.88  )-----------( 191      ( 11 Dec 2022 06:54 )             22.9       12-11    137  |  100  |  10  ----------------------------<  124<H>  3.5   |  26  |  0.53    Ca    8.8      11 Dec 2022 06:54  Phos  3.7     12-11  Mg     1.6     12-11    TPro  6.2  /  Alb  3.6  /  TBili  0.1<L>  /  DBili  x   /  AST  34  /  ALT  47<H>  /  AlkPhos  114  12-11          MICROBIOLOGY:     Culture - Blood (collected 09 Dec 2022 13:45)  Source: .Blood Port Device  Preliminary Report (10 Dec 2022 19:01):    No growth to date.        RADIOLOGY:  < from: Xray Chest 1 View- PORTABLE-Urgent (Xray Chest 1 View- PORTABLE-Urgent .) (12.09.22 @ 18:23) >      ACC: [id number] EXAM:  XR CHEST PORTABLE URGENT 1V                          PROCEDURE DATE:  12/09/2022          INTERPRETATION:  INDICATION: Febrile.    COMPARISON: 12/8/22    Technique: AP radiograph of the chest    FINDINGS:  Right chest wall MediPort tip in lower SVC.  Heart/Vascular: Cardiomediastinal silhouette is within normal limits.  Pulmonary: Lungs are clear. No pleural effusion or pneumothorax.  Bones: No acute bony finding.  Other: Right axillary surgical clips redemonstrated.    Impression:  Clear lungs.        --- End of Report ---      JOSE BARNEY MD; Attending Radiologist  This document has been electronically signed. Dec 10 2022  1:52PM    < end of copied text >            PHYSICAL EXAM:    Constitutional: AOx3. NAD.    Respiratory: clear lungs bilaterally. No wheezing, rhonchi, or crackles.    Cardiovascular: S1 S2. No murmurs.    Gastrointestinal: BS X4 active. soft. nontender.    Extremities/Vascular: +2 pulses bilaterally. No BLE edema.      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 Hematologic history as follows: initially presented on 3/4/22 with right axilla mass to general surgeon. Biopsy on 3/22/22 showed atypical lymphoid infiltrate. She later had an excisional biopsy on 5/16/22 which confirmed peripheral T cell lymphoma. She has no complaints on admission.  (16 Nov 2022 12:00)        1) Fever  -tylenol and cooling measures prn for pyrexia  - cultures pending from 12/11/22  -c/w antibiotics overnight  - c/w monitoring overnight  -F/U primary team in AM        ADDENDUM: 12/12/22- 6:48 AM  RN to PA- Patient febrile again overnight 100.4F tympanic. Patient received 650 acetaminophen and has no complaints at this time. Will c/w monitoring overnight and realy to primary team in AM.     [facility]  [id number]

## 2022-12-12 NOTE — PROGRESS NOTE ADULT - SUBJECTIVE AND OBJECTIVE BOX
HPC Transplant Team                                                      Critical / Counseling Time Provided: 30 minutes                                                                                                                                                        Chief Complaint: Autologous peripheral blood stem cell transplant with high dose CBV prep regimen for treatment of peripheral T cell lymphoma    S: Patient seen and examined with HPC Transplant Team:  + Fever O/N  +intermittent nausea  +Loose stool, abdominal cramps  + rash in the back  +generalized body aches, runny nose  Rest of ROS negative    O: Vitals:   Vital Signs Last 24 Hrs  T(C): 38 (12 Dec 2022 05:44), Max: 38.9 (12 Dec 2022 00:07)  T(F): 100.4 (12 Dec 2022 05:44), Max: 102 (12 Dec 2022 00:07)  HR: 82 (12 Dec 2022 05:44) (82 - 100)  BP: 111/70 (12 Dec 2022 05:44) (100/66 - 114/77)  BP(mean): --  RR: 20 (12 Dec 2022 05:44) (18 - 20)  SpO2: 97% (12 Dec 2022 05:44) (97% - 100%)    Parameters below as of 12 Dec 2022 05:44  Patient On (Oxygen Delivery Method): room air        Admit weight:   Daily     Daily Weight in k.2 (11 Dec 2022 09:02)    Intake / Output:    @ 07:01  -   @ 07:00  --------------------------------------------------------  IN: 1771 mL / OUT: 3050 mL / NET: -1279 mL        PE:   Oropharynx: No erythema or ulcers  Oral Mucositis:                -                                     stGstrstastdstest:st st1st CVS: S1, S2 RRR   Lungs: CTA throughout bilaterally   Abdomen: + BS x 4 normoactive, soft, NT, ND  Extremities: no edema   Gastric Mucositis:       -                                           Grade: n/a   Intestinal Mucositis:     -                                         Grade: n/a   Skin: +erythematous macular rash to neck, upper chest and back- improving, dry skin  Lines: Mediport- clean, dry, intact  Neuro: A&O x 3   Pain: denies    Labs:   CBC Full  -  ( 12 Dec 2022 06:49 )  WBC Count : 3.36 K/uL  Hemoglobin : 7.5 g/dL  Hematocrit : 23.4 %  Platelet Count - Automated : 213 K/uL  Mean Cell Volume : 92.1 fl  Mean Cell Hemoglobin : 29.5 pg  Mean Cell Hemoglobin Concentration : 32.1 gm/dL  Auto Neutrophil # : x  Auto Lymphocyte # : x  Auto Monocyte # : x  Auto Eosinophil # : x  Auto Basophil # : x  Auto Neutrophil % : x  Auto Lymphocyte % : x  Auto Monocyte % : x  Auto Eosinophil % : x  Auto Basophil % : x                          7.5    3.36  )-----------( 213      ( 12 Dec 2022 06:49 )             23.4         139  |  101  |  7   ----------------------------<  110<H>  3.3<L>   |  27  |  0.54    Ca    9.3      12 Dec 2022 06:52  Phos  4.3       Mg     1.7         TPro  6.3  /  Alb  3.8  /  TBili  0.2  /  DBili  x   /  AST  43<H>  /  ALT  52<H>  /  AlkPhos  113        LIVER FUNCTIONS - ( 12 Dec 2022 06:52 )  Alb: 3.8 g/dL / Pro: 6.3 g/dL / ALK PHOS: 113 U/L / ALT: 52 U/L / AST: 43 U/L / GGT: x           Lactate Dehydrogenase, Serum: 266 U/L ( @ 06:52)    Cultures:   Culture - Blood (22 @ 13:45)    Specimen Source: .Blood Port Device    Culture Results:   No growth to date.    Culture - Blood (22 @ 01:48)    Specimen Source: .Blood Blood-Catheter    Culture Results:   No growth to date.    Radiology:   Xray Chest 1 View- PORTABLE-Urgent (Xray Chest 1 View- PORTABLE-Urgent .) (22 @ 18:23) >  Impression:  Clear lungs.    Meds:   Antimicrobials:   acyclovir   Oral Tab/Cap 400 milliGRAM(s) Oral every 8 hours  atovaquone  Suspension 750 milliGRAM(s) Oral two times a day  clotrimazole Lozenge 1 Lozenge Oral five times a day  fluconAZOLE   Tablet 400 milliGRAM(s) Oral daily      Heme / Onc:       GI:  pantoprazole    Tablet 40 milliGRAM(s) Oral before breakfast  polyethylene glycol 3350 17 Gram(s) Oral daily PRN  senna 2 Tablet(s) Oral at bedtime PRN  simethicone 80 milliGRAM(s) Chew every 4 hours PRN  sodium bicarbonate Mouth Rinse 10 milliLiter(s) Swish and Spit five times a day  ursodiol Capsule 300 milliGRAM(s) Oral two times a day      Cardiovascular:       Immunologic:       Other medications:   acetaminophen     Tablet .. 650 milliGRAM(s) Oral every 6 hours  Biotene Dry Mouth Oral Rinse 5 milliLiter(s) Swish and Spit five times a day  chlorhexidine 4% Liquid 1 Application(s) Topical <User Schedule>  dexAMETHasone  Injectable 4 milliGRAM(s) IV Push daily  FIRST- Mouthwash  BLM 10 milliLiter(s) Swish and Spit every 6 hours  FLUoxetine 20 milliGRAM(s) Oral daily  folic acid 1 milliGRAM(s) Oral daily  lidocaine/prilocaine Cream 1 Application(s) Topical daily  loratadine 10 milliGRAM(s) Oral daily  multivitamin 1 Tablet(s) Oral daily  nystatin Powder 1 Application(s) Topical two times a day  sodium chloride 0.45% 1000 milliLiter(s) IV Continuous <Continuous>  sodium chloride 0.9%. 1000 milliLiter(s) IV Continuous <Continuous>      PRN:   acetaminophen     Tablet .. 650 milliGRAM(s) Oral every 6 hours PRN  AQUAPHOR (petrolatum Ointment) 1 Application(s) Topical two times a day PRN  metoclopramide Injectable 10 milliGRAM(s) IV Push every 6 hours PRN  ondansetron Injectable 4 milliGRAM(s) IV Push every 6 hours PRN  polyethylene glycol 3350 17 Gram(s) Oral daily PRN  senna 2 Tablet(s) Oral at bedtime PRN  simethicone 80 milliGRAM(s) Chew every 4 hours PRN  sodium chloride 0.65% Nasal 1 Spray(s) Both Nostrils four times a day PRN  sodium chloride 0.9% lock flush 10 milliLiter(s) IV Push every 1 hour PRN  zinc oxide 40% Paste 1 Application(s) Topical three times a day PRN            A/P:     57 year old female with a history of  peripheral T cell lymphoma   Post :  Autologous PBSCT day + - Completed  -16 24 hour continuous infusion, strict I&O, daily weights, prn diuresis    - Cytoxan day 3.  - CTX - continue CTX hydration for 24 hours post infusion of last dose. Strict I&O, daily weights, prn diuresis. Start cipro 500mg po BID when ANC < 500    Neutropenic. Started Cipro for prophylaxis. If febrile, panculture and start Cefepime (has tolerated in the past)   - Kepivance day 2/3  11/27- Kepivance day 3/3- HELD due to Kepivance rash and oral erythema worsening  - Febrile overnight, tmax 100.4. CXR no obvious consolidations. Cultures pending. Started on cefepime. Grade 2 chemotherapy induced oral mucositis. Continue supportive measures.    - Mild transaminitis, if continue to rise lower Diflucan dose.  - early engraftment. Continue Cefepime, zarxio for now    mouth pain resolved    engrafted. D/C cefepime (infectious work up has been negative), continue zarxio for now   D/C'd Zarxio, pulling TLC today, access mediport f/u cx's  - Febrile, not neutropenic, RVP/COVID (-).    1. Infectious Disease:   acyclovir   Oral Tab/Cap 400 milliGRAM(s) Oral every 8 hours  atovaquone  Suspension 750 milliGRAM(s) Oral two times a day  clotrimazole Lozenge 1 Lozenge Oral five times a day  fluconAZOLE   Tablet 400 milliGRAM(s) Oral daily    2. VOD Prophylaxis: Actigall, Glutamine.    3. GI Prophylaxis:    pantoprazole    Tablet 40 milliGRAM(s) Oral before breakfast    4. Mouth care - NS / NaHCO3 rinses, Mycelex, Biotene; Skin care     5. GVHD prophylaxis - n/a     6. Transfuse & replete electrolytes prn     7. IV hydration, daily weights, strict I&O, prn diuresis     8. PO intake as tolerated, nutrition follow up as needed, MVI, folic acid     9. Antiemetics, anti-diarrhea medications:   metoclopramide Injectable 10 milliGRAM(s) IV Push every 6 hours PRN    10. OOB as tolerated, physical therapy consult if needed     11. Monitor coags / fibrinogen 2x week, vitamin K as needed     12. Monitor closely for clinical changes, monitor for fevers     13. Emotional support provided, plan of care discussed and questions addressed     14. Patient education done regarding plan of care, restrictions and discharge planning     15. Continue regular social work input     I have written the above note for Dr. Cabrales who performed service with me in the room.   Elizabeth Zendejas [facility] ([phone])    I have seen and examined patient with NP, I agree with above note as scribed.                                  HPC Transplant Team                                                      Critical / Counseling Time Provided: 30 minutes                                                                                                                                                        Chief Complaint: Autologous peripheral blood stem cell transplant with high dose CBV prep regimen for treatment of peripheral T cell lymphoma    S: Patient seen and examined with HPC Transplant Team:  + Fever O/N  +intermittent nausea  +Loose stool, abdominal cramps  + rash in the back  +generalized body aches, runny nose  Rest of ROS negative    O: Vitals:   Vital Signs Last 24 Hrs  T(C): 38 (12 Dec 2022 05:44), Max: 38.9 (12 Dec 2022 00:07)  T(F): 100.4 (12 Dec 2022 05:44), Max: 102 (12 Dec 2022 00:07)  HR: 82 (12 Dec 2022 05:44) (82 - 100)  BP: 111/70 (12 Dec 2022 05:44) (100/66 - 114/77)  BP(mean): --  RR: 20 (12 Dec 2022 05:44) (18 - 20)  SpO2: 97% (12 Dec 2022 05:44) (97% - 100%)    Parameters below as of 12 Dec 2022 05:44  Patient On (Oxygen Delivery Method): room air        Admit weight:   Daily     Daily Weight in k.2 (11 Dec 2022 09:02)    Intake / Output:    @ 07:01  -   @ 07:00  --------------------------------------------------------  IN: 1771 mL / OUT: 3050 mL / NET: -1279 mL        PE:   Oropharynx: No erythema or ulcers  Oral Mucositis:                -                                     stGstrstastdstest:st st1st CVS: S1, S2 RRR   Lungs: CTA throughout bilaterally   Abdomen: + BS x 4 normoactive, soft, NT, ND  Extremities: no edema   Gastric Mucositis:       -                                           Grade: n/a   Intestinal Mucositis:     -                                         Grade: n/a   Skin: +erythematous macular rash to neck, upper chest and back- improving, dry skin  Lines: Mediport- clean, dry, intact  Neuro: A&O x 3   Pain: denies    Labs:   CBC Full  -  ( 12 Dec 2022 06:49 )  WBC Count : 3.36 K/uL  Hemoglobin : 7.5 g/dL  Hematocrit : 23.4 %  Platelet Count - Automated : 213 K/uL  Mean Cell Volume : 92.1 fl  Mean Cell Hemoglobin : 29.5 pg  Mean Cell Hemoglobin Concentration : 32.1 gm/dL  Auto Neutrophil # : x  Auto Lymphocyte # : x  Auto Monocyte # : x  Auto Eosinophil # : x  Auto Basophil # : x  Auto Neutrophil % : x  Auto Lymphocyte % : x  Auto Monocyte % : x  Auto Eosinophil % : x  Auto Basophil % : x                          7.5    3.36  )-----------( 213      ( 12 Dec 2022 06:49 )             23.4         139  |  101  |  7   ----------------------------<  110<H>  3.3<L>   |  27  |  0.54    Ca    9.3      12 Dec 2022 06:52  Phos  4.3       Mg     1.7         TPro  6.3  /  Alb  3.8  /  TBili  0.2  /  DBili  x   /  AST  43<H>  /  ALT  52<H>  /  AlkPhos  113        LIVER FUNCTIONS - ( 12 Dec 2022 06:52 )  Alb: 3.8 g/dL / Pro: 6.3 g/dL / ALK PHOS: 113 U/L / ALT: 52 U/L / AST: 43 U/L / GGT: x           Lactate Dehydrogenase, Serum: 266 U/L ( @ 06:52)    Cultures:   Culture - Blood (22 @ 13:45)    Specimen Source: .Blood Port Device    Culture Results:   No growth to date.    Culture - Blood (22 @ 01:48)    Specimen Source: .Blood Blood-Catheter    Culture Results:   No growth to date.    Radiology:   Xray Chest 1 View- PORTABLE-Urgent (Xray Chest 1 View- PORTABLE-Urgent .) (22 @ 18:23) >  Impression:  Clear lungs.    Meds:   Antimicrobials:   acyclovir   Oral Tab/Cap 400 milliGRAM(s) Oral every 8 hours  atovaquone  Suspension 750 milliGRAM(s) Oral two times a day  clotrimazole Lozenge 1 Lozenge Oral five times a day  fluconAZOLE   Tablet 400 milliGRAM(s) Oral daily      Heme / Onc:       GI:  pantoprazole    Tablet 40 milliGRAM(s) Oral before breakfast  polyethylene glycol 3350 17 Gram(s) Oral daily PRN  senna 2 Tablet(s) Oral at bedtime PRN  simethicone 80 milliGRAM(s) Chew every 4 hours PRN  sodium bicarbonate Mouth Rinse 10 milliLiter(s) Swish and Spit five times a day  ursodiol Capsule 300 milliGRAM(s) Oral two times a day      Cardiovascular:       Immunologic:       Other medications:   acetaminophen     Tablet .. 650 milliGRAM(s) Oral every 6 hours  Biotene Dry Mouth Oral Rinse 5 milliLiter(s) Swish and Spit five times a day  chlorhexidine 4% Liquid 1 Application(s) Topical <User Schedule>  dexAMETHasone  Injectable 4 milliGRAM(s) IV Push daily  FIRST- Mouthwash  BLM 10 milliLiter(s) Swish and Spit every 6 hours  FLUoxetine 20 milliGRAM(s) Oral daily  folic acid 1 milliGRAM(s) Oral daily  lidocaine/prilocaine Cream 1 Application(s) Topical daily  loratadine 10 milliGRAM(s) Oral daily  multivitamin 1 Tablet(s) Oral daily  nystatin Powder 1 Application(s) Topical two times a day  sodium chloride 0.45% 1000 milliLiter(s) IV Continuous <Continuous>  sodium chloride 0.9%. 1000 milliLiter(s) IV Continuous <Continuous>      PRN:   acetaminophen     Tablet .. 650 milliGRAM(s) Oral every 6 hours PRN  AQUAPHOR (petrolatum Ointment) 1 Application(s) Topical two times a day PRN  metoclopramide Injectable 10 milliGRAM(s) IV Push every 6 hours PRN  ondansetron Injectable 4 milliGRAM(s) IV Push every 6 hours PRN  polyethylene glycol 3350 17 Gram(s) Oral daily PRN  senna 2 Tablet(s) Oral at bedtime PRN  simethicone 80 milliGRAM(s) Chew every 4 hours PRN  sodium chloride 0.65% Nasal 1 Spray(s) Both Nostrils four times a day PRN  sodium chloride 0.9% lock flush 10 milliLiter(s) IV Push every 1 hour PRN  zinc oxide 40% Paste 1 Application(s) Topical three times a day PRN            A/P:     57 year old female with a history of  peripheral T cell lymphoma   Post :  Autologous PBSCT day + - Completed  -16 24 hour continuous infusion, strict I&O, daily weights, prn diuresis    - Cytoxan day 3.  - CTX - continue CTX hydration for 24 hours post infusion of last dose. Strict I&O, daily weights, prn diuresis. Start cipro 500mg po BID when ANC < 500    Neutropenic. Started Cipro for prophylaxis. If febrile, panculture and start Cefepime (has tolerated in the past)   - Kepivance day 2/3  11/27- Kepivance day 3/3- HELD due to Kepivance rash and oral erythema worsening  - Febrile overnight, tmax 100.4. CXR no obvious consolidations. Cultures pending. Started on cefepime. Grade 2 chemotherapy induced oral mucositis. Continue supportive measures.    - Mild transaminitis, if continue to rise lower Diflucan dose.  - early engraftment. Continue Cefepime, zarxio for now    mouth pain resolved    engrafted. D/C cefepime (infectious work up has been negative), continue zarxio for now   D/C'd Zarxio, pulling TLC today, access mediport f/u cx's  - Febrile, not neutropenic, RVP/COVID (-).    1. Infectious Disease:   acyclovir   Oral Tab/Cap 400 milliGRAM(s) Oral every 8 hours  atovaquone  Suspension 750 milliGRAM(s) Oral two times a day  clotrimazole Lozenge 1 Lozenge Oral five times a day  fluconAZOLE   Tablet 400 milliGRAM(s) Oral daily    2. VOD Prophylaxis: Actigall, Glutamine.    3. GI Prophylaxis:    pantoprazole    Tablet 40 milliGRAM(s) Oral before breakfast    4. Mouth care - NS / NaHCO3 rinses, Mycelex, Biotene; Skin care     5. GVHD prophylaxis - n/a     6. Transfuse & replete electrolytes prn   hypokalemia- repleted     7. IV hydration, daily weights, strict I&O, prn diuresis     8. PO intake as tolerated, nutrition follow up as needed, MVI, folic acid     9. Antiemetics, anti-diarrhea medications:   metoclopramide Injectable 10 milliGRAM(s) IV Push every 6 hours PRN    10. OOB as tolerated, physical therapy consult if needed     11. Monitor coags / fibrinogen 2x week, vitamin K as needed     12. Monitor closely for clinical changes, monitor for fevers     13. Emotional support provided, plan of care discussed and questions addressed     14. Patient education done regarding plan of care, restrictions and discharge planning     15. Continue regular social work input     I have written the above note for Dr. Cabrales who performed service with me in the room.   [facility] ([phone])    I have seen and examined patient with NP, I agree with above note as scribed.                                  HPC Transplant Team                                                      Critical / Counseling Time Provided: 30 minutes                                                                                                                                                        Chief Complaint: Autologous peripheral blood stem cell transplant with high dose CBV prep regimen for treatment of peripheral T cell lymphoma    S: Patient seen and examined with HPC Transplant Team:  + Fever O/N  +intermittent nausea  +Loose stool, abdominal cramps  +generalized body aches  +chills  +generalized weakness  Rest of ROS negative    O: Vitals:   Vital Signs Last 24 Hrs  T(C): 38 (12 Dec 2022 05:44), Max: 38.9 (12 Dec 2022 00:07)  T(F): 100.4 (12 Dec 2022 05:44), Max: 102 (12 Dec 2022 00:07)  HR: 82 (12 Dec 2022 05:44) (82 - 100)  BP: 111/70 (12 Dec 2022 05:44) (100/66 - 114/77)  BP(mean): --  RR: 20 (12 Dec 2022 05:44) (18 - 20)  SpO2: 97% (12 Dec 2022 05:44) (97% - 100%)    Parameters below as of 12 Dec 2022 05:44  Patient On (Oxygen Delivery Method): room air        Admit weight:   Daily     Today's Weight in k.8kg    Intake / Output:    @ 07:01  -   @ 07:00  --------------------------------------------------------  IN: 1771 mL / OUT: 3050 mL / NET: -1279 mL        PE:   Oropharynx: No erythema or ulcers  Oral Mucositis:                -                                     stGstrstastdstest:st st1st CVS: S1, S2 RRR   Lungs: CTA throughout bilaterally, decreased in bilateral bases  Abdomen: + BS x 4 normoactive, soft, NT, ND  Extremities: no edema   Gastric Mucositis:       -                                           Grade: n/a   Intestinal Mucositis:     -                                         Grade: n/a   Skin: +erythematous macular rash to neck, upper chest and back- resolving  Lines: Mediport- clean, dry, intact  Neuro: A&O x 3   Pain: denies    Labs:   CBC Full  -  ( 12 Dec 2022 06:49 )  WBC Count : 3.36 K/uL  Hemoglobin : 7.5 g/dL  Hematocrit : 23.4 %  Platelet Count - Automated : 213 K/uL  Mean Cell Volume : 92.1 fl  Mean Cell Hemoglobin : 29.5 pg  Mean Cell Hemoglobin Concentration : 32.1 gm/dL  Auto Neutrophil # : x  Auto Lymphocyte # : x  Auto Monocyte # : x  Auto Eosinophil # : x  Auto Basophil # : x  Auto Neutrophil % : x  Auto Lymphocyte % : x  Auto Monocyte % : x  Auto Eosinophil % : x  Auto Basophil % : x                          7.5    3.36  )-----------( 213      ( 12 Dec 2022 06:49 )             23.4         139  |  101  |  7   ----------------------------<  110<H>  3.3<L>   |  27  |  0.54    Ca    9.3      12 Dec 2022 06:52  Phos  4.3       Mg     1.7         TPro  6.3  /  Alb  3.8  /  TBili  0.2  /  DBili  x   /  AST  43<H>  /  ALT  52<H>  /  AlkPhos  113        LIVER FUNCTIONS - ( 12 Dec 2022 06:52 )  Alb: 3.8 g/dL / Pro: 6.3 g/dL / ALK PHOS: 113 U/L / ALT: 52 U/L / AST: 43 U/L / GGT: x           Lactate Dehydrogenase, Serum: 266 U/L ( @ 06:52)    Cultures:   Culture - Blood (22 @ 13:45)    Specimen Source: .Blood Port Device    Culture Results:   No growth to date.    Culture - Blood (22 @ 01:48)    Specimen Source: .Blood Blood-Catheter    Culture Results:   No growth to date.    Radiology:   Xray Chest 1 View- PORTABLE-Urgent (Xray Chest 1 View- PORTABLE-Urgent .) (22 @ 18:23) >  Impression:  Clear lungs.    Meds:   Antimicrobials:   acyclovir   Oral Tab/Cap 400 milliGRAM(s) Oral every 8 hours  atovaquone  Suspension 750 milliGRAM(s) Oral two times a day  clotrimazole Lozenge 1 Lozenge Oral five times a day  fluconAZOLE   Tablet 400 milliGRAM(s) Oral daily      Heme / Onc:       GI:  pantoprazole    Tablet 40 milliGRAM(s) Oral before breakfast  polyethylene glycol 3350 17 Gram(s) Oral daily PRN  senna 2 Tablet(s) Oral at bedtime PRN  simethicone 80 milliGRAM(s) Chew every 4 hours PRN  sodium bicarbonate Mouth Rinse 10 milliLiter(s) Swish and Spit five times a day  ursodiol Capsule 300 milliGRAM(s) Oral two times a day      Cardiovascular:       Immunologic:       Other medications:   acetaminophen     Tablet .. 650 milliGRAM(s) Oral every 6 hours  Biotene Dry Mouth Oral Rinse 5 milliLiter(s) Swish and Spit five times a day  chlorhexidine 4% Liquid 1 Application(s) Topical <User Schedule>  dexAMETHasone  Injectable 4 milliGRAM(s) IV Push daily  FIRST- Mouthwash  BLM 10 milliLiter(s) Swish and Spit every 6 hours  FLUoxetine 20 milliGRAM(s) Oral daily  folic acid 1 milliGRAM(s) Oral daily  lidocaine/prilocaine Cream 1 Application(s) Topical daily  loratadine 10 milliGRAM(s) Oral daily  multivitamin 1 Tablet(s) Oral daily  nystatin Powder 1 Application(s) Topical two times a day  sodium chloride 0.45% 1000 milliLiter(s) IV Continuous <Continuous>  sodium chloride 0.9%. 1000 milliLiter(s) IV Continuous <Continuous>      PRN:   acetaminophen     Tablet .. 650 milliGRAM(s) Oral every 6 hours PRN  AQUAPHOR (petrolatum Ointment) 1 Application(s) Topical two times a day PRN  metoclopramide Injectable 10 milliGRAM(s) IV Push every 6 hours PRN  ondansetron Injectable 4 milliGRAM(s) IV Push every 6 hours PRN  polyethylene glycol 3350 17 Gram(s) Oral daily PRN  senna 2 Tablet(s) Oral at bedtime PRN  simethicone 80 milliGRAM(s) Chew every 4 hours PRN  sodium chloride 0.65% Nasal 1 Spray(s) Both Nostrils four times a day PRN  sodium chloride 0.9% lock flush 10 milliLiter(s) IV Push every 1 hour PRN  zinc oxide 40% Paste 1 Application(s) Topical three times a day PRN            A/P:     57 year old female with a history of  peripheral T cell lymphoma   Post :  Autologous PBSCT day + - Completed  -16 24 hour continuous infusion, strict I&O, daily weights, prn diuresis    - Cytoxan day 3/.  - CTX - continue CTX hydration for 24 hours post infusion of last dose. Strict I&O, daily weights, prn diuresis. Start cipro 500mg po BID when ANC < 500    Neutropenic. Started Cipro for prophylaxis. If febrile, panculture and start Cefepime (has tolerated in the past)   - Kepivance day 2/3  11/27- Kepivance day 3/3- HELD due to Kepivance rash and oral erythema worsening  - Febrile overnight, tmax 100.4. CXR no obvious consolidations. Cultures pending. Started on cefepime. Grade 2 chemotherapy induced oral mucositis. Continue supportive measures.    - Mild transaminitis, if continue to rise lower Diflucan dose.  - early engraftment. Continue Cefepime, zarxio for now    mouth pain resolved    engrafted. D/C cefepime (infectious work up has been negative), continue zarxio for now   D/C'd Zarxio, pulling TLC today, access mediport f/u cx's  - Febrile, not neutropenic, RVP/COVID (-).    1. Infectious Disease:   acyclovir   Oral Tab/Cap 400 milliGRAM(s) Oral every 8 hours  atovaquone  Suspension 750 milliGRAM(s) Oral two times a day  clotrimazole Lozenge 1 Lozenge Oral five times a day  fluconAZOLE   Tablet 400 milliGRAM(s) Oral daily    2. VOD Prophylaxis: Actigall, Glutamine.    3. GI Prophylaxis:    pantoprazole    Tablet 40 milliGRAM(s) Oral before breakfast    4. Mouth care - NS / NaHCO3 rinses, Mycelex, Biotene; Skin care     5. GVHD prophylaxis - n/a     6. Transfuse & replete electrolytes prn   hypokalemia- repleted     7. IV hydration, daily weights, strict I&O, prn diuresis     8. PO intake as tolerated, nutrition follow up as needed, MVI, folic acid     9. Antiemetics, anti-diarrhea medications:   metoclopramide Injectable 10 milliGRAM(s) IV Push every 6 hours PRN    10. OOB as tolerated, physical therapy consult if needed     11. Monitor coags / fibrinogen 2x week, vitamin K as needed     12. Monitor closely for clinical changes, monitor for fevers     13. Emotional support provided, plan of care discussed and questions addressed     14. Patient education done regarding plan of care, restrictions and discharge planning     15. Continue regular social work input     I have written the above note for Dr. Cabrales who performed service with me in the room.   Elizabeth Zendejas [facility] ([phone])    I have seen and examined patient with NP, I agree with above note as scribed.                                  HPC Transplant Team                                                      Critical / Counseling Time Provided: 30 minutes                                                                                                                                                        Chief Complaint: Autologous peripheral blood stem cell transplant with high dose CBV prep regimen for treatment of peripheral T cell lymphoma    S: Patient seen and examined with HPC Transplant Team:  + Fever O/N  +intermittent nausea  +Loose stool, abdominal cramps  +generalized body aches  +chills  +generalized weakness  Rest of ROS negative    O: Vitals:   Vital Signs Last 24 Hrs  T(C): 38 (12 Dec 2022 05:44), Max: 38.9 (12 Dec 2022 00:07)  T(F): 100.4 (12 Dec 2022 05:44), Max: 102 (12 Dec 2022 00:07)  HR: 82 (12 Dec 2022 05:44) (82 - 100)  BP: 111/70 (12 Dec 2022 05:44) (100/66 - 114/77)  BP(mean): --  RR: 20 (12 Dec 2022 05:44) (18 - 20)  SpO2: 97% (12 Dec 2022 05:44) (97% - 100%)    Parameters below as of 12 Dec 2022 05:44  Patient On (Oxygen Delivery Method): room air        Admit weight:   Daily     Today's Weight in k.8kg    Intake / Output:    @ 07:01  -   @ 07:00  --------------------------------------------------------  IN: 1771 mL / OUT: 3050 mL / NET: -1279 mL        PE:   Oropharynx: No erythema or ulcers  Oral Mucositis:                -                                     stGstrstastdstest:st st1st CVS: S1, S2 RRR   Lungs: CTA throughout bilaterally, decreased in bilateral bases  Abdomen: + BS x 4 normoactive, soft, NT, ND  Extremities: no edema   Gastric Mucositis:       -                                           Grade: n/a   Intestinal Mucositis:     -                                         Grade: n/a   Skin: +erythematous macular rash to neck, upper chest and back- resolving  Lines: Mediport- clean, dry, intact  Neuro: A&O x 3   Pain: denies    Labs:   CBC Full  -  ( 12 Dec 2022 06:49 )  WBC Count : 3.36 K/uL  Hemoglobin : 7.5 g/dL  Hematocrit : 23.4 %  Platelet Count - Automated : 213 K/uL  Mean Cell Volume : 92.1 fl  Mean Cell Hemoglobin : 29.5 pg  Mean Cell Hemoglobin Concentration : 32.1 gm/dL  Auto Neutrophil # : x  Auto Lymphocyte # : x  Auto Monocyte # : x  Auto Eosinophil # : x  Auto Basophil # : x  Auto Neutrophil % : x  Auto Lymphocyte % : x  Auto Monocyte % : x  Auto Eosinophil % : x  Auto Basophil % : x                          7.5    3.36  )-----------( 213      ( 12 Dec 2022 06:49 )             23.4         139  |  101  |  7   ----------------------------<  110<H>  3.3<L>   |  27  |  0.54    Ca    9.3      12 Dec 2022 06:52  Phos  4.3       Mg     1.7         TPro  6.3  /  Alb  3.8  /  TBili  0.2  /  DBili  x   /  AST  43<H>  /  ALT  52<H>  /  AlkPhos  113        LIVER FUNCTIONS - ( 12 Dec 2022 06:52 )  Alb: 3.8 g/dL / Pro: 6.3 g/dL / ALK PHOS: 113 U/L / ALT: 52 U/L / AST: 43 U/L / GGT: x           Lactate Dehydrogenase, Serum: 266 U/L ( @ 06:52)    Cultures:   Culture - Blood (22 @ 13:45)    Specimen Source: .Blood Port Device    Culture Results:   No growth to date.    Culture - Blood (22 @ 01:48)    Specimen Source: .Blood Blood-Catheter    Culture Results:   No growth to date.    Radiology:   Xray Chest 1 View- PORTABLE-Urgent (Xray Chest 1 View- PORTABLE-Urgent .) (22 @ 18:23) >  Impression:  Clear lungs.    Meds:   Antimicrobials:   acyclovir   Oral Tab/Cap 400 milliGRAM(s) Oral every 8 hours  atovaquone  Suspension 750 milliGRAM(s) Oral two times a day  clotrimazole Lozenge 1 Lozenge Oral five times a day  fluconAZOLE   Tablet 400 milliGRAM(s) Oral daily      Heme / Onc:       GI:  pantoprazole    Tablet 40 milliGRAM(s) Oral before breakfast  polyethylene glycol 3350 17 Gram(s) Oral daily PRN  senna 2 Tablet(s) Oral at bedtime PRN  simethicone 80 milliGRAM(s) Chew every 4 hours PRN  sodium bicarbonate Mouth Rinse 10 milliLiter(s) Swish and Spit five times a day  ursodiol Capsule 300 milliGRAM(s) Oral two times a day      Cardiovascular:       Immunologic:       Other medications:   acetaminophen     Tablet .. 650 milliGRAM(s) Oral every 6 hours  Biotene Dry Mouth Oral Rinse 5 milliLiter(s) Swish and Spit five times a day  chlorhexidine 4% Liquid 1 Application(s) Topical <User Schedule>  dexAMETHasone  Injectable 4 milliGRAM(s) IV Push daily  FIRST- Mouthwash  BLM 10 milliLiter(s) Swish and Spit every 6 hours  FLUoxetine 20 milliGRAM(s) Oral daily  folic acid 1 milliGRAM(s) Oral daily  lidocaine/prilocaine Cream 1 Application(s) Topical daily  loratadine 10 milliGRAM(s) Oral daily  multivitamin 1 Tablet(s) Oral daily  nystatin Powder 1 Application(s) Topical two times a day  sodium chloride 0.45% 1000 milliLiter(s) IV Continuous <Continuous>  sodium chloride 0.9%. 1000 milliLiter(s) IV Continuous <Continuous>      PRN:   acetaminophen     Tablet .. 650 milliGRAM(s) Oral every 6 hours PRN  AQUAPHOR (petrolatum Ointment) 1 Application(s) Topical two times a day PRN  metoclopramide Injectable 10 milliGRAM(s) IV Push every 6 hours PRN  ondansetron Injectable 4 milliGRAM(s) IV Push every 6 hours PRN  polyethylene glycol 3350 17 Gram(s) Oral daily PRN  senna 2 Tablet(s) Oral at bedtime PRN  simethicone 80 milliGRAM(s) Chew every 4 hours PRN  sodium chloride 0.65% Nasal 1 Spray(s) Both Nostrils four times a day PRN  sodium chloride 0.9% lock flush 10 milliLiter(s) IV Push every 1 hour PRN  zinc oxide 40% Paste 1 Application(s) Topical three times a day PRN            A/P:     57 year old female with a history of  peripheral T cell lymphoma   Post :  Autologous PBSCT day + - Completed  -16 24 hour continuous infusion, strict I&O, daily weights, prn diuresis    - Cytoxan day 3/.  - CTX - continue CTX hydration for 24 hours post infusion of last dose. Strict I&O, daily weights, prn diuresis. Start cipro 500mg po BID when ANC < 500    Neutropenic. Started Cipro for prophylaxis. If febrile, panculture and start Cefepime (has tolerated in the past)   - Kepivance day 2/3  11/27- Kepivance day 3/3- HELD due to Kepivance rash and oral erythema worsening  - Febrile overnight, tmax 100.4. CXR no obvious consolidations. Cultures pending. Started on cefepime. Grade 2 chemotherapy induced oral mucositis. Continue supportive measures.    - Mild transaminitis, if continue to rise lower Diflucan dose.  - early engraftment. Continue Cefepime, zarxio for now    mouth pain resolved    engrafted. D/C cefepime (infectious work up has been negative), continue zarxio for now   D/C'd Zarxio, pulling TLC today, access mediport f/u cx's  - Febrile, not neutropenic, RVP/COVID (-).   CT chest follow up    1. Infectious Disease:   acyclovir   Oral Tab/Cap 400 milliGRAM(s) Oral every 8 hours  atovaquone  Suspension 750 milliGRAM(s) Oral two times a day  clotrimazole Lozenge 1 Lozenge Oral five times a day  fluconAZOLE   Tablet 400 milliGRAM(s) Oral daily    2. VOD Prophylaxis: Actigall, Glutamine.    3. GI Prophylaxis:    pantoprazole    Tablet 40 milliGRAM(s) Oral before breakfast    4. Mouth care - NS / NaHCO3 rinses, Mycelex, Biotene; Skin care     5. GVHD prophylaxis - n/a     6. Transfuse & replete electrolytes prn   hypokalemia- repleted     7. IV hydration, daily weights, strict I&O, prn diuresis     8. PO intake as tolerated, nutrition follow up as needed, MVI, folic acid     9. Antiemetics, anti-diarrhea medications:   metoclopramide Injectable 10 milliGRAM(s) IV Push every 6 hours PRN    10. OOB as tolerated, physical therapy consult if needed     11. Monitor coags / fibrinogen 2x week, vitamin K as needed     12. Monitor closely for clinical changes, monitor for fevers     13. Emotional support provided, plan of care discussed and questions addressed     14. Patient education done regarding plan of care, restrictions and discharge planning     15. Continue regular social work input     I have written the above note for Dr. Cabrales who performed service with me in the room.   Elizabeth Zendejas [facility] ([phone])    I have seen and examined patient with NP, I agree with above note as scribed.                                  HPC Transplant Team                                                      Critical / Counseling Time Provided: 30 minutes                                                                                                                                                        Chief Complaint: Autologous peripheral blood stem cell transplant with high dose CBV prep regimen for treatment of peripheral T cell lymphoma    S: Patient seen and examined with HPC Transplant Team:  + Fever O/N  +intermittent nausea  +Loose stool, abdominal cramps  +generalized body aches  +chills  +generalized weakness  Rest of ROS negative    O: Vitals:   Vital Signs Last 24 Hrs  T(C): 38 (12 Dec 2022 05:44), Max: 38.9 (12 Dec 2022 00:07)  T(F): 100.4 (12 Dec 2022 05:44), Max: 102 (12 Dec 2022 00:07)  HR: 82 (12 Dec 2022 05:44) (82 - 100)  BP: 111/70 (12 Dec 2022 05:44) (100/66 - 114/77)  BP(mean): --  RR: 20 (12 Dec 2022 05:44) (18 - 20)  SpO2: 97% (12 Dec 2022 05:44) (97% - 100%)    Parameters below as of 12 Dec 2022 05:44  Patient On (Oxygen Delivery Method): room air        Admit weight:   Daily     Today's Weight in k.8kg    Intake / Output:    @ 07:01  -   @ 07:00  --------------------------------------------------------  IN: 1771 mL / OUT: 3050 mL / NET: -1279 mL        PE:   Oropharynx: No erythema or ulcers  Oral Mucositis:                -                                     stGstrstastdstest:st st1st CVS: S1, S2 RRR   Lungs: CTA throughout bilaterally, decreased in bilateral bases  Abdomen: + BS x 4 normoactive, soft, NT, ND  Extremities: no edema   Gastric Mucositis:       -                                           Grade: n/a   Intestinal Mucositis:     -                                         Grade: n/a   Skin: +erythematous macular rash to neck, upper chest and back- resolving  Lines: Mediport- clean, dry, intact  Neuro: A&O x 3   Pain: denies    Labs:   CBC Full  -  ( 12 Dec 2022 06:49 )  WBC Count : 3.36 K/uL  Hemoglobin : 7.5 g/dL  Hematocrit : 23.4 %  Platelet Count - Automated : 213 K/uL  Mean Cell Volume : 92.1 fl  Mean Cell Hemoglobin : 29.5 pg  Mean Cell Hemoglobin Concentration : 32.1 gm/dL  Auto Neutrophil # : x  Auto Lymphocyte # : x  Auto Monocyte # : x  Auto Eosinophil # : x  Auto Basophil # : x  Auto Neutrophil % : x  Auto Lymphocyte % : x  Auto Monocyte % : x  Auto Eosinophil % : x  Auto Basophil % : x                          7.5    3.36  )-----------( 213      ( 12 Dec 2022 06:49 )             23.4         139  |  101  |  7   ----------------------------<  110<H>  3.3<L>   |  27  |  0.54    Ca    9.3      12 Dec 2022 06:52  Phos  4.3       Mg     1.7         TPro  6.3  /  Alb  3.8  /  TBili  0.2  /  DBili  x   /  AST  43<H>  /  ALT  52<H>  /  AlkPhos  113        LIVER FUNCTIONS - ( 12 Dec 2022 06:52 )  Alb: 3.8 g/dL / Pro: 6.3 g/dL / ALK PHOS: 113 U/L / ALT: 52 U/L / AST: 43 U/L / GGT: x           Lactate Dehydrogenase, Serum: 266 U/L ( @ 06:52)    Cultures:   Culture - Blood (22 @ 13:45)    Specimen Source: .Blood Port Device    Culture Results:   No growth to date.    Culture - Blood (22 @ 01:48)    Specimen Source: .Blood Blood-Catheter    Culture Results:   No growth to date.    Radiology:     from: CT Chest No Cont (22 @ 15:34)   IMPRESSION:  The lungs are clear.    Xray Chest 1 View- PORTABLE-Urgent (Xray Chest 1 View- PORTABLE-Urgent .) (22 @ 18:23) >  Impression:  Clear lungs.    Meds:   Antimicrobials:   acyclovir   Oral Tab/Cap 400 milliGRAM(s) Oral every 8 hours  atovaquone  Suspension 750 milliGRAM(s) Oral two times a day  clotrimazole Lozenge 1 Lozenge Oral five times a day  fluconAZOLE   Tablet 400 milliGRAM(s) Oral daily      Heme / Onc:       GI:  pantoprazole    Tablet 40 milliGRAM(s) Oral before breakfast  polyethylene glycol 3350 17 Gram(s) Oral daily PRN  senna 2 Tablet(s) Oral at bedtime PRN  simethicone 80 milliGRAM(s) Chew every 4 hours PRN  sodium bicarbonate Mouth Rinse 10 milliLiter(s) Swish and Spit five times a day  ursodiol Capsule 300 milliGRAM(s) Oral two times a day      Cardiovascular:       Immunologic:       Other medications:   acetaminophen     Tablet .. 650 milliGRAM(s) Oral every 6 hours  Biotene Dry Mouth Oral Rinse 5 milliLiter(s) Swish and Spit five times a day  chlorhexidine 4% Liquid 1 Application(s) Topical <User Schedule>  dexAMETHasone  Injectable 4 milliGRAM(s) IV Push daily  FIRST- Mouthwash  BLM 10 milliLiter(s) Swish and Spit every 6 hours  FLUoxetine 20 milliGRAM(s) Oral daily  folic acid 1 milliGRAM(s) Oral daily  lidocaine/prilocaine Cream 1 Application(s) Topical daily  loratadine 10 milliGRAM(s) Oral daily  multivitamin 1 Tablet(s) Oral daily  nystatin Powder 1 Application(s) Topical two times a day  sodium chloride 0.45% 1000 milliLiter(s) IV Continuous <Continuous>  sodium chloride 0.9%. 1000 milliLiter(s) IV Continuous <Continuous>      PRN:   acetaminophen     Tablet .. 650 milliGRAM(s) Oral every 6 hours PRN  AQUAPHOR (petrolatum Ointment) 1 Application(s) Topical two times a day PRN  metoclopramide Injectable 10 milliGRAM(s) IV Push every 6 hours PRN  ondansetron Injectable 4 milliGRAM(s) IV Push every 6 hours PRN  polyethylene glycol 3350 17 Gram(s) Oral daily PRN  senna 2 Tablet(s) Oral at bedtime PRN  simethicone 80 milliGRAM(s) Chew every 4 hours PRN  sodium chloride 0.65% Nasal 1 Spray(s) Both Nostrils four times a day PRN  sodium chloride 0.9% lock flush 10 milliLiter(s) IV Push every 1 hour PRN  zinc oxide 40% Paste 1 Application(s) Topical three times a day PRN            A/P:     57 year old female with a history of  peripheral T cell lymphoma   Post :  Autologous PBSCT day + 17  - Completed  -16 24 hour continuous infusion, strict I&O, daily weights, prn diuresis    - Cytoxan day 3/4.  - CTX - continue CTX hydration for 24 hours post infusion of last dose. Strict I&O, daily weights, prn diuresis. Start cipro 500mg po BID when ANC < 500    Neutropenic. Started Cipro for prophylaxis. If febrile, panculture and start Cefepime (has tolerated in the past)   - Kepivance day 2/3  11/27- Kepivance day 3/3- HELD due to Kepivance rash and oral erythema worsening  - Febrile overnight, tmax 100.4. CXR no obvious consolidations. Cultures pending. Started on cefepime. Grade 2 chemotherapy induced oral mucositis. Continue supportive measures.    - Mild transaminitis, if continue to rise lower Diflucan dose.  - early engraftment. Continue Cefepime, zarxio for now    mouth pain resolved    engrafted. D/C cefepime (infectious work up has been negative), continue zarxio for now   D/C'd Zarxio, pulling TLC today, access mediport f/u cx's  - Febrile, not neutropenic, RVP/COVID (-).   CT chest follow up    1. Infectious Disease:   acyclovir   Oral Tab/Cap 400 milliGRAM(s) Oral every 8 hours  atovaquone  Suspension 750 milliGRAM(s) Oral two times a day  clotrimazole Lozenge 1 Lozenge Oral five times a day  fluconAZOLE   Tablet 400 milliGRAM(s) Oral daily    2. VOD Prophylaxis: Actigall, Glutamine.    3. GI Prophylaxis:    pantoprazole    Tablet 40 milliGRAM(s) Oral before breakfast    4. Mouth care - NS / NaHCO3 rinses, Mycelex, Biotene; Skin care     5. GVHD prophylaxis - n/a     6. Transfuse & replete electrolytes prn   hypokalemia- repleted     7. IV hydration, daily weights, strict I&O, prn diuresis     8. PO intake as tolerated, nutrition follow up as needed, MVI, folic acid     9. Antiemetics, anti-diarrhea medications:   metoclopramide Injectable 10 milliGRAM(s) IV Push every 6 hours PRN    10. OOB as tolerated, physical therapy consult if needed     11. Monitor coags / fibrinogen 2x week, vitamin K as needed     12. Monitor closely for clinical changes, monitor for fevers     13. Emotional support provided, plan of care discussed and questions addressed     14. Patient education done regarding plan of care, restrictions and discharge planning     15. Continue regular social work input     I have written the above note for Dr. Cabrales who performed service with me in the room.   [facility] ([phone])    I have seen and examined patient with NP, I agree with above note as scribed.                                  HPC Transplant Team                                                      Critical / Counseling Time Provided: 30 minutes                                                                                                                                                        Chief Complaint: Autologous peripheral blood stem cell transplant with high dose CBV prep regimen for treatment of peripheral T cell lymphoma    S: Patient seen and examined with HPC Transplant Team:  + Fever O/N  +intermittent nausea  +Loose stool, abdominal cramps  +generalized body aches  +chills  +generalized weakness  Rest of ROS negative    O: Vitals:   Vital Signs Last 24 Hrs  T(C): 38 (12 Dec 2022 05:44), Max: 38.9 (12 Dec 2022 00:07)  T(F): 100.4 (12 Dec 2022 05:44), Max: 102 (12 Dec 2022 00:07)  HR: 82 (12 Dec 2022 05:44) (82 - 100)  BP: 111/70 (12 Dec 2022 05:44) (100/66 - 114/77)  BP(mean): --  RR: 20 (12 Dec 2022 05:44) (18 - 20)  SpO2: 97% (12 Dec 2022 05:44) (97% - 100%)    Parameters below as of 12 Dec 2022 05:44  Patient On (Oxygen Delivery Method): room air        Admit weight:   Daily     Today's Weight in k.8kg    Intake / Output:    @ 07:01  -   @ 07:00  --------------------------------------------------------  IN: 1771 mL / OUT: 3050 mL / NET: -1279 mL        PE:   Oropharynx: No erythema or ulcers  Oral Mucositis:                -                                     stGstrstastdstest:st st1st CVS: S1, S2 RRR   Lungs: CTA throughout bilaterally, decreased in bilateral bases  Abdomen: + BS x 4 normoactive, soft, NT, ND  Extremities: no edema   Gastric Mucositis:       -                                           Grade: n/a   Intestinal Mucositis:     -                                         Grade: n/a   Skin: +erythematous macular rash to neck, upper chest and back- resolving  Lines: Mediport- clean, dry, intact  Neuro: A&O x 3   Pain: denies    Labs:   CBC Full  -  ( 12 Dec 2022 06:49 )  WBC Count : 3.36 K/uL  Hemoglobin : 7.5 g/dL  Hematocrit : 23.4 %  Platelet Count - Automated : 213 K/uL  Mean Cell Volume : 92.1 fl  Mean Cell Hemoglobin : 29.5 pg  Mean Cell Hemoglobin Concentration : 32.1 gm/dL  Auto Neutrophil # : x  Auto Lymphocyte # : x  Auto Monocyte # : x  Auto Eosinophil # : x  Auto Basophil # : x  Auto Neutrophil % : x  Auto Lymphocyte % : x  Auto Monocyte % : x  Auto Eosinophil % : x  Auto Basophil % : x                          7.5    3.36  )-----------( 213      ( 12 Dec 2022 06:49 )             23.4         139  |  101  |  7   ----------------------------<  110<H>  3.3<L>   |  27  |  0.54    Ca    9.3      12 Dec 2022 06:52  Phos  4.3       Mg     1.7         TPro  6.3  /  Alb  3.8  /  TBili  0.2  /  DBili  x   /  AST  43<H>  /  ALT  52<H>  /  AlkPhos  113        LIVER FUNCTIONS - ( 12 Dec 2022 06:52 )  Alb: 3.8 g/dL / Pro: 6.3 g/dL / ALK PHOS: 113 U/L / ALT: 52 U/L / AST: 43 U/L / GGT: x           Lactate Dehydrogenase, Serum: 266 U/L ( @ 06:52)    Cultures:     Culture - Blood (22 @ 13:29)    Specimen Source: .Blood Blood    Culture Results:   No growth to date.    Culture - Urine (22 @ 15:09)    Specimen Source: Clean Catch Clean Catch (Midstream)    Culture Results:   10,000 - 49,000 CFU/mL Enterococcus species    Culture - Blood (22 @ 13:45)    Specimen Source: .Blood Port Device    Culture Results:   No growth to date.    Culture - Blood (22 @ 01:48)    Specimen Source: .Blood Blood-Catheter    Culture Results:   No growth to date.    Radiology:     from: CT Chest No Cont (22 @ 15:34)   IMPRESSION:  The lungs are clear.    Xray Chest 1 View- PORTABLE-Urgent (Xray Chest 1 View- PORTABLE-Urgent .) (22 @ 18:23) >  Impression:  Clear lungs.    Meds:   Antimicrobials:   acyclovir   Oral Tab/Cap 400 milliGRAM(s) Oral every 8 hours  atovaquone  Suspension 750 milliGRAM(s) Oral two times a day  clotrimazole Lozenge 1 Lozenge Oral five times a day  fluconAZOLE   Tablet 400 milliGRAM(s) Oral daily      Heme / Onc:       GI:  pantoprazole    Tablet 40 milliGRAM(s) Oral before breakfast  polyethylene glycol 3350 17 Gram(s) Oral daily PRN  senna 2 Tablet(s) Oral at bedtime PRN  simethicone 80 milliGRAM(s) Chew every 4 hours PRN  sodium bicarbonate Mouth Rinse 10 milliLiter(s) Swish and Spit five times a day  ursodiol Capsule 300 milliGRAM(s) Oral two times a day      Cardiovascular:       Immunologic:       Other medications:   acetaminophen     Tablet .. 650 milliGRAM(s) Oral every 6 hours  Biotene Dry Mouth Oral Rinse 5 milliLiter(s) Swish and Spit five times a day  chlorhexidine 4% Liquid 1 Application(s) Topical <User Schedule>  dexAMETHasone  Injectable 4 milliGRAM(s) IV Push daily  FIRST- Mouthwash  BLM 10 milliLiter(s) Swish and Spit every 6 hours  FLUoxetine 20 milliGRAM(s) Oral daily  folic acid 1 milliGRAM(s) Oral daily  lidocaine/prilocaine Cream 1 Application(s) Topical daily  loratadine 10 milliGRAM(s) Oral daily  multivitamin 1 Tablet(s) Oral daily  nystatin Powder 1 Application(s) Topical two times a day  sodium chloride 0.45% 1000 milliLiter(s) IV Continuous <Continuous>  sodium chloride 0.9%. 1000 milliLiter(s) IV Continuous <Continuous>      PRN:   acetaminophen     Tablet .. 650 milliGRAM(s) Oral every 6 hours PRN  AQUAPHOR (petrolatum Ointment) 1 Application(s) Topical two times a day PRN  metoclopramide Injectable 10 milliGRAM(s) IV Push every 6 hours PRN  ondansetron Injectable 4 milliGRAM(s) IV Push every 6 hours PRN  polyethylene glycol 3350 17 Gram(s) Oral daily PRN  senna 2 Tablet(s) Oral at bedtime PRN  simethicone 80 milliGRAM(s) Chew every 4 hours PRN  sodium chloride 0.65% Nasal 1 Spray(s) Both Nostrils four times a day PRN  sodium chloride 0.9% lock flush 10 milliLiter(s) IV Push every 1 hour PRN  zinc oxide 40% Paste 1 Application(s) Topical three times a day PRN            A/P:     57 year old female with a history of  peripheral T cell lymphoma   Post :  Autologous PBSCT day + 17  - Completed  -16 24 hour continuous infusion, strict I&O, daily weights, prn diuresis    - Cytoxan day 3/4.  - CTX - continue CTX hydration for 24 hours post infusion of last dose. Strict I&O, daily weights, prn diuresis. Start cipro 500mg po BID when ANC < 500    Neutropenic. Started Cipro for prophylaxis. If febrile, panculture and start Cefepime (has tolerated in the past)   - Kepivance day 23  - Kepivance day 3/3- HELD due to Kepivance rash and oral erythema worsening  - Febrile overnight, tmax 100.4. CXR no obvious consolidations. Cultures pending. Started on cefepime. Grade 2 chemotherapy induced oral mucositis. Continue supportive measures.    - Mild transaminitis, if continue to rise lower Diflucan dose.  - early engraftment. Continue Cefepime, zarxio for now    mouth pain resolved    engrafted. D/C cefepime (infectious work up has been negative), continue zarxio for now   D/C'd Zarxio, pulling TLC today, access [facility] f/u cx's  - Febrile, not neutropenic, RVP/COVID (-).   CT chest follow up    1. Infectious Disease:   acyclovir   Oral Tab/Cap 400 milliGRAM(s) Oral every 8 hours  atovaquone  Suspension 750 milliGRAM(s) Oral two times a day  clotrimazole Lozenge 1 Lozenge Oral five times a day  fluconAZOLE   Tablet 400 milliGRAM(s) Oral daily    2. VOD Prophylaxis: Actigall, Glutamine.    3. GI Prophylaxis:    pantoprazole    Tablet 40 milliGRAM(s) Oral before breakfast    4. Mouth care - NS / NaHCO3 rinses, Mycelex, Biotene; Skin care     5. GVHD prophylaxis - n/a     6. Transfuse & replete electrolytes prn   hypokalemia- repleted     7. IV hydration, daily weights, strict I&O, prn diuresis     8. PO intake as tolerated, nutrition follow up as needed, MVI, folic acid     9. Antiemetics, anti-diarrhea medications:   metoclopramide Injectable 10 milliGRAM(s) IV Push every 6 hours PRN    10. OOB as tolerated, physical therapy consult if needed     11. Monitor coags / fibrinogen 2x week, vitamin K as needed     12. Monitor closely for clinical changes, monitor for fevers     13. Emotional support provided, plan of care discussed and questions addressed     14. Patient education done regarding plan of care, restrictions and discharge planning     15. Continue regular social work input     I have written the above note for Dr. Cabrales who performed service with me in the room.   Elizabeth Zendejas [facility] ([phone])    I have seen and examined patient with NP, I agree with above note as scribed.                                  HPC Transplant Team                                                      Critical / Counseling Time Provided: 30 minutes                                                                                                                                                        Chief Complaint: Autologous peripheral blood stem cell transplant with high dose CBV prep regimen for treatment of peripheral T cell lymphoma    S: Patient seen and examined with HPC Transplant Team:  + Fever   +intermittent nausea  +Loose stool, abdominal cramps  +generalized body aches  +chills  +generalized weakness  Rest of ROS negative    O: Vitals:   Vital Signs Last 24 Hrs  T(C): 38 (12 Dec 2022 05:44), Max: 38.9 (12 Dec 2022 00:07)  T(F): 100.4 (12 Dec 2022 05:44), Max: 102 (12 Dec 2022 00:07)  HR: 82 (12 Dec 2022 05:44) (82 - 100)  BP: 111/70 (12 Dec 2022 05:44) (100/66 - 114/77)  BP(mean): --  RR: 20 (12 Dec 2022 05:44) (18 - 20)  SpO2: 97% (12 Dec 2022 05:44) (97% - 100%)    Parameters below as of 12 Dec 2022 05:44  Patient On (Oxygen Delivery Method): room air        Admit weight:   Daily     Today's Weight in k.8kg    Intake / Output:    @ 07:01  -   @ 07:00  --------------------------------------------------------  IN: 1771 mL / OUT: 3050 mL / NET: -1279 mL        PE:   Oropharynx: No erythema or ulcers  Oral Mucositis:                -                                     stGstrstastdstest:st st1st CVS: S1, S2 RRR   Lungs: CTA throughout bilaterally, decreased in bilateral bases  Abdomen: + BS x 4 normoactive, soft, NT, ND  Extremities: no edema   Gastric Mucositis:       -                                           Grade: n/a   Intestinal Mucositis:     -                                         Grade: n/a   Skin: +erythematous macular rash to neck, upper chest and back- resolving  Lines: Mediport- clean, dry, intact  Neuro: A&O x 3   Pain: denies    Labs:   CBC Full  -  ( 12 Dec 2022 06:49 )  WBC Count : 3.36 K/uL  Hemoglobin : 7.5 g/dL  Hematocrit : 23.4 %  Platelet Count - Automated : 213 K/uL  Mean Cell Volume : 92.1 fl  Mean Cell Hemoglobin : 29.5 pg  Mean Cell Hemoglobin Concentration : 32.1 gm/dL  Auto Neutrophil # : x  Auto Lymphocyte # : x  Auto Monocyte # : x  Auto Eosinophil # : x  Auto Basophil # : x  Auto Neutrophil % : x  Auto Lymphocyte % : x  Auto Monocyte % : x  Auto Eosinophil % : x  Auto Basophil % : x                          7.5    3.36  )-----------( 213      ( 12 Dec 2022 06:49 )             23.4         139  |  101  |  7   ----------------------------<  110<H>  3.3<L>   |  27  |  0.54    Ca    9.3      12 Dec 2022 06:52  Phos  4.3       Mg     1.7         TPro  6.3  /  Alb  3.8  /  TBili  0.2  /  DBili  x   /  AST  43<H>  /  ALT  52<H>  /  AlkPhos  113        LIVER FUNCTIONS - ( 12 Dec 2022 06:52 )  Alb: 3.8 g/dL / Pro: 6.3 g/dL / ALK PHOS: 113 U/L / ALT: 52 U/L / AST: 43 U/L / GGT: x           Lactate Dehydrogenase, Serum: 266 U/L ( @ 06:52)    Cultures:     Culture - Blood (22 @ 13:29)    Specimen Source: .Blood Blood    Culture Results:   No growth to date.    Culture - Urine (22 @ 15:09)    Specimen Source: Clean Catch Clean Catch (Midstream)    Culture Results:   10,000 - 49,000 CFU/mL Enterococcus species    Culture - Blood (22 @ 13:45)    Specimen Source: .Blood Port Device    Culture Results:   No growth to date.    Culture - Blood (22 @ 01:48)    Specimen Source: .Blood Blood-Catheter    Culture Results:   No growth to date.    Radiology:     from: CT Chest No Cont (22 @ 15:34)   IMPRESSION:  The lungs are clear.    Xray Chest 1 View- PORTABLE-Urgent (Xray Chest 1 View- PORTABLE-Urgent .) (22 @ 18:23) >  Impression:  Clear lungs.    Meds:   Antimicrobials:   acyclovir   Oral Tab/Cap 400 milliGRAM(s) Oral every 8 hours  atovaquone  Suspension 750 milliGRAM(s) Oral two times a day  clotrimazole Lozenge 1 Lozenge Oral five times a day  fluconAZOLE   Tablet 400 milliGRAM(s) Oral daily      Heme / Onc:       GI:  pantoprazole    Tablet 40 milliGRAM(s) Oral before breakfast  polyethylene glycol 3350 17 Gram(s) Oral daily PRN  senna 2 Tablet(s) Oral at bedtime PRN  simethicone 80 milliGRAM(s) Chew every 4 hours PRN  sodium bicarbonate Mouth Rinse 10 milliLiter(s) Swish and Spit five times a day  ursodiol Capsule 300 milliGRAM(s) Oral two times a day      Cardiovascular:       Immunologic:       Other medications:   acetaminophen     Tablet .. 650 milliGRAM(s) Oral every 6 hours  Biotene Dry Mouth Oral Rinse 5 milliLiter(s) Swish and Spit five times a day  chlorhexidine 4% Liquid 1 Application(s) Topical <User Schedule>  dexAMETHasone  Injectable 4 milliGRAM(s) IV Push daily  FIRST- Mouthwash  BLM 10 milliLiter(s) Swish and Spit every 6 hours  FLUoxetine 20 milliGRAM(s) Oral daily  folic acid 1 milliGRAM(s) Oral daily  lidocaine/prilocaine Cream 1 Application(s) Topical daily  loratadine 10 milliGRAM(s) Oral daily  multivitamin 1 Tablet(s) Oral daily  nystatin Powder 1 Application(s) Topical two times a day  sodium chloride 0.45% 1000 milliLiter(s) IV Continuous <Continuous>  sodium chloride 0.9%. 1000 milliLiter(s) IV Continuous <Continuous>      PRN:   acetaminophen     Tablet .. 650 milliGRAM(s) Oral every 6 hours PRN  AQUAPHOR (petrolatum Ointment) 1 Application(s) Topical two times a day PRN  metoclopramide Injectable 10 milliGRAM(s) IV Push every 6 hours PRN  ondansetron Injectable 4 milliGRAM(s) IV Push every 6 hours PRN  polyethylene glycol 3350 17 Gram(s) Oral daily PRN  senna 2 Tablet(s) Oral at bedtime PRN  simethicone 80 milliGRAM(s) Chew every 4 hours PRN  sodium chloride 0.65% Nasal 1 Spray(s) Both Nostrils four times a day PRN  sodium chloride 0.9% lock flush 10 milliLiter(s) IV Push every 1 hour PRN  zinc oxide 40% Paste 1 Application(s) Topical three times a day PRN            A/P:     57 year old female with a history of  peripheral T cell lymphoma   Post :  Autologous PBSCT day + 17  - Completed  -16 24 hour continuous infusion, strict I&O, daily weights, prn diuresis    - Cytoxan day 3/4.  - CTX - continue CTX hydration for 24 hours post infusion of last dose. Strict I&O, daily weights, prn diuresis. Start cipro 500mg po BID when ANC < 500    Neutropenic. Started Cipro for prophylaxis. If febrile, panculture and start Cefepime (has tolerated in the past)   - Kepivance day 2/3  11/27- Kepivance day 3/3- HELD due to Kepivance rash and oral erythema worsening  - Febrile overnight, tmax 100.4. CXR no obvious consolidations. Cultures pending. Started on cefepime. Grade 2 chemotherapy induced oral mucositis. Continue supportive measures.    - Mild transaminitis, if continue to rise lower Diflucan dose.  - early engraftment. Continue Cefepime, zarxio for now    mouth pain resolved    engrafted. D/C cefepime (infectious work up has been negative), continue zarxio for now   D/C'd Zarxio, pulling TLC today, access [facility] f/u cx's  - Febrile, not neutropenic, RVP/COVID (-).   CT chest follow up    1. Infectious Disease:   acyclovir   Oral Tab/Cap 400 milliGRAM(s) Oral every 8 hours  atovaquone  Suspension 750 milliGRAM(s) Oral two times a day  clotrimazole Lozenge 1 Lozenge Oral five times a day  fluconAZOLE   Tablet 400 milliGRAM(s) Oral daily    2. VOD Prophylaxis: Actigall, Glutamine.    3. GI Prophylaxis:    pantoprazole    Tablet 40 milliGRAM(s) Oral before breakfast    4. Mouth care - NS / NaHCO3 rinses, Mycelex, Biotene; Skin care     5. GVHD prophylaxis - n/a     6. Transfuse & replete electrolytes prn   hypokalemia- repleted     7. IV hydration, daily weights, strict I&O, prn diuresis     8. PO intake as tolerated, nutrition follow up as needed, MVI, folic acid     9. Antiemetics, anti-diarrhea medications:   metoclopramide Injectable 10 milliGRAM(s) IV Push every 6 hours PRN    10. OOB as tolerated, physical therapy consult if needed     11. Monitor coags / fibrinogen 2x week, vitamin K as needed     12. Monitor closely for clinical changes, monitor for fevers     13. Emotional support provided, plan of care discussed and questions addressed     14. Patient education done regarding plan of care, restrictions and discharge planning     15. Continue regular social work input     I have written the above note for Dr. Cabrales who performed service with me in the room.   Elizabeth Zendejas [facility] ([phone])    I have seen and examined patient with NP, I agree with above note as scribed.

## 2022-12-12 NOTE — PROGRESS NOTE ADULT - CRITICAL CARE ATTENDING COMMENT
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   Today is Day + 16  Patient has had fevers to low 38C  Some associated with chills.  No antibiotics at present, and RVP was ordered and is negative.  If clinically worsens, will reintroduce Cefepime.      Problem/Plan -   1:  Problem: Stem cell transplant   ·  Plan: 1. Admit to BMTU   2. TLC placement in IR   3. Day -9 - day -2 - antiemetics   4. Day -9 & day -8, VP16 2400mg/m2 IV x 34 hours (final concentration 0.4mg /mL).   5. Strict I&O, daily weights, prn diuresis   6. After completion of VP16- start CTX hydration (D51/2NS + 10mEq KCl @ 150ml / m2) - continue for 24 hours post infusion of CTX   7. Day -7 - day -4 - CTX 1800 mg / m2 daily over 2 hours. Follow SMA7, mag, phos 6hours post CT . Mesna  12mg / kg - hemorrhagic cystitis ppx   8. Day -3 - BCNU 400mg / m2   9. Day -2 - day -1 - rest days  10. Day 0(11/25/22) - HPC transplant. Start Zarxio 480mcg SQ QD, continue through engraftment. Kepivance on days 0, 1 & 2   11. Anxiolytics, antinausea, antidiarrhea medications as needed   12. Lasix PRN while being aggressively hydrated to avoid VOD   13. Nutritional support, pain management.    Problem/Plan - 2:  ·  Problem: Need for prophylactic measure.   ·  Plan: 1. VOD prophylaxis - low dose heparin gtt (dosed at 100 units / kg / day), glutamine supplementation, Actigall BID   2. PCP prophylaxis - Bactrim DS through day -2; add Mepron on 12/10    3. Antiviral prophylaxis - Acyclovir 400mg po TID to start day -1   4. Antifungal prophylaxis- Diflucan 400 mg po daily.  5. GI prophylaxis - Protonix po QD   6. Antibacterial prophylaxis - when ANC < 500, start Cipro 500mg po BID. If becomes febrile, pan cx, CXR and change Cipro to Cefepime 2g IV q 8 hours. Continue until count recovery  7. Kepivance for prevention of mucositis- days 0, +1, +2  8. Aggressive mouth care and skin care as per protocol. On 11/25, early mucositis on lower lip, no ulcers. Continue topical care.   9. Receiving transfusion and electrolyte support.    11/25-Chemotherapy induced nausea and diarrhea managed with antiemetics and antidiarrheals. Now patient states stool is loose rather than watery.   Continue Cipro, Acyclovir, Diflucan prophylaxis. OOB/ambulate. She received HPC transplant today without complications. Begin Zarxio daily.  11/26/22: continues to have loose stools overnight, this am with epistaxis lasting ~ 5 minutes. To receive platelets. Remains fatigued.  11/28- Febrile overnight, tmax 100.4. CXR- no consolidations. Cultures pending. Started on Cefepime. Grade 2 chemotherapy induced oral mucositis. Continue supportive care.  12/2- patient now afebrile; 11/28 cultures negative and CXR- negative. Continue Cefepime; on Acyclovir, Diflucan prophylaxis. Continue Zarxio daily, await engraftment.  12/5- early engraftment, continue Zarxio.  12/7- Neutrophil count > 1.0, afebrile, cultures negative. D/C Cefepime. Continue Zarxio daily.  12/9- Fevers with chills, ? engraftment fever. Check cultures, access Mediport and D/C TLC. Discontinue Zarxio with WBC recovery. 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   Today is Day + 16  Patient has had fevers to low 38C  Some associated with chills.  No antibiotics at present, and RVP was ordered and is negative.  If clinically worsens, will reintroduce Cefepime.    Problem/Plan -   1:  Problem: Stem cell transplant   ·  Plan: 1. Admit to BMTU   2. TLC placement in IR   3. Day -9 - day -2 - antiemetics   4. Day -9 & day -8, VP16 2400mg/m2 IV x 34 hours (final concentration 0.4mg /mL).   5. Strict I&O, daily weights, prn diuresis   6. After completion of VP16- start CTX hydration (D51/2NS + 10mEq KCl @ 150ml / m2) - continue for 24 hours post infusion of CTX   7. Day -7 - day -4 - CTX 1800 mg / m2 daily over 2 hours. Follow SMA7, mag, phos 6hours post CT . Mesna  12mg / kg - hemorrhagic cystitis ppx   8. Day -3 - BCNU 400mg / m2   9. Day -2 - day -1 - rest days  10. Day 0(11/25/22) - HPC transplant. Start Zarxio 480mcg SQ QD, continue through engraftment. Kepivance on days 0, 1 & 2   11. Anxiolytics, antinausea, antidiarrhea medications as needed   12. Lasix PRN while being aggressively hydrated to avoid VOD   13. Nutritional support, pain management.    Problem/Plan - 2:  ·  Problem: Need for prophylactic measure.   ·  Plan: 1. VOD prophylaxis - low dose heparin gtt (dosed at 100 units / kg / day), glutamine supplementation, Actigall BID   2. PCP prophylaxis - Bactrim DS through day -2; add Mepron on 12/10    3. Antiviral prophylaxis - Acyclovir 400mg po TID to start day -1   4. Antifungal prophylaxis- Diflucan 400 mg po daily.  5. GI prophylaxis - Protonix po QD   6. Antibacterial prophylaxis - when ANC < 500, start Cipro 500mg po BID. If becomes febrile, pan cx, CXR and change Cipro to Cefepime 2g IV q 8 hours. Continue until count recovery  7. Kepivance for prevention of mucositis- days 0, +1, +2  8. Aggressive mouth care and skin care as per protocol. On 11/25, early mucositis on lower lip, no ulcers. Continue topical care.   9. Receiving transfusion and electrolyte support.    11/25-Chemotherapy induced nausea and diarrhea managed with antiemetics and antidiarrheals. Now patient states stool is loose rather than watery.   Continue Cipro, Acyclovir, Diflucan prophylaxis. OOB/ambulate. She received HPC transplant today without complications. Begin Zarxio daily.  11/26/22: continues to have loose stools overnight, this am with epistaxis lasting ~ 5 minutes. To receive platelets. Remains fatigued.  11/28- Febrile overnight, tmax 100.4. CXR- no consolidations. Cultures pending. Started on Cefepime. Grade 2 chemotherapy induced oral mucositis. Continue supportive care.  12/2- patient now afebrile; 11/28 cultures negative and CXR- negative. Continue Cefepime; on Acyclovir, Diflucan prophylaxis. Continue Zarxio daily, await engraftment.  12/5- early engraftment, continue Zarxio.  12/7- Neutrophil count > 1.0, afebrile, cultures negative. D/C Cefepime. Continue Zarxio daily.  12/9- Fevers with chills, ? engraftment fever. Check cultures, access Mediport and D/C TLC. Discontinue Zarxio with WBC recovery.  12/12- Fever with chills, felt dyspnea last night. Order CT chest to r/o infiltrates.

## 2022-12-13 LAB
ALBUMIN SERPL ELPH-MCNC: 4.1 G/DL — SIGNIFICANT CHANGE UP (ref 3.3–5)
ALP SERPL-CCNC: 116 U/L — SIGNIFICANT CHANGE UP (ref 40–120)
ALT FLD-CCNC: 49 U/L — HIGH (ref 10–45)
ANION GAP SERPL CALC-SCNC: 10 MMOL/L — SIGNIFICANT CHANGE UP (ref 5–17)
AST SERPL-CCNC: 34 U/L — SIGNIFICANT CHANGE UP (ref 10–40)
BASOPHILS # BLD AUTO: 0 K/UL — SIGNIFICANT CHANGE UP (ref 0–0.2)
BASOPHILS NFR BLD AUTO: 0 % — SIGNIFICANT CHANGE UP (ref 0–2)
BILIRUB SERPL-MCNC: 0.2 MG/DL — SIGNIFICANT CHANGE UP (ref 0.2–1.2)
BUN SERPL-MCNC: 7 MG/DL — SIGNIFICANT CHANGE UP (ref 7–23)
CALCIUM SERPL-MCNC: 9.6 MG/DL — SIGNIFICANT CHANGE UP (ref 8.4–10.5)
CHLORIDE SERPL-SCNC: 102 MMOL/L — SIGNIFICANT CHANGE UP (ref 96–108)
CO2 SERPL-SCNC: 26 MMOL/L — SIGNIFICANT CHANGE UP (ref 22–31)
CREAT SERPL-MCNC: 0.48 MG/DL — LOW (ref 0.5–1.3)
CULTURE RESULTS: SIGNIFICANT CHANGE UP
CULTURE RESULTS: SIGNIFICANT CHANGE UP
EGFR: 112 ML/MIN/1.73M2 — SIGNIFICANT CHANGE UP
EOSINOPHIL # BLD AUTO: 0 K/UL — SIGNIFICANT CHANGE UP (ref 0–0.5)
EOSINOPHIL NFR BLD AUTO: 0 % — SIGNIFICANT CHANGE UP (ref 0–6)
GLUCOSE SERPL-MCNC: 114 MG/DL — HIGH (ref 70–99)
HCT VFR BLD CALC: 23.4 % — LOW (ref 34.5–45)
HGB BLD-MCNC: 7.3 G/DL — LOW (ref 11.5–15.5)
LDH SERPL L TO P-CCNC: 275 U/L — HIGH (ref 50–242)
LYMPHOCYTES # BLD AUTO: 0.75 K/UL — LOW (ref 1–3.3)
LYMPHOCYTES # BLD AUTO: 17 % — SIGNIFICANT CHANGE UP (ref 13–44)
MAGNESIUM SERPL-MCNC: 1.8 MG/DL — SIGNIFICANT CHANGE UP (ref 1.6–2.6)
MANUAL SMEAR VERIFICATION: SIGNIFICANT CHANGE UP
MCHC RBC-ENTMCNC: 29.2 PG — SIGNIFICANT CHANGE UP (ref 27–34)
MCHC RBC-ENTMCNC: 31.2 GM/DL — LOW (ref 32–36)
MCV RBC AUTO: 93.6 FL — SIGNIFICANT CHANGE UP (ref 80–100)
METAMYELOCYTES # FLD: 3 % — HIGH (ref 0–0)
MONOCYTES # BLD AUTO: 0.57 K/UL — SIGNIFICANT CHANGE UP (ref 0–0.9)
MONOCYTES NFR BLD AUTO: 13 % — SIGNIFICANT CHANGE UP (ref 2–14)
NEUTROPHILS # BLD AUTO: 2.96 K/UL — SIGNIFICANT CHANGE UP (ref 1.8–7.4)
NEUTROPHILS NFR BLD AUTO: 65 % — SIGNIFICANT CHANGE UP (ref 43–77)
NEUTS BAND # BLD: 2 % — SIGNIFICANT CHANGE UP (ref 0–8)
NRBC # BLD: 2 /100 — HIGH (ref 0–0)
PHOSPHATE SERPL-MCNC: 3.9 MG/DL — SIGNIFICANT CHANGE UP (ref 2.5–4.5)
PLAT MORPH BLD: NORMAL — SIGNIFICANT CHANGE UP
PLATELET # BLD AUTO: 251 K/UL — SIGNIFICANT CHANGE UP (ref 150–400)
POTASSIUM SERPL-MCNC: 4.3 MMOL/L — SIGNIFICANT CHANGE UP (ref 3.5–5.3)
POTASSIUM SERPL-SCNC: 4.3 MMOL/L — SIGNIFICANT CHANGE UP (ref 3.5–5.3)
PROT SERPL-MCNC: 6.6 G/DL — SIGNIFICANT CHANGE UP (ref 6–8.3)
RBC # BLD: 2.5 M/UL — LOW (ref 3.8–5.2)
RBC # FLD: 15 % — HIGH (ref 10.3–14.5)
RBC BLD AUTO: SIGNIFICANT CHANGE UP
SODIUM SERPL-SCNC: 138 MMOL/L — SIGNIFICANT CHANGE UP (ref 135–145)
SPECIMEN SOURCE: SIGNIFICANT CHANGE UP
SPECIMEN SOURCE: SIGNIFICANT CHANGE UP
WBC # BLD: 4.42 K/UL — SIGNIFICANT CHANGE UP (ref 3.8–10.5)
WBC # FLD AUTO: 4.42 K/UL — SIGNIFICANT CHANGE UP (ref 3.8–10.5)

## 2022-12-13 PROCEDURE — 99233 SBSQ HOSP IP/OBS HIGH 50: CPT

## 2022-12-13 RX ADMIN — Medication 10 MILLILITER(S): at 12:10

## 2022-12-13 RX ADMIN — Medication 1 LOZENGE: at 23:42

## 2022-12-13 RX ADMIN — Medication 10 MILLILITER(S): at 16:14

## 2022-12-13 RX ADMIN — NYSTATIN CREAM 1 APPLICATION(S): 100000 CREAM TOPICAL at 06:10

## 2022-12-13 RX ADMIN — FLUCONAZOLE 400 MILLIGRAM(S): 150 TABLET ORAL at 12:11

## 2022-12-13 RX ADMIN — Medication 1 LOZENGE: at 19:10

## 2022-12-13 RX ADMIN — Medication 20 MILLIGRAM(S): at 12:10

## 2022-12-13 RX ADMIN — URSODIOL 300 MILLIGRAM(S): 250 TABLET, FILM COATED ORAL at 17:13

## 2022-12-13 RX ADMIN — LORATADINE 10 MILLIGRAM(S): 10 TABLET ORAL at 12:11

## 2022-12-13 RX ADMIN — Medication 400 MILLIGRAM(S): at 06:09

## 2022-12-13 RX ADMIN — ATOVAQUONE 750 MILLIGRAM(S): 750 SUSPENSION ORAL at 17:14

## 2022-12-13 RX ADMIN — Medication 10 MILLILITER(S): at 08:26

## 2022-12-13 RX ADMIN — Medication 1 LOZENGE: at 12:10

## 2022-12-13 RX ADMIN — Medication 5 MILLILITER(S): at 08:27

## 2022-12-13 RX ADMIN — ATOVAQUONE 750 MILLIGRAM(S): 750 SUSPENSION ORAL at 06:09

## 2022-12-13 RX ADMIN — Medication 1 MILLIGRAM(S): at 12:10

## 2022-12-13 RX ADMIN — Medication 1 LOZENGE: at 00:03

## 2022-12-13 RX ADMIN — URSODIOL 300 MILLIGRAM(S): 250 TABLET, FILM COATED ORAL at 06:10

## 2022-12-13 RX ADMIN — PANTOPRAZOLE SODIUM 40 MILLIGRAM(S): 20 TABLET, DELAYED RELEASE ORAL at 06:10

## 2022-12-13 RX ADMIN — DIPHENHYDRAMINE HYDROCHLORIDE AND LIDOCAINE HYDROCHLORIDE AND ALUMINUM HYDROXIDE AND MAGNESIUM HYDRO 10 MILLILITER(S): KIT at 12:10

## 2022-12-13 RX ADMIN — DIPHENHYDRAMINE HYDROCHLORIDE AND LIDOCAINE HYDROCHLORIDE AND ALUMINUM HYDROXIDE AND MAGNESIUM HYDRO 10 MILLILITER(S): KIT at 23:42

## 2022-12-13 RX ADMIN — Medication 1 LOZENGE: at 16:14

## 2022-12-13 RX ADMIN — Medication 10 MILLILITER(S): at 00:02

## 2022-12-13 RX ADMIN — Medication 10 MILLILITER(S): at 19:10

## 2022-12-13 RX ADMIN — CHLORHEXIDINE GLUCONATE 1 APPLICATION(S): 213 SOLUTION TOPICAL at 08:26

## 2022-12-13 RX ADMIN — Medication 1 LOZENGE: at 08:26

## 2022-12-13 RX ADMIN — DIPHENHYDRAMINE HYDROCHLORIDE AND LIDOCAINE HYDROCHLORIDE AND ALUMINUM HYDROXIDE AND MAGNESIUM HYDRO 10 MILLILITER(S): KIT at 17:12

## 2022-12-13 RX ADMIN — Medication 400 MILLIGRAM(S): at 14:33

## 2022-12-13 RX ADMIN — Medication 4 MILLIGRAM(S): at 06:09

## 2022-12-13 RX ADMIN — Medication 400 MILLIGRAM(S): at 21:20

## 2022-12-13 RX ADMIN — NYSTATIN CREAM 1 APPLICATION(S): 100000 CREAM TOPICAL at 17:14

## 2022-12-13 RX ADMIN — Medication 5 MILLILITER(S): at 12:09

## 2022-12-13 RX ADMIN — Medication 5 MILLILITER(S): at 23:42

## 2022-12-13 RX ADMIN — Medication 5 MILLILITER(S): at 00:02

## 2022-12-13 RX ADMIN — Medication 5 MILLILITER(S): at 19:10

## 2022-12-13 RX ADMIN — Medication 5 MILLILITER(S): at 16:14

## 2022-12-13 RX ADMIN — Medication 10 MILLILITER(S): at 23:42

## 2022-12-13 RX ADMIN — Medication 1 TABLET(S): at 12:10

## 2022-12-13 NOTE — CHART NOTE - NSCHARTNOTEFT_GEN_A_CORE
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 Day +18"    Source: [x] Patient       [x] EMR        [] RN        [] Family at bedside       [] Other:    Diet, Regular:   GlutaSolve(Glutamine) 15gm pkg     Qty per Day:  1  Supplement Feeding Modality:  Oral  Ensure Enlive Cans or Servings Per Day:  1       Frequency:  Daily (22 @ 13:12) [Active]    - Is current order appropriate/adequate? [X] Yes  []  No:     - PO intake :   [X] >75%  Adequate    [] 50-75%  Fair       [] <50%  Poor    - Nutrition-related concerns:      - Pt reports improving appetite/PO intake, eating ~75% of meals daily. Eating 100% of 2x meals daily, endorses fair PO intake of dinner daily.       - Drinking Ensure Shake 1x (occasionally), will continue to provide per Pt preference       - K+ Phos and Mg WNL today       - Micronutrient/Other supplementation: multivitamin, folic acid      - Ordered for Decadron, BG controlled at this time        GI:    - No difficulty chewing/swallowing difficulty (mucositis resolved), No GI distress reported.   - Last BM .   Bowel Regimen: senna, Miralax    Weights:   Dosing weight in k.3 (11-16)  Daily Weight in k.8 (12-12), Weight in k.2 (12-11), Weight in k.7 (12-10), Weight in k.5 (-), Weight in k.4 (-), Weight in k.5 (-), Weight in k.7 (-)  ** Weight stable x 1 week (overall 5% weight loss x ~4 weeks) In setting of IVF and diuretics this admission. Weight changes may be partially secondary to fluid shifts. RD to continue to monitor weight trends as able.     Nutritionally Pertinent MEDICATIONS  (STANDING):  acyclovir   Oral Tab/Cap  atovaquone  Suspension  clotrimazole Lozenge  dexAMETHasone  Injectable  fluconAZOLE   Tablet  folic acid  multivitamin  pantoprazole    Tablet  sodium bicarbonate Mouth Rinse  sodium chloride 0.45%  sodium chloride 0.9%.  ursodiol Capsule    Pertinent Labs:  @ 06:45: Na 138, BUN 7, Cr 0.48<L>, <H>, K+ 4.3, Phos 3.9, Mg 1.8, Alk Phos 116, ALT/SGPT 49<H>, AST/SGOT 34, HbA1c --    Skin per nursing documentation: No pressure injuries noted per flow sheets   Edema: note noted per nursing documentation     Estimated Needs:   [x] no change since previous assessment   ** (-) based on CBW 177lb/80.2kg :  25-30 kcal/ [id number] kcal  1-1.5 Gm pro/ k.2-112.28 Gm pro   Defer fluid needs to medical team    Previous Nutrition Diagnosis #1: Predicted inadequate protein intake  Nutrition Diagnosis is: [x] upgraded    Previous Nutrition Diagnosis #2: inadequate protein intake  Nutrition Diagnosis is: [x] upgraded    Previous Nutrition Diagnosis #3: moderate acute Malnutrition   Nutrition Diagnosis is: [x] ongoing  - being addressed with PO intake, oral nutritional supplements, food preferences     New Nutrition Diagnosis: [x] n/a     Nutrition Care Plan:  [x] In Progress   [] Achieved  [] Not applicable    Nutrition Interventions:      Education Provided:       [x] Yes: Reviewed transplant food safety precautions and answered all questions as able. Discussed avoiding any take out/outside foods (including no bakery/deli items), emphasis on consuming home cooked or frozen meals. Discussed consuming bottled or filtered water. Discussed importance of adequate intake when home, including nutrient dense foods as part of diet, consuming small, frequent meals to optimize overall intake.        Recommendations:         [X] Continue current diet order: regular.             [X] Continue oral nutrition supplement: Ensure Shake HP 1x/day     [X] Encourage PO intake, obtain food preferences, provide feeding assistance as needed.      [X] Continue current micronutrient supplementation: multivitamin, folic acid      [X] Diet education reviewed, reinforce as needed.     Monitoring and Evaluation:   Continue to monitor nutritional intake, tolerance to diet prescription, weights, labs, skin integrity    RD remains available upon request and will follow up per protocol  SHAHAB Novak [facility] Pager #[phone]

## 2022-12-13 NOTE — PROVIDER CONTACT NOTE (OTHER) - ASSESSMENT
febrile
pt. alert and oriented x4
Patient is alert ,oriented x4.Patient denies chest pain ,shortness of breath ,nausea ,vomiting ,abdominal pain ,cough.
febrile
pt. alert and oriented x4
febrile
pt. alert and oriented x4

## 2022-12-13 NOTE — PROVIDER CONTACT NOTE (OTHER) - BACKGROUND
s/p stem cells
s/p stem cells
s/p allo transplant
s/p stem cells
Cutaneous T- cell Lymphoma ,s/p autologous peripheral stem cell transplant.
S/p auto transplant
s/p auto transplant

## 2022-12-13 NOTE — PROVIDER CONTACT NOTE (OTHER) - DATE AND TIME:
09-Dec-2022 00:24
09-Dec-2022 05:40
09-Dec-2022 11:00
09-Dec-2022 10:00
13-Dec-2022 19:40
08-Dec-2022 00:35
09-Dec-2022 16:20

## 2022-12-13 NOTE — PROVIDER CONTACT NOTE (OTHER) - SITUATION
Patient is febrile 38.1c ,,/70 ,RR 19
pt. had a fever 38.2
pt. had a fever and last blood culture is this morning
pt. had another temperature 38.2
temp 39.7
chills temp 38
temp 38.7

## 2022-12-13 NOTE — PROGRESS NOTE ADULT - CRITICAL CARE ATTENDING COMMENT
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   Today is Day + 17  Patient has had fevers to low 38C  Some associated with chills.  No antibiotics at present, and RVP was ordered and is negative.  If clinically worsens, will reintroduce Cefepime.    Problem/Plan -   1:  Problem: Stem cell transplant   ·  Plan: 1. Admit to BMTU   2. TLC placement in IR   3. Day -9 - day -2 - antiemetics   4. Day -9 & day -8, VP16 2400mg/m2 IV x 34 hours (final concentration 0.4mg /mL).   5. Strict I&O, daily weights, prn diuresis   6. After completion of VP16- start CTX hydration (D51/2NS + 10mEq KCl @ 150ml / m2) - continue for 24 hours post infusion of CTX   7. Day -7 - day -4 - CTX 1800 mg / m2 daily over 2 hours. Follow SMA7, mag, phos 6hours post CT . [place]  12mg / kg - hemorrhagic cystitis ppx   8. Day -3 - BCNU 400mg / m2   9. Day -2 - day -1 - rest days  10. Day 0(11/25/22) - HPC transplant. Start Zarxio 480mcg SQ QD, continue through engraftment. Kepivance on days 0, 1 & 2   11. Anxiolytics, antinausea, antidiarrhea medications as needed   12. Lasix PRN while being aggressively hydrated to avoid VOD   13. Nutritional support, pain management.    Problem/Plan - 2:  ·  Problem: Need for prophylactic measure.   ·  Plan: 1. VOD prophylaxis - low dose heparin gtt (dosed at 100 units / kg / day), glutamine supplementation, Actigall BID   2. PCP prophylaxis - Bactrim DS through day -2; add Mepron on 12/10    3. Antiviral prophylaxis - Acyclovir 400mg po TID to start day -1   4. Antifungal prophylaxis- Diflucan 400 mg po daily.  5. GI prophylaxis - Protonix po QD   6. Antibacterial prophylaxis - when ANC < 500, start Cipro 500mg po BID. If becomes febrile, pan cx, CXR and change Cipro to Cefepime 2g IV q 8 hours. Continue until count recovery  7. Kepivance for prevention of mucositis- days 0, +1, +2  8. Aggressive mouth care and skin care as per protocol. On 11/25, early mucositis on lower lip, no ulcers. Continue topical care.   9. Receiving transfusion and electrolyte support.    11/25-Chemotherapy induced nausea and diarrhea managed with antiemetics and antidiarrheals. Now patient states stool is loose rather than watery.   Continue Cipro, Acyclovir, Diflucan prophylaxis. OOB/ambulate. She received HPC transplant today without complications. Begin Zarxio daily.  11/26/22: continues to have loose stools overnight, this am with epistaxis lasting ~ 5 minutes. To receive platelets. Remains fatigued.  11/28- Febrile overnight, tmax 100.4. CXR- no consolidations. Cultures pending. Started on Cefepime. Grade 2 chemotherapy induced oral mucositis. Continue supportive care.  12/2- patient now afebrile; 11/28 cultures negative and CXR- negative. Continue Cefepime; on Acyclovir, Diflucan prophylaxis. Continue Zarxio daily, await engraftment.  12/5- early engraftment, continue Zarxio.  12/7- Neutrophil count > 1.0, afebrile, cultures negative. D/C Cefepime. Continue Zarxio daily.  12/9- Fevers with chills, ? engraftment fever. Check cultures, access Mediport and D/C TLC. Discontinue Zarxio with WBC recovery.  12/12- Fever with chills, felt dyspnea last night. Order CT chest to r/o infiltrates. 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   Today is Day + 18  Patient has had fevers to low 38C  Some associated with chills.  No antibiotics at present, and RVP was ordered and is negative.  If clinically worsens, will reintroduce Cefepime.    Problem/Plan -   1:  Problem: Stem cell transplant   ·  Plan: 1. Admit to BMTU   2. TLC placement in IR   3. Day -9 - day -2 - antiemetics   4. Day -9 & day -8, VP16 2400mg/m2 IV x 34 hours (final concentration 0.4mg /mL).   5. Strict I&O, daily weights, prn diuresis   6. After completion of VP16- start CTX hydration (D51/2NS + 10mEq KCl @ 150ml / m2) - continue for 24 hours post infusion of CTX   7. Day -7 - day -4 - CTX 1800 mg / m2 daily over 2 hours. Follow SMA7, mag, phos 6hours post CT . [place]  12mg / kg - hemorrhagic cystitis ppx   8. Day -3 - BCNU 400mg / m2   9. Day -2 - day -1 - rest days  10. Day 0(11/25/22) - HPC transplant. Start Zarxio 480mcg SQ QD, continue through engraftment. Kepivance on days 0, 1 & 2   11. Anxiolytics, antinausea, antidiarrhea medications as needed   12. Lasix PRN while being aggressively hydrated to avoid VOD   13. Nutritional support, pain management.    Problem/Plan - 2:  ·  Problem: Need for prophylactic measure.   ·  Plan: 1. VOD prophylaxis - low dose heparin gtt (dosed at 100 units / kg / day), glutamine supplementation, Actigall BID   2. PCP prophylaxis - Bactrim DS through day -2; add Mepron on 12/10    3. Antiviral prophylaxis - Acyclovir 400mg po TID to start day -1   4. Antifungal prophylaxis- Diflucan 400 mg po daily.  5. GI prophylaxis - Protonix po QD   6. Antibacterial prophylaxis - when ANC < 500, start Cipro 500mg po BID. If becomes febrile, pan cx, CXR and change Cipro to Cefepime 2g IV q 8 hours. Continue until count recovery  7. Kepivance for prevention of mucositis- days 0, +1, +2  8. Aggressive mouth care and skin care as per protocol. On 11/25, early mucositis on lower lip, no ulcers. Continue topical care.   9. Receiving transfusion and electrolyte support.    11/25-Chemotherapy induced nausea and diarrhea managed with antiemetics and antidiarrheals. Now patient states stool is loose rather than watery.   Continue Cipro, Acyclovir, Diflucan prophylaxis. OOB/ambulate. She received HPC transplant today without complications. Begin Zarxio daily.  11/26/22: continues to have loose stools overnight, this am with epistaxis lasting ~ 5 minutes. To receive platelets. Remains fatigued.  11/28- Febrile overnight, tmax 100.4. CXR- no consolidations. Cultures pending. Started on Cefepime. Grade 2 chemotherapy induced oral mucositis. Continue supportive care.  12/2- patient now afebrile; 11/28 cultures negative and CXR- negative. Continue Cefepime; on Acyclovir, Diflucan prophylaxis. Continue Zarxio daily, await engraftment.  12/5- early engraftment, continue Zarxio.  12/7- Neutrophil count > 1.0, afebrile, cultures negative. D/C Cefepime. Continue Zarxio daily.  12/9- Fevers with chills, ? engraftment fever. Check cultures, access Mediport and D/C TLC. Discontinue Zarxio with WBC recovery.  12/12- Fever with chills, felt dyspnea last night. Order CT chest to r/o infiltrates.  12/13- CT chest- negative, cultures negative. Fever likely secondary to engraftment. 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   Today is Day + 18    Problem/Plan -   1:  Problem: Stem cell transplant   ·  Plan: 1. Admit to BMTU   2. TLC placement in IR   3. Day -9 - day -2 - antiemetics   4. Day -9 & day -8, VP16 2400mg/m2 IV x 34 hours (final concentration 0.4mg /mL).   5. Strict I&O, daily weights, prn diuresis   6. After completion of VP16- start CTX hydration (D51/2NS + 10mEq KCl @ 150ml / m2) - continue for 24 hours post infusion of CTX   7. Day -7 - day -4 - CTX 1800 mg / m2 daily over 2 hours. Follow SMA7, mag, phos 6hours post CT . [place]  12mg / kg - hemorrhagic cystitis ppx   8. Day -3 - BCNU 400mg / m2   9. Day -2 - day -1 - rest days  10. Day 0(11/25/22) - HPC transplant. Start Zarxio 480mcg SQ QD, continue through engraftment. Kepivance on days 0, 1 & 2   11. Anxiolytics, antinausea, antidiarrhea medications as needed   12. Lasix PRN while being aggressively hydrated to avoid VOD   13. Nutritional support, pain management.    Problem/Plan - 2:  ·  Problem: Need for prophylactic measure.   ·  Plan: 1. VOD prophylaxis - low dose heparin gtt (dosed at 100 units / kg / day), glutamine supplementation, Actigall BID   2. PCP prophylaxis - Bactrim DS through day -2; add Mepron on 12/10    3. Antiviral prophylaxis - Acyclovir 400mg po TID to start day -1   4. Antifungal prophylaxis- Diflucan 400 mg po daily.  5. GI prophylaxis - Protonix po QD   6. Antibacterial prophylaxis - when ANC < 500, start Cipro 500mg po BID. If becomes febrile, pan cx, CXR and change Cipro to Cefepime 2g IV q 8 hours. Continue until count recovery  7. Kepivance for prevention of mucositis- days 0, +1, +2  8. Aggressive mouth care and skin care as per protocol. On 11/25, early mucositis on lower lip, no ulcers. Continue topical care.   9. Receiving transfusion and electrolyte support.    11/25-Chemotherapy induced nausea and diarrhea managed with antiemetics and antidiarrheals. Now patient states stool is loose rather than watery.   Continue Cipro, Acyclovir, Diflucan prophylaxis. OOB/ambulate. She received HPC transplant today without complications. Begin Zarxio daily.  11/26/22: continues to have loose stools overnight, this am with epistaxis lasting ~ 5 minutes. To receive platelets. Remains fatigued.  11/28- Febrile overnight, tmax 100.4. CXR- no consolidations. Cultures pending. Started on Cefepime. Grade 2 chemotherapy induced oral mucositis. Continue supportive care.  12/2- patient now afebrile; 11/28 cultures negative and CXR- negative. Continue Cefepime; on Acyclovir, Diflucan prophylaxis. Continue Zarxio daily, await engraftment.  12/5- early engraftment, continue Zarxio.  12/7- Neutrophil count > 1.0, afebrile, cultures negative. D/C Cefepime. Continue Zarxio daily.  12/9- Fevers with chills, ? engraftment fever. Check cultures, access Mediport and D/C TLC. Discontinue Zarxio with WBC recovery.  12/12- Fever with chills, felt dyspnea last night. Order CT chest to r/o infiltrates.  12/13- CT chest- negative, cultures negative. Fever likely secondary to engraftment.

## 2022-12-13 NOTE — PROGRESS NOTE ADULT - SUBJECTIVE AND OBJECTIVE BOX
HPC Transplant Team                                                      Critical / Counseling Time Provided: 30 minutes                                                                                                                                                        Chief Complaint: Autologous peripheral blood stem cell transplant with high dose CBV prep regimen for treatment of peripheral T cell lymphoma    S: Patient seen and examined with HPC Transplant Team:     All other ROS negative     O: Vitals:   Vital Signs Last 24 Hrs  T(C): 37.4 (13 Dec 2022 05:20), Max: 38.2 (12 Dec 2022 15:00)  T(F): 99.3 (13 Dec 2022 05:20), Max: 100.8 (12 Dec 2022 15:00)  HR: 86 (13 Dec 2022 05:20) (86 - 101)  BP: 101/67 (13 Dec 2022 05:20) (94/63 - 107/70)  BP(mean): --  RR: 18 (13 Dec 2022 05:20) (17 - 18)  SpO2: 98% (13 Dec 2022 05:20) (97% - 100%)    Parameters below as of 13 Dec 2022 05:20  Patient On (Oxygen Delivery Method): room air    Admit weight: 80.3kg  Daily Weight in k.8 (12 Dec 2022 11:06)  Today's weight:     Intake / Output:    @ 07:01  -  - @ 07:00  --------------------------------------------------------  IN: 1798 mL / OUT: 2600 mL / NET: -802 mL    PE:   Oropharynx: No erythema or ulcers  Oral Mucositis:                -                                     stGstrstastdstest:st st1st CVS: S1, S2 RRR   Lungs: CTA throughout bilaterally, decreased in bilateral bases  Abdomen: + BS x 4 normoactive, soft, NT, ND  Extremities: no edema   Gastric Mucositis:       -                                           Grade: n/a   Intestinal Mucositis:     -                                         Grade: n/a   Skin: +erythematous macular rash to neck, upper chest and back- resolving  Lines: Mediport- clean, dry, intact  Neuro: A&O x 3   Pain: denies    Labs:     Cultures:   Culture Results:   10,000 - 49,000 CFU/mL Enterococcus species (22 @ 15:09)    Culture Results:   No growth to date. (22 @ 13:29)    Culture Results:   No growth to date. (22 @ 13:45)    Culture Results:   No growth to date. (22 @ 01:48)    Culture Results:   No growth to date. (22 @ 01:47)    Culture Results:   No growth (22 @ 01:46)    Culture Results:   <10,000 CFU/mL Normal Urogenital Maryanne (22 @ 08:19)    Culture Results:   No Growth Final (22 @ 06:10)    Culture Results:   No Growth Final (22 @ 05:50)      Radiology:   ACC: [id number] EXAM:  CT CHEST                        PROCEDURE DATE:  2022    IMPRESSION:  The lungs are clear.    ACC: [id number] EXAM:  XR CHEST PORTABLE URGENT 1V                        PROCEDURE DATE:  2022    Impression:  Clear lungs.    ACC: [id number] EXAM:  XR CHEST PORTABLE URGENT 1V                        PROCEDURE DATE:  2022    Impression:  No focal consolidations.    Meds:   Antimicrobials:   acyclovir   Oral Tab/Cap 400 milliGRAM(s) Oral every 8 hours  atovaquone  Suspension 750 milliGRAM(s) Oral two times a day  clotrimazole Lozenge 1 Lozenge Oral five times a day  fluconAZOLE   Tablet 400 milliGRAM(s) Oral daily      Heme / Onc:       GI:  pantoprazole    Tablet 40 milliGRAM(s) Oral before breakfast  polyethylene glycol 3350 17 Gram(s) Oral daily PRN  senna 2 Tablet(s) Oral at bedtime PRN  simethicone 80 milliGRAM(s) Chew every 4 hours PRN  sodium bicarbonate Mouth Rinse 10 milliLiter(s) Swish and Spit five times a day  ursodiol Capsule 300 milliGRAM(s) Oral two times a day      Cardiovascular:       Immunologic:       Other medications:   acetaminophen     Tablet .. 650 milliGRAM(s) Oral every 6 hours  Biotene Dry Mouth Oral Rinse 5 milliLiter(s) Swish and Spit five times a day  chlorhexidine 4% Liquid 1 Application(s) Topical <User Schedule>  dexAMETHasone  Injectable 4 milliGRAM(s) IV Push daily  FIRST- Mouthwash  BLM 10 milliLiter(s) Swish and Spit every 6 hours  FLUoxetine 20 milliGRAM(s) Oral daily  folic acid 1 milliGRAM(s) Oral daily  lidocaine/prilocaine Cream 1 Application(s) Topical daily  loratadine 10 milliGRAM(s) Oral daily  multivitamin 1 Tablet(s) Oral daily  nystatin Powder 1 Application(s) Topical two times a day  sodium chloride 0.45% 1000 milliLiter(s) IV Continuous <Continuous>  sodium chloride 0.9%. 1000 milliLiter(s) IV Continuous <Continuous>      PRN:   acetaminophen     Tablet .. 650 milliGRAM(s) Oral every 6 hours PRN  AQUAPHOR (petrolatum Ointment) 1 Application(s) Topical two times a day PRN  metoclopramide Injectable 10 milliGRAM(s) IV Push every 6 hours PRN  ondansetron Injectable 4 milliGRAM(s) IV Push every 6 hours PRN  polyethylene glycol 3350 17 Gram(s) Oral daily PRN  senna 2 Tablet(s) Oral at bedtime PRN  simethicone 80 milliGRAM(s) Chew every 4 hours PRN  sodium chloride 0.65% Nasal 1 Spray(s) Both Nostrils four times a day PRN  sodium chloride 0.9% lock flush 10 milliLiter(s) IV Push every 1 hour PRN  zinc oxide 40% Paste 1 Application(s) Topical three times a day PRN    A/P:   57 year old female with a history of  peripheral T cell lymphoma   Post :  Autologous PBSCT day + 18  - Completed  -16 24 hour continuous infusion, strict I&O, daily weights, prn diuresis    - Cytoxan day 3/.  - CTX - continue CTX hydration for 24 hours post infusion of last dose. Strict I&O, daily weights, prn diuresis. Start cipro 500mg po BID when ANC < 500    Neutropenic. Started Cipro for prophylaxis. If febrile, panculture and start Cefepime (has tolerated in the past)   - Kepivance day 2/3  11/27- Kepivance day 3/3- HELD due to Kepivance rash and oral erythema worsening  - Febrile overnight, tmax 100.4. CXR no obvious consolidations. Cultures pending. Started on cefepime. Grade 2 chemotherapy induced oral mucositis. Continue supportive measures.    - Mild transaminitis, if continue to rise lower Diflucan dose.  - early engraftment. Continue Cefepime, zarxio for now    mouth pain resolved    engrafted. D/C cefepime (infectious work up has been negative), continue zarxio for now   D/C'd Zarxio, pulling TLC today, access mediport f/u cx's  - Febrile, not neutropenic, RVP/COVID (-).   CT chest follow up  - CT chest - no PNA ; febrile despite dexamethasone 4mg IVP daily infectious work up negative to date     1. Infectious Disease:   acyclovir   Oral Tab/Cap 400 milliGRAM(s) Oral every 8 hours  atovaquone  Suspension 750 milliGRAM(s) Oral two times a day  clotrimazole Lozenge 1 Lozenge Oral five times a day  fluconAZOLE   Tablet 400 milliGRAM(s) Oral daily    2. VOD Prophylaxis: Actigall, Glutamine    3. GI Prophylaxis:   pantoprazole    Tablet 40 milliGRAM(s) Oral before breakfast    4. Mouthcare - NS / NaHCO3 rinses, Mycelex, Biotene; Skin care     5. GVHD prophylaxis - n/a     6. Transfuse & replete electrolytes prn     7. IV hydration, daily weights, strict I&O, prn diuresis     8. PO intake as tolerated, nutrition follow up as needed, MVI, folic acid     9. Antiemetics, anti-diarrhea medications:   metoclopramide Injectable 10 milliGRAM(s) IV Push every 6 hours PRN  ondansetron Injectable 4 milliGRAM(s) IV Push every 6 hours PRN    10. OOB as tolerated, physical therapy consult if needed     11. Monitor coags / fibrinogen 2x week, vitamin K as needed     12. Monitor closely for clinical changes, monitor for fevers     13. Emotional support provided, plan of care discussed and questions addressed     14. Patient education done regarding plan of care, restrictions and discharge planning     15. Continue regular social work input     I have written the above note for Dr. Cabrales who performed service with me in the room.   Leena Ibrahim NP-C ([phone])    I have seen and examined patient with NP, I agree with above note as scribed.                    HPC Transplant Team                                                      Critical / Counseling Time Provided: 30 minutes                                                                                                                                                        Chief Complaint: Autologous peripheral blood stem cell transplant with high dose CBV prep regimen for treatment of peripheral T cell lymphoma    S: Patient seen and examined with HPC Transplant Team:   + fatigue   + intermittent nausea  + loose stool   All other ROS negative     O: Vitals:   Vital Signs Last 24 Hrs  T(C): 37.4 (13 Dec 2022 05:20), Max: 38.2 (12 Dec 2022 15:00)  T(F): 99.3 (13 Dec 2022 05:20), Max: 100.8 (12 Dec 2022 15:00)  HR: 86 (13 Dec 2022 05:20) (86 - 101)  BP: 101/67 (13 Dec 2022 05:20) (94/63 - 107/70)  BP(mean): --  RR: 18 (13 Dec 2022 05:20) (17 - 18)  SpO2: 98% (13 Dec 2022 05:20) (97% - 100%)    Parameters below as of 13 Dec 2022 05:20  Patient On (Oxygen Delivery Method): room air    Admit weight: 80.3kg  Daily Weight in k.8 (12 Dec 2022 11:06)  Today's weight: 75.5kg     Intake / Output:    @ 07:01  -   @ 07:00  --------------------------------------------------------  IN: 1798 mL / OUT: 2600 mL / NET: -802 mL    PE:   Oropharynx: No erythema or ulcers  Oral Mucositis:                -                                     stGstrstastdstest:st st1st CVS: S1, S2 RRR   Lungs: CTA throughout bilaterally, decreased in bilateral bases  Abdomen: + BS x 4 normoactive, soft, NT, ND  Extremities: no edema   Gastric Mucositis:       -                                           Grade: n/a   Intestinal Mucositis:     -                                         Grade: n/a   Skin: +erythematous macular rash to neck, upper chest and back- resolving  Lines: Mediport- clean, dry, intact  Neuro: A&O x 3   Pain: denies    Labs:     Cultures:   Culture Results:   10,000 - 49,000 CFU/mL Enterococcus species (22 @ 15:09)    Culture Results:   No growth to date. (22 @ 13:29)    Culture Results:   No growth to date. (22 @ 13:45)    Culture Results:   No growth to date. (22 @ 01:48)    Culture Results:   No growth to date. (22 @ 01:47)    Culture Results:   No growth (22 @ 01:46)    Culture Results:   <10,000 CFU/mL Normal Urogenital Maryanne (22 @ 08:19)    Culture Results:   No Growth Final (22 @ 06:10)    Culture Results:   No Growth Final (22 @ 05:50)      Radiology:   ACC: [id number] EXAM:  CT CHEST                        PROCEDURE DATE:  2022    IMPRESSION:  The lungs are clear.    ACC: [id number] EXAM:  XR CHEST PORTABLE URGENT 1V                        PROCEDURE DATE:  2022    Impression:  Clear lungs.    ACC: [id number] EXAM:  XR CHEST PORTABLE URGENT 1V                        PROCEDURE DATE:  2022    Impression:  No focal consolidations.    Meds:   Antimicrobials:   acyclovir   Oral Tab/Cap 400 milliGRAM(s) Oral every 8 hours  atovaquone  Suspension 750 milliGRAM(s) Oral two times a day  clotrimazole Lozenge 1 Lozenge Oral five times a day  fluconAZOLE   Tablet 400 milliGRAM(s) Oral daily      Heme / Onc:       GI:  pantoprazole    Tablet 40 milliGRAM(s) Oral before breakfast  polyethylene glycol 3350 17 Gram(s) Oral daily PRN  senna 2 Tablet(s) Oral at bedtime PRN  simethicone 80 milliGRAM(s) Chew every 4 hours PRN  sodium bicarbonate Mouth Rinse 10 milliLiter(s) Swish and Spit five times a day  ursodiol Capsule 300 milliGRAM(s) Oral two times a day      Cardiovascular:       Immunologic:       Other medications:   acetaminophen     Tablet .. 650 milliGRAM(s) Oral every 6 hours  Biotene Dry Mouth Oral Rinse 5 milliLiter(s) Swish and Spit five times a day  chlorhexidine 4% Liquid 1 Application(s) Topical <User Schedule>  dexAMETHasone  Injectable 4 milliGRAM(s) IV Push daily  FIRST- Mouthwash  BLM 10 milliLiter(s) Swish and Spit every 6 hours  FLUoxetine 20 milliGRAM(s) Oral daily  folic acid 1 milliGRAM(s) Oral daily  lidocaine/prilocaine Cream 1 Application(s) Topical daily  loratadine 10 milliGRAM(s) Oral daily  multivitamin 1 Tablet(s) Oral daily  nystatin Powder 1 Application(s) Topical two times a day  sodium chloride 0.45% 1000 milliLiter(s) IV Continuous <Continuous>  sodium chloride 0.9%. 1000 milliLiter(s) IV Continuous <Continuous>      PRN:   acetaminophen     Tablet .. 650 milliGRAM(s) Oral every 6 hours PRN  AQUAPHOR (petrolatum Ointment) 1 Application(s) Topical two times a day PRN  metoclopramide Injectable 10 milliGRAM(s) IV Push every 6 hours PRN  ondansetron Injectable 4 milliGRAM(s) IV Push every 6 hours PRN  polyethylene glycol 3350 17 Gram(s) Oral daily PRN  senna 2 Tablet(s) Oral at bedtime PRN  simethicone 80 milliGRAM(s) Chew every 4 hours PRN  sodium chloride 0.65% Nasal 1 Spray(s) Both Nostrils four times a day PRN  sodium chloride 0.9% lock flush 10 milliLiter(s) IV Push every 1 hour PRN  zinc oxide 40% Paste 1 Application(s) Topical three times a day PRN    A/P:   57 year old female with a history of  peripheral T cell lymphoma   Post :  Autologous PBSCT day + 18  - Completed  -16 24 hour continuous infusion, strict I&O, daily weights, prn diuresis    - Cytoxan day 3.  - CTX - continue CTX hydration for 24 hours post infusion of last dose. Strict I&O, daily weights, prn diuresis. Start cipro 500mg po BID when ANC < 500    Neutropenic. Started Cipro for prophylaxis. If febrile, panculture and start Cefepime (has tolerated in the past)   - Kepivance day 2/3  11/27- Kepivance day 3/3- HELD due to Kepivance rash and oral erythema worsening  - Febrile overnight, tmax 100.4. CXR no obvious consolidations. Cultures pending. Started on cefepime. Grade 2 chemotherapy induced oral mucositis. Continue supportive measures.    - Mild transaminitis, if continue to rise lower Diflucan dose.  - early engraftment. Continue Cefepime, zarxio for now    mouth pain resolved    engrafted. D/C cefepime (infectious work up has been negative), continue zarxio for now   D/C'd Zarxio, pulling TLC today, access mediport f/u cx's  - Febrile, not neutropenic, RVP/COVID (-).   CT chest follow up  - CT chest - no PNA ; febrile despite dexamethasone 4mg IVP daily infectious work up negative to date     1. Infectious Disease:   acyclovir   Oral Tab/Cap 400 milliGRAM(s) Oral every 8 hours  atovaquone  Suspension 750 milliGRAM(s) Oral two times a day  clotrimazole Lozenge 1 Lozenge Oral five times a day  fluconAZOLE   Tablet 400 milliGRAM(s) Oral daily    2. VOD Prophylaxis: Actigall, Glutamine    3. GI Prophylaxis:   pantoprazole    Tablet 40 milliGRAM(s) Oral before breakfast    4. Mouthcare - NS / NaHCO3 rinses, Mycelex, Biotene; Skin care     5. GVHD prophylaxis - n/a     6. Transfuse & replete electrolytes prn     7. IV hydration, daily weights, strict I&O, prn diuresis     8. PO intake as tolerated, nutrition follow up as needed, MVI, folic acid     9. Antiemetics, anti-diarrhea medications:   metoclopramide Injectable 10 milliGRAM(s) IV Push every 6 hours PRN  ondansetron Injectable 4 milliGRAM(s) IV Push every 6 hours PRN    10. OOB as tolerated, physical therapy consult if needed     11. Monitor coags / fibrinogen 2x week, vitamin K as needed     12. Monitor closely for clinical changes, monitor for fevers     13. Emotional support provided, plan of care discussed and questions addressed     14. Patient education done regarding plan of care, restrictions and discharge planning     15. Continue regular social work input     I have written the above note for Dr. Cabrales who performed service with me in the room.   Leena Ibrahim NP-C ([phone])    I have seen and examined patient with NP, I agree with above note as scribed.

## 2022-12-13 NOTE — PROVIDER CONTACT NOTE (OTHER) - RECOMMENDATIONS
tylenol 1gm iv
none
notify provider, give po tylenol 650mgs .
tylenol 650 mg po
tylenol 650mgs given po , notify provider.
notify provider. give po tylenol. continue present meds

## 2022-12-13 NOTE — PROVIDER CONTACT NOTE (OTHER) - ACTION/TREATMENT ORDERED:
tylenol 1gm iv
tylenol 650 mg po
tylenol 650mgs given po . will monitor
tylenol 650mgs po , continue IVF , heparin drip and the rest of the meds . will monitor.
tylenol 650mgs given po for fever 38.4  continue present meds. will monitor.
none
Will order blood culture x2 ,UA ,urine culture ,chest x-ray.

## 2022-12-13 NOTE — CHART NOTE - NSCHARTNOTESELECT_GEN_ALL_CORE
[facility] NP Infusion Note/Event Note
FEVER/Event Note
Medicine NP/Event Note
Nutrition Services

## 2022-12-13 NOTE — PROVIDER CONTACT NOTE (OTHER) - REASON
temp 39.7
temperature 38.2
temperature 38.2, as per pt. she had  chills
chills temp 38
temp 38.7
fever
pt. had a temp 38.4

## 2022-12-14 LAB
-  AMPICILLIN: SIGNIFICANT CHANGE UP
-  CIPROFLOXACIN: SIGNIFICANT CHANGE UP
-  LEVOFLOXACIN: SIGNIFICANT CHANGE UP
-  NITROFURANTOIN: SIGNIFICANT CHANGE UP
-  TETRACYCLINE: SIGNIFICANT CHANGE UP
-  VANCOMYCIN: SIGNIFICANT CHANGE UP
ALBUMIN SERPL ELPH-MCNC: 4.1 G/DL — SIGNIFICANT CHANGE UP (ref 3.3–5)
ALP SERPL-CCNC: 111 U/L — SIGNIFICANT CHANGE UP (ref 40–120)
ALT FLD-CCNC: 44 U/L — SIGNIFICANT CHANGE UP (ref 10–45)
ANION GAP SERPL CALC-SCNC: 13 MMOL/L — SIGNIFICANT CHANGE UP (ref 5–17)
AST SERPL-CCNC: 26 U/L — SIGNIFICANT CHANGE UP (ref 10–40)
BASOPHILS # BLD AUTO: 0 K/UL — SIGNIFICANT CHANGE UP (ref 0–0.2)
BASOPHILS NFR BLD AUTO: 0 % — SIGNIFICANT CHANGE UP (ref 0–2)
BILIRUB SERPL-MCNC: 0.2 MG/DL — SIGNIFICANT CHANGE UP (ref 0.2–1.2)
BLD GP AB SCN SERPL QL: NEGATIVE — SIGNIFICANT CHANGE UP
BUN SERPL-MCNC: 15 MG/DL — SIGNIFICANT CHANGE UP (ref 7–23)
CALCIUM SERPL-MCNC: 9.5 MG/DL — SIGNIFICANT CHANGE UP (ref 8.4–10.5)
CHLORIDE SERPL-SCNC: 100 MMOL/L — SIGNIFICANT CHANGE UP (ref 96–108)
CO2 SERPL-SCNC: 25 MMOL/L — SIGNIFICANT CHANGE UP (ref 22–31)
CREAT SERPL-MCNC: 0.55 MG/DL — SIGNIFICANT CHANGE UP (ref 0.5–1.3)
CULTURE RESULTS: SIGNIFICANT CHANGE UP
CULTURE RESULTS: SIGNIFICANT CHANGE UP
EGFR: 108 ML/MIN/1.73M2 — SIGNIFICANT CHANGE UP
EOSINOPHIL # BLD AUTO: 0 K/UL — SIGNIFICANT CHANGE UP (ref 0–0.5)
EOSINOPHIL NFR BLD AUTO: 0 % — SIGNIFICANT CHANGE UP (ref 0–6)
GIANT PLATELETS BLD QL SMEAR: PRESENT — SIGNIFICANT CHANGE UP
GLUCOSE SERPL-MCNC: 113 MG/DL — HIGH (ref 70–99)
HCT VFR BLD CALC: 23.8 % — LOW (ref 34.5–45)
HGB BLD-MCNC: 7.4 G/DL — LOW (ref 11.5–15.5)
LDH SERPL L TO P-CCNC: 268 U/L — HIGH (ref 50–242)
LYMPHOCYTES # BLD AUTO: 2.3 K/UL — SIGNIFICANT CHANGE UP (ref 1–3.3)
LYMPHOCYTES # BLD AUTO: 48.2 % — HIGH (ref 13–44)
MAGNESIUM SERPL-MCNC: 2 MG/DL — SIGNIFICANT CHANGE UP (ref 1.6–2.6)
MANUAL SMEAR VERIFICATION: SIGNIFICANT CHANGE UP
MCHC RBC-ENTMCNC: 29.2 PG — SIGNIFICANT CHANGE UP (ref 27–34)
MCHC RBC-ENTMCNC: 31.1 GM/DL — LOW (ref 32–36)
MCV RBC AUTO: 94.1 FL — SIGNIFICANT CHANGE UP (ref 80–100)
METHOD TYPE: SIGNIFICANT CHANGE UP
MONOCYTES # BLD AUTO: 0.38 K/UL — SIGNIFICANT CHANGE UP (ref 0–0.9)
MONOCYTES NFR BLD AUTO: 8 % — SIGNIFICANT CHANGE UP (ref 2–14)
MYELOCYTES NFR BLD: 2.7 % — HIGH (ref 0–0)
NEUTROPHILS # BLD AUTO: 1.96 K/UL — SIGNIFICANT CHANGE UP (ref 1.8–7.4)
NEUTROPHILS NFR BLD AUTO: 41.1 % — LOW (ref 43–77)
NRBC # BLD: 1 /100 — HIGH (ref 0–0)
ORGANISM # SPEC MICROSCOPIC CNT: SIGNIFICANT CHANGE UP
ORGANISM # SPEC MICROSCOPIC CNT: SIGNIFICANT CHANGE UP
PHOSPHATE SERPL-MCNC: 5.1 MG/DL — HIGH (ref 2.5–4.5)
PLAT MORPH BLD: ABNORMAL
PLATELET # BLD AUTO: 298 K/UL — SIGNIFICANT CHANGE UP (ref 150–400)
POTASSIUM SERPL-MCNC: 4 MMOL/L — SIGNIFICANT CHANGE UP (ref 3.5–5.3)
POTASSIUM SERPL-SCNC: 4 MMOL/L — SIGNIFICANT CHANGE UP (ref 3.5–5.3)
PROT SERPL-MCNC: 6.6 G/DL — SIGNIFICANT CHANGE UP (ref 6–8.3)
RBC # BLD: 2.53 M/UL — LOW (ref 3.8–5.2)
RBC # FLD: 15.2 % — HIGH (ref 10.3–14.5)
RBC BLD AUTO: SIGNIFICANT CHANGE UP
RH IG SCN BLD-IMP: POSITIVE — SIGNIFICANT CHANGE UP
SODIUM SERPL-SCNC: 138 MMOL/L — SIGNIFICANT CHANGE UP (ref 135–145)
SPECIMEN SOURCE: SIGNIFICANT CHANGE UP
SPECIMEN SOURCE: SIGNIFICANT CHANGE UP
WBC # BLD: 4.77 K/UL — SIGNIFICANT CHANGE UP (ref 3.8–10.5)
WBC # FLD AUTO: 4.77 K/UL — SIGNIFICANT CHANGE UP (ref 3.8–10.5)

## 2022-12-14 PROCEDURE — 99233 SBSQ HOSP IP/OBS HIGH 50: CPT

## 2022-12-14 RX ADMIN — Medication 10 MILLILITER(S): at 13:06

## 2022-12-14 RX ADMIN — Medication 400 MILLIGRAM(S): at 05:29

## 2022-12-14 RX ADMIN — Medication 10 MILLILITER(S): at 16:13

## 2022-12-14 RX ADMIN — DIPHENHYDRAMINE HYDROCHLORIDE AND LIDOCAINE HYDROCHLORIDE AND ALUMINUM HYDROXIDE AND MAGNESIUM HYDRO 10 MILLILITER(S): KIT at 05:30

## 2022-12-14 RX ADMIN — LORATADINE 10 MILLIGRAM(S): 10 TABLET ORAL at 13:08

## 2022-12-14 RX ADMIN — Medication 1 LOZENGE: at 13:07

## 2022-12-14 RX ADMIN — Medication 1 LOZENGE: at 16:13

## 2022-12-14 RX ADMIN — Medication 20 MILLIGRAM(S): at 13:08

## 2022-12-14 RX ADMIN — Medication 5 MILLILITER(S): at 09:40

## 2022-12-14 RX ADMIN — NYSTATIN CREAM 1 APPLICATION(S): 100000 CREAM TOPICAL at 05:30

## 2022-12-14 RX ADMIN — Medication 4 MILLIGRAM(S): at 05:29

## 2022-12-14 RX ADMIN — Medication 400 MILLIGRAM(S): at 13:08

## 2022-12-14 RX ADMIN — Medication 1 MILLIGRAM(S): at 13:09

## 2022-12-14 RX ADMIN — ATOVAQUONE 750 MILLIGRAM(S): 750 SUSPENSION ORAL at 17:53

## 2022-12-14 RX ADMIN — Medication 1 LOZENGE: at 20:07

## 2022-12-14 RX ADMIN — Medication 1 LOZENGE: at 23:59

## 2022-12-14 RX ADMIN — FLUCONAZOLE 400 MILLIGRAM(S): 150 TABLET ORAL at 13:08

## 2022-12-14 RX ADMIN — Medication 400 MILLIGRAM(S): at 21:37

## 2022-12-14 RX ADMIN — Medication 5 MILLILITER(S): at 13:07

## 2022-12-14 RX ADMIN — DIPHENHYDRAMINE HYDROCHLORIDE AND LIDOCAINE HYDROCHLORIDE AND ALUMINUM HYDROXIDE AND MAGNESIUM HYDRO 10 MILLILITER(S): KIT at 13:07

## 2022-12-14 RX ADMIN — CHLORHEXIDINE GLUCONATE 1 APPLICATION(S): 213 SOLUTION TOPICAL at 09:41

## 2022-12-14 RX ADMIN — ATOVAQUONE 750 MILLIGRAM(S): 750 SUSPENSION ORAL at 05:29

## 2022-12-14 RX ADMIN — Medication 1 LOZENGE: at 09:40

## 2022-12-14 RX ADMIN — Medication 5 MILLILITER(S): at 20:07

## 2022-12-14 RX ADMIN — URSODIOL 300 MILLIGRAM(S): 250 TABLET, FILM COATED ORAL at 05:29

## 2022-12-14 RX ADMIN — DIPHENHYDRAMINE HYDROCHLORIDE AND LIDOCAINE HYDROCHLORIDE AND ALUMINUM HYDROXIDE AND MAGNESIUM HYDRO 10 MILLILITER(S): KIT at 17:55

## 2022-12-14 RX ADMIN — Medication 10 MILLILITER(S): at 09:40

## 2022-12-14 RX ADMIN — Medication 1 TABLET(S): at 13:08

## 2022-12-14 RX ADMIN — NYSTATIN CREAM 1 APPLICATION(S): 100000 CREAM TOPICAL at 17:54

## 2022-12-14 RX ADMIN — SODIUM CHLORIDE 20 MILLILITER(S): 9 INJECTION INTRAMUSCULAR; INTRAVENOUS; SUBCUTANEOUS at 01:13

## 2022-12-14 RX ADMIN — Medication 10 MILLILITER(S): at 20:07

## 2022-12-14 RX ADMIN — PANTOPRAZOLE SODIUM 40 MILLIGRAM(S): 20 TABLET, DELAYED RELEASE ORAL at 05:29

## 2022-12-14 RX ADMIN — Medication 5 MILLILITER(S): at 16:13

## 2022-12-14 RX ADMIN — URSODIOL 300 MILLIGRAM(S): 250 TABLET, FILM COATED ORAL at 17:54

## 2022-12-14 NOTE — PROGRESS NOTE ADULT - SUBJECTIVE AND OBJECTIVE BOX
HPC Transplant Team                                                      Critical / Counseling Time Provided: 30 minutes                                                                                                                                                        Chief Complaint: Autologous peripheral blood stem cell transplant with high dose CBV prep regimen for treatment of peripheral T cell lymphoma    S: Patient seen and examined with HPC Transplant Team:     All other ROS negative     O: Vitals:   Vital Signs Last 24 Hrs  T(C): 36.8 (14 Dec 2022 05:15), Max: 37 (13 Dec 2022 09:49)  T(F): 98.2 (14 Dec 2022 05:15), Max: 98.6 (13 Dec 2022 09:49)  HR: 75 (14 Dec 2022 05:15) (75 - 89)  BP: 99/63 (14 Dec 2022 05:15) (92/56 - 103/65)  BP(mean): --  RR: 18 (14 Dec 2022 05:15) (18 - 19)  SpO2: 99% (14 Dec 2022 05:15) (98% - 99%)    Parameters below as of 14 Dec 2022 05:15  Patient On (Oxygen Delivery Method): room air    Admit weight: 80.3kg   Daily Weight in k.5 (13 Dec 2022 09:49)  Today's weight:     Intake / Output:   12-13 @ 07:01  -  12-14 @ 07:00  --------------------------------------------------------  IN: 1932 mL / OUT: 2902 mL / NET: -970 mL    PE:   Oropharynx: No erythema or ulcers  Oral Mucositis:                -                                     stGstrstastdstest:st st1st CVS: S1, S2 RRR   Lungs: CTA throughout bilaterally, decreased in bilateral bases  Abdomen: + BS x 4 normoactive, soft, NT, ND  Extremities: no edema   Gastric Mucositis:       -                                           Grade: n/a   Intestinal Mucositis:     -                                         Grade: n/a   Skin: +erythematous macular rash to neck, upper chest and back- resolving  Lines: Mediport- clean, dry, intact  Neuro: A&O x 3   Pain: denies    Labs:   CBC Full  -  ( 14 Dec 2022 07:35 )  WBC Count : 4.77 K/uL  Hemoglobin : 7.4 g/dL  Hematocrit : 23.8 %  Platelet Count - Automated : 298 K/uL  Mean Cell Volume : 94.1 fl  Mean Cell Hemoglobin : 29.2 pg  Mean Cell Hemoglobin Concentration : 31.1 gm/dL  Auto Neutrophil # : x  Auto Lymphocyte # : x  Auto Monocyte # : x  Auto Eosinophil # : x  Auto Basophil # : x  Auto Neutrophil % : x  Auto Lymphocyte % : x  Auto Monocyte % : x  Auto Eosinophil % : x  Auto Basophil % : x                          7.4    4.77  )-----------( 298      ( 14 Dec 2022 07:35 )             23.8       Cultures:   Culture Results:   10,000 - 49,000 CFU/mL Enterococcus species (22 @ 15:09)    Culture Results:   No growth to date. (22 @ 13:29)    Culture Results:   No growth to date. (22 @ 13:45)    Culture Results:   No growth to date. (22 @ 01:48)    Culture Results:   No growth to date. (22 @ 01:47)    Culture Results:   No growth (22 @ 01:46)    Culture Results:   <10,000 CFU/mL Normal Urogenital Maryanne (22 @ 08:19)    Culture Results:   No Growth Final (22 @ 06:10)    Culture Results:   No Growth Final (22 @ 05:50)      Radiology:   ACC: [id number] EXAM:  CT CHEST                        PROCEDURE DATE:  2022    IMPRESSION:  The lungs are clear.    ACC: [id number] EXAM:  XR CHEST PORTABLE URGENT 1V                        PROCEDURE DATE:  2022    Impression:  Clear lungs.    ACC: [id number] EXAM:  XR CHEST PORTABLE URGENT 1V                        PROCEDURE DATE:  2022    Impression:  No focal consolidations.      Meds:   Antimicrobials:   acyclovir   Oral Tab/Cap 400 milliGRAM(s) Oral every 8 hours  atovaquone  Suspension 750 milliGRAM(s) Oral two times a day  clotrimazole Lozenge 1 Lozenge Oral five times a day  fluconAZOLE   Tablet 400 milliGRAM(s) Oral daily      Heme / Onc:       GI:  pantoprazole    Tablet 40 milliGRAM(s) Oral before breakfast  polyethylene glycol 3350 17 Gram(s) Oral daily PRN  senna 2 Tablet(s) Oral at bedtime PRN  simethicone 80 milliGRAM(s) Chew every 4 hours PRN  sodium bicarbonate Mouth Rinse 10 milliLiter(s) Swish and Spit five times a day  ursodiol Capsule 300 milliGRAM(s) Oral two times a day      Cardiovascular:       Immunologic:       Other medications:   acetaminophen     Tablet .. 650 milliGRAM(s) Oral every 6 hours  Biotene Dry Mouth Oral Rinse 5 milliLiter(s) Swish and Spit five times a day  chlorhexidine 4% Liquid 1 Application(s) Topical <User Schedule>  dexAMETHasone  Injectable 4 milliGRAM(s) IV Push daily  FIRST- Mouthwash  BLM 10 milliLiter(s) Swish and Spit every 6 hours  FLUoxetine 20 milliGRAM(s) Oral daily  folic acid 1 milliGRAM(s) Oral daily  lidocaine/prilocaine Cream 1 Application(s) Topical daily  loratadine 10 milliGRAM(s) Oral daily  multivitamin 1 Tablet(s) Oral daily  nystatin Powder 1 Application(s) Topical two times a day  sodium chloride 0.45% 1000 milliLiter(s) IV Continuous <Continuous>  sodium chloride 0.9%. 1000 milliLiter(s) IV Continuous <Continuous>      PRN:   acetaminophen     Tablet .. 650 milliGRAM(s) Oral every 6 hours PRN  AQUAPHOR (petrolatum Ointment) 1 Application(s) Topical two times a day PRN  metoclopramide Injectable 10 milliGRAM(s) IV Push every 6 hours PRN  ondansetron Injectable 4 milliGRAM(s) IV Push every 6 hours PRN  polyethylene glycol 3350 17 Gram(s) Oral daily PRN  senna 2 Tablet(s) Oral at bedtime PRN  simethicone 80 milliGRAM(s) Chew every 4 hours PRN  sodium chloride 0.65% Nasal 1 Spray(s) Both Nostrils four times a day PRN  sodium chloride 0.9% lock flush 10 milliLiter(s) IV Push every 1 hour PRN  zinc oxide 40% Paste 1 Application(s) Topical three times a day PRN    A/P:   57 year old female with a history of  peripheral T cell lymphoma   Post :  Autologous PBSCT day + 19   - Completed  -16 24 hour continuous infusion, strict I&O, daily weights, prn diuresis    - Cytoxan day 3/.  - CTX /- continue CTX hydration for 24 hours post infusion of last dose. Strict I&O, daily weights, prn diuresis. Start cipro 500mg po BID when ANC < 500    Neutropenic. Started Cipro for prophylaxis. If febrile, panculture and start Cefepime (has tolerated in the past)   - Kepivance day 2/3  11/27- Kepivance day 3/3- HELD due to Kepivance rash and oral erythema worsening  - Febrile overnight, tmax 100.4. CXR no obvious consolidations. Cultures pending. Started on cefepime. Grade 2 chemotherapy induced oral mucositis. Continue supportive measures.    - Mild transaminitis, if continue to rise lower Diflucan dose.  - early engraftment. Continue Cefepime, zarxio for now    mouth pain resolved    engrafted. D/C cefepime (infectious work up has been negative), continue zarxio for now   D/C'd Zarxio, pulling TLC today, access mediport f/u cx's  - Febrile, not neutropenic, RVP/COVID (-).   CT chest follow up  - CT chest - no PNA ; febrile despite dexamethasone 4mg IVP daily infectious work up negative to date   - afebrile - last temp 22 at 3pm. Discharge planning for this week.     1. Infectious Disease:   acyclovir   Oral Tab/Cap 400 milliGRAM(s) Oral every 8 hours  atovaquone  Suspension 750 milliGRAM(s) Oral two times a day  clotrimazole Lozenge 1 Lozenge Oral five times a day  fluconAZOLE   Tablet 400 milliGRAM(s) Oral daily    2. VOD Prophylaxis: Actigall, Glutamine    3. GI Prophylaxis:    pantoprazole    Tablet 40 milliGRAM(s) Oral before breakfast    4. Mouthcare - NS / NaHCO3 rinses, Mycelex, Biotene; Skin care     5. GVHD prophylaxis - n/a     6. Transfuse & replete electrolytes prn     7. IV hydration, daily weights, strict I&O, prn diuresis     8. PO intake as tolerated, nutrition follow up as needed, MVI, folic acid     9. Antiemetics, anti-diarrhea medications:   metoclopramide Injectable 10 milliGRAM(s) IV Push every 6 hours PRN  ondansetron Injectable 4 milliGRAM(s) IV Push every 6 hours PRN    10. OOB as tolerated, physical therapy consult if needed     11. Monitor coags / fibrinogen 2x week, vitamin K as needed     12. Monitor closely for clinical changes, monitor for fevers     13. Emotional support provided, plan of care discussed and questions addressed     14. Patient education done regarding plan of care, restrictions and discharge planning     15. Continue regular social work input     I have written the above note for Dr. Cabrales who performed service with me in the room.   Leena Ibrahim NP-C ([phone])    I have seen and examined patient with NP, I agree with above note as scribed.                    HPC Transplant Team                                                      Critical / Counseling Time Provided: 30 minutes                                                                                                                                                        Chief Complaint: Autologous peripheral blood stem cell transplant with high dose CBV prep regimen for treatment of peripheral T cell lymphoma    S: Patient seen and examined with HPC Transplant Team:     All other ROS negative     O: Vitals:   Vital Signs Last 24 Hrs  T(C): 36.8 (14 Dec 2022 05:15), Max: 37 (13 Dec 2022 09:49)  T(F): 98.2 (14 Dec 2022 05:15), Max: 98.6 (13 Dec 2022 09:49)  HR: 75 (14 Dec 2022 05:15) (75 - 89)  BP: 99/63 (14 Dec 2022 05:15) (92/56 - 103/65)  BP(mean): --  RR: 18 (14 Dec 2022 05:15) (18 - 19)  SpO2: 99% (14 Dec 2022 05:15) (98% - 99%)    Parameters below as of 14 Dec 2022 05:15  Patient On (Oxygen Delivery Method): room air    Admit weight: 80.3kg   Daily Weight in k.5 (13 Dec 2022 09:49)  Today's weight:     Intake / Output:   12-13 @ 07:01  -  12-14 @ 07:00  --------------------------------------------------------  IN: 1932 mL / OUT: 2902 mL / NET: -970 mL    PE:   Oropharynx: No erythema or ulcers  Oral Mucositis:                -                                     stGstrstastdstest:st st1st CVS: S1, S2 RRR   Lungs: CTA throughout bilaterally, decreased in bilateral bases  Abdomen: + BS x 4 normoactive, soft, NT, ND  Extremities: no edema   Gastric Mucositis:       -                                           Grade: n/a   Intestinal Mucositis:     -                                         Grade: n/a   Skin: +erythematous macular rash to neck, upper chest and back- resolving  Lines: Mediport- clean, dry, intact  Neuro: A&O x 3   Pain: denies    Labs:   CBC Full  -  ( 14 Dec 2022 07:35 )  WBC Count : 4.77 K/uL  Hemoglobin : 7.4 g/dL  Hematocrit : 23.8 %  Platelet Count - Automated : 298 K/uL  Mean Cell Volume : 94.1 fl  Mean Cell Hemoglobin : 29.2 pg  Mean Cell Hemoglobin Concentration : 31.1 gm/dL  Auto Neutrophil # : x  Auto Lymphocyte # : x  Auto Monocyte # : x  Auto Eosinophil # : x  Auto Basophil # : x  Auto Neutrophil % : x  Auto Lymphocyte % : x  Auto Monocyte % : x  Auto Eosinophil % : x  Auto Basophil % : x                          7.4    4.77  )-----------( 298      ( 14 Dec 2022 07:35 )             23.8         138  |  100  |  15  ----------------------------<  113<H>  4.0   |  25  |  0.55    Ca    9.5      14 Dec 2022 07:35  Phos  5.1       Mg     2.0         TPro  6.6  /  Alb  4.1  /  TBili  0.2  /  DBili  x   /  AST  26  /  ALT  44  /  AlkPhos  111        Cultures:   Culture Results:   10,000 - 49,000 CFU/mL Enterococcus species (22 @ 15:09)    Culture Results:   No growth to date. (22 @ 13:29)    Culture Results:   No growth to date. (22 @ 13:45)    Culture Results:   No growth to date. (22 @ 01:48)    Culture Results:   No growth to date. (22 @ 01:47)    Culture Results:   No growth (22 @ 01:46)    Culture Results:   <10,000 CFU/mL Normal Urogenital Maryanne (22 @ 08:19)    Culture Results:   No Growth Final (22 @ 06:10)    Culture Results:   No Growth Final (22 @ 05:50)      Radiology:   ACC: [id number] EXAM:  CT CHEST                        PROCEDURE DATE:  2022    IMPRESSION:  The lungs are clear.    ACC: [id number] EXAM:  XR CHEST PORTABLE URGENT 1V                        PROCEDURE DATE:  2022    Impression:  Clear lungs.    ACC: [id number] EXAM:  XR CHEST PORTABLE URGENT 1V                        PROCEDURE DATE:  2022    Impression:  No focal consolidations.      Meds:   Antimicrobials:   acyclovir   Oral Tab/Cap 400 milliGRAM(s) Oral every 8 hours  atovaquone  Suspension 750 milliGRAM(s) Oral two times a day  clotrimazole Lozenge 1 Lozenge Oral five times a day  fluconAZOLE   Tablet 400 milliGRAM(s) Oral daily      Heme / Onc:       GI:  pantoprazole    Tablet 40 milliGRAM(s) Oral before breakfast  polyethylene glycol 3350 17 Gram(s) Oral daily PRN  senna 2 Tablet(s) Oral at bedtime PRN  simethicone 80 milliGRAM(s) Chew every 4 hours PRN  sodium bicarbonate Mouth Rinse 10 milliLiter(s) Swish and Spit five times a day  ursodiol Capsule 300 milliGRAM(s) Oral two times a day      Cardiovascular:       Immunologic:       Other medications:   acetaminophen     Tablet .. 650 milliGRAM(s) Oral every 6 hours  Biotene Dry Mouth Oral Rinse 5 milliLiter(s) Swish and Spit five times a day  chlorhexidine 4% Liquid 1 Application(s) Topical <User Schedule>  dexAMETHasone  Injectable 4 milliGRAM(s) IV Push daily  FIRST- Mouthwash  BLM 10 milliLiter(s) Swish and Spit every 6 hours  FLUoxetine 20 milliGRAM(s) Oral daily  folic acid 1 milliGRAM(s) Oral daily  lidocaine/prilocaine Cream 1 Application(s) Topical daily  loratadine 10 milliGRAM(s) Oral daily  multivitamin 1 Tablet(s) Oral daily  nystatin Powder 1 Application(s) Topical two times a day  sodium chloride 0.45% 1000 milliLiter(s) IV Continuous <Continuous>  sodium chloride 0.9%. 1000 milliLiter(s) IV Continuous <Continuous>      PRN:   acetaminophen     Tablet .. 650 milliGRAM(s) Oral every 6 hours PRN  AQUAPHOR (petrolatum Ointment) 1 Application(s) Topical two times a day PRN  metoclopramide Injectable 10 milliGRAM(s) IV Push every 6 hours PRN  ondansetron Injectable 4 milliGRAM(s) IV Push every 6 hours PRN  polyethylene glycol 3350 17 Gram(s) Oral daily PRN  senna 2 Tablet(s) Oral at bedtime PRN  simethicone 80 milliGRAM(s) Chew every 4 hours PRN  sodium chloride 0.65% Nasal 1 Spray(s) Both Nostrils four times a day PRN  sodium chloride 0.9% lock flush 10 milliLiter(s) IV Push every 1 hour PRN  zinc oxide 40% Paste 1 Application(s) Topical three times a day PRN    A/P:   57 year old female with a history of  peripheral T cell lymphoma   Post :  Autologous PBSCT day + 19   - Completed  -16 24 hour continuous infusion, strict I&O, daily weights, prn diuresis    - Cytoxan day 3/.  - CTX - continue CTX hydration for 24 hours post infusion of last dose. Strict I&O, daily weights, prn diuresis. Start cipro 500mg po BID when ANC < 500    Neutropenic. Started Cipro for prophylaxis. If febrile, panculture and start Cefepime (has tolerated in the past)   - Kepivance day 2/3  11/27- Kepivance day 3/3- HELD due to Kepivance rash and oral erythema worsening  - Febrile overnight, tmax 100.4. CXR no obvious consolidations. Cultures pending. Started on cefepime. Grade 2 chemotherapy induced oral mucositis. Continue supportive measures.    - Mild transaminitis, if continue to rise lower Diflucan dose.  - early engraftment. Continue Cefepime, zarxio for now    mouth pain resolved    engrafted. D/C cefepime (infectious work up has been negative), continue zarxio for now   D/C'd Zarxio, pulling TLC today, access mediport f/u cx's  - Febrile, not neutropenic, RVP/COVID (-).   CT chest follow up  - CT chest - no PNA ; febrile despite dexamethasone 4mg IVP daily infectious work up negative to date   - afebrile - last temp 22 at 3pm. Discharge planning for this week.     1. Infectious Disease:   acyclovir   Oral Tab/Cap 400 milliGRAM(s) Oral every 8 hours  atovaquone  Suspension 750 milliGRAM(s) Oral two times a day  clotrimazole Lozenge 1 Lozenge Oral five times a day  fluconAZOLE   Tablet 400 milliGRAM(s) Oral daily    2. VOD Prophylaxis: Actigall, Glutamine    3. GI Prophylaxis:    pantoprazole    Tablet 40 milliGRAM(s) Oral before breakfast    4. Mouthcare - NS / NaHCO3 rinses, Mycelex, Biotene; Skin care     5. GVHD prophylaxis - n/a     6. Transfuse & replete electrolytes prn     7. IV hydration, daily weights, strict I&O, prn diuresis     8. PO intake as tolerated, nutrition follow up as needed, MVI, folic acid     9. Antiemetics, anti-diarrhea medications:   metoclopramide Injectable 10 milliGRAM(s) IV Push every 6 hours PRN  ondansetron Injectable 4 milliGRAM(s) IV Push every 6 hours PRN    10. OOB as tolerated, physical therapy consult if needed     11. Monitor coags / fibrinogen 2x week, vitamin K as needed     12. Monitor closely for clinical changes, monitor for fevers     13. Emotional support provided, plan of care discussed and questions addressed     14. Patient education done regarding plan of care, restrictions and discharge planning     15. Continue regular social work input     I have written the above note for Dr. Cabrales who performed service with me in the room.   Leena Ibrahim NP-C ([phone])    I have seen and examined patient with NP, I agree with above note as scribed.                    HPC Transplant Team                                                      Critical / Counseling Time Provided: 30 minutes                                                                                                                                                        Chief Complaint: Autologous peripheral blood stem cell transplant with high dose CBV prep regimen for treatment of peripheral T cell lymphoma    S: Patient seen and examined with HPC Transplant Team:   + fatigue   + intermittent nausea   + loose stool   All other ROS negative     O: Vitals:   Vital Signs Last 24 Hrs  T(C): 36.8 (14 Dec 2022 05:15), Max: 37 (13 Dec 2022 09:49)  T(F): 98.2 (14 Dec 2022 05:15), Max: 98.6 (13 Dec 2022 09:49)  HR: 75 (14 Dec 2022 05:15) (75 - 89)  BP: 99/63 (14 Dec 2022 05:15) (92/56 - 103/65)  BP(mean): --  RR: 18 (14 Dec 2022 05:15) (18 - 19)  SpO2: 99% (14 Dec 2022 05:15) (98% - 99%)    Parameters below as of 14 Dec 2022 05:15  Patient On (Oxygen Delivery Method): room air    Admit weight: 80.3kg   Daily Weight in k.5 (13 Dec 2022 09:49)  Today's weight: 75kg     Intake / Output:   12-13 @ 07:01  -  -14 @ 07:00  --------------------------------------------------------  IN: 1932 mL / OUT: 2902 mL / NET: -970 mL    PE:   Oropharynx: No erythema or ulcers  Oral Mucositis:                -                                     stGstrstastdstest:st st1st CVS: S1, S2 RRR   Lungs: CTA throughout bilaterally, decreased in bilateral bases  Abdomen: + BS x 4 normoactive, soft, NT, ND  Extremities: no edema   Gastric Mucositis:       -                                           Grade: n/a   Intestinal Mucositis:     -                                         Grade: n/a   Skin: +erythematous macular rash to neck, upper chest and back- resolving  Lines: Mediport- clean, dry, intact  Neuro: A&O x 3   Pain: denies    Labs:   CBC Full  -  ( 14 Dec 2022 07:35 )  WBC Count : 4.77 K/uL  Hemoglobin : 7.4 g/dL  Hematocrit : 23.8 %  Platelet Count - Automated : 298 K/uL  Mean Cell Volume : 94.1 fl  Mean Cell Hemoglobin : 29.2 pg  Mean Cell Hemoglobin Concentration : 31.1 gm/dL  Auto Neutrophil # : x  Auto Lymphocyte # : x  Auto Monocyte # : x  Auto Eosinophil # : x  Auto Basophil # : x  Auto Neutrophil % : x  Auto Lymphocyte % : x  Auto Monocyte % : x  Auto Eosinophil % : x  Auto Basophil % : x                          7.4    4.77  )-----------( 298      ( 14 Dec 2022 07:35 )             23.8         138  |  100  |  15  ----------------------------<  113<H>  4.0   |  25  |  0.55    Ca    9.5      14 Dec 2022 07:35  Phos  5.1       Mg     2.0         TPro  6.6  /  Alb  4.1  /  TBili  0.2  /  DBili  x   /  AST  26  /  ALT  44  /  AlkPhos  111        Cultures:   Culture Results:   10,000 - 49,000 CFU/mL Enterococcus species (22 @ 15:09)    Culture Results:   No growth to date. (22 @ 13:29)    Culture Results:   No growth to date. (22 @ 13:45)    Culture Results:   No growth to date. (22 @ 01:48)    Culture Results:   No growth to date. (22 @ 01:47)    Culture Results:   No growth (22 @ 01:46)    Culture Results:   <10,000 CFU/mL Normal Urogenital Maryanne (22 @ 08:19)    Culture Results:   No Growth Final (22 @ 06:10)    Culture Results:   No Growth Final (22 @ 05:50)      Radiology:   ACC: [id number] EXAM:  CT CHEST                        PROCEDURE DATE:  2022    IMPRESSION:  The lungs are clear.    ACC: [id number] EXAM:  XR CHEST PORTABLE URGENT 1V                        PROCEDURE DATE:  2022    Impression:  Clear lungs.    ACC: [id number] EXAM:  XR CHEST PORTABLE URGENT 1V                        PROCEDURE DATE:  2022    Impression:  No focal consolidations.      Meds:   Antimicrobials:   acyclovir   Oral Tab/Cap 400 milliGRAM(s) Oral every 8 hours  atovaquone  Suspension 750 milliGRAM(s) Oral two times a day  clotrimazole Lozenge 1 Lozenge Oral five times a day  fluconAZOLE   Tablet 400 milliGRAM(s) Oral daily      Heme / Onc:       GI:  pantoprazole    Tablet 40 milliGRAM(s) Oral before breakfast  polyethylene glycol 3350 17 Gram(s) Oral daily PRN  senna 2 Tablet(s) Oral at bedtime PRN  simethicone 80 milliGRAM(s) Chew every 4 hours PRN  sodium bicarbonate Mouth Rinse 10 milliLiter(s) Swish and Spit five times a day  ursodiol Capsule 300 milliGRAM(s) Oral two times a day      Cardiovascular:       Immunologic:       Other medications:   acetaminophen     Tablet .. 650 milliGRAM(s) Oral every 6 hours  Biotene Dry Mouth Oral Rinse 5 milliLiter(s) Swish and Spit five times a day  chlorhexidine 4% Liquid 1 Application(s) Topical <User Schedule>  dexAMETHasone  Injectable 4 milliGRAM(s) IV Push daily  FIRST- Mouthwash  BLM 10 milliLiter(s) Swish and Spit every 6 hours  FLUoxetine 20 milliGRAM(s) Oral daily  folic acid 1 milliGRAM(s) Oral daily  lidocaine/prilocaine Cream 1 Application(s) Topical daily  loratadine 10 milliGRAM(s) Oral daily  multivitamin 1 Tablet(s) Oral daily  nystatin Powder 1 Application(s) Topical two times a day  sodium chloride 0.45% 1000 milliLiter(s) IV Continuous <Continuous>  sodium chloride 0.9%. 1000 milliLiter(s) IV Continuous <Continuous>      PRN:   acetaminophen     Tablet .. 650 milliGRAM(s) Oral every 6 hours PRN  AQUAPHOR (petrolatum Ointment) 1 Application(s) Topical two times a day PRN  metoclopramide Injectable 10 milliGRAM(s) IV Push every 6 hours PRN  ondansetron Injectable 4 milliGRAM(s) IV Push every 6 hours PRN  polyethylene glycol 3350 17 Gram(s) Oral daily PRN  senna 2 Tablet(s) Oral at bedtime PRN  simethicone 80 milliGRAM(s) Chew every 4 hours PRN  sodium chloride 0.65% Nasal 1 Spray(s) Both Nostrils four times a day PRN  sodium chloride 0.9% lock flush 10 milliLiter(s) IV Push every 1 hour PRN  zinc oxide 40% Paste 1 Application(s) Topical three times a day PRN    A/P:   57 year old female with a history of  peripheral T cell lymphoma   Post :  Autologous PBSCT day + 19   - Completed  -16 24 hour continuous infusion, strict I&O, daily weights, prn diuresis    - Cytoxan day 3/.  - CTX - continue CTX hydration for 24 hours post infusion of last dose. Strict I&O, daily weights, prn diuresis. Start cipro 500mg po BID when ANC < 500    Neutropenic. Started Cipro for prophylaxis. If febrile, panculture and start Cefepime (has tolerated in the past)   - Kepivance day 2/3  - Kepivance day 3/3- HELD due to Kepivance rash and oral erythema worsening  - Febrile overnight, tmax 100.4. CXR no obvious consolidations. Cultures pending. Started on cefepime. Grade 2 chemotherapy induced oral mucositis. Continue supportive measures.    - Mild transaminitis, if continue to rise lower Diflucan dose.  - early engraftment. Continue Cefepime, zarxio for now    mouth pain resolved    engrafted. D/C cefepime (infectious work up has been negative), continue zarxio for now   D/C'd Zarxio, pulling TLC today, access [facility] f/u cx's  - Febrile, not neutropenic, RVP/COVID (-).   CT chest follow up  - CT chest - no PNA ; febrile despite dexamethasone 4mg IVP daily infectious work up negative to date   - afebrile - last temp 22 at 3pm. Discharge planning for this week.     1. Infectious Disease:   acyclovir   Oral Tab/Cap 400 milliGRAM(s) Oral every 8 hours  atovaquone  Suspension 750 milliGRAM(s) Oral two times a day  clotrimazole Lozenge 1 Lozenge Oral five times a day  fluconAZOLE   Tablet 400 milliGRAM(s) Oral daily    2. VOD Prophylaxis: Actigall, Glutamine    3. GI Prophylaxis:    pantoprazole    Tablet 40 milliGRAM(s) Oral before breakfast    4. Mouthcare - NS / NaHCO3 rinses, Mycelex, Biotene; Skin care     5. GVHD prophylaxis - n/a     6. Transfuse & replete electrolytes prn     7. IV hydration, daily weights, strict I&O, prn diuresis     8. PO intake as tolerated, nutrition follow up as needed, MVI, folic acid     9. Antiemetics, anti-diarrhea medications:   metoclopramide Injectable 10 milliGRAM(s) IV Push every 6 hours PRN  ondansetron Injectable 4 milliGRAM(s) IV Push every 6 hours PRN    10. OOB as tolerated, physical therapy consult if needed     11. Monitor coags / fibrinogen 2x week, vitamin K as needed     12. Monitor closely for clinical changes, monitor for fevers     13. Emotional support provided, plan of care discussed and questions addressed     14. Patient education done regarding plan of care, restrictions and discharge planning     15. Continue regular social work input     I have written the above note for Dr. Cabrales who performed service with me in the room.   Leena Ibrahim NP-C ([phone])    I have seen and examined patient with NP, I agree with above note as scribed.

## 2022-12-14 NOTE — PROGRESS NOTE ADULT - CRITICAL CARE ATTENDING COMMENT
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   Today is Day + 19    Problem/Plan -   1:  Problem: Stem cell transplant   ·  Plan: 1. Admit to BMTU   2. TLC placement in IR   3. Day -9 - day -2 - antiemetics   4. Day -9 & day -8, VP16 2400mg/m2 IV x 34 hours (final concentration 0.4mg /mL).   5. Strict I&O, daily weights, prn diuresis   6. After completion of VP16- start CTX hydration (D51/2NS + 10mEq KCl @ 150ml / m2) - continue for 24 hours post infusion of CTX   7. Day -7 - day -4 - CTX 1800 mg / m2 daily over 2 hours. Follow SMA7, mag, phos 6hours post CT . [place]  12mg / kg - hemorrhagic cystitis ppx   8. Day -3 - BCNU 400mg / m2   9. Day -2 - day -1 - rest days  10. Day 0(11/25/22) - HPC transplant. Start Zarxio 480mcg SQ QD, continue through engraftment. Kepivance on days 0, 1 & 2   11. Anxiolytics, antinausea, antidiarrhea medications as needed   12. Lasix PRN while being aggressively hydrated to avoid VOD   13. Nutritional support, pain management.    Problem/Plan - 2:  ·  Problem: Need for prophylactic measure.   ·  Plan: 1. VOD prophylaxis - low dose heparin gtt (dosed at 100 units / kg / day), glutamine supplementation, Actigall BID   2. PCP prophylaxis - Bactrim DS through day -2; add Mepron on 12/10    3. Antiviral prophylaxis - Acyclovir 400mg po TID to start day -1   4. Antifungal prophylaxis- Diflucan 400 mg po daily.  5. GI prophylaxis - Protonix po QD   6. Antibacterial prophylaxis - when ANC < 500, start Cipro 500mg po BID. If becomes febrile, pan cx, CXR and change Cipro to Cefepime 2g IV q 8 hours. Continue until count recovery  7. Kepivance for prevention of mucositis- days 0, +1, +2  8. Aggressive mouth care and skin care as per protocol. On 11/25, early mucositis on lower lip, no ulcers. Continue topical care.   9. Receiving transfusion and electrolyte support.    11/25-Chemotherapy induced nausea and diarrhea managed with antiemetics and antidiarrheals. Now patient states stool is loose rather than watery.   Continue Cipro, Acyclovir, Diflucan prophylaxis. OOB/ambulate. She received HPC transplant today without complications. Begin Zarxio daily.  11/26/22: continues to have loose stools overnight, this am with epistaxis lasting ~ 5 minutes. To receive platelets. Remains fatigued.  11/28- Febrile overnight, tmax 100.4. CXR- no consolidations. Cultures pending. Started on Cefepime. Grade 2 chemotherapy induced oral mucositis. Continue supportive care.  12/2- patient now afebrile; 11/28 cultures negative and CXR- negative. Continue Cefepime; on Acyclovir, Diflucan prophylaxis. Continue Zarxio daily, await engraftment.  12/5- early engraftment, continue Zarxio.  12/7- Neutrophil count > 1.0, afebrile, cultures negative. D/C Cefepime. Continue Zarxio daily.  12/9- Fevers with chills, ? engraftment fever. Check cultures, access Mediport and D/C TLC. Discontinue Zarxio with WBC recovery.  12/12- Fever with chills, felt dyspnea last night. Order CT chest to r/o infiltrates.  12/13- CT chest- negative, cultures negative. Fever likely secondary to engraftment. 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   Today is Day + 19    Problem/Plan -   1:  Problem: Stem cell transplant   ·  Plan: 1. Admit to BMTU   2. TLC placement in IR   3. Day -9 - day -2 - antiemetics   4. Day -9 & day -8, VP16 2400mg/m2 IV x 34 hours (final concentration 0.4mg /mL).   5. Strict I&O, daily weights, prn diuresis   6. After completion of VP16- start CTX hydration (D51/2NS + 10mEq KCl @ 150ml / m2) - continue for 24 hours post infusion of CTX   7. Day -7 - day -4 - CTX 1800 mg / m2 daily over 2 hours. Follow SMA7, mag, phos 6hours post CT . [place]  12mg / kg - hemorrhagic cystitis ppx   8. Day -3 - BCNU 400mg / m2   9. Day -2 - day -1 - rest days  10. Day 0(11/25/22) - HPC transplant. Start Zarxio 480mcg SQ QD, continue through engraftment. Kepivance on days 0, 1 & 2   11. Anxiolytics, antinausea, antidiarrhea medications as needed   12. Lasix PRN while being aggressively hydrated to avoid VOD   13. Nutritional support, pain management.    Problem/Plan - 2:  ·  Problem: Need for prophylactic measure.   ·  Plan: 1. VOD prophylaxis - low dose heparin gtt (dosed at 100 units / kg / day), glutamine supplementation, Actigall BID   2. PCP prophylaxis - Bactrim DS through day -2; add Mepron on 12/10    3. Antiviral prophylaxis - Acyclovir 400mg po TID to start day -1   4. Antifungal prophylaxis- Diflucan 400 mg po daily.  5. GI prophylaxis - Protonix po QD   6. Antibacterial prophylaxis - when ANC < 500, start Cipro 500mg po BID. If becomes febrile, pan cx, CXR and change Cipro to Cefepime 2g IV q 8 hours. Continue until count recovery  7. Kepivance for prevention of mucositis- days 0, +1, +2  8. Aggressive mouth care and skin care as per protocol. On 11/25, early mucositis on lower lip, no ulcers. Continue topical care.   9. Receiving transfusion and electrolyte support.    11/25-Chemotherapy induced nausea and diarrhea managed with antiemetics and antidiarrheals. Now patient states stool is loose rather than watery.   Continue Cipro, Acyclovir, Diflucan prophylaxis. OOB/ambulate. She received HPC transplant today without complications. Begin Zarxio daily.  11/26/22: continues to have loose stools overnight, this am with epistaxis lasting ~ 5 minutes. To receive platelets. Remains fatigued.  11/28- Febrile overnight, tmax 100.4. CXR- no consolidations. Cultures pending. Started on Cefepime. Grade 2 chemotherapy induced oral mucositis. Continue supportive care.  12/2- patient now afebrile; 11/28 cultures negative and CXR- negative. Continue Cefepime; on Acyclovir, Diflucan prophylaxis. Continue Zarxio daily, await engraftment.  12/5- early engraftment, continue Zarxio.  12/7- Neutrophil count > 1.0, afebrile, cultures negative. D/C Cefepime. Continue Zarxio daily.  12/9- Fevers with chills, ? engraftment fever. Check cultures, access Mediport and D/C TLC. Discontinue Zarxio with WBC recovery.  12/12- Fever with chills, felt dyspnea last night. Order CT chest to r/o infiltrates.  12/13- CT chest- negative, cultures negative. Fever likely secondary to engraftment

## 2022-12-15 LAB
ALBUMIN SERPL ELPH-MCNC: 3.8 G/DL — SIGNIFICANT CHANGE UP (ref 3.3–5)
ALP SERPL-CCNC: 107 U/L — SIGNIFICANT CHANGE UP (ref 40–120)
ALT FLD-CCNC: 41 U/L — SIGNIFICANT CHANGE UP (ref 10–45)
ANION GAP SERPL CALC-SCNC: 15 MMOL/L — SIGNIFICANT CHANGE UP (ref 5–17)
APTT BLD: 22.4 SEC — LOW (ref 27.5–35.5)
AST SERPL-CCNC: 23 U/L — SIGNIFICANT CHANGE UP (ref 10–40)
BASOPHILS # BLD AUTO: 0.05 K/UL — SIGNIFICANT CHANGE UP (ref 0–0.2)
BASOPHILS NFR BLD AUTO: 0.9 % — SIGNIFICANT CHANGE UP (ref 0–2)
BILIRUB SERPL-MCNC: 0.1 MG/DL — LOW (ref 0.2–1.2)
BUN SERPL-MCNC: 14 MG/DL — SIGNIFICANT CHANGE UP (ref 7–23)
CALCIUM SERPL-MCNC: 9.3 MG/DL — SIGNIFICANT CHANGE UP (ref 8.4–10.5)
CHLORIDE SERPL-SCNC: 100 MMOL/L — SIGNIFICANT CHANGE UP (ref 96–108)
CO2 SERPL-SCNC: 25 MMOL/L — SIGNIFICANT CHANGE UP (ref 22–31)
CREAT SERPL-MCNC: 0.47 MG/DL — LOW (ref 0.5–1.3)
EGFR: 112 ML/MIN/1.73M2 — SIGNIFICANT CHANGE UP
EOSINOPHIL # BLD AUTO: 0 K/UL — SIGNIFICANT CHANGE UP (ref 0–0.5)
EOSINOPHIL NFR BLD AUTO: 0 % — SIGNIFICANT CHANGE UP (ref 0–6)
FIBRINOGEN PPP-MCNC: 237 MG/DL — SIGNIFICANT CHANGE UP (ref 200–445)
GIANT PLATELETS BLD QL SMEAR: PRESENT — SIGNIFICANT CHANGE UP
GLUCOSE SERPL-MCNC: 114 MG/DL — HIGH (ref 70–99)
HCT VFR BLD CALC: 24 % — LOW (ref 34.5–45)
HGB BLD-MCNC: 7.4 G/DL — LOW (ref 11.5–15.5)
INR BLD: 1.08 RATIO — SIGNIFICANT CHANGE UP (ref 0.88–1.16)
LDH SERPL L TO P-CCNC: 232 U/L — SIGNIFICANT CHANGE UP (ref 50–242)
LYMPHOCYTES # BLD AUTO: 2.04 K/UL — SIGNIFICANT CHANGE UP (ref 1–3.3)
LYMPHOCYTES # BLD AUTO: 40.2 % — SIGNIFICANT CHANGE UP (ref 13–44)
MAGNESIUM SERPL-MCNC: 2 MG/DL — SIGNIFICANT CHANGE UP (ref 1.6–2.6)
MANUAL SMEAR VERIFICATION: SIGNIFICANT CHANGE UP
MCHC RBC-ENTMCNC: 29.4 PG — SIGNIFICANT CHANGE UP (ref 27–34)
MCHC RBC-ENTMCNC: 30.8 GM/DL — LOW (ref 32–36)
MCV RBC AUTO: 95.2 FL — SIGNIFICANT CHANGE UP (ref 80–100)
METAMYELOCYTES # FLD: 4.4 % — HIGH (ref 0–0)
MONOCYTES # BLD AUTO: 0.5 K/UL — SIGNIFICANT CHANGE UP (ref 0–0.9)
MONOCYTES NFR BLD AUTO: 9.8 % — SIGNIFICANT CHANGE UP (ref 2–14)
MYELOCYTES NFR BLD: 1.8 % — HIGH (ref 0–0)
NEUTROPHILS # BLD AUTO: 2.13 K/UL — SIGNIFICANT CHANGE UP (ref 1.8–7.4)
NEUTROPHILS NFR BLD AUTO: 40.2 % — LOW (ref 43–77)
NEUTS BAND # BLD: 1.8 % — SIGNIFICANT CHANGE UP (ref 0–8)
NRBC # BLD: 3 /100 — HIGH (ref 0–0)
PHOSPHATE SERPL-MCNC: 5.1 MG/DL — HIGH (ref 2.5–4.5)
PLAT MORPH BLD: ABNORMAL
PLATELET # BLD AUTO: 336 K/UL — SIGNIFICANT CHANGE UP (ref 150–400)
POTASSIUM SERPL-MCNC: 4 MMOL/L — SIGNIFICANT CHANGE UP (ref 3.5–5.3)
POTASSIUM SERPL-SCNC: 4 MMOL/L — SIGNIFICANT CHANGE UP (ref 3.5–5.3)
PROT SERPL-MCNC: 6.5 G/DL — SIGNIFICANT CHANGE UP (ref 6–8.3)
PROTHROM AB SERPL-ACNC: 12.4 SEC — SIGNIFICANT CHANGE UP (ref 10.5–13.4)
RBC # BLD: 2.52 M/UL — LOW (ref 3.8–5.2)
RBC # FLD: 15.7 % — HIGH (ref 10.3–14.5)
RBC BLD AUTO: SIGNIFICANT CHANGE UP
SODIUM SERPL-SCNC: 140 MMOL/L — SIGNIFICANT CHANGE UP (ref 135–145)
VARIANT LYMPHS # BLD: 0.9 % — SIGNIFICANT CHANGE UP (ref 0–6)
WBC # BLD: 5.07 K/UL — SIGNIFICANT CHANGE UP (ref 3.8–10.5)
WBC # FLD AUTO: 5.07 K/UL — SIGNIFICANT CHANGE UP (ref 3.8–10.5)

## 2022-12-15 PROCEDURE — 99233 SBSQ HOSP IP/OBS HIGH 50: CPT

## 2022-12-15 RX ADMIN — Medication 10 MILLILITER(S): at 15:33

## 2022-12-15 RX ADMIN — Medication 1 LOZENGE: at 20:56

## 2022-12-15 RX ADMIN — Medication 10 MILLILITER(S): at 20:56

## 2022-12-15 RX ADMIN — NYSTATIN CREAM 1 APPLICATION(S): 100000 CREAM TOPICAL at 06:19

## 2022-12-15 RX ADMIN — Medication 10 MILLILITER(S): at 00:00

## 2022-12-15 RX ADMIN — Medication 10 MILLILITER(S): at 08:00

## 2022-12-15 RX ADMIN — Medication 400 MILLIGRAM(S): at 13:42

## 2022-12-15 RX ADMIN — Medication 1 LOZENGE: at 07:59

## 2022-12-15 RX ADMIN — Medication 5 MILLILITER(S): at 00:00

## 2022-12-15 RX ADMIN — Medication 400 MILLIGRAM(S): at 06:18

## 2022-12-15 RX ADMIN — PANTOPRAZOLE SODIUM 40 MILLIGRAM(S): 20 TABLET, DELAYED RELEASE ORAL at 06:18

## 2022-12-15 RX ADMIN — URSODIOL 300 MILLIGRAM(S): 250 TABLET, FILM COATED ORAL at 06:18

## 2022-12-15 RX ADMIN — ATOVAQUONE 750 MILLIGRAM(S): 750 SUSPENSION ORAL at 17:07

## 2022-12-15 RX ADMIN — Medication 10 MILLILITER(S): at 12:28

## 2022-12-15 RX ADMIN — DIPHENHYDRAMINE HYDROCHLORIDE AND LIDOCAINE HYDROCHLORIDE AND ALUMINUM HYDROXIDE AND MAGNESIUM HYDRO 10 MILLILITER(S): KIT at 12:26

## 2022-12-15 RX ADMIN — Medication 5 MILLILITER(S): at 20:56

## 2022-12-15 RX ADMIN — DIPHENHYDRAMINE HYDROCHLORIDE AND LIDOCAINE HYDROCHLORIDE AND ALUMINUM HYDROXIDE AND MAGNESIUM HYDRO 10 MILLILITER(S): KIT at 17:07

## 2022-12-15 RX ADMIN — Medication 5 MILLILITER(S): at 07:59

## 2022-12-15 RX ADMIN — Medication 1 LOZENGE: at 15:33

## 2022-12-15 RX ADMIN — LORATADINE 10 MILLIGRAM(S): 10 TABLET ORAL at 12:27

## 2022-12-15 RX ADMIN — Medication 1 MILLIGRAM(S): at 12:27

## 2022-12-15 RX ADMIN — ATOVAQUONE 750 MILLIGRAM(S): 750 SUSPENSION ORAL at 06:18

## 2022-12-15 RX ADMIN — CHLORHEXIDINE GLUCONATE 1 APPLICATION(S): 213 SOLUTION TOPICAL at 08:00

## 2022-12-15 RX ADMIN — Medication 20 MILLIGRAM(S): at 12:27

## 2022-12-15 RX ADMIN — LIDOCAINE AND PRILOCAINE CREAM 1 APPLICATION(S): 25; 25 CREAM TOPICAL at 12:28

## 2022-12-15 RX ADMIN — Medication 400 MILLIGRAM(S): at 21:38

## 2022-12-15 RX ADMIN — Medication 5 MILLILITER(S): at 15:33

## 2022-12-15 RX ADMIN — URSODIOL 300 MILLIGRAM(S): 250 TABLET, FILM COATED ORAL at 17:07

## 2022-12-15 RX ADMIN — Medication 5 MILLILITER(S): at 12:26

## 2022-12-15 RX ADMIN — FLUCONAZOLE 400 MILLIGRAM(S): 150 TABLET ORAL at 12:27

## 2022-12-15 RX ADMIN — Medication 1 TABLET(S): at 12:27

## 2022-12-15 RX ADMIN — NYSTATIN CREAM 1 APPLICATION(S): 100000 CREAM TOPICAL at 17:07

## 2022-12-15 RX ADMIN — Medication 1 LOZENGE: at 12:26

## 2022-12-15 NOTE — PROGRESS NOTE ADULT - CRITICAL CARE ATTENDING COMMENT
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   Today is Day + 20    Problem/Plan -   1:  Problem: Stem cell transplant   ·  Plan: 1. Admit to BMTU   2. TLC placement in IR   3. Day -9 - day -2 - antiemetics   4. Day -9 & day -8, VP16 2400mg/m2 IV x 34 hours (final concentration 0.4mg /mL).   5. Strict I&O, daily weights, prn diuresis   6. After completion of VP16- start CTX hydration (D51/2NS + 10mEq KCl @ 150ml / m2) - continue for 24 hours post infusion of CTX   7. Day -7 - day -4 - CTX 1800 mg / m2 daily over 2 hours. Follow SMA7, mag, phos 6hours post CT . [place]  12mg / kg - hemorrhagic cystitis ppx   8. Day -3 - BCNU 400mg / m2   9. Day -2 - day -1 - rest days  10. Day 0(11/25/22) - HPC transplant. Start Zarxio 480mcg SQ QD, continue through engraftment. Kepivance on days 0, 1 & 2   11. Anxiolytics, antinausea, antidiarrhea medications as needed   12. Lasix PRN while being aggressively hydrated to avoid VOD   13. Nutritional support, pain management.    Problem/Plan - 2:  ·  Problem: Need for prophylactic measure.   ·  Plan: 1. VOD prophylaxis - low dose heparin gtt (dosed at 100 units / kg / day), glutamine supplementation, Actigall BID   2. PCP prophylaxis - Bactrim DS through day -2; add Mepron on 12/10    3. Antiviral prophylaxis - Acyclovir 400mg po TID to start day -1   4. Antifungal prophylaxis- Diflucan 400 mg po daily.  5. GI prophylaxis - Protonix po QD   6. Antibacterial prophylaxis - when ANC < 500, start Cipro 500mg po BID. If becomes febrile, pan cx, CXR and change Cipro to Cefepime 2g IV q 8 hours. Continue until count recovery  7. Kepivance for prevention of mucositis- days 0, +1, +2  8. Aggressive mouth care and skin care as per protocol. On 11/25, early mucositis on lower lip, no ulcers. Continue topical care.   9. Receiving transfusion and electrolyte support.    11/25-Chemotherapy induced nausea and diarrhea managed with antiemetics and antidiarrheals. Now patient states stool is loose rather than watery.   Continue Cipro, Acyclovir, Diflucan prophylaxis. OOB/ambulate. She received HPC transplant today without complications. Begin Zarxio daily.  11/26/22: continues to have loose stools overnight, this am with epistaxis lasting ~ 5 minutes. To receive platelets. Remains fatigued.  11/28- Febrile overnight, tmax 100.4. CXR- no consolidations. Cultures pending. Started on Cefepime. Grade 2 chemotherapy induced oral mucositis. Continue supportive care.  12/2- patient now afebrile; 11/28 cultures negative and CXR- negative. Continue Cefepime; on Acyclovir, Diflucan prophylaxis. Continue Zarxio daily, await engraftment.  12/5- early engraftment, continue Zarxio.  12/7- Neutrophil count > 1.0, afebrile, cultures negative. D/C Cefepime. Continue Zarxio daily.  12/9- Fevers with chills, ? engraftment fever. Check cultures, access Mediport and D/C TLC. Discontinue Zarxio with WBC recovery.  12/12- Fever with chills, felt dyspnea last night. Order CT chest to r/o infiltrates.  12/13- CT chest- negative, cultures negative. Fever likely secondary to engraftment

## 2022-12-15 NOTE — PROGRESS NOTE ADULT - SUBJECTIVE AND OBJECTIVE BOX
HPC Transplant Team                                                      Critical / Counseling Time Provided: 30 minutes                                                                                                                                                        Chief Complaint: Autologous peripheral blood stem cell transplant with high dose CBV prep regimen for treatment of peripheral T cell lymphoma    S: Patient seen and examined with HPC Transplant Team:   + fatigue   + intermittent nausea   + loose stool   All other ROS negative       O: Vitals:   Vital Signs Last 24 Hrs  T(C): 36.9 (15 Dec 2022 06:05), Max: 36.9 (14 Dec 2022 09:00)  T(F): 98.4 (15 Dec 2022 06:05), Max: 98.4 (14 Dec 2022 09:00)  HR: 81 (15 Dec 2022 06:05) (75 - 88)  BP: 110/68 (15 Dec 2022 06:05) (92/60 - 110/68)  BP(mean): --  RR: 18 (15 Dec 2022 06:05) (18 - 18)  SpO2: 99% (15 Dec 2022 06:05) (96% - 99%)    Parameters below as of 15 Dec 2022 06:05  Patient On (Oxygen Delivery Method): room air        Admit weight: 80.3kg   Daily Weight in k (14 Dec 2022 09:00)  Today's weight:     Intake / Output:   12-14 @ 07:01  -  12-15 @ 07:00  --------------------------------------------------------  IN: 1694 mL / OUT: 2950 mL / NET: -1256 mL      PE:   Oropharynx: No erythema or ulcers  Oral Mucositis:                -                                     stGstrstastdstest:st st1st CVS: S1, S2 RRR   Lungs: CTA throughout bilaterally, decreased in bilateral bases  Abdomen: + BS x 4 normoactive, soft, NT, ND  Extremities: no edema   Gastric Mucositis:       -                                           Grade: n/a   Intestinal Mucositis:     -                                         Grade: n/a   Skin: +erythematous macular rash to neck, upper chest and back- resolving  Lines: Mediport- clean, dry, intact  Neuro: A&O x 3   Pain: denies    Labs:   CBC Full  -  ( 15 Dec 2022 06:50 )  WBC Count : 5.07 K/uL  Hemoglobin : 7.4 g/dL  Hematocrit : 24.0 %  Platelet Count - Automated : 336 K/uL  Mean Cell Volume : 95.2 fl  Mean Cell Hemoglobin : 29.4 pg  Mean Cell Hemoglobin Concentration : 30.8 gm/dL  Auto Neutrophil # : x  Auto Lymphocyte # : x  Auto Monocyte # : x  Auto Eosinophil # : x  Auto Basophil # : x  Auto Neutrophil % : x  Auto Lymphocyte % : x  Auto Monocyte % : x  Auto Eosinophil % : x  Auto Basophil % : x                          7.4    5.07  )-----------( 336      ( 15 Dec 2022 06:50 )             24.0     Cultures:   Culture Results:   10,000 - 49,000 CFU/mL Enterococcus species (22 @ 15:09)    Culture Results:   No growth to date. (22 @ 13:29)    Culture Results:   No growth to date. (22 @ 13:45)    Culture Results:   No growth to date. (22 @ 01:48)    Culture Results:   No growth to date. (22 @ 01:47)    Culture Results:   No growth (22 @ 01:46)    Culture Results:   <10,000 CFU/mL Normal Urogenital Maryanne (22 @ 08:19)    Culture Results:   No Growth Final (22 @ 06:10)    Culture Results:   No Growth Final (22 @ 05:50)      Radiology:   [id number]: [id number] EXAM:  CT CHEST                        PROCEDURE DATE:  2022    IMPRESSION:  The lungs are clear.    ACC: [id number] EXAM:  XR CHEST PORTABLE URGENT 1V                        PROCEDURE DATE:  2022    Impression:  Clear lungs.    ACC: [id number] EXAM:  XR CHEST PORTABLE URGENT 1V                        PROCEDURE DATE:  2022    Impression:  No focal consolidations.      Meds:   Antimicrobials:   acyclovir   Oral Tab/Cap 400 milliGRAM(s) Oral every 8 hours  atovaquone  Suspension 750 milliGRAM(s) Oral two times a day  clotrimazole Lozenge 1 Lozenge Oral five times a day  fluconAZOLE   Tablet 400 milliGRAM(s) Oral daily      Heme / Onc:       GI:  pantoprazole    Tablet 40 milliGRAM(s) Oral before breakfast  polyethylene glycol 3350 17 Gram(s) Oral daily PRN  senna 2 Tablet(s) Oral at bedtime PRN  simethicone 80 milliGRAM(s) Chew every 4 hours PRN  sodium bicarbonate Mouth Rinse 10 milliLiter(s) Swish and Spit five times a day  ursodiol Capsule 300 milliGRAM(s) Oral two times a day      Cardiovascular:       Immunologic:       Other medications:   acetaminophen     Tablet .. 650 milliGRAM(s) Oral every 6 hours  Biotene Dry Mouth Oral Rinse 5 milliLiter(s) Swish and Spit five times a day  chlorhexidine 4% Liquid 1 Application(s) Topical <User Schedule>  FIRST- Mouthwash  BLM 10 milliLiter(s) Swish and Spit every 6 hours  FLUoxetine 20 milliGRAM(s) Oral daily  folic acid 1 milliGRAM(s) Oral daily  lidocaine/prilocaine Cream 1 Application(s) Topical daily  loratadine 10 milliGRAM(s) Oral daily  multivitamin 1 Tablet(s) Oral daily  nystatin Powder 1 Application(s) Topical two times a day  sodium chloride 0.45% 1000 milliLiter(s) IV Continuous <Continuous>  sodium chloride 0.9%. 1000 milliLiter(s) IV Continuous <Continuous>      PRN:   acetaminophen     Tablet .. 650 milliGRAM(s) Oral every 6 hours PRN  AQUAPHOR (petrolatum Ointment) 1 Application(s) Topical two times a day PRN  metoclopramide Injectable 10 milliGRAM(s) IV Push every 6 hours PRN  ondansetron Injectable 4 milliGRAM(s) IV Push every 6 hours PRN  polyethylene glycol 3350 17 Gram(s) Oral daily PRN  senna 2 Tablet(s) Oral at bedtime PRN  simethicone 80 milliGRAM(s) Chew every 4 hours PRN  sodium chloride 0.65% Nasal 1 Spray(s) Both Nostrils four times a day PRN  sodium chloride 0.9% lock flush 10 milliLiter(s) IV Push every 1 hour PRN  zinc oxide 40% Paste 1 Application(s) Topical three times a day PRN    A/P:   57 year old female with a history of  peripheral T cell lymphoma   Post :  Autologous PBSCT day + 20  - Completed  -16 24 hour continuous infusion, strict I&O, daily weights, prn diuresis    - Cytoxan day 3/.  - CTX /- continue CTX hydration for 24 hours post infusion of last dose. Strict I&O, daily weights, prn diuresis. Start cipro 500mg po BID when ANC < 500    Neutropenic. Started Cipro for prophylaxis. If febrile, panculture and start Cefepime (has tolerated in the past)   - Kepivance day 2/3  - Kepivance day 3/3- HELD due to Kepivance rash and oral erythema worsening  - Febrile overnight, tmax 100.4. CXR no obvious consolidations. Cultures pending. Started on cefepime. Grade 2 chemotherapy induced oral mucositis. Continue supportive measures.    - Mild transaminitis, if continue to rise lower Diflucan dose.  - early engraftment. Continue Cefepime, zarxio for now    mouth pain resolved    engrafted. D/C cefepime (infectious work up has been negative), continue zarxio for now   D/C'd Zarxio, pulling TLC today, access mediport f/u cx's  - Febrile, not neutropenic, RVP/COVID (-).   CT chest follow up  - CT chest - no PNA ; febrile despite dexamethasone 4mg IVP daily infectious work up negative to date   - afebrile - last temp 22 at 3pm. Discharge planning for this week.     1. Infectious Disease:   acyclovir   Oral Tab/Cap 400 milliGRAM(s) Oral every 8 hours  atovaquone  Suspension 750 milliGRAM(s) Oral two times a day  clotrimazole Lozenge 1 Lozenge Oral five times a day  fluconAZOLE   Tablet 400 milliGRAM(s) Oral daily    2. VOD Prophylaxis: Actigall, Glutamine    3. GI Prophylaxis:   pantoprazole    Tablet 40 milliGRAM(s) Oral before breakfast    4. Mouthcare - NS / NaHCO3 rinses, Mycelex, Biotene; Skin care     5. GVHD prophylaxis - n/a     6. Transfuse & replete electrolytes prn   1 unit PRBC     7. IV hydration, daily weights, strict I&O, prn diuresis     8. PO intake as tolerated, nutrition follow up as needed, MVI, folic acid     9. Antiemetics, anti-diarrhea medications:   metoclopramide Injectable 10 milliGRAM(s) IV Push every 6 hours PRN  ondansetron Injectable 4 milliGRAM(s) IV Push every 6 hours PRN    10. OOB as tolerated, physical therapy consult if needed     11. Monitor coags / fibrinogen 2x week, vitamin K as needed     12. Monitor closely for clinical changes, monitor for fevers     13. Emotional support provided, plan of care discussed and questions addressed     14. Patient education done regarding plan of care, restrictions and discharge planning     15. Continue regular social work input     I have written the above note for Dr. Cabrales who performed service with me in the room.   Leena Ibrahim NP-C ([phone])    I have seen and examined patient with NP, I agree with above note as scribed.                    HPC Transplant Team                                                      Critical / Counseling Time Provided: 30 minutes                                                                                                                                                        Chief Complaint: Autologous peripheral blood stem cell transplant with high dose CBV prep regimen for treatment of peripheral T cell lymphoma    S: Patient seen and examined with HPC Transplant Team:   + fatigue   + intermittent nausea   + loose stool   All other ROS negative       O: Vitals:   Vital Signs Last 24 Hrs  T(C): 36.9 (15 Dec 2022 06:05), Max: 36.9 (14 Dec 2022 09:00)  T(F): 98.4 (15 Dec 2022 06:05), Max: 98.4 (14 Dec 2022 09:00)  HR: 81 (15 Dec 2022 06:05) (75 - 88)  BP: 110/68 (15 Dec 2022 06:05) (92/60 - 110/68)  BP(mean): --  RR: 18 (15 Dec 2022 06:05) (18 - 18)  SpO2: 99% (15 Dec 2022 06:05) (96% - 99%)    Parameters below as of 15 Dec 2022 06:05  Patient On (Oxygen Delivery Method): room air        Admit weight: 80.3kg   Daily Weight in k (14 Dec 2022 09:00)  Today's weight:     Intake / Output:   12-14 @ 07:01  -  12-15 @ 07:00  --------------------------------------------------------  IN: 1694 mL / OUT: 2950 mL / NET: -1256 mL      PE:   Oropharynx: No erythema or ulcers  Oral Mucositis:                -                                     stGstrstastdstest:st st1st CVS: S1, S2 RRR   Lungs: CTA throughout bilaterally, decreased in bilateral bases  Abdomen: + BS x 4 normoactive, soft, NT, ND  Extremities: no edema   Gastric Mucositis:       -                                           Grade: n/a   Intestinal Mucositis:     -                                         Grade: n/a   Skin: +erythematous macular rash to neck, upper chest and back- resolving  Lines: Mediport- clean, dry, intact  Neuro: A&O x 3   Pain: denies    Labs:   CBC Full  -  ( 15 Dec 2022 06:50 )  WBC Count : 5.07 K/uL  Hemoglobin : 7.4 g/dL  Hematocrit : 24.0 %  Platelet Count - Automated : 336 K/uL  Mean Cell Volume : 95.2 fl  Mean Cell Hemoglobin : 29.4 pg  Mean Cell Hemoglobin Concentration : 30.8 gm/dL  Auto Neutrophil # : x  Auto Lymphocyte # : x  Auto Monocyte # : x  Auto Eosinophil # : x  Auto Basophil # : x  Auto Neutrophil % : x  Auto Lymphocyte % : x  Auto Monocyte % : x  Auto Eosinophil % : x  Auto Basophil % : x                          7.4    5.07  )-----------( 336      ( 15 Dec 2022 06:50 )             24.0     12-15    140  |  100  |  14  ----------------------------<  114<H>  4.0   |  25  |  0.47<L>    Ca    9.3      15 Dec 2022 06:50  Phos  5.1     12-15  Mg     2.0     12-15    TPro  6.5  /  Alb  3.8  /  TBili  0.1<L>  /  DBili  x   /  AST  23  /  ALT  41  /  AlkPhos  107  12-15      Cultures:   Culture Results:   10,000 - 49,000 CFU/mL Enterococcus species (22 @ 15:09)    Culture Results:   No growth to date. (22 @ 13:29)    Culture Results:   No growth to date. (22 @ 13:45)    Culture Results:   No growth to date. (22 @ 01:48)    Culture Results:   No growth to date. (22 @ 01:47)    Culture Results:   No growth (22 @ 01:46)    Culture Results:   <10,000 CFU/mL Normal Urogenital Maryanne (22 @ 08:19)    Culture Results:   No Growth Final (22 @ 06:10)    Culture Results:   No Growth Final (22 @ 05:50)      Radiology:   ACC: [id number] EXAM:  CT CHEST                        PROCEDURE DATE:  2022    IMPRESSION:  The lungs are clear.    ACC: [id number] EXAM:  XR CHEST PORTABLE URGENT 1V                        PROCEDURE DATE:  2022    Impression:  Clear lungs.    ACC: [id number] EXAM:  XR CHEST PORTABLE URGENT 1V                        PROCEDURE DATE:  2022    Impression:  No focal consolidations.      Meds:   Antimicrobials:   acyclovir   Oral Tab/Cap 400 milliGRAM(s) Oral every 8 hours  atovaquone  Suspension 750 milliGRAM(s) Oral two times a day  clotrimazole Lozenge 1 Lozenge Oral five times a day  fluconAZOLE   Tablet 400 milliGRAM(s) Oral daily      Heme / Onc:       GI:  pantoprazole    Tablet 40 milliGRAM(s) Oral before breakfast  polyethylene glycol 3350 17 Gram(s) Oral daily PRN  senna 2 Tablet(s) Oral at bedtime PRN  simethicone 80 milliGRAM(s) Chew every 4 hours PRN  sodium bicarbonate Mouth Rinse 10 milliLiter(s) Swish and Spit five times a day  ursodiol Capsule 300 milliGRAM(s) Oral two times a day      Cardiovascular:       Immunologic:       Other medications:   acetaminophen     Tablet .. 650 milliGRAM(s) Oral every 6 hours  Biotene Dry Mouth Oral Rinse 5 milliLiter(s) Swish and Spit five times a day  chlorhexidine 4% Liquid 1 Application(s) Topical <User Schedule>  FIRST- Mouthwash  BLM 10 milliLiter(s) Swish and Spit every 6 hours  FLUoxetine 20 milliGRAM(s) Oral daily  folic acid 1 milliGRAM(s) Oral daily  lidocaine/prilocaine Cream 1 Application(s) Topical daily  loratadine 10 milliGRAM(s) Oral daily  multivitamin 1 Tablet(s) Oral daily  nystatin Powder 1 Application(s) Topical two times a day  sodium chloride 0.45% 1000 milliLiter(s) IV Continuous <Continuous>  sodium chloride 0.9%. 1000 milliLiter(s) IV Continuous <Continuous>      PRN:   acetaminophen     Tablet .. 650 milliGRAM(s) Oral every 6 hours PRN  AQUAPHOR (petrolatum Ointment) 1 Application(s) Topical two times a day PRN  metoclopramide Injectable 10 milliGRAM(s) IV Push every 6 hours PRN  ondansetron Injectable 4 milliGRAM(s) IV Push every 6 hours PRN  polyethylene glycol 3350 17 Gram(s) Oral daily PRN  senna 2 Tablet(s) Oral at bedtime PRN  simethicone 80 milliGRAM(s) Chew every 4 hours PRN  sodium chloride 0.65% Nasal 1 Spray(s) Both Nostrils four times a day PRN  sodium chloride 0.9% lock flush 10 milliLiter(s) IV Push every 1 hour PRN  zinc oxide 40% Paste 1 Application(s) Topical three times a day PRN    A/P:   57 year old female with a history of  peripheral T cell lymphoma   Post :  Autologous PBSCT day + 20  - Completed  -16 24 hour continuous infusion, strict I&O, daily weights, prn diuresis    - Cytoxan day 3/.  - CTX - continue CTX hydration for 24 hours post infusion of last dose. Strict I&O, daily weights, prn diuresis. Start cipro 500mg po BID when ANC < 500    Neutropenic. Started Cipro for prophylaxis. If febrile, panculture and start Cefepime (has tolerated in the past)   - Kepivance day 2/3  11/27- Kepivance day 3/3- HELD due to Kepivance rash and oral erythema worsening  - Febrile overnight, tmax 100.4. CXR no obvious consolidations. Cultures pending. Started on cefepime. Grade 2 chemotherapy induced oral mucositis. Continue supportive measures.    - Mild transaminitis, if continue to rise lower Diflucan dose.  - early engraftment. Continue Cefepime, zarxio for now    mouth pain resolved    engrafted. D/C cefepime (infectious work up has been negative), continue zarxio for now   D[facility] Zarxio, [facility]  - Febrile, not neutropenic, RVP/COVID (-).   CT chest follow up  - CT chest - no PNA ; febrile despite dexamethasone 4mg IVP daily infectious work up negative to date   - afebrile - last temp 22 at 3pm. Discharge planning for this week.     1. Infectious Disease:   acyclovir   Oral Tab/Cap 400 milliGRAM(s) Oral every 8 hours  atovaquone  Suspension 750 milliGRAM(s) Oral two times a day  clotrimazole Lozenge 1 Lozenge Oral five times a day  fluconAZOLE   Tablet 400 milliGRAM(s) Oral daily    2. VOD Prophylaxis: Actigall, Glutamine    3. GI Prophylaxis:   pantoprazole    Tablet 40 milliGRAM(s) Oral before breakfast    4. Mouthcare - NS / NaHCO3 rinses, Mycelex, Biotene; Skin care     5. GVHD prophylaxis - n/a     6. Transfuse & replete electrolytes prn   1 unit PRBC     7. IV hydration, daily weights, strict I&O, prn diuresis     8. PO intake as tolerated, nutrition follow up as needed, MVI, folic acid     9. Antiemetics, anti-diarrhea medications:   metoclopramide Injectable 10 milliGRAM(s) IV Push every 6 hours PRN  ondansetron Injectable 4 milliGRAM(s) IV Push every 6 hours PRN    10. OOB as tolerated, physical therapy consult if needed     11. Monitor coags / fibrinogen 2x week, vitamin K as needed     12. Monitor closely for clinical changes, monitor for fevers     13. Emotional support provided, plan of care discussed and questions addressed     14. Patient education done regarding plan of care, restrictions and discharge planning     15. Continue regular social work input     I have written the above note for Dr. Cabrales who performed service with me in the room.   Leena Ibrahim NP-C ([phone])    I have seen and examined patient with NP, I agree with above note as scribed.                    HPC Transplant Team                                                      Critical / Counseling Time Provided: 30 minutes                                                                                                                                                        Chief Complaint: Autologous peripheral blood stem cell transplant with high dose CBV prep regimen for treatment of peripheral T cell lymphoma    S: Patient seen and examined with HPC Transplant Team:   + fatigue   + intermittent nausea   + loose stool   All other ROS negative       O: Vitals:   Vital Signs Last 24 Hrs  T(C): 36.9 (15 Dec 2022 06:05), Max: 36.9 (14 Dec 2022 09:00)  T(F): 98.4 (15 Dec 2022 06:05), Max: 98.4 (14 Dec 2022 09:00)  HR: 81 (15 Dec 2022 06:05) (75 - 88)  BP: 110/68 (15 Dec 2022 06:05) (92/60 - 110/68)  BP(mean): --  RR: 18 (15 Dec 2022 06:05) (18 - 18)  SpO2: 99% (15 Dec 2022 06:05) (96% - 99%)    Parameters below as of 15 Dec 2022 06:05  Patient On (Oxygen Delivery Method): room air        Admit weight: 80.3kg   Daily Weight in k (14 Dec 2022 09:00)  Today's weight: 74.9kg     Intake / Output:   -14 @ 07:01  -  12-15 @ 07:00  --------------------------------------------------------  IN: 1694 mL / OUT: 2950 mL / NET: -1256 mL      PE:   Oropharynx: No erythema or ulcers  Oral Mucositis:                -                                     stGstrstastdstest:st st1st CVS: S1, S2 RRR   Lungs: CTA throughout bilaterally, decreased in bilateral bases  Abdomen: + BS x 4 normoactive, soft, NT, ND  Extremities: no edema   Gastric Mucositis:       -                                           Grade: n/a   Intestinal Mucositis:     -                                         Grade: n/a   Skin: +erythematous macular rash to neck, upper chest and back- resolving  Lines: Mediport- clean, dry, intact  Neuro: A&O x 3   Pain: denies    Labs:   CBC Full  -  ( 15 Dec 2022 06:50 )  WBC Count : 5.07 K/uL  Hemoglobin : 7.4 g/dL  Hematocrit : 24.0 %  Platelet Count - Automated : 336 K/uL  Mean Cell Volume : 95.2 fl  Mean Cell Hemoglobin : 29.4 pg  Mean Cell Hemoglobin Concentration : 30.8 gm/dL  Auto Neutrophil # : x  Auto Lymphocyte # : x  Auto Monocyte # : x  Auto Eosinophil # : x  Auto Basophil # : x  Auto Neutrophil % : x  Auto Lymphocyte % : x  Auto Monocyte % : x  Auto Eosinophil % : x  Auto Basophil % : x                          7.4    5.07  )-----------( 336      ( 15 Dec 2022 06:50 )             24.0     12-15    140  |  100  |  14  ----------------------------<  114<H>  4.0   |  25  |  0.47<L>    Ca    9.3      15 Dec 2022 06:50  Phos  5.1     -15  Mg     2.0     12-15    TPro  6.5  /  Alb  3.8  /  TBili  0.1<L>  /  DBili  x   /  AST  23  /  ALT  41  /  AlkPhos  107  -15      Cultures:   Culture Results:   10,000 - 49,000 CFU/mL Enterococcus species (22 @ 15:09)    Culture Results:   No growth to date. (22 @ 13:29)    Culture Results:   No growth to date. (22 @ 13:45)    Culture Results:   No growth to date. (22 @ 01:48)    Culture Results:   No growth to date. (22 @ 01:47)    Culture Results:   No growth (22 @ 01:46)    Culture Results:   <10,000 CFU/mL Normal Urogenital Maryanne (22 @ 08:19)    Culture Results:   No Growth Final (22 @ 06:10)    Culture Results:   No Growth Final (22 @ 05:50)      Radiology:   ACC: [id number] EXAM:  CT CHEST                        PROCEDURE DATE:  2022    IMPRESSION:  The lungs are clear.    ACC: [id number] EXAM:  XR CHEST PORTABLE URGENT 1V                        PROCEDURE DATE:  2022    Impression:  Clear lungs.    ACC: [id number] EXAM:  XR CHEST PORTABLE URGENT 1V                        PROCEDURE DATE:  2022    Impression:  No focal consolidations.      Meds:   Antimicrobials:   acyclovir   Oral Tab/Cap 400 milliGRAM(s) Oral every 8 hours  atovaquone  Suspension 750 milliGRAM(s) Oral two times a day  clotrimazole Lozenge 1 Lozenge Oral five times a day  fluconAZOLE   Tablet 400 milliGRAM(s) Oral daily      Heme / Onc:       GI:  pantoprazole    Tablet 40 milliGRAM(s) Oral before breakfast  polyethylene glycol 3350 17 Gram(s) Oral daily PRN  senna 2 Tablet(s) Oral at bedtime PRN  simethicone 80 milliGRAM(s) Chew every 4 hours PRN  sodium bicarbonate Mouth Rinse 10 milliLiter(s) Swish and Spit five times a day  ursodiol Capsule 300 milliGRAM(s) Oral two times a day      Cardiovascular:       Immunologic:       Other medications:   acetaminophen     Tablet .. 650 milliGRAM(s) Oral every 6 hours  Biotene Dry Mouth Oral Rinse 5 milliLiter(s) Swish and Spit five times a day  chlorhexidine 4% Liquid 1 Application(s) Topical <User Schedule>  FIRST- Mouthwash  BLM 10 milliLiter(s) Swish and Spit every 6 hours  FLUoxetine 20 milliGRAM(s) Oral daily  folic acid 1 milliGRAM(s) Oral daily  lidocaine/prilocaine Cream 1 Application(s) Topical daily  loratadine 10 milliGRAM(s) Oral daily  multivitamin 1 Tablet(s) Oral daily  nystatin Powder 1 Application(s) Topical two times a day  sodium chloride 0.45% 1000 milliLiter(s) IV Continuous <Continuous>  sodium chloride 0.9%. 1000 milliLiter(s) IV Continuous <Continuous>      PRN:   acetaminophen     Tablet .. 650 milliGRAM(s) Oral every 6 hours PRN  AQUAPHOR (petrolatum Ointment) 1 Application(s) Topical two times a day PRN  metoclopramide Injectable 10 milliGRAM(s) IV Push every 6 hours PRN  ondansetron Injectable 4 milliGRAM(s) IV Push every 6 hours PRN  polyethylene glycol 3350 17 Gram(s) Oral daily PRN  senna 2 Tablet(s) Oral at bedtime PRN  simethicone 80 milliGRAM(s) Chew every 4 hours PRN  sodium chloride 0.65% Nasal 1 Spray(s) Both Nostrils four times a day PRN  sodium chloride 0.9% lock flush 10 milliLiter(s) IV Push every 1 hour PRN  zinc oxide 40% Paste 1 Application(s) Topical three times a day PRN    A/P:   57 year old female with a history of  peripheral T cell lymphoma   Post :  Autologous PBSCT day + 20  - Completed  -16 24 hour continuous infusion, strict I&O, daily weights, prn diuresis    - Cytoxan day 3/.  - CTX - continue CTX hydration for 24 hours post infusion of last dose. Strict I&O, daily weights, prn diuresis. Start cipro 500mg po BID when ANC < 500    Neutropenic. Started Cipro for prophylaxis. If febrile, panculture and start Cefepime (has tolerated in the past)   - Kepivance day 2/3  11/27- Kepivance day 3/3- HELD due to Kepivance rash and oral erythema worsening  - Febrile overnight, tmax 100.4. CXR no obvious consolidations. Cultures pending. Started on cefepime. Grade 2 chemotherapy induced oral mucositis. Continue supportive measures.    - Mild transaminitis, if continue to rise lower Diflucan dose.  - early engraftment. Continue Cefepime, zarxio for now    mouth pain resolved    engrafted. D/C cefepime (infectious work up has been negative), continue zarxio for now   D/C'd Zarxio, pulling TLC today, access mediport f/u cx's  - Febrile, not neutropenic, RVP/COVID (-).   CT chest follow up  - CT chest - no PNA ; febrile despite dexamethasone 4mg IVP daily infectious work up negative to date   - afebrile - last temp 22 at 3pm. Discharge planning for this week.     1. Infectious Disease:   acyclovir   Oral Tab/Cap 400 milliGRAM(s) Oral every 8 hours  atovaquone  Suspension 750 milliGRAM(s) Oral two times a day  clotrimazole Lozenge 1 Lozenge Oral five times a day  fluconAZOLE   Tablet 400 milliGRAM(s) Oral daily    2. VOD Prophylaxis: Actigall, Glutamine    3. GI Prophylaxis:   pantoprazole    Tablet 40 milliGRAM(s) Oral before breakfast    4. Mouthcare - NS / NaHCO3 rinses, Mycelex, Biotene; Skin care     5. GVHD prophylaxis - n/a     6. Transfuse & replete electrolytes prn   1 unit PRBC     7. IV hydration, daily weights, strict I&O, prn diuresis     8. PO intake as tolerated, nutrition follow up as needed, MVI, folic acid     9. Antiemetics, anti-diarrhea medications:   metoclopramide Injectable 10 milliGRAM(s) IV Push every 6 hours PRN  ondansetron Injectable 4 milliGRAM(s) IV Push every 6 hours PRN    10. OOB as tolerated, physical therapy consult if needed     11. Monitor coags / fibrinogen 2x week, vitamin K as needed     12. Monitor closely for clinical changes, monitor for fevers     13. Emotional support provided, plan of care discussed and questions addressed     14. Patient education done regarding plan of care, restrictions and discharge planning     15. Continue regular social work input     I have written the above note for Dr. Cabrales who performed service with me in the room.   Leena Ibrahim NP-C ([phone])    I have seen and examined patient with NP, I agree with above note as scribed.

## 2022-12-16 ENCOUNTER — TRANSCRIPTION ENCOUNTER (OUTPATIENT)
Age: 57
End: 2022-12-16

## 2022-12-16 VITALS
OXYGEN SATURATION: 99 % | RESPIRATION RATE: 18 BRPM | SYSTOLIC BLOOD PRESSURE: 100 MMHG | TEMPERATURE: 99 F | DIASTOLIC BLOOD PRESSURE: 67 MMHG | HEART RATE: 77 BPM

## 2022-12-16 LAB
ALBUMIN SERPL ELPH-MCNC: 4 G/DL — SIGNIFICANT CHANGE UP (ref 3.3–5)
ALP SERPL-CCNC: 103 U/L — SIGNIFICANT CHANGE UP (ref 40–120)
ALT FLD-CCNC: 37 U/L — SIGNIFICANT CHANGE UP (ref 10–45)
ANION GAP SERPL CALC-SCNC: 15 MMOL/L — SIGNIFICANT CHANGE UP (ref 5–17)
AST SERPL-CCNC: 24 U/L — SIGNIFICANT CHANGE UP (ref 10–40)
BASOPHILS # BLD AUTO: 0.04 K/UL — SIGNIFICANT CHANGE UP (ref 0–0.2)
BASOPHILS NFR BLD AUTO: 0.9 % — SIGNIFICANT CHANGE UP (ref 0–2)
BILIRUB SERPL-MCNC: 0.2 MG/DL — SIGNIFICANT CHANGE UP (ref 0.2–1.2)
BLD GP AB SCN SERPL QL: NEGATIVE — SIGNIFICANT CHANGE UP
BUN SERPL-MCNC: 12 MG/DL — SIGNIFICANT CHANGE UP (ref 7–23)
CALCIUM SERPL-MCNC: 9.5 MG/DL — SIGNIFICANT CHANGE UP (ref 8.4–10.5)
CHLORIDE SERPL-SCNC: 101 MMOL/L — SIGNIFICANT CHANGE UP (ref 96–108)
CO2 SERPL-SCNC: 24 MMOL/L — SIGNIFICANT CHANGE UP (ref 22–31)
CREAT SERPL-MCNC: 0.53 MG/DL — SIGNIFICANT CHANGE UP (ref 0.5–1.3)
CULTURE RESULTS: SIGNIFICANT CHANGE UP
EGFR: 109 ML/MIN/1.73M2 — SIGNIFICANT CHANGE UP
EOSINOPHIL # BLD AUTO: 0 K/UL — SIGNIFICANT CHANGE UP (ref 0–0.5)
EOSINOPHIL NFR BLD AUTO: 0 % — SIGNIFICANT CHANGE UP (ref 0–6)
GLUCOSE SERPL-MCNC: 112 MG/DL — HIGH (ref 70–99)
HCT VFR BLD CALC: 30 % — LOW (ref 34.5–45)
HGB BLD-MCNC: 9.4 G/DL — LOW (ref 11.5–15.5)
LDH SERPL L TO P-CCNC: 270 U/L — HIGH (ref 50–242)
LYMPHOCYTES # BLD AUTO: 1.65 K/UL — SIGNIFICANT CHANGE UP (ref 1–3.3)
LYMPHOCYTES # BLD AUTO: 35.7 % — SIGNIFICANT CHANGE UP (ref 13–44)
MAGNESIUM SERPL-MCNC: 1.9 MG/DL — SIGNIFICANT CHANGE UP (ref 1.6–2.6)
MANUAL SMEAR VERIFICATION: SIGNIFICANT CHANGE UP
MCHC RBC-ENTMCNC: 29.2 PG — SIGNIFICANT CHANGE UP (ref 27–34)
MCHC RBC-ENTMCNC: 31.3 GM/DL — LOW (ref 32–36)
MCV RBC AUTO: 93.2 FL — SIGNIFICANT CHANGE UP (ref 80–100)
MONOCYTES # BLD AUTO: 0.7 K/UL — SIGNIFICANT CHANGE UP (ref 0–0.9)
MONOCYTES NFR BLD AUTO: 15.2 % — HIGH (ref 2–14)
NEUTROPHILS # BLD AUTO: 2.18 K/UL — SIGNIFICANT CHANGE UP (ref 1.8–7.4)
NEUTROPHILS NFR BLD AUTO: 46.4 % — SIGNIFICANT CHANGE UP (ref 43–77)
NEUTS BAND # BLD: 0.9 % — SIGNIFICANT CHANGE UP (ref 0–8)
NRBC # BLD: 5 /100 — HIGH (ref 0–0)
PHOSPHATE SERPL-MCNC: 4.8 MG/DL — HIGH (ref 2.5–4.5)
PLAT MORPH BLD: NORMAL — SIGNIFICANT CHANGE UP
PLATELET # BLD AUTO: 399 K/UL — SIGNIFICANT CHANGE UP (ref 150–400)
POTASSIUM SERPL-MCNC: 4.2 MMOL/L — SIGNIFICANT CHANGE UP (ref 3.5–5.3)
POTASSIUM SERPL-SCNC: 4.2 MMOL/L — SIGNIFICANT CHANGE UP (ref 3.5–5.3)
PROT SERPL-MCNC: 6.8 G/DL — SIGNIFICANT CHANGE UP (ref 6–8.3)
RBC # BLD: 3.22 M/UL — LOW (ref 3.8–5.2)
RBC # FLD: 18.2 % — HIGH (ref 10.3–14.5)
RBC BLD AUTO: SIGNIFICANT CHANGE UP
RH IG SCN BLD-IMP: POSITIVE — SIGNIFICANT CHANGE UP
SODIUM SERPL-SCNC: 140 MMOL/L — SIGNIFICANT CHANGE UP (ref 135–145)
SPECIMEN SOURCE: SIGNIFICANT CHANGE UP
VARIANT LYMPHS # BLD: 0.9 % — SIGNIFICANT CHANGE UP (ref 0–6)
WBC # BLD: 4.61 K/UL — SIGNIFICANT CHANGE UP (ref 3.8–10.5)
WBC # FLD AUTO: 4.61 K/UL — SIGNIFICANT CHANGE UP (ref 3.8–10.5)

## 2022-12-16 PROCEDURE — 36430 TRANSFUSION BLD/BLD COMPNT: CPT

## 2022-12-16 PROCEDURE — C1769: CPT

## 2022-12-16 PROCEDURE — 86923 COMPATIBILITY TEST ELECTRIC: CPT

## 2022-12-16 PROCEDURE — 0225U NFCT DS DNA&RNA 21 SARSCOV2: CPT

## 2022-12-16 PROCEDURE — 82955 ASSAY OF G6PD ENZYME: CPT

## 2022-12-16 PROCEDURE — 99238 HOSP IP/OBS DSCHRG MGMT 30/<: CPT

## 2022-12-16 PROCEDURE — 85025 COMPLETE CBC W/AUTO DIFF WBC: CPT

## 2022-12-16 PROCEDURE — P9100: CPT

## 2022-12-16 PROCEDURE — 93005 ELECTROCARDIOGRAM TRACING: CPT

## 2022-12-16 PROCEDURE — 85045 AUTOMATED RETICULOCYTE COUNT: CPT

## 2022-12-16 PROCEDURE — 82785 ASSAY OF IGE: CPT

## 2022-12-16 PROCEDURE — 86901 BLOOD TYPING SEROLOGIC RH(D): CPT

## 2022-12-16 PROCEDURE — 36556 INSERT NON-TUNNEL CV CATH: CPT

## 2022-12-16 PROCEDURE — 86850 RBC ANTIBODY SCREEN: CPT

## 2022-12-16 PROCEDURE — 71045 X-RAY EXAM CHEST 1 VIEW: CPT

## 2022-12-16 PROCEDURE — 84100 ASSAY OF PHOSPHORUS: CPT

## 2022-12-16 PROCEDURE — 86900 BLOOD TYPING SEROLOGIC ABO: CPT

## 2022-12-16 PROCEDURE — 87086 URINE CULTURE/COLONY COUNT: CPT

## 2022-12-16 PROCEDURE — 77001 FLUOROGUIDE FOR VEIN DEVICE: CPT

## 2022-12-16 PROCEDURE — 87040 BLOOD CULTURE FOR BACTERIA: CPT

## 2022-12-16 PROCEDURE — 83615 LACTATE (LD) (LDH) ENZYME: CPT

## 2022-12-16 PROCEDURE — C1894: CPT

## 2022-12-16 PROCEDURE — 85730 THROMBOPLASTIN TIME PARTIAL: CPT

## 2022-12-16 PROCEDURE — 81005 URINALYSIS: CPT

## 2022-12-16 PROCEDURE — 84132 ASSAY OF SERUM POTASSIUM: CPT

## 2022-12-16 PROCEDURE — 71250 CT THORAX DX C-: CPT

## 2022-12-16 PROCEDURE — 76937 US GUIDE VASCULAR ACCESS: CPT

## 2022-12-16 PROCEDURE — 83735 ASSAY OF MAGNESIUM: CPT

## 2022-12-16 PROCEDURE — 85384 FIBRINOGEN ACTIVITY: CPT

## 2022-12-16 PROCEDURE — 80053 COMPREHEN METABOLIC PANEL: CPT

## 2022-12-16 PROCEDURE — 36415 COLL VENOUS BLD VENIPUNCTURE: CPT

## 2022-12-16 PROCEDURE — 81001 URINALYSIS AUTO W/SCOPE: CPT

## 2022-12-16 PROCEDURE — P9037: CPT

## 2022-12-16 PROCEDURE — 82784 ASSAY IGA/IGD/IGG/IGM EACH: CPT

## 2022-12-16 PROCEDURE — 85610 PROTHROMBIN TIME: CPT

## 2022-12-16 PROCEDURE — P9040: CPT

## 2022-12-16 PROCEDURE — 38207 CRYOPRESERVE STEM CELLS: CPT

## 2022-12-16 PROCEDURE — 87186 SC STD MICRODIL/AGAR DIL: CPT

## 2022-12-16 PROCEDURE — 38209 WASH HARVEST STEM CELLS: CPT

## 2022-12-16 RX ADMIN — PANTOPRAZOLE SODIUM 40 MILLIGRAM(S): 20 TABLET, DELAYED RELEASE ORAL at 06:13

## 2022-12-16 RX ADMIN — Medication 10 MILLILITER(S): at 12:38

## 2022-12-16 RX ADMIN — Medication 1 TABLET(S): at 12:37

## 2022-12-16 RX ADMIN — Medication 400 MILLIGRAM(S): at 05:30

## 2022-12-16 RX ADMIN — DIPHENHYDRAMINE HYDROCHLORIDE AND LIDOCAINE HYDROCHLORIDE AND ALUMINUM HYDROXIDE AND MAGNESIUM HYDRO 10 MILLILITER(S): KIT at 12:39

## 2022-12-16 RX ADMIN — FLUCONAZOLE 400 MILLIGRAM(S): 150 TABLET ORAL at 12:38

## 2022-12-16 RX ADMIN — Medication 1 MILLIGRAM(S): at 12:37

## 2022-12-16 RX ADMIN — Medication 300 UNIT(S): at 12:36

## 2022-12-16 RX ADMIN — Medication 20 MILLIGRAM(S): at 12:37

## 2022-12-16 RX ADMIN — Medication 1 LOZENGE: at 08:50

## 2022-12-16 RX ADMIN — Medication 10 MILLILITER(S): at 08:50

## 2022-12-16 RX ADMIN — ATOVAQUONE 750 MILLIGRAM(S): 750 SUSPENSION ORAL at 05:30

## 2022-12-16 RX ADMIN — Medication 5 MILLILITER(S): at 12:39

## 2022-12-16 RX ADMIN — Medication 5 MILLILITER(S): at 08:50

## 2022-12-16 RX ADMIN — Medication 1 LOZENGE: at 00:39

## 2022-12-16 RX ADMIN — Medication 1 LOZENGE: at 12:38

## 2022-12-16 RX ADMIN — URSODIOL 300 MILLIGRAM(S): 250 TABLET, FILM COATED ORAL at 05:30

## 2022-12-16 RX ADMIN — CHLORHEXIDINE GLUCONATE 1 APPLICATION(S): 213 SOLUTION TOPICAL at 08:51

## 2022-12-16 RX ADMIN — Medication 400 MILLIGRAM(S): at 14:00

## 2022-12-16 RX ADMIN — NYSTATIN CREAM 1 APPLICATION(S): 100000 CREAM TOPICAL at 06:13

## 2022-12-16 RX ADMIN — LORATADINE 10 MILLIGRAM(S): 10 TABLET ORAL at 12:38

## 2022-12-16 RX ADMIN — Medication 10 MILLILITER(S): at 00:39

## 2022-12-16 RX ADMIN — Medication 5 MILLILITER(S): at 00:39

## 2022-12-16 NOTE — DISCHARGE NOTE NURSING/CASE MANAGEMENT/SOCIAL WORK - NSDCPEFALRISK_GEN_ALL_CORE
For information on Fall & Injury Prevention, visit: [email or web address] OR  [email or web address] OR  https://www.cdc.gov/steadi/patient.html

## 2022-12-16 NOTE — DISCHARGE NOTE NURSING/CASE MANAGEMENT/SOCIAL WORK - PATIENT PORTAL LINK FT
You can access the FollowMyHealth Patient Portal offered by [facility] by registering at the following website: [email or web address]. By joining [employer]’s FollowMyHealth portal, you will also be able to view your health information using other applications (apps) compatible with our system.

## 2022-12-16 NOTE — PROGRESS NOTE ADULT - CRITICAL CARE ATTENDING COMMENT
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   Today is Day + 21    Problem/Plan -   1:  Problem: Stem cell transplant   ·  Plan: 1. Admit to BMTU   2. TLC placement in IR   3. Day -9 - day -2 - antiemetics   4. Day -9 & day -8, VP16 2400mg/m2 IV x 34 hours (final concentration 0.4mg /mL).   5. Strict I&O, daily weights, prn diuresis   6. After completion of VP16- start CTX hydration (D51/2NS + 10mEq KCl @ 150ml / m2) - continue for 24 hours post infusion of CTX   7. Day -7 - day -4 - CTX 1800 mg / m2 daily over 2 hours. Follow SMA7, mag, phos 6hours post CT . Mesna  12mg / kg - hemorrhagic cystitis ppx   8. Day -3 - BCNU 400mg / m2   9. Day -2 - day -1 - rest days  10. Day 0(11/25/22) - HPC transplant. Start Zarxio 480mcg SQ QD, continue through engraftment. Kepivance on days 0, 1 & 2   11. Anxiolytics, antinausea, antidiarrhea medications as needed   12. Lasix PRN while being aggressively hydrated to avoid VOD   13. Nutritional support, pain management.    Problem/Plan - 2:  ·  Problem: Need for prophylactic measure.   ·  Plan: 1. VOD prophylaxis - low dose heparin gtt (dosed at 100 units / kg / day), glutamine supplementation, Actigall BID   2. PCP prophylaxis - Bactrim DS through day -2; add Mepron on 12/10    3. Antiviral prophylaxis - Acyclovir 400mg po TID to start day -1   4. Antifungal prophylaxis- Diflucan 400 mg po daily.  5. GI prophylaxis - Protonix po QD   6. Antibacterial prophylaxis - when ANC < 500, start Cipro 500mg po BID. If becomes febrile, pan cx, CXR and change Cipro to Cefepime 2g IV q 8 hours. Continue until count recovery  7. Kepivance for prevention of mucositis- days 0, +1, +2  8. Aggressive mouth care and skin care as per protocol. On 11/25, early mucositis on lower lip, no ulcers. Continue topical care.   9. Receiving transfusion and electrolyte support.    11/25-Chemotherapy induced nausea and diarrhea managed with antiemetics and antidiarrheals. Now patient states stool is loose rather than watery.   Continue Cipro, Acyclovir, Diflucan prophylaxis. OOB/ambulate. She received HPC transplant today without complications. Begin Zarxio daily.  11/26/22: continues to have loose stools overnight, this am with epistaxis lasting ~ 5 minutes. To receive platelets. Remains fatigued.  11/28- Febrile overnight, tmax 100.4. CXR- no consolidations. Cultures pending. Started on Cefepime. Grade 2 chemotherapy induced oral mucositis. Continue supportive care.  12/2- patient now afebrile; 11/28 cultures negative and CXR- negative. Continue Cefepime; on Acyclovir, Diflucan prophylaxis. Continue Zarxio daily, await engraftment.  12/5- early engraftment, continue Zarxio.  12/7- Neutrophil count > 1.0, afebrile, cultures negative. D/C Cefepime. Continue Zarxio daily.  12/9- Fevers with chills, ? engraftment fever. Check cultures, access Mediport and D/C TLC. Discontinue Zarxio with WBC recovery.  12/12- Fever with chills, felt dyspnea last night. Order CT chest to r/o infiltrates.  12/13- CT chest- negative, cultures negative. Fever likely secondary to engraftment 57 year old female with peripheral T cell lymphoma s/p CHOEP x 6 admitted for autologous pbsct with high dose CBV prep regimen.   Today is Day + 21    Problem/Plan -   1:  Problem: Stem cell transplant   ·  Plan: 1. Admit to BMTU   2. TLC placement in IR   3. Day -9 - day -2 - antiemetics   4. Day -9 & day -8, VP16 2400mg/m2 IV x 34 hours (final concentration 0.4mg /mL).   5. Strict I&O, daily weights, prn diuresis   6. After completion of VP16- start CTX hydration (D51/2NS + 10mEq KCl @ 150ml / m2) - continue for 24 hours post infusion of CTX   7. Day -7 - day -4 - CTX 1800 mg / m2 daily over 2 hours. Follow SMA7, mag, phos 6hours post CT . Mes[place]  12mg / kg - hemorrhagic cystitis ppx   8. Day -3 - BCNU 400mg / m2   9. Day -2 - day -1 - rest days  10. Day 0(11/25/22) - HPC transplant. Start Zarxio 480mcg SQ QD, continue through engraftment. Kepivance on days 0, 1 & 2   11. Anxiolytics, antinausea, antidiarrhea medications as needed   12. Lasix PRN while being aggressively hydrated to avoid VOD   13. Nutritional support, pain management.    Problem/Plan - 2:  ·  Problem: Need for prophylactic measure.   ·  Plan: 1. VOD prophylaxis - low dose heparin gtt (dosed at 100 units / kg / day), glutamine supplementation, Actigall BID   2. PCP prophylaxis - Bactrim DS through day -2; add Mepron on 12/10    3. Antiviral prophylaxis - Acyclovir 400mg po TID to start day -1   4. Antifungal prophylaxis- Diflucan 400 mg po daily.  5. GI prophylaxis - Protonix po QD   6. Antibacterial prophylaxis - when ANC < 500, start Cipro 500mg po BID. If becomes febrile, pan cx, CXR and change Cipro to Cefepime 2g IV q 8 hours. Continue until count recovery  7. Kepivance for prevention of mucositis- days 0, +1, +2  8. Aggressive mouth care and skin care as per protocol. On 11/25, early mucositis on lower lip, no ulcers. Continue topical care.   9. Receiving transfusion and electrolyte support.    11/25-Chemotherapy induced nausea and diarrhea managed with antiemetics and antidiarrheals. Now patient states stool is loose rather than watery.   Continue Cipro, Acyclovir, Diflucan prophylaxis. OOB/ambulate. She received HPC transplant today without complications. Begin Zarxio daily.  11/26/22: continues to have loose stools overnight, this am with epistaxis lasting ~ 5 minutes. To receive platelets. Remains fatigued.  11/28- Febrile overnight, tmax 100.4. CXR- no consolidations. Cultures pending. Started on Cefepime. Grade 2 chemotherapy induced oral mucositis. Continue supportive care.  12/2- patient now afebrile; 11/28 cultures negative and CXR- negative. Continue Cefepime; on Acyclovir, Diflucan prophylaxis. Continue Zarxio daily, await engraftment.  12/5- early engraftment, continue Zarxio.  12/7- Neutrophil count > 1.0, afebrile, cultures negative. D/C Cefepime. Continue Zarxio daily.  12/9- Fevers with chills, ? engraftment fever. Check cultures, access Mediport and D/C TLC. Discontinue Zarxio with WBC recovery.  12/12- Fever with chills, felt dyspnea last night. Order CT chest to r/o infiltrates.  12/13- CT chest- negative, cultures negative. Fever likely secondary to engraftment  12/16- patient feels well today, ready to go home. Discharge to home and [facility] as directed

## 2022-12-16 NOTE — PROGRESS NOTE ADULT - CRITICAL CARE SERVICES PROVIDED
Patient is critically ill, requiring critical care services.

## 2022-12-16 NOTE — PROGRESS NOTE ADULT - PROVIDER SPECIALTY LIST ADULT
BMT/SCT

## 2022-12-16 NOTE — PROGRESS NOTE ADULT - SUBJECTIVE AND OBJECTIVE BOX
HPC Transplant Team                                                      Critical / Counseling Time Provided: 30 minutes                                                                                                                                                        Chief Complaint: Autologous peripheral blood stem cell transplant with high dose CBV prep regimen for treatment of peripheral T cell lymphoma    S: Patient seen and examined with HPC Transplant Team:   + fatigue   + intermittent nausea   + loose stool   All other ROS negative     O: Vitals:   Vital Signs Last 24 Hrs  T(C): 36.5 (16 Dec 2022 05:33), Max: 36.9 (15 Dec 2022 21:15)  T(F): 97.7 (16 Dec 2022 05:33), Max: 98.4 (15 Dec 2022 21:15)  HR: 79 (16 Dec 2022 05:33) (70 - 81)  BP: 124/75 (16 Dec 2022 05:33) (93/60 - 124/75)  BP(mean): --  RR: 18 (16 Dec 2022 05:33) (18 - 18)  SpO2: 98% (16 Dec 2022 05:33) (96% - 100%)    Parameters below as of 16 Dec 2022 05:33  Patient On (Oxygen Delivery Method): room air        Admit weight: 80.3kg   Daily Weight in k.9 (15 Dec 2022 09:45)  Today's weight:     Intake / Output:   -15 @ 07:01  -  12-16 @ 07:00  --------------------------------------------------------  IN: 1500 mL / OUT: 2250 mL / NET: -750 mL    PE:   Oropharynx: No erythema or ulcers  Oral Mucositis:                -                                     stGstrstastdstest:st st1st CVS: S1, S2 RRR   Lungs: CTA throughout bilaterally, decreased in bilateral bases  Abdomen: + BS x 4 normoactive, soft, NT, ND  Extremities: no edema   Gastric Mucositis:       -                                           Grade: n/a   Intestinal Mucositis:     -                                         Grade: n/a   Skin: +erythematous macular rash to neck, upper chest and back- resolving  Lines: Mediport- clean, dry, intact  Neuro: A&O x 3   Pain: denies    Labs:       Cultures:   Culture Results:   10,000 - 49,000 CFU/mL Enterococcus species (22 @ 15:09)    Culture Results:   No growth to date. (22 @ 13:29)    Culture Results:   No growth to date. (22 @ 13:45)    Culture Results:   No growth to date. (22 @ 01:48)    Culture Results:   No growth to date. (22 @ 01:47)    Culture Results:   No growth (22 @ 01:46)    Culture Results:   <10,000 CFU/mL Normal Urogenital Maryanne (22 @ 08:19)    Culture Results:   No Growth Final (22 @ 06:10)    Culture Results:   No Growth Final (22 @ 05:50)      Radiology:   ACC: [id number] EXAM:  CT CHEST                        PROCEDURE DATE:  2022    IMPRESSION:  The lungs are clear.    ACC: [id number] EXAM:  XR CHEST PORTABLE URGENT 1V                        PROCEDURE DATE:  2022    Impression:  Clear lungs.    ACC: [id number] EXAM:  XR CHEST PORTABLE URGENT 1V                        PROCEDURE DATE:  2022    Impression:  No focal consolidations.      Meds:   Antimicrobials:   acyclovir   Oral Tab/Cap 400 milliGRAM(s) Oral every 8 hours  atovaquone  Suspension 750 milliGRAM(s) Oral two times a day  clotrimazole Lozenge 1 Lozenge Oral five times a day  fluconAZOLE   Tablet 400 milliGRAM(s) Oral daily      Heme / Onc:   heparin  Lock Flush 100 Units/mL Injectable 300 Unit(s) IV Push once      GI:  pantoprazole    Tablet 40 milliGRAM(s) Oral before breakfast  polyethylene glycol 3350 17 Gram(s) Oral daily PRN  senna 2 Tablet(s) Oral at bedtime PRN  simethicone 80 milliGRAM(s) Chew every 4 hours PRN  sodium bicarbonate Mouth Rinse 10 milliLiter(s) Swish and Spit five times a day  ursodiol Capsule 300 milliGRAM(s) Oral two times a day      Cardiovascular:       Immunologic:       Other medications:   acetaminophen     Tablet .. 650 milliGRAM(s) Oral every 6 hours  Biotene Dry Mouth Oral Rinse 5 milliLiter(s) Swish and Spit five times a day  chlorhexidine 4% Liquid 1 Application(s) Topical <User Schedule>  FIRST- Mouthwash  BLM 10 milliLiter(s) Swish and Spit every 6 hours  FLUoxetine 20 milliGRAM(s) Oral daily  folic acid 1 milliGRAM(s) Oral daily  lidocaine/prilocaine Cream 1 Application(s) Topical daily  loratadine 10 milliGRAM(s) Oral daily  multivitamin 1 Tablet(s) Oral daily  nystatin Powder 1 Application(s) Topical two times a day      PRN:   acetaminophen     Tablet .. 650 milliGRAM(s) Oral every 6 hours PRN  AQUAPHOR (petrolatum Ointment) 1 Application(s) Topical two times a day PRN  metoclopramide Injectable 10 milliGRAM(s) IV Push every 6 hours PRN  ondansetron Injectable 4 milliGRAM(s) IV Push every 6 hours PRN  polyethylene glycol 3350 17 Gram(s) Oral daily PRN  senna 2 Tablet(s) Oral at bedtime PRN  simethicone 80 milliGRAM(s) Chew every 4 hours PRN  sodium chloride 0.65% Nasal 1 Spray(s) Both Nostrils four times a day PRN  sodium chloride 0.9% lock flush 10 milliLiter(s) IV Push every 1 hour PRN  zinc oxide 40% Paste 1 Application(s) Topical three times a day PRN    A/P:   57 year old female with a history of  peripheral T cell lymphoma   Post :  Autologous PBSCT day + 21  - Completed  -16 24 hour continuous infusion, strict I&O, daily weights, prn diuresis    - Cytoxan day 3/.  - CTX - continue CTX hydration for 24 hours post infusion of last dose. Strict I&O, daily weights, prn diuresis. Start cipro 500mg po BID when ANC < 500    Neutropenic. Started Cipro for prophylaxis. If febrile, panculture and start Cefepime (has tolerated in the past)   - Kepivance day 2/3  11/27- Kepivance day 3/3- HELD due to Kepivance rash and oral erythema worsening  - Febrile overnight, tmax 100.4. CXR no obvious consolidations. Cultures pending. Started on cefepime. Grade 2 chemotherapy induced oral mucositis. Continue supportive measures.    - Mild transaminitis, if continue to rise lower Diflucan dose.  - early engraftment. Continue Cefepime, zarxio for now    mouth pain resolved    engrafted. D/C cefepime (infectious work up has been negative), continue zarxio for now   D/C'd Zarxio, pulling TLC today, access mediport f/u cx's  - Febrile, not neutropenic, RVP/COVID (-).   CT chest follow up  - CT chest - no PNA ; febrile despite dexamethasone 4mg IVP daily infectious work up negative to date   - afebrile - last temp 22 at 3pm. Discharge planning for this week.   - discharge home today     1. Infectious Disease:   acyclovir   Oral Tab/Cap 400 milliGRAM(s) Oral every 8 hours  atovaquone  Suspension 750 milliGRAM(s) Oral two times a day  clotrimazole Lozenge 1 Lozenge Oral five times a day  fluconAZOLE   Tablet 400 milliGRAM(s) Oral daily    2. VOD Prophylaxis: Actigall, Glutamine     3. GI Prophylaxis:    pantoprazole    Tablet 40 milliGRAM(s) Oral before breakfast    4. Mouthcare - NS / NaHCO3 rinses, Mycelex, Biotene; Skin care     5. GVHD prophylaxis - n/a     6. Transfuse & replete electrolytes prn     7. IV hydration, daily weights, strict I&O, prn diuresis     8. PO intake as tolerated, nutrition follow up as needed, MVI, folic acid     9. Antiemetics, anti-diarrhea medications:   metoclopramide Injectable 10 milliGRAM(s) IV Push every 6 hours PRN  ondansetron Injectable 4 milliGRAM(s) IV Push every 6 hours PRN    10. OOB as tolerated, physical therapy consult if needed     11. Monitor coags / fibrinogen 2x week, vitamin K as needed     12. Monitor closely for clinical changes, monitor for fevers     13. Emotional support provided, plan of care discussed and questions addressed     14. Patient education done regarding plan of care, restrictions and discharge planning     15. Continue regular social work input     I have written the above note for Dr. Cabrales who performed service with me in the room.   Leena Ibrahim NP-C ([phone])    I have seen and examined patient with NP, I agree with above note as scribed.                    HPC Transplant Team                                                      Critical / Counseling Time Provided: 30 minutes                                                                                                                                                        Chief Complaint: Autologous peripheral blood stem cell transplant with high dose CBV prep regimen for treatment of peripheral T cell lymphoma    S: Patient seen and examined with HPC Transplant Team:   + fatigue   + intermittent nausea   + loose stool   All other ROS negative     O: Vitals:   Vital Signs Last 24 Hrs  T(C): 36.5 (16 Dec 2022 05:33), Max: 36.9 (15 Dec 2022 21:15)  T(F): 97.7 (16 Dec 2022 05:33), Max: 98.4 (15 Dec 2022 21:15)  HR: 79 (16 Dec 2022 05:33) (70 - 81)  BP: 124/75 (16 Dec 2022 05:33) (93/60 - 124/75)  BP(mean): --  RR: 18 (16 Dec 2022 05:33) (18 - 18)  SpO2: 98% (16 Dec 2022 05:33) (96% - 100%)    Parameters below as of 16 Dec 2022 05:33  Patient On (Oxygen Delivery Method): room air        Admit weight: 80.3kg   Daily Weight in k.9 (15 Dec 2022 09:45)  Today's weight: 75.4kg     Intake / Output:   -15 @ 07:01  -  16 @ 07:00  --------------------------------------------------------  IN: 1500 mL / OUT: 2250 mL / NET: -750 mL    PE:   Oropharynx: No erythema or ulcers  Oral Mucositis:                -                                     stGstrstastdstest:st st1st CVS: S1, S2 RRR   Lungs: CTA throughout bilaterally, decreased in bilateral bases  Abdomen: + BS x 4 normoactive, soft, NT, ND  Extremities: no edema   Gastric Mucositis:       -                                           Grade: n/a   Intestinal Mucositis:     -                                         Grade: n/a   Skin: +erythematous macular rash to neck, upper chest and back- resolving  Lines: Mediport- clean, dry, intact  Neuro: A&O x 3   Pain: denies    Labs:                         9.4    4.61  )-----------( 399      ( 16 Dec 2022 06:50 )             30.0     12-16    140  |  101  |  12  ----------------------------<  112<H>  4.2   |  24  |  0.53    Ca    9.5      16 Dec 2022 06:49  Phos  4.8       Mg     1.9         TPro  6.8  /  Alb  4.0  /  TBili  0.2  /  DBili  x   /  AST  24  /  ALT  37  /  AlkPhos  103        Cultures:   Culture Results:   10,000 - 49,000 CFU/mL Enterococcus species (22 @ 15:09)    Culture Results:   No growth to date. (22 @ 13:29)    Culture Results:   No growth to date. (22 @ 13:45)    Culture Results:   No growth to date. (22 @ 01:48)    Culture Results:   No growth to date. (22 @ 01:47)    Culture Results:   No growth (22 @ 01:46)    Culture Results:   <10,000 CFU/mL Normal Urogenital Maryanne (22 @ 08:19)    Culture Results:   No Growth Final (22 @ 06:10)    Culture Results:   No Growth Final (22 @ 05:50)      Radiology:   ACC: [id number] EXAM:  CT CHEST                        PROCEDURE DATE:  2022    IMPRESSION:  The lungs are clear.    ACC: [id number] EXAM:  XR CHEST PORTABLE URGENT 1V                        PROCEDURE DATE:  2022    Impression:  Clear lungs.    ACC: [id number] EXAM:  XR CHEST PORTABLE URGENT 1V                        PROCEDURE DATE:  2022    Impression:  No focal consolidations.      Meds:   Antimicrobials:   acyclovir   Oral Tab/Cap 400 milliGRAM(s) Oral every 8 hours  atovaquone  Suspension 750 milliGRAM(s) Oral two times a day  clotrimazole Lozenge 1 Lozenge Oral five times a day  fluconAZOLE   Tablet 400 milliGRAM(s) Oral daily      Heme / Onc:   heparin  Lock Flush 100 Units/mL Injectable 300 Unit(s) IV Push once      GI:  pantoprazole    Tablet 40 milliGRAM(s) Oral before breakfast  polyethylene glycol 3350 17 Gram(s) Oral daily PRN  senna 2 Tablet(s) Oral at bedtime PRN  simethicone 80 milliGRAM(s) Chew every 4 hours PRN  sodium bicarbonate Mouth Rinse 10 milliLiter(s) Swish and Spit five times a day  ursodiol Capsule 300 milliGRAM(s) Oral two times a day      Cardiovascular:       Immunologic:       Other medications:   acetaminophen     Tablet .. 650 milliGRAM(s) Oral every 6 hours  Biotene Dry Mouth Oral Rinse 5 milliLiter(s) Swish and Spit five times a day  chlorhexidine 4% Liquid 1 Application(s) Topical <User Schedule>  FIRST- Mouthwash  BLM 10 milliLiter(s) Swish and Spit every 6 hours  FLUoxetine 20 milliGRAM(s) Oral daily  folic acid 1 milliGRAM(s) Oral daily  lidocaine/prilocaine Cream 1 Application(s) Topical daily  loratadine 10 milliGRAM(s) Oral daily  multivitamin 1 Tablet(s) Oral daily  nystatin Powder 1 Application(s) Topical two times a day      PRN:   acetaminophen     Tablet .. 650 milliGRAM(s) Oral every 6 hours PRN  AQUAPHOR (petrolatum Ointment) 1 Application(s) Topical two times a day PRN  metoclopramide Injectable 10 milliGRAM(s) IV Push every 6 hours PRN  ondansetron Injectable 4 milliGRAM(s) IV Push every 6 hours PRN  polyethylene glycol 3350 17 Gram(s) Oral daily PRN  senna 2 Tablet(s) Oral at bedtime PRN  simethicone 80 milliGRAM(s) Chew every 4 hours PRN  sodium chloride 0.65% Nasal 1 Spray(s) Both Nostrils four times a day PRN  sodium chloride 0.9% lock flush 10 milliLiter(s) IV Push every 1 hour PRN  zinc oxide 40% Paste 1 Application(s) Topical three times a day PRN    A/P:   57 year old female with a history of  peripheral T cell lymphoma   Post :  Autologous PBSCT day + - Completed  -16 24 hour continuous infusion, strict I&O, daily weights, prn diuresis    - Cytoxan day 3/4.  - CTX /- continue CTX hydration for 24 hours post infusion of last dose. Strict I&O, daily weights, prn diuresis. Start cipro 500mg po BID when ANC < 500    Neutropenic. Started Cipro for prophylaxis. If febrile, panculture and start Cefepime (has tolerated in the past)   - Kepivance day 2/3  11/27- Kepivance day 3/3- HELD due to Kepivance rash and oral erythema worsening  - Febrile overnight, tmax 100.4. CXR no obvious consolidations. Cultures pending. Started on cefepime. Grade 2 chemotherapy induced oral mucositis. Continue supportive measures.    - Mild transaminitis, if continue to rise lower Diflucan dose.  - early engraftment. Continue Cefepime, zarxio for now    mouth pain resolved    engrafted. D/C cefepime (infectious work up has been negative), continue zarxio for now   D/C'd Zarxio, pulling TLC today, access mediport f/u cx's  - Febrile, not neutropenic, RVP/COVID (-).   CT chest follow up  - CT chest - no PNA ; febrile despite dexamethasone 4mg IVP daily infectious work up negative to date   - afebrile - last temp 22 at 3pm. Discharge planning for this week.   - discharge home today     1. Infectious Disease:   acyclovir   Oral Tab/Cap 400 milliGRAM(s) Oral every 8 hours  atovaquone  Suspension 750 milliGRAM(s) Oral two times a day  clotrimazole Lozenge 1 Lozenge Oral five times a day  fluconAZOLE   Tablet 400 milliGRAM(s) Oral daily    2. VOD Prophylaxis: Actigall, Glutamine     3. GI Prophylaxis:    pantoprazole    Tablet 40 milliGRAM(s) Oral before breakfast    4. Mouthcare - NS / NaHCO3 rinses, Mycelex, Biotene; Skin care     5. GVHD prophylaxis - n/a     6. Transfuse & replete electrolytes prn     7. IV hydration, daily weights, strict I&O, prn diuresis     8. PO intake as tolerated, nutrition follow up as needed, MVI, folic acid     9. Antiemetics, anti-diarrhea medications:   metoclopramide Injectable 10 milliGRAM(s) IV Push every 6 hours PRN  ondansetron Injectable 4 milliGRAM(s) IV Push every 6 hours PRN    10. OOB as tolerated, physical therapy consult if needed     11. Monitor coags / fibrinogen 2x week, vitamin K as needed     12. Monitor closely for clinical changes, monitor for fevers     13. Emotional support provided, plan of care discussed and questions addressed     14. Patient education done regarding plan of care, restrictions and discharge planning     15. Continue regular social work input     I have written the above note for Dr. Cabrales who performed service with me in the room.   Leena Ibrahim NP-C ([phone])    I have seen and examined patient with NP, I agree with above note as scribed.

## 2022-12-16 NOTE — DISCHARGE NOTE NURSING/CASE MANAGEMENT/SOCIAL WORK - NSDCFUADDAPPT_GEN_ALL_CORE_FT
You have the following appointments at the [facility]:   Tuesday 12/20 @ 10:20AM with Akash Reed NP   Tuesday 12/27 @ 11:30AM with Patricia Banks NP   Tuesday 01/03 @ 11:40AM with Dr. Acevedo

## 2022-12-20 ENCOUNTER — APPOINTMENT (OUTPATIENT)
Dept: HEMATOLOGY ONCOLOGY | Facility: CLINIC | Age: 57
End: 2022-12-20

## 2022-12-20 ENCOUNTER — OUTPATIENT (OUTPATIENT)
Dept: OUTPATIENT SERVICES | Facility: HOSPITAL | Age: 57
LOS: 1 days | Discharge: HOME | End: 2022-12-20

## 2022-12-20 ENCOUNTER — RESULT REVIEW (OUTPATIENT)
Age: 57
End: 2022-12-20

## 2022-12-20 VITALS
HEART RATE: 105 BPM | BODY MASS INDEX: 29.8 KG/M2 | DIASTOLIC BLOOD PRESSURE: 65 MMHG | OXYGEN SATURATION: 99 % | RESPIRATION RATE: 16 BRPM | WEIGHT: 162.92 LBS | TEMPERATURE: 96.8 F | SYSTOLIC BLOOD PRESSURE: 89 MMHG

## 2022-12-20 VITALS — SYSTOLIC BLOOD PRESSURE: 101 MMHG | DIASTOLIC BLOOD PRESSURE: 72 MMHG | HEART RATE: 94 BPM

## 2022-12-20 DIAGNOSIS — C85.99 NON-HODGKIN LYMPHOMA, UNSPECIFIED, EXTRANODAL AND SOLID ORGAN SITES: ICD-10-CM

## 2022-12-20 DIAGNOSIS — C84.40 PERIPHERAL T-CELL LYMPHOMA, NOT ELSEWHERE CLASSIFIED, UNSPECIFIED SITE: ICD-10-CM

## 2022-12-20 LAB
ALBUMIN SERPL ELPH-MCNC: 4.2 G/DL
ALP BLD-CCNC: 127 U/L
ALT SERPL-CCNC: 46 U/L
ANION GAP SERPL CALC-SCNC: 12 MMOL/L
ANISOCYTOSIS BLD QL: SLIGHT — SIGNIFICANT CHANGE UP
AST SERPL-CCNC: 37 U/L
BASOPHILS # BLD AUTO: 0 K/UL — SIGNIFICANT CHANGE UP (ref 0–0.2)
BASOPHILS NFR BLD AUTO: 0 % — SIGNIFICANT CHANGE UP (ref 0–2)
BILIRUB SERPL-MCNC: 0.2 MG/DL
BUN SERPL-MCNC: 11 MG/DL
CALCIUM SERPL-MCNC: 9.8 MG/DL
CHLORIDE SERPL-SCNC: 102 MMOL/L
CO2 SERPL-SCNC: 24 MMOL/L
CREAT SERPL-MCNC: 0.63 MG/DL
DACRYOCYTES BLD QL SMEAR: SLIGHT — SIGNIFICANT CHANGE UP
EGFR: 105 ML/MIN/1.73M2
ELLIPTOCYTES BLD QL SMEAR: SLIGHT — SIGNIFICANT CHANGE UP
EOSINOPHIL # BLD AUTO: 0.04 K/UL — SIGNIFICANT CHANGE UP (ref 0–0.5)
EOSINOPHIL NFR BLD AUTO: 1 % — SIGNIFICANT CHANGE UP (ref 0–6)
GLUCOSE SERPL-MCNC: 113 MG/DL
HCT VFR BLD CALC: 30.8 % — LOW (ref 34.5–45)
HGB BLD-MCNC: 9.8 G/DL — LOW (ref 11.5–15.5)
LDH SERPL-CCNC: 283 U/L
LYMPHOCYTES # BLD AUTO: 1.49 K/UL — SIGNIFICANT CHANGE UP (ref 1–3.3)
LYMPHOCYTES # BLD AUTO: 38 % — SIGNIFICANT CHANGE UP (ref 13–44)
LYMPHOCYTES # SPEC AUTO: 3 % — HIGH (ref 0–0)
MAGNESIUM SERPL-MCNC: 1.9 MG/DL
MCHC RBC-ENTMCNC: 29.3 PG — SIGNIFICANT CHANGE UP (ref 27–34)
MCHC RBC-ENTMCNC: 31.8 G/DL — LOW (ref 32–36)
MCV RBC AUTO: 91.9 FL — SIGNIFICANT CHANGE UP (ref 80–100)
MONOCYTES # BLD AUTO: 0.74 K/UL — SIGNIFICANT CHANGE UP (ref 0–0.9)
MONOCYTES NFR BLD AUTO: 19 % — HIGH (ref 2–14)
NEUTROPHILS # BLD AUTO: 1.52 K/UL — LOW (ref 1.8–7.4)
NEUTROPHILS NFR BLD AUTO: 39 % — LOW (ref 43–77)
NRBC # BLD: 0 /100 — SIGNIFICANT CHANGE UP (ref 0–0)
NRBC # BLD: SIGNIFICANT CHANGE UP /100 WBCS (ref 0–0)
PLAT MORPH BLD: NORMAL — SIGNIFICANT CHANGE UP
PLATELET # BLD AUTO: 306 K/UL — SIGNIFICANT CHANGE UP (ref 150–400)
POIKILOCYTOSIS BLD QL AUTO: SLIGHT — SIGNIFICANT CHANGE UP
POTASSIUM SERPL-SCNC: 4.1 MMOL/L
PROT SERPL-MCNC: 7 G/DL
RBC # BLD: 3.35 M/UL — LOW (ref 3.8–5.2)
RBC # FLD: 16.9 % — HIGH (ref 10.3–14.5)
RBC BLD AUTO: ABNORMAL
SODIUM SERPL-SCNC: 139 MMOL/L
WBC # BLD: 3.91 K/UL — SIGNIFICANT CHANGE UP (ref 3.8–10.5)
WBC # FLD AUTO: 3.91 K/UL — SIGNIFICANT CHANGE UP (ref 3.8–10.5)

## 2022-12-20 PROCEDURE — 99214 OFFICE O/P EST MOD 30 MIN: CPT

## 2022-12-20 RX ORDER — FILGRASTIM-SNDZ 480 UG/.8ML
480 INJECTION, SOLUTION INTRAVENOUS; SUBCUTANEOUS
Qty: 5 | Refills: 0 | Status: DISCONTINUED | COMMUNITY
Start: 2022-10-19 | End: 2022-12-20

## 2022-12-20 RX ORDER — LINACLOTIDE 145 UG/1
145 CAPSULE, GELATIN COATED ORAL DAILY
Qty: 30 | Refills: 5 | Status: DISCONTINUED | COMMUNITY
Start: 2021-03-29 | End: 2022-12-20

## 2022-12-20 RX ORDER — ALLOPURINOL 100 MG/1
100 TABLET ORAL DAILY
Qty: 30 | Refills: 2 | Status: DISCONTINUED | COMMUNITY
Start: 2022-06-06 | End: 2022-12-20

## 2022-12-21 LAB — MANUAL DIF COMMENT BLD-IMP: SIGNIFICANT CHANGE UP

## 2022-12-22 NOTE — REASON FOR VISIT
[Follow-Up Visit] : a follow-up visit for [Lymphoma] : lymphoma [Spouse] : spouse [FreeTextEntry2] : S/P Auto PBSCT on 11/25/22

## 2022-12-22 NOTE — REVIEW OF SYSTEMS
[Negative] : Allergic/Immunologic [Fatigue] : fatigue [Muscle Pain] : muscle pain [Fever] : no fever [Chills] : no chills [Night Sweats] : no night sweats [Recent Change In Weight] : ~T no recent weight change [Abdominal Pain] : no abdominal pain [Vomiting] : no vomiting [Constipation] : no constipation [Diarrhea] : no diarrhea [Joint Pain] : no joint pain [Joint Stiffness] : no joint stiffness [Muscle Weakness] : no muscle weakness [FreeTextEntry7] : Occasional nausea [FreeTextEntry9] : Right calf pain

## 2022-12-22 NOTE — PHYSICAL EXAM
[Restricted in physically strenuous activity but ambulatory and able to carry out work of a light or sedentary nature] : Status 1- Restricted in physically strenuous activity but ambulatory and able to carry out work of a light or sedentary nature, e.g., light house work, office work [Normal] : affect appropriate [de-identified] : port in place-flushed on 12/16/22 [de-identified] : No edema [de-identified] : Right LE weight bearing is painful

## 2022-12-22 NOTE — HISTORY OF PRESENT ILLNESS
[de-identified] : 57 year old female with no significant past medical hx presents to office for initial visit for high dose chemtherapy and auto stem cell transplant....  Patient presented on 3/4/22 c/o right axilla mass  to general surgeon, Dr. Amadeo Grace. Patient incidentally discovered it while shavingin Jan 2022  \par Biopsy of right axilla performed on 3/22/22 revealing atypical lymphoid infiltrate ( NEGATIVE for a clonal lgH gene rearrangement/ Positive clonal TCR gamma gene rearrangement )\par \par Planned for lymph node excision with Dr. Grace however patient decided to stay in the [facility] system and had Excisional Biopsy per Dr Mariano \par Colonoscopy November 2021\par \par LN excional BIopsy was delayed 2/2 to her travelling and pre surg testing.\par On review of needle biopsy from 3/22/22 at [facility], suggestive of T cell lymphoma\par \par S/p right axillary lymph node excisional biopsy on 5/16/22  per DR Mariano Path c/w Peripheral T- Cell Lymphoma, NOS\par PET SCAN 4/21/22: \par - 1. FDG avid extensive right axillary lymphadenopathy 5.4 x 3.6 cm , SUV 22.5. and right supraclavicular lymph node.0.8 x 0.8 cm (image 53), SUV 5.8.\par 2. Difficult to delineate retroareolar right breast soft tissue, with low-grade FDG avidity and minimally FDG avid diffuse, right breast cutaneous thickening. \par \par Breast node biopsy performed February 2022 was negative. \par MRI Breast on 5/26/22 with benign findings repeat in 6 months\par \par Recent Hospital Discharge Summary as follows:\par Discharge Date    16-Dec-2022\par Admission Date    16-Nov-2022 \par Reason for Admission    Autologous peripheral blood stem cell transplant with high\par dose CBV prep regimen for treatment of peripheral T cell lymphoma\par Hospital Course   \par This is a 57 year old female with peripheral T cell lymphoma status post CHOEP\par x 6 admitted for an autologous peripheral blood stem cell transplant with high\par dose CBV prep regimen. Hematologic history as follows: initially presented on\par 3/4/22 with right axilla mass to general surgeon. Biopsy on 3/22/22 showed\par atypical lymphoid infiltrate. She later had an excisional biopsy on 5/16/22\par which confirmed peripheral T cell lymphoma.\par \par Upon admission, a TLC was placed in IR. Ms. Johnston received IV hydration, pain\par management, nutritional support and antibacterial / antiviral / antifungal / GI\par / PCP and VOD (SOS) prophylaxis. Labs were monitored on a daily basis and she\par received electrolyte repletion and transfusional support as needed.\par While admitted Ms. Johnston experienced pancytopenia related to the high dose\par chemotherapy prep regimen. When she became neutropenic she was started on\par prophylactic ciprofloxacin. She also experienced neutropenic fevers. When she\par became febrile, blood and urine cultures were sent, a CXR completed and the\par ciprofloxacin was changed to cefepime. The infectious work up remained\par negative.  Ms. Johnston had a diffuse, macular, erythematous rash secondary to\par kepivance. The 3rd dose of kepivance was held post transplant.\par \par On 11/25/22, after pre-medication, Ms. Johnston received 316ml of autologous,\par pooled, thawed, washed, mobilized, plasma reduced, HPC apheresis over\par approximately 1 hour. Cell counts as follows:\par Total MNCs (x10^8/kg) = 6.67\par CD34+ cells (10^6/kg) = 5.46\par Cell Viability (%) = 80%\par She tolerated the infusion well with no adverse reactions noted.\par Engraftment was noted on 12/7/22. The cefepime was discontinued 12/7/22. The\par zarxio was discontinued 12/9/22. Post engraftment, Ms. Johnston remained\par intermittently febrile, likely secondary to engraftment. She was observed off\par antibiotics. On 12/9/22, the mediport was accessed, cultures sent, and the TLC\par discontinued. She remained intermittently febrile with a negative infectious\par work up. The source of the fever was thought to be engraftment, and she was\par treated with a short course of dexamethasone with good effect.\par Currently, Ms. Johnston is stable for discharge home with outpatient follow up at\par the [facility]. The mediport was flushed with hep-lock prior to\par discharge 12/16/22.\par \par  [de-identified] : Patient presents accompanied with .\par S/p right axillary lymph node excisional biopsy on 5/16/22  per DR Mariano Path c/w Peripheral T- Cell Lymphoma, NOS\par Patient started  CHOEP on 6/20/22, C4  8/22/22\par \par She reports increased fatigue and insomnia 2/2 to steriod after first week of  treatment.\par She takes a nap during the day but is able to get up and be active. \par Reports patch release pain the next day. She takes Claritin and Tylenol with relief. \par Patient admits constipation uses MiraLAX however takes 6 days after treatment to have a bowel movement. \par Denies trouble hearing.  \par Currently on linzess for constipation, baby aspirin 81 mg, Prozac 20mg, multivitamin \par \par 10/19/22 S/P hospitalization for krzysztof fever with 6th cycle of chemotherapy...now recovered\par \par On 11/3/22, patient presents for growth factor teaching. Overall well with no acute concerns. Only complaint is lower back pain. Denies fever, chills, nausea, vomiting, diarrhea, rash, mouth sores or any signs of active bleeding. Denies chest pain or B/L LE edema. \par \par 12/20/22: Patient is seen for the first follow up after Auto PBSCT.Today is Day +25 post Auto PBSCT.She states that she continues to feel fatigued, has pain on right calf since the past 3 days.Denies fever, chills, SOB,chest pain, edema,vomiting, rash, mouth sores or any signs of active bleeding. She states that she has occasional nausea relieved with Zofran.Remains compliant with diet and crowd restrictions. Remains compliant with medications as prescribed.

## 2022-12-22 NOTE — ASSESSMENT
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 Amadeo Grace. Patient incidentally discovered it while shaving in Jan / Feb 2022 \par Biopsy of right axilla performed on 3/22/22 revealing atypical lymphoid infiltrate ( NEGATIVE for a clonal lgH gene rearrangement/ Positive clonal TCR gamma gene rearrangement )\par \par On review of needle biopsy from 3/22/22 at [facility], suggestive of T cell lymphoma\par \par S/p right axillary lymph node excisional biopsy on 5/16/22  per DR Montserrat Knight c/w Peripheral T- Cell Lymphoma, NOS\par \par PET SCAN 4/21/22: \par - 1. FDG avid extensive right axillary lymphadenopathy 5.4 x 3.6 cm , SUV 22.5. and right supraclavicular lymph node.0.8 x 0.8 cm (image 53), SUV 5.8.\par 2. Difficult to delineate retroareolar right breast soft tissue, with low-grade FDG avidity and minimally FDG avid diffuse, right breast cutaneous thickening. \par \par Breast node biopsy performed February 2022 was negative. \par MRI BREAST 5/26/22: BiRADS 3 , f/u in 6 months\par \par PET SCAN 6/13/22: \par 1. Interval resolution of FDG avidity associated with right supraclavicular lymph node which has decreased in size.\par 2. Interval decrease in size, number and avidity of right axillary lymphadenopathy and resolution of  FDG avid right retropectoral lymph nodes.\par 3. Unchanged minimally FDG avid difficult to delineate retroareolar right breast soft tissue and minimally FDG avid diffuse right breast cutaneous thickening. Skin thickening and parenchymal edema in the right breast is consistent with metastatic obstruction on interval MRI from 5/26/2022.\par \par PET/CT 8/8/22 \par 1. Interval further decrease of right axillary lymph nodes.\par 2. Unchanged nonspecific minimally FDG avid retroareolar right breast soft tissue. Interval decreased avidity of unchanged cutaneous thickening in the right breast.\par 3. Subcentimeter nodular opacity in the right upper lobe medially, unchanged since PET/CT on 4/21/2022. \par 4. New generalized diffuse bone marrow hypermetabolism, nonspecific and may be related to treatment. \par \par \par PERIPHERAL T CELL LYMPHOMA NOS Stage 1\par IPI SCORE: 0 LOW RISK \par - Some B symptoms, Night sweats since Jan 2022, fatigue, 6 lb weight loss\par - Discussed with patient Chemotherapy with CHOEP WITH NEULASTA Support \par -D1 :  Cyclophosphamide 750 mg /m2, Adriamycin 50 mg/ m2 Vincristone 1.4mg/ m2, Etoposide 10mg/ m2 IV \par D2- D3 Etoposide 100mg/m2  IV Hydration \par D1-D5 Prednisone 10mg po \par Allopurinol 100mg daily for TLS prophylaxis\par \par \par - Port placement on 6/8/22\par - ECHO on 6/14/22, LV EF 59%\par - Patient started  CHOEP on 6/20/22, C4  8/22/22 now completed 6\par -Constipation; continue MiraLAX. Advised senna and colace. \par -Rash; Continue salicylic acid cream for rash, can try clindamycin ointment if not improved. \par - Restaging PET Scan and bm bx  after 6 cycles...confirms response.....to have f/u MRI of breast\par - Echo in Sept 2022 along with pre transplant testing and dental eval\par MRI BREAST: to be repeated in Nov 2022 \par I had another  extensive discussion regarding the risks, benefits, alternatives, logistics and rationale for high dose chemotherapy and auto stem cell transplant for t cell NHL..4 week hospital stay and 8 week recovery discussed...augmented cbv prep...stem cell mobilization with growth factor and collection discussed...need confirmed negative bm bx...if not consider ICE mobilization...goal 2 to 5 million cd 34 cells per kg...Collection early to mid nov...admit for transplant end nov.Literature and consents provided for review..quests addressed..f/u 3 weeks with transplant team...orientation and sw eval...\par \par 11/3/22\par Patient presents for growth factor teaching.\par CBC stable. No indication for transfusion today.\par Tyrell will receive Zarxio 780 mcg SQ Daily 11/3/22- 11/7/22. Stem cell collection will start on 11/7/22. \par Teach back method used during today's visit. NP administered Zarxio 480 mcg to RLQ abdomen. Lot #[id number] expiration December 2024. Tolerated injection with no adverse reaction.\par Second Zarxio 300 mcg Lot # [id number] Expiration September 2024 administered by spouse to RLQ abdomen. Tolerated injection with no adverse reaction.\par Recommended Claritin daily 11/3/22- 11/7/22. May take Tylenol prn for pain.\par Avoid Advil, Aleve and aspirin.\par Encouraged to increase calcium in diet over the next few days. \par Questions and concerns addressed. Reassurance provided.\par COVID 19 PCR scheduled at [facility] tomorrow.\par 11/7/22 Return to [facility] for Shiley catheter placement and stem cell collection. \par \par 12/20/22\par T Cell Lymphoma (C85.90)\par S/P  Autologous Stem Cell Transplant (Z94.81) \par On 11/25/22, after pre-medication, Ms. Johnston received 316ml of autologous,\par pooled, thawed, washed, mobilized, plasma reduced, HPC apheresis\par \par 1) T Cell Lymphoma\par  S/P Autologous PBSCT on 11/25/22\par  Labs sent today\par  Send labs with every visit\par \par 2) Heme\par    Counts stable, no transfusion requirements today\par    WBC -3.91,H/H- 9.8/30.8, Platelet -306,ANC-1.55\par    Continue  Multiple Vitamin and Folic acid \par  \par 3) ID\par    Continue prophylaxis\par    Acyclovir 400 mg 1 tab orally every 8 hrs \par    Atovaquone 750 mg/5 mL oral suspension 5 milliliter 2 times a day\par    Diflucan 200 mg 2 tabs orally once a day \par    Post  transplant restrictions reviewed and reinforced in light of COVID-19 Pandemic\par \par 4) GI\par     Protonix 40 mg oral delayed release 1 tab once a day \par     Zofran as needed for nausea \par \par 5)Right calf pain\par     Start Magnesium Oxide 400 mg 2 x day\par     Advised to call the center with worsening pain or edema\par     \par 6) Plan\par   -POF 6-8 weeks;will schedule for POF on 1/24/23\par   -Educated about plan of care,all questions and concerns addressed\par  - Advised to call office with any acute concerns\par  - Advised to have small frequent meals/snacks\par  -Reinstated to avoid crowd\par  -Reinstated dietary restrictions: No restaurant or take out food at this time, only home cooked, prepared/frozen         food. Allowed to have fresh baked pizza right out of the oven which is the ONLY takeout food at this time.\par  - Additional Instructions: Notify if bleeding; swelling; persistent nausea and vomiting; unable to  urinate; pain not        relieved by medications; fever; numbness, tingling; excessive diarrhea, inability to tolerate liquids  or foods;           increased irritability or sluggishness, rash\par  -Follow up with NP Patricia in one week\par \par I examined patient under Dr. Acevedo's supervision and Dr. Acevedo agrees to plan of care as listed above\par \par

## 2022-12-27 ENCOUNTER — RESULT REVIEW (OUTPATIENT)
Age: 57
End: 2022-12-27

## 2022-12-27 ENCOUNTER — APPOINTMENT (OUTPATIENT)
Dept: HEMATOLOGY ONCOLOGY | Facility: CLINIC | Age: 57
End: 2022-12-27
Payer: COMMERCIAL

## 2022-12-27 VITALS
OXYGEN SATURATION: 98 % | HEART RATE: 77 BPM | WEIGHT: 163.36 LBS | DIASTOLIC BLOOD PRESSURE: 71 MMHG | TEMPERATURE: 96.9 F | SYSTOLIC BLOOD PRESSURE: 105 MMHG | BODY MASS INDEX: 29.88 KG/M2 | RESPIRATION RATE: 16 BRPM

## 2022-12-27 LAB
ALBUMIN SERPL ELPH-MCNC: 4.5 G/DL
ALP BLD-CCNC: 132 U/L
ALT SERPL-CCNC: 44 U/L
ANION GAP SERPL CALC-SCNC: 11 MMOL/L
AST SERPL-CCNC: 32 U/L
BASOPHILS # BLD AUTO: 0.07 K/UL — SIGNIFICANT CHANGE UP (ref 0–0.2)
BASOPHILS NFR BLD AUTO: 1.5 % — SIGNIFICANT CHANGE UP (ref 0–2)
BILIRUB SERPL-MCNC: 0.4 MG/DL
BUN SERPL-MCNC: 9 MG/DL
CALCIUM SERPL-MCNC: 10.6 MG/DL
CHLORIDE SERPL-SCNC: 101 MMOL/L
CO2 SERPL-SCNC: 28 MMOL/L
CREAT SERPL-MCNC: 0.72 MG/DL
EGFR: 99 ML/MIN/1.73M2
EOSINOPHIL # BLD AUTO: 0.17 K/UL — SIGNIFICANT CHANGE UP (ref 0–0.5)
EOSINOPHIL NFR BLD AUTO: 3.6 % — SIGNIFICANT CHANGE UP (ref 0–6)
GLUCOSE SERPL-MCNC: 102 MG/DL
HCT VFR BLD CALC: 33 % — LOW (ref 34.5–45)
HGB BLD-MCNC: 10.5 G/DL — LOW (ref 11.5–15.5)
IMM GRANULOCYTES NFR BLD AUTO: 0.2 % — SIGNIFICANT CHANGE UP (ref 0–0.9)
LDH SERPL-CCNC: 267 U/L
LYMPHOCYTES # BLD AUTO: 1.28 K/UL — SIGNIFICANT CHANGE UP (ref 1–3.3)
LYMPHOCYTES # BLD AUTO: 27.1 % — SIGNIFICANT CHANGE UP (ref 13–44)
MAGNESIUM SERPL-MCNC: 2.1 MG/DL
MCHC RBC-ENTMCNC: 29.6 PG — SIGNIFICANT CHANGE UP (ref 27–34)
MCHC RBC-ENTMCNC: 31.8 G/DL — LOW (ref 32–36)
MCV RBC AUTO: 93 FL — SIGNIFICANT CHANGE UP (ref 80–100)
MONOCYTES # BLD AUTO: 0.75 K/UL — SIGNIFICANT CHANGE UP (ref 0–0.9)
MONOCYTES NFR BLD AUTO: 15.9 % — HIGH (ref 2–14)
NEUTROPHILS # BLD AUTO: 2.44 K/UL — SIGNIFICANT CHANGE UP (ref 1.8–7.4)
NEUTROPHILS NFR BLD AUTO: 51.7 % — SIGNIFICANT CHANGE UP (ref 43–77)
NRBC # BLD: 0 /100 WBCS — SIGNIFICANT CHANGE UP (ref 0–0)
PLATELET # BLD AUTO: 348 K/UL — SIGNIFICANT CHANGE UP (ref 150–400)
POTASSIUM SERPL-SCNC: 5.3 MMOL/L
PROT SERPL-MCNC: 7.5 G/DL
RBC # BLD: 3.55 M/UL — LOW (ref 3.8–5.2)
RBC # FLD: 17.5 % — HIGH (ref 10.3–14.5)
SODIUM SERPL-SCNC: 139 MMOL/L
WBC # BLD: 4.72 K/UL — SIGNIFICANT CHANGE UP (ref 3.8–10.5)
WBC # FLD AUTO: 4.72 K/UL — SIGNIFICANT CHANGE UP (ref 3.8–10.5)

## 2022-12-27 PROCEDURE — 99214 OFFICE O/P EST MOD 30 MIN: CPT

## 2022-12-27 RX ORDER — FLUOXETINE HYDROCHLORIDE 40 MG/1
CAPSULE ORAL
Refills: 0 | Status: DISCONTINUED | COMMUNITY
End: 2022-12-27

## 2022-12-28 ENCOUNTER — RESULT REVIEW (OUTPATIENT)
Age: 57
End: 2022-12-28

## 2022-12-28 ENCOUNTER — APPOINTMENT (OUTPATIENT)
Age: 57
End: 2022-12-28

## 2023-01-03 ENCOUNTER — RESULT REVIEW (OUTPATIENT)
Age: 58
End: 2023-01-03

## 2023-01-03 ENCOUNTER — LABORATORY RESULT (OUTPATIENT)
Age: 58
End: 2023-01-03

## 2023-01-03 ENCOUNTER — APPOINTMENT (OUTPATIENT)
Dept: HEMATOLOGY ONCOLOGY | Facility: CLINIC | Age: 58
End: 2023-01-03
Payer: COMMERCIAL

## 2023-01-03 VITALS
RESPIRATION RATE: 15 BRPM | TEMPERATURE: 97.1 F | SYSTOLIC BLOOD PRESSURE: 97 MMHG | DIASTOLIC BLOOD PRESSURE: 67 MMHG | WEIGHT: 162.7 LBS | HEART RATE: 96 BPM | OXYGEN SATURATION: 99 % | BODY MASS INDEX: 29.76 KG/M2

## 2023-01-03 LAB
BASOPHILS # BLD AUTO: 0.06 K/UL — SIGNIFICANT CHANGE UP (ref 0–0.2)
BASOPHILS NFR BLD AUTO: 1 % — SIGNIFICANT CHANGE UP (ref 0–2)
EOSINOPHIL # BLD AUTO: 0.49 K/UL — SIGNIFICANT CHANGE UP (ref 0–0.5)
EOSINOPHIL NFR BLD AUTO: 8.6 % — HIGH (ref 0–6)
HCT VFR BLD CALC: 32.8 % — LOW (ref 34.5–45)
HGB BLD-MCNC: 10.5 G/DL — LOW (ref 11.5–15.5)
IMM GRANULOCYTES NFR BLD AUTO: 0.3 % — SIGNIFICANT CHANGE UP (ref 0–0.9)
LYMPHOCYTES # BLD AUTO: 1.18 K/UL — SIGNIFICANT CHANGE UP (ref 1–3.3)
LYMPHOCYTES # BLD AUTO: 20.6 % — SIGNIFICANT CHANGE UP (ref 13–44)
MCHC RBC-ENTMCNC: 29.7 PG — SIGNIFICANT CHANGE UP (ref 27–34)
MCHC RBC-ENTMCNC: 32 G/DL — SIGNIFICANT CHANGE UP (ref 32–36)
MCV RBC AUTO: 92.7 FL — SIGNIFICANT CHANGE UP (ref 80–100)
MONOCYTES # BLD AUTO: 0.64 K/UL — SIGNIFICANT CHANGE UP (ref 0–0.9)
MONOCYTES NFR BLD AUTO: 11.2 % — SIGNIFICANT CHANGE UP (ref 2–14)
NEUTROPHILS # BLD AUTO: 3.34 K/UL — SIGNIFICANT CHANGE UP (ref 1.8–7.4)
NEUTROPHILS NFR BLD AUTO: 58.3 % — SIGNIFICANT CHANGE UP (ref 43–77)
NRBC # BLD: 0 /100 WBCS — SIGNIFICANT CHANGE UP (ref 0–0)
PLATELET # BLD AUTO: 229 K/UL — SIGNIFICANT CHANGE UP (ref 150–400)
RBC # BLD: 3.54 M/UL — LOW (ref 3.8–5.2)
RBC # FLD: 18 % — HIGH (ref 10.3–14.5)
WBC # BLD: 5.73 K/UL — SIGNIFICANT CHANGE UP (ref 3.8–10.5)
WBC # FLD AUTO: 5.73 K/UL — SIGNIFICANT CHANGE UP (ref 3.8–10.5)

## 2023-01-03 PROCEDURE — 99214 OFFICE O/P EST MOD 30 MIN: CPT

## 2023-01-04 LAB
ALBUMIN SERPL ELPH-MCNC: 4.4 G/DL
ALP BLD-CCNC: 124 U/L
ALT SERPL-CCNC: 42 U/L
ANION GAP SERPL CALC-SCNC: 12 MMOL/L
AST SERPL-CCNC: 38 U/L
BILIRUB SERPL-MCNC: 0.5 MG/DL
BUN SERPL-MCNC: 15 MG/DL
CALCIUM SERPL-MCNC: 10.2 MG/DL
CHLORIDE SERPL-SCNC: 100 MMOL/L
CO2 SERPL-SCNC: 25 MMOL/L
CREAT SERPL-MCNC: 0.79 MG/DL
EGFR: 88 ML/MIN/1.73M2
GLUCOSE SERPL-MCNC: 117 MG/DL
LDH SERPL-CCNC: 272 U/L
MAGNESIUM SERPL-MCNC: 2 MG/DL
POTASSIUM SERPL-SCNC: 4.6 MMOL/L
PROT SERPL-MCNC: 7.4 G/DL
SODIUM SERPL-SCNC: 137 MMOL/L

## 2023-01-06 ENCOUNTER — OUTPATIENT (OUTPATIENT)
Dept: OUTPATIENT SERVICES | Facility: HOSPITAL | Age: 58
LOS: 1 days | Discharge: HOME | End: 2023-01-06

## 2023-01-06 DIAGNOSIS — C85.90 NON-HODGKIN LYMPHOMA, UNSPECIFIED, UNSPECIFIED SITE: ICD-10-CM

## 2023-01-06 NOTE — REVIEW OF SYSTEMS
[Fatigue] : fatigue [Cough] : cough [SOB on Exertion] : shortness of breath during exertion [Negative] : Musculoskeletal [Fever] : no fever [Chills] : no chills [Night Sweats] : no night sweats [Recent Change In Weight] : ~T no recent weight change [Shortness Of Breath] : no shortness of breath [Wheezing] : no wheezing [Abdominal Pain] : no abdominal pain [Vomiting] : no vomiting [Constipation] : no constipation [Diarrhea] : no diarrhea [Joint Pain] : no joint pain [Joint Stiffness] : no joint stiffness [Muscle Pain] : no muscle pain [Muscle Weakness] : no muscle weakness [FreeTextEntry7] : Occasional nausea

## 2023-01-06 NOTE — PHYSICAL EXAM
[Restricted in physically strenuous activity but ambulatory and able to carry out work of a light or sedentary nature] : Status 1- Restricted in physically strenuous activity but ambulatory and able to carry out work of a light or sedentary nature, e.g., light house work, office work [Normal] : full range of motion and no deformities appreciated [de-identified] : port in place-flushed on 12/16/22 [de-identified] : No edema

## 2023-01-06 NOTE — ASSESSMENT
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 Amadeo Grace. Patient incidentally discovered it while shaving in Jan / Feb 2022 \par Biopsy of right axilla performed on 3/22/22 revealing atypical lymphoid infiltrate ( NEGATIVE for a clonal lgH gene rearrangement/ Positive clonal TCR gamma gene rearrangement )\par \par On review of needle biopsy from 3/22/22 at [facility], suggestive of T cell lymphoma\par \par S/p right axillary lymph node excisional biopsy on 5/16/22  per DR Montserrat Knight c/w Peripheral T- Cell Lymphoma, NOS\par \par PET SCAN 4/21/22: \par - 1. FDG avid extensive right axillary lymphadenopathy 5.4 x 3.6 cm , SUV 22.5. and right supraclavicular lymph node.0.8 x 0.8 cm (image 53), SUV 5.8.\par 2. Difficult to delineate retroareolar right breast soft tissue, with low-grade FDG avidity and minimally FDG avid diffuse, right breast cutaneous thickening. \par \par Breast node biopsy performed February 2022 was negative. \par MRI BREAST 5/26/22: BiRADS 3 , f/u in 6 months\par \par PET SCAN 6/13/22: \par 1. Interval resolution of FDG avidity associated with right supraclavicular lymph node which has decreased in size.\par 2. Interval decrease in size, number and avidity of right axillary lymphadenopathy and resolution of  FDG avid right retropectoral lymph nodes.\par 3. Unchanged minimally FDG avid difficult to delineate retroareolar right breast soft tissue and minimally FDG avid diffuse right breast cutaneous thickening. Skin thickening and parenchymal edema in the right breast is consistent with metastatic obstruction on interval MRI from 5/26/2022.\par \par PET/CT 8/8/22 \par 1. Interval further decrease of right axillary lymph nodes.\par 2. Unchanged nonspecific minimally FDG avid retroareolar right breast soft tissue. Interval decreased avidity of unchanged cutaneous thickening in the right breast.\par 3. Subcentimeter nodular opacity in the right upper lobe medially, unchanged since PET/CT on 4/21/2022. \par 4. New generalized diffuse bone marrow hypermetabolism, nonspecific and may be related to treatment. \par \par \par PERIPHERAL T CELL LYMPHOMA NOS Stage 1\par IPI SCORE: 0 LOW RISK \par - Some B symptoms, Night sweats since Jan 2022, fatigue, 6 lb weight loss\par - Discussed with patient Chemotherapy with CHOEP WITH NEULASTA Support \par -D1 :  Cyclophosphamide 750 mg /m2, Adriamycin 50 mg/ m2 Vincristone 1.4mg/ m2, Etoposide 10mg/ m2 IV \par D2- D3 Etoposide 100mg/m2  IV Hydration \par D1-D5 Prednisone 10mg po \par Allopurinol 100mg daily for TLS prophylaxis\par \par \par - Port placement on 6/8/22\par - ECHO on 6/14/22, LV EF 59%\par - Patient started  CHOEP on 6/20/22, C4  8/22/22 now completed 6\par -Constipation; continue MiraLAX. Advised senna and colace. \par -Rash; Continue salicylic acid cream for rash, can try clindamycin ointment if not improved. \par - Restaging PET Scan and bm bx  after 6 cycles...confirms response.....to have f/u MRI of breast\par - Echo in Sept 2022 along with pre transplant testing and dental eval\par MRI BREAST: to be repeated in Nov 2022 \par I had another  extensive discussion regarding the risks, benefits, alternatives, logistics and rationale for high dose chemotherapy and auto stem cell transplant for t cell NHL..4 week hospital stay and 8 week recovery discussed...augmented cbv prep...stem cell mobilization with growth factor and collection discussed...need confirmed negative bm bx...if not consider ICE mobilization...goal 2 to 5 million cd 34 cells per kg...Collection early to mid nov...admit for transplant end nov.Literature and consents provided for review..quests addressed..f/u 3 weeks with transplant team...orientation and sw eval...\par \par 11/3/22\par Patient presents for growth factor teaching.\par CBC stable. No indication for transfusion today.\par Tyrell will receive Zarxio 780 mcg SQ Daily 11/3/22- 11/7/22. Stem cell collection will start on 11/7/22. \par Teach back method used during today's visit. NP administered Zarxio 480 mcg to RLQ abdomen. Lot #[id number] expiration December 2024. Tolerated injection with no adverse reaction.\par Second Zarxio 300 mcg Lot # [id number] Expiration September 2024 administered by spouse to RLQ abdomen. Tolerated injection with no adverse reaction.\par Recommended Claritin daily 11/3/22- 11/7/22. May take Tylenol prn for pain.\par Avoid Advil, Aleve and aspirin.\par Encouraged to increase calcium in diet over the next few days. \par Questions and concerns addressed. Reassurance provided.\par COVID 19 PCR scheduled at [facility] tomorrow.\par 11/7/22 Return to [facility] for Shiley catheter placement and stem cell collection. \par \par 1/3/23\par T Cell Lymphoma (C85.90)\par S/P  Autologous Stem Cell Transplant (Z94.81) \par On 11/25/22, after pre-medication, Ms. Johnston received 316ml of autologous,\par pooled, thawed, washed, mobilized, plasma reduced, HPC apheresis\par \par 1) T Cell Lymphoma\par  S/P Autologous PBSCT on 11/25/22\par  Labs sent today\par  Send labs with every visit\par \par 2) Heme\par    Counts stable, no transfusion requirements today\par    WBC -5.73,H/H- 10.5/32.8, Platelet -229,ANC -3.34\par    Continue  Multiple Vitamin and Folic acid \par  \par 3) ID\par    Continue prophylaxis\par    Acyclovir 400 mg 1 tab orally every 8 hrs \par    Atovaquone 750 mg/5 mL oral suspension 5 milliliter 2 times a day\par    Diflucan 200 mg 2 tabs orally once a day \par    Post  transplant restrictions reviewed and reinforced in light of COVID-19 Pandemic\par \par 4) GI\par     Protonix 40 mg oral delayed release 1 tab once a day \par     Zofran as needed for nausea \par \par 5)Right calf pain-RESOLVED\par     Continue Magnesium Oxide 400 mg 2 x day\par \par 6) Cough/SOB on exertion\par     Rule out COVID/RVP- Not detected \par     OTC Delsym\par     Call the center with worsening/persistent cough or onset of new symptoms\par     Advised to monitor temperature for fever\par     \par 6) Plan\par   -Folow up on COVID/RVP done today-Not detected\par   -POF 6-8 weeks; scheduled for POF on 1/24/23\par  -Educated about plan of care,all questions and concerns addressed\par  - Advised to call office with any acute concerns\par  - Advised to have small frequent meals/snacks\par  -Reinstated to avoid crowd\par  -Reinstated dietary restrictions: No restaurant or take out food at this time, only home cooked, prepared/frozen         food. Allowed to have fresh baked pizza right out of the oven which is the ONLY takeout food at this time.\par  - Additional Instructions: Notify if bleeding; swelling; persistent nausea and vomiting; unable to  urinate; pain not        relieved by medications; fever; numbness, tingling; excessive diarrhea, inability to tolerate liquids  or foods;           increased irritability or sluggishness, rash\par  -Follow up with me in one week\par \par I examined patient under Dr. Acevedo's supervision and Dr. Acevedo agrees to plan of care as listed above\par \par

## 2023-01-06 NOTE — HISTORY OF PRESENT ILLNESS
[de-identified] : 57 year old female with no significant past medical hx presents to office for initial visit for high dose chemtherapy and auto stem cell transplant....  Patient presented on 3/4/22 c/o right axilla mass  to general surgeon, Dr. Amadeo Grace. Patient incidentally discovered it while shavingin Jan 2022  \par Biopsy of right axilla performed on 3/22/22 revealing atypical lymphoid infiltrate ( NEGATIVE for a clonal lgH gene rearrangement/ Positive clonal TCR gamma gene rearrangement )\par \par Planned for lymph node excision with Dr. Grace however patient decided to stay in the [facility] system and had Excisional Biopsy per Dr Mariano \par Colonoscopy November 2021\par \par LN excional BIopsy was delayed 2/2 to her travelling and pre surg testing.\par On review of needle biopsy from 3/22/22 at [facility], suggestive of T cell lymphoma\par \par S/p right axillary lymph node excisional biopsy on 5/16/22  per DR Mariano Path c/w Peripheral T- Cell Lymphoma, NOS\par PET SCAN 4/21/22: \par - 1. FDG avid extensive right axillary lymphadenopathy 5.4 x 3.6 cm , SUV 22.5. and right supraclavicular lymph node.0.8 x 0.8 cm (image 53), SUV 5.8.\par 2. Difficult to delineate retroareolar right breast soft tissue, with low-grade FDG avidity and minimally FDG avid diffuse, right breast cutaneous thickening. \par \par Breast node biopsy performed February 2022 was negative. \par MRI Breast on 5/26/22 with benign findings repeat in 6 months\par \par Recent Hospital Discharge Summary as follows:\par Discharge Date    16-Dec-2022\par Admission Date    16-Nov-2022 \par Reason for Admission    Autologous peripheral blood stem cell transplant with high\par dose CBV prep regimen for treatment of peripheral T cell lymphoma\par Hospital Course   \par This is a 57 year old female with peripheral T cell lymphoma status post CHOEP\par x 6 admitted for an autologous peripheral blood stem cell transplant with high\par dose CBV prep regimen. Hematologic history as follows: initially presented on\par 3/4/22 with right axilla mass to general surgeon. Biopsy on 3/22/22 showed\par atypical lymphoid infiltrate. She later had an excisional biopsy on 5/16/22\par which confirmed peripheral T cell lymphoma.\par \par Upon admission, a TLC was placed in IR. Ms. Johnston received IV hydration, pain\par management, nutritional support and antibacterial / antiviral / antifungal / GI\par / PCP and VOD (SOS) prophylaxis. Labs were monitored on a daily basis and she\par received electrolyte repletion and transfusional support as needed.\par While admitted Ms. Johnston experienced pancytopenia related to the high dose\par chemotherapy prep regimen. When she became neutropenic she was started on\par prophylactic ciprofloxacin. She also experienced neutropenic fevers. When she\par became febrile, blood and urine cultures were sent, a CXR completed and the\par ciprofloxacin was changed to cefepime. The infectious work up remained\par negative.  Ms. Johnston had a diffuse, macular, erythematous rash secondary to\par kepivance. The 3rd dose of kepivance was held post transplant.\par \par On 11/25/22, after pre-medication, Ms. Johnston received 316ml of autologous,\par pooled, thawed, washed, mobilized, plasma reduced, HPC apheresis over\par approximately 1 hour. Cell counts as follows:\par Total MNCs (x10^8/kg) = 6.67\par CD34+ cells (10^6/kg) = 5.46\par Cell Viability (%) = 80%\par She tolerated the infusion well with no adverse reactions noted.\par Engraftment was noted on 12/7/22. The cefepime was discontinued 12/7/22. The\par zarxio was discontinued 12/9/22. Post engraftment, Ms. Johnston remained\par intermittently febrile, likely secondary to engraftment. She was observed off\par antibiotics. On 12/9/22, the mediport was accessed, cultures sent, and the TLC\par discontinued. She remained intermittently febrile with a negative infectious\par work up. The source of the fever was thought to be engraftment, and she was\par treated with a short course of dexamethasone with good effect.\par Currently, Ms. Johnston is stable for discharge home with outpatient follow up at\par the [facility]. The mediport was flushed with hep-lock prior to\par discharge 12/16/22.\par \par  [de-identified] : Patient presents accompanied with .\par S/p right axillary lymph node excisional biopsy on 5/16/22  per DR Mariano Path c/w Peripheral T- Cell Lymphoma, NOS\par Patient started  CHOEP on 6/20/22, C4  8/22/22\par \par She reports increased fatigue and insomnia 2/2 to steriod after first week of  treatment.\par She takes a nap during the day but is able to get up and be active. \par Reports patch release pain the next day. She takes Claritin and Tylenol with relief. \par Patient admits constipation uses MiraLAX however takes 6 days after treatment to have a bowel movement. \par Denies trouble hearing.  \par Currently on linzess for constipation, baby aspirin 81 mg, Prozac 20mg, multivitamin \par \par 10/19/22 S/P hospitalization for krzysztof fever with 6th cycle of chemotherapy...now recovered\par \par On 11/3/22, patient presents for growth factor teaching. Overall well with no acute concerns. Only complaint is lower back pain. Denies fever, chills, nausea, vomiting, diarrhea, rash, mouth sores or any signs of active bleeding. Denies chest pain or B/L LE edema. \par \par 12/20/22: Patient is seen for the first follow up after Auto PBSCT.Today is Day +25 post Auto PBSCT.She states that she continues to feel fatigued, has pain on right calf since the past 3 days.Denies fever, chills, SOB,chest pain, edema,vomiting, rash, mouth sores or any signs of active bleeding. She states that she has occasional nausea relieved with Zofran.Remains compliant with diet and crowd restrictions. Remains compliant with medications as prescribed.\par \par 1/3/23:Today is Day +39 post Auto PBSCT.Denies fever, chills, chest pain, vomiting, diarrhea,rash,mouth sores, edema or any signs of active bleeding.She has occasional nausea relieved with Zofran.She has a  dry cough since past few days and has SOB with exertion.Remains compliant with medications as prescribed. Remains compliant with diet and crowd restrictions.

## 2023-01-10 ENCOUNTER — APPOINTMENT (OUTPATIENT)
Dept: HEMATOLOGY ONCOLOGY | Facility: CLINIC | Age: 58
End: 2023-01-10
Payer: COMMERCIAL

## 2023-01-10 ENCOUNTER — RESULT REVIEW (OUTPATIENT)
Age: 58
End: 2023-01-10

## 2023-01-10 VITALS
DIASTOLIC BLOOD PRESSURE: 70 MMHG | SYSTOLIC BLOOD PRESSURE: 112 MMHG | RESPIRATION RATE: 16 BRPM | TEMPERATURE: 97.2 F | BODY MASS INDEX: 29.35 KG/M2 | WEIGHT: 160.47 LBS | HEART RATE: 84 BPM | OXYGEN SATURATION: 98 %

## 2023-01-10 LAB
ALBUMIN SERPL ELPH-MCNC: 4.5 G/DL
ALP BLD-CCNC: 115 U/L
ALT SERPL-CCNC: 46 U/L
ANION GAP SERPL CALC-SCNC: 11 MMOL/L
AST SERPL-CCNC: 46 U/L
BASOPHILS # BLD AUTO: 0.04 K/UL — SIGNIFICANT CHANGE UP (ref 0–0.2)
BASOPHILS NFR BLD AUTO: 0.7 % — SIGNIFICANT CHANGE UP (ref 0–2)
BILIRUB SERPL-MCNC: 0.5 MG/DL
BUN SERPL-MCNC: 13 MG/DL
CALCIUM SERPL-MCNC: 10.3 MG/DL
CHLORIDE SERPL-SCNC: 101 MMOL/L
CO2 SERPL-SCNC: 26 MMOL/L
CREAT SERPL-MCNC: 0.8 MG/DL
EGFR: 87 ML/MIN/1.73M2
EOSINOPHIL # BLD AUTO: 0.43 K/UL — SIGNIFICANT CHANGE UP (ref 0–0.5)
EOSINOPHIL NFR BLD AUTO: 7.8 % — HIGH (ref 0–6)
GLUCOSE SERPL-MCNC: 115 MG/DL
HCT VFR BLD CALC: 33.4 % — LOW (ref 34.5–45)
HGB BLD-MCNC: 10.8 G/DL — LOW (ref 11.5–15.5)
IMM GRANULOCYTES NFR BLD AUTO: 0.2 % — SIGNIFICANT CHANGE UP (ref 0–0.9)
LDH SERPL-CCNC: 299 U/L
LYMPHOCYTES # BLD AUTO: 1.13 K/UL — SIGNIFICANT CHANGE UP (ref 1–3.3)
LYMPHOCYTES # BLD AUTO: 20.5 % — SIGNIFICANT CHANGE UP (ref 13–44)
MAGNESIUM SERPL-MCNC: 1.9 MG/DL
MCHC RBC-ENTMCNC: 30.1 PG — SIGNIFICANT CHANGE UP (ref 27–34)
MCHC RBC-ENTMCNC: 32.3 G/DL — SIGNIFICANT CHANGE UP (ref 32–36)
MCV RBC AUTO: 93 FL — SIGNIFICANT CHANGE UP (ref 80–100)
MONOCYTES # BLD AUTO: 0.57 K/UL — SIGNIFICANT CHANGE UP (ref 0–0.9)
MONOCYTES NFR BLD AUTO: 10.4 % — SIGNIFICANT CHANGE UP (ref 2–14)
NEUTROPHILS # BLD AUTO: 3.32 K/UL — SIGNIFICANT CHANGE UP (ref 1.8–7.4)
NEUTROPHILS NFR BLD AUTO: 60.4 % — SIGNIFICANT CHANGE UP (ref 43–77)
NRBC # BLD: 0 /100 WBCS — SIGNIFICANT CHANGE UP (ref 0–0)
PLATELET # BLD AUTO: 242 K/UL — SIGNIFICANT CHANGE UP (ref 150–400)
POTASSIUM SERPL-SCNC: 4.9 MMOL/L
PROT SERPL-MCNC: 7.3 G/DL
RBC # BLD: 3.59 M/UL — LOW (ref 3.8–5.2)
RBC # FLD: 17.8 % — HIGH (ref 10.3–14.5)
SODIUM SERPL-SCNC: 138 MMOL/L
WBC # BLD: 5.5 K/UL — SIGNIFICANT CHANGE UP (ref 3.8–10.5)
WBC # FLD AUTO: 5.5 K/UL — SIGNIFICANT CHANGE UP (ref 3.8–10.5)

## 2023-01-10 PROCEDURE — 99214 OFFICE O/P EST MOD 30 MIN: CPT

## 2023-01-11 NOTE — REVIEW OF SYSTEMS
[Fatigue] : fatigue [Cough] : cough [SOB on Exertion] : shortness of breath during exertion [Negative] : Allergic/Immunologic [Fever] : no fever [Chills] : no chills [Night Sweats] : no night sweats [Recent Change In Weight] : ~T no recent weight change [Shortness Of Breath] : no shortness of breath [Wheezing] : no wheezing [Abdominal Pain] : no abdominal pain [Vomiting] : no vomiting [Constipation] : no constipation [Diarrhea] : no diarrhea [Joint Pain] : no joint pain [Joint Stiffness] : no joint stiffness [Muscle Pain] : no muscle pain [Muscle Weakness] : no muscle weakness [FreeTextEntry6] : Dry cough mostly when lying down [FreeTextEntry7] : Occasional nausea

## 2023-01-11 NOTE — PHYSICAL EXAM
[Restricted in physically strenuous activity but ambulatory and able to carry out work of a light or sedentary nature] : Status 1- Restricted in physically strenuous activity but ambulatory and able to carry out work of a light or sedentary nature, e.g., light house work, office work [Normal] : affect appropriate [de-identified] : port in place-flushed on 12/16/22;no tenderness or redness on the port site but has mild pain by the wire of the port [de-identified] : No edema

## 2023-01-11 NOTE — HISTORY OF PRESENT ILLNESS
[de-identified] : 57 year old female with no significant past medical hx presents to office for initial visit for high dose chemtherapy and auto stem cell transplant....  Patient presented on 3/4/22 c/o right axilla mass  to general surgeon, Dr. Amadeo Grace. Patient incidentally discovered it while shavingin Jan 2022  \par Biopsy of right axilla performed on 3/22/22 revealing atypical lymphoid infiltrate ( NEGATIVE for a clonal lgH gene rearrangement/ Positive clonal TCR gamma gene rearrangement )\par \par Planned for lymph node excision with Dr. Grace however patient decided to stay in the [facility] system and had Excisional Biopsy per Dr Mariano \par Colonoscopy November 2021\par \par LN excional BIopsy was delayed 2/2 to her travelling and pre surg testing.\par On review of [facility], suggestive of T cell lymphoma\par \par S/p right axillary lymph node excisional biopsy on 5/16/22  per DR Mariano Path c/w Peripheral T- Cell Lymphoma, NOS\par PET SCAN 4/21/22: \par - 1. FDG avid extensive right axillary lymphadenopathy 5.4 x 3.6 cm , SUV 22.5. and right supraclavicular lymph node.0.8 x 0.8 cm (image 53), SUV 5.8.\par 2. Difficult to delineate retroareolar right breast soft tissue, with low-grade FDG avidity and minimally FDG avid diffuse, right breast cutaneous thickening. \par \par Breast node biopsy performed February 2022 was negative. \par MRI Breast on 5/26/22 with benign findings repeat in 6 months\par \par Recent Hospital Discharge Summary as follows:\par Discharge Date    16-Dec-2022\par Admission Date    16-Nov-2022 \par Reason for Admission    Autologous peripheral blood stem cell transplant with high\par dose CBV prep regimen for treatment of peripheral T cell lymphoma\par Hospital Course   \par This is a 57 year old female with peripheral T cell lymphoma status post CHOEP\par x 6 admitted for an autologous peripheral blood stem cell transplant with high\par dose CBV prep regimen. Hematologic history as follows: initially presented on\par 3/4/22 with right axilla mass to general surgeon. Biopsy on 3/22/22 showed\par atypical lymphoid infiltrate. She later had an excisional biopsy on 5/16/22\par which confirmed peripheral T cell lymphoma.\par \par Upon admission, a TLC was placed in IR. Ms. Johnston received IV hydration, pain\par management, nutritional support and antibacterial / antiviral / antifungal / GI\par / PCP and VOD (SOS) prophylaxis. Labs were monitored on a daily basis and she\par received electrolyte repletion and transfusional support as needed.\par While admitted Ms. Johnston experienced pancytopenia related to the high dose\par chemotherapy prep regimen. When she became neutropenic she was started on\par prophylactic ciprofloxacin. She also experienced neutropenic fevers. When she\par became febrile, blood and urine cultures were sent, a CXR completed and the\par ciprofloxacin was changed to cefepime. The infectious work up remained\par negative.  Ms. Johnston had a diffuse, macular, erythematous rash secondary to\par kepivance. The 3rd dose of kepivance was held post transplant.\par \par On 11/25/22, after pre-medication, Ms. Johnston received 316ml of autologous,\par pooled, thawed, washed, mobilized, plasma reduced, HPC apheresis over\par approximately 1 hour. Cell counts as follows:\par Total MNCs (x10^8/kg) = 6.67\par CD34+ cells (10^6/kg) = 5.46\par Cell Viability (%) = 80%\par She tolerated the infusion well with no adverse reactions noted.\par Engraftment was noted on 12/7/22. The cefepime was discontinued 12/7/22. The\par zarxio was discontinued 12/9/22. Post engraftment, Ms. Johnston remained\par intermittently febrile, likely secondary to engraftment. She was observed off\par antibiotics. On 12/9/22, the mediport was accessed, cultures sent, and the TLC\par discontinued. She remained intermittently febrile with a negative infectious\par work up. The source of the fever was thought to be engraftment, and she was\par treated with a short course of dexamethasone with good effect.\par Currently, Ms. Johnston is stable for discharge home with outpatient follow up at\par the [facility]. The mediport was flushed with hep-lock prior to\par discharge 12/16/22.\par \par  [de-identified] : Patient presents accompanied with .\par S/p right axillary lymph node excisional biopsy on 5/16/22  per DR Mariano Path c/w Peripheral T- Cell Lymphoma, NOS\par Patient started  CHOEP on 6/20/22, C4  8/22/22\par \par She reports increased fatigue and insomnia 2/2 to steriod after first week of  treatment.\par She takes a nap during the day but is able to get up and be active. \par Reports patch release pain the next day. She takes Claritin and Tylenol with relief. \par Patient admits constipation uses MiraLAX however takes 6 days after treatment to have a bowel movement. \par Denies trouble hearing.  \par Currently on linzess for constipation, baby aspirin 81 mg, Prozac 20mg, multivitamin \par \par 10/19/22 S/P hospitalization for krzysztof fever with 6th cycle of chemotherapy...now recovered\par \par On 11/3/22, patient presents for growth factor teaching. Overall well with no acute concerns. Only complaint is lower back pain. Denies fever, chills, nausea, vomiting, diarrhea, rash, mouth sores or any signs of active bleeding. Denies chest pain or B/L LE edema. \par \par 12/20/22: Patient is seen for the first follow up after Auto PBSCT.Today is Day +25 post Auto PBSCT.She states that she continues to feel fatigued, has pain on right calf since the past 3 days.Denies fever, chills, SOB,chest pain, edema,vomiting, rash, mouth sores or any signs of active bleeding. She states that she has occasional nausea relieved with Zofran.Remains compliant with diet and crowd restrictions. Remains compliant with medications as prescribed.\par \par 1/3/23:Today is Day +39 post Auto PBSCT.Denies fever, chills, chest pain, vomiting, diarrhea,rash,mouth sores, edema or any signs of active bleeding.She has occasional nausea relieved with Zofran.She has a  dry cough since past few days and has SOB with exertion.Remains compliant with medications as prescribed. Remains compliant with diet and crowd restrictions.\par \par 1/10/23:Patient is seen for a follow up visit today.Today is Day + 46.Denies fever, chills, SOB,chest pain,vomiting,diarrhea,rash,mouth sores or any signs of active bleeding.She states that she feels nauseous at times and has poor appetite.She still has a dry cough mostly when lying down. She states that she has mild pain by the the wire of the right CW mediport.Remains compliant with medications.Remains compliant with diet and crowd restrictions.

## 2023-01-11 NOTE — ASSESSMENT
[FreeTextEntry1] : T Cell Lymphoma (C85.90)\par S/P  Autologous Stem Cell Transplant (Z94.81) \par On 11/25/22, after pre-medication, Ms. Johnston received 316ml of autologous,\par pooled, thawed, washed, mobilized, plasma reduced, HPC apheresis\par \par 1) T Cell Lymphoma\par  S/P Autologous PBSCT on 11/25/22\par  Labs sent today\par  Send labs with every visit\par \par 2) Heme\par    Counts stable, no transfusion requirements today\par    Continue  Multiple Vitamin and Folic acid \par  \par 3) ID\par    Continue prophylaxis\par    Acyclovir 400 mg 1 tab orally every 8 hrs \par    Atovaquone 750 mg/5 mL oral suspension 5 milliliter 2 times a day\par    Diflucan 200 mg 2 tabs orally once a day \par    Post  transplant restrictions reviewed and reinforced in light of COVID-19 Pandemic\par \par 4) GI\par     Protonix 40 mg oral delayed release 1 tab once a day \par     Zofran as needed for nausea \par \par 5)Right calf pain\par     Start Magnesium Oxide 400 mg 2 x day\par     Advised to call the center with worsening pain or edema\par     RESOLVED\par \par 6) Plan\par   -POF 6-8 weeks;will schedule for POF on 1/24/23\par   -Educated about plan of care,all questions and concerns addressed\par  - Advised to call office with any acute concerns\par  - Advised to have small frequent meals/snacks\par  -Reinstated to avoid crowd\par  -Reinstated dietary restrictions: No restaurant or take out food at this time, only home cooked, prepared/frozen         food. Allowed to have fresh baked pizza right out of the oven which is the ONLY takeout food at this time.\par  - Additional Instructions: Notify if bleeding; swelling; persistent nausea and vomiting; unable to  urinate; pain not        relieved by medications; fever; numbness, tingling; excessive diarrhea, inability to tolerate liquids  or foods;           increased irritability or sluggishness, rash\par  -Follow up with Dr Acevedo in one week\par \par I examined patient under Dr. Acevedo's supervision and Dr. Acevedo agrees to plan of care as listed above\par \par

## 2023-01-11 NOTE — PHYSICAL EXAM
[Restricted in physically strenuous activity but ambulatory and able to carry out work of a light or sedentary nature] : Status 1- Restricted in physically strenuous activity but ambulatory and able to carry out work of a light or sedentary nature, e.g., light house work, office work [Normal] : full range of motion and no deformities appreciated [de-identified] : port in place-flushed on 12/16/22 [de-identified] : No edema

## 2023-01-11 NOTE — REVIEW OF SYSTEMS
[Fatigue] : fatigue [Fever] : no fever [Chills] : no chills [Night Sweats] : no night sweats [Recent Change In Weight] : ~T no recent weight change [Abdominal Pain] : no abdominal pain [Vomiting] : no vomiting [Constipation] : no constipation [Diarrhea] : no diarrhea [Joint Pain] : no joint pain [Joint Stiffness] : no joint stiffness [Muscle Pain] : no muscle pain [Muscle Weakness] : no muscle weakness [Negative] : Musculoskeletal

## 2023-01-11 NOTE — HISTORY OF PRESENT ILLNESS
[de-identified] : 57 year old female with no significant past medical hx presents to office for initial visit for high dose chemtherapy and auto stem cell transplant....  Patient presented on 3/4/22 c/o right axilla mass  to general surgeon, Dr. Amadeo Grace. Patient incidentally discovered it while shavingin Jan 2022  \par Biopsy of right axilla performed on 3/22/22 revealing atypical lymphoid infiltrate ( NEGATIVE for a clonal lgH gene rearrangement/ Positive clonal TCR gamma gene rearrangement )\par \par Planned for lymph node excision with Dr. Grace however patient decided to stay in the [facility] system and had Excisional Biopsy per Dr Mariano \par Colonoscopy November 2021\par \par LN excional BIopsy was delayed 2/2 to her travelling and pre surg testing.\par On review of needle biopsy from 3/22/22 at [facility], suggestive of T cell lymphoma\par \par S/p right axillary lymph node excisional biopsy on 5/16/22  per DR Mariano Path c/w Peripheral T- Cell Lymphoma, NOS\par PET SCAN 4/21/22: \par - 1. FDG avid extensive right axillary lymphadenopathy 5.4 x 3.6 cm , SUV 22.5. and right supraclavicular lymph node.0.8 x 0.8 cm (image 53), SUV 5.8.\par 2. Difficult to delineate retroareolar right breast soft tissue, with low-grade FDG avidity and minimally FDG avid diffuse, right breast cutaneous thickening. \par \par Breast node biopsy performed February 2022 was negative. \par MRI Breast on 5/26/22 with benign findings repeat in 6 months\par \par Recent Hospital Discharge Summary as follows:\par Discharge Date    16-Dec-2022\par Admission Date    16-Nov-2022 \par Reason for Admission    Autologous peripheral blood stem cell transplant with high\par dose CBV prep regimen for treatment of peripheral T cell lymphoma\par Hospital Course   \par This is a 57 year old female with peripheral T cell lymphoma status post CHOEP\par x 6 admitted for an autologous peripheral blood stem cell transplant with high\par dose CBV prep regimen. Hematologic history as follows: initially presented on\par 3/4/22 with right axilla mass to general surgeon. Biopsy on 3/22/22 showed\par atypical lymphoid infiltrate. She later had an excisional biopsy on 5/16/22\par which confirmed peripheral T cell lymphoma.\par \par Upon admission, a TLC was placed in IR. Ms. Johnston received IV hydration, pain\par management, nutritional support and antibacterial / antiviral / antifungal / GI\par / PCP and VOD (SOS) prophylaxis. Labs were monitored on a daily basis and she\par received electrolyte repletion and transfusional support as needed.\par While admitted Ms. Johnston experienced pancytopenia related to the high dose\par chemotherapy prep regimen. When she became neutropenic she was started on\par prophylactic ciprofloxacin. She also experienced neutropenic fevers. When she\par became febrile, blood and urine cultures were sent, a CXR completed and the\par ciprofloxacin was changed to cefepime. The infectious work up remained\par negative.  Ms. Johnston had a diffuse, macular, erythematous rash secondary to\par kepivance. The 3rd dose of kepivance was held post transplant.\par \par On 11/25/22, after pre-medication, Ms. Johnston received 316ml of autologous,\par pooled, thawed, washed, mobilized, plasma reduced, HPC apheresis over\par approximately 1 hour. Cell counts as follows:\par Total MNCs (x10^8/kg) = 6.67\par CD34+ cells (10^6/kg) = 5.46\par Cell Viability (%) = 80%\par She tolerated the infusion well with no adverse reactions noted.\par Engraftment was noted on 12/7/22. The cefepime was discontinued 12/7/22. The\par zarxio was discontinued 12/9/22. Post engraftment, Ms. Johnston remained\par intermittently febrile, likely secondary to engraftment. She was observed off\par antibiotics. On 12/9/22, the mediport was accessed, cultures sent, and the TLC\par discontinued. She remained intermittently febrile with a negative infectious\par work up. The source of the fever was thought to be engraftment, and she was\par treated with a short course of dexamethasone with good effect.\par Currently, Ms. Johnston is stable for discharge home with outpatient follow up at\par the [facility]. The mediport was flushed with hep-lock prior to\par discharge 12/16/22.\par \par  [de-identified] : Patient presents accompanied with .\par S/p right axillary lymph node excisional biopsy on 5/16/22  per DR Mariano Path c/w Peripheral T- Cell Lymphoma, NOS\par Patient started  CHOEP on 6/20/22, C4  8/22/22\par \par She reports increased fatigue and insomnia 2/2 to steriod after first week of  treatment.\par She takes a nap during the day but is able to get up and be active. \par Reports patch release pain the next day. She takes Claritin and Tylenol with relief. \par Patient admits constipation uses MiraLAX however takes 6 days after treatment to have a bowel movement. \par Denies trouble hearing.  \par Currently on linzess for constipation, baby aspirin 81 mg, Prozac 20mg, multivitamin \par \par 10/19/22 S/P hospitalization for krzysztof fever with 6th cycle of chemotherapy...now recovered\par \par On 11/3/22, patient presents for growth factor teaching. Overall well with no acute concerns. Only complaint is lower back pain. Denies fever, chills, nausea, vomiting, diarrhea, rash, mouth sores or any signs of active bleeding. Denies chest pain or B/L LE edema. \par \par 12/20/22: Patient is seen for the first follow up after Auto PBSCT.Today is Day +25 post Auto PBSCT.She states that she continues to feel fatigued, has pain on right calf since the past 3 days.Denies fever, chills, SOB,chest pain, edema,vomiting, rash, mouth sores or any signs of active bleeding. She states that she has occasional nausea relieved with Zofran.Remains compliant with diet and crowd restrictions. Remains compliant with medications as prescribed.\par \par On 12/27/22, patient presents for a follow up visit. Overall well with no acute concerns. Denies fever, chills, nausea, vomiting, diarrhea, rash, mouth sores or any signs of active bleeding. Denies SOB, chest pain or B/L LE edema. Remains compliant with post transplant restrictions. Remains compliant with post transplant medications.

## 2023-01-11 NOTE — ASSESSMENT
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 Amadeo Grace. Patient incidentally discovered it while shaving in Jan / Feb 2022 \par Biopsy of right axilla performed on 3/22/22 revealing atypical lymphoid infiltrate ( NEGATIVE for a clonal lgH gene rearrangement/ Positive clonal TCR gamma gene rearrangement )\par \par On review of needle biopsy from 3/22/22 at [facility], suggestive of T cell lymphoma\par \par S/p right axillary lymph node excisional biopsy on 5/16/22  per DR Montserrat Knight c/w Peripheral T- Cell Lymphoma, NOS\par \par PET SCAN 4/21/22: \par - 1. FDG avid extensive right axillary lymphadenopathy 5.4 x 3.6 cm , SUV 22.5. and right supraclavicular lymph node.0.8 x 0.8 cm (image 53), SUV 5.8.\par 2. Difficult to delineate retroareolar right breast soft tissue, with low-grade FDG avidity and minimally FDG avid diffuse, right breast cutaneous thickening. \par \par Breast node biopsy performed February 2022 was negative. \par MRI BREAST 5/26/22: BiRADS 3 , f/u in 6 months\par \par PET SCAN 6/13/22: \par 1. Interval resolution of FDG avidity associated with right supraclavicular lymph node which has decreased in size.\par 2. Interval decrease in size, number and avidity of right axillary lymphadenopathy and resolution of  FDG avid right retropectoral lymph nodes.\par 3. Unchanged minimally FDG avid difficult to delineate retroareolar right breast soft tissue and minimally FDG avid diffuse right breast cutaneous thickening. Skin thickening and parenchymal edema in the right breast is consistent with metastatic obstruction on interval MRI from 5/26/2022.\par \par PET/CT 8/8/22 \par 1. Interval further decrease of right axillary lymph nodes.\par 2. Unchanged nonspecific minimally FDG avid retroareolar right breast soft tissue. Interval decreased avidity of unchanged cutaneous thickening in the right breast.\par 3. Subcentimeter nodular opacity in the right upper lobe medially, unchanged since PET/CT on 4/21/2022. \par 4. New generalized diffuse bone marrow hypermetabolism, nonspecific and may be related to treatment. \par \par \par PERIPHERAL T CELL LYMPHOMA NOS Stage 1\par IPI SCORE: 0 LOW RISK \par - Some B symptoms, Night sweats since Jan 2022, fatigue, 6 lb weight loss\par - Discussed with patient Chemotherapy with CHOEP WITH NEULASTA Support \par -D1 :  Cyclophosphamide 750 mg /m2, Adriamycin 50 mg/ m2 Vincristone 1.4mg/ m2, Etoposide 10mg/ m2 IV \par D2- D3 Etoposide 100mg/m2  IV Hydration \par D1-D5 Prednisone 10mg po \par Allopurinol 100mg daily for TLS prophylaxis\par \par \par - Port placement on 6/8/22\par - ECHO on 6/14/22, LV EF 59%\par - Patient started  CHOEP on 6/20/22, C4  8/22/22 now completed 6\par -Constipation; continue MiraLAX. Advised senna and colace. \par -Rash; Continue salicylic acid cream for rash, can try clindamycin ointment if not improved. \par - Restaging PET Scan and bm bx  after 6 cycles...confirms response.....to have f/u MRI of breast\par - Echo in Sept 2022 along with pre transplant testing and dental eval\par MRI BREAST: to be repeated in Nov 2022 \par I had another  extensive discussion regarding the risks, benefits, alternatives, logistics and rationale for high dose chemotherapy and auto stem cell transplant for t cell NHL..4 week hospital stay and 8 week recovery discussed...augmented cbv prep...stem cell mobilization with growth factor and collection discussed...need confirmed negative bm bx...if not consider ICE mobilization...goal 2 to 5 million cd 34 cells per kg...Collection early to mid nov...admit for transplant end nov.Literature and consents provided for review..quests addressed..f/u 3 weeks with transplant team...orientation and sw eval...\par \par 11/3/22\par Patient presents for growth factor teaching.\par CBC stable. No indication for transfusion today.\par Tyrell will receive Zarxio 780 mcg SQ Daily 11/3/22- 11/7/22. Stem cell collection will start on 11/7/22. \par Teach back method used during today's visit. NP administered Zarxio 480 mcg to RLQ abdomen. Lot #[id number] expiration December 2024. Tolerated injection with no adverse reaction.\par Second Zarxio 300 mcg Lot # [id number] Expiration September 2024 administered by spouse to RLQ abdomen. Tolerated injection with no adverse reaction.\par Recommended Claritin daily 11/3/22- 11/7/22. May take Tylenol prn for pain.\par Avoid Advil, Aleve and aspirin.\par Encouraged to increase calcium in diet over the next few days. \par Questions and concerns addressed. Reassurance provided.\par COVID 19 PCR scheduled at [facility] tomorrow.\par 11/7/22 Return to [facility] for Shiley catheter placement and stem cell collection. \par \par 1/10/23\par T Cell Lymphoma (C85.90)\par S/P  Autologous Stem Cell Transplant (Z94.81) \par On 11/25/22, after pre-medication, Ms. Johnston received 316ml of autologous,\par pooled, thawed, washed, mobilized, plasma reduced, HPC apheresis\par \par 1) T Cell Lymphoma\par  S/P Autologous PBSCT on 11/25/22\par  Labs sent today\par  Send labs with every visit\par  Repeat imaging 4  months post PBSCT\par \par 2) Heme\par    Counts stable, no transfusion requirements today\par    WBC -5.50,H/H- 10.8/33.4, Platelet -242,ANC -3.32\par    Continue  Multiple Vitamin and Folic acid \par  \par 3) ID\par    Continue prophylaxis\par    Acyclovir 400 mg 1 tab orally every 8 hrs \par    Atovaquone 750 mg/5 mL oral suspension 5 milliliter 2 times a day\par    Diflucan 200 mg 2 tabs orally once a day \par    Post  transplant restrictions reviewed and reinforced in light of COVID-19 Pandemic\par \par 4) GI\par     Protonix 40 mg oral delayed release 1 tab once a day \par     Zofran every 8 hours as needed for nausea \par \par 5)Right calf pain-RESOLVED\par     Continue Magnesium Oxide 400 mg 2 x day\par \par 6) Cough\par     COVID/RVP- Not detected on 1/3/23\par      Continue OTC Delsym as needed for cough\par     Call the center with worsening/persistent cough or onset of new symptoms\par     Advised to monitor temperature for fever\par     OTC Claritin \par \par 7)  Mild pain by the wire of the right CW Mediport\par     Continue to monitor now;if  continues / worsens will do port study\par     Port Study ordered on 1/11/23\par     Tylenol as needed for pain\par \par 8) Plan\par   - Follow up on Port study \par   -POF 6-8 weeks; scheduled for POF on 1/24/23\par  -Educated about plan of care,all questions and concerns addressed\par  -Advised to take Zofran every 8 hours if needed for nausea as she had been taking only if nausea got worse\par  - Advised to call office with any acute concerns\par  - Advised to have small frequent meals/snacks\par  -Reinstated to avoid crowd\par  -Reinstated dietary restrictions: No restaurant or take out food at this time, only home cooked, prepared/frozen         food. Allowed to have fresh baked pizza right out of the oven which is the ONLY takeout food at this time.\par  - Additional Instructions: Notify if bleeding; swelling; persistent nausea and vomiting; unable to  urinate; pain not        relieved by medications; fever; numbness, tingling; excessive diarrhea, inability to tolerate liquids  or foods;             increased irritability or sluggishness, rash\par  -Follow up with Dr. Acevedo in one week\par \par I examined patient under Dr. Acevedo's supervision and Dr. Acevedo agrees to plan of care as listed above\par \par

## 2023-01-17 ENCOUNTER — APPOINTMENT (OUTPATIENT)
Dept: HEMATOLOGY ONCOLOGY | Facility: CLINIC | Age: 58
End: 2023-01-17
Payer: COMMERCIAL

## 2023-01-17 ENCOUNTER — RESULT REVIEW (OUTPATIENT)
Age: 58
End: 2023-01-17

## 2023-01-17 VITALS
BODY MASS INDEX: 29.23 KG/M2 | WEIGHT: 159.81 LBS | SYSTOLIC BLOOD PRESSURE: 106 MMHG | HEART RATE: 75 BPM | OXYGEN SATURATION: 95 % | TEMPERATURE: 96.6 F | RESPIRATION RATE: 16 BRPM | DIASTOLIC BLOOD PRESSURE: 73 MMHG

## 2023-01-17 LAB
BASOPHILS # BLD AUTO: 0.04 K/UL — SIGNIFICANT CHANGE UP (ref 0–0.2)
BASOPHILS NFR BLD AUTO: 0.8 % — SIGNIFICANT CHANGE UP (ref 0–2)
EOSINOPHIL # BLD AUTO: 0.32 K/UL — SIGNIFICANT CHANGE UP (ref 0–0.5)
EOSINOPHIL NFR BLD AUTO: 6.1 % — HIGH (ref 0–6)
HCT VFR BLD CALC: 33.8 % — LOW (ref 34.5–45)
HGB BLD-MCNC: 11 G/DL — LOW (ref 11.5–15.5)
IMM GRANULOCYTES NFR BLD AUTO: 0.4 % — SIGNIFICANT CHANGE UP (ref 0–0.9)
LYMPHOCYTES # BLD AUTO: 1.28 K/UL — SIGNIFICANT CHANGE UP (ref 1–3.3)
LYMPHOCYTES # BLD AUTO: 24.3 % — SIGNIFICANT CHANGE UP (ref 13–44)
MCHC RBC-ENTMCNC: 30.6 PG — SIGNIFICANT CHANGE UP (ref 27–34)
MCHC RBC-ENTMCNC: 32.5 G/DL — SIGNIFICANT CHANGE UP (ref 32–36)
MCV RBC AUTO: 93.9 FL — SIGNIFICANT CHANGE UP (ref 80–100)
MONOCYTES # BLD AUTO: 0.63 K/UL — SIGNIFICANT CHANGE UP (ref 0–0.9)
MONOCYTES NFR BLD AUTO: 12 % — SIGNIFICANT CHANGE UP (ref 2–14)
NEUTROPHILS # BLD AUTO: 2.97 K/UL — SIGNIFICANT CHANGE UP (ref 1.8–7.4)
NEUTROPHILS NFR BLD AUTO: 56.4 % — SIGNIFICANT CHANGE UP (ref 43–77)
NRBC # BLD: 0 /100 WBCS — SIGNIFICANT CHANGE UP (ref 0–0)
PLATELET # BLD AUTO: 254 K/UL — SIGNIFICANT CHANGE UP (ref 150–400)
RBC # BLD: 3.6 M/UL — LOW (ref 3.8–5.2)
RBC # FLD: 17.1 % — HIGH (ref 10.3–14.5)
WBC # BLD: 5.26 K/UL — SIGNIFICANT CHANGE UP (ref 3.8–10.5)
WBC # FLD AUTO: 5.26 K/UL — SIGNIFICANT CHANGE UP (ref 3.8–10.5)

## 2023-01-17 PROCEDURE — 99215 OFFICE O/P EST HI 40 MIN: CPT

## 2023-01-17 NOTE — HISTORY OF PRESENT ILLNESS
[de-identified] : 57 year old female with no significant past medical hx presents to office for initial visit for high dose chemtherapy and auto stem cell transplant....  Patient presented on 3/4/22 c/o right axilla mass  to general surgeon, Dr. Amadeo Grace. Patient incidentally discovered it while shavingin Jan 2022  \par Biopsy of right axilla performed on 3/22/22 revealing atypical lymphoid infiltrate ( NEGATIVE for a clonal lgH gene rearrangement/ Positive clonal TCR gamma gene rearrangement )\par \par Planned for lymph node excision with Dr. Grace however patient decided to stay in the [facility] system and had Excisional Biopsy per Dr Mariano \par Colonoscopy November 2021\par \par LN excional BIopsy was delayed 2/2 to her travelling and pre surg testing.\par On review of needle biopsy from 3/22/22 at [facility], suggestive of T cell lymphoma\par \par S/p right axillary lymph node excisional biopsy on 5/16/22  per DR Mariano Path c/w Peripheral T- Cell Lymphoma, NOS\par PET SCAN 4/21/22: \par - 1. FDG avid extensive right axillary lymphadenopathy 5.4 x 3.6 cm , SUV 22.5. and right supraclavicular lymph node.0.8 x 0.8 cm (image 53), SUV 5.8.\par 2. Difficult to delineate retroareolar right breast soft tissue, with low-grade FDG avidity and minimally FDG avid diffuse, right breast cutaneous thickening. \par \par Breast node biopsy performed February 2022 was negative. \par MRI Breast on 5/26/22 with benign findings repeat in 6 months\par \par Recent Hospital Discharge Summary as follows:\par Discharge Date    16-Dec-2022\par Admission Date    16-Nov-2022 \par Reason for Admission    Autologous peripheral blood stem cell transplant with high\par dose CBV prep regimen for treatment of peripheral T cell lymphoma\par Hospital Course   \par This is a 57 year old female with peripheral T cell lymphoma status post CHOEP\par x 6 admitted for an autologous peripheral blood stem cell transplant with high\par dose CBV prep regimen. Hematologic history as follows: initially presented on\par 3/4/22 with right axilla mass to general surgeon. Biopsy on 3/22/22 showed\par atypical lymphoid infiltrate. She later had an excisional biopsy on 5/16/22\par which confirmed peripheral T cell lymphoma.\par \par Upon admission, a TLC was placed in IR. Ms. Johnston received IV hydration, pain\par management, nutritional support and antibacterial / antiviral / antifungal / GI\par / PCP and VOD (SOS) prophylaxis. Labs were monitored on a daily basis and she\par received electrolyte repletion and transfusional support as needed.\par While admitted Ms. Johnston experienced pancytopenia related to the high dose\par chemotherapy prep regimen. When she became neutropenic she was started on\par prophylactic ciprofloxacin. She also experienced neutropenic fevers. When she\par became febrile, blood and urine cultures were sent, a CXR completed and the\par ciprofloxacin was changed to cefepime. The infectious work up remained\par negative.  Ms. Johnston had a diffuse, macular, erythematous rash secondary to\par kepivance. The 3rd dose of kepivance was held post transplant.\par \par On 11/25/22, after pre-medication, Ms. Johnston received 316ml of autologous,\par pooled, thawed, washed, mobilized, plasma reduced, HPC apheresis over\par approximately 1 hour. Cell counts as follows:\par Total MNCs (x10^8/kg) = 6.67\par CD34+ cells (10^6/kg) = 5.46\par Cell Viability (%) = 80%\par She tolerated the infusion well with no adverse reactions noted.\par Engraftment was noted on 12/7/22. The cefepime was discontinued 12/7/22. The\par zarxio was discontinued 12/9/22. Post engraftment, Ms. Johnston remained\par intermittently febrile, likely secondary to engraftment. She was observed off\par antibiotics. On 12/9/22, the mediport was accessed, cultures sent, and the TLC\par discontinued. She remained intermittently febrile with a negative infectious\par work up. The source of the fever was thought to be engraftment, and she was\par treated with a short course of dexamethasone with good effect.\par Currently, Ms. Johnston is stable for discharge home with outpatient follow up at\par the [facility]. The mediport was flushed with hep-lock prior to\par discharge 12/16/22.\par \par  [de-identified] : Patient presents accompanied with .\par S/p right axillary lymph node excisional biopsy on 5/16/22  per DR Mariano Path c/w Peripheral T- Cell Lymphoma, NOS\par Patient started  CHOEP on 6/20/22, C4  8/22/22\par \par She reports increased fatigue and insomnia 2/2 to steriod after first week of  treatment.\par She takes a nap during the day but is able to get up and be active. \par Reports patch release pain the next day. She takes Claritin and Tylenol with relief. \par Patient admits constipation uses MiraLAX however takes 6 days after treatment to have a bowel movement. \par Denies trouble hearing.  \par Currently on linzess for constipation, baby aspirin 81 mg, Prozac 20mg, multivitamin \par \par 10/19/22 S/P hospitalization for krzysztof fever with 6th cycle of chemotherapy...now recovered\par \par On 11/3/22, patient presents for growth factor teaching. Overall well with no acute concerns. Only complaint is lower back pain. Denies fever, chills, nausea, vomiting, diarrhea, rash, mouth sores or any signs of active bleeding. Denies chest pain or B/L LE edema. \par \par 12/20/22: Patient is seen for the first follow up after Auto PBSCT.Today is Day +25 post Auto PBSCT.She states that she continues to feel fatigued, has pain on right calf since the past 3 days.Denies fever, chills, SOB,chest pain, edema,vomiting, rash, mouth sores or any signs of active bleeding. She states that she has occasional nausea relieved with Zofran.Remains compliant with diet and crowd restrictions. Remains compliant with medications as prescribed.\par \par 1/3/23:Today is Day +39 post Auto PBSCT.Denies fever, chills, chest pain, vomiting, diarrhea,rash,mouth sores, edema or any signs of active bleeding.She has occasional nausea relieved with Zofran.She has a  dry cough since past few days and has SOB with exertion.Remains compliant with medications as prescribed. Remains compliant with diet and crowd restrictions.\par \par 1/17/23:Patient is seen for a follow up visit today.Today is Day + 53 post auto transplant...Denies fever, chills, SOB,chest pain,vomiting,diarrhea,rash,mouth sores or any signs of active bleeding.She states that she feels nauseous at times ...using zofran..She states that she has mild pain by the the wire of the right CW mediport..will follow....Remains compliant with medications.Remains compliant with diet and crowd restrictions.

## 2023-01-17 NOTE — REVIEW OF SYSTEMS
[Fatigue] : fatigue [SOB on Exertion] : shortness of breath during exertion [Negative] : Respiratory [Fever] : no fever [Chills] : no chills [Night Sweats] : no night sweats [Recent Change In Weight] : ~T no recent weight change [Shortness Of Breath] : no shortness of breath [Wheezing] : no wheezing [Cough] : no cough [Abdominal Pain] : no abdominal pain [Vomiting] : no vomiting [Constipation] : no constipation [Diarrhea] : no diarrhea [Joint Pain] : no joint pain [Joint Stiffness] : no joint stiffness [Muscle Pain] : no muscle pain [Muscle Weakness] : no muscle weakness [FreeTextEntry7] : Occasional nausea

## 2023-01-17 NOTE — ASSESSMENT
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 Amadeo Grace. Patient incidentally discovered it while shaving in Jan / Feb 2022 \par Biopsy of right axilla performed on 3/22/22 revealing atypical lymphoid infiltrate ( NEGATIVE for a clonal lgH gene rearrangement/ Positive clonal TCR gamma gene rearrangement )\par \par On review of needle biopsy from 3/22/22 at [facility], suggestive of T cell lymphoma\par \par S/p right axillary lymph node excisional biopsy on 5/16/22  per DR Montserrat Knight c/w Peripheral T- Cell Lymphoma, NOS\par \par PET SCAN 4/21/22: \par - 1. FDG avid extensive right axillary lymphadenopathy 5.4 x 3.6 cm , SUV 22.5. and right supraclavicular lymph node.0.8 x 0.8 cm (image 53), SUV 5.8.\par 2. Difficult to delineate retroareolar right breast soft tissue, with low-grade FDG avidity and minimally FDG avid diffuse, right breast cutaneous thickening. \par \par Breast node biopsy performed February 2022 was negative. \par MRI BREAST 5/26/22: BiRADS 3 , f/u in 6 months\par \par PET SCAN 6/13/22: \par 1. Interval resolution of FDG avidity associated with right supraclavicular lymph node which has decreased in size.\par 2. Interval decrease in size, number and avidity of right axillary lymphadenopathy and resolution of  FDG avid right retropectoral lymph nodes.\par 3. Unchanged minimally FDG avid difficult to delineate retroareolar right breast soft tissue and minimally FDG avid diffuse right breast cutaneous thickening. Skin thickening and parenchymal edema in the right breast is consistent with metastatic obstruction on interval MRI from 5/26/2022.\par \par PET/CT 8/8/22 \par 1. Interval further decrease of right axillary lymph nodes.\par 2. Unchanged nonspecific minimally FDG avid retroareolar right breast soft tissue. Interval decreased avidity of unchanged cutaneous thickening in the right breast.\par 3. Subcentimeter nodular opacity in the right upper lobe medially, unchanged since PET/CT on 4/21/2022. \par 4. New generalized diffuse bone marrow hypermetabolism, nonspecific and may be related to treatment. \par \par \par PERIPHERAL T CELL LYMPHOMA NOS Stage 1\par IPI SCORE: 0 LOW RISK \par - Some B symptoms, Night sweats since Jan 2022, fatigue, 6 lb weight loss\par - Discussed with patient Chemotherapy with CHOEP WITH NEULASTA Support \par -D1 :  Cyclophosphamide 750 mg /m2, Adriamycin 50 mg/ m2 Vincristone 1.4mg/ m2, Etoposide 10mg/ m2 IV \par D2- D3 Etoposide 100mg/m2  IV Hydration \par D1-D5 Prednisone 10mg po \par Allopurinol 100mg daily for TLS prophylaxis\par \par \par - Port placement on 6/8/22\par - ECHO on 6/14/22, LV EF 59%\par - Patient started  CHOEP on 6/20/22, C4  8/22/22 now completed 6\par -Constipation; continue MiraLAX. Advised senna and colace. \par -Rash; Continue salicylic acid cream for rash, can try clindamycin ointment if not improved. \par - Restaging PET Scan and bm bx  after 6 cycles...confirms response.....to have f/u MRI of breast\par - Echo in Sept 2022 along with pre transplant testing and dental eval\par MRI BREAST: to be repeated in Nov 2022 \par I had another  extensive discussion regarding the risks, benefits, alternatives, logistics and rationale for high dose chemotherapy and auto stem cell transplant for t cell NHL..4 week hospital stay and 8 week recovery discussed...augmented cbv prep...stem cell mobilization with growth factor and collection discussed...need confirmed negative bm bx...if not consider ICE mobilization...goal 2 to 5 million cd 34 cells per kg...Collection early to mid nov...admit for transplant end nov.Literature and consents provided for review..quests addressed..f/u 3 weeks with transplant team...orientation and sw eval...\par \par 11/3/22\par Patient presents for growth factor teaching.\par CBC stable. No indication for transfusion today.\par Tyrell will receive Zarxio 780 mcg SQ Daily 11/3/22- 11/7/22. Stem cell collection will start on 11/7/22. \par Teach back method used during today's visit. NP administered Zarxio 480 mcg to RLQ abdomen. Lot #[id number] expiration December 2024. Tolerated injection with no adverse reaction.\par Second Zarxio 300 mcg Lot # [id number] Expiration September 2024 administered by spouse to RLQ abdomen. Tolerated injection with no adverse reaction.\par Recommended Claritin daily 11/3/22- 11/7/22. May take Tylenol prn for pain.\par Avoid Advil, Aleve and aspirin.\par Encouraged to increase calcium in diet over the next few days. \par Questions and concerns addressed. Reassurance provided.\par COVID 19 PCR scheduled at [facility] tomorrow.\par 11/7/22 Return to [facility] for Shiley catheter placement and stem cell collection. \par \par 1/17/23\par T Cell Lymphoma (C85.90)\par S/P  Autologous Stem Cell Transplant (Z94.81) \par On 11/25/22, after pre-medication, Ms. Johnston received 316ml of autologous,\par pooled, thawed, washed, mobilized, plasma reduced, HPC apheresis\par \par 1) T Cell Lymphoma\par  S/P Autologous PBSCT on 11/25/22\par  Labs sent today\par  Send labs with every visit\par  Repeat imaging 4  months post PBSCT..march 2023\par overall doing well\par \par 2) Heme\par    Counts stable, no transfusion requirements today\par   \par    Continue  Multiple Vitamin and stop Folic acid \par  \par 3) ID\par    Continue prophylaxis\par    Acyclovir 400 mg 1 tab orally every 8 hrs \par    Atovaquone 750 mg/5 mL oral suspension 5 milliliter 2 times a day\par    Diflucan 200 mg 2 tabs orally once a day ...stop\par    Post  transplant restrictions reviewed and reinforced in light of COVID-19 Pandemic\par   discussed initial series of covid vaccine at 90 days post auto\par \par 4) GI\par     Protonix 40 mg oral delayed release 1 tab once a day \par     Zofran every 8 hours as needed for nausea \par \par 5)Right calf pain-RESOLVED\par     Continue Magnesium Oxide 400 mg 2 x day\par \par 6) Cough..resolved\par     COVID/RVP- Not detected on 1/3/23\par      Continue OTC Delsym as needed for cough\par     Call the center with worsening/persistent cough or onset of new symptoms\par     Advised to monitor temperature for fever\par     OTC Claritin \par \par 7)  Mild pain by the wire of the right CW Mediport..\par     Continue to monitor now;if  continues / worsens will do port study\par     Port Study ordered on 1/11/23...pt did not have...sx improved now\par     Tylenol as needed for pain\par \par 8) Plan\par  \par   -POF 6-8 weeks; scheduled for POF on 1/24/23\par  -Educated about plan of care,all questions and concerns addressed\par  -Advised to take Zofran every 8 hours if needed for nausea as she had been taking only if nausea got worse\par  - Advised to call office with any acute concerns\par  - Advised to have small frequent meals/snacks\par  -Reinstated to avoid crowd\par  -Reinstated dietary restrictions: No restaurant or take out food at this time, only home cooked, prepared/frozen         food. Allowed to have fresh baked pizza right out of the oven which is the ONLY takeout food at this time.\par  - Additional Instructions: Notify if bleeding; swelling; persistent nausea and vomiting; unable to  urinate; pain not        relieved by medications; fever; numbness, tingling; excessive diarrhea, inability to tolerate liquids  or foods;             increased irritability or sluggishness, rash\par  -Follow up with SHAE Bustos  in one week with port flush..will order port study if not functioning properly or pain continues\par \par

## 2023-01-17 NOTE — PHYSICAL EXAM
[Restricted in physically strenuous activity but ambulatory and able to carry out work of a light or sedentary nature] : Status 1- Restricted in physically strenuous activity but ambulatory and able to carry out work of a light or sedentary nature, e.g., light house work, office work [Normal] : affect appropriate [de-identified] : port in place-flushed on 12/16/22;no tenderness or redness on the port site but had mild pain by the wire of the port..now improved [de-identified] : No edema

## 2023-01-18 LAB
ALBUMIN SERPL ELPH-MCNC: 4.4 G/DL
ALP BLD-CCNC: 97 U/L
ALT SERPL-CCNC: 43 U/L
ANION GAP SERPL CALC-SCNC: 11 MMOL/L
AST SERPL-CCNC: 46 U/L
BILIRUB SERPL-MCNC: 0.4 MG/DL
BUN SERPL-MCNC: 12 MG/DL
CALCIUM SERPL-MCNC: 10.2 MG/DL
CHLORIDE SERPL-SCNC: 101 MMOL/L
CO2 SERPL-SCNC: 24 MMOL/L
CREAT SERPL-MCNC: 0.8 MG/DL
EGFR: 87 ML/MIN/1.73M2
GLUCOSE SERPL-MCNC: 89 MG/DL
LDH SERPL-CCNC: 258 U/L
MAGNESIUM SERPL-MCNC: 2.1 MG/DL
POTASSIUM SERPL-SCNC: 4.8 MMOL/L
PROT SERPL-MCNC: 7.1 G/DL
SODIUM SERPL-SCNC: 137 MMOL/L

## 2023-01-24 ENCOUNTER — RESULT REVIEW (OUTPATIENT)
Age: 58
End: 2023-01-24

## 2023-01-24 ENCOUNTER — APPOINTMENT (OUTPATIENT)
Dept: HEMATOLOGY ONCOLOGY | Facility: CLINIC | Age: 58
End: 2023-01-24
Payer: COMMERCIAL

## 2023-01-24 ENCOUNTER — APPOINTMENT (OUTPATIENT)
Dept: INFUSION THERAPY | Facility: HOSPITAL | Age: 58
End: 2023-01-24

## 2023-01-24 VITALS
WEIGHT: 157.63 LBS | OXYGEN SATURATION: 97 % | BODY MASS INDEX: 28.83 KG/M2 | DIASTOLIC BLOOD PRESSURE: 69 MMHG | RESPIRATION RATE: 17 BRPM | SYSTOLIC BLOOD PRESSURE: 101 MMHG | HEART RATE: 83 BPM | TEMPERATURE: 97.2 F

## 2023-01-24 LAB
ALBUMIN SERPL ELPH-MCNC: 4.6 G/DL — SIGNIFICANT CHANGE UP (ref 3.3–5)
ALP SERPL-CCNC: 89 U/L — SIGNIFICANT CHANGE UP (ref 40–120)
ALT FLD-CCNC: 49 U/L — HIGH (ref 10–45)
ANION GAP SERPL CALC-SCNC: 13 MMOL/L — SIGNIFICANT CHANGE UP (ref 5–17)
AST SERPL-CCNC: 46 U/L — HIGH (ref 10–40)
BASOPHILS # BLD AUTO: 0.04 K/UL — SIGNIFICANT CHANGE UP (ref 0–0.2)
BASOPHILS NFR BLD AUTO: 0.6 % — SIGNIFICANT CHANGE UP (ref 0–2)
BILIRUB SERPL-MCNC: 0.4 MG/DL — SIGNIFICANT CHANGE UP (ref 0.2–1.2)
BUN SERPL-MCNC: 15 MG/DL — SIGNIFICANT CHANGE UP (ref 7–23)
CALCIUM SERPL-MCNC: 10.2 MG/DL — SIGNIFICANT CHANGE UP (ref 8.4–10.5)
CHLORIDE SERPL-SCNC: 103 MMOL/L — SIGNIFICANT CHANGE UP (ref 96–108)
CO2 SERPL-SCNC: 23 MMOL/L — SIGNIFICANT CHANGE UP (ref 22–31)
CREAT SERPL-MCNC: 0.68 MG/DL — SIGNIFICANT CHANGE UP (ref 0.5–1.3)
EGFR: 103 ML/MIN/1.73M2 — SIGNIFICANT CHANGE UP
EOSINOPHIL # BLD AUTO: 0.34 K/UL — SIGNIFICANT CHANGE UP (ref 0–0.5)
EOSINOPHIL NFR BLD AUTO: 4.9 % — SIGNIFICANT CHANGE UP (ref 0–6)
GLUCOSE SERPL-MCNC: 106 MG/DL — HIGH (ref 70–99)
HCT VFR BLD CALC: 32.8 % — LOW (ref 34.5–45)
HGB BLD-MCNC: 10.9 G/DL — LOW (ref 11.5–15.5)
IMM GRANULOCYTES NFR BLD AUTO: 0.1 % — SIGNIFICANT CHANGE UP (ref 0–0.9)
LDH SERPL L TO P-CCNC: 228 U/L — SIGNIFICANT CHANGE UP (ref 50–242)
LYMPHOCYTES # BLD AUTO: 0.99 K/UL — LOW (ref 1–3.3)
LYMPHOCYTES # BLD AUTO: 14.2 % — SIGNIFICANT CHANGE UP (ref 13–44)
MAGNESIUM SERPL-MCNC: 1.8 MG/DL — SIGNIFICANT CHANGE UP (ref 1.6–2.6)
MCHC RBC-ENTMCNC: 30.4 PG — SIGNIFICANT CHANGE UP (ref 27–34)
MCHC RBC-ENTMCNC: 33.2 G/DL — SIGNIFICANT CHANGE UP (ref 32–36)
MCV RBC AUTO: 91.6 FL — SIGNIFICANT CHANGE UP (ref 80–100)
MONOCYTES # BLD AUTO: 0.56 K/UL — SIGNIFICANT CHANGE UP (ref 0–0.9)
MONOCYTES NFR BLD AUTO: 8 % — SIGNIFICANT CHANGE UP (ref 2–14)
NEUTROPHILS # BLD AUTO: 5.02 K/UL — SIGNIFICANT CHANGE UP (ref 1.8–7.4)
NEUTROPHILS NFR BLD AUTO: 72.2 % — SIGNIFICANT CHANGE UP (ref 43–77)
NRBC # BLD: 0 /100 WBCS — SIGNIFICANT CHANGE UP (ref 0–0)
PLATELET # BLD AUTO: 258 K/UL — SIGNIFICANT CHANGE UP (ref 150–400)
POTASSIUM SERPL-MCNC: 4.2 MMOL/L — SIGNIFICANT CHANGE UP (ref 3.5–5.3)
POTASSIUM SERPL-SCNC: 4.2 MMOL/L — SIGNIFICANT CHANGE UP (ref 3.5–5.3)
PROT SERPL-MCNC: 7.1 G/DL — SIGNIFICANT CHANGE UP (ref 6–8.3)
RBC # BLD: 3.58 M/UL — LOW (ref 3.8–5.2)
RBC # FLD: 15.9 % — HIGH (ref 10.3–14.5)
SARS-COV-2 RNA SPEC QL NAA+PROBE: SIGNIFICANT CHANGE UP
SODIUM SERPL-SCNC: 139 MMOL/L — SIGNIFICANT CHANGE UP (ref 135–145)
WBC # BLD: 6.96 K/UL — SIGNIFICANT CHANGE UP (ref 3.8–10.5)
WBC # FLD AUTO: 6.96 K/UL — SIGNIFICANT CHANGE UP (ref 3.8–10.5)

## 2023-01-24 PROCEDURE — 99214 OFFICE O/P EST MOD 30 MIN: CPT

## 2023-01-26 NOTE — PHYSICAL EXAM
[Restricted in physically strenuous activity but ambulatory and able to carry out work of a light or sedentary nature] : Status 1- Restricted in physically strenuous activity but ambulatory and able to carry out work of a light or sedentary nature, e.g., light house work, office work [Normal] : affect appropriate [de-identified] : port in place-flushed on 12/16/22;no tenderness or redness on the port site but has occasional mild pain by the wire of the port [de-identified] : No edema

## 2023-01-26 NOTE — HISTORY OF PRESENT ILLNESS
[de-identified] : 57 year old female with no significant past medical hx presents to office for initial visit for high dose chemtherapy and auto stem cell transplant....  Patient presented on 3/4/22 c/o right axilla mass  to general surgeon, Dr. Amadeo Grace. Patient incidentally discovered it while shavingin Jan 2022  \par Biopsy of right axilla performed on 3/22/22 revealing atypical lymphoid infiltrate ( NEGATIVE for a clonal lgH gene rearrangement/ Positive clonal TCR gamma gene rearrangement )\par \par Planned for lymph node excision with Dr. Grace however patient decided to stay in the [facility] system and had Excisional Biopsy per Dr Mariano \par Colonoscopy November 2021\par \par LN excional BIopsy was delayed 2/2 to her travelling and pre surg testing.\par On review of needle biopsy from 3/22/22 at [facility], suggestive of T cell lymphoma\par \par S/p right axillary lymph node excisional biopsy on 5/16/22  per DR Mariano Path c/w Peripheral T- Cell Lymphoma, NOS\par PET SCAN 4/21/22: \par - 1. FDG avid extensive right axillary lymphadenopathy 5.4 x 3.6 cm , SUV 22.5. and right supraclavicular lymph node.0.8 x 0.8 cm (image 53), SUV 5.8.\par 2. Difficult to delineate retroareolar right breast soft tissue, with low-grade FDG avidity and minimally FDG avid diffuse, right breast cutaneous thickening. \par \par Breast node biopsy performed February 2022 was negative. \par MRI Breast on 5/26/22 with benign findings repeat in 6 months\par \par Recent Hospital Discharge Summary as follows:\par Discharge Date    16-Dec-2022\par Admission Date    16-Nov-2022 \par Reason for Admission    Autologous peripheral blood stem cell transplant with high\par dose CBV prep regimen for treatment of peripheral T cell lymphoma\par Hospital Course   \par This is a 57 year old female with peripheral T cell lymphoma status post CHOEP\par x 6 admitted for an autologous peripheral blood stem cell transplant with high\par dose CBV prep regimen. Hematologic history as follows: initially presented on\par 3/4/22 with right axilla mass to general surgeon. Biopsy on 3/22/22 showed\par atypical lymphoid infiltrate. She later had an excisional biopsy on 5/16/22\par which confirmed peripheral T cell lymphoma.\par \par Upon admission, a TLC was placed in IR. Ms. Johnston received IV hydration, pain\par management, nutritional support and antibacterial / antiviral / antifungal / GI\par / PCP and VOD (SOS) prophylaxis. Labs were monitored on a daily basis and she\par received electrolyte repletion and transfusional support as needed.\par While admitted Ms. Johnston experienced pancytopenia related to the high dose\par chemotherapy prep regimen. When she became neutropenic she was started on\par prophylactic ciprofloxacin. She also experienced neutropenic fevers. When she\par became febrile, blood and urine cultures were sent, a CXR completed and the\par ciprofloxacin was changed to cefepime. The infectious work up remained\par negative.  Ms. Johnston had a diffuse, macular, erythematous rash secondary to\par kepivance. The 3rd dose of kepivance was held post transplant.\par \par On 11/25/22, after pre-medication, Ms. Johnston received 316ml of autologous,\par pooled, thawed, washed, mobilized, plasma reduced, HPC apheresis over\par approximately 1 hour. Cell counts as follows:\par Total MNCs (x10^8/kg) = 6.67\par CD34+ cells (10^6/kg) = 5.46\par Cell Viability (%) = 80%\par She tolerated the infusion well with no adverse reactions noted.\par Engraftment was noted on 12/7/22. The cefepime was discontinued 12/7/22. The\par zarxio was discontinued 12/9/22. Post engraftment, Ms. Johnston remained\par intermittently febrile, likely secondary to engraftment. She was observed off\par antibiotics. On 12/9/22, the mediport was accessed, cultures sent, and the TLC\par discontinued. She remained intermittently febrile with a negative infectious\par work up. The source of the fever was thought to be engraftment, and she was\par treated with a short course of dexamethasone with good effect.\par Currently, Ms. Johnston is stable for discharge home with outpatient follow up at\par the [facility]. The mediport was flushed with hep-lock prior to\par discharge 12/16/22.\par \par  [de-identified] : Patient presents accompanied with .\par S/p right axillary lymph node excisional biopsy on 5/16/22  per DR Mariano Path c/w Peripheral T- Cell Lymphoma, NOS\par Patient started  CHOEP on 6/20/22, C4  8/22/22\par \par She reports increased fatigue and insomnia 2/2 to steriod after first week of  treatment.\par She takes a nap during the day but is able to get up and be active. \par Reports patch release pain the next day. She takes Claritin and Tylenol with relief. \par Patient admits constipation uses MiraLAX however takes 6 days after treatment to have a bowel movement. \par Denies trouble hearing.  \par Currently on linzess for constipation, baby aspirin 81 mg, Prozac 20mg, multivitamin \par \par 10/19/22 S/P hospitalization for krzysztof fever with 6th cycle of chemotherapy...now recovered\par \par On 11/3/22, patient presents for growth factor teaching. Overall well with no acute concerns. Only complaint is lower back pain. Denies fever, chills, nausea, vomiting, diarrhea, rash, mouth sores or any signs of active bleeding. Denies chest pain or B/L LE edema. \par \par 12/20/22: Patient is seen for the first follow up after Auto PBSCT.Today is Day +25 post Auto PBSCT.She states that she continues to feel fatigued, has pain on right calf since the past 3 days.Denies fever, chills, SOB,chest pain, edema,vomiting, rash, mouth sores or any signs of active bleeding. She states that she has occasional nausea relieved with Zofran.Remains compliant with diet and crowd restrictions. Remains compliant with medications as prescribed.\par \par 1/3/23:Today is Day +39 post Auto PBSCT.Denies fever, chills, chest pain, vomiting, diarrhea,rash,mouth sores, edema or any signs of active bleeding.She has occasional nausea relieved with Zofran.She has a  dry cough since past few days and has SOB with exertion.Remains compliant with medications as prescribed. Remains compliant with diet and crowd restrictions.\par \par 1/17/23:Patient is seen for a follow up visit today.Today is Day + 53 post auto transplant...Denies fever, chills, SOB,chest pain,vomiting,diarrhea,rash,mouth sores or any signs of active bleeding.She states that she feels nauseous at times ...using zofran..She states that she has mild pain by the the wire of the right CW mediport..will follow....Remains compliant with medications.Remains compliant with diet and crowd restrictions. \par \par 1/24/23:Today is Day +60 post Auto PBSCT.Denies fever,chills,SOB,chest pain,nausea,vomiting, diarrhea,rash,mouth sores,edema or any signs of active bleeding.She has occasional nausea relieved with Zofran.Remains compliant with medications.Remains compliant with diet and crowd restrictions.She still has mild pain by the wire of the CW mediport.

## 2023-01-26 NOTE — ASSESSMENT
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 Amadeo Grace. Patient incidentally discovered it while shaving in Jan / Feb 2022 \par Biopsy of right axilla performed on 3/22/22 revealing atypical lymphoid infiltrate ( NEGATIVE for a clonal lgH gene rearrangement/ Positive clonal TCR gamma gene rearrangement )\par \par On review of needle biopsy from 3/22/22 at [facility], suggestive of T cell lymphoma\par \par S/p right axillary lymph node excisional biopsy on 5/16/22  per DR Montserrat Knight c/w Peripheral T- Cell Lymphoma, NOS\par \par PET SCAN 4/21/22: \par - 1. FDG avid extensive right axillary lymphadenopathy 5.4 x 3.6 cm , SUV 22.5. and right supraclavicular lymph node.0.8 x 0.8 cm (image 53), SUV 5.8.\par 2. Difficult to delineate retroareolar right breast soft tissue, with low-grade FDG avidity and minimally FDG avid diffuse, right breast cutaneous thickening. \par \par Breast node biopsy performed February 2022 was negative. \par MRI BREAST 5/26/22: BiRADS 3 , f/u in 6 months\par \par PET SCAN 6/13/22: \par 1. Interval resolution of FDG avidity associated with right supraclavicular lymph node which has decreased in size.\par 2. Interval decrease in size, number and avidity of right axillary lymphadenopathy and resolution of  FDG avid right retropectoral lymph nodes.\par 3. Unchanged minimally FDG avid difficult to delineate retroareolar right breast soft tissue and minimally FDG avid diffuse right breast cutaneous thickening. Skin thickening and parenchymal edema in the right breast is consistent with metastatic obstruction on interval MRI from 5/26/2022.\par \par PET/CT 8/8/22 \par 1. Interval further decrease of right axillary lymph nodes.\par 2. Unchanged nonspecific minimally FDG avid retroareolar right breast soft tissue. Interval decreased avidity of unchanged cutaneous thickening in the right breast.\par 3. Subcentimeter nodular opacity in the right upper lobe medially, unchanged since PET/CT on 4/21/2022. \par 4. New generalized diffuse bone marrow hypermetabolism, nonspecific and may be related to treatment. \par \par \par PERIPHERAL T CELL LYMPHOMA NOS Stage 1\par IPI SCORE: 0 LOW RISK \par - Some B symptoms, Night sweats since Jan 2022, fatigue, 6 lb weight loss\par - Discussed with patient Chemotherapy with CHOEP WITH NEULASTA Support \par -D1 :  Cyclophosphamide 750 mg /m2, Adriamycin 50 mg/ m2 Vincristone 1.4mg/ m2, Etoposide 10mg/ m2 IV \par D2- D3 Etoposide 100mg/m2  IV Hydration \par D1-D5 Prednisone 10mg po \par Allopurinol 100mg daily for TLS prophylaxis\par \par \par - Port placement on 6/8/22\par - ECHO on 6/14/22, LV EF 59%\par - Patient started  CHOEP on 6/20/22, C4  8/22/22 now completed 6\par -Constipation; continue MiraLAX. Advised senna and colace. \par -Rash; Continue salicylic acid cream for rash, can try clindamycin ointment if not improved. \par - Restaging PET Scan and bm bx  after 6 cycles...confirms response.....to have f/u MRI of breast\par - Echo in Sept 2022 along with pre transplant testing and dental eval\par MRI BREAST: to be repeated in Nov 2022 \par I had another  extensive discussion regarding the risks, benefits, alternatives, logistics and rationale for high dose chemotherapy and auto stem cell transplant for t cell NHL..4 week hospital stay and 8 week recovery discussed...augmented cbv prep...stem cell mobilization with growth factor and collection discussed...need confirmed negative bm bx...if not consider ICE mobilization...goal 2 to 5 million cd 34 cells per kg...Collection early to mid nov...admit for transplant end nov.Literature and consents provided for review..quests addressed..f/u 3 weeks with transplant team...orientation and sw eval...\par \par 11/3/22\par Patient presents for growth factor teaching.\par CBC stable. No indication for transfusion today.\par Tyrell will receive Zarxio 780 mcg SQ Daily 11/3/22- 11/7/22. Stem cell collection will start on 11/7/22. \par Teach back method used during today's visit. NP administered Zarxio 480 mcg to RLQ abdomen. Lot #[id number] expiration December 2024. Tolerated injection with no adverse reaction.\par Second Zarxio 300 mcg Lot # [id number] Expiration September 2024 administered by spouse to RLQ abdomen. Tolerated injection with no adverse reaction.\par Recommended Claritin daily 11/3/22- 11/7/22. May take Tylenol prn for pain.\par Avoid Advil, Aleve and aspirin.\par Encouraged to increase calcium in diet over the next few days. \par Questions and concerns addressed. Reassurance provided.\par COVID 19 PCR scheduled at [facility] tomorrow.\par 11/7/22 Return to [facility] for Shiley catheter placement and stem cell collection. \par \par 1/24/23\par T Cell Lymphoma (C85.90)\par S/P  Autologous Stem Cell Transplant (Z94.81) \par On 11/25/22, after pre-medication, Ms. Johnston received 316ml of autologous,\par pooled, thawed, washed, mobilized, plasma reduced, HPC apheresis\par \par 1) T Cell Lymphoma\par  S/P Autologous PBSCT on 11/25/22\par  Labs sent today\par  Send labs with every visit\par  Repeat imaging 4  months post PBSCT..March 2023\par  Overall doing well\par \par 2) Heme\par    Counts stable, no transfusion requirements today\par    Continue  Multiple Vitamin and stop Folic acid \par  \par 3) ID\par    Continue prophylaxis\par    Acyclovir 400 mg 1 tab orally every 8 hrs \par    Atovaquone 750 mg/5 mL oral suspension 5 milliliter 2 times a day\par    Diflucan 200 mg 2 tabs orally once a day ..STOPPED\par    Post  transplant restrictions reviewed and reinforced in light of COVID-19 Pandemic\par    discussed initial series of COVID vaccine at 90 days post Auto\par \par 4) GI\par     Protonix 40 mg oral delayed release 1 tab once a day \par     Zofran every 8 hours as needed for nausea \par \par 5)Right calf pain-RESOLVED\par     Continue Magnesium Oxide 400 mg 2 x day\par \par 6) Cough..RESOLVED\par     COVID/RVP- Not detected on 1/3/23\par     Call the center with worsening/persistent cough or onset of new symptoms\par     Advised to monitor temperature for fever\par     OTC Claritin \par \par 7)  Mild pain by the wire of the right CW Mediport\par     Port Study ordered on 1/11/23...pt did not have it done...scheduled on 1/27/23\par     Tylenol as needed for pain\par \par 8) Plan\par   -Follow up on port study\par   -POF 6-8 weeks; POF done today (1/24/23)\par  -Educated about plan of care,all questions and concerns addressed\par  -Advised to take Zofran every 8 hours if needed for nausea as she had been taking only if nausea got worse\par  - Advised to call office with any acute concerns\par  - Advised to have small frequent meals/snacks\par  -Reinstated to avoid crowd\par  -Reinstated dietary restrictions: No restaurant or take out food at this time, only home cooked, prepared/frozen         food. Allowed to have fresh baked pizza right out of the oven which is the ONLY takeout food at this time.\par  - Additional Instructions: Notify if bleeding; swelling; persistent nausea and vomiting; unable to  urinate; pain not        relieved by medications; fever; numbness, tingling; excessive diarrhea, inability to tolerate liquids  or foods;             increased irritability or sluggishness, rash\par  -Follow up with Dr. Acevedo  in 2 weeks\par \par I examined patient under Dr. Acevedo's supervision and Dr. Acevedo agrees to plan of care as listed above\par \par

## 2023-01-26 NOTE — REVIEW OF SYSTEMS
[Fatigue] : fatigue [Negative] : Allergic/Immunologic [Fever] : no fever [Chills] : no chills [Night Sweats] : no night sweats [Recent Change In Weight] : ~T no recent weight change [Shortness Of Breath] : no shortness of breath [Wheezing] : no wheezing [Cough] : no cough [SOB on Exertion] : no shortness of breath during exertion [Abdominal Pain] : no abdominal pain [Vomiting] : no vomiting [Constipation] : no constipation [Diarrhea] : no diarrhea [Joint Pain] : no joint pain [Joint Stiffness] : no joint stiffness [Muscle Pain] : no muscle pain [Muscle Weakness] : no muscle weakness [FreeTextEntry7] : Occasional nausea

## 2023-01-27 ENCOUNTER — NON-APPOINTMENT (OUTPATIENT)
Age: 58
End: 2023-01-27

## 2023-01-27 ENCOUNTER — APPOINTMENT (OUTPATIENT)
Dept: AFTER HOURS CARE | Facility: EMERGENCY ROOM | Age: 58
End: 2023-01-27
Payer: COMMERCIAL

## 2023-01-27 DIAGNOSIS — R68.89 OTHER GENERAL SYMPTOMS AND SIGNS: ICD-10-CM

## 2023-01-27 DIAGNOSIS — J11.1 INFLUENZA DUE TO UNIDENTIFIED INFLUENZA VIRUS WITH OTHER RESPIRATORY MANIFESTATIONS: ICD-10-CM

## 2023-01-27 PROCEDURE — 99204 OFFICE O/P NEW MOD 45 MIN: CPT | Mod: 95

## 2023-01-27 NOTE — HISTORY OF PRESENT ILLNESS
[Home] : at home, [unfilled] , at the time of the visit. [Other Location: e.g. Home (Enter Location, City,State)___] : at [unfilled] [Verbal consent obtained from patient] : the patient, [unfilled] [FreeTextEntry8] : Patient with history of recent bone marrow transplant for lymphoma, currently not on any immune modulators, calls in with concern for fever.  Patient states 3 days ago her  tested positive for influenza and has been symptomatic.  Patient developed a cough yesterday, and today spiked a fever, which prompted a call to her oncologist who suggested if she felt worse call in and get evaluated.  Patient did a home COVID test, which was negative. Patient endorses fatigue, but otherwise denies any shortness of breath, chest pain, vomiting.  She endorses nausea but she has experienced this since her transplant and it has not changed since then.

## 2023-01-27 NOTE — PLAN
[With new medications prescribed] : Treat in place: with new medications prescribed [FreeTextEntry1] : You were evaluated via Telemedicine for your symptoms. You most likely have influenza or other similar virus causing an upper respiratory infection. \par \par While you may continue to feel sick for a few days, at this time fortunately there is no need to be hospitalized and you are safe to continue your treatment at home.\par \par We recommend that you:\par 1. Continue your home medications as prescribed. Start taking oseltamivir (Tamiflu) twice a day for 5 days as prescribed to help treat influenza.\par 2. Take Tylenol 2 extra strength tablets (or 3 regular strength tablets), or ibuprofen 600mg, up to every 6 hours as needed for pain or any fever.\par 3. Drink plenty of fluids.\par 4. Call your primary care doctor or oncologist in 2 days for follow-up.\par 5. Avoid contact with people, wash your hands thoroughly and often, and cover your mouth when coughing or sneezing to prevent the spread of the illness to others.\par \par *** If you have increased difficulty breathing, chest pain, confusion, persistent vomiting, severe pain or develop other new/concerning symptoms please call 911 or go to your nearest Emergency Department ***\par

## 2023-01-27 NOTE — PHYSICAL EXAM
[No Acute Distress] : no acute distress [Well Nourished] : well nourished [Well Developed] : well developed [Well-Appearing] : well-appearing [Supple] : supple [No Respiratory Distress] : no respiratory distress  [Normal Affect] : the affect was normal [Alert and Oriented x3] : oriented to person, place, and time

## 2023-01-27 NOTE — ASSESSMENT
[FreeTextEntry1] : Pt presents with constellation of symptoms most consistent with an URI, possibly influenza given her known contact. While exam is limited due to telemedicine aspect of evaluation, patient has benign appearance without  signs of respiratory compromise or instability. Despite her recent BMT, she is not on immune suppresant medications and there is low concern for pneumonia, sepsis, severe infection. No indication for further emergent evaluation at this point. Pt given supportive care instructions and isolation instructions and was told which symptoms should prompt a repeat visit and which symptoms should prompt a call to an ambulance or trip to ED. Pt encouraged to follow up with her oncologist in 2 days for a checkup.\par

## 2023-01-30 ENCOUNTER — NON-APPOINTMENT (OUTPATIENT)
Age: 58
End: 2023-01-30

## 2023-01-31 ENCOUNTER — APPOINTMENT (OUTPATIENT)
Dept: HEMATOLOGY ONCOLOGY | Facility: CLINIC | Age: 58
End: 2023-01-31

## 2023-01-31 ENCOUNTER — NON-APPOINTMENT (OUTPATIENT)
Age: 58
End: 2023-01-31

## 2023-02-02 ENCOUNTER — OUTPATIENT (OUTPATIENT)
Dept: OUTPATIENT SERVICES | Facility: HOSPITAL | Age: 58
LOS: 1 days | End: 2023-02-02
Payer: COMMERCIAL

## 2023-02-02 ENCOUNTER — TRANSCRIPTION ENCOUNTER (OUTPATIENT)
Age: 58
End: 2023-02-02

## 2023-02-02 ENCOUNTER — RESULT REVIEW (OUTPATIENT)
Age: 58
End: 2023-02-02

## 2023-02-02 VITALS
SYSTOLIC BLOOD PRESSURE: 114 MMHG | WEIGHT: 156.97 LBS | RESPIRATION RATE: 18 BRPM | HEART RATE: 79 BPM | OXYGEN SATURATION: 100 % | DIASTOLIC BLOOD PRESSURE: 82 MMHG | TEMPERATURE: 98 F | HEIGHT: 62 IN

## 2023-02-02 DIAGNOSIS — Z94.81 BONE MARROW TRANSPLANT STATUS: ICD-10-CM

## 2023-02-02 DIAGNOSIS — Z95.828 PRESENCE OF OTHER VASCULAR IMPLANTS AND GRAFTS: ICD-10-CM

## 2023-02-02 DIAGNOSIS — C85.90 NON-HODGKIN LYMPHOMA, UNSPECIFIED, UNSPECIFIED SITE: ICD-10-CM

## 2023-02-02 PROCEDURE — 36598 INJ W/FLUOR EVAL CV DEVICE: CPT | Mod: 52

## 2023-02-02 PROCEDURE — 36598 INJ W/FLUOR EVAL CV DEVICE: CPT

## 2023-02-02 NOTE — ASU DISCHARGE PLAN (ADULT/PEDIATRIC) - ASU DC SPECIAL INSTRUCTIONSFT
Chest Port Check    Discharge Instructions  - You have had a chest port evaluated.   - You may shower in 24 hours.  - Keep the area covered and dry for the next 24 hours.  - You may resume your normal diet.  - You may resume your normal medications.  - It is normal to experience some pain over the site for the next 24 hours. You may take apply ice to the area (20 minutes on, 20 minutes off) and take Tylenol for that pain. Do not take more frequently than every 6 hours and do not exceed more than 3000mg of Tylenol in a 24 hour period.    Notify your primary physician and/or Interventional Radiology IMMEDIATELY if you experience any of the following       - Fever of 101F or 38C       - Chills or Rigors/ Shakes       - Swelling and/or Redness in the area around the port       - Worsening Pain       - Blood soaked bandages or worsening bleeding       - Lightheadedness and/or dizziness upon standing       - Chest Pain/ Tightness       - Shortness of Breath       - Difficulty walking    If you have a problem that you believe requires IMMEDIATE attention, please go to your NEAREST Emergency Room. If you believe your problem can safely wait until you speak to a physician, please call Interventional Radiology for any concerns.    Please feel free to contact us at [phone] if any problems arise. After 6PM, Monday through Friday, on weekends and on holidays, please call [phone] and ask for the radiology resident on call to be paged.

## 2023-02-02 NOTE — PRE PROCEDURE NOTE - PRE PROCEDURE EVALUATION
Interventional Radiology    HPI: 57y Female with hx of lymphoma s/p chest port on 22 here for port evaluation.        Allergies: doxycycline (Rash)  penicillins (Unknown)    Medications (Abx/Cardiac/Anticoagulation/Blood Products)      Data:    T(C): --  HR: --  BP: --  RR: --  SpO2: --    Non-Hodgkin&#x27;s lymphoma    Presence of other vascular implant or graft    Status post bone marrow transplant    Family hx of colon cancer (Mother)    FHx: diabetes mellitus (Father)    FH: heart disease (Father)    FH: dementia (Mother)    Handoff    Anxiety    History of IBS    Axillary adenopathy    2019 novel coronavirus disease (COVID-19)    Lymphoma    History of appendectomy    H/O nasal polypectomy    H/O  section    History of cholecystectomy    NON-HODGKIN LYMPHOMA, UNSPECIF    SysAdmin_VstLnk        Exam  General: No acute distress  Chest: Non labored breathing           Plan: 57y Female presents for chest port evaluation.   -Risks/Benefits/alternatives explained with the patient and/or healthcare proxy and witnessed informed consent obtained.    Interventional Radiology    HPI: 57y Female with hx of lymphoma s/p chest port on 22 here for port evaluation. Reports pain over the port site X 1 month.        Allergies: doxycycline (Rash)  penicillins (Unknown)    Medications (Abx/Cardiac/Anticoagulation/Blood Products)      Data:    T(C): --  HR: --  BP: --  RR: --  SpO2: --    Non-Hodgkin&#x27;s lymphoma    Presence of other vascular implant or graft    Status post bone marrow transplant    Family hx of colon cancer (Mother)    FHx: diabetes mellitus (Father)    FH: heart disease (Father)    FH: dementia (Mother)    Handoff    Anxiety    History of IBS    Axillary adenopathy    2019 novel coronavirus disease (COVID-19)    Lymphoma    History of appendectomy    H/O nasal polypectomy    H/O  section    History of cholecystectomy    NON-HODGKIN LYMPHOMA, UNSPECIF    SysAdmin_VstLnk        Exam  General: No acute distress  Chest: Non labored breathing. Port site no erythema, edema. (+) TTP at the connection site of the port with catheter.            Plan: 57y Female presents for chest port evaluation.   -Risks/Benefits/alternatives explained with the patient and/or healthcare proxy and witnessed informed consent obtained.

## 2023-02-02 NOTE — ASU DISCHARGE PLAN (ADULT/PEDIATRIC) - NURSING INSTRUCTIONS
Please feel free to contact us at [phone] if any problems arise. After 6PM, Monday through Friday, on weekends and on holidays, please call [phone] and ask for the radiology resident on call to be paged.

## 2023-02-02 NOTE — ASU DISCHARGE PLAN (ADULT/PEDIATRIC) - NS MD DC FALL RISK RISK
For information on Fall & Injury Prevention, visit: [email or web address] OR  [email or web address] OR  https://www.cdc.gov/steadi/patient.html

## 2023-02-02 NOTE — PROCEDURE NOTE - PROCEDURE FINDINGS AND DETAILS
----  Contrast showed patent chest port, catheter and catheter tip.   No evidence of fibrin sheath or extravasation.   ----

## 2023-02-02 NOTE — ASU PATIENT PROFILE, ADULT - FALL HARM RISK - UNIVERSAL INTERVENTIONS
Bed in lowest position, wheels locked, appropriate side rails in place/Call bell, personal items and telephone in reach/Instruct patient to call for assistance before getting out of bed or chair/Non-slip footwear when patient is out of bed/[place] to call system/Physically safe environment - no spills, clutter or unnecessary equipment/Purposeful Proactive Rounding/Room/bathroom lighting operational, light cord in reach

## 2023-02-03 LAB — SARS-COV-2 N GENE NPH QL NAA+PROBE: NOT DETECTED

## 2023-02-08 ENCOUNTER — RESULT REVIEW (OUTPATIENT)
Age: 58
End: 2023-02-08

## 2023-02-08 ENCOUNTER — APPOINTMENT (OUTPATIENT)
Dept: HEMATOLOGY ONCOLOGY | Facility: CLINIC | Age: 58
End: 2023-02-08
Payer: COMMERCIAL

## 2023-02-08 ENCOUNTER — APPOINTMENT (OUTPATIENT)
Age: 58
End: 2023-02-08

## 2023-02-08 VITALS
TEMPERATURE: 97 F | RESPIRATION RATE: 16 BRPM | WEIGHT: 155.4 LBS | SYSTOLIC BLOOD PRESSURE: 104 MMHG | BODY MASS INDEX: 28.43 KG/M2 | DIASTOLIC BLOOD PRESSURE: 69 MMHG | HEART RATE: 90 BPM | OXYGEN SATURATION: 97 %

## 2023-02-08 LAB
ALBUMIN SERPL ELPH-MCNC: 4.2 G/DL
ALP BLD-CCNC: 93 U/L
ALT SERPL-CCNC: 38 U/L
ANION GAP SERPL CALC-SCNC: 11 MMOL/L
AST SERPL-CCNC: 36 U/L
BASOPHILS # BLD AUTO: 0.02 K/UL — SIGNIFICANT CHANGE UP (ref 0–0.2)
BASOPHILS NFR BLD AUTO: 0.5 % — SIGNIFICANT CHANGE UP (ref 0–2)
BILIRUB SERPL-MCNC: 0.5 MG/DL
BUN SERPL-MCNC: 13 MG/DL
CALCIUM SERPL-MCNC: 9.7 MG/DL
CHLORIDE SERPL-SCNC: 106 MMOL/L
CO2 SERPL-SCNC: 25 MMOL/L
CREAT SERPL-MCNC: 0.69 MG/DL
EGFR: 102 ML/MIN/1.73M2
EOSINOPHIL # BLD AUTO: 0.1 K/UL — SIGNIFICANT CHANGE UP (ref 0–0.5)
EOSINOPHIL NFR BLD AUTO: 2.3 % — SIGNIFICANT CHANGE UP (ref 0–6)
GLUCOSE SERPL-MCNC: 99 MG/DL
HCT VFR BLD CALC: 33.3 % — LOW (ref 34.5–45)
HGB BLD-MCNC: 10.8 G/DL — LOW (ref 11.5–15.5)
IMM GRANULOCYTES NFR BLD AUTO: 0 % — SIGNIFICANT CHANGE UP (ref 0–0.9)
LDH SERPL-CCNC: 182 U/L
LYMPHOCYTES # BLD AUTO: 0.86 K/UL — LOW (ref 1–3.3)
LYMPHOCYTES # BLD AUTO: 19.4 % — SIGNIFICANT CHANGE UP (ref 13–44)
MAGNESIUM SERPL-MCNC: 2 MG/DL
MCHC RBC-ENTMCNC: 30.2 PG — SIGNIFICANT CHANGE UP (ref 27–34)
MCHC RBC-ENTMCNC: 32.4 G/DL — SIGNIFICANT CHANGE UP (ref 32–36)
MCV RBC AUTO: 93 FL — SIGNIFICANT CHANGE UP (ref 80–100)
MONOCYTES # BLD AUTO: 0.39 K/UL — SIGNIFICANT CHANGE UP (ref 0–0.9)
MONOCYTES NFR BLD AUTO: 8.8 % — SIGNIFICANT CHANGE UP (ref 2–14)
NEUTROPHILS # BLD AUTO: 3.07 K/UL — SIGNIFICANT CHANGE UP (ref 1.8–7.4)
NEUTROPHILS NFR BLD AUTO: 69 % — SIGNIFICANT CHANGE UP (ref 43–77)
NRBC # BLD: 0 /100 WBCS — SIGNIFICANT CHANGE UP (ref 0–0)
PLATELET # BLD AUTO: 286 K/UL — SIGNIFICANT CHANGE UP (ref 150–400)
POTASSIUM SERPL-SCNC: 4.6 MMOL/L
PROT SERPL-MCNC: 6.5 G/DL
RBC # BLD: 3.58 M/UL — LOW (ref 3.8–5.2)
RBC # FLD: 14.5 % — SIGNIFICANT CHANGE UP (ref 10.3–14.5)
SODIUM SERPL-SCNC: 142 MMOL/L
WBC # BLD: 4.44 K/UL — SIGNIFICANT CHANGE UP (ref 3.8–10.5)
WBC # FLD AUTO: 4.44 K/UL — SIGNIFICANT CHANGE UP (ref 3.8–10.5)

## 2023-02-08 PROCEDURE — 99215 OFFICE O/P EST HI 40 MIN: CPT

## 2023-02-13 NOTE — HISTORY OF PRESENT ILLNESS
[de-identified] : 57 year old female with no significant past medical hx presents to office for initial visit for high dose chemtherapy and auto stem cell transplant....  Patient presented on 3/4/22 c/o right axilla mass  to general surgeon, Dr. Amadeo Grace. Patient incidentally discovered it while shavingin Jan 2022  \par Biopsy of right axilla performed on 3/22/22 revealing atypical lymphoid infiltrate ( NEGATIVE for a clonal lgH gene rearrangement/ Positive clonal TCR gamma gene rearrangement )\par \par Planned for lymph node excision with Dr. Grace however patient decided to stay in the [facility] system and had Excisional Biopsy per Dr Mariano \par Colonoscopy November 2021\par \par LN excional BIopsy was delayed 2/2 to her travelling and pre surg testing.\par On review of needle biopsy from 3/22/22 at [facility], suggestive of T cell lymphoma\par \par S/p right axillary lymph node excisional biopsy on 5/16/22  per DR Mariano Path c/w Peripheral T- Cell Lymphoma, NOS\par PET SCAN 4/21/22: \par - 1. FDG avid extensive right axillary lymphadenopathy 5.4 x 3.6 cm , SUV 22.5. and right supraclavicular lymph node.0.8 x 0.8 cm (image 53), SUV 5.8.\par 2. Difficult to delineate retroareolar right breast soft tissue, with low-grade FDG avidity and minimally FDG avid diffuse, right breast cutaneous thickening. \par \par Breast node biopsy performed February 2022 was negative. \par MRI Breast on 5/26/22 with benign findings repeat in 6 months\par \par Recent Hospital Discharge Summary as follows:\par Discharge Date    16-Dec-2022\par Admission Date    16-Nov-2022 \par Reason for Admission    Autologous peripheral blood stem cell transplant with high\par dose CBV prep regimen for treatment of peripheral T cell lymphoma\par Hospital Course   \par This is a 57 year old female with peripheral T cell lymphoma status post CHOEP\par x 6 admitted for an autologous peripheral blood stem cell transplant with high\par dose CBV prep regimen. Hematologic history as follows: initially presented on\par 3/4/22 with right axilla mass to general surgeon. Biopsy on 3/22/22 showed\par atypical lymphoid infiltrate. She later had an excisional biopsy on 5/16/22\par which confirmed peripheral T cell lymphoma.\par \par Upon admission, a TLC was placed in IR. Ms. Johnston received IV hydration, pain\par management, nutritional support and antibacterial / antiviral / antifungal / GI\par / PCP and VOD (SOS) prophylaxis. Labs were monitored on a daily basis and she\par received electrolyte repletion and transfusional support as needed.\par While admitted Ms. Johnston experienced pancytopenia related to the high dose\par chemotherapy prep regimen. When she became neutropenic she was started on\par prophylactic ciprofloxacin. She also experienced neutropenic fevers. When she\par became febrile, blood and urine cultures were sent, a CXR completed and the\par ciprofloxacin was changed to cefepime. The infectious work up remained\par negative.  Ms. Johnston had a diffuse, macular, erythematous rash secondary to\par kepivance. The 3rd dose of kepivance was held post transplant.\par \par On 11/25/22, after pre-medication, Ms. Johnston received 316ml of autologous,\par pooled, thawed, washed, mobilized, plasma reduced, HPC apheresis over\par approximately 1 hour. Cell counts as follows:\par Total MNCs (x10^8/kg) = 6.67\par CD34+ cells (10^6/kg) = 5.46\par Cell Viability (%) = 80%\par She tolerated the infusion well with no adverse reactions noted.\par Engraftment was noted on 12/7/22. The cefepime was discontinued 12/7/22. The\par zarxio was discontinued 12/9/22. Post engraftment, Ms. Johnston remained\par intermittently febrile, likely secondary to engraftment. She was observed off\par antibiotics. On 12/9/22, the mediport was accessed, cultures sent, and the TLC\par discontinued. She remained intermittently febrile with a negative infectious\par work up. The source of the fever was thought to be engraftment, and she was\par treated with a short course of dexamethasone with good effect.\par Currently, Ms. Johnston is stable for discharge home with outpatient follow up at\par the [facility]. The mediport was flushed with hep-lock prior to\par discharge 12/16/22.\par \par  [de-identified] : Patient presents accompanied with .\par S/p right axillary lymph node excisional biopsy on 5/16/22  per DR Mariano Path c/w Peripheral T- Cell Lymphoma, NOS\par Patient started  CHOEP on 6/20/22, C4  8/22/22\par \par She reports increased fatigue and insomnia 2/2 to steriod after first week of  treatment.\par She takes a nap during the day but is able to get up and be active. \par Reports patch release pain the next day. She takes Claritin and Tylenol with relief. \par Patient admits constipation uses MiraLAX however takes 6 days after treatment to have a bowel movement. \par Denies trouble hearing.  \par Currently on linzess for constipation, baby aspirin 81 mg, Prozac 20mg, multivitamin \par \par 10/19/22 S/P hospitalization for krzysztof fever with 6th cycle of chemotherapy...now recovered\par \par On 11/3/22, patient presents for growth factor teaching. Overall well with no acute concerns. Only complaint is lower back pain. Denies fever, chills, nausea, vomiting, diarrhea, rash, mouth sores or any signs of active bleeding. Denies chest pain or B/L LE edema. \par \par 12/20/22: Patient is seen for the first follow up after Auto PBSCT.Today is Day +25 post Auto PBSCT.She states that she continues to feel fatigued, has pain on right calf since the past 3 days.Denies fever, chills, SOB,chest pain, edema,vomiting, rash, mouth sores or any signs of active bleeding. She states that she has occasional nausea relieved with Zofran.Remains compliant with diet and crowd restrictions. Remains compliant with medications as prescribed.\par \par 1/3/23:Today is Day +39 post Auto PBSCT.Denies fever, chills, chest pain, vomiting, diarrhea,rash,mouth sores, edema or any signs of active bleeding.She has occasional nausea relieved with Zofran.She has a  dry cough since past few days and has SOB with exertion.Remains compliant with medications as prescribed. Remains compliant with diet and crowd restrictions.\par \par 1/17/23:Patient is seen for a follow up visit today.Today is Day + 53 post auto transplant...Denies fever, chills, SOB,chest pain,vomiting,diarrhea,rash,mouth sores or any signs of active bleeding.She states that she feels nauseous at times ...using zofran..She states that she has mild pain by the the wire of the right CW mediport..will follow....Remains compliant with medications.Remains compliant with diet and crowd restrictions. \par \par 2/8//23: post Auto PBSCT.Denies fever,chills,SOB,chest pain,nausea,vomiting, diarrhea,rash,mouth sores,edema or any signs of active bleeding at this tiome.....did have flu symptoms last week.....She has occasional nausea relieved with Zofran.Remains compliant with medications.Remains compliant with diet and crowd restrictions.She still has mild pain by the wire of the CW mediport...seen by IR...may be related to weight loss

## 2023-02-13 NOTE — PHYSICAL EXAM
[Restricted in physically strenuous activity but ambulatory and able to carry out work of a light or sedentary nature] : Status 1- Restricted in physically strenuous activity but ambulatory and able to carry out work of a light or sedentary nature, e.g., light house work, office work [Normal] : affect appropriate [de-identified] : port in place-flushed on 12/16/22;no tenderness or redness on the port site but has occasional mild pain by the wire of the port [de-identified] : No edema

## 2023-02-16 DIAGNOSIS — T82.848A PAIN DUE TO VASCULAR PROSTHETIC DEVICES, IMPLANTS AND GRAFTS, INITIAL ENCOUNTER: ICD-10-CM

## 2023-02-21 ENCOUNTER — RESULT REVIEW (OUTPATIENT)
Age: 58
End: 2023-02-21

## 2023-02-21 ENCOUNTER — APPOINTMENT (OUTPATIENT)
Dept: HEMATOLOGY ONCOLOGY | Facility: CLINIC | Age: 58
End: 2023-02-21
Payer: COMMERCIAL

## 2023-02-21 VITALS
SYSTOLIC BLOOD PRESSURE: 112 MMHG | BODY MASS INDEX: 28.75 KG/M2 | OXYGEN SATURATION: 98 % | WEIGHT: 157.19 LBS | RESPIRATION RATE: 16 BRPM | DIASTOLIC BLOOD PRESSURE: 75 MMHG | HEART RATE: 73 BPM | TEMPERATURE: 97.2 F

## 2023-02-21 LAB
ALBUMIN SERPL ELPH-MCNC: 4.6 G/DL
ALP BLD-CCNC: 91 U/L
ALT SERPL-CCNC: 32 U/L
ANION GAP SERPL CALC-SCNC: 11 MMOL/L
AST SERPL-CCNC: 32 U/L
BASOPHILS # BLD AUTO: 0.02 K/UL — SIGNIFICANT CHANGE UP (ref 0–0.2)
BASOPHILS NFR BLD AUTO: 0.5 % — SIGNIFICANT CHANGE UP (ref 0–2)
BILIRUB SERPL-MCNC: 0.4 MG/DL
BUN SERPL-MCNC: 19 MG/DL
CALCIUM SERPL-MCNC: 10.2 MG/DL
CHLORIDE SERPL-SCNC: 103 MMOL/L
CO2 SERPL-SCNC: 25 MMOL/L
CREAT SERPL-MCNC: 0.7 MG/DL
EGFR: 102 ML/MIN/1.73M2
EOSINOPHIL # BLD AUTO: 0.1 K/UL — SIGNIFICANT CHANGE UP (ref 0–0.5)
EOSINOPHIL NFR BLD AUTO: 2.4 % — SIGNIFICANT CHANGE UP (ref 0–6)
GLUCOSE SERPL-MCNC: 112 MG/DL
HCT VFR BLD CALC: 32.9 % — LOW (ref 34.5–45)
HGB BLD-MCNC: 10.7 G/DL — LOW (ref 11.5–15.5)
IMM GRANULOCYTES NFR BLD AUTO: 0.2 % — SIGNIFICANT CHANGE UP (ref 0–0.9)
LDH SERPL-CCNC: 159 U/L
LYMPHOCYTES # BLD AUTO: 0.92 K/UL — LOW (ref 1–3.3)
LYMPHOCYTES # BLD AUTO: 22.3 % — SIGNIFICANT CHANGE UP (ref 13–44)
MAGNESIUM SERPL-MCNC: 2 MG/DL
MCHC RBC-ENTMCNC: 30.3 PG — SIGNIFICANT CHANGE UP (ref 27–34)
MCHC RBC-ENTMCNC: 32.5 G/DL — SIGNIFICANT CHANGE UP (ref 32–36)
MCV RBC AUTO: 93.2 FL — SIGNIFICANT CHANGE UP (ref 80–100)
MONOCYTES # BLD AUTO: 0.45 K/UL — SIGNIFICANT CHANGE UP (ref 0–0.9)
MONOCYTES NFR BLD AUTO: 10.9 % — SIGNIFICANT CHANGE UP (ref 2–14)
NEUTROPHILS # BLD AUTO: 2.63 K/UL — SIGNIFICANT CHANGE UP (ref 1.8–7.4)
NEUTROPHILS NFR BLD AUTO: 63.7 % — SIGNIFICANT CHANGE UP (ref 43–77)
NRBC # BLD: 0 /100 WBCS — SIGNIFICANT CHANGE UP (ref 0–0)
PLATELET # BLD AUTO: 239 K/UL — SIGNIFICANT CHANGE UP (ref 150–400)
POTASSIUM SERPL-SCNC: 4.8 MMOL/L
PROT SERPL-MCNC: 6.7 G/DL
RBC # BLD: 3.53 M/UL — LOW (ref 3.8–5.2)
RBC # FLD: 14.6 % — HIGH (ref 10.3–14.5)
SODIUM SERPL-SCNC: 140 MMOL/L
WBC # BLD: 4.13 K/UL — SIGNIFICANT CHANGE UP (ref 3.8–10.5)
WBC # FLD AUTO: 4.13 K/UL — SIGNIFICANT CHANGE UP (ref 3.8–10.5)

## 2023-02-21 PROCEDURE — 99214 OFFICE O/P EST MOD 30 MIN: CPT

## 2023-02-21 NOTE — PHYSICAL EXAM
[Restricted in physically strenuous activity but ambulatory and able to carry out work of a light or sedentary nature] : Status 1- Restricted in physically strenuous activity but ambulatory and able to carry out work of a light or sedentary nature, e.g., light house work, office work [Normal] : affect appropriate [de-identified] : port in place-flushed on 12/16/22;no tenderness or redness on the port site but has occasional mild pain by the wire of the port;port study done [de-identified] : No edema

## 2023-02-21 NOTE — HISTORY OF PRESENT ILLNESS
[de-identified] : 57 year old female with no significant past medical hx presents to office for initial visit for high dose chemtherapy and auto stem cell transplant....  Patient presented on 3/4/22 c/o right axilla mass  to general surgeon, Dr. Amadeo Grace. Patient incidentally discovered it while shavingin Jan 2022  \par Biopsy of right axilla performed on 3/22/22 revealing atypical lymphoid infiltrate ( NEGATIVE for a clonal lgH gene rearrangement/ Positive clonal TCR gamma gene rearrangement )\par \par Planned for lymph node excision with Dr. Grace however patient decided to stay in the [facility] system and had Excisional Biopsy per Dr Mariano \par Colonoscopy November 2021\par \par LN excional BIopsy was delayed 2/2 to her travelling and pre surg testing.\par On review of needle biopsy from 3/22/22 at [facility], suggestive of T cell lymphoma\par \par S/p right axillary lymph node excisional biopsy on 5/16/22  per DR Mariano Path c/w Peripheral T- Cell Lymphoma, NOS\par PET SCAN 4/21/22: \par - 1. FDG avid extensive right axillary lymphadenopathy 5.4 x 3.6 cm , SUV 22.5. and right supraclavicular lymph node.0.8 x 0.8 cm (image 53), SUV 5.8.\par 2. Difficult to delineate retroareolar right breast soft tissue, with low-grade FDG avidity and minimally FDG avid diffuse, right breast cutaneous thickening. \par \par Breast node biopsy performed February 2022 was negative. \par MRI Breast on 5/26/22 with benign findings repeat in 6 months\par \par Recent Hospital Discharge Summary as follows:\par Discharge Date    16-Dec-2022\par Admission Date    16-Nov-2022 \par Reason for Admission    Autologous peripheral blood stem cell transplant with high\par dose CBV prep regimen for treatment of peripheral T cell lymphoma\par Hospital Course   \par This is a 57 year old female with peripheral T cell lymphoma status post CHOEP\par x 6 admitted for an autologous peripheral blood stem cell transplant with high\par dose CBV prep regimen. Hematologic history as follows: initially presented on\par 3/4/22 with right axilla mass to general surgeon. Biopsy on 3/22/22 showed\par atypical lymphoid infiltrate. She later had an excisional biopsy on 5/16/22\par which confirmed peripheral T cell lymphoma.\par \par Upon admission, a TLC was placed in IR. Ms. Johnston received IV hydration, pain\par management, nutritional support and antibacterial / antiviral / antifungal / GI\par / PCP and VOD (SOS) prophylaxis. Labs were monitored on a daily basis and she\par received electrolyte repletion and transfusional support as needed.\par While admitted Ms. Johnston experienced pancytopenia related to the high dose\par chemotherapy prep regimen. When she became neutropenic she was started on\par prophylactic ciprofloxacin. She also experienced neutropenic fevers. When she\par became febrile, blood and urine cultures were sent, a CXR completed and the\par ciprofloxacin was changed to cefepime. The infectious work up remained\par negative.  Ms. Johnston had a diffuse, macular, erythematous rash secondary to\par kepivance. The 3rd dose of kepivance was held post transplant.\par \par On 11/25/22, after pre-medication, Ms. Johnston received 316ml of autologous,\par pooled, thawed, washed, mobilized, plasma reduced, HPC apheresis over\par approximately 1 hour. Cell counts as follows:\par Total MNCs (x10^8/kg) = 6.67\par CD34+ cells (10^6/kg) = 5.46\par Cell Viability (%) = 80%\par She tolerated the infusion well with no adverse reactions noted.\par Engraftment was noted on 12/7/22. The cefepime was discontinued 12/7/22. The\par zarxio was discontinued 12/9/22. Post engraftment, Ms. Johnston remained\par intermittently febrile, likely secondary to engraftment. She was observed off\par antibiotics. On 12/9/22, the mediport was accessed, cultures sent, and the TLC\par discontinued. She remained intermittently febrile with a negative infectious\par work up. The source of the fever was thought to be engraftment, and she was\par treated with a short course of dexamethasone with good effect.\par Currently, Ms. Johnston is stable for discharge home with outpatient follow up at\par the [facility]. The mediport was flushed with hep-lock prior to\par discharge 12/16/22.\par \par  [de-identified] : Patient presents accompanied with .\par S/p right axillary lymph node excisional biopsy on 5/16/22  per DR Mariano Path c/w Peripheral T- Cell Lymphoma, NOS\par Patient started  CHOEP on 6/20/22, C4  8/22/22\par \par She reports increased fatigue and insomnia 2/2 to steriod after first week of  treatment.\par She takes a nap during the day but is able to get up and be active. \par Reports patch release pain the next day. She takes Claritin and Tylenol with relief. \par Patient admits constipation uses MiraLAX however takes 6 days after treatment to have a bowel movement. \par Denies trouble hearing.  \par Currently on linzess for constipation, baby aspirin 81 mg, Prozac 20mg, multivitamin \par \par 10/19/22 S/P hospitalization for krzysztof fever with 6th cycle of chemotherapy...now recovered\par \par On 11/3/22, patient presents for growth factor teaching. Overall well with no acute concerns. Only complaint is lower back pain. Denies fever, chills, nausea, vomiting, diarrhea, rash, mouth sores or any signs of active bleeding. Denies chest pain or B/L LE edema. \par \par 12/20/22: Patient is seen for the first follow up after Auto PBSCT.Today is Day +25 post Auto PBSCT.She states that she continues to feel fatigued, has pain on right calf since the past 3 days.Denies fever, chills, SOB,chest pain, edema,vomiting, rash, mouth sores or any signs of active bleeding. She states that she has occasional nausea relieved with Zofran.Remains compliant with diet and crowd restrictions. Remains compliant with medications as prescribed.\par \par 1/3/23:Today is Day +39 post Auto PBSCT.Denies fever, chills, chest pain, vomiting, diarrhea,rash,mouth sores, edema or any signs of active bleeding.She has occasional nausea relieved with Zofran.She has a  dry cough since past few days and has SOB with exertion.Remains compliant with medications as prescribed. Remains compliant with diet and crowd restrictions.\par \par 1/17/23:Patient is seen for a follow up visit today.Today is Day + 53 post auto transplant...Denies fever, chills, SOB,chest pain,vomiting,diarrhea,rash,mouth sores or any signs of active bleeding.She states that she feels nauseous at times ...using zofran..She states that she has mild pain by the the wire of the right CW mediport..will follow....Remains compliant with medications.Remains compliant with diet and crowd restrictions. \par \par 2/8//23: post Auto PBSCT.Denies fever,chills,SOB,chest pain,nausea,vomiting, diarrhea,rash,mouth sores,edema or any signs of active bleeding at this tiome.....did have flu symptoms last week.....She has occasional nausea relieved with Zofran.Remains compliant with medications.Remains compliant with diet and crowd restrictions.She still has mild pain by the wire of the CW mediport...seen by IR...may be related to weight loss\par \par 2/21/23:Today is Day + 88 post Auto PBSCT.She is here for a follow up visit accompanied by her . Denies fever,chills,SOB,chest pain, edema,mouth sores, rash,vomiting, diarrhea or any signs of active bleeding. She takes Zofran once daily for nausea with relief.She still has mild pain on B/L LE with activities.Remains compliant with diet and crowd restrictions.Remains compliant with medications as prescribed.

## 2023-02-21 NOTE — ASSESSMENT
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 Amadeo Grace. Patient incidentally discovered it while shaving in Jan / Feb 2022 \par Biopsy of right axilla performed on 3/22/22 revealing atypical lymphoid infiltrate ( NEGATIVE for a clonal lgH gene rearrangement/ Positive clonal TCR gamma gene rearrangement )\par \par On review of needle biopsy from 3/22/22 at [facility], suggestive of T cell lymphoma\par \par S/p right axillary lymph node excisional biopsy on 5/16/22  per DR Montserrat Knight c/w Peripheral T- Cell Lymphoma, NOS\par \par PET SCAN 4/21/22: \par - 1. FDG avid extensive right axillary lymphadenopathy 5.4 x 3.6 cm , SUV 22.5. and right supraclavicular lymph node.0.8 x 0.8 cm (image 53), SUV 5.8.\par 2. Difficult to delineate retroareolar right breast soft tissue, with low-grade FDG avidity and minimally FDG avid diffuse, right breast cutaneous thickening. \par \par Breast node biopsy performed February 2022 was negative. \par MRI BREAST 5/26/22: BiRADS 3 , f/u in 6 months\par \par PET SCAN 6/13/22: \par 1. Interval resolution of FDG avidity associated with right supraclavicular lymph node which has decreased in size.\par 2. Interval decrease in size, number and avidity of right axillary lymphadenopathy and resolution of  FDG avid right retropectoral lymph nodes.\par 3. Unchanged minimally FDG avid difficult to delineate retroareolar right breast soft tissue and minimally FDG avid diffuse right breast cutaneous thickening. Skin thickening and parenchymal edema in the right breast is consistent with metastatic obstruction on interval MRI from 5/26/2022.\par \par PET/CT 8/8/22 \par 1. Interval further decrease of right axillary lymph nodes.\par 2. Unchanged nonspecific minimally FDG avid retroareolar right breast soft tissue. Interval decreased avidity of unchanged cutaneous thickening in the right breast.\par 3. Subcentimeter nodular opacity in the right upper lobe medially, unchanged since PET/CT on 4/21/2022. \par 4. New generalized diffuse bone marrow hypermetabolism, nonspecific and may be related to treatment. \par \par \par PERIPHERAL T CELL LYMPHOMA NOS Stage 1\par IPI SCORE: 0 LOW RISK \par - Some B symptoms, Night sweats since Jan 2022, fatigue, 6 lb weight loss\par - Discussed with patient Chemotherapy with CHOEP WITH NEULASTA Support \par -D1 :  Cyclophosphamide 750 mg /m2, Adriamycin 50 mg/ m2 Vincristone 1.4mg/ m2, Etoposide 10mg/ m2 IV \par D2- D3 Etoposide 100mg/m2  IV Hydration \par D1-D5 Prednisone 10mg po \par Allopurinol 100mg daily for TLS prophylaxis\par \par \par - Port placement on 6/8/22\par - ECHO on 6/14/22, LV EF 59%\par - Patient started  CHOEP on 6/20/22, C4  8/22/22 now completed 6\par -Constipation; continue MiraLAX. Advised senna and colace. \par -Rash; Continue salicylic acid cream for rash, can try clindamycin ointment if not improved. \par - Restaging PET Scan and bm bx  after 6 cycles...confirms response.....to have f/u MRI of breast\par - Echo in Sept 2022 along with pre transplant testing and dental eval\par MRI BREAST: to be repeated in Nov 2022 \par I had another  extensive discussion regarding the risks, benefits, alternatives, logistics and rationale for high dose chemotherapy and auto stem cell transplant for t cell NHL..4 week hospital stay and 8 week recovery discussed...augmented cbv prep...stem cell mobilization with growth factor and collection discussed...need confirmed negative bm bx...if not consider ICE mobilization...goal 2 to 5 million cd 34 cells per kg...Collection early to mid nov...admit for transplant end nov.Literature and consents provided for review..quests addressed..f/u 3 weeks with transplant team...orientation and sw eval...\par \par 11/3/22\par Patient presents for growth factor teaching.\par CBC stable. No indication for transfusion today.\par Tyrell will receive Zarxio 780 mcg SQ Daily 11/3/22- 11/7/22. Stem cell collection will start on 11/7/22. \par Teach back method used during today's visit. NP administered Zarxio 480 mcg to RLQ abdomen. Lot #[id number] expiration December 2024. Tolerated injection with no adverse reaction.\par Second Zarxio 300 mcg Lot # [id number] Expiration September 2024 administered by spouse to RLQ abdomen. Tolerated injection with no adverse reaction.\par Recommended Claritin daily 11/3/22- 11/7/22. May take Tylenol prn for pain.\par Avoid Advil, Aleve and aspirin.\par Encouraged to increase calcium in diet over the next few days. \par Questions and concerns addressed. Reassurance provided.\par COVID 19 PCR scheduled at [facility] tomorrow.\par 11/7/22 Return to [facility] for Shiley catheter placement and stem cell collection. \par \par 2/21/23:\par T Cell Lymphoma (C85.90)\par S/P  Autologous Stem Cell Transplant (Z94.81) \par On 11/25/22, after pre-medication, Ms. Johnston received 316ml of autologous,\par pooled, thawed, washed, mobilized, plasma reduced, HPC apheresis\par \par 1) T Cell Lymphoma\par  S/P Autologous PBSCT on 11/25/22\par  Labs sent today\par  Send labs with every visit\par  Repeat imaging 4  months post PBSCT..March 2023..will order with next visit as patient's  is changing  insurance this month\par  Overall doing well\par \par 2) Heme\par    Counts stable, no transfusion requirements today\par    Continue  Multiple Vitamin and stop Folic acid \par  \par 3) ID\par    Continue prophylaxis\par    Acyclovir 400 mg 1 tab orally every 8 hrs \par    Atovaquone 750 mg/5 mL oral suspension 5 milliliter 2 times a day\par    Diflucan 200 mg 2 tabs orally once a day ..STOPPED\par    Post  transplant restrictions reviewed and reinforced in light of COVID-19 Pandemic..ok for take out\par    discussed initial series of COVID vaccine at 90 days post Auto....Scheduled for COVID vaccines....Moderna # 1    on 2/26/23 and # 2 on 3/26/23;will give a letter for vaccines\par \par 4) GI\par     Protonix 40 mg oral delayed release 1 tab once a day \par     Zofran every 8 hours as needed for nausea \par \par 5)Right calf pain-RESOLVED\par     Continue Magnesium Oxide 400 mg 2 x day\par \par 6) Cough..RESOLVED\par     COVID/RVP- Not detected on 1/3/23\par     Call the center with worsening/persistent cough or onset of new symptoms\par     Advised to monitor temperature for fever\par     OTC Claritin \par \par 7)  Mild pain by the wire of the right CW Mediport\par     Port Study ordered on 1/11/23...done  1/27/23\par     Tylenol as needed for pain\par \par 8) Plan\par -B/L LE muscle pain with activity:Continue Magnesium Oxide,call home PT to find out whether she could get some  PT services as she didn't get any after discharge from hospital\par   -POF 6-8 weeks;scheduled on 3/7/23\par  -Educated about plan of care,all questions and concerns addressed\par  -Advised to take Zofran every 8 hours if needed for nausea as she had been taking only if nausea got worse\par  - Advised to call office with any acute concerns\par  - Advised to have small frequent meals/snacks\par  -Reinstated to avoid crowd\par  -Reinstated dietary restrictions: No restaurant  food at this time, take out  ok... home cooked, prepared/frozen         food. \par  - Additional Instructions: Notify if bleeding; swelling; persistent nausea and vomiting; unable to  urinate; pain not        relieved by medications; fever; numbness, tingling; excessive diarrhea, inability to tolerate liquids  or foods;             increased irritability or sluggishness, rash\par -Scheduled for COVID vaccines....Moderna # 1 on 2/26/23 and # 2 on 3/26/23;will give a letter for vaccines\par  -Follow up  in 2 weeks\par \par I examined patient under Dr. Acevedo's supervision and Dr. Acevedo agrees to plan of care as listed above\par \par \par \par \par

## 2023-02-21 NOTE — REVIEW OF SYSTEMS
[Fatigue] : fatigue [Negative] : Allergic/Immunologic [Muscle Pain] : muscle pain [Fever] : no fever [Chills] : no chills [Night Sweats] : no night sweats [Recent Change In Weight] : ~T no recent weight change [Shortness Of Breath] : no shortness of breath [Wheezing] : no wheezing [Cough] : no cough [SOB on Exertion] : no shortness of breath during exertion [Abdominal Pain] : no abdominal pain [Vomiting] : no vomiting [Constipation] : no constipation [Diarrhea] : no diarrhea [FreeTextEntry7] : Occasional nausea [FreeTextEntry9] : Mild muscle pain on B/L LE with activities

## 2023-03-07 ENCOUNTER — RESULT REVIEW (OUTPATIENT)
Age: 58
End: 2023-03-07

## 2023-03-07 ENCOUNTER — APPOINTMENT (OUTPATIENT)
Dept: HEMATOLOGY ONCOLOGY | Facility: CLINIC | Age: 58
End: 2023-03-07
Payer: COMMERCIAL

## 2023-03-07 ENCOUNTER — APPOINTMENT (OUTPATIENT)
Dept: INFUSION THERAPY | Facility: HOSPITAL | Age: 58
End: 2023-03-07

## 2023-03-07 VITALS
DIASTOLIC BLOOD PRESSURE: 79 MMHG | SYSTOLIC BLOOD PRESSURE: 127 MMHG | HEART RATE: 71 BPM | TEMPERATURE: 96.9 F | RESPIRATION RATE: 16 BRPM | BODY MASS INDEX: 28.43 KG/M2 | OXYGEN SATURATION: 98 % | WEIGHT: 155.4 LBS

## 2023-03-07 LAB
ALBUMIN SERPL ELPH-MCNC: 4.5 G/DL — SIGNIFICANT CHANGE UP (ref 3.3–5)
ALP SERPL-CCNC: 94 U/L — SIGNIFICANT CHANGE UP (ref 40–120)
ALT FLD-CCNC: 26 U/L — SIGNIFICANT CHANGE UP (ref 10–45)
ANION GAP SERPL CALC-SCNC: 14 MMOL/L — SIGNIFICANT CHANGE UP (ref 5–17)
AST SERPL-CCNC: 29 U/L — SIGNIFICANT CHANGE UP (ref 10–40)
BASOPHILS # BLD AUTO: 0.04 K/UL — SIGNIFICANT CHANGE UP (ref 0–0.2)
BASOPHILS NFR BLD AUTO: 0.9 % — SIGNIFICANT CHANGE UP (ref 0–2)
BILIRUB SERPL-MCNC: 0.4 MG/DL — SIGNIFICANT CHANGE UP (ref 0.2–1.2)
BUN SERPL-MCNC: 12 MG/DL — SIGNIFICANT CHANGE UP (ref 7–23)
CALCIUM SERPL-MCNC: 10.2 MG/DL — SIGNIFICANT CHANGE UP (ref 8.4–10.5)
CHLORIDE SERPL-SCNC: 104 MMOL/L — SIGNIFICANT CHANGE UP (ref 96–108)
CO2 SERPL-SCNC: 23 MMOL/L — SIGNIFICANT CHANGE UP (ref 22–31)
CREAT SERPL-MCNC: 0.59 MG/DL — SIGNIFICANT CHANGE UP (ref 0.5–1.3)
EGFR: 106 ML/MIN/1.73M2 — SIGNIFICANT CHANGE UP
EOSINOPHIL # BLD AUTO: 0.13 K/UL — SIGNIFICANT CHANGE UP (ref 0–0.5)
EOSINOPHIL NFR BLD AUTO: 3 % — SIGNIFICANT CHANGE UP (ref 0–6)
GLUCOSE SERPL-MCNC: 109 MG/DL — HIGH (ref 70–99)
HCT VFR BLD CALC: 33.4 % — LOW (ref 34.5–45)
HGB BLD-MCNC: 11.1 G/DL — LOW (ref 11.5–15.5)
IMM GRANULOCYTES NFR BLD AUTO: 0.2 % — SIGNIFICANT CHANGE UP (ref 0–0.9)
LDH SERPL L TO P-CCNC: 180 U/L — SIGNIFICANT CHANGE UP (ref 50–242)
LYMPHOCYTES # BLD AUTO: 1.29 K/UL — SIGNIFICANT CHANGE UP (ref 1–3.3)
LYMPHOCYTES # BLD AUTO: 29.5 % — SIGNIFICANT CHANGE UP (ref 13–44)
MAGNESIUM SERPL-MCNC: 2.1 MG/DL — SIGNIFICANT CHANGE UP (ref 1.6–2.6)
MCHC RBC-ENTMCNC: 30.4 PG — SIGNIFICANT CHANGE UP (ref 27–34)
MCHC RBC-ENTMCNC: 33.2 G/DL — SIGNIFICANT CHANGE UP (ref 32–36)
MCV RBC AUTO: 91.5 FL — SIGNIFICANT CHANGE UP (ref 80–100)
MONOCYTES # BLD AUTO: 0.38 K/UL — SIGNIFICANT CHANGE UP (ref 0–0.9)
MONOCYTES NFR BLD AUTO: 8.7 % — SIGNIFICANT CHANGE UP (ref 2–14)
NEUTROPHILS # BLD AUTO: 2.53 K/UL — SIGNIFICANT CHANGE UP (ref 1.8–7.4)
NEUTROPHILS NFR BLD AUTO: 57.7 % — SIGNIFICANT CHANGE UP (ref 43–77)
NRBC # BLD: 0 /100 WBCS — SIGNIFICANT CHANGE UP (ref 0–0)
PLATELET # BLD AUTO: 295 K/UL — SIGNIFICANT CHANGE UP (ref 150–400)
POTASSIUM SERPL-MCNC: 4.4 MMOL/L — SIGNIFICANT CHANGE UP (ref 3.5–5.3)
POTASSIUM SERPL-SCNC: 4.4 MMOL/L — SIGNIFICANT CHANGE UP (ref 3.5–5.3)
PROT SERPL-MCNC: 6.9 G/DL — SIGNIFICANT CHANGE UP (ref 6–8.3)
RBC # BLD: 3.65 M/UL — LOW (ref 3.8–5.2)
RBC # FLD: 13.7 % — SIGNIFICANT CHANGE UP (ref 10.3–14.5)
SODIUM SERPL-SCNC: 141 MMOL/L — SIGNIFICANT CHANGE UP (ref 135–145)
WBC # BLD: 4.38 K/UL — SIGNIFICANT CHANGE UP (ref 3.8–10.5)
WBC # FLD AUTO: 4.38 K/UL — SIGNIFICANT CHANGE UP (ref 3.8–10.5)

## 2023-03-07 PROCEDURE — 99214 OFFICE O/P EST MOD 30 MIN: CPT

## 2023-03-07 NOTE — ASSESSMENT
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 Amadeo Grace. Patient incidentally discovered it while shaving in Jan / Feb 2022 \par Biopsy of right axilla performed on 3/22/22 revealing atypical lymphoid infiltrate ( NEGATIVE for a clonal lgH gene rearrangement/ Positive clonal TCR gamma gene rearrangement )\par \par On review of needle biopsy from 3/22/22 at [facility], suggestive of T cell lymphoma\par \par S/p right axillary lymph node excisional biopsy on 5/16/22  per DR Montserrat Knight c/w Peripheral T- Cell Lymphoma, NOS\par \par PET SCAN 4/21/22: \par - 1. FDG avid extensive right axillary lymphadenopathy 5.4 x 3.6 cm , SUV 22.5. and right supraclavicular lymph node.0.8 x 0.8 cm (image 53), SUV 5.8.\par 2. Difficult to delineate retroareolar right breast soft tissue, with low-grade FDG avidity and minimally FDG avid diffuse, right breast cutaneous thickening. \par \par Breast node biopsy performed February 2022 was negative. \par MRI BREAST 5/26/22: BiRADS 3 , f/u in 6 months\par \par PET SCAN 6/13/22: \par 1. Interval resolution of FDG avidity associated with right supraclavicular lymph node which has decreased in size.\par 2. Interval decrease in size, number and avidity of right axillary lymphadenopathy and resolution of  FDG avid right retropectoral lymph nodes.\par 3. Unchanged minimally FDG avid difficult to delineate retroareolar right breast soft tissue and minimally FDG avid diffuse right breast cutaneous thickening. Skin thickening and parenchymal edema in the right breast is consistent with metastatic obstruction on interval MRI from 5/26/2022.\par \par PET/CT 8/8/22 \par 1. Interval further decrease of right axillary lymph nodes.\par 2. Unchanged nonspecific minimally FDG avid retroareolar right breast soft tissue. Interval decreased avidity of unchanged cutaneous thickening in the right breast.\par 3. Subcentimeter nodular opacity in the right upper lobe medially, unchanged since PET/CT on 4/21/2022. \par 4. New generalized diffuse bone marrow hypermetabolism, nonspecific and may be related to treatment. \par \par \par PERIPHERAL T CELL LYMPHOMA NOS Stage 1\par IPI SCORE: 0 LOW RISK \par - Some B symptoms, Night sweats since Jan 2022, fatigue, 6 lb weight loss\par - Discussed with patient Chemotherapy with CHOEP WITH NEULASTA Support \par -D1 :  Cyclophosphamide 750 mg /m2, Adriamycin 50 mg/ m2 Vincristone 1.4mg/ m2, Etoposide 10mg/ m2 IV \par D2- D3 Etoposide 100mg/m2  IV Hydration \par D1-D5 Prednisone 10mg po \par Allopurinol 100mg daily for TLS prophylaxis\par \par \par - Port placement on 6/8/22\par - ECHO on 6/14/22, LV EF 59%\par - Patient started  CHOEP on 6/20/22, C4  8/22/22 now completed 6\par -Constipation; continue MiraLAX. Advised senna and colace. \par -Rash; Continue salicylic acid cream for rash, can try clindamycin ointment if not improved. \par - Restaging PET Scan and bm bx  after 6 cycles...confirms response.....to have f/u MRI of breast\par - Echo in Sept 2022 along with pre transplant testing and dental eval\par MRI BREAST: to be repeated in Nov 2022 \par I had another  extensive discussion regarding the risks, benefits, alternatives, logistics and rationale for high dose chemotherapy and auto stem cell transplant for t cell NHL..4 week hospital stay and 8 week recovery discussed...augmented cbv prep...stem cell mobilization with growth factor and collection discussed...need confirmed negative bm bx...if not consider ICE mobilization...goal 2 to 5 million cd 34 cells per kg...Collection early to mid nov...admit for transplant end nov.Literature and consents provided for review..quests addressed..f/u 3 weeks with transplant team...orientation and sw eval...\par \par 11/3/22\par Patient presents for growth factor teaching.\par CBC stable. No indication for transfusion today.\par Tyrell will receive Zarxio 780 mcg SQ Daily 11/3/22- 11/7/22. Stem cell collection will start on 11/7/22. \par Teach back method used during today's visit. NP administered Zarxio 480 mcg to RLQ abdomen. Lot #[id number] expiration December 2024. Tolerated injection with no adverse reaction.\par Second Zarxio 300 mcg Lot # [id number] Expiration September 2024 administered by spouse to RLQ abdomen. Tolerated injection with no adverse reaction.\par Recommended Claritin daily 11/3/22- 11/7/22. May take Tylenol prn for pain.\par Avoid Advil, Aleve and aspirin.\par Encouraged to increase calcium in diet over the next few days. \par Questions and concerns addressed. Reassurance provided.\par COVID 19 PCR scheduled at [facility] tomorrow.\par 11/7/22 Return to [facility] for Shiley catheter placement and stem cell collection. \par \par 3/7/23:\par T Cell Lymphoma (C85.90)\par S/P  Autologous Stem Cell Transplant (Z94.81) \par On 11/25/22, after pre-medication, Ms. Johnston received 316ml of autologous,\par pooled, thawed, washed, mobilized, plasma reduced, HPC apheresis\par \par 1) T Cell Lymphoma\par  S/P Autologous PBSCT on 11/25/22\par  Labs sent today\par  Send labs with every visit\par  Repeat imaging 4  months post PBSCT..PET CT ordered today\par  Overall doing well\par \par 2) Heme\par    Counts stable, no transfusion requirements today\par    Continue  Multiple Vitamin and stop Folic acid \par  \par 3) ID\par    Continue prophylaxis\par    Acyclovir 400 mg 1 tab orally every 8 hrs \par    Atovaquone 750 mg/5 mL oral suspension 5 milliliter 2 times a day\par    Diflucan 200 mg 2 tabs orally once a day ..STOPPED\par    Post  transplant restrictions reviewed and reinforced in light of COVID-19 Pandemic..ok for take out\par    discussed initial series of COVID vaccine at 90 days post Auto....Scheduled for COVID   vaccines....Received      Moderna # 1 on 2/26/23 , had fever, diarrhea, faitgue for 2 da# 2 scheduled  on 3/26/23 letter for vaccines    given... she will schedule for # 3 after receiving  #2\par \par 4) GI\par     Protonix 40 mg oral delayed release 1 tab once a day \par     Zofran every 8 hours as needed for nausea \par \par 5)Right calf pain-RESOLVED\par     Continue Magnesium Oxide 400 mg 2 x day\par \par 6) Occasional dry Cough\par     COVID/RVP- Not detected on 1/3/23\par     Call the center with worsening/persistent cough or onset of new symptoms\par     Advised to monitor temperature for fever\par     OTC Claritin \par \par 7)  Mild pain by the wire of the right CW Mediport\par     Port Study ordered on 1/11/23...done  1/27/23\par     Tylenol as needed for pain\par \par 8) Plan\par   -B/L LE muscle pain with activity:Continue Magnesium Oxide,call home PT to find out whether she could get some    PT services as she didn't get any after discharge from hospital\par   -POF 6-8 weeks;done today (3/7/23)\par  -Educated about plan of care,all questions and concerns addressed\par  -Advised to take Zofran every 8 hours if needed for nausea as she had been taking only if nausea got worse\par  - Advised to call office with any acute concerns\par  - Advised to have small frequent meals/snacks\par  -Reinstated to avoid crowd\par  -Reinstated dietary restrictions: No restaurant  food at this time, take out  ok... home cooked, prepared/frozen           food. \par  - Additional Instructions: Notify if bleeding; swelling; persistent nausea and vomiting; unable to  urinate; pain not        relieved by medications; fever; numbness, tingling; excessive diarrhea, inability to tolerate liquids  or foods;             increased irritability or sluggishness, rash\par -Scheduled for COVID vaccines....Moderna # 1 on 2/26/23 and # 2 on 3/26/23;will give a letter for vaccines\par  -Follow up  in 2 weeks with Dr. Acevedo\par \par I examined patient under Dr. Acevedo's supervision and Dr. Acevedo agrees to plan of care as listed above\par \par \par \par \par

## 2023-03-07 NOTE — PHYSICAL EXAM
[Restricted in physically strenuous activity but ambulatory and able to carry out work of a light or sedentary nature] : Status 1- Restricted in physically strenuous activity but ambulatory and able to carry out work of a light or sedentary nature, e.g., light house work, office work [Normal] : affect appropriate [de-identified] : port in place-flushed on 12/16/22;no tenderness or redness on the port site but has occasional mild pain by the wire of the port;port study WDL [de-identified] : No edema

## 2023-03-07 NOTE — REVIEW OF SYSTEMS
[Muscle Pain] : muscle pain [Cough] : cough [Negative] : Constitutional [Fever] : no fever [Chills] : no chills [Night Sweats] : no night sweats [Fatigue] : no fatigue [Recent Change In Weight] : ~T no recent weight change [SOB on Exertion] : no shortness of breath during exertion [Abdominal Pain] : no abdominal pain [Vomiting] : no vomiting [Constipation] : no constipation [Diarrhea] : no diarrhea [FreeTextEntry6] : Occasional dry cough [FreeTextEntry7] : Occasional nausea [FreeTextEntry9] : Mild muscle pain on B/L LE with activities

## 2023-03-07 NOTE — HISTORY OF PRESENT ILLNESS
[de-identified] : 57 year old female with no significant past medical hx presents to office for initial visit for high dose chemtherapy and auto stem cell transplant....  Patient presented on 3/4/22 c/o right axilla mass  to general surgeon, Dr. Amadeo Grace. Patient incidentally discovered it while shavingin Jan 2022  \par Biopsy of right axilla performed on 3/22/22 revealing atypical lymphoid infiltrate ( NEGATIVE for a clonal lgH gene rearrangement/ Positive clonal TCR gamma gene rearrangement )\par \par Planned for lymph node excision with Dr. Grace however patient decided to stay in the [facility] system and had Excisional Biopsy per Dr Mariano \par Colonoscopy November 2021\par \par LN excional BIopsy was delayed 2/2 to her travelling and pre surg testing.\par On review of needle biopsy from 3/22/22 at [facility], suggestive of T cell lymphoma\par \par S/p right axillary lymph node excisional biopsy on 5/16/22  per DR Mariano Path c/w Peripheral T- Cell Lymphoma, NOS\par PET SCAN 4/21/22: \par - 1. FDG avid extensive right axillary lymphadenopathy 5.4 x 3.6 cm , SUV 22.5. and right supraclavicular lymph node.0.8 x 0.8 cm (image 53), SUV 5.8.\par 2. Difficult to delineate retroareolar right breast soft tissue, with low-grade FDG avidity and minimally FDG avid diffuse, right breast cutaneous thickening. \par \par Breast node biopsy performed February 2022 was negative. \par MRI Breast on 5/26/22 with benign findings repeat in 6 months\par \par Recent Hospital Discharge Summary as follows:\par Discharge Date    16-Dec-2022\par Admission Date    16-Nov-2022 \par Reason for Admission    Autologous peripheral blood stem cell transplant with high\par dose CBV prep regimen for treatment of peripheral T cell lymphoma\par Hospital Course   \par This is a 57 year old female with peripheral T cell lymphoma status post CHOEP\par x 6 admitted for an autologous peripheral blood stem cell transplant with high\par dose CBV prep regimen. Hematologic history as follows: initially presented on\par 3/4/22 with right axilla mass to general surgeon. Biopsy on 3/22/22 showed\par atypical lymphoid infiltrate. She later had an excisional biopsy on 5/16/22\par which confirmed peripheral T cell lymphoma.\par \par Upon admission, a TLC was placed in IR. Ms. Johnston received IV hydration, pain\par management, nutritional support and antibacterial / antiviral / antifungal / GI\par / PCP and VOD (SOS) prophylaxis. Labs were monitored on a daily basis and she\par received electrolyte repletion and transfusional support as needed.\par While admitted Ms. Johnston experienced pancytopenia related to the high dose\par chemotherapy prep regimen. When she became neutropenic she was started on\par prophylactic ciprofloxacin. She also experienced neutropenic fevers. When she\par became febrile, blood and urine cultures were sent, a CXR completed and the\par ciprofloxacin was changed to cefepime. The infectious work up remained\par negative.  Ms. Johnston had a diffuse, macular, erythematous rash secondary to\par kepivance. The 3rd dose of kepivance was held post transplant.\par \par On 11/25/22, after pre-medication, Ms. Johnston received 316ml of autologous,\par pooled, thawed, washed, mobilized, plasma reduced, HPC apheresis over\par approximately 1 hour. Cell counts as follows:\par Total MNCs (x10^8/kg) = 6.67\par CD34+ cells (10^6/kg) = 5.46\par Cell Viability (%) = 80%\par She tolerated the infusion well with no adverse reactions noted.\par Engraftment was noted on 12/7/22. The cefepime was discontinued 12/7/22. The\par zarxio was discontinued 12/9/22. Post engraftment, Ms. Johnston remained\par intermittently febrile, likely secondary to engraftment. She was observed off\par antibiotics. On 12/9/22, the mediport was accessed, cultures sent, and the TLC\par discontinued. She remained intermittently febrile with a negative infectious\par work up. The source of the fever was thought to be engraftment, and she was\par treated with a short course of dexamethasone with good effect.\par Currently, Ms. Johnston is stable for discharge home with outpatient follow up at\par the [facility]. The mediport was flushed with hep-lock prior to\par discharge 12/16/22.\par \par  [de-identified] : Patient presents accompanied with .\par S/p right axillary lymph node excisional biopsy on 5/16/22  per DR Mariano Path c/w Peripheral T- Cell Lymphoma, NOS\par Patient started  CHOEP on 6/20/22, C4  8/22/22\par \par She reports increased fatigue and insomnia 2/2 to steriod after first week of  treatment.\par She takes a nap during the day but is able to get up and be active. \par Reports patch release pain the next day. She takes Claritin and Tylenol with relief. \par Patient admits constipation uses MiraLAX however takes 6 days after treatment to have a bowel movement. \par Denies trouble hearing.  \par Currently on linzess for constipation, baby aspirin 81 mg, Prozac 20mg, multivitamin \par \par 10/19/22 S/P hospitalization for krzysztof fever with 6th cycle of chemotherapy...now recovered\par \par On 11/3/22, patient presents for growth factor teaching. Overall well with no acute concerns. Only complaint is lower back pain. Denies fever, chills, nausea, vomiting, diarrhea, rash, mouth sores or any signs of active bleeding. Denies chest pain or B/L LE edema. \par \par 12/20/22: Patient is seen for the first follow up after Auto PBSCT.Today is Day +25 post Auto PBSCT.She states that she continues to feel fatigued, has pain on right calf since the past 3 days.Denies fever, chills, SOB,chest pain, edema,vomiting, rash, mouth sores or any signs of active bleeding. She states that she has occasional nausea relieved with Zofran.Remains compliant with diet and crowd restrictions. Remains compliant with medications as prescribed.\par \par 1/3/23:Today is Day +39 post Auto PBSCT.Denies fever, chills, chest pain, vomiting, diarrhea,rash,mouth sores, edema or any signs of active bleeding.She has occasional nausea relieved with Zofran.She has a  dry cough since past few days and has SOB with exertion.Remains compliant with medications as prescribed. Remains compliant with diet and crowd restrictions.\par \par 1/17/23:Patient is seen for a follow up visit today.Today is Day + 53 post auto transplant...Denies fever, chills, SOB,chest pain,vomiting,diarrhea,rash,mouth sores or any signs of active bleeding.She states that she feels nauseous at times ...using zofran..She states that she has mild pain by the the wire of the right CW mediport..will follow....Remains compliant with medications.Remains compliant with diet and crowd restrictions. \par \par 2/8//23: post Auto PBSCT.Denies fever,chills,SOB,chest pain,nausea,vomiting, diarrhea,rash,mouth sores,edema or any signs of active bleeding at this tiome.....did have flu symptoms last week.....She has occasional nausea relieved with Zofran.Remains compliant with medications.Remains compliant with diet and crowd restrictions.She still has mild pain by the wire of the CW mediport...seen by IR...may be related to weight loss\par \par 2/21/23:Today is Day + 88 post Auto PBSCT.She is here for a follow up visit accompanied by her . Denies fever,chills,SOB,chest pain, edema,mouth sores, rash,vomiting, diarrhea or any signs of active bleeding. She takes Zofran once daily for nausea with relief.She still has mild pain on B/L LE with activities.Remains compliant with diet and crowd restrictions.Remains compliant with medications as prescribed.\par \par 3/7/23: Patient is seen for a follow up visit.Today is DAY + 102 post Auto PBSCT.She had fever,fatigue and diarrhea for 2 days after receiving Moderna #1 series on 2/26/23. Denies fever, chills now.Denies SOB,nausea, vomiting, diarrhea,chest pain,mouth sores,rash,edema or any signs of active bleeding.She has an occasional dry cough.

## 2023-03-15 ENCOUNTER — OUTPATIENT (OUTPATIENT)
Dept: OUTPATIENT SERVICES | Facility: HOSPITAL | Age: 58
LOS: 1 days | Discharge: HOME | End: 2023-03-15

## 2023-03-15 DIAGNOSIS — C84.40 PERIPHERAL T-CELL LYMPHOMA, NOT ELSEWHERE CLASSIFIED, UNSPECIFIED SITE: ICD-10-CM

## 2023-03-19 ENCOUNTER — OUTPATIENT (OUTPATIENT)
Dept: OUTPATIENT SERVICES | Facility: HOSPITAL | Age: 58
LOS: 1 days | Discharge: HOME | End: 2023-03-19

## 2023-03-19 DIAGNOSIS — C85.90 NON-HODGKIN LYMPHOMA, UNSPECIFIED, UNSPECIFIED SITE: ICD-10-CM

## 2023-03-21 ENCOUNTER — APPOINTMENT (OUTPATIENT)
Dept: HEMATOLOGY ONCOLOGY | Facility: CLINIC | Age: 58
End: 2023-03-21
Payer: COMMERCIAL

## 2023-03-21 ENCOUNTER — RESULT REVIEW (OUTPATIENT)
Age: 58
End: 2023-03-21

## 2023-03-21 VITALS
WEIGHT: 150.33 LBS | RESPIRATION RATE: 16 BRPM | BODY MASS INDEX: 27.5 KG/M2 | DIASTOLIC BLOOD PRESSURE: 75 MMHG | OXYGEN SATURATION: 98 % | HEART RATE: 87 BPM | TEMPERATURE: 96.8 F | SYSTOLIC BLOOD PRESSURE: 109 MMHG

## 2023-03-21 LAB
ALBUMIN SERPL ELPH-MCNC: 4.7 G/DL
ALP BLD-CCNC: 91 U/L
ALT SERPL-CCNC: 21 U/L
ANION GAP SERPL CALC-SCNC: 12 MMOL/L
AST SERPL-CCNC: 29 U/L
BASOPHILS # BLD AUTO: 0.03 K/UL — SIGNIFICANT CHANGE UP (ref 0–0.2)
BASOPHILS NFR BLD AUTO: 0.6 % — SIGNIFICANT CHANGE UP (ref 0–2)
BILIRUB SERPL-MCNC: 0.5 MG/DL
BUN SERPL-MCNC: 18 MG/DL
CALCIUM SERPL-MCNC: 10.6 MG/DL
CHLORIDE SERPL-SCNC: 104 MMOL/L
CO2 SERPL-SCNC: 26 MMOL/L
CREAT SERPL-MCNC: 0.76 MG/DL
EGFR: 92 ML/MIN/1.73M2
EOSINOPHIL # BLD AUTO: 0.11 K/UL — SIGNIFICANT CHANGE UP (ref 0–0.5)
EOSINOPHIL NFR BLD AUTO: 2.3 % — SIGNIFICANT CHANGE UP (ref 0–6)
GLUCOSE SERPL-MCNC: 117 MG/DL
HCT VFR BLD CALC: 37.9 % — SIGNIFICANT CHANGE UP (ref 34.5–45)
HGB BLD-MCNC: 12.4 G/DL — SIGNIFICANT CHANGE UP (ref 11.5–15.5)
IMM GRANULOCYTES NFR BLD AUTO: 0.2 % — SIGNIFICANT CHANGE UP (ref 0–0.9)
LDH SERPL-CCNC: 166 U/L
LYMPHOCYTES # BLD AUTO: 1.29 K/UL — SIGNIFICANT CHANGE UP (ref 1–3.3)
LYMPHOCYTES # BLD AUTO: 27.2 % — SIGNIFICANT CHANGE UP (ref 13–44)
MAGNESIUM SERPL-MCNC: 2.1 MG/DL
MCHC RBC-ENTMCNC: 29.7 PG — SIGNIFICANT CHANGE UP (ref 27–34)
MCHC RBC-ENTMCNC: 32.7 G/DL — SIGNIFICANT CHANGE UP (ref 32–36)
MCV RBC AUTO: 90.7 FL — SIGNIFICANT CHANGE UP (ref 80–100)
MONOCYTES # BLD AUTO: 0.42 K/UL — SIGNIFICANT CHANGE UP (ref 0–0.9)
MONOCYTES NFR BLD AUTO: 8.9 % — SIGNIFICANT CHANGE UP (ref 2–14)
NEUTROPHILS # BLD AUTO: 2.88 K/UL — SIGNIFICANT CHANGE UP (ref 1.8–7.4)
NEUTROPHILS NFR BLD AUTO: 60.8 % — SIGNIFICANT CHANGE UP (ref 43–77)
NRBC # BLD: 0 /100 WBCS — SIGNIFICANT CHANGE UP (ref 0–0)
PLATELET # BLD AUTO: 225 K/UL — SIGNIFICANT CHANGE UP (ref 150–400)
POTASSIUM SERPL-SCNC: 4.7 MMOL/L
PROT SERPL-MCNC: 7.3 G/DL
RBC # BLD: 4.18 M/UL — SIGNIFICANT CHANGE UP (ref 3.8–5.2)
RBC # FLD: 13.5 % — SIGNIFICANT CHANGE UP (ref 10.3–14.5)
SODIUM SERPL-SCNC: 141 MMOL/L
WBC # BLD: 4.74 K/UL — SIGNIFICANT CHANGE UP (ref 3.8–10.5)
WBC # FLD AUTO: 4.74 K/UL — SIGNIFICANT CHANGE UP (ref 3.8–10.5)

## 2023-03-21 PROCEDURE — 99215 OFFICE O/P EST HI 40 MIN: CPT

## 2023-03-21 NOTE — PHYSICAL EXAM
[Restricted in physically strenuous activity but ambulatory and able to carry out work of a light or sedentary nature] : Status 1- Restricted in physically strenuous activity but ambulatory and able to carry out work of a light or sedentary nature, e.g., light house work, office work [Normal] : affect appropriate [de-identified] : port in place-flushed on 12/16/22;no tenderness or redness on the port site but has occasional mild pain by the wire of the port;port study WDL [de-identified] : No edema

## 2023-03-21 NOTE — ASSESSMENT
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 Amadeo Grace. Patient incidentally discovered it while shaving in Jan / Feb 2022 \par Biopsy of right axilla performed on 3/22/22 revealing atypical lymphoid infiltrate ( NEGATIVE for a clonal lgH gene rearrangement/ Positive clonal TCR gamma gene rearrangement )\par \par On review of needle biopsy from 3/22/22 at [facility], suggestive of T cell lymphoma\par \par S/p right axillary lymph node excisional biopsy on 5/16/22  per DR Montserrat Knight c/w Peripheral T- Cell Lymphoma, NOS\par \par PET SCAN 4/21/22: \par - 1. FDG avid extensive right axillary lymphadenopathy 5.4 x 3.6 cm , SUV 22.5. and right supraclavicular lymph node.0.8 x 0.8 cm (image 53), SUV 5.8.\par 2. Difficult to delineate retroareolar right breast soft tissue, with low-grade FDG avidity and minimally FDG avid diffuse, right breast cutaneous thickening. \par \par Breast node biopsy performed February 2022 was negative. \par MRI BREAST 5/26/22: BiRADS 3 , f/u in 6 months\par \par PET SCAN 6/13/22: \par 1. Interval resolution of FDG avidity associated with right supraclavicular lymph node which has decreased in size.\par 2. Interval decrease in size, number and avidity of right axillary lymphadenopathy and resolution of  FDG avid right retropectoral lymph nodes.\par 3. Unchanged minimally FDG avid difficult to delineate retroareolar right breast soft tissue and minimally FDG avid diffuse right breast cutaneous thickening. Skin thickening and parenchymal edema in the right breast is consistent with metastatic obstruction on interval MRI from 5/26/2022.\par \par PET/CT 8/8/22 \par 1. Interval further decrease of right axillary lymph nodes.\par 2. Unchanged nonspecific minimally FDG avid retroareolar right breast soft tissue. Interval decreased avidity of unchanged cutaneous thickening in the right breast.\par 3. Subcentimeter nodular opacity in the right upper lobe medially, unchanged since PET/CT on 4/21/2022. \par 4. New generalized diffuse bone marrow hypermetabolism, nonspecific and may be related to treatment. \par \par \par PERIPHERAL T CELL LYMPHOMA NOS Stage 1\par IPI SCORE: 0 LOW RISK \par - Some B symptoms, Night sweats since Jan 2022, fatigue, 6 lb weight loss\par - Discussed with patient Chemotherapy with CHOEP WITH NEULASTA Support \par -D1 :  Cyclophosphamide 750 mg /m2, Adriamycin 50 mg/ m2 Vincristone 1.4mg/ m2, Etoposide 10mg/ m2 IV \par D2- D3 Etoposide 100mg/m2  IV Hydration \par D1-D5 Prednisone 10mg po \par Allopurinol 100mg daily for TLS prophylaxis\par \par \par - Port placement on 6/8/22\par - ECHO on 6/14/22, LV EF 59%\par - Patient started  CHOEP on 6/20/22, C4  8/22/22 now completed 6\par -Constipation; continue MiraLAX. Advised senna and colace. \par -Rash; Continue salicylic acid cream for rash, can try clindamycin ointment if not improved. \par - Restaging PET Scan and bm bx  after 6 cycles...confirms response.....to have f/u MRI of breast\par - Echo in Sept 2022 along with pre transplant testing and dental eval\par MRI BREAST: to be repeated in Nov 2022 \par I had another  extensive discussion regarding the risks, benefits, alternatives, logistics and rationale for high dose chemotherapy and auto stem cell transplant for t cell NHL..4 week hospital stay and 8 week recovery discussed...augmented cbv prep...stem cell mobilization with growth factor and collection discussed...need confirmed negative bm bx...if not consider ICE mobilization...goal 2 to 5 million cd 34 cells per kg...Collection early to mid nov...admit for transplant end nov.Literature and consents provided for review..quests addressed..f/u 3 weeks with transplant team...orientation and sw eval...\par \par 11/3/22\par Patient presents for growth factor teaching.\par CBC stable. No indication for transfusion today.\par Tyrell will receive Zarxio 780 mcg SQ Daily 11/3/22- 11/7/22. Stem cell collection will start on 11/7/22. \par Teach back method used during today's visit. NP administered Zarxio 480 mcg to RLQ abdomen. Lot #[id number] expiration December 2024. Tolerated injection with no adverse reaction.\par Second Zarxio 300 mcg Lot # [id number] Expiration September 2024 administered by spouse to RLQ abdomen. Tolerated injection with no adverse reaction.\par Recommended Claritin daily 11/3/22- 11/7/22. May take Tylenol prn for pain.\par Avoid Advil, Aleve and aspirin.\par Encouraged to increase calcium in diet over the next few days. \par Questions and concerns addressed. Reassurance provided.\par COVID 19 PCR scheduled at [facility] tomorrow.\par 11/7/22 Return to [facility] for Shiley catheter placement and stem cell collection. \par \par 3/21/23:\par T Cell Lymphoma (C85.90)\par S/P  Autologous Stem Cell Transplant (Z94.81) \par On 11/25/22, after pre-medication, Ms. Johnston received 316ml of autologous,\par pooled, thawed, washed, mobilized, plasma reduced, HPC apheresis\par \par 1) T Cell Lymphoma\par  S/P Autologous PBSCT on 11/25/22\par  Labs sent today\par  Send labs with every visit\par  Repeat imaging 4  months post PBSCT..PET CT ordered but denied....ct ordered\par  Overall doing well\par \par 2) Heme\par    Counts stable, no transfusion requirements today\par    Continue  Multiple Vitamin and stop Folic acid \par  \par 3) ID\par    Continue prophylaxis\par    Acyclovir 400 mg 1 tab orally every 8 hrs \par    Atovaquone 750 mg/5 mL oral suspension 5 milliliter 2 times a day\par    Diflucan 200 mg 2 tabs orally once a day ..STOPPED\par    Post  transplant restrictions reviewed and reinforced in light of COVID-19 Pandemic..ok for take out\par    discussed initial series of COVID vaccine at 90 days post Auto....Scheduled for COVID   vaccines....Received      Moderna # 1 on 2/26/23 , had fever, diarrhea, faitgue for 2 da# 2 scheduled  on 3/26/23 letter for vaccines    given... I would hold on booster\par \par 4) GI\par     Protonix 40 mg oral delayed release 1 tab once a day \par     Zofran every 8 hours as needed for nausea \par \par 5)Right calf pain-RESOLVED\par     Continue Magnesium Oxide 400 mg 2 x day\par \par 6) Occasional dry Cough\par     COVID/RVP- Not detected on 1/3/23\par     Call the center with worsening/persistent cough or onset of new symptoms\par     Advised to monitor temperature for fever\par     OTC Claritin \par \par 7)  Mild pain by the wire of the right CW Mediport\par     Port Study ordered on 1/11/23...done  1/27/23\par     Tylenol as needed for pain\par \par 8) Plan\par   -B/L LE muscle pain with activity:Continue Magnesium Oxide,call home PT to find out whether she could get some    PT services as she didn't get any after discharge from hospital\par   -POF 6-8 weeks;done today (3/7/23)\par  -Educated about plan of care,all questions and concerns addressed\par  -Advised to take Zofran every 8 hours if needed for nausea as she had been taking only if nausea got worse\par  - Advised to call office with any acute concerns\par  - Advised to have small frequent meals/snacks\par  -Reinstated to avoid crowd\par  -Reinstated dietary restrictions: No restaurant  food at this time, take out  ok... home cooked, prepared/frozen           food. \par  - Additional Instructions: Notify if bleeding; swelling; persistent nausea and vomiting; unable to  urinate; pain not        relieved by medications; fever; numbness, tingling; excessive diarrhea, inability to tolerate liquids  or foods;             increased irritability or sluggishness, rash\par -Scheduled for COVID vaccines....Moderna # 1 on 2/26/23 and # 2 on 3/26/23; had fever and significant body aches with first vaccine\par  -Follow up  in 4 weeks with Dr. Acevedo with port flush\par \par \par \par \par \par

## 2023-03-21 NOTE — REVIEW OF SYSTEMS
[Muscle Pain] : muscle pain [Negative] : Respiratory [Fever] : no fever [Chills] : no chills [Night Sweats] : no night sweats [Fatigue] : no fatigue [Recent Change In Weight] : ~T no recent weight change [Cough] : no cough [SOB on Exertion] : no shortness of breath during exertion [Abdominal Pain] : no abdominal pain [Vomiting] : no vomiting [Constipation] : no constipation [Diarrhea] : no diarrhea [FreeTextEntry7] : Occasional nausea [FreeTextEntry9] : Mild muscle pain on B/L LE with activities

## 2023-03-21 NOTE — HISTORY OF PRESENT ILLNESS
[de-identified] : 57 year old female with no significant past medical hx presents to office for initial visit for high dose chemtherapy and auto stem cell transplant....  Patient presented on 3/4/22 c/o right axilla mass  to general surgeon, Dr. Amadeo Grace. Patient incidentally discovered it while shavingin Jan 2022  \par Biopsy of right axilla performed on 3/22/22 revealing atypical lymphoid infiltrate ( NEGATIVE for a clonal lgH gene rearrangement/ Positive clonal TCR gamma gene rearrangement )\par \par Planned for lymph node excision with Dr. Grace however patient decided to stay in the [facility] system and had Excisional Biopsy per Dr Mariano \par Colonoscopy November 2021\par \par LN excional BIopsy was delayed 2/2 to her travelling and pre surg testing.\par On review of needle biopsy from 3/22/22 at [facility], suggestive of T cell lymphoma\par \par S/p right axillary lymph node excisional biopsy on 5/16/22  per DR Mariano Path c/w Peripheral T- Cell Lymphoma, NOS\par PET SCAN 4/21/22: \par - 1. FDG avid extensive right axillary lymphadenopathy 5.4 x 3.6 cm , SUV 22.5. and right supraclavicular lymph node.0.8 x 0.8 cm (image 53), SUV 5.8.\par 2. Difficult to delineate retroareolar right breast soft tissue, with low-grade FDG avidity and minimally FDG avid diffuse, right breast cutaneous thickening. \par \par Breast node biopsy performed February 2022 was negative. \par MRI Breast on 5/26/22 with benign findings repeat in 6 months\par \par Recent Hospital Discharge Summary as follows:\par Discharge Date    16-Dec-2022\par Admission Date    16-Nov-2022 \par Reason for Admission    Autologous peripheral blood stem cell transplant with high\par dose CBV prep regimen for treatment of peripheral T cell lymphoma\par Hospital Course   \par This is a 57 year old female with peripheral T cell lymphoma status post CHOEP\par x 6 admitted for an autologous peripheral blood stem cell transplant with high\par dose CBV prep regimen. Hematologic history as follows: initially presented on\par 3/4/22 with right axilla mass to general surgeon. Biopsy on 3/22/22 showed\par atypical lymphoid infiltrate. She later had an excisional biopsy on 5/16/22\par which confirmed peripheral T cell lymphoma.\par \par Upon admission, a TLC was placed in IR. Ms. Johnston received IV hydration, pain\par management, nutritional support and antibacterial / antiviral / antifungal / GI\par / PCP and VOD (SOS) prophylaxis. Labs were monitored on a daily basis and she\par received electrolyte repletion and transfusional support as needed.\par While admitted Ms. Johnston experienced pancytopenia related to the high dose\par chemotherapy prep regimen. When she became neutropenic she was started on\par prophylactic ciprofloxacin. She also experienced neutropenic fevers. When she\par became febrile, blood and urine cultures were sent, a CXR completed and the\par ciprofloxacin was changed to cefepime. The infectious work up remained\par negative.  Ms. Johnston had a diffuse, macular, erythematous rash secondary to\par kepivance. The 3rd dose of kepivance was held post transplant.\par \par On 11/25/22, after pre-medication, Ms. Johnston received 316ml of autologous,\par pooled, thawed, washed, mobilized, plasma reduced, HPC apheresis over\par approximately 1 hour. Cell counts as follows:\par Total MNCs (x10^8/kg) = 6.67\par CD34+ cells (10^6/kg) = 5.46\par Cell Viability (%) = 80%\par She tolerated the infusion well with no adverse reactions noted.\par Engraftment was noted on 12/7/22. The cefepime was discontinued 12/7/22. The\par zarxio was discontinued 12/9/22. Post engraftment, Ms. Johnsotn remained\par intermittently febrile, likely secondary to engraftment. She was observed off\par antibiotics. On 12/9/22, the mediport was accessed, cultures sent, and the TLC\par discontinued. She remained intermittently febrile with a negative infectious\par work up. The source of the fever was thought to be engraftment, and she was\par treated with a short course of dexamethasone with good effect.\par Currently, Ms. Johnston is stable for discharge home with outpatient follow up at\par the [facility]. The mediport was flushed with hep-lock prior to\par discharge 12/16/22.\par \par  [de-identified] : Patient presents accompanied with .\par S/p right axillary lymph node excisional biopsy on 5/16/22  per DR Mariano Path c/w Peripheral T- Cell Lymphoma, NOS\par Patient started  CHOEP on 6/20/22, C4  8/22/22\par \par She reports increased fatigue and insomnia 2/2 to steriod after first week of  treatment.\par She takes a nap during the day but is able to get up and be active. \par Reports patch release pain the next day. She takes Claritin and Tylenol with relief. \par Patient admits constipation uses MiraLAX however takes 6 days after treatment to have a bowel movement. \par Denies trouble hearing.  \par Currently on linzess for constipation, baby aspirin 81 mg, Prozac 20mg, multivitamin \par \par 10/19/22 S/P hospitalization for krzysztof fever with 6th cycle of chemotherapy...now recovered\par \par On 11/3/22, patient presents for growth factor teaching. Overall well with no acute concerns. Only complaint is lower back pain. Denies fever, chills, nausea, vomiting, diarrhea, rash, mouth sores or any signs of active bleeding. Denies chest pain or B/L LE edema. \par \par 12/20/22: Patient is seen for the first follow up after Auto PBSCT.Today is Day +25 post Auto PBSCT.She states that she continues to feel fatigued, has pain on right calf since the past 3 days.Denies fever, chills, SOB,chest pain, edema,vomiting, rash, mouth sores or any signs of active bleeding. She states that she has occasional nausea relieved with Zofran.Remains compliant with diet and crowd restrictions. Remains compliant with medications as prescribed.\par \par 1/3/23:Today is Day +39 post Auto PBSCT.Denies fever, chills, chest pain, vomiting, diarrhea,rash,mouth sores, edema or any signs of active bleeding.She has occasional nausea relieved with Zofran.She has a  dry cough since past few days and has SOB with exertion.Remains compliant with medications as prescribed. Remains compliant with diet and crowd restrictions.\par \par 1/17/23:Patient is seen for a follow up visit today.Today is Day + 53 post auto transplant...Denies fever, chills, SOB,chest pain,vomiting,diarrhea,rash,mouth sores or any signs of active bleeding.She states that she feels nauseous at times ...using zofran..She states that she has mild pain by the the wire of the right CW mediport..will follow....Remains compliant with medications.Remains compliant with diet and crowd restrictions. \par \par 2/8//23: post Auto PBSCT.Denies fever,chills,SOB,chest pain,nausea,vomiting, diarrhea,rash,mouth sores,edema or any signs of active bleeding at this tiome.....did have flu symptoms last week.....She has occasional nausea relieved with Zofran.Remains compliant with medications.Remains compliant with diet and crowd restrictions.She still has mild pain by the wire of the CW mediport...seen by IR...may be related to weight loss\par \par 2/21/23:Today is Day + 88 post Auto PBSCT.She is here for a follow up visit accompanied by her . Denies fever,chills,SOB,chest pain, edema,mouth sores, rash,vomiting, diarrhea or any signs of active bleeding. She takes Zofran once daily for nausea with relief.She still has mild pain on B/L LE with activities.Remains compliant with diet and crowd restrictions.Remains compliant with medications as prescribed.\par \par 3/7/23: Patient is seen for a follow up visit.Today is DAY + 116  post Auto PBSCT.She had fever,fatigue and diarrhea for 2 days after receiving Moderna #1 series on 2/26/23. Denies fever, chills now.Denies SOB,nausea, vomiting, diarrhea,chest pain,mouth sores,rash,edema or any signs of active bleeding.She has an occasional dry cough.

## 2023-03-22 ENCOUNTER — APPOINTMENT (OUTPATIENT)
Age: 58
End: 2023-03-22

## 2023-03-23 ENCOUNTER — OUTPATIENT (OUTPATIENT)
Dept: OUTPATIENT SERVICES | Facility: HOSPITAL | Age: 58
LOS: 1 days | End: 2023-03-23

## 2023-03-23 ENCOUNTER — APPOINTMENT (OUTPATIENT)
Dept: CT IMAGING | Facility: CLINIC | Age: 58
End: 2023-03-23
Payer: COMMERCIAL

## 2023-03-23 ENCOUNTER — OUTPATIENT (OUTPATIENT)
Dept: OUTPATIENT SERVICES | Facility: HOSPITAL | Age: 58
LOS: 1 days | End: 2023-03-23
Payer: COMMERCIAL

## 2023-03-23 DIAGNOSIS — Z94.84 STEM CELLS TRANSPLANT STATUS: ICD-10-CM

## 2023-03-23 DIAGNOSIS — C85.90 NON-HODGKIN LYMPHOMA, UNSPECIFIED, UNSPECIFIED SITE: ICD-10-CM

## 2023-03-23 DIAGNOSIS — Z00.8 ENCOUNTER FOR OTHER GENERAL EXAMINATION: ICD-10-CM

## 2023-03-23 PROCEDURE — 71260 CT THORAX DX C+: CPT

## 2023-03-23 PROCEDURE — 74177 CT ABD & PELVIS W/CONTRAST: CPT | Mod: 26

## 2023-03-23 PROCEDURE — 74177 CT ABD & PELVIS W/CONTRAST: CPT

## 2023-03-23 PROCEDURE — 71260 CT THORAX DX C+: CPT | Mod: 26

## 2023-04-04 ENCOUNTER — APPOINTMENT (OUTPATIENT)
Dept: HEMATOLOGY ONCOLOGY | Facility: CLINIC | Age: 58
End: 2023-04-04

## 2023-04-18 ENCOUNTER — RESULT REVIEW (OUTPATIENT)
Age: 58
End: 2023-04-18

## 2023-04-18 ENCOUNTER — APPOINTMENT (OUTPATIENT)
Dept: HEMATOLOGY ONCOLOGY | Facility: CLINIC | Age: 58
End: 2023-04-18
Payer: COMMERCIAL

## 2023-04-18 ENCOUNTER — APPOINTMENT (OUTPATIENT)
Dept: INFUSION THERAPY | Facility: HOSPITAL | Age: 58
End: 2023-04-18

## 2023-04-18 VITALS
TEMPERATURE: 96.7 F | RESPIRATION RATE: 16 BRPM | HEART RATE: 86 BPM | DIASTOLIC BLOOD PRESSURE: 71 MMHG | OXYGEN SATURATION: 94 % | BODY MASS INDEX: 27.5 KG/M2 | WEIGHT: 150.33 LBS | SYSTOLIC BLOOD PRESSURE: 115 MMHG

## 2023-04-18 LAB
ALBUMIN SERPL ELPH-MCNC: 4.8 G/DL — SIGNIFICANT CHANGE UP (ref 3.3–5)
ALP SERPL-CCNC: 104 U/L — SIGNIFICANT CHANGE UP (ref 40–120)
ALT FLD-CCNC: 25 U/L — SIGNIFICANT CHANGE UP (ref 10–45)
ANION GAP SERPL CALC-SCNC: 12 MMOL/L — SIGNIFICANT CHANGE UP (ref 5–17)
AST SERPL-CCNC: 29 U/L — SIGNIFICANT CHANGE UP (ref 10–40)
BASOPHILS # BLD AUTO: 0.02 K/UL — SIGNIFICANT CHANGE UP (ref 0–0.2)
BASOPHILS NFR BLD AUTO: 0.4 % — SIGNIFICANT CHANGE UP (ref 0–2)
BILIRUB SERPL-MCNC: 0.8 MG/DL — SIGNIFICANT CHANGE UP (ref 0.2–1.2)
BUN SERPL-MCNC: 16 MG/DL — SIGNIFICANT CHANGE UP (ref 7–23)
CALCIUM SERPL-MCNC: 10.3 MG/DL — SIGNIFICANT CHANGE UP (ref 8.4–10.5)
CHLORIDE SERPL-SCNC: 104 MMOL/L — SIGNIFICANT CHANGE UP (ref 96–108)
CO2 SERPL-SCNC: 25 MMOL/L — SIGNIFICANT CHANGE UP (ref 22–31)
CREAT SERPL-MCNC: 0.71 MG/DL — SIGNIFICANT CHANGE UP (ref 0.5–1.3)
EGFR: 100 ML/MIN/1.73M2 — SIGNIFICANT CHANGE UP
EOSINOPHIL # BLD AUTO: 0.08 K/UL — SIGNIFICANT CHANGE UP (ref 0–0.5)
EOSINOPHIL NFR BLD AUTO: 1.7 % — SIGNIFICANT CHANGE UP (ref 0–6)
GLUCOSE SERPL-MCNC: 90 MG/DL — SIGNIFICANT CHANGE UP (ref 70–99)
HCT VFR BLD CALC: 36.8 % — SIGNIFICANT CHANGE UP (ref 34.5–45)
HGB BLD-MCNC: 12.1 G/DL — SIGNIFICANT CHANGE UP (ref 11.5–15.5)
IMM GRANULOCYTES NFR BLD AUTO: 0.2 % — SIGNIFICANT CHANGE UP (ref 0–0.9)
LDH SERPL L TO P-CCNC: 169 U/L — SIGNIFICANT CHANGE UP (ref 50–242)
LYMPHOCYTES # BLD AUTO: 1.24 K/UL — SIGNIFICANT CHANGE UP (ref 1–3.3)
LYMPHOCYTES # BLD AUTO: 26 % — SIGNIFICANT CHANGE UP (ref 13–44)
MAGNESIUM SERPL-MCNC: 2.1 MG/DL — SIGNIFICANT CHANGE UP (ref 1.6–2.6)
MCHC RBC-ENTMCNC: 29.4 PG — SIGNIFICANT CHANGE UP (ref 27–34)
MCHC RBC-ENTMCNC: 32.9 G/DL — SIGNIFICANT CHANGE UP (ref 32–36)
MCV RBC AUTO: 89.5 FL — SIGNIFICANT CHANGE UP (ref 80–100)
MONOCYTES # BLD AUTO: 0.4 K/UL — SIGNIFICANT CHANGE UP (ref 0–0.9)
MONOCYTES NFR BLD AUTO: 8.4 % — SIGNIFICANT CHANGE UP (ref 2–14)
NEUTROPHILS # BLD AUTO: 3.02 K/UL — SIGNIFICANT CHANGE UP (ref 1.8–7.4)
NEUTROPHILS NFR BLD AUTO: 63.3 % — SIGNIFICANT CHANGE UP (ref 43–77)
NRBC # BLD: 0 /100 WBCS — SIGNIFICANT CHANGE UP (ref 0–0)
PLATELET # BLD AUTO: 246 K/UL — SIGNIFICANT CHANGE UP (ref 150–400)
POTASSIUM SERPL-MCNC: 4.4 MMOL/L — SIGNIFICANT CHANGE UP (ref 3.5–5.3)
POTASSIUM SERPL-SCNC: 4.4 MMOL/L — SIGNIFICANT CHANGE UP (ref 3.5–5.3)
PROT SERPL-MCNC: 7.2 G/DL — SIGNIFICANT CHANGE UP (ref 6–8.3)
RBC # BLD: 4.11 M/UL — SIGNIFICANT CHANGE UP (ref 3.8–5.2)
RBC # FLD: 14.1 % — SIGNIFICANT CHANGE UP (ref 10.3–14.5)
SODIUM SERPL-SCNC: 140 MMOL/L — SIGNIFICANT CHANGE UP (ref 135–145)
WBC # BLD: 4.77 K/UL — SIGNIFICANT CHANGE UP (ref 3.8–10.5)
WBC # FLD AUTO: 4.77 K/UL — SIGNIFICANT CHANGE UP (ref 3.8–10.5)

## 2023-04-18 PROCEDURE — 99215 OFFICE O/P EST HI 40 MIN: CPT

## 2023-04-23 NOTE — PHYSICAL EXAM
[Restricted in physically strenuous activity but ambulatory and able to carry out work of a light or sedentary nature] : Status 1- Restricted in physically strenuous activity but ambulatory and able to carry out work of a light or sedentary nature, e.g., light house work, office work [Normal] : affect appropriate [de-identified] : port in place-flushed mid march 2023..;no tenderness or redness on the port site but has occasional mild pain by the wire of the port;port study WDL [de-identified] : No edema

## 2023-04-23 NOTE — REVIEW OF SYSTEMS
[Muscle Pain] : muscle pain [Negative] : Allergic/Immunologic [Chills] : no chills [Fever] : no fever [Night Sweats] : no night sweats [Fatigue] : no fatigue [Recent Change In Weight] : ~T no recent weight change [Cough] : no cough [SOB on Exertion] : no shortness of breath during exertion [Abdominal Pain] : no abdominal pain [Vomiting] : no vomiting [Constipation] : no constipation [Diarrhea] : no diarrhea [FreeTextEntry9] : Mild muscle pain on B/L LE with activities improved [FreeTextEntry7] : Occasional nausea

## 2023-04-23 NOTE — ASSESSMENT
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 Amadeo Grace. Patient incidentally discovered it while shaving in Jan / Feb 2022 \par Biopsy of right axilla performed on 3/22/22 revealing atypical lymphoid infiltrate ( NEGATIVE for a clonal lgH gene rearrangement/ Positive clonal TCR gamma gene rearrangement )\par \par On review of needle biopsy from 3/22/22 at [facility], suggestive of T cell lymphoma\par \par S/p right axillary lymph node excisional biopsy on 5/16/22  per DR Montserrat Knight c/w Peripheral T- Cell Lymphoma, NOS\par \par PET SCAN 4/21/22: \par - 1. FDG avid extensive right axillary lymphadenopathy 5.4 x 3.6 cm , SUV 22.5. and right supraclavicular lymph node.0.8 x 0.8 cm (image 53), SUV 5.8.\par 2. Difficult to delineate retroareolar right breast soft tissue, with low-grade FDG avidity and minimally FDG avid diffuse, right breast cutaneous thickening. \par \par Breast node biopsy performed February 2022 was negative. \par MRI BREAST 5/26/22: BiRADS 3 , f/u in 6 months\par \par PET SCAN 6/13/22: \par 1. Interval resolution of FDG avidity associated with right supraclavicular lymph node which has decreased in size.\par 2. Interval decrease in size, number and avidity of right axillary lymphadenopathy and resolution of  FDG avid right retropectoral lymph nodes.\par 3. Unchanged minimally FDG avid difficult to delineate retroareolar right breast soft tissue and minimally FDG avid diffuse right breast cutaneous thickening. Skin thickening and parenchymal edema in the right breast is consistent with metastatic obstruction on interval MRI from 5/26/2022.\par \par PET/CT 8/8/22 \par 1. Interval further decrease of right axillary lymph nodes.\par 2. Unchanged nonspecific minimally FDG avid retroareolar right breast soft tissue. Interval decreased avidity of unchanged cutaneous thickening in the right breast.\par 3. Subcentimeter nodular opacity in the right upper lobe medially, unchanged since PET/CT on 4/21/2022. \par 4. New generalized diffuse bone marrow hypermetabolism, nonspecific and may be related to treatment. \par \par \par PERIPHERAL T CELL LYMPHOMA NOS Stage 1\par IPI SCORE: 0 LOW RISK \par - Some B symptoms, Night sweats since Jan 2022, fatigue, 6 lb weight loss\par - Discussed with patient Chemotherapy with CHOEP WITH NEULASTA Support \par -D1 :  Cyclophosphamide 750 mg /m2, Adriamycin 50 mg/ m2 Vincristone 1.4mg/ m2, Etoposide 10mg/ m2 IV \par D2- D3 Etoposide 100mg/m2  IV Hydration \par D1-D5 Prednisone 10mg po \par Allopurinol 100mg daily for TLS prophylaxis\par \par \par - Port placement on 6/8/22\par - ECHO on 6/14/22, LV EF 59%\par - Patient started  CHOEP on 6/20/22, C4  8/22/22 now completed 6\par -Constipation; continue MiraLAX. Advised senna and colace. \par -Rash; Continue salicylic acid cream for rash, can try clindamycin ointment if not improved. \par - Restaging PET Scan and bm bx  after 6 cycles...confirms response.....to have f/u MRI of breast\par - Echo in Sept 2022 along with pre transplant testing and dental eval\par MRI BREAST: to be repeated in Nov 2022 \par I had another  extensive discussion regarding the risks, benefits, alternatives, logistics and rationale for high dose chemotherapy and auto stem cell transplant for t cell NHL..4 week hospital stay and 8 week recovery discussed...augmented cbv prep...stem cell mobilization with growth factor and collection discussed...need confirmed negative bm bx...if not consider ICE mobilization...goal 2 to 5 million cd 34 cells per kg...Collection early to mid nov...admit for transplant end nov.Literature and consents provided for review..quests addressed..f/u 3 weeks with transplant team...orientation and sw eval...\par \par 11/3/22\par Patient presents for growth factor teaching.\par CBC stable. No indication for transfusion today.\par Tyrell will receive Zarxio 780 mcg SQ Daily 11/3/22- 11/7/22. Stem cell collection will start on 11/7/22. \par Teach back method used during today's visit. NP administered Zarxio 480 mcg to RLQ abdomen. Lot #[id number] expiration December 2024. Tolerated injection with no adverse reaction.\par Second Zarxio 300 mcg Lot # [id number] Expiration September 2024 administered by spouse to RLQ abdomen. Tolerated injection with no adverse reaction.\par Recommended Claritin daily 11/3/22- 11/7/22. May take Tylenol prn for pain.\par Avoid Advil, Aleve and aspirin.\par Encouraged to increase calcium in diet over the next few days. \par Questions and concerns addressed. Reassurance provided.\par COVID 19 PCR scheduled at [facility] tomorrow.\par 11/7/22 Return to [facility] for Shiley catheter placement and stem cell collection. \par \par 4/18//23:\par T Cell Lymphoma (C85.90)\par S/P  Autologous Stem Cell Transplant (Z94.81) \par On 11/25/22, after pre-medication, Ms. Johnston received 316ml of autologous,\par pooled, thawed, washed, mobilized, plasma reduced, HPC apheresis\par \par 1) T Cell Lymphoma\par  S/P Autologous PBSCT on 11/25/22\par  Labs sent today\par  Send labs with every visit\par  Repeat imaging 4  months post PBSCT..PET CT ordered but denied....ct ordered and reviewed..CRYSTAL..repeat in 4 months...pt to review findings with PMD\par  Overall doing well\par \par 2) Heme\par    Counts stable, no transfusion requirements today\par    Continue  Multiple Vitamin and stop Folic acid \par  \par 3) ID\par    Continue prophylaxis unti end of may\par    Acyclovir 400 mg 1 tab orally every 8 hrs \par    Atovaquone 750 mg/5 mL oral suspension 5 milliliter 2 times a day\par    Diflucan 200 mg 2 tabs orally once a day ..STOPPED\par    Post  transplant restrictions reviewed and reinforced in light of COVID-19 Pandemic..ok for take out\par    discussed initial series of COVID vaccine at 90 days post Auto....Scheduled for COVID   vaccines....Received      Moderna # 1 on 2/26/23 , had fever, diarrhea, faitgue for 2 da# 2 scheduled  on 3/26/23 letter for vaccines    given... \par \par 4) GI\par     Protonix 40 mg oral delayed release 1 tab once a day \par     Zofran every 8 hours as needed for nausea \par \par 5)Right calf pain-RESOLVED\par     Continue Magnesium Oxide 400 mg 2 x day\par \par 6) Occasional dry Cough..resolved\par     COVID/RVP- Not detected on 1/3/23\par     Call the center with worsening/persistent cough or onset of new symptoms\par     Advised to monitor temperature for fever\par     OTC Claritin \par \par 7)  Mild pain by the wire of the right CW Mediport\par     Port Study ordered on 1/11/23...done  1/27/23\par     Tylenol as needed for pain\par \par 8) Plan\par   -B/L LE muscle pain with activity:Continue Magnesium Oxide,call home PT to find out whether she could get some    PT services as she didn't get any after discharge from hospital\par   -POF 6-8 weeks;done (3/7/23)\par  -Educated about plan of care,all questions and concerns addressed\par  -Advised to take Zofran every 8 hours if needed for nausea as she had been taking only if nausea got worse\par  - Advised to call office with any acute concerns\par  - Advised to have small frequent meals/snacks\par  -Reinstated to avoid crowd\par  -Reinstated dietary restrictions: No restaurant  food at this time, take out  ok... home cooked, prepared/frozen           food. \par  - Additional Instructions: Notify if bleeding; swelling; persistent nausea and vomiting; unable to  urinate; pain not        relieved by medications; fever; numbness, tingling; excessive diarrhea, inability to tolerate liquids  or foods;             increased irritability or sluggishness, rash\par -Scheduled for COVID vaccines....Moderna # 1 on 2/26/23 and # 2 on 3/26/23; had fever and significant body aches with first vaccine\par  -Follow up end may with Dr. Acevedo with port flush\par \par \par \par \par \par

## 2023-04-23 NOTE — HISTORY OF PRESENT ILLNESS
[de-identified] : 57 year old female with no significant past medical hx presents to office for initial visit for high dose chemtherapy and auto stem cell transplant....  Patient presented on 3/4/22 c/o right axilla mass  to general surgeon, Dr. Amadeo Grace. Patient incidentally discovered it while shavingin Jan 2022  \par Biopsy of right axilla performed on 3/22/22 revealing atypical lymphoid infiltrate ( NEGATIVE for a clonal lgH gene rearrangement/ Positive clonal TCR gamma gene rearrangement )\par \par Planned for lymph node excision with Dr. Grace however patient decided to stay in the [facility] system and had Excisional Biopsy per Dr Mariano \par Colonoscopy November 2021\par \par LN excional BIopsy was delayed 2/2 to her travelling and pre surg testing.\par On review of needle biopsy from 3/22/22 at [facility], suggestive of T cell lymphoma\par \par S/p right axillary lymph node excisional biopsy on 5/16/22  per DR Mariano Path c/w Peripheral T- Cell Lymphoma, NOS\par PET SCAN 4/21/22: \par - 1. FDG avid extensive right axillary lymphadenopathy 5.4 x 3.6 cm , SUV 22.5. and right supraclavicular lymph node.0.8 x 0.8 cm (image 53), SUV 5.8.\par 2. Difficult to delineate retroareolar right breast soft tissue, with low-grade FDG avidity and minimally FDG avid diffuse, right breast cutaneous thickening. \par \par Breast node biopsy performed February 2022 was negative. \par MRI Breast on 5/26/22 with benign findings repeat in 6 months\par \par Recent Hospital Discharge Summary as follows:\par Discharge Date    16-Dec-2022\par Admission Date    16-Nov-2022 \par Reason for Admission    Autologous peripheral blood stem cell transplant with high\par dose CBV prep regimen for treatment of peripheral T cell lymphoma\par Hospital Course   \par This is a 57 year old female with peripheral T cell lymphoma status post CHOEP\par x 6 admitted for an autologous peripheral blood stem cell transplant with high\par dose CBV prep regimen. Hematologic history as follows: initially presented on\par 3/4/22 with right axilla mass to general surgeon. Biopsy on 3/22/22 showed\par atypical lymphoid infiltrate. She later had an excisional biopsy on 5/16/22\par which confirmed peripheral T cell lymphoma.\par \par Upon admission, a TLC was placed in IR. Ms. Johnston received IV hydration, pain\par management, nutritional support and antibacterial / antiviral / antifungal / GI\par / PCP and VOD (SOS) prophylaxis. Labs were monitored on a daily basis and she\par received electrolyte repletion and transfusional support as needed.\par While admitted Ms. Johnston experienced pancytopenia related to the high dose\par chemotherapy prep regimen. When she became neutropenic she was started on\par prophylactic ciprofloxacin. She also experienced neutropenic fevers. When she\par became febrile, blood and urine cultures were sent, a CXR completed and the\par ciprofloxacin was changed to cefepime. The infectious work up remained\par negative.  Ms. Johnston had a diffuse, macular, erythematous rash secondary to\par kepivance. The 3rd dose of kepivance was held post transplant.\par \par On 11/25/22, after pre-medication, Ms. Johnston received 316ml of autologous,\par pooled, thawed, washed, mobilized, plasma reduced, HPC apheresis over\par approximately 1 hour. Cell counts as follows:\par Total MNCs (x10^8/kg) = 6.67\par CD34+ cells (10^6/kg) = 5.46\par Cell Viability (%) = 80%\par She tolerated the infusion well with no adverse reactions noted.\par Engraftment was noted on 12/7/22. The cefepime was discontinued 12/7/22. The\par zarxio was discontinued 12/9/22. Post engraftment, Ms. Johnston remained\par intermittently febrile, likely secondary to engraftment. She was observed off\par antibiotics. On 12/9/22, the mediport was accessed, cultures sent, and the TLC\par discontinued. She remained intermittently febrile with a negative infectious\par work up. The source of the fever was thought to be engraftment, and she was\par treated with a short course of dexamethasone with good effect.\par Currently, Ms. Johnston is stable for discharge home with outpatient follow up at\par the [facility]. The mediport was flushed with hep-lock prior to\par discharge 12/16/22.\par \par  [de-identified] : Patient presents accompanied with .\par S/p right axillary lymph node excisional biopsy on 5/16/22  per DR Mariano Path c/w Peripheral T- Cell Lymphoma, NOS\par Patient started  CHOEP on 6/20/22, C4  8/22/22\par \par She reports increased fatigue and insomnia 2/2 to steriod after first week of  treatment.\par She takes a nap during the day but is able to get up and be active. \par Reports patch release pain the next day. She takes Claritin and Tylenol with relief. \par Patient admits constipation uses MiraLAX however takes 6 days after treatment to have a bowel movement. \par Denies trouble hearing.  \par Currently on linzess for constipation, baby aspirin 81 mg, Prozac 20mg, multivitamin \par \par 10/19/22 S/P hospitalization for krzysztof fever with 6th cycle of chemotherapy...now recovered\par \par On 11/3/22, patient presents for growth factor teaching. Overall well with no acute concerns. Only complaint is lower back pain. Denies fever, chills, nausea, vomiting, diarrhea, rash, mouth sores or any signs of active bleeding. Denies chest pain or B/L LE edema. \par \par 12/20/22: Patient is seen for the first follow up after Auto PBSCT.Today is Day +25 post Auto PBSCT.She states that she continues to feel fatigued, has pain on right calf since the past 3 days.Denies fever, chills, SOB,chest pain, edema,vomiting, rash, mouth sores or any signs of active bleeding. She states that she has occasional nausea relieved with Zofran.Remains compliant with diet and crowd restrictions. Remains compliant with medications as prescribed.\par \par 1/3/23:Today is Day +39 post Auto PBSCT.Denies fever, chills, chest pain, vomiting, diarrhea,rash,mouth sores, edema or any signs of active bleeding.She has occasional nausea relieved with Zofran.She has a  dry cough since past few days and has SOB with exertion.Remains compliant with medications as prescribed. Remains compliant with diet and crowd restrictions.\par \par 1/17/23:Patient is seen for a follow up visit today.Today is Day + 53 post auto transplant...Denies fever, chills, SOB,chest pain,vomiting,diarrhea,rash,mouth sores or any signs of active bleeding.She states that she feels nauseous at times ...using zofran..She states that she has mild pain by the the wire of the right CW mediport..will follow....Remains compliant with medications.Remains compliant with diet and crowd restrictions. \par \par 2/8//23: post Auto PBSCT.Denies fever,chills,SOB,chest pain,nausea,vomiting, diarrhea,rash,mouth sores,edema or any signs of active bleeding at this tiome.....did have flu symptoms last week.....She has occasional nausea relieved with Zofran.Remains compliant with medications.Remains compliant with diet and crowd restrictions.She still has mild pain by the wire of the CW mediport...seen by IR...may be related to weight loss\par \par 2/21/23:Today is Day + 88 post Auto PBSCT.She is here for a follow up visit accompanied by her . Denies fever,chills,SOB,chest pain, edema,mouth sores, rash,vomiting, diarrhea or any signs of active bleeding. She takes Zofran once daily for nausea with relief.She still has mild pain on B/L LE with activities.Remains compliant with diet and crowd restrictions.Remains compliant with medications as prescribed.\par \par 3/7/23: Patient is seen for a follow up visit.Today is DAY + 116  post Auto PBSCT.She had fever,fatigue and diarrhea for 2 days after receiving Moderna #1 series on 2/26/23. Denies fever, chills now.Denies SOB,nausea, vomiting, diarrhea,chest pain,mouth sores,rash,edema or any signs of active bleeding.She has an occasional dry cough.\par \par 4/18/23 Overall improved..no new issues...CRYSTAL with last scan

## 2023-05-02 ENCOUNTER — APPOINTMENT (OUTPATIENT)
Dept: HEMATOLOGY ONCOLOGY | Facility: CLINIC | Age: 58
End: 2023-05-02

## 2023-05-03 ENCOUNTER — APPOINTMENT (OUTPATIENT)
Age: 58
End: 2023-05-03

## 2023-05-16 ENCOUNTER — APPOINTMENT (OUTPATIENT)
Dept: HEMATOLOGY ONCOLOGY | Facility: CLINIC | Age: 58
End: 2023-05-16

## 2023-05-16 ENCOUNTER — OUTPATIENT (OUTPATIENT)
Dept: OUTPATIENT SERVICES | Facility: HOSPITAL | Age: 58
LOS: 1 days | Discharge: HOME | End: 2023-05-16

## 2023-05-16 DIAGNOSIS — C84.40 PERIPHERAL T-CELL LYMPHOMA, NOT ELSEWHERE CLASSIFIED, UNSPECIFIED SITE: ICD-10-CM

## 2023-05-30 ENCOUNTER — RESULT REVIEW (OUTPATIENT)
Age: 58
End: 2023-05-30

## 2023-05-30 ENCOUNTER — APPOINTMENT (OUTPATIENT)
Dept: HEMATOLOGY ONCOLOGY | Facility: CLINIC | Age: 58
End: 2023-05-30
Payer: COMMERCIAL

## 2023-05-30 ENCOUNTER — APPOINTMENT (OUTPATIENT)
Dept: INFUSION THERAPY | Facility: HOSPITAL | Age: 58
End: 2023-05-30

## 2023-05-30 VITALS
TEMPERATURE: 96.6 F | SYSTOLIC BLOOD PRESSURE: 103 MMHG | OXYGEN SATURATION: 96 % | DIASTOLIC BLOOD PRESSURE: 66 MMHG | RESPIRATION RATE: 16 BRPM | HEART RATE: 80 BPM | BODY MASS INDEX: 27.62 KG/M2 | WEIGHT: 150.99 LBS

## 2023-05-30 LAB
BASOPHILS # BLD AUTO: 0.02 K/UL — SIGNIFICANT CHANGE UP (ref 0–0.2)
BASOPHILS NFR BLD AUTO: 0.5 % — SIGNIFICANT CHANGE UP (ref 0–2)
EOSINOPHIL # BLD AUTO: 0.08 K/UL — SIGNIFICANT CHANGE UP (ref 0–0.5)
EOSINOPHIL NFR BLD AUTO: 1.9 % — SIGNIFICANT CHANGE UP (ref 0–6)
HCT VFR BLD CALC: 38.2 % — SIGNIFICANT CHANGE UP (ref 34.5–45)
HGB BLD-MCNC: 12.3 G/DL — SIGNIFICANT CHANGE UP (ref 11.5–15.5)
IMM GRANULOCYTES NFR BLD AUTO: 0.2 % — SIGNIFICANT CHANGE UP (ref 0–0.9)
LYMPHOCYTES # BLD AUTO: 1.18 K/UL — SIGNIFICANT CHANGE UP (ref 1–3.3)
LYMPHOCYTES # BLD AUTO: 27.4 % — SIGNIFICANT CHANGE UP (ref 13–44)
MCHC RBC-ENTMCNC: 29.6 PG — SIGNIFICANT CHANGE UP (ref 27–34)
MCHC RBC-ENTMCNC: 32.2 G/DL — SIGNIFICANT CHANGE UP (ref 32–36)
MCV RBC AUTO: 91.8 FL — SIGNIFICANT CHANGE UP (ref 80–100)
MONOCYTES # BLD AUTO: 0.38 K/UL — SIGNIFICANT CHANGE UP (ref 0–0.9)
MONOCYTES NFR BLD AUTO: 8.8 % — SIGNIFICANT CHANGE UP (ref 2–14)
NEUTROPHILS # BLD AUTO: 2.64 K/UL — SIGNIFICANT CHANGE UP (ref 1.8–7.4)
NEUTROPHILS NFR BLD AUTO: 61.2 % — SIGNIFICANT CHANGE UP (ref 43–77)
NRBC # BLD: 0 /100 WBCS — SIGNIFICANT CHANGE UP (ref 0–0)
PLATELET # BLD AUTO: 237 K/UL — SIGNIFICANT CHANGE UP (ref 150–400)
RBC # BLD: 4.16 M/UL — SIGNIFICANT CHANGE UP (ref 3.8–5.2)
RBC # FLD: 15 % — HIGH (ref 10.3–14.5)
WBC # BLD: 4.31 K/UL — SIGNIFICANT CHANGE UP (ref 3.8–10.5)
WBC # FLD AUTO: 4.31 K/UL — SIGNIFICANT CHANGE UP (ref 3.8–10.5)

## 2023-05-30 PROCEDURE — 99215 OFFICE O/P EST HI 40 MIN: CPT

## 2023-05-31 LAB
ALBUMIN SERPL ELPH-MCNC: 4.9 G/DL
ALP BLD-CCNC: 108 U/L
ALT SERPL-CCNC: 23 U/L
ANION GAP SERPL CALC-SCNC: 11 MMOL/L
AST SERPL-CCNC: 32 U/L
BILIRUB SERPL-MCNC: 0.6 MG/DL
BUN SERPL-MCNC: 21 MG/DL
CALCIUM SERPL-MCNC: 10.5 MG/DL
CHLORIDE SERPL-SCNC: 103 MMOL/L
CO2 SERPL-SCNC: 26 MMOL/L
CREAT SERPL-MCNC: 0.76 MG/DL
EGFR: 92 ML/MIN/1.73M2
GLUCOSE SERPL-MCNC: 115 MG/DL
LDH SERPL-CCNC: 197 U/L
MAGNESIUM SERPL-MCNC: 2.2 MG/DL
POTASSIUM SERPL-SCNC: 5.5 MMOL/L
PROT SERPL-MCNC: 7.3 G/DL
SODIUM SERPL-SCNC: 140 MMOL/L

## 2023-06-05 NOTE — PHYSICAL EXAM
[Restricted in physically strenuous activity but ambulatory and able to carry out work of a light or sedentary nature] : Status 1- Restricted in physically strenuous activity but ambulatory and able to carry out work of a light or sedentary nature, e.g., light house work, office work [Normal] : affect appropriate [de-identified] : port in place-flushed mid march 2023..;no tenderness or redness on the port site but has occasional mild pain by the wire of the port;port study WDL [de-identified] : No edema

## 2023-06-05 NOTE — REVIEW OF SYSTEMS
[Muscle Pain] : muscle pain [Negative] : Allergic/Immunologic [Fever] : no fever [Chills] : no chills [Night Sweats] : no night sweats [Fatigue] : no fatigue [Recent Change In Weight] : ~T no recent weight change [Cough] : no cough [SOB on Exertion] : no shortness of breath during exertion [Abdominal Pain] : no abdominal pain [Vomiting] : no vomiting [Constipation] : no constipation [FreeTextEntry7] : Occasional nausea [FreeTextEntry9] : Mild muscle pain on B/L LE with activities improved

## 2023-06-05 NOTE — HISTORY OF PRESENT ILLNESS
[de-identified] : 57 year old female with no significant past medical hx presents to office for initial visit for high dose chemtherapy and auto stem cell transplant....  Patient presented on 3/4/22 c/o right axilla mass  to general surgeon, Dr. Amadeo Grace. Patient incidentally discovered it while shavingin Jan 2022  \par Biopsy of right axilla performed on 3/22/22 revealing atypical lymphoid infiltrate ( NEGATIVE for a clonal lgH gene rearrangement/ Positive clonal TCR gamma gene rearrangement )\par \par Planned for lymph node excision with Dr. Grace however patient decided to stay in the [facility] system and had Excisional Biopsy per Dr Mariano \par Colonoscopy November 2021\par \par LN excional BIopsy was delayed 2/2 to her travelling and pre surg testing.\par On review of needle biopsy from 3/22/22 at [facility], suggestive of T cell lymphoma\par \par S/p right axillary lymph node excisional biopsy on 5/16/22  per DR Mariano Path c/w Peripheral T- Cell Lymphoma, NOS\par PET SCAN 4/21/22: \par - 1. FDG avid extensive right axillary lymphadenopathy 5.4 x 3.6 cm , SUV 22.5. and right supraclavicular lymph node.0.8 x 0.8 cm (image 53), SUV 5.8.\par 2. Difficult to delineate retroareolar right breast soft tissue, with low-grade FDG avidity and minimally FDG avid diffuse, right breast cutaneous thickening. \par \par Breast node biopsy performed February 2022 was negative. \par MRI Breast on 5/26/22 with benign findings repeat in 6 months\par \par Recent Hospital Discharge Summary as follows:\par Discharge Date    16-Dec-2022\par Admission Date    16-Nov-2022 \par Reason for Admission    Autologous peripheral blood stem cell transplant with high\par dose CBV prep regimen for treatment of peripheral T cell lymphoma\par Hospital Course   \par This is a 57 year old female with peripheral T cell lymphoma status post CHOEP\par x 6 admitted for an autologous peripheral blood stem cell transplant with high\par dose CBV prep regimen. Hematologic history as follows: initially presented on\par 3/4/22 with right axilla mass to general surgeon. Biopsy on 3/22/22 showed\par atypical lymphoid infiltrate. She later had an excisional biopsy on 5/16/22\par which confirmed peripheral T cell lymphoma.\par \par Upon admission, a TLC was placed in IR. Ms. Johnston received IV hydration, pain\par management, nutritional support and antibacterial / antiviral / antifungal / GI\par / PCP and VOD (SOS) prophylaxis. Labs were monitored on a daily basis and she\par received electrolyte repletion and transfusional support as needed.\par While admitted Ms. Johnston experienced pancytopenia related to the high dose\par chemotherapy prep regimen. When she became neutropenic she was started on\par prophylactic ciprofloxacin. She also experienced neutropenic fevers. When she\par became febrile, blood and urine cultures were sent, a CXR completed and the\par ciprofloxacin was changed to cefepime. The infectious work up remained\par negative.  Ms. Johnston had a diffuse, macular, erythematous rash secondary to\par kepivance. The 3rd dose of kepivance was held post transplant.\par \par On 11/25/22, after pre-medication, Ms. Johnston received 316ml of autologous,\par pooled, thawed, washed, mobilized, plasma reduced, HPC apheresis over\par approximately 1 hour. Cell counts as follows:\par Total MNCs (x10^8/kg) = 6.67\par CD34+ cells (10^6/kg) = 5.46\par Cell Viability (%) = 80%\par She tolerated the infusion well with no adverse reactions noted.\par Engraftment was noted on 12/7/22. The cefepime was discontinued 12/7/22. The\par zarxio was discontinued 12/9/22. Post engraftment, Ms. Johnston remained\par intermittently febrile, likely secondary to engraftment. She was observed off\par antibiotics. On 12/9/22, the mediport was accessed, cultures sent, and the TLC\par discontinued. She remained intermittently febrile with a negative infectious\par work up. The source of the fever was thought to be engraftment, and she was\par treated with a short course of dexamethasone with good effect.\par Currently, Ms. Johnston is stable for discharge home with outpatient follow up at\par the [facility]. The mediport was flushed with hep-lock prior to\par discharge 12/16/22.\par \par  [de-identified] : Patient presents accompanied with .\par S/p right axillary lymph node excisional biopsy on 5/16/22  per DR Mariano Path c/w Peripheral T- Cell Lymphoma, NOS\par Patient started  CHOEP on 6/20/22, C4  8/22/22\par \par She reports increased fatigue and insomnia 2/2 to steriod after first week of  treatment.\par She takes a nap during the day but is able to get up and be active. \par Reports patch release pain the next day. She takes Claritin and Tylenol with relief. \par Patient admits constipation uses MiraLAX however takes 6 days after treatment to have a bowel movement. \par Denies trouble hearing.  \par Currently on linzess for constipation, baby aspirin 81 mg, Prozac 20mg, multivitamin \par \par 10/19/22 S/P hospitalization for krzysztof fever with 6th cycle of chemotherapy...now recovered\par \par On 11/3/22, patient presents for growth factor teaching. Overall well with no acute concerns. Only complaint is lower back pain. Denies fever, chills, nausea, vomiting, diarrhea, rash, mouth sores or any signs of active bleeding. Denies chest pain or B/L LE edema. \par \par 12/20/22: Patient is seen for the first follow up after Auto PBSCT.Today is Day +25 post Auto PBSCT.She states that she continues to feel fatigued, has pain on right calf since the past 3 days.Denies fever, chills, SOB,chest pain, edema,vomiting, rash, mouth sores or any signs of active bleeding. She states that she has occasional nausea relieved with Zofran.Remains compliant with diet and crowd restrictions. Remains compliant with medications as prescribed.\par \par 1/3/23:Today is Day +39 post Auto PBSCT.Denies fever, chills, chest pain, vomiting, diarrhea,rash,mouth sores, edema or any signs of active bleeding.She has occasional nausea relieved with Zofran.She has a  dry cough since past few days and has SOB with exertion.Remains compliant with medications as prescribed. Remains compliant with diet and crowd restrictions.\par \par 1/17/23:Patient is seen for a follow up visit today.Today is Day + 53 post auto transplant...Denies fever, chills, SOB,chest pain,vomiting,diarrhea,rash,mouth sores or any signs of active bleeding.She states that she feels nauseous at times ...using zofran..She states that she has mild pain by the the wire of the right CW mediport..will follow....Remains compliant with medications.Remains compliant with diet and crowd restrictions. \par \par 2/8//23: post Auto PBSCT.Denies fever,chills,SOB,chest pain,nausea,vomiting, diarrhea,rash,mouth sores,edema or any signs of active bleeding at this tiome.....did have flu symptoms last week.....She has occasional nausea relieved with Zofran.Remains compliant with medications.Remains compliant with diet and crowd restrictions.She still has mild pain by the wire of the CW mediport...seen by IR...may be related to weight loss\par \par 2/21/23:Today is Day + 88 post Auto PBSCT.She is here for a follow up visit accompanied by her . Denies fever,chills,SOB,chest pain, edema,mouth sores, rash,vomiting, diarrhea or any signs of active bleeding. She takes Zofran once daily for nausea with relief.She still has mild pain on B/L LE with activities.Remains compliant with diet and crowd restrictions.Remains compliant with medications as prescribed.\par \par 3/7/23: Patient is seen for a follow up visit.Today is DAY + 116  post Auto PBSCT.She had fever,fatigue and diarrhea for 2 days after receiving Moderna #1 series on 2/26/23. Denies fever, chills now.Denies SOB,nausea, vomiting, diarrhea,chest pain,mouth sores,rash,edema or any signs of active bleeding.She has an occasional dry cough.\par \par 5/30//23 Overall improved..no new issues...CRYSTAL with last scan

## 2023-07-06 ENCOUNTER — APPOINTMENT (OUTPATIENT)
Dept: FAMILY MEDICINE | Facility: CLINIC | Age: 58
End: 2023-07-06
Payer: COMMERCIAL

## 2023-07-06 VITALS
BODY MASS INDEX: 27.42 KG/M2 | DIASTOLIC BLOOD PRESSURE: 70 MMHG | HEIGHT: 62 IN | OXYGEN SATURATION: 98 % | SYSTOLIC BLOOD PRESSURE: 110 MMHG | WEIGHT: 149 LBS | HEART RATE: 65 BPM

## 2023-07-06 DIAGNOSIS — Z13.220 ENCOUNTER FOR SCREENING FOR LIPOID DISORDERS: ICD-10-CM

## 2023-07-06 DIAGNOSIS — K59.09 OTHER CONSTIPATION: ICD-10-CM

## 2023-07-06 DIAGNOSIS — Z13.21 ENCOUNTER FOR SCREENING FOR NUTRITIONAL DISORDER: ICD-10-CM

## 2023-07-06 DIAGNOSIS — Z23 ENCOUNTER FOR IMMUNIZATION: ICD-10-CM

## 2023-07-06 DIAGNOSIS — Z80.0 FAMILY HISTORY OF MALIGNANT NEOPLASM OF DIGESTIVE ORGANS: ICD-10-CM

## 2023-07-06 DIAGNOSIS — Z13.1 ENCOUNTER FOR SCREENING FOR DIABETES MELLITUS: ICD-10-CM

## 2023-07-06 DIAGNOSIS — Z13.29 ENCOUNTER FOR SCREENING FOR OTHER SUSPECTED ENDOCRINE DISORDER: ICD-10-CM

## 2023-07-06 DIAGNOSIS — Z87.898 PERSONAL HISTORY OF OTHER SPECIFIED CONDITIONS: ICD-10-CM

## 2023-07-06 PROCEDURE — 36415 COLL VENOUS BLD VENIPUNCTURE: CPT

## 2023-07-06 PROCEDURE — 99386 PREV VISIT NEW AGE 40-64: CPT | Mod: 25

## 2023-07-06 PROCEDURE — G0444 DEPRESSION SCREEN ANNUAL: CPT | Mod: 59

## 2023-07-06 RX ORDER — FOLIC ACID 1 MG/1
1 TABLET ORAL DAILY
Qty: 30 | Refills: 5 | Status: DISCONTINUED | COMMUNITY
Start: 2022-12-20 | End: 2023-07-06

## 2023-07-06 RX ORDER — FLUCONAZOLE 200 MG/1
200 TABLET ORAL
Qty: 60 | Refills: 0 | Status: DISCONTINUED | COMMUNITY
Start: 2022-12-20 | End: 2023-07-06

## 2023-07-06 RX ORDER — PANTOPRAZOLE 40 MG/1
40 TABLET, DELAYED RELEASE ORAL DAILY
Qty: 30 | Refills: 3 | Status: DISCONTINUED | COMMUNITY
Start: 2022-12-20 | End: 2023-07-06

## 2023-07-06 RX ORDER — OSELTAMIVIR PHOSPHATE 75 MG/1
75 CAPSULE ORAL TWICE DAILY
Qty: 10 | Refills: 0 | Status: DISCONTINUED | COMMUNITY
Start: 2023-01-27 | End: 2023-07-06

## 2023-07-06 RX ORDER — ACYCLOVIR 400 MG/1
400 TABLET ORAL
Qty: 90 | Refills: 3 | Status: DISCONTINUED | COMMUNITY
Start: 2022-12-20 | End: 2023-07-06

## 2023-07-06 RX ORDER — ATOVAQUONE 750 MG/5ML
750 SUSPENSION ORAL
Qty: 1 | Refills: 3 | Status: DISCONTINUED | COMMUNITY
Start: 2022-12-20 | End: 2023-07-06

## 2023-07-06 NOTE — PHYSICAL EXAM
Echo does not need pre auth  See referral shell encounter 
July from pre-encounters called checking on Prior authorization for MRI  MRI 4/22/21 @ 2:00 pm   Call Jeffery Suarez @ [phone] with reference # 
Patient needs prior authorization for for echo on 4/16/21 and MRI on 4/22/21 
Prior Auth started and July notified
Pt called to state that MRI r/s to 5/3/21 due to p/a still pending; Clinical information faxed today to fax#[phone]
[Normal] : affect was normal and insight and judgment were intact

## 2023-07-11 DIAGNOSIS — E55.9 VITAMIN D DEFICIENCY, UNSPECIFIED: ICD-10-CM

## 2023-07-11 LAB
25(OH)D3 SERPL-MCNC: 24.3 NG/ML
ALBUMIN SERPL ELPH-MCNC: 4.6 G/DL
ALP BLD-CCNC: 103 U/L
ALT SERPL-CCNC: 18 U/L
ANION GAP SERPL CALC-SCNC: 12 MMOL/L
APPEARANCE: CLEAR
AST SERPL-CCNC: 21 U/L
BACTERIA: NEGATIVE /HPF
BILIRUB SERPL-MCNC: 0.4 MG/DL
BILIRUBIN URINE: NEGATIVE
BLOOD URINE: NEGATIVE
BUN SERPL-MCNC: 12 MG/DL
CALCIUM SERPL-MCNC: 9.6 MG/DL
CAST: 0 /LPF
CHLORIDE SERPL-SCNC: 104 MMOL/L
CHOLEST SERPL-MCNC: 188 MG/DL
CO2 SERPL-SCNC: 25 MMOL/L
COLOR: NORMAL
CREAT SERPL-MCNC: 0.69 MG/DL
EGFR: 102 ML/MIN/1.73M2
EPITHELIAL CELLS: 0 /HPF
ESTIMATED AVERAGE GLUCOSE: 105 MG/DL
GLUCOSE QUALITATIVE U: NEGATIVE MG/DL
GLUCOSE SERPL-MCNC: 91 MG/DL
HBA1C MFR BLD HPLC: 5.3 %
HDLC SERPL-MCNC: 55 MG/DL
KETONES URINE: ABNORMAL MG/DL
LDLC SERPL CALC-MCNC: 109 MG/DL
LEUKOCYTE ESTERASE URINE: ABNORMAL
MICROSCOPIC-UA: NORMAL
NITRITE URINE: NEGATIVE
NONHDLC SERPL-MCNC: 133 MG/DL
PH URINE: 5.5
POTASSIUM SERPL-SCNC: 4.4 MMOL/L
PROT SERPL-MCNC: 6.7 G/DL
PROTEIN URINE: NEGATIVE MG/DL
RED BLOOD CELLS URINE: 6 /HPF
REVIEW: NORMAL
SODIUM SERPL-SCNC: 141 MMOL/L
SPECIFIC GRAVITY URINE: 1.02
TRIGL SERPL-MCNC: 117 MG/DL
TSH SERPL-ACNC: 0.73 UIU/ML
UROBILINOGEN URINE: 0.2 MG/DL
WHITE BLOOD CELLS URINE: 0 /HPF

## 2023-07-16 ENCOUNTER — OUTPATIENT (OUTPATIENT)
Dept: OUTPATIENT SERVICES | Facility: HOSPITAL | Age: 58
LOS: 1 days | End: 2023-07-16

## 2023-07-16 ENCOUNTER — APPOINTMENT (OUTPATIENT)
Dept: NUCLEAR MEDICINE | Facility: CLINIC | Age: 58
End: 2023-07-16
Payer: COMMERCIAL

## 2023-07-16 DIAGNOSIS — Z00.8 ENCOUNTER FOR OTHER GENERAL EXAMINATION: ICD-10-CM

## 2023-07-16 DIAGNOSIS — Z94.84 STEM CELLS TRANSPLANT STATUS: ICD-10-CM

## 2023-07-16 DIAGNOSIS — C85.90 NON-HODGKIN LYMPHOMA, UNSPECIFIED, UNSPECIFIED SITE: ICD-10-CM

## 2023-07-16 PROCEDURE — 78815 PET IMAGE W/CT SKULL-THIGH: CPT | Mod: 26,PS

## 2023-07-19 ENCOUNTER — OUTPATIENT (OUTPATIENT)
Dept: OUTPATIENT SERVICES | Facility: HOSPITAL | Age: 58
LOS: 1 days | Discharge: HOME | End: 2023-07-19

## 2023-07-19 DIAGNOSIS — C85.90 NON-HODGKIN LYMPHOMA, UNSPECIFIED, UNSPECIFIED SITE: ICD-10-CM

## 2023-07-21 ENCOUNTER — APPOINTMENT (OUTPATIENT)
Dept: FAMILY MEDICINE | Facility: CLINIC | Age: 58
End: 2023-07-21
Payer: COMMERCIAL

## 2023-07-21 ENCOUNTER — RESULT REVIEW (OUTPATIENT)
Age: 58
End: 2023-07-21

## 2023-07-21 VITALS
OXYGEN SATURATION: 96 % | BODY MASS INDEX: 27.6 KG/M2 | HEIGHT: 62 IN | HEART RATE: 80 BPM | DIASTOLIC BLOOD PRESSURE: 82 MMHG | SYSTOLIC BLOOD PRESSURE: 118 MMHG | WEIGHT: 150 LBS

## 2023-07-21 DIAGNOSIS — R10.9 UNSPECIFIED ABDOMINAL PAIN: ICD-10-CM

## 2023-07-21 DIAGNOSIS — R82.4 ACETONURIA: ICD-10-CM

## 2023-07-21 PROCEDURE — 99214 OFFICE O/P EST MOD 30 MIN: CPT | Mod: 25

## 2023-07-21 PROCEDURE — 36415 COLL VENOUS BLD VENIPUNCTURE: CPT

## 2023-07-21 NOTE — ASSESSMENT
[FreeTextEntry1] : Annual physical: f/u routine labwork\par Colon CA screening: repeat due in 2026\par T-cell lymphoma: Rt axilla mass 3/22, T-cell lymphoma on excisional biopsy 5/22, MRI breast birads 3 10/22, repeat in 10/23, s/p transplant in Nov 22, f/u oncology\par Chronic constipation: c/w current regimen, f/u GI\par BMI 27: Recommend low fat diet, wt loss, exercise, and DASH diet\par RTC 3 wks

## 2023-07-21 NOTE — PHYSICAL EXAM
[Soft] : abdomen soft [Declined Rectal Exam] : declined rectal exam [Normal] : affect was normal and insight and judgment were intact [de-identified] : mild abdominal tenderness right side of abdomen, no guarding/rigidity/rebound tenderness

## 2023-07-21 NOTE — ASSESSMENT
[FreeTextEntry1] : Right sided abdominal pain: persistent, began 3 - 4 wks ago, hx of gallbladder and appendix removal, no guarding/rigidity/rebound tenderness, may be msk etiology, f/u CT, f/u labwork\par T-cell lymphoma: recent PET, f/u oncology\par Urine ketones: recommend increased hydration, f/u repeat UA\par Hematuria: f/u repeat UA/Ucx\par RTC 1 wk

## 2023-07-21 NOTE — HISTORY OF PRESENT ILLNESS
[FreeTextEntry1] : NP-CPE [de-identified] : 57 yo female presents for annual physical. She has a hx of T-cell lymphoma s/p BM transplant in Nov 2022. Denies fever, chills, cp, palpitations, sob, nvcd.\par

## 2023-07-21 NOTE — REVIEW OF SYSTEMS
[Abdominal Pain] : abdominal pain [Constipation] : no constipation [Diarrhea] : diarrhea [Vomiting] : no vomiting [Heartburn] : no heartburn [Melena] : no melena [Negative] : Psychiatric

## 2023-07-21 NOTE — HEALTH RISK ASSESSMENT
[Patient reported mammogram was normal] : Patient reported mammogram was normal [Good] : ~his/her~  mood as  good [Yes] : Yes [Monthly or less (1 pt)] : Monthly or less (1 point) [1 or 2 (0 pts)] : 1 or 2 (0 points) [Never (0 pts)] : Never (0 points) [No] : In the past 12 months have you used drugs other than those required for medical reasons? No [No falls in past year] : Patient reported no falls in the past year [1] : 1) Little interest or pleasure doing things for several days (1) [0] : 2) Feeling down, depressed, or hopeless: Not at all (0) [PHQ-2 Negative - No further assessment needed] : PHQ-2 Negative - No further assessment needed [Patient reported PAP Smear was normal] : Patient reported PAP Smear was normal [Patient reported colonoscopy was normal] : Patient reported colonoscopy was normal [HIV test declined] : HIV test declined [Hepatitis C test declined] : Hepatitis C test declined [With Family] : lives with family [Unemployed] : unemployed [] :  [Sexually Active] : sexually active [Feels Safe at Home] : Feels safe at home [Never] : Never [Audit-CScore] : 1 [de-identified] : needs improvement [de-identified] : needs improvement [[id number]] : 1 [High Risk Behavior] : no high risk behavior [Reports changes in hearing] : Reports no changes in hearing [Reports changes in vision] : Reports no changes in vision [Reports changes in dental health] : Reports no changes in dental health [MammogramDate] : 03/23 [PapSmearDate] : 10/22 [ColonoscopyDate] : 11/21 [ColonoscopyComments] : repeat 5 years- Dr. Mac

## 2023-07-21 NOTE — HISTORY OF PRESENT ILLNESS
[FreeTextEntry1] : Follow Up [de-identified] : 59 yo female presents with complaint of RLQ pain, pain is 4/10 in severity, has been going on for 4 wks, constant. She reports no diarrhea, constipation or vomiting. She has a hx of T-cell lymphoma s/p BM transplant Nov 2022, s/p CHOEP. Denies fever, chills, cp, palpitations, sob. \par

## 2023-07-22 ENCOUNTER — APPOINTMENT (OUTPATIENT)
Dept: CT IMAGING | Facility: CLINIC | Age: 58
End: 2023-07-22
Payer: COMMERCIAL

## 2023-07-22 ENCOUNTER — OUTPATIENT (OUTPATIENT)
Dept: OUTPATIENT SERVICES | Facility: HOSPITAL | Age: 58
LOS: 1 days | End: 2023-07-22
Payer: COMMERCIAL

## 2023-07-22 DIAGNOSIS — C85.90 NON-HODGKIN LYMPHOMA, UNSPECIFIED, UNSPECIFIED SITE: ICD-10-CM

## 2023-07-22 DIAGNOSIS — R10.9 UNSPECIFIED ABDOMINAL PAIN: ICD-10-CM

## 2023-07-22 PROCEDURE — 74177 CT ABD & PELVIS W/CONTRAST: CPT

## 2023-07-22 PROCEDURE — 74177 CT ABD & PELVIS W/CONTRAST: CPT | Mod: 26

## 2023-07-23 LAB
ALBUMIN SERPL ELPH-MCNC: 4.8 G/DL
ALP BLD-CCNC: 108 U/L
ALT SERPL-CCNC: 18 U/L
AMYLASE/CREAT SERPL: 40 U/L
ANION GAP SERPL CALC-SCNC: 12 MMOL/L
APPEARANCE: CLEAR
AST SERPL-CCNC: 22 U/L
BACTERIA: NEGATIVE /HPF
BILIRUB SERPL-MCNC: 0.4 MG/DL
BILIRUBIN URINE: NEGATIVE
BLOOD URINE: NEGATIVE
BUN SERPL-MCNC: 12 MG/DL
CALCIUM SERPL-MCNC: 10 MG/DL
CAST: 0 /LPF
CHLORIDE SERPL-SCNC: 105 MMOL/L
CO2 SERPL-SCNC: 24 MMOL/L
COLOR: YELLOW
CREAT SERPL-MCNC: 0.67 MG/DL
EGFR: 103 ML/MIN/1.73M2
EPITHELIAL CELLS: 0 /HPF
GLIADIN IGA SER QL: <5 UNITS
GLIADIN IGG SER QL: <5 UNITS
GLIADIN PEPTIDE IGA SER-ACNC: NEGATIVE
GLIADIN PEPTIDE IGG SER-ACNC: NEGATIVE
GLUCOSE QUALITATIVE U: NEGATIVE MG/DL
GLUCOSE SERPL-MCNC: 97 MG/DL
KETONES URINE: NEGATIVE MG/DL
LEUKOCYTE ESTERASE URINE: NEGATIVE
LPL SERPL-CCNC: 25 U/L
MICROSCOPIC-UA: NORMAL
NITRITE URINE: NEGATIVE
PH URINE: 7.5
POTASSIUM SERPL-SCNC: 4.7 MMOL/L
PROT SERPL-MCNC: 7 G/DL
PROTEIN URINE: NEGATIVE MG/DL
RED BLOOD CELLS URINE: 0 /HPF
SODIUM SERPL-SCNC: 142 MMOL/L
SPECIFIC GRAVITY URINE: 1.01
TSH SERPL-ACNC: 1.41 UIU/ML
TTG IGA SER IA-ACNC: 1.3 U/ML
TTG IGA SER-ACNC: NEGATIVE
TTG IGG SER IA-ACNC: 4 U/ML
TTG IGG SER IA-ACNC: NEGATIVE
UROBILINOGEN URINE: 0.2 MG/DL
WHITE BLOOD CELLS URINE: 0 /HPF

## 2023-07-25 LAB — BACTERIA UR CULT: NORMAL

## 2023-07-28 ENCOUNTER — RESULT REVIEW (OUTPATIENT)
Age: 58
End: 2023-07-28

## 2023-07-28 ENCOUNTER — APPOINTMENT (OUTPATIENT)
Dept: INFUSION THERAPY | Facility: HOSPITAL | Age: 58
End: 2023-07-28

## 2023-07-28 ENCOUNTER — APPOINTMENT (OUTPATIENT)
Dept: HEMATOLOGY ONCOLOGY | Facility: CLINIC | Age: 58
End: 2023-07-28
Payer: COMMERCIAL

## 2023-07-28 VITALS
BODY MASS INDEX: 27.91 KG/M2 | TEMPERATURE: 97.4 F | SYSTOLIC BLOOD PRESSURE: 103 MMHG | HEIGHT: 62 IN | HEART RATE: 65 BPM | OXYGEN SATURATION: 100 % | WEIGHT: 151.66 LBS | RESPIRATION RATE: 16 BRPM | DIASTOLIC BLOOD PRESSURE: 67 MMHG

## 2023-07-28 LAB
ALBUMIN SERPL ELPH-MCNC: 4.7 G/DL — SIGNIFICANT CHANGE UP (ref 3.3–5)
ALP SERPL-CCNC: 95 U/L — SIGNIFICANT CHANGE UP (ref 40–120)
ALT FLD-CCNC: 22 U/L — SIGNIFICANT CHANGE UP (ref 10–45)
ANION GAP SERPL CALC-SCNC: 12 MMOL/L — SIGNIFICANT CHANGE UP (ref 5–17)
AST SERPL-CCNC: 24 U/L — SIGNIFICANT CHANGE UP (ref 10–40)
BASOPHILS # BLD AUTO: 0.04 K/UL — SIGNIFICANT CHANGE UP (ref 0–0.2)
BASOPHILS NFR BLD AUTO: 0.8 % — SIGNIFICANT CHANGE UP (ref 0–2)
BILIRUB SERPL-MCNC: 0.4 MG/DL — SIGNIFICANT CHANGE UP (ref 0.2–1.2)
BUN SERPL-MCNC: 14 MG/DL — SIGNIFICANT CHANGE UP (ref 7–23)
CALCIUM SERPL-MCNC: 9.9 MG/DL — SIGNIFICANT CHANGE UP (ref 8.4–10.5)
CHLORIDE SERPL-SCNC: 103 MMOL/L — SIGNIFICANT CHANGE UP (ref 96–108)
CO2 SERPL-SCNC: 25 MMOL/L — SIGNIFICANT CHANGE UP (ref 22–31)
CREAT SERPL-MCNC: 0.69 MG/DL — SIGNIFICANT CHANGE UP (ref 0.5–1.3)
EGFR: 102 ML/MIN/1.73M2 — SIGNIFICANT CHANGE UP
EOSINOPHIL # BLD AUTO: 0.1 K/UL — SIGNIFICANT CHANGE UP (ref 0–0.5)
EOSINOPHIL NFR BLD AUTO: 1.9 % — SIGNIFICANT CHANGE UP (ref 0–6)
GLUCOSE SERPL-MCNC: 113 MG/DL — HIGH (ref 70–99)
HCT VFR BLD CALC: 37 % — SIGNIFICANT CHANGE UP (ref 34.5–45)
HGB BLD-MCNC: 12 G/DL — SIGNIFICANT CHANGE UP (ref 11.5–15.5)
IMM GRANULOCYTES NFR BLD AUTO: 0.2 % — SIGNIFICANT CHANGE UP (ref 0–0.9)
LDH SERPL L TO P-CCNC: 148 U/L — SIGNIFICANT CHANGE UP (ref 50–242)
LYMPHOCYTES # BLD AUTO: 1.13 K/UL — SIGNIFICANT CHANGE UP (ref 1–3.3)
LYMPHOCYTES # BLD AUTO: 21.4 % — SIGNIFICANT CHANGE UP (ref 13–44)
MAGNESIUM SERPL-MCNC: 2.1 MG/DL — SIGNIFICANT CHANGE UP (ref 1.6–2.6)
MCHC RBC-ENTMCNC: 29.3 PG — SIGNIFICANT CHANGE UP (ref 27–34)
MCHC RBC-ENTMCNC: 32.4 G/DL — SIGNIFICANT CHANGE UP (ref 32–36)
MCV RBC AUTO: 90.5 FL — SIGNIFICANT CHANGE UP (ref 80–100)
MONOCYTES # BLD AUTO: 0.44 K/UL — SIGNIFICANT CHANGE UP (ref 0–0.9)
MONOCYTES NFR BLD AUTO: 8.3 % — SIGNIFICANT CHANGE UP (ref 2–14)
NEUTROPHILS # BLD AUTO: 3.56 K/UL — SIGNIFICANT CHANGE UP (ref 1.8–7.4)
NEUTROPHILS NFR BLD AUTO: 67.4 % — SIGNIFICANT CHANGE UP (ref 43–77)
NRBC # BLD: 0 /100 WBCS — SIGNIFICANT CHANGE UP (ref 0–0)
PLATELET # BLD AUTO: 245 K/UL — SIGNIFICANT CHANGE UP (ref 150–400)
POTASSIUM SERPL-MCNC: 4.5 MMOL/L — SIGNIFICANT CHANGE UP (ref 3.5–5.3)
POTASSIUM SERPL-SCNC: 4.5 MMOL/L — SIGNIFICANT CHANGE UP (ref 3.5–5.3)
PROT SERPL-MCNC: 6.8 G/DL — SIGNIFICANT CHANGE UP (ref 6–8.3)
RBC # BLD: 4.09 M/UL — SIGNIFICANT CHANGE UP (ref 3.8–5.2)
RBC # FLD: 13 % — SIGNIFICANT CHANGE UP (ref 10.3–14.5)
SODIUM SERPL-SCNC: 140 MMOL/L — SIGNIFICANT CHANGE UP (ref 135–145)
WBC # BLD: 5.28 K/UL — SIGNIFICANT CHANGE UP (ref 3.8–10.5)
WBC # FLD AUTO: 5.28 K/UL — SIGNIFICANT CHANGE UP (ref 3.8–10.5)

## 2023-07-28 PROCEDURE — 99214 OFFICE O/P EST MOD 30 MIN: CPT

## 2023-07-28 RX ORDER — ALPRAZOLAM 0.5 MG/1
0.5 TABLET ORAL
Qty: 30 | Refills: 0 | Status: DISCONTINUED | COMMUNITY
Start: 2022-12-20 | End: 2023-07-28

## 2023-07-28 NOTE — REVIEW OF SYSTEMS
[Muscle Pain] : muscle pain [Negative] : Allergic/Immunologic [Constipation] : constipation [Fever] : no fever [Chills] : no chills [Night Sweats] : no night sweats [Fatigue] : no fatigue [Recent Change In Weight] : ~T no recent weight change [Cough] : no cough [SOB on Exertion] : no shortness of breath during exertion [Abdominal Pain] : no abdominal pain [Vomiting] : no vomiting [FreeTextEntry9] : Mild muscle pain on B/L LE with activities improved

## 2023-07-28 NOTE — ASSESSMENT
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 Amadeo Grace. Patient incidentally discovered it while shaving in Jan / Feb 2022 \par Biopsy of right axilla performed on 3/22/22 revealing atypical lymphoid infiltrate ( NEGATIVE for a clonal lgH gene rearrangement/ Positive clonal TCR gamma gene rearrangement )\par \par On review of needle biopsy from 3/22/22 at [facility], suggestive of T cell lymphoma\par \par S/p right axillary lymph node excisional biopsy on 5/16/22  per DR Montserrat Knight c/w Peripheral T- Cell Lymphoma, NOS\par \par PET SCAN 4/21/22: \par - 1. FDG avid extensive right axillary lymphadenopathy 5.4 x 3.6 cm , SUV 22.5. and right supraclavicular lymph node.0.8 x 0.8 cm (image 53), SUV 5.8.\par 2. Difficult to delineate retroareolar right breast soft tissue, with low-grade FDG avidity and minimally FDG avid diffuse, right breast cutaneous thickening. \par \par Breast node biopsy performed February 2022 was negative. \par MRI BREAST 5/26/22: BiRADS 3 , f/u in 6 months\par \par PET SCAN 6/13/22: \par 1. Interval resolution of FDG avidity associated with right supraclavicular lymph node which has decreased in size.\par 2. Interval decrease in size, number and avidity of right axillary lymphadenopathy and resolution of  FDG avid right retropectoral lymph nodes.\par 3. Unchanged minimally FDG avid difficult to delineate retroareolar right breast soft tissue and minimally FDG avid diffuse right breast cutaneous thickening. Skin thickening and parenchymal edema in the right breast is consistent with metastatic obstruction on interval MRI from 5/26/2022.\par \par PET/CT 8/8/22 \par 1. Interval further decrease of right axillary lymph nodes.\par 2. Unchanged nonspecific minimally FDG avid retroareolar right breast soft tissue. Interval decreased avidity of unchanged cutaneous thickening in the right breast.\par 3. Subcentimeter nodular opacity in the right upper lobe medially, unchanged since PET/CT on 4/21/2022. \par 4. New generalized diffuse bone marrow hypermetabolism, nonspecific and may be related to treatment. \par \par \par PERIPHERAL T CELL LYMPHOMA NOS Stage 1\par IPI SCORE: 0 LOW RISK \par - Some B symptoms, Night sweats since Jan 2022, fatigue, 6 lb weight loss\par - Discussed with patient Chemotherapy with CHOEP WITH NEULASTA Support \par -D1 :  Cyclophosphamide 750 mg /m2, Adriamycin 50 mg/ m2 Vincristone 1.4mg/ m2, Etoposide 10mg/ m2 IV \par D2- D3 Etoposide 100mg/m2  IV Hydration \par D1-D5 Prednisone 10mg po \par Allopurinol 100mg daily for TLS prophylaxis\par \par \par - Port placement on 6/8/22\par - ECHO on 6/14/22, LV EF 59%\par - Patient started  CHOEP on 6/20/22, C4  8/22/22 now completed 6\par -Constipation; continue MiraLAX. Advised senna and colace. \par -Rash; Continue salicylic acid cream for rash, can try clindamycin ointment if not improved. \par - Restaging PET Scan and bm bx  after 6 cycles...confirms response.....to have f/u MRI of breast\par - Echo in Sept 2022 along with pre transplant testing and dental eval\par MRI BREAST: to be repeated in Nov 2022 \par I had another  extensive discussion regarding the risks, benefits, alternatives, logistics and rationale for high dose chemotherapy and auto stem cell transplant for t cell NHL..4 week hospital stay and 8 week recovery discussed...augmented cbv prep...stem cell mobilization with growth factor and collection discussed...need confirmed negative bm bx...if not consider ICE mobilization...goal 2 to 5 million cd 34 cells per kg...Collection early to mid nov...admit for transplant end nov.Literature and consents provided for review..quests addressed..f/u 3 weeks with transplant team...orientation and sw eval...\par \par 11/3/22\par Patient presents for growth factor teaching.\par CBC stable. No indication for transfusion today.\par Tyrell will receive Zarxio 780 mcg SQ Daily 11/3/22- 11/7/22. Stem cell collection will start on 11/7/22. \par Teach back method used during today's visit. NP administered Zarxio 480 mcg to RLQ abdomen. Lot #[id number] expiration December 2024. Tolerated injection with no adverse reaction.\par Second Zarxio 300 mcg Lot # [id number] Expiration September 2024 administered by spouse to RLQ abdomen. Tolerated injection with no adverse reaction.\par Recommended Claritin daily 11/3/22- 11/7/22. May take Tylenol prn for pain.\par Avoid Advil, Aleve and aspirin.\par Encouraged to increase calcium in diet over the next few days. \par Questions and concerns addressed. Reassurance provided.\par COVID 19 PCR scheduled at [facility] tomorrow.\par 11/7/22 Return to [facility] for Shiley catheter placement and stem cell collection. \par \par 7/28/23:\par T Cell Lymphoma (C85.90)\par S/P  Autologous Stem Cell Transplant (Z94.81) \par On 11/25/22, after pre-medication, Ms. Johnston received 316ml of autologous,\par pooled, thawed, washed, mobilized, plasma reduced, HPC apheresis\par \par 1) T Cell Lymphoma\par  S/P Autologous PBSCT on 11/25/22\par  Labs sent today\par  Send labs with every visit\par  Repeat imaging 4  months post PBSCT..PET CT ordered but denied....ct ordered and reviewed..CRYSTAL..repeate in 4 months on july 16 2023. CRYSTAL....then every 6 months year 2 and 3...pt to review findings with PMD...Next scan due in January 2024 \par  Overall doing well\par \par 2) Heme\par    Counts stable, no transfusion requirements today\par    Continue  Multiple Vitamin and stop Folic acid \par  \par 3) ID\par    Continue prophylaxis unti end of may\par    Acyclovir 400 mg 1 tab orally every 8 hrs \par    Atovaquone 750 mg/5 mL oral suspension 5 milliliter 2 times a day\par    Diflucan 200 mg 2 tabs orally once a day ..STOPPED\par    Post  transplant restrictions reviewed and reinforced in light of COVID-19 Pandemic..ok for take out\par    discussed initial series of COVID vaccine at 90 days post Auto....Scheduled for COVID   vaccines....Received      Moderna # 1 on 2/26/23 , had fever, diarrhea, faitgue for 2 da# 2 scheduled  on 3/26/23 letter for vaccines    given... \par   Will receive PSCT Vaccines: 12 Months Week 1 on 11/27/23 and week on 12/4/23\par                                                14 Months Week 1 on 1/29/24 and Week 2 on 2/5/24\par                                                24 Months Week 1 on 11/25/24,Week 2 on 12/2/24 and Week 3 on 12/9/24\par \par 4) GI\par     Protonix 40 mg oral delayed release 1 tab once a day prn\par     Zofran every 8 hours as needed for nausea \par \par 5)Right calf pain-RESOLVED\par     Continue Magnesium Oxide 400 mg 2 x day\par \par 6) Occasional dry Cough..resolved\par     COVID/RVP- Not detected on 1/3/23\par     Call the center with worsening/persistent cough or onset of new symptoms\par     Advised to monitor temperature for fever\par     OTC Claritin \par \par 7)  Mild pain by the wire of the right CW Mediport\par     Port Study ordered on 1/11/23...done  1/27/23\par     Tylenol as needed for pain\par \par 8) Plan\par  - Colace 2 x day with Senna at bedtime and MiraLAX 1 x day for constipation\par   -B/L LE muscle pain with activity:Continue Magnesium Oxide,call home PT to find out whether she could get some    PT services as she didn't get any after discharge from hospital\par   -POF 6-8 weeks;flushed today (7/28/23)\par  -Educated about plan of care,all questions and concerns addressed\par  -Advised to take Zofran every 8 hours if needed for nausea as she had been taking only if nausea got worse\par  - Advised to call office with any acute concerns\par  - Advised to have small frequent meals/snacks\par  -Reinstated to avoid crowd\par  -Lifted  dietary restrictions: ok restaurant  food at this time, take out  ok... home cooked, prepared/frozen           food. \par  - Additional Instructions: Notify if bleeding; swelling; persistent nausea and vomiting; unable to  urinate; pain not        relieved by medications; fever; numbness, tingling; excessive diarrhea, inability to tolerate liquids  or foods;             increased irritability or sluggishness, rash\par -Scheduled for COVID vaccines....Moderna # 1 on 2/26/23 and # 2 on 3/26/23; had fever and significant body aches with first vaccine\par  -Follow up in Sept with port flush\par \par I examined patient under Dr. Acevedo's supervision and Dr. Acevedo agrees to plan of care as listed above\par \par \par \par \par

## 2023-07-28 NOTE — PHYSICAL EXAM
[Restricted in physically strenuous activity but ambulatory and able to carry out work of a light or sedentary nature] : Status 1- Restricted in physically strenuous activity but ambulatory and able to carry out work of a light or sedentary nature, e.g., light house work, office work [Normal] : affect appropriate [de-identified] : port in place-flushed mid march 2023..;no tenderness or redness on the port site but has occasional mild pain by the wire of the port;port study WDL [de-identified] : No edema

## 2023-07-28 NOTE — HISTORY OF PRESENT ILLNESS
[de-identified] : 57 year old female with no significant past medical hx presents to office for initial visit for high dose chemtherapy and auto stem cell transplant....  Patient presented on 3/4/22 c/o right axilla mass  to general surgeon, Dr. Amadeo Grace. Patient incidentally discovered it while shavingin Jan 2022  \par Biopsy of right axilla performed on 3/22/22 revealing atypical lymphoid infiltrate ( NEGATIVE for a clonal lgH gene rearrangement/ Positive clonal TCR gamma gene rearrangement )\par \par Planned for lymph node excision with Dr. Grace however patient decided to stay in [facility] and had Excisional Biopsy per Dr Mariano \par Colonoscopy November 2021\par \par LN excional BIopsy was delayed 2/2 to her travelling and pre surg testing.\par On review of needle biopsy from 3/22/22 at [facility], suggestive of T cell lymphoma\par \par S/p right axillary lymph node excisional biopsy on 5/16/22  per DR Mariano Path c/w Peripheral T- Cell Lymphoma, NOS\par PET SCAN 4/21/22: \par - 1. FDG avid extensive right axillary lymphadenopathy 5.4 x 3.6 cm , SUV 22.5. and right supraclavicular lymph node.0.8 x 0.8 cm (image 53), SUV 5.8.\par 2. Difficult to delineate retroareolar right breast soft tissue, with low-grade FDG avidity and minimally FDG avid diffuse, right breast cutaneous thickening. \par \par Breast node biopsy performed February 2022 was negative. \par MRI Breast on 5/26/22 with benign findings repeat in 6 months\par \par Recent Hospital Discharge Summary as follows:\par Discharge Date    16-Dec-2022\par Admission Date    16-Nov-2022 \par Reason for Admission    Autologous peripheral blood stem cell transplant with high\par dose CBV prep regimen for treatment of peripheral T cell lymphoma\par Hospital Course   \par This is a 57 year old female with peripheral T cell lymphoma status post CHOEP\par x 6 admitted for an autologous peripheral blood stem cell transplant with high\par dose CBV prep regimen. Hematologic history as follows: initially presented on\par 3/4/22 with right axilla mass to general surgeon. Biopsy on 3/22/22 showed\par atypical lymphoid infiltrate. She later had an excisional biopsy on 5/16/22\par which confirmed peripheral T cell lymphoma.\par \par Upon admission, a TLC was placed in IR. Ms. Johnston received IV hydration, pain\par management, nutritional support and antibacterial / antiviral / antifungal / GI\par / PCP and VOD (SOS) prophylaxis. Labs were monitored on a daily basis and she\par received electrolyte repletion and transfusional support as needed.\par While admitted Ms. Johnston experienced pancytopenia related to the high dose\par chemotherapy prep regimen. When she became neutropenic she was started on\par prophylactic ciprofloxacin. She also experienced neutropenic fevers. When she\par became febrile, blood and urine cultures were sent, a CXR completed and the\par ciprofloxacin was changed to cefepime. The infectious work up remained\par negative.  Ms. Johnston had a diffuse, macular, erythematous rash secondary to\par kepivance. The 3rd dose of kepivance was held post transplant.\par \par On 11/25/22, after pre-medication, Ms. Johnston received 316ml of autologous,\par pooled, thawed, washed, mobilized, plasma reduced, HPC apheresis over\par approximately 1 hour. Cell counts as follows:\par Total MNCs (x10^8/kg) = 6.67\par CD34+ cells (10^6/kg) = 5.46\par Cell Viability (%) = 80%\par She tolerated the infusion well with no adverse reactions noted.\par Engraftment was noted on 12/7/22. The cefepime was discontinued 12/7/22. The\par zarxio was discontinued 12/9/22. Post engraftment, Ms. Johnston remained\par intermittently febrile, likely secondary to engraftment. She was observed off\par antibiotics. On 12/9/22, the mediport was accessed, cultures sent, and the TLC\par discontinued. She remained intermittently febrile with a negative infectious\par work up. The source of the fever was thought to be engraftment, and she was\par treated with a short course of dexamethasone with good effect.\par Currently, Ms. Johnston is stable for discharge home with outpatient follow up at\par the [facility]. The mediport was flushed with hep-lock prior to\par discharge 12/16/22.\par \par  [de-identified] : Patient presents accompanied with .\par S/p right axillary lymph node excisional biopsy on 5/16/22  per DR Mariano Path c/w Peripheral T- Cell Lymphoma, NOS\par Patient started  CHOEP on 6/20/22, C4  8/22/22\par \par She reports increased fatigue and insomnia 2/2 to steriod after first week of  treatment.\par She takes a nap during the day but is able to get up and be active. \par Reports patch release pain the next day. She takes Claritin and Tylenol with relief. \par Patient admits constipation uses MiraLAX however takes 6 days after treatment to have a bowel movement. \par Denies trouble hearing.  \par Currently on linzess for constipation, baby aspirin 81 mg, Prozac 20mg, multivitamin \par \par 10/19/22 S/P hospitalization for krzysztof fever with 6th cycle of chemotherapy...now recovered\par \par On 11/3/22, patient presents for growth factor teaching. Overall well with no acute concerns. Only complaint is lower back pain. Denies fever, chills, nausea, vomiting, diarrhea, rash, mouth sores or any signs of active bleeding. Denies chest pain or B/L LE edema. \par \par 12/20/22: Patient is seen for the first follow up after Auto PBSCT.Today is Day +25 post Auto PBSCT.She states that she continues to feel fatigued, has pain on right calf since the past 3 days.Denies fever, chills, SOB,chest pain, edema,vomiting, rash, mouth sores or any signs of active bleeding. She states that she has occasional nausea relieved with Zofran.Remains compliant with diet and crowd restrictions. Remains compliant with medications as prescribed.\par \par 1/3/23:Today is Day +39 post Auto PBSCT.Denies fever, chills, chest pain, vomiting, diarrhea,rash,mouth sores, edema or any signs of active bleeding.She has occasional nausea relieved with Zofran.She has a  dry cough since past few days and has SOB with exertion.Remains compliant with medications as prescribed. Remains compliant with diet and crowd restrictions.\par \par 1/17/23:Patient is seen for a follow up visit today.Today is Day + 53 post auto transplant...Denies fever, chills, SOB,chest pain,vomiting,diarrhea,rash,mouth sores or any signs of active bleeding.She states that she feels nauseous at times ...using zofran..She states that she has mild pain by the the wire of the right CW mediport..will follow....Remains compliant with medications.Remains compliant with diet and crowd restrictions. \par \par 2/8//23: post Auto PBSCT.Denies fever,chills,SOB,chest pain,nausea,vomiting, diarrhea,rash,mouth sores,edema or any signs of active bleeding at this tiome.....did have flu symptoms last week.....She has occasional nausea relieved with Zofran.Remains compliant with medications.Remains compliant with diet and crowd restrictions.She still has mild pain by the wire of the CW mediport...seen by IR...may be related to weight loss\par \par 2/21/23:Today is Day + 88 post Auto PBSCT.She is here for a follow up visit accompanied by her . Denies fever,chills,SOB,chest pain, edema,mouth sores, rash,vomiting, diarrhea or any signs of active bleeding. She takes Zofran once daily for nausea with relief.She still has mild pain on B/L LE with activities.Remains compliant with diet and crowd restrictions.Remains compliant with medications as prescribed.\par \par 3/7/23: Patient is seen for a follow up visit.Today is DAY + 116  post Auto PBSCT.She had fever,fatigue and diarrhea for 2 days after receiving Moderna #1 series on 2/26/23. Denies fever, chills now.Denies SOB,nausea, vomiting, diarrhea,chest pain,mouth sores,rash,edema or any signs of active bleeding.She has an occasional dry cough.\par \par 5/30//23 Overall improved..no new issues...CRYSTAL with last scan\par \par 7/28/23:Overall well with no acute concerns.She has C/O constipation.

## 2023-08-08 ENCOUNTER — APPOINTMENT (OUTPATIENT)
Dept: FAMILY MEDICINE | Facility: CLINIC | Age: 58
End: 2023-08-08
Payer: COMMERCIAL

## 2023-08-08 VITALS
OXYGEN SATURATION: 100 % | SYSTOLIC BLOOD PRESSURE: 104 MMHG | BODY MASS INDEX: 28.16 KG/M2 | WEIGHT: 153 LBS | HEIGHT: 62 IN | TEMPERATURE: 97.8 F | DIASTOLIC BLOOD PRESSURE: 66 MMHG | HEART RATE: 68 BPM

## 2023-08-08 DIAGNOSIS — B34.9 VIRAL INFECTION, UNSPECIFIED: ICD-10-CM

## 2023-08-08 PROCEDURE — 99213 OFFICE O/P EST LOW 20 MIN: CPT

## 2023-08-08 NOTE — HISTORY OF PRESENT ILLNESS
[FreeTextEntry8] : REESE is a 58 year old female here for c/o of cough and sinus pressure.   6 day of cough that is intermittently productive c y/g phlegm.   Resolved fever  Hmwz=395.6F.    Fever x 1 day only.   resolved sore throat  mild voice hoarseness  no ear pain B/L  mild sinus congestion c y/g rhinorrhea  no change in bowel habits

## 2023-08-08 NOTE — PLAN
[FreeTextEntry1] : aggressive hydration!!!!!!!  OTC Nasacort AQ  2 sp ea nostril qd  cont OTC Claritin 10mg qd    check RVP

## 2023-08-08 NOTE — PHYSICAL EXAM
[No Acute Distress] : no acute distress [Well Nourished] : well nourished [Well Developed] : well developed [Well-Appearing] : well-appearing [EOMI] : extraocular movements intact [Normal Outer Ear/Nose] : the outer ears and nose were normal in appearance [No JVD] : no jugular venous distention [No Respiratory Distress] : no respiratory distress  [No Accessory Muscle Use] : no accessory muscle use [Clear to Auscultation] : lungs were clear to auscultation bilaterally [Normal Rate] : normal rate  [Regular Rhythm] : with a regular rhythm [Normal S1, S2] : normal S1 and S2 [No Carotid Bruits] : no carotid bruits [No Edema] : there was no peripheral edema [Non-distended] : non-distended [No CVA Tenderness] : no CVA  tenderness [Grossly Normal Strength/Tone] : grossly normal strength/tone [No Rash] : no rash [Coordination Grossly Intact] : coordination grossly intact [No Focal Deficits] : no focal deficits [Normal Gait] : normal gait [Normal Affect] : the affect was normal [Normal Mood] : the mood was normal [Normal Insight/Judgement] : insight and judgment were intact [de-identified] : +ve PND dull TMs B

## 2023-08-10 LAB
RAPID RVP RESULT: DETECTED
SARS-COV-2 RNA PNL RESP NAA+PROBE: DETECTED

## 2023-08-21 ENCOUNTER — RX RENEWAL (OUTPATIENT)
Age: 58
End: 2023-08-21

## 2023-08-31 ENCOUNTER — NON-APPOINTMENT (OUTPATIENT)
Age: 58
End: 2023-08-31

## 2023-08-31 ENCOUNTER — APPOINTMENT (OUTPATIENT)
Dept: CARDIOLOGY | Facility: CLINIC | Age: 58
End: 2023-08-31
Payer: COMMERCIAL

## 2023-08-31 VITALS
WEIGHT: 155 LBS | HEIGHT: 62 IN | OXYGEN SATURATION: 99 % | BODY MASS INDEX: 28.52 KG/M2 | TEMPERATURE: 98 F | DIASTOLIC BLOOD PRESSURE: 77 MMHG | HEART RATE: 72 BPM | SYSTOLIC BLOOD PRESSURE: 110 MMHG

## 2023-08-31 DIAGNOSIS — Z83.3 FAMILY HISTORY OF DIABETES MELLITUS: ICD-10-CM

## 2023-08-31 DIAGNOSIS — Z78.9 OTHER SPECIFIED HEALTH STATUS: ICD-10-CM

## 2023-08-31 DIAGNOSIS — R00.0 TACHYCARDIA, UNSPECIFIED: ICD-10-CM

## 2023-08-31 DIAGNOSIS — Z82.3 FAMILY HISTORY OF STROKE: ICD-10-CM

## 2023-08-31 DIAGNOSIS — Z82.49 FAMILY HISTORY OF ISCHEMIC HEART DISEASE AND OTHER DISEASES OF THE CIRCULATORY SYSTEM: ICD-10-CM

## 2023-08-31 PROCEDURE — 99203 OFFICE O/P NEW LOW 30 MIN: CPT

## 2023-08-31 PROCEDURE — 93000 ELECTROCARDIOGRAM COMPLETE: CPT

## 2023-08-31 NOTE — PHYSICAL EXAM

## 2023-08-31 NOTE — ASSESSMENT
[FreeTextEntry1] : Echocardiogram June 2022: Normal LVEF, mild mitral regurgitation EKG: August 31, 2023: Sinus rhythm low voltage, possible pulmonary disease  Assessment: 1.  Mild mitral regurgitation 2.  "Ventricular tachycardia"-for 20 years 3.  T-cell lymphoma  Recommendations: 1.  Echocardiogram 2.  Follow-up in 3 months  I requested my office staff to get her cardiology records from her last cardiologist , [facility].

## 2023-09-08 ENCOUNTER — APPOINTMENT (OUTPATIENT)
Dept: FAMILY MEDICINE | Facility: CLINIC | Age: 58
End: 2023-09-08
Payer: COMMERCIAL

## 2023-09-08 VITALS
OXYGEN SATURATION: 99 % | BODY MASS INDEX: 29.44 KG/M2 | WEIGHT: 160 LBS | DIASTOLIC BLOOD PRESSURE: 72 MMHG | SYSTOLIC BLOOD PRESSURE: 114 MMHG | HEART RATE: 80 BPM | HEIGHT: 62 IN

## 2023-09-08 DIAGNOSIS — R09.82 POSTNASAL DRIP: ICD-10-CM

## 2023-09-08 DIAGNOSIS — Z86.16 PERSONAL HISTORY OF COVID-19: ICD-10-CM

## 2023-09-08 DIAGNOSIS — R05.9 COUGH, UNSPECIFIED: ICD-10-CM

## 2023-09-08 DIAGNOSIS — R06.02 SHORTNESS OF BREATH: ICD-10-CM

## 2023-09-08 PROCEDURE — 99214 OFFICE O/P EST MOD 30 MIN: CPT | Mod: 25

## 2023-09-08 PROCEDURE — 36415 COLL VENOUS BLD VENIPUNCTURE: CPT

## 2023-09-08 NOTE — HISTORY OF PRESENT ILLNESS
[FreeTextEntry8] : pt c/o PND   57 yo female s/p   COVID-19 Dx on 8/8/23    Symptom onset 8/2/23  presents c persistent PND,  intermittent subjective fever.  +ve dry cough (does NOT disrupt sleep).  Denies new or worsened SOB     STABLE POSADAS  UNCHANGED  NOT affected by  recent COVID-19 infection   Denies upper back pain and UNCHANGE chronic LBP   reports room spinning sensation when bending "all the way down and coming up."    All other positional changes do NOT illicit symptoms   no N/V/D  +ve fatigue   Hx of Bone marrow transplant 11/25/23

## 2023-09-08 NOTE — PLAN
[FreeTextEntry1] : aggressive hydration!!!!!  Singulair 10mg  hs  Nasacort AQ 2 sp ea nostril qd   if no change post 48hrs start  Prednisone (see med orders)   Oncology f/u scheduled. 9/19/23

## 2023-09-08 NOTE — PHYSICAL EXAM
[No Acute Distress] : no acute distress [EOMI] : extraocular movements intact [No JVD] : no jugular venous distention [No Respiratory Distress] : no respiratory distress  [No Accessory Muscle Use] : no accessory muscle use [Normal Rate] : normal rate  [Regular Rhythm] : with a regular rhythm [Normal S1, S2] : normal S1 and S2 [No Edema] : there was no peripheral edema [No CVA Tenderness] : no CVA  tenderness [No Rash] : no rash [Coordination Grossly Intact] : coordination grossly intact [No Focal Deficits] : no focal deficits [Normal Gait] : normal gait [Normal Affect] : the affect was normal [Normal Mood] : the mood was normal [Normal Insight/Judgement] : insight and judgment were intact [de-identified] : +ve PND,   dull TMs B  [de-identified] : decreased clear BS RLL  o/w CTA B/L

## 2023-09-14 LAB
ALBUMIN SERPL ELPH-MCNC: 4.8 G/DL
ALP BLD-CCNC: 103 U/L
ALT SERPL-CCNC: 19 U/L
ANION GAP SERPL CALC-SCNC: 13 MMOL/L
AST SERPL-CCNC: 21 U/L
BILIRUB SERPL-MCNC: 0.4 MG/DL
BUN SERPL-MCNC: 17 MG/DL
CALCIUM SERPL-MCNC: 10.1 MG/DL
CHLORIDE SERPL-SCNC: 104 MMOL/L
CO2 SERPL-SCNC: 25 MMOL/L
CREAT SERPL-MCNC: 0.7 MG/DL
EGFR: 101 ML/MIN/1.73M2
GLUCOSE SERPL-MCNC: 92 MG/DL
HCT VFR BLD CALC: 39.1 %
HGB BLD-MCNC: 12 G/DL
MCHC RBC-ENTMCNC: 29.3 PG
MCHC RBC-ENTMCNC: 30.7 GM/DL
MCV RBC AUTO: 95.4 FL
PLATELET # BLD AUTO: 281 K/UL
POTASSIUM SERPL-SCNC: 4.8 MMOL/L
PROT SERPL-MCNC: 7.3 G/DL
RBC # BLD: 4.1 M/UL
RBC # FLD: 14.1 %
SODIUM SERPL-SCNC: 142 MMOL/L
WBC # FLD AUTO: 5.87 K/UL

## 2023-09-15 ENCOUNTER — OUTPATIENT (OUTPATIENT)
Dept: OUTPATIENT SERVICES | Facility: HOSPITAL | Age: 58
LOS: 1 days | Discharge: HOME | End: 2023-09-15

## 2023-09-15 DIAGNOSIS — C85.90 NON-HODGKIN LYMPHOMA, UNSPECIFIED, UNSPECIFIED SITE: ICD-10-CM

## 2023-09-19 ENCOUNTER — RESULT REVIEW (OUTPATIENT)
Age: 58
End: 2023-09-19

## 2023-09-19 ENCOUNTER — APPOINTMENT (OUTPATIENT)
Dept: HEMATOLOGY ONCOLOGY | Facility: CLINIC | Age: 58
End: 2023-09-19
Payer: COMMERCIAL

## 2023-09-19 ENCOUNTER — APPOINTMENT (OUTPATIENT)
Dept: INFUSION THERAPY | Facility: HOSPITAL | Age: 58
End: 2023-09-19

## 2023-09-19 VITALS
SYSTOLIC BLOOD PRESSURE: 89 MMHG | BODY MASS INDEX: 28.67 KG/M2 | WEIGHT: 156.75 LBS | RESPIRATION RATE: 16 BRPM | DIASTOLIC BLOOD PRESSURE: 61 MMHG | TEMPERATURE: 97.1 F | OXYGEN SATURATION: 96 % | HEART RATE: 71 BPM

## 2023-09-19 LAB
ALBUMIN SERPL ELPH-MCNC: 4.9 G/DL — SIGNIFICANT CHANGE UP (ref 3.3–5)
ALP SERPL-CCNC: 109 U/L — SIGNIFICANT CHANGE UP (ref 40–120)
ALT FLD-CCNC: 18 U/L — SIGNIFICANT CHANGE UP (ref 10–45)
ANION GAP SERPL CALC-SCNC: 12 MMOL/L — SIGNIFICANT CHANGE UP (ref 5–17)
AST SERPL-CCNC: 32 U/L — SIGNIFICANT CHANGE UP (ref 10–40)
BASOPHILS # BLD AUTO: 0.04 K/UL — SIGNIFICANT CHANGE UP (ref 0–0.2)
BASOPHILS NFR BLD AUTO: 0.7 % — SIGNIFICANT CHANGE UP (ref 0–2)
BILIRUB SERPL-MCNC: 0.3 MG/DL — SIGNIFICANT CHANGE UP (ref 0.2–1.2)
BUN SERPL-MCNC: 14 MG/DL — SIGNIFICANT CHANGE UP (ref 7–23)
CALCIUM SERPL-MCNC: 9.9 MG/DL — SIGNIFICANT CHANGE UP (ref 8.4–10.5)
CHLORIDE SERPL-SCNC: 101 MMOL/L — SIGNIFICANT CHANGE UP (ref 96–108)
CO2 SERPL-SCNC: 26 MMOL/L — SIGNIFICANT CHANGE UP (ref 22–31)
CREAT SERPL-MCNC: 0.59 MG/DL — SIGNIFICANT CHANGE UP (ref 0.5–1.3)
EGFR: 106 ML/MIN/1.73M2 — SIGNIFICANT CHANGE UP
EOSINOPHIL # BLD AUTO: 0.17 K/UL — SIGNIFICANT CHANGE UP (ref 0–0.5)
EOSINOPHIL NFR BLD AUTO: 2.8 % — SIGNIFICANT CHANGE UP (ref 0–6)
GLUCOSE SERPL-MCNC: 90 MG/DL — SIGNIFICANT CHANGE UP (ref 70–99)
HCT VFR BLD CALC: 34.9 % — SIGNIFICANT CHANGE UP (ref 34.5–45)
HGB BLD-MCNC: 11.5 G/DL — SIGNIFICANT CHANGE UP (ref 11.5–15.5)
IMM GRANULOCYTES NFR BLD AUTO: 0.5 % — SIGNIFICANT CHANGE UP (ref 0–0.9)
LDH SERPL L TO P-CCNC: 195 U/L — SIGNIFICANT CHANGE UP (ref 50–242)
LYMPHOCYTES # BLD AUTO: 1.17 K/UL — SIGNIFICANT CHANGE UP (ref 1–3.3)
LYMPHOCYTES # BLD AUTO: 19.5 % — SIGNIFICANT CHANGE UP (ref 13–44)
MAGNESIUM SERPL-MCNC: 2.1 MG/DL — SIGNIFICANT CHANGE UP (ref 1.6–2.6)
MCHC RBC-ENTMCNC: 29.2 PG — SIGNIFICANT CHANGE UP (ref 27–34)
MCHC RBC-ENTMCNC: 33 G/DL — SIGNIFICANT CHANGE UP (ref 32–36)
MCV RBC AUTO: 88.6 FL — SIGNIFICANT CHANGE UP (ref 80–100)
MONOCYTES # BLD AUTO: 0.54 K/UL — SIGNIFICANT CHANGE UP (ref 0–0.9)
MONOCYTES NFR BLD AUTO: 9 % — SIGNIFICANT CHANGE UP (ref 2–14)
NEUTROPHILS # BLD AUTO: 4.05 K/UL — SIGNIFICANT CHANGE UP (ref 1.8–7.4)
NEUTROPHILS NFR BLD AUTO: 67.5 % — SIGNIFICANT CHANGE UP (ref 43–77)
NRBC # BLD: 0 /100 WBCS — SIGNIFICANT CHANGE UP (ref 0–0)
PLATELET # BLD AUTO: 261 K/UL — SIGNIFICANT CHANGE UP (ref 150–400)
POTASSIUM SERPL-MCNC: 4.1 MMOL/L — SIGNIFICANT CHANGE UP (ref 3.5–5.3)
POTASSIUM SERPL-SCNC: 4.1 MMOL/L — SIGNIFICANT CHANGE UP (ref 3.5–5.3)
PROT SERPL-MCNC: 7.5 G/DL — SIGNIFICANT CHANGE UP (ref 6–8.3)
RBC # BLD: 3.94 M/UL — SIGNIFICANT CHANGE UP (ref 3.8–5.2)
RBC # FLD: 13.2 % — SIGNIFICANT CHANGE UP (ref 10.3–14.5)
SODIUM SERPL-SCNC: 139 MMOL/L — SIGNIFICANT CHANGE UP (ref 135–145)
WBC # BLD: 6 K/UL — SIGNIFICANT CHANGE UP (ref 3.8–10.5)
WBC # FLD AUTO: 6 K/UL — SIGNIFICANT CHANGE UP (ref 3.8–10.5)

## 2023-09-19 PROCEDURE — 99215 OFFICE O/P EST HI 40 MIN: CPT

## 2023-10-03 ENCOUNTER — APPOINTMENT (OUTPATIENT)
Dept: OBGYN | Facility: CLINIC | Age: 58
End: 2023-10-03
Payer: COMMERCIAL

## 2023-10-03 VITALS
SYSTOLIC BLOOD PRESSURE: 116 MMHG | WEIGHT: 158 LBS | BODY MASS INDEX: 29.08 KG/M2 | DIASTOLIC BLOOD PRESSURE: 76 MMHG | HEIGHT: 62 IN

## 2023-10-03 DIAGNOSIS — Z00.00 ENCOUNTER FOR GENERAL ADULT MEDICAL EXAMINATION W/OUT ABNORMAL FINDINGS: ICD-10-CM

## 2023-10-03 DIAGNOSIS — N94.10 UNSPECIFIED DYSPAREUNIA: ICD-10-CM

## 2023-10-03 PROCEDURE — 99386 PREV VISIT NEW AGE 40-64: CPT

## 2023-10-03 PROCEDURE — 99214 OFFICE O/P EST MOD 30 MIN: CPT | Mod: 25

## 2023-10-07 LAB
CYTOLOGY CVX/VAG DOC THIN PREP: ABNORMAL
HPV HIGH+LOW RISK DNA PNL CVX: NOT DETECTED

## 2023-10-16 ENCOUNTER — OUTPATIENT (OUTPATIENT)
Dept: OUTPATIENT SERVICES | Facility: HOSPITAL | Age: 58
LOS: 1 days | End: 2023-10-16
Payer: COMMERCIAL

## 2023-10-16 ENCOUNTER — APPOINTMENT (OUTPATIENT)
Dept: RADIOLOGY | Facility: CLINIC | Age: 58
End: 2023-10-16
Payer: COMMERCIAL

## 2023-10-16 DIAGNOSIS — Z13.820 ENCOUNTER FOR SCREENING FOR OSTEOPOROSIS: ICD-10-CM

## 2023-10-16 DIAGNOSIS — Z00.8 ENCOUNTER FOR OTHER GENERAL EXAMINATION: ICD-10-CM

## 2023-10-16 PROCEDURE — 77080 DXA BONE DENSITY AXIAL: CPT

## 2023-10-16 PROCEDURE — 77080 DXA BONE DENSITY AXIAL: CPT | Mod: 26

## 2023-11-01 ENCOUNTER — RX RENEWAL (OUTPATIENT)
Age: 58
End: 2023-11-01

## 2023-11-02 ENCOUNTER — OUTPATIENT (OUTPATIENT)
Dept: OUTPATIENT SERVICES | Facility: HOSPITAL | Age: 58
LOS: 1 days | Discharge: HOME | End: 2023-11-02

## 2023-11-02 DIAGNOSIS — C84.40 PERIPHERAL T-CELL LYMPHOMA, NOT ELSEWHERE CLASSIFIED, UNSPECIFIED SITE: ICD-10-CM

## 2023-11-06 ENCOUNTER — RESULT REVIEW (OUTPATIENT)
Age: 58
End: 2023-11-06

## 2023-11-06 ENCOUNTER — APPOINTMENT (OUTPATIENT)
Dept: HEMATOLOGY ONCOLOGY | Facility: CLINIC | Age: 58
End: 2023-11-06
Payer: COMMERCIAL

## 2023-11-06 ENCOUNTER — APPOINTMENT (OUTPATIENT)
Age: 58
End: 2023-11-06

## 2023-11-06 VITALS
DIASTOLIC BLOOD PRESSURE: 73 MMHG | OXYGEN SATURATION: 97 % | SYSTOLIC BLOOD PRESSURE: 106 MMHG | WEIGHT: 158.18 LBS | HEIGHT: 62 IN | TEMPERATURE: 97.5 F | HEART RATE: 70 BPM | BODY MASS INDEX: 29.11 KG/M2

## 2023-11-06 LAB
HCT VFR BLD CALC: 36.7 % — SIGNIFICANT CHANGE UP (ref 34.5–45)
HCT VFR BLD CALC: 36.7 % — SIGNIFICANT CHANGE UP (ref 34.5–45)
HGB BLD-MCNC: 12.2 G/DL — SIGNIFICANT CHANGE UP (ref 11.5–15.5)
HGB BLD-MCNC: 12.2 G/DL — SIGNIFICANT CHANGE UP (ref 11.5–15.5)
MCHC RBC-ENTMCNC: 30.2 PG — SIGNIFICANT CHANGE UP (ref 27–34)
MCHC RBC-ENTMCNC: 30.2 PG — SIGNIFICANT CHANGE UP (ref 27–34)
MCHC RBC-ENTMCNC: 33.3 G/DL — SIGNIFICANT CHANGE UP (ref 32–36)
MCHC RBC-ENTMCNC: 33.3 G/DL — SIGNIFICANT CHANGE UP (ref 32–36)
MCV RBC AUTO: 90.7 FL — SIGNIFICANT CHANGE UP (ref 80–100)
MCV RBC AUTO: 90.7 FL — SIGNIFICANT CHANGE UP (ref 80–100)
PLATELET # BLD AUTO: 234 K/UL — SIGNIFICANT CHANGE UP (ref 150–400)
PLATELET # BLD AUTO: 234 K/UL — SIGNIFICANT CHANGE UP (ref 150–400)
RBC # BLD: 4.04 M/UL — SIGNIFICANT CHANGE UP (ref 3.8–5.2)
RBC # BLD: 4.04 M/UL — SIGNIFICANT CHANGE UP (ref 3.8–5.2)
RBC # FLD: 12.3 % — SIGNIFICANT CHANGE UP (ref 10.3–14.5)
RBC # FLD: 12.3 % — SIGNIFICANT CHANGE UP (ref 10.3–14.5)
WBC # BLD: 5.4 K/UL — SIGNIFICANT CHANGE UP (ref 3.8–10.5)
WBC # BLD: 5.4 K/UL — SIGNIFICANT CHANGE UP (ref 3.8–10.5)
WBC # FLD AUTO: 5.4 K/UL — SIGNIFICANT CHANGE UP (ref 3.8–10.5)
WBC # FLD AUTO: 5.4 K/UL — SIGNIFICANT CHANGE UP (ref 3.8–10.5)

## 2023-11-06 PROCEDURE — 99215 OFFICE O/P EST HI 40 MIN: CPT

## 2023-11-07 LAB
ALBUMIN SERPL ELPH-MCNC: 4.4 G/DL — SIGNIFICANT CHANGE UP (ref 3.3–5)
ALBUMIN SERPL ELPH-MCNC: 4.4 G/DL — SIGNIFICANT CHANGE UP (ref 3.3–5)
ALP SERPL-CCNC: 94 U/L — SIGNIFICANT CHANGE UP (ref 40–120)
ALP SERPL-CCNC: 94 U/L — SIGNIFICANT CHANGE UP (ref 40–120)
ALT FLD-CCNC: 16 U/L — SIGNIFICANT CHANGE UP (ref 10–45)
ALT FLD-CCNC: 16 U/L — SIGNIFICANT CHANGE UP (ref 10–45)
ANION GAP SERPL CALC-SCNC: 12 MMOL/L — SIGNIFICANT CHANGE UP (ref 5–17)
ANION GAP SERPL CALC-SCNC: 12 MMOL/L — SIGNIFICANT CHANGE UP (ref 5–17)
AST SERPL-CCNC: 22 U/L — SIGNIFICANT CHANGE UP (ref 10–40)
AST SERPL-CCNC: 22 U/L — SIGNIFICANT CHANGE UP (ref 10–40)
BILIRUB SERPL-MCNC: 0.3 MG/DL — SIGNIFICANT CHANGE UP (ref 0.2–1.2)
BILIRUB SERPL-MCNC: 0.3 MG/DL — SIGNIFICANT CHANGE UP (ref 0.2–1.2)
BUN SERPL-MCNC: 15 MG/DL — SIGNIFICANT CHANGE UP (ref 7–23)
BUN SERPL-MCNC: 15 MG/DL — SIGNIFICANT CHANGE UP (ref 7–23)
CALCIUM SERPL-MCNC: 10.1 MG/DL — SIGNIFICANT CHANGE UP (ref 8.4–10.5)
CALCIUM SERPL-MCNC: 10.1 MG/DL — SIGNIFICANT CHANGE UP (ref 8.4–10.5)
CHLORIDE SERPL-SCNC: 104 MMOL/L — SIGNIFICANT CHANGE UP (ref 96–108)
CHLORIDE SERPL-SCNC: 104 MMOL/L — SIGNIFICANT CHANGE UP (ref 96–108)
CO2 SERPL-SCNC: 25 MMOL/L — SIGNIFICANT CHANGE UP (ref 22–31)
CO2 SERPL-SCNC: 25 MMOL/L — SIGNIFICANT CHANGE UP (ref 22–31)
CREAT SERPL-MCNC: 0.62 MG/DL — SIGNIFICANT CHANGE UP (ref 0.5–1.3)
CREAT SERPL-MCNC: 0.62 MG/DL — SIGNIFICANT CHANGE UP (ref 0.5–1.3)
EGFR: 104 ML/MIN/1.73M2 — SIGNIFICANT CHANGE UP
EGFR: 104 ML/MIN/1.73M2 — SIGNIFICANT CHANGE UP
GLUCOSE SERPL-MCNC: 90 MG/DL — SIGNIFICANT CHANGE UP (ref 70–99)
GLUCOSE SERPL-MCNC: 90 MG/DL — SIGNIFICANT CHANGE UP (ref 70–99)
LDH SERPL L TO P-CCNC: 204 U/L — SIGNIFICANT CHANGE UP (ref 50–242)
LDH SERPL L TO P-CCNC: 204 U/L — SIGNIFICANT CHANGE UP (ref 50–242)
MAGNESIUM SERPL-MCNC: 2.1 MG/DL — SIGNIFICANT CHANGE UP (ref 1.6–2.6)
MAGNESIUM SERPL-MCNC: 2.1 MG/DL — SIGNIFICANT CHANGE UP (ref 1.6–2.6)
POTASSIUM SERPL-MCNC: 4.2 MMOL/L — SIGNIFICANT CHANGE UP (ref 3.5–5.3)
POTASSIUM SERPL-MCNC: 4.2 MMOL/L — SIGNIFICANT CHANGE UP (ref 3.5–5.3)
POTASSIUM SERPL-SCNC: 4.2 MMOL/L — SIGNIFICANT CHANGE UP (ref 3.5–5.3)
POTASSIUM SERPL-SCNC: 4.2 MMOL/L — SIGNIFICANT CHANGE UP (ref 3.5–5.3)
PROT SERPL-MCNC: 7.4 G/DL — SIGNIFICANT CHANGE UP (ref 6–8.3)
PROT SERPL-MCNC: 7.4 G/DL — SIGNIFICANT CHANGE UP (ref 6–8.3)
SODIUM SERPL-SCNC: 141 MMOL/L — SIGNIFICANT CHANGE UP (ref 135–145)
SODIUM SERPL-SCNC: 141 MMOL/L — SIGNIFICANT CHANGE UP (ref 135–145)

## 2023-11-11 ENCOUNTER — NON-APPOINTMENT (OUTPATIENT)
Age: 58
End: 2023-11-11

## 2023-11-13 ENCOUNTER — OUTPATIENT (OUTPATIENT)
Dept: OUTPATIENT SERVICES | Facility: HOSPITAL | Age: 58
LOS: 1 days | Discharge: HOME | End: 2023-11-13

## 2023-11-13 ENCOUNTER — APPOINTMENT (OUTPATIENT)
Dept: OBGYN | Facility: CLINIC | Age: 58
End: 2023-11-13
Payer: COMMERCIAL

## 2023-11-13 DIAGNOSIS — C85.90 NON-HODGKIN LYMPHOMA, UNSPECIFIED, UNSPECIFIED SITE: ICD-10-CM

## 2023-11-13 DIAGNOSIS — C84.40 PERIPHERAL T-CELL LYMPHOMA, NOT ELSEWHERE CLASSIFIED, UNSPECIFIED SITE: ICD-10-CM

## 2023-11-21 ENCOUNTER — APPOINTMENT (OUTPATIENT)
Dept: CARDIOLOGY | Facility: CLINIC | Age: 58
End: 2023-11-21
Payer: COMMERCIAL

## 2023-11-21 PROCEDURE — 93306 TTE W/DOPPLER COMPLETE: CPT

## 2023-11-21 PROCEDURE — 93356 MYOCRD STRAIN IMG SPCKL TRCK: CPT

## 2023-11-27 ENCOUNTER — APPOINTMENT (OUTPATIENT)
Dept: INFUSION THERAPY | Facility: HOSPITAL | Age: 58
End: 2023-11-27

## 2023-11-28 ENCOUNTER — APPOINTMENT (OUTPATIENT)
Dept: NUCLEAR MEDICINE | Facility: CLINIC | Age: 58
End: 2023-11-28

## 2023-11-28 ENCOUNTER — APPOINTMENT (OUTPATIENT)
Dept: MRI IMAGING | Facility: CLINIC | Age: 58
End: 2023-11-28

## 2023-11-28 ENCOUNTER — OUTPATIENT (OUTPATIENT)
Dept: OUTPATIENT SERVICES | Facility: HOSPITAL | Age: 58
LOS: 1 days | End: 2023-11-28
Payer: COMMERCIAL

## 2023-11-28 DIAGNOSIS — Z94.84 STEM CELLS TRANSPLANT STATUS: ICD-10-CM

## 2023-11-28 DIAGNOSIS — C85.90 NON-HODGKIN LYMPHOMA, UNSPECIFIED, UNSPECIFIED SITE: ICD-10-CM

## 2023-11-28 DIAGNOSIS — Z23 ENCOUNTER FOR IMMUNIZATION: ICD-10-CM

## 2023-11-28 PROCEDURE — 77049 MRI BREAST C-+ W/CAD BI: CPT | Mod: 26

## 2023-11-28 PROCEDURE — 78815 PET IMAGE W/CT SKULL-THIGH: CPT | Mod: 26,PS

## 2023-11-30 ENCOUNTER — APPOINTMENT (OUTPATIENT)
Dept: CARDIOLOGY | Facility: CLINIC | Age: 58
End: 2023-11-30

## 2023-11-30 ENCOUNTER — ASOB RESULT (OUTPATIENT)
Age: 58
End: 2023-11-30

## 2023-11-30 ENCOUNTER — APPOINTMENT (OUTPATIENT)
Dept: ANTEPARTUM | Facility: CLINIC | Age: 58
End: 2023-11-30
Payer: COMMERCIAL

## 2023-11-30 ENCOUNTER — APPOINTMENT (OUTPATIENT)
Dept: OBGYN | Facility: CLINIC | Age: 58
End: 2023-11-30
Payer: COMMERCIAL

## 2023-11-30 DIAGNOSIS — M85.88 OTHER SPECIFIED DISORDERS OF BONE DENSITY AND STRUCTURE, OTHER SITE: ICD-10-CM

## 2023-11-30 DIAGNOSIS — N83.209 UNSPECIFIED OVARIAN CYST, UNSPECIFIED SIDE: ICD-10-CM

## 2023-11-30 PROCEDURE — 76830 TRANSVAGINAL US NON-OB: CPT

## 2023-11-30 PROCEDURE — 99214 OFFICE O/P EST MOD 30 MIN: CPT

## 2023-11-30 PROCEDURE — 76856 US EXAM PELVIC COMPLETE: CPT | Mod: 59

## 2023-12-01 ENCOUNTER — NON-APPOINTMENT (OUTPATIENT)
Age: 58
End: 2023-12-01

## 2023-12-01 ENCOUNTER — APPOINTMENT (OUTPATIENT)
Dept: CARDIOLOGY | Facility: CLINIC | Age: 58
End: 2023-12-01
Payer: COMMERCIAL

## 2023-12-01 VITALS
DIASTOLIC BLOOD PRESSURE: 64 MMHG | HEIGHT: 62 IN | BODY MASS INDEX: 28.71 KG/M2 | WEIGHT: 156 LBS | SYSTOLIC BLOOD PRESSURE: 102 MMHG | HEART RATE: 75 BPM | OXYGEN SATURATION: 97 %

## 2023-12-01 PROBLEM — M85.88 OSTEOPENIA OF LUMBAR SPINE: Status: ACTIVE | Noted: 2023-12-01

## 2023-12-01 PROBLEM — N83.209 CYST OF OVARY, UNSPECIFIED LATERALITY: Status: ACTIVE | Noted: 2023-12-01

## 2023-12-01 PROCEDURE — 93000 ELECTROCARDIOGRAM COMPLETE: CPT

## 2023-12-01 PROCEDURE — 99204 OFFICE O/P NEW MOD 45 MIN: CPT

## 2023-12-01 RX ORDER — PREDNISONE 20 MG/1
20 TABLET ORAL
Qty: 9 | Refills: 0 | Status: DISCONTINUED | COMMUNITY
Start: 2023-09-08 | End: 2023-12-01

## 2023-12-04 ENCOUNTER — LABORATORY RESULT (OUTPATIENT)
Age: 58
End: 2023-12-04

## 2023-12-04 ENCOUNTER — APPOINTMENT (OUTPATIENT)
Dept: INFUSION THERAPY | Facility: HOSPITAL | Age: 58
End: 2023-12-04

## 2023-12-04 ENCOUNTER — RESULT REVIEW (OUTPATIENT)
Age: 58
End: 2023-12-04

## 2023-12-04 ENCOUNTER — APPOINTMENT (OUTPATIENT)
Dept: HEMATOLOGY ONCOLOGY | Facility: CLINIC | Age: 58
End: 2023-12-04
Payer: COMMERCIAL

## 2023-12-04 VITALS
BODY MASS INDEX: 28.98 KG/M2 | HEART RATE: 67 BPM | SYSTOLIC BLOOD PRESSURE: 120 MMHG | DIASTOLIC BLOOD PRESSURE: 78 MMHG | OXYGEN SATURATION: 99 % | TEMPERATURE: 98.3 F | RESPIRATION RATE: 16 BRPM | HEIGHT: 62 IN | WEIGHT: 157.48 LBS

## 2023-12-04 LAB
BASOPHILS # BLD AUTO: 0.03 K/UL — SIGNIFICANT CHANGE UP (ref 0–0.2)
BASOPHILS # BLD AUTO: 0.03 K/UL — SIGNIFICANT CHANGE UP (ref 0–0.2)
BASOPHILS NFR BLD AUTO: 0.7 % — SIGNIFICANT CHANGE UP (ref 0–2)
BASOPHILS NFR BLD AUTO: 0.7 % — SIGNIFICANT CHANGE UP (ref 0–2)
EOSINOPHIL # BLD AUTO: 0.14 K/UL — SIGNIFICANT CHANGE UP (ref 0–0.5)
EOSINOPHIL # BLD AUTO: 0.14 K/UL — SIGNIFICANT CHANGE UP (ref 0–0.5)
EOSINOPHIL NFR BLD AUTO: 3.2 % — SIGNIFICANT CHANGE UP (ref 0–6)
EOSINOPHIL NFR BLD AUTO: 3.2 % — SIGNIFICANT CHANGE UP (ref 0–6)
HCT VFR BLD CALC: 36.2 % — SIGNIFICANT CHANGE UP (ref 34.5–45)
HCT VFR BLD CALC: 36.2 % — SIGNIFICANT CHANGE UP (ref 34.5–45)
HGB BLD-MCNC: 11.8 G/DL — SIGNIFICANT CHANGE UP (ref 11.5–15.5)
HGB BLD-MCNC: 11.8 G/DL — SIGNIFICANT CHANGE UP (ref 11.5–15.5)
IMM GRANULOCYTES NFR BLD AUTO: 0.2 % — SIGNIFICANT CHANGE UP (ref 0–0.9)
IMM GRANULOCYTES NFR BLD AUTO: 0.2 % — SIGNIFICANT CHANGE UP (ref 0–0.9)
LYMPHOCYTES # BLD AUTO: 1.27 K/UL — SIGNIFICANT CHANGE UP (ref 1–3.3)
LYMPHOCYTES # BLD AUTO: 1.27 K/UL — SIGNIFICANT CHANGE UP (ref 1–3.3)
LYMPHOCYTES # BLD AUTO: 28.9 % — SIGNIFICANT CHANGE UP (ref 13–44)
LYMPHOCYTES # BLD AUTO: 28.9 % — SIGNIFICANT CHANGE UP (ref 13–44)
MCHC RBC-ENTMCNC: 29.1 PG — SIGNIFICANT CHANGE UP (ref 27–34)
MCHC RBC-ENTMCNC: 29.1 PG — SIGNIFICANT CHANGE UP (ref 27–34)
MCHC RBC-ENTMCNC: 32.6 G/DL — SIGNIFICANT CHANGE UP (ref 32–36)
MCHC RBC-ENTMCNC: 32.6 G/DL — SIGNIFICANT CHANGE UP (ref 32–36)
MCV RBC AUTO: 89.4 FL — SIGNIFICANT CHANGE UP (ref 80–100)
MCV RBC AUTO: 89.4 FL — SIGNIFICANT CHANGE UP (ref 80–100)
MONOCYTES # BLD AUTO: 0.38 K/UL — SIGNIFICANT CHANGE UP (ref 0–0.9)
MONOCYTES # BLD AUTO: 0.38 K/UL — SIGNIFICANT CHANGE UP (ref 0–0.9)
MONOCYTES NFR BLD AUTO: 8.7 % — SIGNIFICANT CHANGE UP (ref 2–14)
MONOCYTES NFR BLD AUTO: 8.7 % — SIGNIFICANT CHANGE UP (ref 2–14)
NEUTROPHILS # BLD AUTO: 2.56 K/UL — SIGNIFICANT CHANGE UP (ref 1.8–7.4)
NEUTROPHILS # BLD AUTO: 2.56 K/UL — SIGNIFICANT CHANGE UP (ref 1.8–7.4)
NEUTROPHILS NFR BLD AUTO: 58.3 % — SIGNIFICANT CHANGE UP (ref 43–77)
NEUTROPHILS NFR BLD AUTO: 58.3 % — SIGNIFICANT CHANGE UP (ref 43–77)
NRBC # BLD: 0 /100 WBCS — SIGNIFICANT CHANGE UP (ref 0–0)
NRBC # BLD: 0 /100 WBCS — SIGNIFICANT CHANGE UP (ref 0–0)
PLATELET # BLD AUTO: 217 K/UL — SIGNIFICANT CHANGE UP (ref 150–400)
PLATELET # BLD AUTO: 217 K/UL — SIGNIFICANT CHANGE UP (ref 150–400)
RBC # BLD: 4.05 M/UL — SIGNIFICANT CHANGE UP (ref 3.8–5.2)
RBC # BLD: 4.05 M/UL — SIGNIFICANT CHANGE UP (ref 3.8–5.2)
RBC # FLD: 13.1 % — SIGNIFICANT CHANGE UP (ref 10.3–14.5)
RBC # FLD: 13.1 % — SIGNIFICANT CHANGE UP (ref 10.3–14.5)
WBC # BLD: 4.39 K/UL — SIGNIFICANT CHANGE UP (ref 3.8–10.5)
WBC # BLD: 4.39 K/UL — SIGNIFICANT CHANGE UP (ref 3.8–10.5)
WBC # FLD AUTO: 4.39 K/UL — SIGNIFICANT CHANGE UP (ref 3.8–10.5)
WBC # FLD AUTO: 4.39 K/UL — SIGNIFICANT CHANGE UP (ref 3.8–10.5)

## 2023-12-04 PROCEDURE — 99214 OFFICE O/P EST MOD 30 MIN: CPT

## 2023-12-04 RX ORDER — MULTIVIT-MIN/FOLIC/VIT K/LYCOP 400-300MCG
50 MCG TABLET ORAL
Qty: 30 | Refills: 2 | Status: DISCONTINUED | COMMUNITY
Start: 2023-07-11 | End: 2023-12-04

## 2023-12-04 RX ORDER — ESTRADIOL 0.1 MG/G
0.1 CREAM VAGINAL
Qty: 1 | Refills: 1 | Status: DISCONTINUED | COMMUNITY
Start: 2023-10-03 | End: 2023-12-04

## 2023-12-19 ENCOUNTER — APPOINTMENT (OUTPATIENT)
Age: 58
End: 2023-12-19

## 2023-12-27 ENCOUNTER — OUTPATIENT (OUTPATIENT)
Dept: OUTPATIENT SERVICES | Facility: HOSPITAL | Age: 58
LOS: 1 days | Discharge: HOME | End: 2023-12-27

## 2023-12-27 DIAGNOSIS — C84.40 PERIPHERAL T-CELL LYMPHOMA, NOT ELSEWHERE CLASSIFIED, UNSPECIFIED SITE: ICD-10-CM

## 2023-12-27 DIAGNOSIS — C85.90 NON-HODGKIN LYMPHOMA, UNSPECIFIED, UNSPECIFIED SITE: ICD-10-CM

## 2024-01-02 ENCOUNTER — RESULT REVIEW (OUTPATIENT)
Age: 59
End: 2024-01-02

## 2024-01-02 ENCOUNTER — APPOINTMENT (OUTPATIENT)
Dept: HEMATOLOGY ONCOLOGY | Facility: CLINIC | Age: 59
End: 2024-01-02
Payer: COMMERCIAL

## 2024-01-02 ENCOUNTER — OUTPATIENT (OUTPATIENT)
Dept: OUTPATIENT SERVICES | Facility: HOSPITAL | Age: 59
LOS: 1 days | Discharge: HOME | End: 2024-01-02

## 2024-01-02 VITALS
HEART RATE: 70 BPM | DIASTOLIC BLOOD PRESSURE: 58 MMHG | BODY MASS INDEX: 29.68 KG/M2 | SYSTOLIC BLOOD PRESSURE: 102 MMHG | TEMPERATURE: 97.9 F | OXYGEN SATURATION: 99 % | WEIGHT: 161.31 LBS | HEIGHT: 62 IN

## 2024-01-02 DIAGNOSIS — C85.90 NON-HODGKIN LYMPHOMA, UNSPECIFIED, UNSPECIFIED SITE: ICD-10-CM

## 2024-01-02 LAB
BASOPHILS # BLD AUTO: 0 K/UL — SIGNIFICANT CHANGE UP (ref 0–0.2)
BASOPHILS # BLD AUTO: 0 K/UL — SIGNIFICANT CHANGE UP (ref 0–0.2)
BASOPHILS NFR BLD AUTO: 0.6 % — SIGNIFICANT CHANGE UP (ref 0–2)
BASOPHILS NFR BLD AUTO: 0.6 % — SIGNIFICANT CHANGE UP (ref 0–2)
EOSINOPHIL # BLD AUTO: 0.1 K/UL — SIGNIFICANT CHANGE UP (ref 0–0.5)
EOSINOPHIL # BLD AUTO: 0.1 K/UL — SIGNIFICANT CHANGE UP (ref 0–0.5)
EOSINOPHIL NFR BLD AUTO: 1.9 % — SIGNIFICANT CHANGE UP (ref 0–6)
EOSINOPHIL NFR BLD AUTO: 1.9 % — SIGNIFICANT CHANGE UP (ref 0–6)
HCT VFR BLD CALC: 38.1 % — SIGNIFICANT CHANGE UP (ref 34.5–45)
HCT VFR BLD CALC: 38.1 % — SIGNIFICANT CHANGE UP (ref 34.5–45)
HGB BLD-MCNC: 12.2 G/DL — SIGNIFICANT CHANGE UP (ref 11.5–15.5)
HGB BLD-MCNC: 12.2 G/DL — SIGNIFICANT CHANGE UP (ref 11.5–15.5)
LYMPHOCYTES # BLD AUTO: 1.7 K/UL — SIGNIFICANT CHANGE UP (ref 1–3.3)
LYMPHOCYTES # BLD AUTO: 1.7 K/UL — SIGNIFICANT CHANGE UP (ref 1–3.3)
LYMPHOCYTES # BLD AUTO: 24.4 % — SIGNIFICANT CHANGE UP (ref 13–44)
LYMPHOCYTES # BLD AUTO: 24.4 % — SIGNIFICANT CHANGE UP (ref 13–44)
MCHC RBC-ENTMCNC: 29.1 PG — SIGNIFICANT CHANGE UP (ref 27–34)
MCHC RBC-ENTMCNC: 29.1 PG — SIGNIFICANT CHANGE UP (ref 27–34)
MCHC RBC-ENTMCNC: 32.1 G/DL — SIGNIFICANT CHANGE UP (ref 32–36)
MCHC RBC-ENTMCNC: 32.1 G/DL — SIGNIFICANT CHANGE UP (ref 32–36)
MCV RBC AUTO: 90.9 FL — SIGNIFICANT CHANGE UP (ref 80–100)
MCV RBC AUTO: 90.9 FL — SIGNIFICANT CHANGE UP (ref 80–100)
MONOCYTES # BLD AUTO: 0.5 K/UL — SIGNIFICANT CHANGE UP (ref 0–0.9)
MONOCYTES # BLD AUTO: 0.5 K/UL — SIGNIFICANT CHANGE UP (ref 0–0.9)
MONOCYTES NFR BLD AUTO: 7.9 % — SIGNIFICANT CHANGE UP (ref 2–14)
MONOCYTES NFR BLD AUTO: 7.9 % — SIGNIFICANT CHANGE UP (ref 2–14)
NEUTROPHILS # BLD AUTO: 4.5 K/UL — SIGNIFICANT CHANGE UP (ref 1.8–7.4)
NEUTROPHILS # BLD AUTO: 4.5 K/UL — SIGNIFICANT CHANGE UP (ref 1.8–7.4)
NEUTROPHILS NFR BLD AUTO: 65.2 % — SIGNIFICANT CHANGE UP (ref 43–77)
NEUTROPHILS NFR BLD AUTO: 65.2 % — SIGNIFICANT CHANGE UP (ref 43–77)
PLATELET # BLD AUTO: 249 K/UL — SIGNIFICANT CHANGE UP (ref 150–400)
PLATELET # BLD AUTO: 249 K/UL — SIGNIFICANT CHANGE UP (ref 150–400)
RBC # BLD: 4.2 M/UL — SIGNIFICANT CHANGE UP (ref 3.8–5.2)
RBC # BLD: 4.2 M/UL — SIGNIFICANT CHANGE UP (ref 3.8–5.2)
RBC # FLD: 13 % — SIGNIFICANT CHANGE UP (ref 10.3–14.5)
RBC # FLD: 13 % — SIGNIFICANT CHANGE UP (ref 10.3–14.5)
WBC # BLD: 6.9 K/UL — SIGNIFICANT CHANGE UP (ref 3.8–10.5)
WBC # BLD: 6.9 K/UL — SIGNIFICANT CHANGE UP (ref 3.8–10.5)
WBC # FLD AUTO: 6.9 K/UL — SIGNIFICANT CHANGE UP (ref 3.8–10.5)
WBC # FLD AUTO: 6.9 K/UL — SIGNIFICANT CHANGE UP (ref 3.8–10.5)

## 2024-01-02 PROCEDURE — 99214 OFFICE O/P EST MOD 30 MIN: CPT

## 2024-01-03 ENCOUNTER — RESULT REVIEW (OUTPATIENT)
Age: 59
End: 2024-01-03

## 2024-01-03 ENCOUNTER — APPOINTMENT (OUTPATIENT)
Dept: HEMATOLOGY ONCOLOGY | Facility: CLINIC | Age: 59
End: 2024-01-03

## 2024-01-03 LAB
BASOPHILS # BLD AUTO: 0.1 K/UL — SIGNIFICANT CHANGE UP (ref 0–0.2)
BASOPHILS # BLD AUTO: 0.1 K/UL — SIGNIFICANT CHANGE UP (ref 0–0.2)
BASOPHILS NFR BLD AUTO: 0.9 % — SIGNIFICANT CHANGE UP (ref 0–2)
BASOPHILS NFR BLD AUTO: 0.9 % — SIGNIFICANT CHANGE UP (ref 0–2)
EOSINOPHIL # BLD AUTO: 0.1 K/UL — SIGNIFICANT CHANGE UP (ref 0–0.5)
EOSINOPHIL # BLD AUTO: 0.1 K/UL — SIGNIFICANT CHANGE UP (ref 0–0.5)
EOSINOPHIL NFR BLD AUTO: 2 % — SIGNIFICANT CHANGE UP (ref 0–6)
EOSINOPHIL NFR BLD AUTO: 2 % — SIGNIFICANT CHANGE UP (ref 0–6)
HCT VFR BLD CALC: 37.8 % — SIGNIFICANT CHANGE UP (ref 34.5–45)
HCT VFR BLD CALC: 37.8 % — SIGNIFICANT CHANGE UP (ref 34.5–45)
HGB BLD-MCNC: 12 G/DL — SIGNIFICANT CHANGE UP (ref 11.5–15.5)
HGB BLD-MCNC: 12 G/DL — SIGNIFICANT CHANGE UP (ref 11.5–15.5)
LYMPHOCYTES # BLD AUTO: 1.5 K/UL — SIGNIFICANT CHANGE UP (ref 1–3.3)
LYMPHOCYTES # BLD AUTO: 1.5 K/UL — SIGNIFICANT CHANGE UP (ref 1–3.3)
LYMPHOCYTES # BLD AUTO: 26.5 % — SIGNIFICANT CHANGE UP (ref 13–44)
LYMPHOCYTES # BLD AUTO: 26.5 % — SIGNIFICANT CHANGE UP (ref 13–44)
MCHC RBC-ENTMCNC: 29.3 PG — SIGNIFICANT CHANGE UP (ref 27–34)
MCHC RBC-ENTMCNC: 29.3 PG — SIGNIFICANT CHANGE UP (ref 27–34)
MCHC RBC-ENTMCNC: 31.7 G/DL — LOW (ref 32–36)
MCHC RBC-ENTMCNC: 31.7 G/DL — LOW (ref 32–36)
MCV RBC AUTO: 92.4 FL — SIGNIFICANT CHANGE UP (ref 80–100)
MCV RBC AUTO: 92.4 FL — SIGNIFICANT CHANGE UP (ref 80–100)
MONOCYTES # BLD AUTO: 0.4 K/UL — SIGNIFICANT CHANGE UP (ref 0–0.9)
MONOCYTES # BLD AUTO: 0.4 K/UL — SIGNIFICANT CHANGE UP (ref 0–0.9)
MONOCYTES NFR BLD AUTO: 7.4 % — SIGNIFICANT CHANGE UP (ref 2–14)
MONOCYTES NFR BLD AUTO: 7.4 % — SIGNIFICANT CHANGE UP (ref 2–14)
NEUTROPHILS # BLD AUTO: 3.5 K/UL — SIGNIFICANT CHANGE UP (ref 1.8–7.4)
NEUTROPHILS # BLD AUTO: 3.5 K/UL — SIGNIFICANT CHANGE UP (ref 1.8–7.4)
NEUTROPHILS NFR BLD AUTO: 63.1 % — SIGNIFICANT CHANGE UP (ref 43–77)
NEUTROPHILS NFR BLD AUTO: 63.1 % — SIGNIFICANT CHANGE UP (ref 43–77)
PLATELET # BLD AUTO: 247 K/UL — SIGNIFICANT CHANGE UP (ref 150–400)
PLATELET # BLD AUTO: 247 K/UL — SIGNIFICANT CHANGE UP (ref 150–400)
RBC # BLD: 4.09 M/UL — SIGNIFICANT CHANGE UP (ref 3.8–5.2)
RBC # BLD: 4.09 M/UL — SIGNIFICANT CHANGE UP (ref 3.8–5.2)
RBC # FLD: 12.7 % — SIGNIFICANT CHANGE UP (ref 10.3–14.5)
RBC # FLD: 12.7 % — SIGNIFICANT CHANGE UP (ref 10.3–14.5)
WBC # BLD: 5.6 K/UL — SIGNIFICANT CHANGE UP (ref 3.8–10.5)
WBC # BLD: 5.6 K/UL — SIGNIFICANT CHANGE UP (ref 3.8–10.5)
WBC # FLD AUTO: 5.6 K/UL — SIGNIFICANT CHANGE UP (ref 3.8–10.5)
WBC # FLD AUTO: 5.6 K/UL — SIGNIFICANT CHANGE UP (ref 3.8–10.5)

## 2024-01-03 NOTE — RESULTS/DATA
[FreeTextEntry1] : PET SCAN ON 10/17/22 1. Interval further decreased size of right axillary lymph nodes with unchanged minimal activity comparable to mediastinal blood pool (Deauville 2). 2. Unchanged nonspecific minimally FDG avid retroareolar right breast soft tissue and unchanged minimally avid cutaneous thickening in the right breast. 3. Unchanged too small to characterize subcentimeter nodular opacity in the right upper lobe medially. 4. Generalized diffuse bone marrow hypermetabolism without CT correlate, slightly less prominent. This is nonspecific and may be related to treatment with G-CSF  .5/26/22: MRI BREAST: No MRI evidence of malignancy, left breast. Right axillary adenopathy. Skin thickening and parenchymal edema in the right breast which was seen on PET/CT is consistent with lymphatic obstruction. There is a benign-appearing circumscribed mass in the right central breast for which six-month follow-up MRI is recommended to document stability. RECOMMENDATION:  MRI in 6 months. BI-RADS 3-Probably Benign Finding(s)   5/16/22: Er Axillary Excisional Biopsy   Peripheral T- Cell Lymphoma, NOS - By   immunohistochemical  stains the atypical cells are positive for CD4, BCL-6 (weak/partial), PD-1 and BCL2 (dim) with high proliferation index, suggestive of T   follicular  helper immunophenotype. CD 30-veTest Performed: T Cell Gene Rearrangement RESULTS: TCR-beta: NEGATIVE TCR-gamma: INDETERMINATE  PET SCAN 4/21/22:  - 1. FDG avid extensive right axillary lymphadenopathy 5.4 x 3.6 cm , SUV 22.5. and right supraclavicular lymph node.0.8 x 0.8 cm (image 53), SUV 5.8. 2. Difficult to delineate retroareolar right breast soft tissue, with low-grade FDG avidity and minimally FDG avid diffuse, right breast cutaneous thickening.    3/22/22 Lymph Node, right axilla  -Atypical lymphoid infiltrate - NEGATIVE for a clonal lgH gene rearrangement. - Positive clonal TCR gamma gene rearrangement    PET SCAN 4/21/22: - 1. FDG avid extensive right axillary lymphadenopathy 5.4 x 3.6 cm , SUV 22.5. and right supraclavicular lymph node.0.8 x 0.8 cm (image 53), SUV 5.8. 2. Difficult to delineate retroareolar right breast soft tissue, with low-grade FDG avidity and minimally FDG avid diffuse, right breast cutaneous thickening.  Breast node biopsy performed February 2022 was negative. MRI BREAST 5/26/22: BiRADS 3 , f/u in 6 months  PET SCAN 6/13/22: 1. Interval resolution of FDG avidity associated with right supraclavicular lymph node which has decreased in size. 2. Interval decrease in size, number and avidity of right axillary lymphadenopathy and resolution of FDG avid right retropectoral lymph nodes. 3. Unchanged minimally FDG avid difficult to delineate retroareolar right breast soft tissue and minimally FDG avid diffuse right breast cutaneous thickening. Skin thickening and parenchymal edema in the right breast is consistent with metastatic obstruction on interval MRI from 5/26/2022.    PET/CT 8/8/22 1. Interval further decrease of right axillary lymph nodes. 2. Unchanged nonspecific minimally FDG avid retroareolar right breast soft tissue. Interval decreased avidity of unchanged cutaneous thickening in the right breast. 3. Subcentimeter nodular opacity in the right upper lobe medially, unchanged since PET/CT on 4/21/2022. 4. New generalized diffuse bone marrow hypermetabolism, nonspecific and may be related to treatment.

## 2024-01-03 NOTE — HISTORY OF PRESENT ILLNESS
[de-identified] : 57 year old female with no significant past medical hx presents to office for initial visit.  Patient presented on 3/4/22 c/o right axilla mass  to general surgeon, Dr. Amadeo Grace. Patient incidentally discovered it while shavingin Jan 2022  \par  Biopsy of right axilla performed on 3/22/22 revealing atypical lymphoid infiltrate ( NEGATIVE for a clonal lgH gene rearrangement/ Positive clonal TCR gamma gene rearrangement )\par  \par  Planned for lymph node excision with Dr. Grace however patient decided to stay in the [facility] system and had Excisional Biopsy per Dr Mariano \par  Colonoscopy November 2021\par  \par  LN excional BIopsy was delayed 2/2 to her travelling and pre surg testing.\par  On review of needle biopsy from 3/22/22 at [facility], suggestive of T cell lymphoma\par  \par  S/p right axillary lymph node excisional biopsy on 5/16/22  per DR Mariano Path c/w Peripheral T- Cell Lymphoma, NOS\par  PET SCAN 4/21/22: \par  - 1. FDG avid extensive right axillary lymphadenopathy 5.4 x 3.6 cm , SUV 22.5. and right supraclavicular lymph node.0.8 x 0.8 cm (image 53), SUV 5.8.\par  2. Difficult to delineate retroareolar right breast soft tissue, with low-grade FDG avidity and minimally FDG avid diffuse, right breast cutaneous thickening. \par  \par  Breast node biopsy performed February 2022 was negative. \par  MRI Breast on 5/26/22 with benign findings repeat in 6 months [de-identified] : Patient presents for follow up accompanied with . S/p right axillary lymph node excisional biopsy on 5/16/22  per DR Mariano Path c/w Peripheral T- Cell Lymphoma, NOS Patient started  CHOEP on 6/20/22, Completed C 6 on 10/6/22  Patient evaluated by Dr. Acevedo for auto stem cell transplant for t cell NHLHere for f/u..  Patient c/o neuropathy b/l feet since transplant, start on magnesium cream with no relief, may consider starting Lyrica Generalized Arthralgia progressing for the past month, taking Motrin 200 mg 2 tabs BID, Tylenol 500 mg 2 tabs daily BID  Patient also admit worsening fatigue  Reports Night sweats x 2 week for the past month   Today's CBC: WBC 6.9, Hg 12.2 , HCT 38.1 , plt 249

## 2024-01-05 LAB
ALBUMIN SERPL ELPH-MCNC: 4.6 G/DL
ALP BLD-CCNC: 96 U/L
ALT SERPL-CCNC: 19 U/L
ANION GAP SERPL CALC-SCNC: 10 MMOL/L
AST SERPL-CCNC: 19 U/L
BILIRUB SERPL-MCNC: 0.2 MG/DL
BUN SERPL-MCNC: 14 MG/DL
CALCIUM SERPL-MCNC: 9.6 MG/DL
CHLORIDE SERPL-SCNC: 105 MMOL/L
CO2 SERPL-SCNC: 26 MMOL/L
CREAT SERPL-MCNC: 0.65 MG/DL
EGFR: 103 ML/MIN/1.73M2
GLUCOSE SERPL-MCNC: 94 MG/DL
LDH SERPL-CCNC: 165 U/L
POTASSIUM SERPL-SCNC: 4.1 MMOL/L
PROT SERPL-MCNC: 6.7 G/DL
SODIUM SERPL-SCNC: 141 MMOL/L

## 2024-01-09 ENCOUNTER — APPOINTMENT (OUTPATIENT)
Dept: PHYSICAL MEDICINE AND REHAB | Facility: CLINIC | Age: 59
End: 2024-01-09
Payer: COMMERCIAL

## 2024-01-09 VITALS
HEIGHT: 62 IN | DIASTOLIC BLOOD PRESSURE: 71 MMHG | HEART RATE: 72 BPM | WEIGHT: 160 LBS | BODY MASS INDEX: 29.44 KG/M2 | RESPIRATION RATE: 14 BRPM | SYSTOLIC BLOOD PRESSURE: 114 MMHG

## 2024-01-09 DIAGNOSIS — R73.03 PREDIABETES.: ICD-10-CM

## 2024-01-09 DIAGNOSIS — Z85.72 PERSONAL HISTORY OF NON-HODGKIN LYMPHOMAS: ICD-10-CM

## 2024-01-09 PROCEDURE — 99204 OFFICE O/P NEW MOD 45 MIN: CPT

## 2024-01-15 ENCOUNTER — APPOINTMENT (OUTPATIENT)
Dept: FAMILY MEDICINE | Facility: CLINIC | Age: 59
End: 2024-01-15
Payer: COMMERCIAL

## 2024-01-15 VITALS
WEIGHT: 160 LBS | BODY MASS INDEX: 29.44 KG/M2 | HEIGHT: 62 IN | OXYGEN SATURATION: 98 % | SYSTOLIC BLOOD PRESSURE: 102 MMHG | HEART RATE: 65 BPM | DIASTOLIC BLOOD PRESSURE: 70 MMHG

## 2024-01-15 PROCEDURE — 99214 OFFICE O/P EST MOD 30 MIN: CPT

## 2024-01-15 RX ORDER — PNV NO.95/FERROUS FUM/FOLIC AC 28MG-0.8MG
TABLET ORAL DAILY
Qty: 90 | Refills: 0 | Status: ACTIVE | COMMUNITY
Start: 2022-12-20 | End: 1900-01-01

## 2024-01-15 NOTE — ASSESSMENT
[FreeTextEntry1] : Anxiety: increase prozac 30 mg po daily, recommend exercise, yoga, meditation, refer to sw/therapist Neuropathy: recently started on pregabalin, f/u PMR T-cell lymphoma: s/p bone marrow transplant, f/u oncology RTC 4 wks

## 2024-01-15 NOTE — HISTORY OF PRESENT ILLNESS
[FreeTextEntry1] : Follow up  [de-identified] : 59 yo female presents for follow up of anxiety. She is taking prozac. She would like to increase the dose. In addition, she is following with oncology for T-cell lymphoma, s/p bone marrow transplant. In addition, she is being treated with pregabalin for neuropathy. Denies fever, chills, cp, palpitations, sob, nvcd.

## 2024-01-16 ENCOUNTER — NON-APPOINTMENT (OUTPATIENT)
Age: 59
End: 2024-01-16

## 2024-01-17 ENCOUNTER — NON-APPOINTMENT (OUTPATIENT)
Age: 59
End: 2024-01-17

## 2024-01-19 ENCOUNTER — OUTPATIENT (OUTPATIENT)
Dept: OUTPATIENT SERVICES | Facility: HOSPITAL | Age: 59
LOS: 1 days | Discharge: HOME | End: 2024-01-19

## 2024-01-19 DIAGNOSIS — C85.90 NON-HODGKIN LYMPHOMA, UNSPECIFIED, UNSPECIFIED SITE: ICD-10-CM

## 2024-01-19 DIAGNOSIS — C84.40 PERIPHERAL T-CELL LYMPHOMA, NOT ELSEWHERE CLASSIFIED, UNSPECIFIED SITE: ICD-10-CM

## 2024-01-29 ENCOUNTER — APPOINTMENT (OUTPATIENT)
Dept: INFUSION THERAPY | Facility: HOSPITAL | Age: 59
End: 2024-01-29

## 2024-01-30 ENCOUNTER — APPOINTMENT (OUTPATIENT)
Age: 59
End: 2024-01-30

## 2024-01-30 DIAGNOSIS — Z23 ENCOUNTER FOR IMMUNIZATION: ICD-10-CM

## 2024-02-05 ENCOUNTER — APPOINTMENT (OUTPATIENT)
Dept: INFUSION THERAPY | Facility: HOSPITAL | Age: 59
End: 2024-02-05

## 2024-02-05 ENCOUNTER — RESULT REVIEW (OUTPATIENT)
Age: 59
End: 2024-02-05

## 2024-02-05 ENCOUNTER — APPOINTMENT (OUTPATIENT)
Dept: HEMATOLOGY ONCOLOGY | Facility: CLINIC | Age: 59
End: 2024-02-05
Payer: COMMERCIAL

## 2024-02-05 VITALS
HEART RATE: 75 BPM | DIASTOLIC BLOOD PRESSURE: 72 MMHG | OXYGEN SATURATION: 100 % | SYSTOLIC BLOOD PRESSURE: 111 MMHG | BODY MASS INDEX: 29.64 KG/M2 | TEMPERATURE: 98.1 F | RESPIRATION RATE: 16 BRPM | WEIGHT: 162.04 LBS

## 2024-02-05 LAB
ALBUMIN SERPL ELPH-MCNC: 4.6 G/DL
ALP BLD-CCNC: 101 U/L
ALT SERPL-CCNC: 19 U/L
ANION GAP SERPL CALC-SCNC: 10 MMOL/L
AST SERPL-CCNC: 16 U/L
BASOPHILS # BLD AUTO: 0.03 K/UL — SIGNIFICANT CHANGE UP (ref 0–0.2)
BASOPHILS NFR BLD AUTO: 0.5 % — SIGNIFICANT CHANGE UP (ref 0–2)
BILIRUB SERPL-MCNC: 0.4 MG/DL
BUN SERPL-MCNC: 16 MG/DL
CALCIUM SERPL-MCNC: 9.8 MG/DL
CHLORIDE SERPL-SCNC: 103 MMOL/L
CO2 SERPL-SCNC: 27 MMOL/L
CREAT SERPL-MCNC: 0.63 MG/DL
EGFR: 104 ML/MIN/1.73M2
EOSINOPHIL # BLD AUTO: 0.15 K/UL — SIGNIFICANT CHANGE UP (ref 0–0.5)
EOSINOPHIL NFR BLD AUTO: 2.7 % — SIGNIFICANT CHANGE UP (ref 0–6)
GLUCOSE SERPL-MCNC: 120 MG/DL
HCT VFR BLD CALC: 37.7 % — SIGNIFICANT CHANGE UP (ref 34.5–45)
HGB BLD-MCNC: 12.2 G/DL — SIGNIFICANT CHANGE UP (ref 11.5–15.5)
IMM GRANULOCYTES NFR BLD AUTO: 0.4 % — SIGNIFICANT CHANGE UP (ref 0–0.9)
LDH SERPL-CCNC: 173 U/L
LYMPHOCYTES # BLD AUTO: 1.47 K/UL — SIGNIFICANT CHANGE UP (ref 1–3.3)
LYMPHOCYTES # BLD AUTO: 26.8 % — SIGNIFICANT CHANGE UP (ref 13–44)
MAGNESIUM SERPL-MCNC: 2.2 MG/DL
MCHC RBC-ENTMCNC: 29 PG — SIGNIFICANT CHANGE UP (ref 27–34)
MCHC RBC-ENTMCNC: 32.4 G/DL — SIGNIFICANT CHANGE UP (ref 32–36)
MCV RBC AUTO: 89.5 FL — SIGNIFICANT CHANGE UP (ref 80–100)
MONOCYTES # BLD AUTO: 0.36 K/UL — SIGNIFICANT CHANGE UP (ref 0–0.9)
MONOCYTES NFR BLD AUTO: 6.6 % — SIGNIFICANT CHANGE UP (ref 2–14)
NEUTROPHILS # BLD AUTO: 3.46 K/UL — SIGNIFICANT CHANGE UP (ref 1.8–7.4)
NEUTROPHILS NFR BLD AUTO: 63 % — SIGNIFICANT CHANGE UP (ref 43–77)
NRBC # BLD: 0 /100 WBCS — SIGNIFICANT CHANGE UP (ref 0–0)
PLATELET # BLD AUTO: 256 K/UL — SIGNIFICANT CHANGE UP (ref 150–400)
POTASSIUM SERPL-SCNC: 4.4 MMOL/L
PROT SERPL-MCNC: 6.9 G/DL
RBC # BLD: 4.21 M/UL — SIGNIFICANT CHANGE UP (ref 3.8–5.2)
RBC # FLD: 13.3 % — SIGNIFICANT CHANGE UP (ref 10.3–14.5)
SODIUM SERPL-SCNC: 140 MMOL/L
WBC # BLD: 5.49 K/UL — SIGNIFICANT CHANGE UP (ref 3.8–10.5)
WBC # FLD AUTO: 5.49 K/UL — SIGNIFICANT CHANGE UP (ref 3.8–10.5)

## 2024-02-05 PROCEDURE — 99214 OFFICE O/P EST MOD 30 MIN: CPT

## 2024-02-05 RX ORDER — MONTELUKAST 10 MG/1
10 TABLET, FILM COATED ORAL
Qty: 30 | Refills: 1 | Status: DISCONTINUED | COMMUNITY
Start: 2023-09-08 | End: 2024-02-05

## 2024-02-05 RX ORDER — METOCLOPRAMIDE 10 MG/1
10 TABLET ORAL
Qty: 120 | Refills: 1 | Status: DISCONTINUED | COMMUNITY
Start: 2023-06-30 | End: 2024-02-05

## 2024-02-05 NOTE — HISTORY OF PRESENT ILLNESS
[de-identified] : Last office visit: 12/4/23 Primary Oncologist: Dr Acevedo   [de-identified] : Reason for visit: Follow up status post autologous stem cell transplant. Patient complains of neuropathy of bilateral feet and short term memory loss. Continues to have night sweats, fatigue and intermittent nausea. Denies fever, vomiting, diarrhea, rash, SOB, chest pain or B/L LE edema. Walks with a cane.   Medications: Alprazolam prn Prozac 30 mg daily.  Magnesium Multivitamin Zofran PRN  Pregabalin 50 mg BID  Examination: Articulate and in no acute distress lines: clean and non-tender. Mediport in place.  No mucositis No occiput, poster cervical, anterior cervical, submandibular, sublingual, submental, supraclavicular nor axillary adenopathy Lungs: Clear Cardiac: without rubs Abd: soft and non-tender, Traube's space is tympanitic. No inguinal adenoapathy. No femoral adenopathy No sig peripheral edema Gait: unencumbered. Skin: Intact, no rash.

## 2024-02-05 NOTE — ASSESSMENT
[FreeTextEntry1] : T Cell Lymphoma (C85.90) S/P Autologous Stem Cell Transplant (Z94.81) On 11/25/22, after pre-medication, Ms. Johnston received 316ml of autologous, pooled, thawed, washed, mobilized, plasma reduced, HPC apheresis  1) T Cell Lymphoma  S/P Autologous PBSCT on 11/25/22 11/28/23 PET CT: CRYSTAL 11/28/23 MRI Bilateral breasts: No MRI evidence of malignancy. Benign.  Repeat CT Scans March 2024  2) Heme  Counts stable, no transfusion requirements today 2/5/24: WBC 5.49  H/H 12.2/37.7    ANC 3.46  Continue Multiple Vitamin  3) ID Not on ppx  Discussed initial series of COVID vaccine at 90 days post Auto....Scheduled for COVID vaccines....Received Moderna # 1 on 2/26/23 , had fever, diarrhea, faitgue for 2 days# 2 received on 3/26/23.   One year post transplant: 12 month vaccine received on 11/27/23 and 12/4/23 14 month vaccines received on 1/29/24 and today 2/5/24.  24 month vaccines due November 2024 One year labs sent on 12/4/23 Echocardiogram completed on 11/21/23 PFT ordered on 12/4/23  4) GI  Zofran PRN for nausea  5)Other Continue Magnesium Oxide 400 mg 2 x day Continue Alprazolam prn Continue Prozac daily  Neuropathy- Continue Pregabalin 50 mg BID.   6)Plan - Port Study done on 1/27/23. Continue port flush every 6 weeks. Last port flush on 1/29/24. Next port flush March 2024. -Continue Speech therapy.  -Educated about plan of care, All questions and concerns addressed  - Advised to call office with any acute concerns  - Additional Instructions: Notify if bleeding; swelling; persistent nausea and vomiting; unable to urinate; pain not relieved by medications; fever; numbness, tingling; excessive diarrhea, inability to tolerate liquids or foods; increased irritability or sluggishness, rash Follow up in 2 months with Dr Acevedo

## 2024-02-05 NOTE — PHYSICAL EXAM
[Restricted in physically strenuous activity but ambulatory and able to carry out work of a light or sedentary nature] : Status 1- Restricted in physically strenuous activity but ambulatory and able to carry out work of a light or sedentary nature, e.g., light house work, office work [Normal] : affect appropriate [de-identified] : port in place- Last POF 1/29/24 [de-identified] : No edema

## 2024-02-05 NOTE — REVIEW OF SYSTEMS
[Anxiety] : anxiety [Negative] : Allergic/Immunologic [Fever] : no fever [Chills] : no chills [Night Sweats] : night sweats [Fatigue] : fatigue [Recent Change In Weight] : ~T recent weight change [Cough] : no cough [SOB on Exertion] : no shortness of breath during exertion [Abdominal Pain] : no abdominal pain [Vomiting] : no vomiting [Constipation] : no constipation [Muscle Pain] : no muscle pain [Confused] : no confusion [Dizziness] : no dizziness [Fainting] : no fainting [Difficulty Walking] : no difficulty walking [Suicidal] : not suicidal [Insomnia] : no insomnia [Depression] : no depression [FreeTextEntry7] : Nausea [de-identified] : Neuropathy of bilateral feet. Walks with a cane.  [de-identified] : Short team memory loss

## 2024-02-21 ENCOUNTER — APPOINTMENT (OUTPATIENT)
Dept: PHYSICAL MEDICINE AND REHAB | Facility: CLINIC | Age: 59
End: 2024-02-21
Payer: COMMERCIAL

## 2024-02-21 VITALS
HEIGHT: 62 IN | RESPIRATION RATE: 14 BRPM | SYSTOLIC BLOOD PRESSURE: 112 MMHG | HEART RATE: 66 BPM | BODY MASS INDEX: 29.44 KG/M2 | DIASTOLIC BLOOD PRESSURE: 76 MMHG | WEIGHT: 160 LBS

## 2024-02-21 PROCEDURE — 99214 OFFICE O/P EST MOD 30 MIN: CPT

## 2024-02-21 NOTE — ASSESSMENT
[FreeTextEntry1] : 58 year old female presenting for evaluation.  #Neuropathy: -Increase pregabalin to 100mg BID -I Stop # [id number] -Avoid SNRI given she is on SSRI -Refer to neurology for further evaluation given time course of symptoms  -Discussed EMG, will consider after neurology evaluation   #Impaired cognition: -Continue SLP -Refer to neuropsychology for evaluation  #Impaired mobility: -Continue PT  Follow up in 1 month

## 2024-02-21 NOTE — HISTORY OF PRESENT ILLNESS
[FreeTextEntry1] : Ms. Oviedo is a 58 year old female presented on 3/4/22 c/o right axilla mass to general surgeon, Dr. Amadeo Grace. Patient incidentally discovered it while shaving in Jan / Feb 2022.  Biopsy of right axilla performed on 3/22/22 revealing atypical lymphoid infiltrate ( NEGATIVE for a clonal lgH gene rearrangement/ Positive clonal TCR gamma gene rearrangement ).  On review of needle biopsy from 3/22/22 at [facility], suggestive of T cell lymphoma.  S/p right axillary lymph node excisional biopsy on 5/16/22 per DR Mariano Path c/w Peripheral T- Cell Lymphoma, NOS.  Treatment with CHOEP started CHOEP 6/20/22, Completed C 6 on 10/6/22.  Patient evaluated by Dr. Acevedo for auto stem cell transplant for t cell NHL, S/P Autologous PBSCT on 11/25/22.  She reports that she still having significant burning pain especially in her feet.  She states she has good days and bad days worse with cold.  Still reports that she is feeling some weakness but working with physical therapy.  Working speech therapy thinks is helping with her cognition.  Started pregabalin.  States it might be helpful but unsure.  No major side effects.

## 2024-02-21 NOTE — PHYSICAL EXAM
[FreeTextEntry1] : Gen: Patient is A&O x 3, NAD HEENT: EOMI, hearing grossly normal Resp: regular, non - labored CV: pulses regular Skin: no rashes, erythema Lymph: no clubbing, cyanosis, edema Inspection: no instability  ROM: full throughout Palpation:no tenderness to palpation Sensation: Decreased to light touch in bilateral hands and feet, up to mid calf   Reflexes: 1+ and symmetric throughout Strength: 5/5 throughout, except 4/5 in hip flexion and ankle dorsiflexion b/l Special tests: -Straight leg raise, -Hoffmans sign, -Spurling sign Gait: Difficulty with tandem gait, ambulates with single point cane

## 2024-02-27 ENCOUNTER — OUTPATIENT (OUTPATIENT)
Dept: OUTPATIENT SERVICES | Facility: HOSPITAL | Age: 59
LOS: 1 days | Discharge: HOME | End: 2024-02-27

## 2024-02-27 DIAGNOSIS — C84.40 PERIPHERAL T-CELL LYMPHOMA, NOT ELSEWHERE CLASSIFIED, UNSPECIFIED SITE: ICD-10-CM

## 2024-02-27 DIAGNOSIS — C85.90 NON-HODGKIN LYMPHOMA, UNSPECIFIED, UNSPECIFIED SITE: ICD-10-CM

## 2024-03-05 ENCOUNTER — APPOINTMENT (OUTPATIENT)
Dept: HEMATOLOGY ONCOLOGY | Facility: CLINIC | Age: 59
End: 2024-03-05
Payer: COMMERCIAL

## 2024-03-05 ENCOUNTER — RESULT REVIEW (OUTPATIENT)
Age: 59
End: 2024-03-05

## 2024-03-05 VITALS
WEIGHT: 164.19 LBS | OXYGEN SATURATION: 98 % | BODY MASS INDEX: 30.22 KG/M2 | HEART RATE: 72 BPM | TEMPERATURE: 97.8 F | DIASTOLIC BLOOD PRESSURE: 66 MMHG | SYSTOLIC BLOOD PRESSURE: 96 MMHG | HEIGHT: 62 IN

## 2024-03-05 LAB
BASOPHILS # BLD AUTO: 0 K/UL — SIGNIFICANT CHANGE UP (ref 0–0.2)
BASOPHILS NFR BLD AUTO: 1 % — SIGNIFICANT CHANGE UP (ref 0–2)
EOSINOPHIL # BLD AUTO: 0.1 K/UL — SIGNIFICANT CHANGE UP (ref 0–0.5)
EOSINOPHIL NFR BLD AUTO: 1.7 % — SIGNIFICANT CHANGE UP (ref 0–6)
HCT VFR BLD CALC: 37.9 % — SIGNIFICANT CHANGE UP (ref 34.5–45)
HGB BLD-MCNC: 12.5 G/DL — SIGNIFICANT CHANGE UP (ref 11.5–15.5)
LYMPHOCYTES # BLD AUTO: 1.3 K/UL — SIGNIFICANT CHANGE UP (ref 1–3.3)
LYMPHOCYTES # BLD AUTO: 28.1 % — SIGNIFICANT CHANGE UP (ref 13–44)
MCHC RBC-ENTMCNC: 29.6 PG — SIGNIFICANT CHANGE UP (ref 27–34)
MCHC RBC-ENTMCNC: 33 G/DL — SIGNIFICANT CHANGE UP (ref 32–36)
MCV RBC AUTO: 89.8 FL — SIGNIFICANT CHANGE UP (ref 80–100)
MONOCYTES # BLD AUTO: 0.5 K/UL — SIGNIFICANT CHANGE UP (ref 0–0.9)
MONOCYTES NFR BLD AUTO: 9.7 % — SIGNIFICANT CHANGE UP (ref 2–14)
NEUTROPHILS # BLD AUTO: 2.8 K/UL — SIGNIFICANT CHANGE UP (ref 1.8–7.4)
NEUTROPHILS NFR BLD AUTO: 59.6 % — SIGNIFICANT CHANGE UP (ref 43–77)
PLATELET # BLD AUTO: 223 K/UL — SIGNIFICANT CHANGE UP (ref 150–400)
RBC # BLD: 4.22 M/UL — SIGNIFICANT CHANGE UP (ref 3.8–5.2)
RBC # FLD: 12.4 % — SIGNIFICANT CHANGE UP (ref 10.3–14.5)
WBC # BLD: 4.7 K/UL — SIGNIFICANT CHANGE UP (ref 3.8–10.5)
WBC # FLD AUTO: 4.7 K/UL — SIGNIFICANT CHANGE UP (ref 3.8–10.5)

## 2024-03-05 PROCEDURE — 99215 OFFICE O/P EST HI 40 MIN: CPT

## 2024-03-05 NOTE — HISTORY OF PRESENT ILLNESS
[de-identified] : 57 year old female with no significant past medical hx presents to office for initial visit.  Patient presented on 3/4/22 c/o right axilla mass  to general surgeon, Dr. Amadeo Grace. Patient incidentally discovered it while shavingin Jan 2022  \par  Biopsy of right axilla performed on 3/22/22 revealing atypical lymphoid infiltrate ( NEGATIVE for a clonal lgH gene rearrangement/ Positive clonal TCR gamma gene rearrangement )\par  \par  Planned for lymph node excision with Dr. Grace however patient decided to stay in the [facility] system and had Excisional Biopsy per Dr Mariano \par  Colonoscopy November 2021\par  \par  LN excional BIopsy was delayed 2/2 to her travelling and pre surg testing.\par  On review of needle biopsy from 3/22/22 at [facility], suggestive of T cell lymphoma\par  \par  S/p right axillary lymph node excisional biopsy on 5/16/22  per DR Mariano Path c/w Peripheral T- Cell Lymphoma, NOS\par  PET SCAN 4/21/22: \par  - 1. FDG avid extensive right axillary lymphadenopathy 5.4 x 3.6 cm , SUV 22.5. and right supraclavicular lymph node.0.8 x 0.8 cm (image 53), SUV 5.8.\par  2. Difficult to delineate retroareolar right breast soft tissue, with low-grade FDG avidity and minimally FDG avid diffuse, right breast cutaneous thickening. \par  \par  Breast node biopsy performed February 2022 was negative. \par  MRI Breast on 5/26/22 with benign findings repeat in 6 months [de-identified] : Patient presents for follow up accompanied with daughter. S/p right axillary lymph node excisional biopsy on 5/16/22  per DR Mariano Path c/w Peripheral T- Cell Lymphoma, NOS Patient started  CHOEP on 6/20/22, Completed C 6 on 10/6/22  Patient evaluated by Dr. Acevedo for auto stem cell transplant for t cell NHLHere for f/u..  Patient c/o neuropathy b/l feet since transplant, start on magnesium cream with no relief, may consider starting Lyrica Generalized Arthralgia progressing for the past month, taking Motrin 200 mg 2 tabs BID, Tylenol 500 mg 2 tabs daily BID  Patient also admit worsening fatigue  Reports Night sweats x 2 week for the past month   Pt reports feeling fine She reports fatigue and night sweats, although has been told is not concerning Pt reports pregabalin was not helping, Dr. Monae increased dose last week Reports bad days with neuropathy when it is cold, uses heating pads, not very much improvement, currently goes to PT  Today's CBC: WBC 6.9, Hg 12.2 , HCT 38.1 , plt 249

## 2024-03-05 NOTE — ADDENDUM
[FreeTextEntry1] :  Documented by Teresa Aaron acting as scribe for Dr. Rendon on  03/05/2024.   All Medical record entries made by the Scribe were at my, Dr. Rendon's, direction and personally dictated by me on  03/05/2024. I have reviewed the chart and agree that the record accurately reflects my personal performance of the history, physical exam, assessment and plan. I have also personally directed, reviewed, and agreed with the discharge instructions.

## 2024-03-05 NOTE — ASSESSMENT
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 Amadeo Grace. Patient incidentally discovered it while shaving in Jan / Feb 2022 Biopsy of right axilla performed on 3/22/22 revealing atypical lymphoid infiltrate ( NEGATIVE for a clonal lgH gene rearrangement/ Positive clonal TCR gamma gene rearrangement ) On review of needle biopsy from 3/22/22 at [facility], suggestive of T cell lymphoma  S/p right axillary lymph node excisional biopsy on 5/16/22 per DR Mariano Path c/w Peripheral T- Cell Lymphoma, NOS  PERIPHERAL T CELL LYMPHOMA NOS Stage 1 IPI SCORE: 0 LOW RISk - Some B symptoms, Night sweats since Jan 2022, fatigue, 6 lb weight loss - Discussed with patient Chemotherapy with CHOEP WITH NEULASTA Support -D1 : Cyclophosphamide 750 mg /m2, Adriamycin 50 mg/ m2 Vincristone 1.4mg/ m2, Etoposide 10mg/ m2 IV D2- D3 Etoposide 100mg/m2 IV Hydration D1-D5 Prednisone 10mg po Allopurinol 100mg daily for TLS prophylaxis  - Port placement on 6/8/22 - ECHO on 6/14/22, LV EF 59% - Patient started CHOEP on 6/20/22, Completed C 6 on 10/6/22 - Patient evaluated by Dr. Acevedo for auto stem cell transplant for t cell NHL, S/P Autologous PBSCT on 11/25/22 11/28/23 PET/CT- CRYSTAL - CBC and LDH normal on 2/6/24 - CT CAP moved up to end of March 2024 - Neuropathy -  Dr. Monae increased dose of pregabalin last week, continue to monitor - Port Flush q 6 weeks  - RTO in June 2024 with Margaret, currently alternating with Team at [facility]  -She goes to PT for Neuropathy., OK for Vestibular testing per PT   PSCT Vaccines:    14 Months Week 1 on 1/29/24 and Week 2 on 2/5/24 12 Months Week 1 on 11/27/23 and week on 12/4/23   24 Months Week 1 on 11/25/24,Week 2 on 12/2/24 and Week 3 on 12/9/24

## 2024-03-06 LAB
ALBUMIN SERPL ELPH-MCNC: 4.6 G/DL
ALP BLD-CCNC: 106 U/L
ALT SERPL-CCNC: 17 U/L
ANION GAP SERPL CALC-SCNC: 11 MMOL/L
AST SERPL-CCNC: 20 U/L
BILIRUB SERPL-MCNC: 0.2 MG/DL
BUN SERPL-MCNC: 12 MG/DL
CALCIUM SERPL-MCNC: 9.6 MG/DL
CHLORIDE SERPL-SCNC: 103 MMOL/L
CO2 SERPL-SCNC: 25 MMOL/L
CREAT SERPL-MCNC: 0.65 MG/DL
EGFR: 103 ML/MIN/1.73M2
GLUCOSE SERPL-MCNC: 95 MG/DL
LDH SERPL-CCNC: 176 U/L
POTASSIUM SERPL-SCNC: 4.5 MMOL/L
PROT SERPL-MCNC: 7 G/DL
SODIUM SERPL-SCNC: 139 MMOL/L

## 2024-03-12 ENCOUNTER — APPOINTMENT (OUTPATIENT)
Age: 59
End: 2024-03-12

## 2024-03-12 NOTE — ASSESSMENT
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 Amadeo Grace. Patient incidentally discovered it while shaving in Jan / Feb 2022 \par Biopsy of right axilla performed on 3/22/22 revealing atypical lymphoid infiltrate ( NEGATIVE for a clonal lgH gene rearrangement/ Positive clonal TCR gamma gene rearrangement )\par \par On review of needle biopsy from 3/22/22 at [facility], suggestive of T cell lymphoma\par \par S/p right axillary lymph node excisional biopsy on 5/16/22  per DR Montserrat Knight c/w Peripheral T- Cell Lymphoma, NOS\par \par PET SCAN 4/21/22: \par - 1. FDG avid extensive right axillary lymphadenopathy 5.4 x 3.6 cm , SUV 22.5. and right supraclavicular lymph node.0.8 x 0.8 cm (image 53), SUV 5.8.\par 2. Difficult to delineate retroareolar right breast soft tissue, with low-grade FDG avidity and minimally FDG avid diffuse, right breast cutaneous thickening. \par \par Breast node biopsy performed February 2022 was negative. \par MRI BREAST 5/26/22: BiRADS 3 , f/u in 6 months\par \par PET SCAN 6/13/22: \par 1. Interval resolution of FDG avidity associated with right supraclavicular lymph node which has decreased in size.\par 2. Interval decrease in size, number and avidity of right axillary lymphadenopathy and resolution of  FDG avid right retropectoral lymph nodes.\par 3. Unchanged minimally FDG avid difficult to delineate retroareolar right breast soft tissue and minimally FDG avid diffuse right breast cutaneous thickening. Skin thickening and parenchymal edema in the right breast is consistent with metastatic obstruction on interval MRI from 5/26/2022.\par \par PET/CT 8/8/22 \par 1. Interval further decrease of right axillary lymph nodes.\par 2. Unchanged nonspecific minimally FDG avid retroareolar right breast soft tissue. Interval decreased avidity of unchanged cutaneous thickening in the right breast.\par 3. Subcentimeter nodular opacity in the right upper lobe medially, unchanged since PET/CT on 4/21/2022. \par 4. New generalized diffuse bone marrow hypermetabolism, nonspecific and may be related to treatment. \par \par \par PERIPHERAL T CELL LYMPHOMA NOS Stage 1\par IPI SCORE: 0 LOW RISK \par - Some B symptoms, Night sweats since Jan 2022, fatigue, 6 lb weight loss\par - Discussed with patient Chemotherapy with CHOEP WITH NEULASTA Support \par -D1 :  Cyclophosphamide 750 mg /m2, Adriamycin 50 mg/ m2 Vincristone 1.4mg/ m2, Etoposide 10mg/ m2 IV \par D2- D3 Etoposide 100mg/m2  IV Hydration \par D1-D5 Prednisone 10mg po \par Allopurinol 100mg daily for TLS prophylaxis\par \par \par - Port placement on 6/8/22\par - ECHO on 6/14/22, LV EF 59%\par - Patient started  CHOEP on 6/20/22, C4  8/22/22 now completed 6\par -Constipation; continue MiraLAX. Advised senna and colace. \par -Rash; Continue salicylic acid cream for rash, can try clindamycin ointment if not improved. \par - Restaging PET Scan and bm bx  after 6 cycles...confirms response.....to have f/u MRI of breast\par - Echo in Sept 2022 along with pre transplant testing and dental eval\par MRI BREAST: to be repeated in Nov 2022 \par I had another  extensive discussion regarding the risks, benefits, alternatives, logistics and rationale for high dose chemotherapy and auto stem cell transplant for t cell NHL..4 week hospital stay and 8 week recovery discussed...augmented cbv prep...stem cell mobilization with growth factor and collection discussed...need confirmed negative bm bx...if not consider ICE mobilization...goal 2 to 5 million cd 34 cells per kg...Collection early to mid nov...admit for transplant end nov.Literature and consents provided for review..quests addressed..f/u 3 weeks with transplant team...orientation and sw eval...\par \par 11/3/22\par Patient presents for growth factor teaching.\par CBC stable. No indication for transfusion today.\par Tyrell will receive Zarxio 780 mcg SQ Daily 11/3/22- 11/7/22. Stem cell collection will start on 11/7/22. \par Teach back method used during today's visit. NP administered Zarxio 480 mcg to RLQ abdomen. Lot #[id number] expiration December 2024. Tolerated injection with no adverse reaction.\par Second Zarxio 300 mcg Lot # [id number] Expiration September 2024 administered by spouse to RLQ abdomen. Tolerated injection with no adverse reaction.\par Recommended Claritin daily 11/3/22- 11/7/22. May take Tylenol prn for pain.\par Avoid Advil, Aleve and aspirin.\par Encouraged to increase calcium in diet over the next few days. \par Questions and concerns addressed. Reassurance provided.\par COVID 19 PCR scheduled at [facility] tomorrow.\par 11/7/22 Return to [facility] for Shiley catheter placement and stem cell collection. \par \par 2/8//23\par T Cell Lymphoma (C85.90)\par S/P  Autologous Stem Cell Transplant (Z94.81) \par On 11/25/22, after pre-medication, Ms. Johnston received 316ml of autologous,\par pooled, thawed, washed, mobilized, plasma reduced, HPC apheresis\par \par 1) T Cell Lymphoma\par  S/P Autologous PBSCT on 11/25/22\par  Labs sent today\par  Send labs with every visit\par  Repeat imaging 4  months post PBSCT..March 2023\par  Overall doing well\par \par 2) Heme\par    Counts stable, no transfusion requirements today\par    Continue  Multiple Vitamin and stop Folic acid \par  \par 3) ID\par    Continue prophylaxis\par    Acyclovir 400 mg 1 tab orally every 8 hrs \par    Atovaquone 750 mg/5 mL oral suspension 5 milliliter 2 times a day\par    Diflucan 200 mg 2 tabs orally once a day ..STOPPED\par    Post  transplant restrictions reviewed and reinforced in light of COVID-19 Pandemic..ok for take out\par    discussed initial series of COVID vaccine at 90 days post Auto\par \par 4) GI\par     Protonix 40 mg oral delayed release 1 tab once a day \par     Zofran every 8 hours as needed for nausea \par \par 5)Right calf pain-RESOLVED\par     Continue Magnesium Oxide 400 mg 2 x day\par \par 6) Cough..RESOLVED\par     COVID/RVP- Not detected on 1/3/23\par     Call the center with worsening/persistent cough or onset of new symptoms\par     Advised to monitor temperature for fever\par     OTC Claritin \par \par 7)  Mild pain by the wire of the right CW Mediport\par     Port Study ordered on 1/11/23...done  1/27/23\par     Tylenol as needed for pain\par \par 8) Plan\par  -recovered from flu\par   -POF 6-8 weeks;\par  -Educated about plan of care,all questions and concerns addressed\par  -Advised to take Zofran every 8 hours if needed for nausea as she had been taking only if nausea got worse\par  - Advised to call office with any acute concerns\par  - Advised to have small frequent meals/snacks\par  -Reinstated to avoid crowd\par  -Reinstated dietary restrictions: No restaurant  food at this time, take out  ok... home cooked, prepared/frozen         food. \par  - Additional Instructions: Notify if bleeding; swelling; persistent nausea and vomiting; unable to  urinate; pain not        relieved by medications; fever; numbness, tingling; excessive diarrhea, inability to tolerate liquids  or foods;             increased irritability or sluggishness, rash\par  -Follow up with Dr. Acevedo  in 2 weeks\par \par I examined patient under Dr. Acevedo's supervision and Dr. Acevedo agrees to plan of care as listed above\par \par 
auscultated w/ stethoscope

## 2024-03-21 ENCOUNTER — APPOINTMENT (OUTPATIENT)
Dept: PHYSICAL MEDICINE AND REHAB | Facility: CLINIC | Age: 59
End: 2024-03-21
Payer: COMMERCIAL

## 2024-03-21 VITALS
BODY MASS INDEX: 30.18 KG/M2 | DIASTOLIC BLOOD PRESSURE: 58 MMHG | SYSTOLIC BLOOD PRESSURE: 101 MMHG | HEIGHT: 62 IN | RESPIRATION RATE: 14 BRPM | HEART RATE: 69 BPM | WEIGHT: 164 LBS

## 2024-03-21 PROCEDURE — 99214 OFFICE O/P EST MOD 30 MIN: CPT

## 2024-03-21 NOTE — HISTORY OF PRESENT ILLNESS
[FreeTextEntry1] : Ms. Oviedo is a 58 year old female presented on 3/4/22 c/o right axilla mass to general surgeon, Dr. Amadeo Grace. Patient incidentally discovered it while shaving in Jan / Feb 2022.  Biopsy of right axilla performed on 3/22/22 revealing atypical lymphoid infiltrate ( NEGATIVE for a clonal lgH gene rearrangement/ Positive clonal TCR gamma gene rearrangement ).  On review of needle biopsy from 3/22/22 at [facility], suggestive of T cell lymphoma.  S/p right axillary lymph node excisional biopsy on 5/16/22 per DR Mariano Path c/w Peripheral T- Cell Lymphoma, NOS.  Treatment with CHOEP started CHOEP 6/20/22, Completed C 6 on 10/6/22.  Patient evaluated by Dr. Acevedo for auto stem cell transplant for t cell NHL, S/P Autologous PBSCT on 11/25/22.  She does PT and speech twice a week which she finds helpful. She has an appointment with neuropsychology next month and Neurology in August 2024. Thinks lyrica is helping. Her symptoms are worst in the feet. Denies any radiation of back pain into her feet. She has mild tiredness while on Lyrica, but she is at home. She had an exacerbation of her symptoms after a long walk.

## 2024-03-21 NOTE — PHYSICAL EXAM
[FreeTextEntry1] : Gen: Patient is A&O x 3, NAD HEENT: EOMI, hearing grossly normal Resp: regular, non - labored CV: pulses regular Skin: no rashes, erythema Lymph: no clubbing, cyanosis, edema Inspection: no instability  ROM: full throughout Palpation:no tenderness to palpation Sensation: Decreased to light touch in bilateral hands and feet, up to mid calf   Reflexes: 1+ and symmetric throughout Strength: 5/5 throughout, except 4/5 in hip flexion and ankle dorsiflexion b/l Special tests: -Straight leg raise, -Hoffmans sign, No ankle clonus Gait: Difficulty with tandem gait, ambulates with single point cane with a wide-based gait

## 2024-03-21 NOTE — ASSESSMENT
[FreeTextEntry1] : 58-year-old female presenting for evaluation.  #Neuropathy: -Increase pregabalin to 150mg BID -I Stop # [id number] -Avoid SNRI given she is on SSRI -She has an appointment with Neurology in August 2024.  -Refer for EMG evaluation   #Impaired cognition: -Continue SLP -Patent has a neuropsychology appointment next month  #Impaired mobility: -Continue PT  Follow up in 1 month

## 2024-03-21 NOTE — END OF VISIT
[] : Fellow [FreeTextEntry3] : I have personally seen and examined the patient. I fully participated in the care of this patient. I have made amendments to the documentation where necessary, and agree with the history, physical exam, and plan as documented by the Fellow, Dr. Campbell.

## 2024-03-28 ENCOUNTER — OUTPATIENT (OUTPATIENT)
Dept: OUTPATIENT SERVICES | Facility: HOSPITAL | Age: 59
LOS: 1 days | End: 2024-03-28
Payer: COMMERCIAL

## 2024-03-28 ENCOUNTER — APPOINTMENT (OUTPATIENT)
Dept: CT IMAGING | Facility: CLINIC | Age: 59
End: 2024-03-28
Payer: COMMERCIAL

## 2024-03-28 DIAGNOSIS — Z94.84 STEM CELLS TRANSPLANT STATUS: ICD-10-CM

## 2024-03-28 DIAGNOSIS — C85.90 NON-HODGKIN LYMPHOMA, UNSPECIFIED, UNSPECIFIED SITE: ICD-10-CM

## 2024-03-28 PROCEDURE — 71260 CT THORAX DX C+: CPT | Mod: 26

## 2024-03-28 PROCEDURE — 74177 CT ABD & PELVIS W/CONTRAST: CPT

## 2024-03-28 PROCEDURE — 74177 CT ABD & PELVIS W/CONTRAST: CPT | Mod: 26

## 2024-03-28 PROCEDURE — 71260 CT THORAX DX C+: CPT

## 2024-03-29 ENCOUNTER — OUTPATIENT (OUTPATIENT)
Dept: OUTPATIENT SERVICES | Facility: HOSPITAL | Age: 59
LOS: 1 days | End: 2024-03-29
Payer: COMMERCIAL

## 2024-03-29 ENCOUNTER — RESULT REVIEW (OUTPATIENT)
Age: 59
End: 2024-03-29

## 2024-03-29 ENCOUNTER — APPOINTMENT (OUTPATIENT)
Dept: MAMMOGRAPHY | Facility: CLINIC | Age: 59
End: 2024-03-29
Payer: COMMERCIAL

## 2024-03-29 DIAGNOSIS — Z12.31 ENCOUNTER FOR SCREENING MAMMOGRAM FOR MALIGNANT NEOPLASM OF BREAST: ICD-10-CM

## 2024-03-29 PROCEDURE — 77063 BREAST TOMOSYNTHESIS BI: CPT | Mod: 26

## 2024-03-29 PROCEDURE — 77063 BREAST TOMOSYNTHESIS BI: CPT

## 2024-03-29 PROCEDURE — 77067 SCR MAMMO BI INCL CAD: CPT | Mod: 26

## 2024-03-29 PROCEDURE — 77067 SCR MAMMO BI INCL CAD: CPT

## 2024-04-01 ENCOUNTER — APPOINTMENT (OUTPATIENT)
Dept: PHYSICAL MEDICINE AND REHAB | Facility: CLINIC | Age: 59
End: 2024-04-01
Payer: COMMERCIAL

## 2024-04-01 VITALS
RESPIRATION RATE: 12 BRPM | HEART RATE: 64 BPM | SYSTOLIC BLOOD PRESSURE: 96 MMHG | DIASTOLIC BLOOD PRESSURE: 65 MMHG | BODY MASS INDEX: 29.44 KG/M2 | HEIGHT: 62 IN | WEIGHT: 160 LBS

## 2024-04-01 PROCEDURE — 95912 NRV CNDJ TEST 11-12 STUDIES: CPT

## 2024-04-01 NOTE — PROCEDURE
[de-identified] : PRE-PROCEDURE  * Was H&P completed and in chart at time of procedure ?  YES * Were the indications for the procedure appropriate ?  YES * Were the practitioner's entries in the patient's medical record appropriate ?  YES  PROCEDURE * Did the practitioner maintain proper sterile technique ?  YES * If bleeding was encountered, did the practitioner manage it appropriately?  YES * Were complications, if any, recognized and managed appropriately?  YES * Was the practitioner's use of diagnostic services (e.g., lab, x-ray, MRI, CT) appropriate?  YES  POST-PROCEDURE * Did the pre-procedure diagnosis coincide with the findings of the procedure?  YES * Was the procedure report complete, accurate and timely?  YES

## 2024-04-03 ENCOUNTER — OUTPATIENT (OUTPATIENT)
Dept: OUTPATIENT SERVICES | Facility: HOSPITAL | Age: 59
LOS: 1 days | Discharge: HOME | End: 2024-04-03

## 2024-04-03 DIAGNOSIS — C84.40 PERIPHERAL T-CELL LYMPHOMA, NOT ELSEWHERE CLASSIFIED, UNSPECIFIED SITE: ICD-10-CM

## 2024-04-03 DIAGNOSIS — Z94.81 BONE MARROW TRANSPLANT STATUS: ICD-10-CM

## 2024-04-05 ENCOUNTER — APPOINTMENT (OUTPATIENT)
Dept: PHYSICAL MEDICINE AND REHAB | Facility: CLINIC | Age: 59
End: 2024-04-05
Payer: COMMERCIAL

## 2024-04-05 VITALS
RESPIRATION RATE: 12 BRPM | HEIGHT: 62 IN | SYSTOLIC BLOOD PRESSURE: 106 MMHG | HEART RATE: 75 BPM | WEIGHT: 160 LBS | BODY MASS INDEX: 29.44 KG/M2 | DIASTOLIC BLOOD PRESSURE: 72 MMHG

## 2024-04-05 DIAGNOSIS — M54.16 RADICULOPATHY, LUMBAR REGION: ICD-10-CM

## 2024-04-05 PROCEDURE — 95861 NEEDLE EMG 2 EXTREMITIES: CPT

## 2024-04-05 NOTE — PROCEDURE
[de-identified] : PRE-PROCEDURE  * Was H&P completed and in chart at time of procedure ?  YES * Were the indications for the procedure appropriate ?  YES * Were the practitioner's entries in the patient's medical record appropriate ?  YES  PROCEDURE * Did the practitioner maintain proper sterile technique ?  YES * If bleeding was encountered, did the practitioner manage it appropriately?  YES * Were complications, if any, recognized and managed appropriately?  YES * Was the practitioner's use of diagnostic services (e.g., lab, x-ray, MRI, CT) appropriate?  YES  POST-PROCEDURE * Did the pre-procedure diagnosis coincide with the findings of the procedure?  YES * Was the procedure report complete, accurate and timely?  YES

## 2024-04-05 NOTE — ASSESSMENT
[FreeTextEntry1] : Needle EMG bilateral lower extremities performed at today's office visit taken with the patient's electrodiagnostic data from 4/1/24, electrodiagnostic findings are significant for (1) mild, demyelinating, sensorimotor, median mononeuropathies across both wrists; (2) mild, demyelinating, motor, ulnar mononeuropathy across the left elbow; (3) subacute to chronic, lumbosacral polyradiculopathy affecting the motor axons on the left.  There is no definitive electrodiagnostic evidence in support of a large fiber, length dependent, peripheral polyneuropathy affecting the patient's arms or legs.  Recommendations MRI lumbar spine with and without IV contrast.  Full report to follow.

## 2024-04-10 ENCOUNTER — APPOINTMENT (OUTPATIENT)
Dept: HEMATOLOGY ONCOLOGY | Facility: CLINIC | Age: 59
End: 2024-04-10
Payer: COMMERCIAL

## 2024-04-10 ENCOUNTER — RESULT REVIEW (OUTPATIENT)
Age: 59
End: 2024-04-10

## 2024-04-10 VITALS
TEMPERATURE: 98.4 F | DIASTOLIC BLOOD PRESSURE: 79 MMHG | SYSTOLIC BLOOD PRESSURE: 115 MMHG | WEIGHT: 164.88 LBS | RESPIRATION RATE: 16 BRPM | HEART RATE: 69 BPM | OXYGEN SATURATION: 100 % | BODY MASS INDEX: 30.16 KG/M2

## 2024-04-10 DIAGNOSIS — Z95.828 PRESENCE OF OTHER VASCULAR IMPLANTS AND GRAFTS: ICD-10-CM

## 2024-04-10 DIAGNOSIS — Z94.84 STEM CELLS TRANSPLANT STATUS: ICD-10-CM

## 2024-04-10 LAB
ALBUMIN SERPL ELPH-MCNC: 4.7 G/DL
ALP BLD-CCNC: 111 U/L
ALT SERPL-CCNC: 22 U/L
ANION GAP SERPL CALC-SCNC: 10 MMOL/L
AST SERPL-CCNC: 22 U/L
BASOPHILS # BLD AUTO: 0.03 K/UL — SIGNIFICANT CHANGE UP (ref 0–0.2)
BASOPHILS NFR BLD AUTO: 0.7 % — SIGNIFICANT CHANGE UP (ref 0–2)
BILIRUB SERPL-MCNC: 0.4 MG/DL
BUN SERPL-MCNC: 20 MG/DL
CALCIUM SERPL-MCNC: 9.8 MG/DL
CHLORIDE SERPL-SCNC: 102 MMOL/L
CO2 SERPL-SCNC: 27 MMOL/L
CREAT SERPL-MCNC: 0.85 MG/DL
EGFR: 80 ML/MIN/1.73M2
EOSINOPHIL # BLD AUTO: 0.1 K/UL — SIGNIFICANT CHANGE UP (ref 0–0.5)
EOSINOPHIL NFR BLD AUTO: 2.3 % — SIGNIFICANT CHANGE UP (ref 0–6)
GLUCOSE SERPL-MCNC: 126 MG/DL
HCT VFR BLD CALC: 37.8 % — SIGNIFICANT CHANGE UP (ref 34.5–45)
HGB BLD-MCNC: 12.2 G/DL — SIGNIFICANT CHANGE UP (ref 11.5–15.5)
IMM GRANULOCYTES NFR BLD AUTO: 0.2 % — SIGNIFICANT CHANGE UP (ref 0–0.9)
LDH SERPL-CCNC: 171 U/L
LYMPHOCYTES # BLD AUTO: 1.18 K/UL — SIGNIFICANT CHANGE UP (ref 1–3.3)
LYMPHOCYTES # BLD AUTO: 27.7 % — SIGNIFICANT CHANGE UP (ref 13–44)
MAGNESIUM SERPL-MCNC: 2.2 MG/DL
MCHC RBC-ENTMCNC: 29.3 PG — SIGNIFICANT CHANGE UP (ref 27–34)
MCHC RBC-ENTMCNC: 32.3 G/DL — SIGNIFICANT CHANGE UP (ref 32–36)
MCV RBC AUTO: 90.9 FL — SIGNIFICANT CHANGE UP (ref 80–100)
MONOCYTES # BLD AUTO: 0.35 K/UL — SIGNIFICANT CHANGE UP (ref 0–0.9)
MONOCYTES NFR BLD AUTO: 8.2 % — SIGNIFICANT CHANGE UP (ref 2–14)
NEUTROPHILS # BLD AUTO: 2.59 K/UL — SIGNIFICANT CHANGE UP (ref 1.8–7.4)
NEUTROPHILS NFR BLD AUTO: 60.9 % — SIGNIFICANT CHANGE UP (ref 43–77)
NRBC # BLD: 0 /100 WBCS — SIGNIFICANT CHANGE UP (ref 0–0)
PLATELET # BLD AUTO: 236 K/UL — SIGNIFICANT CHANGE UP (ref 150–400)
POTASSIUM SERPL-SCNC: 4.5 MMOL/L
PROT SERPL-MCNC: 7.3 G/DL
RBC # BLD: 4.16 M/UL — SIGNIFICANT CHANGE UP (ref 3.8–5.2)
RBC # FLD: 13.2 % — SIGNIFICANT CHANGE UP (ref 10.3–14.5)
SODIUM SERPL-SCNC: 140 MMOL/L
WBC # BLD: 4.26 K/UL — SIGNIFICANT CHANGE UP (ref 3.8–10.5)
WBC # FLD AUTO: 4.26 K/UL — SIGNIFICANT CHANGE UP (ref 3.8–10.5)

## 2024-04-10 PROCEDURE — 99215 OFFICE O/P EST HI 40 MIN: CPT

## 2024-04-15 PROBLEM — Z94.84 H/O AUTOLOGOUS STEM CELL TRANSPLANT: Status: ACTIVE | Noted: 2022-12-15

## 2024-04-15 PROBLEM — Z95.828 PORT-A-CATH IN PLACE: Status: ACTIVE | Noted: 2023-01-11

## 2024-04-15 NOTE — ADDENDUM
[FreeTextEntry1] :  Documented by Aurora Gaines acting as a scribe for Dr. Fozia Acevedo on 4/10/24. All medical record entries made by the Scribe were at my, Dr. Fozia Acevedo, direction and personally dictated by me on 4/10/24. I have reviewed the chart and agree that the record accurately reflects my personal performance of the history, physical exam, assessment and plan. I have also personally directed, reviewed, and agree with the discharge instructions.

## 2024-04-15 NOTE — ASSESSMENT
[FreeTextEntry1] : T Cell Lymphoma (C85.90) S/P Autologous Stem Cell Transplant (Z94.81) On 11/25/22, after pre-medication, Ms. Johnston received 316ml of autologous, pooled, thawed, washed, mobilized, plasma reduced, HPC apheresis  1) T Cell Lymphoma  S/P Autologous PBSCT on 11/25/22 11/28/23 PET CT: CRYSTAL 11/28/23 MRI Bilateral breasts: No MRI evidence of malignancy. Benign.  CT Scans 3/28/24 - CRYSTAL ; repeat inevery 6 months, repeat Sept 2024  2) Heme  Counts stable, no transfusion requirements today  Continue Multiple Vitamin  3) ID Not on ppx  Discussed initial series of COVID vaccine at 90 days post Auto....Scheduled for COVID vaccines....Received Moderna # 1 on 2/26/23 , had fever, diarrhea, faitgue for 2 days# 2 received on 3/26/23.   One year post transplant: 12 month vaccine received on 11/27/23 and 12/4/23 14 month vaccines received on 1/29/24 and today 2/5/24.  24 month vaccines due November 2024 One year labs sent on 12/4/23 Echocardiogram completed on 11/21/23 PFT ordered on 12/4/23  4) GI  Zofran PRN for nausea  5) Other Continue Magnesium Oxide 400 mg 2 x day Continue Alprazolam prn Continue Prozac daily  Neuropathy- Continue Pregabalin 50 mg BID.  Osteopenia - Calcium and vitamin D supplements  6) Plan - Port Study done on 1/27/23. Continue port flush every 6 weeks at Dr. Rendon's office... Can remove port if Sept 2024 scan is stable -Continue Speech therapy.  -Educated about plan of care, All questions and concerns addressed  - Advised to call office with any acute concerns  - Additional Instructions: Notify if bleeding; swelling; persistent nausea and vomiting; unable to urinate; pain not relieved by medications; fever; numbness, tingling; excessive diarrhea, inability to tolerate liquids or foods; increased irritability or sluggishness, rash -Pt received COVID vaccine, flu vaccine, and shingrix  - Recommend vibration plates, massages, voltaren cream for neuropathy Follow up with Dr Acevedo in Nov 2024 with post transplant vaccines

## 2024-04-15 NOTE — HISTORY OF PRESENT ILLNESS
[de-identified] : see prior note   [de-identified] : Reason for visit: Follow up status post autologous stem cell transplant. Patient complains of neuropathy of bilateral feet and short term memory loss. Continues to have night sweats, fatigue and intermittent nausea. Denies fever, vomiting, diarrhea, rash, SOB, chest pain or B/L LE edema. Walks with a cane.   4/10/24:  Patient is here for a follow up visit. Denies fever, chills, nausea, vomiting, diarrhea, rash, mouth sores or any signs of active bleeding. Denies SOB, chest pain or B/L LE edema. Patient states she is feeling good. Complains of neuropathy, goes to PT twice a week. She had an EMG done. Pt wanted to begin cognitive therapy due to slow memory recall.

## 2024-04-15 NOTE — PHYSICAL EXAM
[Restricted in physically strenuous activity but ambulatory and able to carry out work of a light or sedentary nature] : Status 1- Restricted in physically strenuous activity but ambulatory and able to carry out work of a light or sedentary nature, e.g., light house work, office work [Normal] : affect appropriate [de-identified] : port in place- Last POF 1/29/24 [de-identified] : No edema

## 2024-04-15 NOTE — RESULTS/DATA
[FreeTextEntry1] : 3/28/24: CT Abdomen and Pelvis with IV Cont - IMPRESSION: *  No evidence of suspicious mass or adenopathy in the chest, abdomen, or pelvis. *  Normal size spleen.

## 2024-04-15 NOTE — REVIEW OF SYSTEMS
[Night Sweats] : night sweats [Fatigue] : fatigue [Recent Change In Weight] : ~T recent weight change [Anxiety] : anxiety [Fever] : no fever [Chills] : no chills [Cough] : no cough [SOB on Exertion] : no shortness of breath during exertion [Abdominal Pain] : no abdominal pain [Vomiting] : no vomiting [Constipation] : no constipation [Muscle Pain] : no muscle pain [Confused] : no confusion [Dizziness] : no dizziness [Fainting] : no fainting [Difficulty Walking] : no difficulty walking [Suicidal] : not suicidal [Insomnia] : no insomnia [Depression] : no depression [Negative] : Gastrointestinal [FreeTextEntry7] : Nausea resolved [de-identified] : Neuropathy of bilateral feet. Walks with a cane.  [de-identified] : Short team memory loss

## 2024-04-16 ENCOUNTER — APPOINTMENT (OUTPATIENT)
Dept: PHYSICAL MEDICINE AND REHAB | Facility: CLINIC | Age: 59
End: 2024-04-16
Payer: COMMERCIAL

## 2024-04-16 PROCEDURE — ZZZZZ: CPT

## 2024-04-22 ENCOUNTER — APPOINTMENT (OUTPATIENT)
Age: 59
End: 2024-04-22

## 2024-04-23 ENCOUNTER — APPOINTMENT (OUTPATIENT)
Dept: FAMILY MEDICINE | Facility: CLINIC | Age: 59
End: 2024-04-23
Payer: COMMERCIAL

## 2024-04-23 DIAGNOSIS — F41.9 ANXIETY DISORDER, UNSPECIFIED: ICD-10-CM

## 2024-04-23 DIAGNOSIS — Z87.448 PERSONAL HISTORY OF OTHER DISEASES OF URINARY SYSTEM: ICD-10-CM

## 2024-04-23 PROCEDURE — 99213 OFFICE O/P EST LOW 20 MIN: CPT

## 2024-04-23 NOTE — ASSESSMENT
[FreeTextEntry1] : Anxiety: recommend exercise, yoga, meditation, c/w fluoxetine as prescribed T-cell lymphoma: f/u oncology RTC 3 months

## 2024-04-23 NOTE — HISTORY OF PRESENT ILLNESS
[Home] : at home, [unfilled] , at the time of the visit. [Medical Office: ([facility])___] : at the medical office located in  [Verbal consent obtained from patient] : the patient, [unfilled] [FreeTextEntry1] : follow up [de-identified] : 58 yo female presents for follow up of anxiety. She is taking fluoxetine which she says is working for her. She is following up with oncology for T cell lymphoma. Denies fever, chills, cp, palpitations, sob, nvcd.

## 2024-04-26 ENCOUNTER — APPOINTMENT (OUTPATIENT)
Dept: PHYSICAL MEDICINE AND REHAB | Facility: CLINIC | Age: 59
End: 2024-04-26

## 2024-04-30 ENCOUNTER — APPOINTMENT (OUTPATIENT)
Dept: PHYSICAL MEDICINE AND REHAB | Facility: CLINIC | Age: 59
End: 2024-04-30
Payer: COMMERCIAL

## 2024-04-30 VITALS
HEART RATE: 70 BPM | DIASTOLIC BLOOD PRESSURE: 67 MMHG | SYSTOLIC BLOOD PRESSURE: 103 MMHG | RESPIRATION RATE: 15 BRPM | HEIGHT: 62 IN | BODY MASS INDEX: 29.44 KG/M2 | WEIGHT: 160 LBS | OXYGEN SATURATION: 99 %

## 2024-04-30 PROCEDURE — 99214 OFFICE O/P EST MOD 30 MIN: CPT

## 2024-04-30 NOTE — PHYSICAL EXAM
[FreeTextEntry1] : Gen: Patient is A&O x 3, NAD HEENT: EOMI, hearing grossly normal Resp: regular, non - labored CV: pulses regular Skin: no rashes, erythema Lymph: no clubbing, cyanosis, edema Inspection: no instability  ROM: full throughout Palpation:no tenderness to palpation Sensation: Decreased to light touch in bilateral hands and feet, up to mid calf   Reflexes: 1+ and symmetric throughout Strength: 5/5 throughout, except 4/5 in hip flexion Special tests: +Straight left straight leg raise, -Hoffmans sign, No ankle clonus Gait: Difficulty with tandem gait, ambulates with single point cane with a wide-based gait

## 2024-04-30 NOTE — HISTORY OF PRESENT ILLNESS
[FreeTextEntry1] : Ms. Oviedo is a 59 year old female presented on 3/4/22 c/o right axilla mass to general surgeon, Dr. Amadeo Grace. Patient incidentally discovered it while shaving in Jan / Feb 2022.  Biopsy of right axilla performed on 3/22/22 revealing atypical lymphoid infiltrate ( NEGATIVE for a clonal lgH gene rearrangement/ Positive clonal TCR gamma gene rearrangement ).  On review of needle biopsy from 3/22/22 at [facility], suggestive of T cell lymphoma.  S/p right axillary lymph node excisional biopsy on 5/16/22 per DR Mariano Path c/w Peripheral T- Cell Lymphoma, NOS.  Treatment with CHOEP started CHOEP 6/20/22, Completed C 6 on 10/6/22.  Patient evaluated by Dr. Acevedo for auto stem cell transplant for t cell NHL, S/P Autologous PBSCT on 11/25/22.  She reports since her last visit she had an EMG performed.  Reports she still is having burning and pain in her legs left greater than right.  EMG suggestive of lumbar radiculopathy on the left.  She denies any bowel bladder dysfunction.  She denies any saddle anesthesia.  She occasionally has radiation pain down her left leg.  Worst pain that was burning in bilateral feet.  Continue with speech therapy and neuropsychology evaluation.  Decreased lyrica back to 100 mg daily.  Reports is helping but still significant burning pain.

## 2024-04-30 NOTE — ASSESSMENT
[FreeTextEntry1] : 59-year-old female presenting for evaluation.  #Neuropathy: -Continue pregabalin 100mg BID -I Stop # [id number] -Discussed to consider duloxetine, she will need to discontinue fluoxetine, she will consider  -She has an appointment with Neurology in August 2024.  -EMG reviewed, obtain MRI lumbar spine, Follow up with Dr. Alexandra   #Impaired cognition: -Continue SLP -Continue to follow with neuropsychology   #Impaired mobility: -Continue PT  Follow up in 6 weeks.

## 2024-05-03 ENCOUNTER — APPOINTMENT (OUTPATIENT)
Dept: PHYSICAL MEDICINE AND REHAB | Facility: CLINIC | Age: 59
End: 2024-05-03
Payer: COMMERCIAL

## 2024-05-03 PROCEDURE — 96136 PSYCL/NRPSYC TST PHY/QHP 1ST: CPT

## 2024-05-03 PROCEDURE — 96132 NRPSYC TST EVAL PHYS/QHP 1ST: CPT

## 2024-05-03 PROCEDURE — 96133 NRPSYC TST EVAL PHYS/QHP EA: CPT

## 2024-05-03 PROCEDURE — 96116 NUBHVL XM PHYS/QHP 1ST HR: CPT

## 2024-05-03 PROCEDURE — 96137 PSYCL/NRPSYC TST PHY/QHP EA: CPT

## 2024-05-09 ENCOUNTER — NON-APPOINTMENT (OUTPATIENT)
Age: 59
End: 2024-05-09

## 2024-05-13 ENCOUNTER — APPOINTMENT (OUTPATIENT)
Dept: FAMILY MEDICINE | Facility: CLINIC | Age: 59
End: 2024-05-13
Payer: COMMERCIAL

## 2024-05-13 VITALS
DIASTOLIC BLOOD PRESSURE: 70 MMHG | WEIGHT: 160 LBS | SYSTOLIC BLOOD PRESSURE: 112 MMHG | HEART RATE: 83 BPM | OXYGEN SATURATION: 98 % | BODY MASS INDEX: 29.44 KG/M2 | HEIGHT: 62 IN

## 2024-05-13 DIAGNOSIS — J06.9 ACUTE UPPER RESPIRATORY INFECTION, UNSPECIFIED: ICD-10-CM

## 2024-05-13 PROCEDURE — 99213 OFFICE O/P EST LOW 20 MIN: CPT

## 2024-05-13 RX ORDER — ONDANSETRON 8 MG/1
8 TABLET ORAL
Qty: 90 | Refills: 2 | Status: DISCONTINUED | COMMUNITY
Start: 2022-12-20 | End: 2024-05-13

## 2024-05-13 RX ORDER — ACETAMINOPHEN 500 MG/1
500 TABLET ORAL
Qty: 180 | Refills: 0 | Status: ACTIVE | COMMUNITY
Start: 2024-05-13

## 2024-05-13 NOTE — ASSESSMENT
[FreeTextEntry1] : Acute URI: recommend supportive care, start tylenol prn for fever/pain, recommend increased hydration, call EMS if symptoms worsen or develop sob. Recommend hand hygiene, cover mouth when coughing, wash hands frequently, start azithromycin 250 mg po as prescribed, f/u covid19/viral panel ordered RTC 1 wk

## 2024-05-13 NOTE — REVIEW OF SYSTEMS
[Nasal Discharge] : nasal discharge [Shortness Of Breath] : no shortness of breath [Wheezing] : no wheezing [Cough] : cough [Dyspnea on Exertion] : no dyspnea on exertion [Negative] : Psychiatric

## 2024-05-13 NOTE — HISTORY OF PRESENT ILLNESS
[FreeTextEntry1] : Pt. c/o cough going on for a week. Pt stated going to urgent care on 05/10/2024 [de-identified] : 60 yo female presents with complaint of cough, dry nonproductive, sinus congestion. Symptoms began a week ago. She was seen in urgent care on Friday. Denies fever, cp, palpitations, sob, nvcd.

## 2024-05-14 LAB
HPIV3 RNA SPEC QL NAA+PROBE: DETECTED
RAPID RVP RESULT: DETECTED
SARS-COV-2 RNA PNL RESP NAA+PROBE: NOT DETECTED

## 2024-05-29 ENCOUNTER — OUTPATIENT (OUTPATIENT)
Dept: OUTPATIENT SERVICES | Facility: HOSPITAL | Age: 59
LOS: 1 days | Discharge: HOME | End: 2024-05-29

## 2024-05-29 DIAGNOSIS — C84.40 PERIPHERAL T-CELL LYMPHOMA, NOT ELSEWHERE CLASSIFIED, UNSPECIFIED SITE: ICD-10-CM

## 2024-05-29 RX ORDER — AZITHROMYCIN 250 MG/1
250 TABLET, FILM COATED ORAL
Qty: 1 | Refills: 0 | Status: DISCONTINUED | COMMUNITY
Start: 2024-05-13 | End: 2024-05-29

## 2024-05-30 ENCOUNTER — APPOINTMENT (OUTPATIENT)
Dept: PSYCHIATRY | Facility: CLINIC | Age: 59
End: 2024-05-30
Payer: COMMERCIAL

## 2024-05-30 PROCEDURE — 90791 PSYCH DIAGNOSTIC EVALUATION: CPT | Mod: 95

## 2024-06-03 ENCOUNTER — NON-APPOINTMENT (OUTPATIENT)
Age: 59
End: 2024-06-03

## 2024-06-03 NOTE — DISCUSSION/SUMMARY
[FreeTextEntry1] : REESE SCHERER is a 59 year White female.  Psychologist explained process of evaluation (interview, questionnaires) and that evaluation did not guarantee treatment, if Pt's diagnoses were outside of Psychologist's expertise. Pt expressed understanding. Psychologist gave an overview of typical course of Cognitive Behavioral Therapy, explained the basic CBT model and collaborative means of therapy.  Psychologist explained limits of confidentiality (plans to harm/kill self/others). Pt asked questions as needed. Psychologist reviewed terms of confidentiality, consent, practice rules and expectations. Pt was in agreement. Built rapport with pt.  Psychologist met with Pt using teletherapy platform due to COVID-19 pandemic disaster. Pt was at their home, while psychologist was working remotely. Pt continued to provide consent for telehealth services.  Patient presented to session reporting symptoms of anxiety including increased heart rate, irritability, headaches, upset stomach if her anxiety is very high, feeling jittery, racing thoughts difficulty sleeping.  Patient noted she is unsure if her sleep disruption is due to anxiety or physical discomfort of sleeping in a recliner.  Disruption is experienced as frequent awakenings throughout the night.  Patient estimates a 2 out of 10 on a daily basis with patient estimating a higher level of anxiety rated as 6 out of 10 with more difficult circumstances.  Patient reports having moderate level of anxiety a few times a month it lasts for a few hours depending on the issue. Patient believes she was always an anxious person but noticed an increase when she received a diagnosis of cancer in May 2022.  Patient believes this increase was due to feeling out of control of her situation.  Patient reports feeling motivated to work on these symptoms so she can be a better version of her current situation and be able to tolerate uncertainty.  Patient reports she would like to be more social take fewer naps and share more about how she is feeling with her family members.  Patient notes anxiety runs in her family.  Patient began taking a low-dose of anxiety medication after giving birth.  Patient says she has tried to wean off of the medication was not able to because it was not conducive with the timing of stressors in her life.  Patient reports taking Prozac daily and Xanax as needed.  Patient was diagnosed with non-Hodgkin's t-cell lymphoma in May 2022.  Patient began chemotherapy and stem cell transplant in November 2022.  Patient reported feeling as though she needed to stay strong for the sake of her family.  Patient believes she did not have time to digest the fact that she then had cancer.  Patient reported it was difficult to make complex medical decisions at that time i.e. thinking about a DNR, etc. Patient reported the restrictions after having her surgery were challenging.  Patient was not allowed to see anyone outside her household for 6 months, had to wear gloves, could only eat food prepared by those in her household, and had many fluctuations with her blood levels.  Patient reports that post surgery and when she was cancer free initially she thought she would go back to resuming regular normal activity however that was not the case.  Patient reports her quality of life has greatly changed.  Patient had to wear a mask around other people and wear gloves, cleaning the bathroom every other day, her family members had to wear masks in public.  Patient has neuropathy in her feet, and she has to walk with a cane.  Patient reports experiencing muscle weakness, cannot drive and is commonly fatigued.  Patient boarded she needs to find the right medication for her neuropathy and a speech therapist for cognitive issues she is still struggling with.  Patient reports that she has memory recall challenges, stutters when she is frustrated and has a delay in processing.  Patient reported completing a neuro psychological evaluation.  Patient reports that the results of the assessment indicated that her cognitive challenges are due to the chemotherapy and the  recommended CBT.   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

## 2024-06-03 NOTE — HISTORY OF PRESENT ILLNESS
[FreeTextEntry1] : REESE SCHERER is a 59 year White female.  Patient presented to session reporting symptoms of anxiety including increased heart rate, irritability, headaches, upset stomach if her anxiety is very high, feeling jittery, racing thoughts difficulty sleeping.  Patient noted she is unsure if her sleep disruption is due to anxiety or physical discomfort of sleeping in a recliner.  Disruption is experienced as frequent awakenings throughout the night.  Patient estimates a 2 out of 10 on a daily basis with patient estimating a higher level of anxiety rated as 6 out of 10 with more difficult circumstances.  Patient reports having moderate level of anxiety a few times a month it lasts for a few hours depending on the issue. Patient believes she was always an anxious person but noticed an increase when she received a diagnosis of cancer in May 2022.  Patient believes this increase was due to feeling out of control of her situation.  Patient reports feeling motivated to work on these symptoms so she can be a better version of her current situation and be able to tolerate uncertainty.  Patient reports she would like to be more social take fewer naps and share more about how she is feeling with her family members.  Patient notes anxiety runs in her family.  Patient began taking a low-dose of anxiety medication after giving birth.  Patient says she has tried to wean off of the medication was not able to because it was not conducive with the timing of stressors in her life.  Patient reports taking Prozac daily and Xanax as needed.  Patient was diagnosed with non-Hodgkin's t-cell lymphoma in May 2022.  Patient began chemotherapy and stem cell transplant in November 2022.  Patient reported feeling as though she needed to stay strong for the sake of her family.  Patient believes she did not have time to digest the fact that she then had cancer.  Patient reported it was difficult to make complex medical decisions at that time i.e. thinking about a DNR, etc. Patient reported the restrictions after having her surgery were challenging.  Patient was not allowed to see anyone outside her household for 6 months, had to wear gloves, could only eat food prepared by those in her household, and had many fluctuations with her blood levels.  Patient reports that post surgery and when she was cancer free initially she thought she would go back to resuming regular normal activity however that was not the case.  Patient reports her quality of life has greatly changed.  Patient had to wear a mask around other people and wear gloves, cleaning the bathroom every other day, her family members had to wear masks in public.  Patient has neuropathy in her feet, and she has to walk with a cane.  Patient reports experiencing muscle weakness, cannot drive and is commonly fatigued.  Patient boarded she needs to find the right medication for her neuropathy and a speech therapist for cognitive issues she is still struggling with.  Patient reports that she has memory recall challenges, stutters when she is frustrated and has a delay in processing.  Patient reported completing a neuro psychological evaluation.  Patient reports that the results of the assessment indicated that her cognitive challenges are due to the chemotherapy and the  recommended CBT.   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

## 2024-06-03 NOTE — REASON FOR VISIT
[Telehealth (audio & video) - Individual/Group] : This visit was provided via telehealth using real-time 2-way audio visual technology. [Other Location: e.g. Home (Enter Location, City,State)___] : The provider was located at [unfilled]. [Home] : The patient, [unfilled], was located at home, [unfilled], at the time of the visit. [Verbal consent obtained from patient/other participant(s)] : Verbal consent for telehealth/telephonic services obtained from patient/other participant(s) [FreeTextEntry1] : anxiety

## 2024-06-04 ENCOUNTER — APPOINTMENT (OUTPATIENT)
Age: 59
End: 2024-06-04

## 2024-06-04 ENCOUNTER — RESULT REVIEW (OUTPATIENT)
Age: 59
End: 2024-06-04

## 2024-06-04 ENCOUNTER — APPOINTMENT (OUTPATIENT)
Dept: HEMATOLOGY ONCOLOGY | Facility: CLINIC | Age: 59
End: 2024-06-04
Payer: COMMERCIAL

## 2024-06-04 VITALS
HEIGHT: 62 IN | HEART RATE: 73 BPM | SYSTOLIC BLOOD PRESSURE: 110 MMHG | OXYGEN SATURATION: 95 % | BODY MASS INDEX: 29.81 KG/M2 | WEIGHT: 162 LBS | DIASTOLIC BLOOD PRESSURE: 72 MMHG | TEMPERATURE: 97.2 F

## 2024-06-04 LAB
ALBUMIN SERPL ELPH-MCNC: 4.7 G/DL — SIGNIFICANT CHANGE UP (ref 3.3–5)
ALP SERPL-CCNC: 114 U/L — SIGNIFICANT CHANGE UP (ref 40–120)
ALT FLD-CCNC: 19 U/L — SIGNIFICANT CHANGE UP (ref 10–45)
ANION GAP SERPL CALC-SCNC: 13 MMOL/L — SIGNIFICANT CHANGE UP (ref 5–17)
AST SERPL-CCNC: 23 U/L — SIGNIFICANT CHANGE UP (ref 10–40)
BILIRUB SERPL-MCNC: 0.6 MG/DL — SIGNIFICANT CHANGE UP (ref 0.2–1.2)
BUN SERPL-MCNC: 13 MG/DL — SIGNIFICANT CHANGE UP (ref 7–23)
CALCIUM SERPL-MCNC: 10 MG/DL — SIGNIFICANT CHANGE UP (ref 8.4–10.5)
CHLORIDE SERPL-SCNC: 102 MMOL/L — SIGNIFICANT CHANGE UP (ref 96–108)
CO2 SERPL-SCNC: 23 MMOL/L — SIGNIFICANT CHANGE UP (ref 22–31)
CREAT SERPL-MCNC: 0.66 MG/DL — SIGNIFICANT CHANGE UP (ref 0.5–1.3)
EGFR: 102 ML/MIN/1.73M2 — SIGNIFICANT CHANGE UP
GLUCOSE SERPL-MCNC: 113 MG/DL — HIGH (ref 70–99)
HCT VFR BLD CALC: 37.6 % — SIGNIFICANT CHANGE UP (ref 34.5–45)
HGB BLD-MCNC: 12.5 G/DL — SIGNIFICANT CHANGE UP (ref 11.5–15.5)
LDH SERPL L TO P-CCNC: 186 U/L — SIGNIFICANT CHANGE UP (ref 50–242)
MCHC RBC-ENTMCNC: 29.2 PG — SIGNIFICANT CHANGE UP (ref 27–34)
MCHC RBC-ENTMCNC: 33.2 G/DL — SIGNIFICANT CHANGE UP (ref 32–36)
MCV RBC AUTO: 88 FL — SIGNIFICANT CHANGE UP (ref 80–100)
PLATELET # BLD AUTO: 259 K/UL — SIGNIFICANT CHANGE UP (ref 150–400)
POTASSIUM SERPL-MCNC: 4.5 MMOL/L — SIGNIFICANT CHANGE UP (ref 3.5–5.3)
POTASSIUM SERPL-SCNC: 4.5 MMOL/L — SIGNIFICANT CHANGE UP (ref 3.5–5.3)
PROT SERPL-MCNC: 7.2 G/DL — SIGNIFICANT CHANGE UP (ref 6–8.3)
RBC # BLD: 4.27 M/UL — SIGNIFICANT CHANGE UP (ref 3.8–5.2)
RBC # FLD: 12.9 % — SIGNIFICANT CHANGE UP (ref 10.3–14.5)
SODIUM SERPL-SCNC: 138 MMOL/L — SIGNIFICANT CHANGE UP (ref 135–145)
WBC # BLD: 6.3 K/UL — SIGNIFICANT CHANGE UP (ref 3.8–10.5)
WBC # FLD AUTO: 6.3 K/UL — SIGNIFICANT CHANGE UP (ref 3.8–10.5)

## 2024-06-04 PROCEDURE — 99214 OFFICE O/P EST MOD 30 MIN: CPT

## 2024-06-04 RX ORDER — ONDANSETRON 8 MG/1
8 TABLET ORAL EVERY 8 HOURS
Qty: 90 | Refills: 1 | Status: ACTIVE | COMMUNITY
Start: 2022-06-06 | End: 1900-01-01

## 2024-06-04 RX ORDER — MAGNESIUM OXIDE 241.3 MG/1000MG
400 TABLET ORAL
Qty: 60 | Refills: 3 | Status: ACTIVE | COMMUNITY
Start: 2022-12-20 | End: 1900-01-01

## 2024-06-04 RX ORDER — ALPRAZOLAM 0.5 MG/1
0.5 TABLET ORAL
Qty: 2 | Refills: 0 | Status: ACTIVE | COMMUNITY
Start: 2023-07-28 | End: 1900-01-01

## 2024-06-06 ENCOUNTER — APPOINTMENT (OUTPATIENT)
Dept: CARDIOLOGY | Facility: CLINIC | Age: 59
End: 2024-06-06
Payer: COMMERCIAL

## 2024-06-06 ENCOUNTER — APPOINTMENT (OUTPATIENT)
Dept: PSYCHIATRY | Facility: CLINIC | Age: 59
End: 2024-06-06
Payer: COMMERCIAL

## 2024-06-06 ENCOUNTER — NON-APPOINTMENT (OUTPATIENT)
Age: 59
End: 2024-06-06

## 2024-06-06 VITALS — SYSTOLIC BLOOD PRESSURE: 94 MMHG | DIASTOLIC BLOOD PRESSURE: 60 MMHG | OXYGEN SATURATION: 98 % | HEART RATE: 81 BPM

## 2024-06-06 VITALS — WEIGHT: 160 LBS | BODY MASS INDEX: 29.44 KG/M2 | HEIGHT: 62 IN

## 2024-06-06 DIAGNOSIS — I49.3 VENTRICULAR PREMATURE DEPOLARIZATION: ICD-10-CM

## 2024-06-06 DIAGNOSIS — E78.5 HYPERLIPIDEMIA, UNSPECIFIED: ICD-10-CM

## 2024-06-06 DIAGNOSIS — I34.0 NONRHEUMATIC MITRAL (VALVE) INSUFFICIENCY: ICD-10-CM

## 2024-06-06 DIAGNOSIS — R07.89 OTHER CHEST PAIN: ICD-10-CM

## 2024-06-06 PROCEDURE — 93000 ELECTROCARDIOGRAM COMPLETE: CPT

## 2024-06-06 PROCEDURE — 90837 PSYTX W PT 60 MINUTES: CPT | Mod: 95

## 2024-06-06 PROCEDURE — 99214 OFFICE O/P EST MOD 30 MIN: CPT

## 2024-06-06 RX ORDER — METHYLPREDNISOLONE 4 MG/1
4 TABLET ORAL
Qty: 1 | Refills: 0 | Status: DISCONTINUED | COMMUNITY
Start: 2024-05-29 | End: 2024-06-06

## 2024-06-06 RX ORDER — CALCIUM CITRATE/VITAMIN D3 315MG-6.25
TABLET ORAL DAILY
Refills: 0 | Status: ACTIVE | COMMUNITY

## 2024-06-06 RX ORDER — PREGABALIN 50 MG/1
50 CAPSULE ORAL
Qty: 60 | Refills: 1 | Status: DISCONTINUED | COMMUNITY
Start: 2024-01-09 | End: 2024-06-06

## 2024-06-06 RX ORDER — PREGABALIN 150 MG/1
150 CAPSULE ORAL
Qty: 60 | Refills: 2 | Status: DISCONTINUED | COMMUNITY
Start: 2024-03-21 | End: 2024-06-06

## 2024-06-06 NOTE — HISTORY OF PRESENT ILLNESS
[FreeTextEntry1] : 58 y/o F with PMH T cell lymphoma dx'ed by axillary LN biopsy 5/16/22 s/p chemo and auto stem cell transplant 11/2022, reported history of "tachycardia" many years ago, previously followed with Dr. Barry at [place]. Records from [place] were reviewed; patient was last seen 7/2018; had PVCs, no evidence of VT on holter monitor, normal TTE.  Family Hx: no premature CAD or sudden cardiac death Social Hx: denies smoking, ETOH, or illicit drug use   Patient was previously seen by me 12/1/23 with no changes in management  She complains of episodes of chest discomfort that are precipitated by stress/anxiety. Nonradiating/nonexertional. She denies associated dyspnea or diaphoresis. No palpitations, dizziness/lightheadedness, or LE edema.  She has no prior ischemic workup. Limited effort tolerance due to neuropathy.

## 2024-06-06 NOTE — CARDIOLOGY SUMMARY
Date Performed: 11/04/2017       Time Performed: 19:46:16

 

PTAGE:      89 years

 

EKG:      Sinus rhythm 

 

. Normal ECG

 

PREVIOUS TRACING       : 11/04/2017 16.58 Compared to prior tracing no significant change

 

DOCTOR:   Og Madrigal  Interpretating Date/Time  11/05/2017 12:30:48 [de-identified] : SR, short ME [de-identified] : TTE 11/2023: LVEF 55-60%, GLS -22.2%, no significant valve disease

## 2024-06-06 NOTE — PLAN
[Cognitive and/or Behavior Therapy] : Cognitive and/or Behavior Therapy  [FreeTextEntry2] : CBT for anxiety [de-identified] : Pt was Ox3. Pt's affect was observed to be euthymic. No risk was observed or reported.  Psychologist met with Pt using teletherapy platform due to COVID-19 pandemic disaster. Pt was at their home, while psychologist was working remotely. Pt continued to provide consent for telehealth services.  Met with patient.  Reviewed symptoms discussed last week.  Patient reported struggling this week with family members not understanding the challenges that she has during her recovery period.  Continued assessment of symptoms.  Reviewed psychiatric history.  Patient reported depressive symptoms in 2022 after her diagnosis.  Patient reported depressed mood and decreased motivation for 2 days intermittently for a few months.  Patient denied any access to weapons.  Patient identified protective factors including family simple day to day pleasures such as cooking and baking.  Patient reported mild fear of heights and claustrophobia.  Patient reported she increases her anxiety medication as recommended before having an MRI.  Patient reported frequent body complaint of neuropathy in her feet and weakness in her legs.  Patient reported on family history.  Patient reported 2 of her daughters had anxiety as well as her parents and grandparents.  Patient reported her older daughter has infrequent panic attacks.  Patient's uncle had substance and alcohol abuse.  Patient has 3 adult children.  Patient identified hobbies including baking, cooking, watching TV and completing word search puzzles.  Patient reported high quality of childhood.  Patient identifies as being spiritual within the [employer] and has an [place] background.  Patient reported currently having high satisfaction with her relationship.  Patient reported high satisfaction with relationships with her children.  Patient identify personal strengths as being organized, friendly, not determined, compassionate and giving.  Began YONI assessment.  Patient identified moderate symptoms of social anxiety.  Patient reported anxiety when introducing herself to someone new, attending parties again with unfamiliar people.  Patient reported avoiding various social interactions.  Patient also reported due to her cancer diagnosis and various restrictions she is not typically engaging in social settings.  Patient reported moderate anxiety when refusing requests asking others to change by being assertive.  Patient did report this varies based on this is with.  Next session will continue YONI assessment of SVETLANA symptoms. [FreeTextEntry1] : Treatment plan reviewed for psycho-education include the followin. Pt will learn about their symptoms and diagnosis and be able to explain it back to psychologist. 2. Pt will learn about the CBT model and be able to accurately categorize their symptoms and diagnosis in terms of cognition, emotional responses, behaviors, and physiological arousal. Treatment plan for relaxation techniques reviewed in session included the followin. Pt can benefit from learning and practicing Relaxation Techniques to reduce physiological arousal, including flight/flight/freeze responses, manifested by muscle tightness or tension; emotions including anxiety and panic, sadness and depression, and anger. 2. Relaxation techniques can include any or all of the following: Diaphragmatic Breathing (DB), Progressive Muscle Relaxation (PMR), Imaginal Relaxation (IMR), or mindfulness. 3. Relaxation techniques will be learned and practiced within sessions, and assigned for homework between sessions. This will entail interventions such using monitoring forms to track their stress and other symptoms, and assess the efficacy of their implementation of relaxation techniques. Treatment plan reviewed for cognitive therapy included the followin. Pt can benefit from learning & practicing cognitive restructuring, including learning to categorize automatic thoughts/cognitive distortions (e.g. shoulds, catastrophic thoughts, overgeneralizations), judgements, biases, and attributions.  2. Pt can benefit from using cognitive reframing to challenge cognitive distortions to increase flexibility in thinking. 3. Pt can benefit from using self-coping statements to encourage themselves and provide validation.  4. Cognitive restructuring will be learned and practiced within sessions, and assigned for homework between sessions. This will entail interventions such using mood monitoring and thought record forms, to challenge cognitive distortions in real time.

## 2024-06-06 NOTE — DISCUSSION/SUMMARY
[FreeTextEntry1] : 58 y/o F with PMH T cell lymphoma dx'ed by axillary LN biopsy 5/16/22 s/p chemo and auto stem cell transplant 11/2022, PVCs now presents with chest tightness.   #Chest tightness Atypical, however patient with no prior ischemic workup.  Pharm NST to evaluate for ischemia (patient unable to exercise due to LE neuropathy)  #HLD 7/2023 ; due for repeat labs  10 year ASCVD risk <2% Diet, lifestyle modifications discussed  #PVCs PVCs were reported on HM with previous cardiologist; TTE with normal LVEF Patient asymptomatic; no further workup at this time unless patient becomes symptomatic. [EKG obtained to assist in diagnosis and management of assessed problem(s)] : EKG obtained to assist in diagnosis and management of assessed problem(s)

## 2024-06-10 NOTE — ASSESSMENT
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 Amadeo Grace. Patient incidentally discovered it while shaving in Jan / Feb 2022 Biopsy of right axilla performed on 3/22/22 revealing atypical lymphoid infiltrate ( NEGATIVE for a clonal lgH gene rearrangement/ Positive clonal TCR gamma gene rearrangement ) On review of needle biopsy from 3/22/22 at [facility], suggestive of T cell lymphoma  S/p right axillary lymph node excisional biopsy on 5/16/22 per DR Mariano Path c/w Peripheral T- Cell Lymphoma, NOS  PERIPHERAL T CELL LYMPHOMA NOS Stage 1 IPI SCORE: 0 LOW RISk - Some B symptoms, Night sweats since Jan 2022, fatigue, 6 lb weight loss - Discussed with patient Chemotherapy with CHOEP WITH NEULASTA Support -D1 : Cyclophosphamide 750 mg /m2, Adriamycin 50 mg/ m2 Vincristone 1.4mg/ m2, Etoposide 10mg/ m2 IV D2- D3 Etoposide 100mg/m2 IV Hydration D1-D5 Prednisone 10mg po Allopurinol 100mg daily for TLS prophylaxis  - Port placement on 6/8/22 - ECHO on 6/14/22, LV EF 59% - Patient started CHOEP on 6/20/22, Completed C 6 on 10/6/22 - Patient evaluated by Dr. Acevedo for auto stem cell transplant for t cell NHL, S/P Autologous PBSCT on 11/25/22 11/28/23 PET/CT- CRYSTAL - CBC and LDH normal on 2/6/24 - 3/28/24 CT CAP: CRYSTAL, repeat in 6 months  - Neuropathy -  continue f/u with Dr. Monae and neurologist  - Port Flush q 6 weeks  -She goes to PT for Neuropathy., OK for Vestibular testing per PT  - F/U in 3 months  PSCT Vaccines:    14 Months Week 1 on 1/29/24 and Week 2 on 2/5/24 12 Months Week 1 on 11/27/23 and week on 12/4/23   24 Months Week 1 on 11/25/24,Week 2 on 12/2/24 and Week 3 on 12/9/24

## 2024-06-10 NOTE — HISTORY OF PRESENT ILLNESS
[de-identified] : 57 year old female with no significant past medical hx presents to office for initial visit.  Patient presented on 3/4/22 c/o right axilla mass  to general surgeon, Dr. Amadeo Grace. Patient incidentally discovered it while shavingin Jan 2022  \par  Biopsy of right axilla performed on 3/22/22 revealing atypical lymphoid infiltrate ( NEGATIVE for a clonal lgH gene rearrangement/ Positive clonal TCR gamma gene rearrangement )\par  \par  Planned for lymph node excision with Dr. Grace however patient decided to stay in the [facility] system and had Excisional Biopsy per Dr Mariano \par  Colonoscopy November 2021\par  \par  LN excional BIopsy was delayed 2/2 to her travelling and pre surg testing.\par  On review of needle biopsy from 3/22/22 at [facility], suggestive of T cell lymphoma\par  \par  S/p right axillary lymph node excisional biopsy on 5/16/22  per DR Mariano Path c/w Peripheral T- Cell Lymphoma, NOS\par  PET SCAN 4/21/22: \par  - 1. FDG avid extensive right axillary lymphadenopathy 5.4 x 3.6 cm , SUV 22.5. and right supraclavicular lymph node.0.8 x 0.8 cm (image 53), SUV 5.8.\par  2. Difficult to delineate retroareolar right breast soft tissue, with low-grade FDG avidity and minimally FDG avid diffuse, right breast cutaneous thickening. \par  \par  Breast node biopsy performed February 2022 was negative. \par  MRI Breast on 5/26/22 with benign findings repeat in 6 months [de-identified] : Patient presents for follow up accompanied with daughter. S/p right axillary lymph node excisional biopsy on 5/16/22  per DR Mariano Path c/w Peripheral T- Cell Lymphoma, NOS Patient started  CHOEP on 6/20/22, Completed C 6 on 10/6/22  Patient evaluated by Dr. Acevedo for auto stem cell transplant for t cell NHLHere for f/u.  Patient evaluated by Dr. Pizano performed CBT testing for fogginess 2/2 chemo Patient started talk therapy for increased in anxiety, Fluoxetine dose recently increased  Admits intermittent nausea  Dr. Alexandra ordering MRI to further evaluate neuropathy Patient with recent URI- staking allergy pill, completed abx course , currently taking steroid taper Reports continued night sweats x 2 week   Today's CBC: WBC 6.3, Hg 12.5 , HCT 37.6 , plt 259

## 2024-06-13 ENCOUNTER — OUTPATIENT (OUTPATIENT)
Dept: OUTPATIENT SERVICES | Facility: HOSPITAL | Age: 59
LOS: 1 days | End: 2024-06-13

## 2024-06-13 ENCOUNTER — APPOINTMENT (OUTPATIENT)
Dept: MRI IMAGING | Facility: CLINIC | Age: 59
End: 2024-06-13

## 2024-06-13 DIAGNOSIS — Z00.8 ENCOUNTER FOR OTHER GENERAL EXAMINATION: ICD-10-CM

## 2024-06-13 PROCEDURE — 72158 MRI LUMBAR SPINE W/O & W/DYE: CPT | Mod: 26

## 2024-06-20 ENCOUNTER — APPOINTMENT (OUTPATIENT)
Dept: PHYSICAL MEDICINE AND REHAB | Facility: CLINIC | Age: 59
End: 2024-06-20
Payer: COMMERCIAL

## 2024-06-20 ENCOUNTER — APPOINTMENT (OUTPATIENT)
Dept: PSYCHIATRY | Facility: CLINIC | Age: 59
End: 2024-06-20
Payer: COMMERCIAL

## 2024-06-20 VITALS
WEIGHT: 160 LBS | SYSTOLIC BLOOD PRESSURE: 110 MMHG | BODY MASS INDEX: 29.44 KG/M2 | HEART RATE: 65 BPM | HEIGHT: 62 IN | DIASTOLIC BLOOD PRESSURE: 72 MMHG

## 2024-06-20 DIAGNOSIS — R29.898 OTHER SYMPTOMS AND SIGNS INVOLVING THE MUSCULOSKELETAL SYSTEM: ICD-10-CM

## 2024-06-20 DIAGNOSIS — C85.90 NON-HODGKIN LYMPHOMA, UNSPECIFIED, UNSPECIFIED SITE: ICD-10-CM

## 2024-06-20 DIAGNOSIS — M62.81 MUSCLE WEAKNESS (GENERALIZED): ICD-10-CM

## 2024-06-20 PROCEDURE — 90837 PSYTX W PT 60 MINUTES: CPT | Mod: 95

## 2024-06-20 PROCEDURE — 99214 OFFICE O/P EST MOD 30 MIN: CPT

## 2024-06-20 RX ORDER — FLUOXETINE HYDROCHLORIDE 10 MG/1
10 CAPSULE ORAL
Qty: 90 | Refills: 1 | Status: ACTIVE | COMMUNITY
Start: 2024-04-23 | End: 1900-01-01

## 2024-06-20 NOTE — PHYSICAL EXAM
[FreeTextEntry1] : No acute distress Alert and oriented x 3 Nonobese Cristobal's negative Deep tendon reflexes 1+ biceps; absent triceps/brachioradialis/knees; trace right ankle jerk; absent left ankle jerk No clonus Absent Babinski Manual muscle testing 2/5 right hip flexors; 1/5 left hip flexors; 2/5 left knee extensors; 3+/5 right knee extensors; 2/5 right dorsiflexors; 4/5 left dorsiflexors; 4/5 bilateral plantar flexors Decreased pinprick throughout the lower extremities Dysesthesia to pinprick bilateral upper extremity

## 2024-06-20 NOTE — DATA REVIEWED
[EMG] : EMG [FreeTextEntry1] : MRI L-spine w/ & w/o (June 2024): Multilevel discogenic degenerative disease and facet arthropathy of the lumbar spine, most pronounced at L4-L5 where there is mild bilateral neural foraminal narrowing. No additional areas of significant central canal or neural foraminal narrowing within the lumbar spine.  No evidence for osseous or soft tissue metastatic disease to the lumbar spine.  Needle EMG bilateral lower extremities (4/1/24, 4/5/24): Electrodiagnostic findings are significant for (1) mild, demyelinating, sensorimotor, median mononeuropathies across both wrists; (2) mild, demyelinating, motor, ulnar mononeuropathy across the left elbow; (3) subacute to chronic, lumbosacral polyradiculopathy affecting the motor axons on the left. There is no definitive electrodiagnostic evidence in support of a large fiber, length dependent, peripheral polyneuropathy affecting the patient's arms or legs.

## 2024-06-20 NOTE — HISTORY OF PRESENT ILLNESS
[FreeTextEntry1] : 59-year-old right-hand-dominant female with history of non-Hodgkin's lymphoma status post stem cell transplant (November 2022) returns to office for EMG and MRI review.  Results detailed below.  The patient endorses a history of numbness and tingling in both hands and pain and paresthesias in both feet.  She endorses some lower back pain which she had prior to her cancer diagnosis.  She has no history of diabetes or autoimmune disease.  Pt. states that her painful sxs began one year later Nov 2023.  Left leg still feels heavy.  Still walks with aide of quad cane.  Patient does not utilize a brace.  She continues in P.T. 2x/week.  Follows with Dr. Monae.  Pt is maintained on pregabalin 100 mg po bid which she tolerates well (did not tolerate higher doses).

## 2024-06-20 NOTE — ASSESSMENT
[FreeTextEntry1] : 59-year-old right-hand-dominant female with history of non-Hodgkin's lymphoma status post stem cell transplant (November 2022) with left greater than right leg weakness and dysesthetic pain.  I spent most of today's office visit (30 minutes) reviewing the patient's EMG, MRI, discussing likely etiology, pathogenesis, further diagnostic workup and nonoperative management.  EMG significant for (1) mild, demyelinating, sensorimotor, median mononeuropathies across both wrists; (2) mild, demyelinating, motor, ulnar mononeuropathy across the left elbow; (3) subacute to chronic, lumbosacral polyradiculopathy affecting the motor axons on the left.  There is no definitive electrodiagnostic evidence in support of a large fiber, length dependent, peripheral polyneuropathy affecting the patient's arms or legs.  MRI lumbar spine significant for multilevel degenerative disc disease and facet arthropathy worse at L4-L5 without significant HNP, central canal or neuroforaminal stenosis.  I explained to the patient that the weakness in her left leg is likely due to the lumbosacral polyradiculopathy even though there was no significant compression on her MRI.  It is quite possible that the patient's cancer and/or chemotherapy are the causative agents.  As the patient is weaker proximally than distally in her left leg, there exists the possibility of a concomitant femoral mononeuropathy which would be best evaluated with an ultrasound-guided near nerve conduction study of the femoral nerve both above and below the inguinal ligament.  I will discuss this further with Dr. Monae.  It is likely that the patient's median mononeuropathy and left ulnar mononeuropathy were pre-existing but may have been exacerbated during treatment.  The patient has been unable to tolerate higher doses of pregabalin.  I did recommend at least an AFO to assist her ambulation along with continued use of her straight cane.  The patient is otherwise in agreement with the plan.  All questions have been answered.  Return to office 6 months.

## 2024-06-20 NOTE — PLAN
[FreeTextEntry2] : CBT for anxiety [Cognitive and/or Behavior Therapy] : Cognitive and/or Behavior Therapy  [de-identified] : Pt was Ox3. Pt's affect was observed to be euthymic. No risk was observed or reported.  Psychologist met with Pt using teletherapy platform due to COVID-19 pandemic disaster. Pt was at their home, while psychologist was working remotely. Pt continued to provide consent for telehealth services.  Met with patient.  Continued YONI assessment.  Patient reported on SVETLANA symptoms.  Patient reported severe worry about her own health.  Patient reported moderate level of worry for work, health of others and finances.  Patient reported mild level of worry or minor matters, family, interpersonal relationships and community/world affairs.  Patient reported a mild level of avoidance, spending extra time preparing and procrastination.  Patient estimates worrying about 50% of the average day but noted it fluctuates based on current situations.  Patient reported highest worry concerns are related to her own health, family and finances.  Patient reported moderate level of fatigue and insomnia and mild level of irritability.  Patient denied symptoms of OCD, BDD, phobias, PTSD, MDD, hoarding, BFRBs, bipolar disorder and substance use disorders.  Introduced treatment plan.  Treatment plan reviewed for psycho-education include the followin. Pt will learn about their symptoms and diagnosis and be able to explain it back to psychologist. 2. Pt will learn about the CBT model and be able to accurately categorize their symptoms and diagnosis in terms of cognition, emotional responses, behaviors, and physiological arousal. Treatment plan for relaxation techniques reviewed in session included the followin. Pt can benefit from learning and practicing Relaxation Techniques to reduce physiological arousal, including flight/flight/freeze responses, manifested by muscle tightness or tension; emotions including anxiety and panic, sadness and depression, and anger. 2. Relaxation techniques can include any or all of the following: Diaphragmatic Breathing (DB), Progressive Muscle Relaxation (PMR), Imaginal Relaxation (IMR), or mindfulness. 3. Relaxation techniques will be learned and practiced within sessions, and assigned for homework between sessions. This will entail interventions such using monitoring forms to track their stress and other symptoms, and assess the efficacy of their implementation of relaxation techniques. Pt will learn ACT strategies and practices exercises assisting her in accepting her recovery journey. Patient expressed understanding and was in agreement.  Introduced tripartite model.  Patient expressed understanding and was able to provide examples.  Homework assigned for patient to begin tracking her symptoms.  Next session will review symptom log and introduce diaphragmatic breathing. [FreeTextEntry1] : Treatment plan reviewed for psycho-education include the followin. Pt will learn about their symptoms and diagnosis and be able to explain it back to psychologist. 2. Pt will learn about the CBT model and be able to accurately categorize their symptoms and diagnosis in terms of cognition, emotional responses, behaviors, and physiological arousal. Treatment plan for relaxation techniques reviewed in session included the followin. Pt can benefit from learning and practicing Relaxation Techniques to reduce physiological arousal, including flight/flight/freeze responses, manifested by muscle tightness or tension; emotions including anxiety and panic, sadness and depression, and anger. 2. Relaxation techniques can include any or all of the following: Diaphragmatic Breathing (DB), Progressive Muscle Relaxation (PMR), Imaginal Relaxation (IMR), or mindfulness. 3. Relaxation techniques will be learned and practiced within sessions, and assigned for homework between sessions. This will entail interventions such using monitoring forms to track their stress and other symptoms, and assess the efficacy of their implementation of relaxation techniques. Treatment plan reviewed for cognitive therapy included the followin. Pt can benefit from learning & practicing cognitive restructuring, including learning to categorize automatic thoughts/cognitive distortions (e.g. shoulds, catastrophic thoughts, overgeneralizations), judgements, biases, and attributions.  2. Pt can benefit from using cognitive reframing to challenge cognitive distortions to increase flexibility in thinking. 3. Pt can benefit from using self-coping statements to encourage themselves and provide validation.  4. Cognitive restructuring will be learned and practiced within sessions, and assigned for homework between sessions. This will entail interventions such using mood monitoring and thought record forms, to challenge cognitive distortions in real time.

## 2024-06-22 ENCOUNTER — RX RENEWAL (OUTPATIENT)
Age: 59
End: 2024-06-22

## 2024-06-22 RX ORDER — FLUOXETINE HYDROCHLORIDE 20 MG/1
20 CAPSULE ORAL
Qty: 90 | Refills: 0 | Status: ACTIVE | COMMUNITY
Start: 2022-12-20 | End: 1900-01-01

## 2024-06-25 ENCOUNTER — APPOINTMENT (OUTPATIENT)
Dept: CARDIOLOGY | Facility: CLINIC | Age: 59
End: 2024-06-25

## 2024-06-25 ENCOUNTER — APPOINTMENT (OUTPATIENT)
Dept: PHYSICAL MEDICINE AND REHAB | Facility: CLINIC | Age: 59
End: 2024-06-25
Payer: COMMERCIAL

## 2024-06-25 DIAGNOSIS — R41.89 OTHER SYMPTOMS AND SIGNS INVOLVING COGNITIVE FUNCTIONS AND AWARENESS: ICD-10-CM

## 2024-06-25 DIAGNOSIS — G62.9 POLYNEUROPATHY, UNSPECIFIED: ICD-10-CM

## 2024-06-25 DIAGNOSIS — R26.89 OTHER ABNORMALITIES OF GAIT AND MOBILITY: ICD-10-CM

## 2024-06-25 DIAGNOSIS — M79.2 NEURALGIA AND NEURITIS, UNSPECIFIED: ICD-10-CM

## 2024-06-25 PROCEDURE — 99214 OFFICE O/P EST MOD 30 MIN: CPT

## 2024-06-25 RX ORDER — PREGABALIN 100 MG/1
100 CAPSULE ORAL
Qty: 60 | Refills: 2 | Status: ACTIVE | COMMUNITY
Start: 2024-02-21 | End: 1900-01-01

## 2024-06-27 ENCOUNTER — APPOINTMENT (OUTPATIENT)
Dept: PSYCHIATRY | Facility: CLINIC | Age: 59
End: 2024-06-27
Payer: COMMERCIAL

## 2024-06-27 DIAGNOSIS — F43.22 ADJUSTMENT DISORDER WITH ANXIETY: ICD-10-CM

## 2024-06-27 PROCEDURE — 90837 PSYTX W PT 60 MINUTES: CPT | Mod: 95

## 2024-07-03 ENCOUNTER — APPOINTMENT (OUTPATIENT)
Dept: PSYCHIATRY | Facility: CLINIC | Age: 59
End: 2024-07-03
Payer: COMMERCIAL

## 2024-07-03 PROCEDURE — 90837 PSYTX W PT 60 MINUTES: CPT | Mod: 95

## 2024-07-11 ENCOUNTER — APPOINTMENT (OUTPATIENT)
Dept: PSYCHIATRY | Facility: CLINIC | Age: 59
End: 2024-07-11
Payer: COMMERCIAL

## 2024-07-11 PROCEDURE — 90837 PSYTX W PT 60 MINUTES: CPT | Mod: 95

## 2024-07-16 ENCOUNTER — RESULT REVIEW (OUTPATIENT)
Age: 59
End: 2024-07-16

## 2024-07-16 ENCOUNTER — APPOINTMENT (OUTPATIENT)
Age: 59
End: 2024-07-16

## 2024-07-16 LAB
HCT VFR BLD CALC: 35.3 % — SIGNIFICANT CHANGE UP (ref 34.5–45)
HGB BLD-MCNC: 11.7 G/DL — SIGNIFICANT CHANGE UP (ref 11.5–15.5)
MCHC RBC-ENTMCNC: 29.8 PG — SIGNIFICANT CHANGE UP (ref 27–34)
MCHC RBC-ENTMCNC: 33.1 G/DL — SIGNIFICANT CHANGE UP (ref 32–36)
MCV RBC AUTO: 90.2 FL — SIGNIFICANT CHANGE UP (ref 80–100)
PLATELET # BLD AUTO: 212 K/UL — SIGNIFICANT CHANGE UP (ref 150–400)
RBC # BLD: 3.92 M/UL — SIGNIFICANT CHANGE UP (ref 3.8–5.2)
RBC # FLD: 12.5 % — SIGNIFICANT CHANGE UP (ref 10.3–14.5)
WBC # BLD: 4.8 K/UL — SIGNIFICANT CHANGE UP (ref 3.8–10.5)
WBC # FLD AUTO: 4.8 K/UL — SIGNIFICANT CHANGE UP (ref 3.8–10.5)

## 2024-07-17 DIAGNOSIS — C85.90 NON-HODGKIN LYMPHOMA, UNSPECIFIED, UNSPECIFIED SITE: ICD-10-CM

## 2024-07-17 LAB
ALBUMIN SERPL ELPH-MCNC: 4.2 G/DL — SIGNIFICANT CHANGE UP (ref 3.3–5)
ALP SERPL-CCNC: 83 U/L — SIGNIFICANT CHANGE UP (ref 40–120)
ALT FLD-CCNC: 18 U/L — SIGNIFICANT CHANGE UP (ref 10–45)
ANION GAP SERPL CALC-SCNC: 12 MMOL/L — SIGNIFICANT CHANGE UP (ref 5–17)
AST SERPL-CCNC: 26 U/L — SIGNIFICANT CHANGE UP (ref 10–40)
BILIRUB SERPL-MCNC: 0.2 MG/DL — SIGNIFICANT CHANGE UP (ref 0.2–1.2)
BUN SERPL-MCNC: 20 MG/DL — SIGNIFICANT CHANGE UP (ref 7–23)
CALCIUM SERPL-MCNC: 9.5 MG/DL — SIGNIFICANT CHANGE UP (ref 8.4–10.5)
CHLORIDE SERPL-SCNC: 105 MMOL/L — SIGNIFICANT CHANGE UP (ref 96–108)
CO2 SERPL-SCNC: 22 MMOL/L — SIGNIFICANT CHANGE UP (ref 22–31)
CREAT SERPL-MCNC: 0.61 MG/DL — SIGNIFICANT CHANGE UP (ref 0.5–1.3)
EGFR: 104 ML/MIN/1.73M2 — SIGNIFICANT CHANGE UP
GLUCOSE SERPL-MCNC: 99 MG/DL — SIGNIFICANT CHANGE UP (ref 70–99)
LDH SERPL L TO P-CCNC: 301 U/L — HIGH (ref 50–242)
POTASSIUM SERPL-MCNC: 4 MMOL/L — SIGNIFICANT CHANGE UP (ref 3.5–5.3)
POTASSIUM SERPL-SCNC: 4 MMOL/L — SIGNIFICANT CHANGE UP (ref 3.5–5.3)
PROT SERPL-MCNC: 7.1 G/DL — SIGNIFICANT CHANGE UP (ref 6–8.3)
SODIUM SERPL-SCNC: 140 MMOL/L — SIGNIFICANT CHANGE UP (ref 135–145)

## 2024-07-18 ENCOUNTER — APPOINTMENT (OUTPATIENT)
Dept: PSYCHIATRY | Facility: CLINIC | Age: 59
End: 2024-07-18
Payer: COMMERCIAL

## 2024-07-18 DIAGNOSIS — F43.22 ADJUSTMENT DISORDER WITH ANXIETY: ICD-10-CM

## 2024-07-18 PROCEDURE — 90837 PSYTX W PT 60 MINUTES: CPT | Mod: 95

## 2024-07-25 ENCOUNTER — NON-APPOINTMENT (OUTPATIENT)
Age: 59
End: 2024-07-25

## 2024-07-25 ENCOUNTER — APPOINTMENT (OUTPATIENT)
Dept: PSYCHIATRY | Facility: CLINIC | Age: 59
End: 2024-07-25

## 2024-07-31 ENCOUNTER — APPOINTMENT (OUTPATIENT)
Dept: PSYCHIATRY | Facility: CLINIC | Age: 59
End: 2024-07-31
Payer: COMMERCIAL

## 2024-07-31 PROCEDURE — 90837 PSYTX W PT 60 MINUTES: CPT | Mod: 95

## 2024-07-31 NOTE — PLAN
[FreeTextEntry2] : CBT for anxiety [Cognitive and/or Behavior Therapy] : Cognitive and/or Behavior Therapy  [de-identified] : Pt was Ox3. Pt's affect was observed to be euthymic. No risk was observed or reported.  Psychologist met with Pt using teletherapy platform due to COVID-19 pandemic disaster. Pt was at their home, while psychologist was working remotely. Pt continued to provide consent for telehealth services.  Met with patient.  Practiced PMR in session.  Patient completed exercise appropriately for reported experiencing relief.  Discussed application of the skill and how she can modify it in situations she has less time.  Discussed stressful situations from this past week including when her  was hospitalized due to kidney stones.  Patient reported on stuck thoughts such as 'he is being unreasonable for not staying in the hospital,' 'this is unfair to me.'  Introduced thinking your mind technique.  Discussed application for this skill to unhook from her thoughts.  Patient expressed understanding. Homework assigned for patient to practice PMR and apply shifting her focus.  Next session will review unhooking thoughts and introduce unhelpful thinking styles. [FreeTextEntry1] : Treatment plan reviewed for psycho-education include the followin. Pt will learn about their symptoms and diagnosis and be able to explain it back to psychologist. 2. Pt will learn about the CBT model and be able to accurately categorize their symptoms and diagnosis in terms of cognition, emotional responses, behaviors, and physiological arousal. Treatment plan for relaxation techniques reviewed in session included the followin. Pt can benefit from learning and practicing Relaxation Techniques to reduce physiological arousal, including flight/flight/freeze responses, manifested by muscle tightness or tension; emotions including anxiety and panic, sadness and depression, and anger. 2. Relaxation techniques can include any or all of the following: Diaphragmatic Breathing (DB), Progressive Muscle Relaxation (PMR), Imaginal Relaxation (IMR), or mindfulness. 3. Relaxation techniques will be learned and practiced within sessions, and assigned for homework between sessions. This will entail interventions such using monitoring forms to track their stress and other symptoms, and assess the efficacy of their implementation of relaxation techniques. Treatment plan reviewed for cognitive therapy included the followin. Pt can benefit from learning & practicing cognitive restructuring, including learning to categorize automatic thoughts/cognitive distortions (e.g. shoulds, catastrophic thoughts, overgeneralizations), judgements, biases, and attributions.  2. Pt can benefit from using cognitive reframing to challenge cognitive distortions to increase flexibility in thinking. 3. Pt can benefit from using self-coping statements to encourage themselves and provide validation.  4. Cognitive restructuring will be learned and practiced within sessions, and assigned for homework between sessions. This will entail interventions such using mood monitoring and thought record forms, to challenge cognitive distortions in real time.

## 2024-08-05 ENCOUNTER — APPOINTMENT (OUTPATIENT)
Dept: NEUROLOGY | Facility: CLINIC | Age: 59
End: 2024-08-05

## 2024-08-05 PROCEDURE — 99204 OFFICE O/P NEW MOD 45 MIN: CPT

## 2024-08-05 NOTE — DATA REVIEWED
[de-identified] : MRI of her lumbar spine as well as the results of the EMG and nerve conduction study were summarized in the history of present illness as above.

## 2024-08-05 NOTE — CONSULT LETTER
[Dear  ___] : Dear  [unfilled], [Consult Letter:] : I had the pleasure of evaluating your patient, [unfilled]. [Please see my note below.] : Please see my note below. [Consult Closing:] : Thank you very much for allowing me to participate in the care of this patient.  If you have any questions, please do not hesitate to contact me. [Sincerely,] : Sincerely, [FreeTextEntry3] : Coleman Cowan M.D., Ph.D. DPN-N [facility] [place] Director, Division of Neurology Director, [facility] [DrSilvia  ___] : Dr. BEARD

## 2024-08-05 NOTE — PHYSICAL EXAM
[General Appearance - Alert] : alert [General Appearance - In No Acute Distress] : in no acute distress [General Appearance - Well Nourished] : well nourished [General Appearance - Well Developed] : well developed [Person] : oriented to person [Place] : oriented to place [Time] : oriented to time [Remote Intact] : remote memory intact [Registration Intact] : recent registration memory intact [Span Intact] : the attention span was normal [Concentration Intact] : normal concentrating ability [Visual Intact] : visual attention was ~T not ~L decreased [Naming Objects] : no difficulty naming common objects [Repeating Phrases] : no difficulty repeating a phrase [Fluency] : fluency intact [Comprehension] : comprehension intact [Past History] : adequate knowledge of personal past history [Cranial Nerves Optic (II)] : visual acuity intact bilaterally,  visual fields full to confrontation, pupils equal round and reactive to light [Cranial Nerves Oculomotor (III)] : extraocular motion intact [Cranial Nerves Trigeminal (V)] : facial sensation intact symmetrically [Cranial Nerves Facial (VII)] : face symmetrical [Cranial Nerves Vestibulocochlear (VIII)] : hearing was intact bilaterally [Cranial Nerves Glossopharyngeal (IX)] : tongue and palate midline [Cranial Nerves Accessory (XI - Cranial And Spinal)] : head turning and shoulder shrug symmetric [Cranial Nerves Hypoglossal (XII)] : there was no tongue deviation with protrusion [Motor Tone] : muscle tone was normal in all four extremities [Involuntary Movements] : no involuntary movements were seen [No Muscle Atrophy] : normal bulk in all four extremities [Paresis Pronator Drift Right-Sided] : no pronator drift on the right [Paresis Pronator Drift Left-Sided] : no pronator drift on the left [Motor Strength Upper Extremities Bilaterally] : strength was normal in both upper extremities [Motor Strength Lower Extremities Right] : there was weakness of the right lower extremity [Motor Strength Lower Extremities Left] : there was weakness of the left lower extremity [Sensation Tactile Decrease] : light touch was intact [Sensation Pain / Temperature Decrease] : pain and temperature was intact [Romberg's Sign] : a positive Romberg's sign was present [Tremor] : no tremor present [Coordination - Dysmetria Impaired Finger-to-Nose Bilateral] : not present [0] : Patella left 0 [FreeTextEntry4] : She has a mild stutter, she attributes this to anxiety [FreeTextEntry6] : Left greater than right lower extremity weakness is present, the weakness is most noticeable proximally more than distally [FreeTextEntry8] : Cautious gait with cane, using cane for balance [FreeTextEntry9] : Trace reflexes throughout [Sclera] : the sclera and conjunctiva were normal [PERRL With Normal Accommodation] : pupils were equal in size, round, reactive to light, with normal accommodation [Extraocular Movements] : extraocular movements were intact [No APD] : no afferent pupillary defect [No LORENE] : no internuclear ophthalmoplegia [Full Visual Field] : full visual field

## 2024-08-05 NOTE — HISTORY OF PRESENT ILLNESS
[FreeTextEntry1] : Initial office visit August 5, 2024: This is a 59-year-old woman who presents today with pain and numbness and weakness in her hands and legs.  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  She is currently off immunosuppressive's.  She states that after the bone marrow transplant she started to have numbness in the hands and her feet.  It is down to the point where she also is having weakness in her legs, has imbalance and needs a cane to ambulate.  She states the numbness and weakness got worse after the immunosuppression for the transplant was stopped.  She did not have any significant pre-existing neuropathic symptoms.  She is also having difficulty with lifting up her left leg from the hip in a seated position as well as weak quadriceps.  It appears to be worse on the left than the right.  She had a lumbar spine MRI which showed degenerative changes most pronounced at L4-5 where there is mild bilateral neural foraminal stenosis.  There is no central canal stenosis.  There was no evidence for osseous or soft tissue metastatic disease.  She had an EMG and nerve conduction study which showed a mild demyelinating sensory median neuropathy across both wrists, mild demyelinating motor ulnar neuropathy across left elbow.  She also had subacute to chronic lumbosacral polyradiculopathy affecting motor axons on the left.  It is reported that there is no electrodiagnostic evidence to support a large fiber length dependent peripheral polyneuropathy affecting her arms or legs.  She is here today for neurologic evaluation and treatment.

## 2024-08-05 NOTE — ASSESSMENT
[FreeTextEntry1] : This is a 59-year-old woman with neuropathy.  Given the EMG findings and not convinced that this is from her chemotherapy.  I be concerned for other etiologies of neuropathy.  She has significant weakness which may be from her neuropathy but also be concerned about a myopathy.  I will send blood work for neuropathy and myopathy.  She should continue physical therapy.  Given her positive Romberg sign I advised that she get a shower chair for safety, rather than just relying on hand bars in the shower.  I will call her when the blood work comes back and treat accordingly.  If her blood work is unrevealing we may consider a lumbar puncture to evaluate for other causes of neuropathy.

## 2024-08-05 NOTE — REVIEW OF SYSTEMS
[As Noted in HPI] : as noted in HPI [Leg Weakness] : leg weakness [Numbness] : numbness [Abnormal Sensation] : an abnormal sensation [Difficulty Walking] : difficulty walking [Ataxia] : ataxia [Negative] : Heme/Lymph [de-identified] : She has a sensory ataxia

## 2024-08-06 ENCOUNTER — LABORATORY RESULT (OUTPATIENT)
Age: 59
End: 2024-08-06

## 2024-08-07 ENCOUNTER — APPOINTMENT (OUTPATIENT)
Dept: PSYCHIATRY | Facility: CLINIC | Age: 59
End: 2024-08-07

## 2024-08-07 PROCEDURE — 90837 PSYTX W PT 60 MINUTES: CPT | Mod: 95

## 2024-08-07 NOTE — PLAN
[FreeTextEntry2] : CBT for anxiety [Cognitive and/or Behavior Therapy] : Cognitive and/or Behavior Therapy  [de-identified] : Pt was Ox3. Pt's affect was observed to be euthymic. No risk was observed or reported.  Psychologist met with Pt using teletherapy platform due to COVID-19 pandemic disaster. Pt was at their home, while psychologist was working remotely. Pt continued to provide consent for telehealth services.  Met with patient.  Patient practiced PMR for homework.  Patient reported great relief when tensing and relaxing her feet and legs from the exercise.  In session, introduced unhelpful thinking styles.  Patient expressed understanding of how to identify if the thought is an unhelpful thought.  Reviewed 10 unhelpful thinking styles and patient was able to provide an example for each. Homework assigned for patient to identify which type of unhelpful thinking styles she has used in her previous logs.  Next session will review unhelpful thinking styles and begin to discuss how to adjust them. [FreeTextEntry1] : Treatment plan reviewed for psycho-education include the followin. Pt will learn about their symptoms and diagnosis and be able to explain it back to psychologist. 2. Pt will learn about the CBT model and be able to accurately categorize their symptoms and diagnosis in terms of cognition, emotional responses, behaviors, and physiological arousal. Treatment plan for relaxation techniques reviewed in session included the followin. Pt can benefit from learning and practicing Relaxation Techniques to reduce physiological arousal, including flight/flight/freeze responses, manifested by muscle tightness or tension; emotions including anxiety and panic, sadness and depression, and anger. 2. Relaxation techniques can include any or all of the following: Diaphragmatic Breathing (DB), Progressive Muscle Relaxation (PMR), Imaginal Relaxation (IMR), or mindfulness. 3. Relaxation techniques will be learned and practiced within sessions, and assigned for homework between sessions. This will entail interventions such using monitoring forms to track their stress and other symptoms, and assess the efficacy of their implementation of relaxation techniques. Treatment plan reviewed for cognitive therapy included the followin. Pt can benefit from learning & practicing cognitive restructuring, including learning to categorize automatic thoughts/cognitive distortions (e.g. shoulds, catastrophic thoughts, overgeneralizations), judgements, biases, and attributions.  2. Pt can benefit from using cognitive reframing to challenge cognitive distortions to increase flexibility in thinking. 3. Pt can benefit from using self-coping statements to encourage themselves and provide validation.  4. Cognitive restructuring will be learned and practiced within sessions, and assigned for homework between sessions. This will entail interventions such using mood monitoring and thought record forms, to challenge cognitive distortions in real time.

## 2024-08-14 ENCOUNTER — APPOINTMENT (OUTPATIENT)
Dept: FAMILY MEDICINE | Facility: CLINIC | Age: 59
End: 2024-08-14
Payer: COMMERCIAL

## 2024-08-14 VITALS
HEART RATE: 87 BPM | HEIGHT: 62 IN | SYSTOLIC BLOOD PRESSURE: 110 MMHG | TEMPERATURE: 97 F | DIASTOLIC BLOOD PRESSURE: 68 MMHG | BODY MASS INDEX: 30.18 KG/M2 | OXYGEN SATURATION: 98 % | WEIGHT: 164 LBS

## 2024-08-14 DIAGNOSIS — R00.0 TACHYCARDIA, UNSPECIFIED: ICD-10-CM

## 2024-08-14 DIAGNOSIS — F41.9 ANXIETY DISORDER, UNSPECIFIED: ICD-10-CM

## 2024-08-14 DIAGNOSIS — M85.88 OTHER SPECIFIED DISORDERS OF BONE DENSITY AND STRUCTURE, OTHER SITE: ICD-10-CM

## 2024-08-14 DIAGNOSIS — E05.90 THYROTOXICOSIS, UNSPECIFIED W/OUT THYROTOXIC CRISIS OR STORM: ICD-10-CM

## 2024-08-14 DIAGNOSIS — Z86.39 PERSONAL HISTORY OF OTHER ENDOCRINE, NUTRITIONAL AND METABOLIC DISEASE: ICD-10-CM

## 2024-08-14 DIAGNOSIS — Z80.0 FAMILY HISTORY OF MALIGNANT NEOPLASM OF DIGESTIVE ORGANS: ICD-10-CM

## 2024-08-14 DIAGNOSIS — F32.A DEPRESSION, UNSPECIFIED: ICD-10-CM

## 2024-08-14 DIAGNOSIS — R06.02 SHORTNESS OF BREATH: ICD-10-CM

## 2024-08-14 DIAGNOSIS — R10.9 UNSPECIFIED ABDOMINAL PAIN: ICD-10-CM

## 2024-08-14 DIAGNOSIS — Z87.898 PERSONAL HISTORY OF OTHER SPECIFIED CONDITIONS: ICD-10-CM

## 2024-08-14 DIAGNOSIS — I34.0 NONRHEUMATIC MITRAL (VALVE) INSUFFICIENCY: ICD-10-CM

## 2024-08-14 DIAGNOSIS — I49.3 VENTRICULAR PREMATURE DEPOLARIZATION: ICD-10-CM

## 2024-08-14 DIAGNOSIS — R82.4 ACETONURIA: ICD-10-CM

## 2024-08-14 DIAGNOSIS — Z86.16 PERSONAL HISTORY OF COVID-19: ICD-10-CM

## 2024-08-14 DIAGNOSIS — R21 RASH AND OTHER NONSPECIFIC SKIN ERUPTION: ICD-10-CM

## 2024-08-14 DIAGNOSIS — C85.90 NON-HODGKIN LYMPHOMA, UNSPECIFIED, UNSPECIFIED SITE: ICD-10-CM

## 2024-08-14 DIAGNOSIS — M54.16 RADICULOPATHY, LUMBAR REGION: ICD-10-CM

## 2024-08-14 DIAGNOSIS — Z86.19 PERSONAL HISTORY OF OTHER INFECTIOUS AND PARASITIC DISEASES: ICD-10-CM

## 2024-08-14 DIAGNOSIS — R22.1 LOCALIZED SWELLING, MASS AND LUMP, NECK: ICD-10-CM

## 2024-08-14 DIAGNOSIS — R07.89 OTHER CHEST PAIN: ICD-10-CM

## 2024-08-14 DIAGNOSIS — E78.5 HYPERLIPIDEMIA, UNSPECIFIED: ICD-10-CM

## 2024-08-14 DIAGNOSIS — G62.9 POLYNEUROPATHY, UNSPECIFIED: ICD-10-CM

## 2024-08-14 DIAGNOSIS — J06.9 ACUTE UPPER RESPIRATORY INFECTION, UNSPECIFIED: ICD-10-CM

## 2024-08-14 DIAGNOSIS — Z00.00 ENCOUNTER FOR GENERAL ADULT MEDICAL EXAMINATION W/OUT ABNORMAL FINDINGS: ICD-10-CM

## 2024-08-14 PROCEDURE — 99396 PREV VISIT EST AGE 40-64: CPT

## 2024-08-14 PROCEDURE — G0444 DEPRESSION SCREEN ANNUAL: CPT | Mod: 59

## 2024-08-14 NOTE — HISTORY OF PRESENT ILLNESS
[FreeTextEntry1] : cpe [de-identified] : 58 yo female presents for annual physical. She reports concern for depression. She reports sleeping okay, loss of interest, low energy, trouble concentrating, increased appetite. No suicidal/homicidal ideation. In addition, she notes lump in left side of neck that she noticed a month ago. She says it is tender. Denies fever, chills, cp, palpitations, sob, nvcd.

## 2024-08-14 NOTE — PHYSICAL EXAM
[Normal] : affect was normal and insight and judgment were intact [de-identified] : left sided 2x2 cm lump, tender [de-identified] : LUE motor strength 4/5

## 2024-08-14 NOTE — HEALTH RISK ASSESSMENT
[Fair] :  ~his/her~ mood as fair [Yes] : Yes [Monthly or less (1 pt)] : Monthly or less (1 point) [1 or 2 (0 pts)] : 1 or 2 (0 points) [Never (0 pts)] : Never (0 points) [No] : In the past 12 months have you used drugs other than those required for medical reasons? No [Assistive Device] : Patient uses an assistive device [1] : 1) Little interest or pleasure doing things for several days (1) [2] : 2) Feeling down, depressed, or hopeless for more than half of the days (2) [PHQ-2 Positive] : PHQ-2 Positive [PHQ-9 Positive] : PHQ-9 Positive [I have developed a follow-up plan documented below in the note.] : I have developed a follow-up plan documented below in the note. [Time Spent: ___ Minutes] : I spent [unfilled] minutes performing a depression screening for this patient. [1/2 of Days or More (2)] : 3.) Trouble falling asleep, or sleeping too much? Half the days or more [Nearly Every Day (3)] : 4.) Feeling tired or having little energy? Nearly every day [Several Days (1)] : 6.) Feeling bad about yourself, or that you are a failure, or have let yourself or your family down? Several days [Not at All (0)] : 9.) Thoughts that you would be off dead or of hurting yourself in some way? Not at all [Mild] : Severity of Depression is Mild [Somewhat Difficult] : How difficult have these problems made it for you to do your work, take care of things at home, or get along with people? Somewhat difficult [Never] : Never [Patient reported mammogram was normal] : Patient reported mammogram was normal [Patient reported PAP Smear was normal] : Patient reported PAP Smear was normal [Patient reported colonoscopy was normal] : Patient reported colonoscopy was normal [HIV test declined] : HIV test declined [Hepatitis C test declined] : Hepatitis C test declined [With Family] : lives with family [Employed] : employed [] :  [Sexually Active] : sexually active [Feels Safe at Home] : Feels safe at home [Fully functional (bathing, dressing, toileting, transferring, walking, feeding)] : Fully functional (bathing, dressing, toileting, transferring, walking, feeding) [Fully functional (using the telephone, shopping, preparing meals, housekeeping, doing laundry, using] : Fully functional and needs no help or supervision to perform IADLs (using the telephone, shopping, preparing meals, housekeeping, doing laundry, using transportation, managing medications and managing finances) [Audit-CScore] : 1 [de-identified] : needs improvement [de-identified] : needs improvement [de-identified] : cane [[id number]] : 3 [HZA6SnblzHwszt] : 9 [High Risk Behavior] : no high risk behavior [Reports changes in hearing] : Reports no changes in hearing [Reports changes in vision] : Reports no changes in vision [Reports changes in dental health] : Reports no changes in dental health [MammogramDate] : 03/24 [MammogramComments] : birads 2 [PapSmearDate] : 10/23 [PapSmearComments] : NILM [ColonoscopyDate] : 05/17

## 2024-08-14 NOTE — HISTORY OF PRESENT ILLNESS
[FreeTextEntry1] : cpe [de-identified] : 58 yo female presents for annual physical. She reports concern for depression. She reports sleeping okay, loss of interest, low energy, trouble concentrating, increased appetite. No suicidal/homicidal ideation. In addition, she notes lump in left side of neck that she noticed a month ago. She says it is tender. Denies fever, chills, cp, palpitations, sob, nvcd.

## 2024-08-14 NOTE — HEALTH RISK ASSESSMENT
[Fair] :  ~his/her~ mood as fair [Yes] : Yes [Monthly or less (1 pt)] : Monthly or less (1 point) [1 or 2 (0 pts)] : 1 or 2 (0 points) [Never (0 pts)] : Never (0 points) [No] : In the past 12 months have you used drugs other than those required for medical reasons? No [Assistive Device] : Patient uses an assistive device [1] : 1) Little interest or pleasure doing things for several days (1) [2] : 2) Feeling down, depressed, or hopeless for more than half of the days (2) [PHQ-2 Positive] : PHQ-2 Positive [PHQ-9 Positive] : PHQ-9 Positive [I have developed a follow-up plan documented below in the note.] : I have developed a follow-up plan documented below in the note. [Time Spent: ___ Minutes] : I spent [unfilled] minutes performing a depression screening for this patient. [1/2 of Days or More (2)] : 3.) Trouble falling asleep, or sleeping too much? Half the days or more [Nearly Every Day (3)] : 4.) Feeling tired or having little energy? Nearly every day [Several Days (1)] : 6.) Feeling bad about yourself, or that you are a failure, or have let yourself or your family down? Several days [Not at All (0)] : 9.) Thoughts that you would be off dead or of hurting yourself in some way? Not at all [Mild] : Severity of Depression is Mild [Somewhat Difficult] : How difficult have these problems made it for you to do your work, take care of things at home, or get along with people? Somewhat difficult [Never] : Never [Patient reported mammogram was normal] : Patient reported mammogram was normal [Patient reported PAP Smear was normal] : Patient reported PAP Smear was normal [Patient reported colonoscopy was normal] : Patient reported colonoscopy was normal [HIV test declined] : HIV test declined [Hepatitis C test declined] : Hepatitis C test declined [With Family] : lives with family [Employed] : employed [] :  [Sexually Active] : sexually active [Feels Safe at Home] : Feels safe at home [Fully functional (bathing, dressing, toileting, transferring, walking, feeding)] : Fully functional (bathing, dressing, toileting, transferring, walking, feeding) [Fully functional (using the telephone, shopping, preparing meals, housekeeping, doing laundry, using] : Fully functional and needs no help or supervision to perform IADLs (using the telephone, shopping, preparing meals, housekeeping, doing laundry, using transportation, managing medications and managing finances) [Audit-CScore] : 1 [de-identified] : needs improvement [de-identified] : needs improvement [de-identified] : cane [JMI6Keqeh] : 3 [[id number]] : 9 [High Risk Behavior] : no high risk behavior [Reports changes in hearing] : Reports no changes in hearing [Reports changes in vision] : Reports no changes in vision [Reports changes in dental health] : Reports no changes in dental health [MammogramDate] : 03/24 [MammogramComments] : birads 2 [PapSmearDate] : 10/23 [PapSmearComments] : NILM [ColonoscopyDate] : 05/17

## 2024-08-14 NOTE — PHYSICAL EXAM
[Normal] : affect was normal and insight and judgment were intact [de-identified] : left sided 2x2 cm lump, tender [de-identified] : LUE motor strength 4/5

## 2024-08-14 NOTE — ASSESSMENT
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, s/p chemotherapy, f/u oncology for monitoring HLD: Recommend low fat diet, wt loss, exercise, nutritional counseling provided, f/u lipid panel ordered Anx/depression: no suicidal/homicidal ideation, increase fluoxetine to 40 mg po daily, c/w therapist, recommend exercise, yoga, meditation Neuropathy: f/u neurology PVCs/tachycardia/MVR: f/u cardiology Hyperthyroid: low TSH, recommend repeat thyroid panel with thyroid ab panel ordered, referred to endocrinology by neuro Fam hx of colon CA: f/u GI for repeat colonoscopy Osteopenia: c/w calcium supplement, recommend low int stretching/exercise, repeat in 2 yrs BMI 29: Recommend low fat diet, wt loss, exercise, and DASH diet. RTC 1 wk

## 2024-08-15 ENCOUNTER — APPOINTMENT (OUTPATIENT)
Dept: PSYCHIATRY | Facility: CLINIC | Age: 59
End: 2024-08-15
Payer: COMMERCIAL

## 2024-08-15 PROCEDURE — 90837 PSYTX W PT 60 MINUTES: CPT | Mod: 95

## 2024-08-15 NOTE — PLAN
[FreeTextEntry2] : CBT for anxiety [Cognitive and/or Behavior Therapy] : Cognitive and/or Behavior Therapy  [de-identified] : Pt was Ox3. Pt's affect was observed to be euthymic. No risk was observed or reported.  Psychologist met with Pt using teletherapy platform due to COVID-19 pandemic disaster. Pt was at their home, while psychologist was working remotely. Pt continued to provide consent for telehealth services.  Met with patient.  Patient did complete homework of identifying unhelpful thoughts used.  Patient identified overthinking, should beliefs, catastrophizing, labeling and over generalizing thoughts.  In session, discussed how to challenge thoughts.  Patient did so using an example of his should beliefs.  Patient was successfully able to identify evidence for and against the thought and come up with an alternate functional thought.  Homework assigned for patient to adjust 1 should believe from this coming week.  Next session will continue with cognitive challenging. [FreeTextEntry1] : Treatment plan reviewed for psycho-education include the followin. Pt will learn about their symptoms and diagnosis and be able to explain it back to psychologist. 2. Pt will learn about the CBT model and be able to accurately categorize their symptoms and diagnosis in terms of cognition, emotional responses, behaviors, and physiological arousal. Treatment plan for relaxation techniques reviewed in session included the followin. Pt can benefit from learning and practicing Relaxation Techniques to reduce physiological arousal, including flight/flight/freeze responses, manifested by muscle tightness or tension; emotions including anxiety and panic, sadness and depression, and anger. 2. Relaxation techniques can include any or all of the following: Diaphragmatic Breathing (DB), Progressive Muscle Relaxation (PMR), Imaginal Relaxation (IMR), or mindfulness. 3. Relaxation techniques will be learned and practiced within sessions, and assigned for homework between sessions. This will entail interventions such using monitoring forms to track their stress and other symptoms, and assess the efficacy of their implementation of relaxation techniques. Treatment plan reviewed for cognitive therapy included the followin. Pt can benefit from learning & practicing cognitive restructuring, including learning to categorize automatic thoughts/cognitive distortions (e.g. shoulds, catastrophic thoughts, overgeneralizations), judgements, biases, and attributions.  2. Pt can benefit from using cognitive reframing to challenge cognitive distortions to increase flexibility in thinking. 3. Pt can benefit from using self-coping statements to encourage themselves and provide validation.  4. Cognitive restructuring will be learned and practiced within sessions, and assigned for homework between sessions. This will entail interventions such using mood monitoring and thought record forms, to challenge cognitive distortions in real time.

## 2024-08-19 PROBLEM — D64.9 ANEMIA: Status: ACTIVE | Noted: 2024-08-19

## 2024-08-19 LAB
ALBUMIN SERPL ELPH-MCNC: 4.4 G/DL
ALP BLD-CCNC: 77 U/L
ALT SERPL-CCNC: 16 U/L
ANION GAP SERPL CALC-SCNC: 12 MMOL/L
AST SERPL-CCNC: 18 U/L
BASOPHILS # BLD AUTO: 0.02 K/UL
BASOPHILS NFR BLD AUTO: 0.4 %
BILIRUB SERPL-MCNC: 0.4 MG/DL
BUN SERPL-MCNC: 17 MG/DL
CALCIUM SERPL-MCNC: 10 MG/DL
CHLORIDE SERPL-SCNC: 104 MMOL/L
CHOLEST SERPL-MCNC: 165 MG/DL
CO2 SERPL-SCNC: 24 MMOL/L
CREAT SERPL-MCNC: 0.71 MG/DL
EGFR: 99 ML/MIN/1.73M2
EOSINOPHIL # BLD AUTO: 0.09 K/UL
EOSINOPHIL NFR BLD AUTO: 1.8 %
ESTIMATED AVERAGE GLUCOSE: 114 MG/DL
GLUCOSE SERPL-MCNC: 94 MG/DL
HBA1C MFR BLD HPLC: 5.6 %
HCT VFR BLD CALC: 36.4 %
HDLC SERPL-MCNC: 50 MG/DL
HGB BLD-MCNC: 11.2 G/DL
IMM GRANULOCYTES NFR BLD AUTO: 0.2 %
LDLC SERPL CALC-MCNC: 90 MG/DL
LYMPHOCYTES # BLD AUTO: 1.48 K/UL
LYMPHOCYTES NFR BLD AUTO: 28.8 %
MAN DIFF?: NORMAL
MCHC RBC-ENTMCNC: 28.2 PG
MCHC RBC-ENTMCNC: 30.8 GM/DL
MCV RBC AUTO: 91.7 FL
MONOCYTES # BLD AUTO: 0.43 K/UL
MONOCYTES NFR BLD AUTO: 8.4 %
NEUTROPHILS # BLD AUTO: 3.1 K/UL
NEUTROPHILS NFR BLD AUTO: 60.4 %
NONHDLC SERPL-MCNC: 115 MG/DL
PLATELET # BLD AUTO: 259 K/UL
POTASSIUM SERPL-SCNC: 5 MMOL/L
PROT SERPL-MCNC: 6.8 G/DL
RBC # BLD: 3.97 M/UL
RBC # FLD: 13.4 %
SODIUM SERPL-SCNC: 141 MMOL/L
T3 SERPL-MCNC: 257 NG/DL
T3FREE SERPL-MCNC: 8.2 PG/ML
T4 FREE SERPL-MCNC: 2.5 NG/DL
T4 SERPL-MCNC: 12.2 UG/DL
THYROGLOB AB SERPL-ACNC: 269 IU/ML
THYROPEROXIDASE AB SERPL IA-ACNC: 15.8 IU/ML
TRIGL SERPL-MCNC: 148 MG/DL
TSH RECEPTOR AB: 2 IU/L
TSH SERPL-ACNC: <0.01 UIU/ML
TSI ACT/NOR SER: 0.7 IU/L
WBC # FLD AUTO: 5.13 K/UL

## 2024-08-20 ENCOUNTER — OUTPATIENT (OUTPATIENT)
Dept: OUTPATIENT SERVICES | Facility: HOSPITAL | Age: 59
LOS: 1 days | End: 2024-08-20
Payer: COMMERCIAL

## 2024-08-20 ENCOUNTER — APPOINTMENT (OUTPATIENT)
Dept: ULTRASOUND IMAGING | Facility: CLINIC | Age: 59
End: 2024-08-20

## 2024-08-20 DIAGNOSIS — E05.90 THYROTOXICOSIS, UNSPECIFIED WITHOUT THYROTOXIC CRISIS OR STORM: ICD-10-CM

## 2024-08-20 DIAGNOSIS — R41.3 OTHER AMNESIA: ICD-10-CM

## 2024-08-20 PROCEDURE — 76536 US EXAM OF HEAD AND NECK: CPT | Mod: 26

## 2024-08-21 ENCOUNTER — APPOINTMENT (OUTPATIENT)
Dept: NUCLEAR MEDICINE | Facility: CLINIC | Age: 59
End: 2024-08-21
Payer: COMMERCIAL

## 2024-08-21 ENCOUNTER — OUTPATIENT (OUTPATIENT)
Dept: OUTPATIENT SERVICES | Facility: HOSPITAL | Age: 59
LOS: 1 days | End: 2024-08-21

## 2024-08-21 DIAGNOSIS — C85.90 NON-HODGKIN LYMPHOMA, UNSPECIFIED, UNSPECIFIED SITE: ICD-10-CM

## 2024-08-21 PROCEDURE — 78815 PET IMAGE W/CT SKULL-THIGH: CPT | Mod: 26,PS

## 2024-08-22 ENCOUNTER — APPOINTMENT (OUTPATIENT)
Dept: PSYCHIATRY | Facility: CLINIC | Age: 59
End: 2024-08-22
Payer: COMMERCIAL

## 2024-08-22 PROCEDURE — 90837 PSYTX W PT 60 MINUTES: CPT | Mod: 95

## 2024-08-22 NOTE — PLAN
[FreeTextEntry2] : CBT for anxiety [Cognitive and/or Behavior Therapy] : Cognitive and/or Behavior Therapy  [de-identified] : Pt was Ox3. Pt's affect was observed to be euthymic. No risk was observed or reported.  Psychologist met with Pt using teletherapy platform due to COVID-19 pandemic disaster. Pt was at their home, while psychologist was working remotely. Pt continued to provide consent for telehealth services.  Met with patient.  Patient did not complete homework of challenging thought because she was not able to access the emailed worksheet.  Patient reported having numerous medical exams this week because her PCP found a lump in her throat.  Patient is hoping this will just be a thyroid issue and is not cancerous.  Practiced challenging her thoughts in session.  Patient identified catastrophizing, mental filter, personalization and emotional reasoning thoughts related to worry about if she will be sick again and that she caused it and how this is not fair to her family.  Patient successfully was able to identify evidence for and against, worst-case best case and most likely scenario as well as the result of thinking this way versus changing her thoughts.  Patient identified alternate functional thought that worked for her.  Introduced dropping anchor ACT technique.  Homework assigned for patient to continue using cognitive challenging thoughts independently and attempt to use dropping anchor technique.  Next session will continue with dropping anchor technique. [FreeTextEntry1] : Treatment plan reviewed for psycho-education include the followin. Pt will learn about their symptoms and diagnosis and be able to explain it back to psychologist. 2. Pt will learn about the CBT model and be able to accurately categorize their symptoms and diagnosis in terms of cognition, emotional responses, behaviors, and physiological arousal. Treatment plan for relaxation techniques reviewed in session included the followin. Pt can benefit from learning and practicing Relaxation Techniques to reduce physiological arousal, including flight/flight/freeze responses, manifested by muscle tightness or tension; emotions including anxiety and panic, sadness and depression, and anger. 2. Relaxation techniques can include any or all of the following: Diaphragmatic Breathing (DB), Progressive Muscle Relaxation (PMR), Imaginal Relaxation (IMR), or mindfulness. 3. Relaxation techniques will be learned and practiced within sessions, and assigned for homework between sessions. This will entail interventions such using monitoring forms to track their stress and other symptoms, and assess the efficacy of their implementation of relaxation techniques. Treatment plan reviewed for cognitive therapy included the followin. Pt can benefit from learning & practicing cognitive restructuring, including learning to categorize automatic thoughts/cognitive distortions (e.g. shoulds, catastrophic thoughts, overgeneralizations), judgements, biases, and attributions.  2. Pt can benefit from using cognitive reframing to challenge cognitive distortions to increase flexibility in thinking. 3. Pt can benefit from using self-coping statements to encourage themselves and provide validation.  4. Cognitive restructuring will be learned and practiced within sessions, and assigned for homework between sessions. This will entail interventions such using mood monitoring and thought record forms, to challenge cognitive distortions in real time.

## 2024-08-23 ENCOUNTER — LABORATORY RESULT (OUTPATIENT)
Age: 59
End: 2024-08-23

## 2024-08-27 DIAGNOSIS — Z86.2 PERSONAL HISTORY OF DISEASES OF THE BLOOD AND BLOOD-FORMING ORGANS AND CERTAIN DISORDERS INVOLVING THE IMMUNE MECHANISM: ICD-10-CM

## 2024-08-28 ENCOUNTER — OUTPATIENT (OUTPATIENT)
Dept: OUTPATIENT SERVICES | Facility: HOSPITAL | Age: 59
LOS: 1 days | Discharge: HOME | End: 2024-08-28

## 2024-08-28 DIAGNOSIS — C85.90 NON-HODGKIN LYMPHOMA, UNSPECIFIED, UNSPECIFIED SITE: ICD-10-CM

## 2024-08-28 DIAGNOSIS — C84.40 PERIPHERAL T-CELL LYMPHOMA, NOT ELSEWHERE CLASSIFIED, UNSPECIFIED SITE: ICD-10-CM

## 2024-08-29 ENCOUNTER — APPOINTMENT (OUTPATIENT)
Dept: PSYCHIATRY | Facility: CLINIC | Age: 59
End: 2024-08-29
Payer: COMMERCIAL

## 2024-08-29 PROCEDURE — 90837 PSYTX W PT 60 MINUTES: CPT | Mod: 95

## 2024-08-29 NOTE — PLAN
[FreeTextEntry2] : CBT for anxiety [Cognitive and/or Behavior Therapy] : Cognitive and/or Behavior Therapy  [de-identified] : Pt was Ox3. Pt's affect was observed to be euthymic. No risk was observed or reported.  Psychologist met with Pt using teletherapy platform due to COVID-19 pandemic disaster. Pt was at their home, while psychologist was working remotely. Pt continued to provide consent for telehealth services.  Met with patient.  In session, reviewed dropping anchor ACT technique.  Practiced exercise in session using audio.  Patient was successfully able to acknowledge stressors about her daughter beginning as well as increasing herself in her body and her environment.  Patient expressed appreciation of this skill and understanding that this is different than problem solving or challenging her thoughts and instead excepting the stressors and trying to coexist with them.  Introduced dear Man skill.  Homework assigned for patient to c practice dropping anchor technique and write out how to use dear man to explain to family members how she would like them to be more patient with her.  Next session will continue with Dear Man technique. [FreeTextEntry1] : Treatment plan reviewed for psycho-education include the followin. Pt will learn about their symptoms and diagnosis and be able to explain it back to psychologist. 2. Pt will learn about the CBT model and be able to accurately categorize their symptoms and diagnosis in terms of cognition, emotional responses, behaviors, and physiological arousal. Treatment plan for relaxation techniques reviewed in session included the followin. Pt can benefit from learning and practicing Relaxation Techniques to reduce physiological arousal, including flight/flight/freeze responses, manifested by muscle tightness or tension; emotions including anxiety and panic, sadness and depression, and anger. 2. Relaxation techniques can include any or all of the following: Diaphragmatic Breathing (DB), Progressive Muscle Relaxation (PMR), Imaginal Relaxation (IMR), or mindfulness. 3. Relaxation techniques will be learned and practiced within sessions, and assigned for homework between sessions. This will entail interventions such using monitoring forms to track their stress and other symptoms, and assess the efficacy of their implementation of relaxation techniques. Treatment plan reviewed for cognitive therapy included the followin. Pt can benefit from learning & practicing cognitive restructuring, including learning to categorize automatic thoughts/cognitive distortions (e.g. shoulds, catastrophic thoughts, overgeneralizations), judgements, biases, and attributions.  2. Pt can benefit from using cognitive reframing to challenge cognitive distortions to increase flexibility in thinking. 3. Pt can benefit from using self-coping statements to encourage themselves and provide validation.  4. Cognitive restructuring will be learned and practiced within sessions, and assigned for homework between sessions. This will entail interventions such using mood monitoring and thought record forms, to challenge cognitive distortions in real time.

## 2024-09-05 ENCOUNTER — APPOINTMENT (OUTPATIENT)
Dept: PSYCHIATRY | Facility: CLINIC | Age: 59
End: 2024-09-05
Payer: COMMERCIAL

## 2024-09-05 PROCEDURE — 90837 PSYTX W PT 60 MINUTES: CPT | Mod: 95

## 2024-09-05 NOTE — PLAN
[FreeTextEntry2] : CBT for anxiety [Cognitive and/or Behavior Therapy] : Cognitive and/or Behavior Therapy  [de-identified] : Pt was Ox3. Pt's affect was observed to be euthymic. No risk was observed or reported.  Psychologist met with Pt using teletherapy platform due to COVID-19 pandemic disaster. Pt was at their home, while psychologist was working remotely. Pt continued to provide consent for telehealth services.  Met with patient.  In session, reviewed patient's plan of using dear Man with her daughter.  In session, reviewed bullet points of each of the steps.  Therapist coached patient as to how to keep her facts more objective.  Discussed using I versus You statements to prevent her daughter from getting defensive.  Homework assigned for patient to write out prescription for Dear Man.  Next session will continue with Dear Man technique and complete role-play. [FreeTextEntry1] : Treatment plan reviewed for psycho-education include the followin. Pt will learn about their symptoms and diagnosis and be able to explain it back to psychologist. 2. Pt will learn about the CBT model and be able to accurately categorize their symptoms and diagnosis in terms of cognition, emotional responses, behaviors, and physiological arousal. Treatment plan for relaxation techniques reviewed in session included the followin. Pt can benefit from learning and practicing Relaxation Techniques to reduce physiological arousal, including flight/flight/freeze responses, manifested by muscle tightness or tension; emotions including anxiety and panic, sadness and depression, and anger. 2. Relaxation techniques can include any or all of the following: Diaphragmatic Breathing (DB), Progressive Muscle Relaxation (PMR), Imaginal Relaxation (IMR), or mindfulness. 3. Relaxation techniques will be learned and practiced within sessions, and assigned for homework between sessions. This will entail interventions such using monitoring forms to track their stress and other symptoms, and assess the efficacy of their implementation of relaxation techniques. Treatment plan reviewed for cognitive therapy included the followin. Pt can benefit from learning & practicing cognitive restructuring, including learning to categorize automatic thoughts/cognitive distortions (e.g. shoulds, catastrophic thoughts, overgeneralizations), judgements, biases, and attributions.  2. Pt can benefit from using cognitive reframing to challenge cognitive distortions to increase flexibility in thinking. 3. Pt can benefit from using self-coping statements to encourage themselves and provide validation.  4. Cognitive restructuring will be learned and practiced within sessions, and assigned for homework between sessions. This will entail interventions such using mood monitoring and thought record forms, to challenge cognitive distortions in real time.

## 2024-09-06 ENCOUNTER — RESULT REVIEW (OUTPATIENT)
Age: 59
End: 2024-09-06

## 2024-09-06 ENCOUNTER — APPOINTMENT (OUTPATIENT)
Age: 59
End: 2024-09-06

## 2024-09-06 ENCOUNTER — APPOINTMENT (OUTPATIENT)
Dept: HEMATOLOGY ONCOLOGY | Facility: CLINIC | Age: 59
End: 2024-09-06
Payer: COMMERCIAL

## 2024-09-06 ENCOUNTER — APPOINTMENT (OUTPATIENT)
Dept: PHYSICAL MEDICINE AND REHAB | Facility: CLINIC | Age: 59
End: 2024-09-06
Payer: COMMERCIAL

## 2024-09-06 VITALS
OXYGEN SATURATION: 96 % | DIASTOLIC BLOOD PRESSURE: 65 MMHG | WEIGHT: 160.05 LBS | BODY MASS INDEX: 29.45 KG/M2 | HEART RATE: 87 BPM | HEIGHT: 62 IN | SYSTOLIC BLOOD PRESSURE: 98 MMHG

## 2024-09-06 DIAGNOSIS — R41.89 OTHER SYMPTOMS AND SIGNS INVOLVING COGNITIVE FUNCTIONS AND AWARENESS: ICD-10-CM

## 2024-09-06 DIAGNOSIS — R26.89 OTHER ABNORMALITIES OF GAIT AND MOBILITY: ICD-10-CM

## 2024-09-06 DIAGNOSIS — C85.90 NON-HODGKIN LYMPHOMA, UNSPECIFIED, UNSPECIFIED SITE: ICD-10-CM

## 2024-09-06 DIAGNOSIS — G62.9 POLYNEUROPATHY, UNSPECIFIED: ICD-10-CM

## 2024-09-06 LAB
BASOPHILS # BLD AUTO: 0 K/UL — SIGNIFICANT CHANGE UP (ref 0–0.2)
BASOPHILS NFR BLD AUTO: 0.9 % — SIGNIFICANT CHANGE UP (ref 0–2)
EOSINOPHIL # BLD AUTO: 0 K/UL — SIGNIFICANT CHANGE UP (ref 0–0.5)
EOSINOPHIL NFR BLD AUTO: 0.8 % — SIGNIFICANT CHANGE UP (ref 0–6)
HCT VFR BLD CALC: 39 % — SIGNIFICANT CHANGE UP (ref 34.5–45)
HGB BLD-MCNC: 12.5 G/DL — SIGNIFICANT CHANGE UP (ref 11.5–15.5)
LYMPHOCYTES # BLD AUTO: 1.2 K/UL — SIGNIFICANT CHANGE UP (ref 1–3.3)
LYMPHOCYTES # BLD AUTO: 23.2 % — SIGNIFICANT CHANGE UP (ref 13–44)
MCHC RBC-ENTMCNC: 28.9 PG — SIGNIFICANT CHANGE UP (ref 27–34)
MCHC RBC-ENTMCNC: 32.2 G/DL — SIGNIFICANT CHANGE UP (ref 32–36)
MCV RBC AUTO: 89.8 FL — SIGNIFICANT CHANGE UP (ref 80–100)
MONOCYTES # BLD AUTO: 0.4 K/UL — SIGNIFICANT CHANGE UP (ref 0–0.9)
MONOCYTES NFR BLD AUTO: 7.7 % — SIGNIFICANT CHANGE UP (ref 2–14)
NEUTROPHILS # BLD AUTO: 3.5 K/UL — SIGNIFICANT CHANGE UP (ref 1.8–7.4)
NEUTROPHILS NFR BLD AUTO: 67.4 % — SIGNIFICANT CHANGE UP (ref 43–77)
PLATELET # BLD AUTO: 245 K/UL — SIGNIFICANT CHANGE UP (ref 150–400)
RBC # BLD: 4.34 M/UL — SIGNIFICANT CHANGE UP (ref 3.8–5.2)
RBC # FLD: 12 % — SIGNIFICANT CHANGE UP (ref 10.3–14.5)
WBC # BLD: 5.2 K/UL — SIGNIFICANT CHANGE UP (ref 3.8–10.5)
WBC # FLD AUTO: 5.2 K/UL — SIGNIFICANT CHANGE UP (ref 3.8–10.5)

## 2024-09-06 PROCEDURE — 99215 OFFICE O/P EST HI 40 MIN: CPT

## 2024-09-06 PROCEDURE — 99214 OFFICE O/P EST MOD 30 MIN: CPT

## 2024-09-06 NOTE — ASSESSMENT
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 Amadeo Grace. Patient incidentally discovered it while shaving in Jan / Feb 2022 Biopsy of right axilla performed on 3/22/22 revealing atypical lymphoid infiltrate ( NEGATIVE for a clonal lgH gene rearrangement/ Positive clonal TCR gamma gene rearrangement ) On review of needle biopsy from 3/22/22 at [facility], suggestive of T cell lymphoma  S/p right axillary lymph node excisional biopsy on 5/16/22 per DR Mariano Path c/w Peripheral T- Cell Lymphoma, NOS  PERIPHERAL T CELL LYMPHOMA NOS Stage 1 IPI SCORE: 0 LOW RISk - Some B symptoms, Night sweats since Jan 2022, fatigue, 6 lb weight loss - Discussed with patient Chemotherapy with CHOEP WITH NEULASTA Support -D1 : Cyclophosphamide 750 mg /m2, Adriamycin 50 mg/ m2 Vincristone 1.4mg/ m2, Etoposide 10mg/ m2 IV D2- D3 Etoposide 100mg/m2 IV Hydration D1-D5 Prednisone 10mg po Allopurinol 100mg daily for TLS prophylaxis  - Port placement on 6/8/22 - ECHO on 6/14/22, LV EF 59% - Patient started CHOEP on 6/20/22, Completed C 6 on 10/6/22 - Patient evaluated by Dr. 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 Subcentimeter right thyroid nodule. There are no suspicious lesions. TI-RAD 2: Not suspicious (No FNA) PET 8/21/24 - No evidence of FDG avid malignant lesion.   Plan: PET 8/21/24 - No evidence of FDG avid malignant lesion.  - Neuropathy - continue f/u with Dr. Monae and neurologist  - Port Flush q 6 weeks  - She goes to PT for Neuropathy., OK for Vestibular testing per PT  - Labs ordered today - USg thyroid TIRAD 2 and thyroiditis she is seeing Endocrine in Oct 2024  - F/u with Dr Acevedo in NOv 2024 - F/U with  January 2025  PSCT Vaccines:  14 Months Week 1 on 1/29/24 and Week 2 on 2/5/24 12 Months Week 1 on 11/27/23 and week on 12/4/23  24 Months Week 1 on 11/25/24,Week 2 on 12/2/24 and Week 3 on 12/9/24

## 2024-09-06 NOTE — HISTORY OF PRESENT ILLNESS
[FreeTextEntry1] : Ms. Oviedo is a 59 year old female presented on 3/4/22 c/o right axilla mass to general surgeon, Dr. Amadeo Grace. Patient incidentally discovered it while shaving in Jan / Feb 2022.  Biopsy of right axilla performed on 3/22/22 revealing atypical lymphoid infiltrate ( NEGATIVE for a clonal lgH gene rearrangement/ Positive clonal TCR gamma gene rearrangement ).  On review of needle biopsy from 3/22/22 at [facility], suggestive of T cell lymphoma.  S/p right axillary lymph node excisional biopsy on 5/16/22 per DR Mariano Path c/w Peripheral T- Cell Lymphoma, NOS.  Treatment with CHOEP started CHOEP 6/20/22, Completed C 6 on 10/6/22.  Patient evaluated by Dr. Acevedo for auto stem cell transplant for t cell NHL, S/P Autologous PBSCT on 11/25/22.  She followed up with neurology.  No plans for further workup.  She does report that her gait is improving.  She also thinks her strength is improving.  Reports she thinks pain has been stable sometimes comes and goes but overall stable.  No worsening of weakness.  Taking pregabalin no major side effects.  Still has some difficulty with thinking.

## 2024-09-06 NOTE — PHYSICAL EXAM
[FreeTextEntry1] : Gen: Patient is A&O x 3, NAD HEENT: EOMI, hearing grossly normal Resp: regular, non - labored CV: pulses regular Skin: no rashes, erythema Lymph: no clubbing, cyanosis, edema Inspection: no instability  ROM: full throughout Palpation : no tenderness to palpation Sensation: Decreased to light touch in bilateral hands and feet, up to mid calf   Reflexes: 1+ and symmetric throughout Strength: 5/5 throughout, except 2-3/5 in left hip flexion/knee extension  Special tests: +Straight left straight leg raise, -Hoffmans sign, No ankle clonus Gait: Difficulty with tandem gait, ambulates with single point cane, improved swing phase

## 2024-09-06 NOTE — HISTORY OF PRESENT ILLNESS
[de-identified] : 57 year old female with no significant past medical hx presents to office for initial visit.  Patient presented on 3/4/22 c/o right axilla mass  to general surgeon, Dr. Amadeo Grace. Patient incidentally discovered it while shavingin Jan 2022  \par  Biopsy of right axilla performed on 3/22/22 revealing atypical lymphoid infiltrate ( NEGATIVE for a clonal lgH gene rearrangement/ Positive clonal TCR gamma gene rearrangement )\par  \par  Planned for lymph node excision with Dr. Grace however patient decided to stay in the [facility] system and had Excisional Biopsy per Dr Mariano \par  Colonoscopy November 2021\par  \par  LN excional BIopsy was delayed 2/2 to her travelling and pre surg testing.\par  On review of needle biopsy from 3/22/22 at [facility], suggestive of T cell lymphoma\par  \par  S/p right axillary lymph node excisional biopsy on 5/16/22  per DR Mariano Path c/w Peripheral T- Cell Lymphoma, NOS\par  PET SCAN 4/21/22: \par  - 1. FDG avid extensive right axillary lymphadenopathy 5.4 x 3.6 cm , SUV 22.5. and right supraclavicular lymph node.0.8 x 0.8 cm (image 53), SUV 5.8.\par  2. Difficult to delineate retroareolar right breast soft tissue, with low-grade FDG avidity and minimally FDG avid diffuse, right breast cutaneous thickening. \par  \par  Breast node biopsy performed February 2022 was negative. \par  MRI Breast on 5/26/22 with benign findings repeat in 6 months [de-identified] : Patient presents for follow up accompanied with daughter. S/p right axillary lymph node excisional biopsy on 5/16/22  per DR Mariano Path c/w Peripheral T- Cell Lymphoma, NOS Patient started CHOEP on 6/20/22, Completed C 6 on 10/6/22 Patient evaluated by Dr. Acevedo for auto stem cell transplant for t cell [facility] for f/u. Patient evaluated by Dr. Pizano performed CBT testing for fogginess 2/2 chemo Patient started talk therapy for increased in anxiety, Fluoxetine dose recently increased  Admits intermittent nausea  Dr. Alexandra ordering MRI to further evaluate neuropathy Patient with recent URI- staking allergy pill, completed abx course , currently taking steroid taper Reports continued night sweats x 2 week Today's CBC: WBC 6.3, Hg 12.5 , HCT 37.6 , plt 259  9/5/24 Pt presents for follow up visit. Pt states feeling overall Pet scan done on 8/21/24 with no evidence of FDG avid malignant lesion. Saw neurologist for issues with thyroid where she had an US done 2 weeks ago. PCP suspected her for Graves disease. Pt reports continued sweats, occurs during the day   8/23/24 CBC: WBC 4.65K, HGB 11.6g, HCT 36.1%, PLT 237K,

## 2024-09-06 NOTE — DATA REVIEWED
[FreeTextEntry1] : MRI lumbar spine June 2024: Multilevel discogenic degenerative disease and facet arthropathy of the lumbar spine most pronounced at L4-L5 where there is mild bilateral neuroforaminal narrowing.  No additional areas of significant central canal or neuroforaminal narrowing within the lumbar spine.

## 2024-09-06 NOTE — ADDENDUM
[FreeTextEntry1] :  Documented by Christian Fink acting as a scribe for  on 09/06/2024  All Medical record entries made by the Scribe were at my, Dr. Rendon's, direction and personally dictated by me on  09/06/2024. I have reviewed the chart and agree that the record accurately reflects my personal performance of the history, physical exam, assessment and plan. I have also personally directed, reviewed, and agreed with the discharge instructions.

## 2024-09-06 NOTE — ASSESSMENT
[FreeTextEntry1] : 59-year-old female presenting for evaluation.  #Neuropathy: -Continue pregabalin 100mg BID-rx sent  -I Stop # [id number] -S/p Neurology evaluation   #Impaired cognition: -s/p neuropsychology evaluation -Discussed OT, she would like to defer for now   #Impaired mobility: -Continue PT -Continue single point cane   Follow up in 2 months.

## 2024-09-08 PROBLEM — E04.1 THYROID NODULE: Status: ACTIVE | Noted: 2024-09-08

## 2024-09-12 ENCOUNTER — APPOINTMENT (OUTPATIENT)
Dept: PSYCHIATRY | Facility: CLINIC | Age: 59
End: 2024-09-12
Payer: COMMERCIAL

## 2024-09-12 PROCEDURE — 90837 PSYTX W PT 60 MINUTES: CPT | Mod: 95

## 2024-09-12 NOTE — PLAN
[FreeTextEntry2] : CBT for anxiety [Cognitive and/or Behavior Therapy] : Cognitive and/or Behavior Therapy  [de-identified] : Pt was Ox3. Pt's affect was observed to be euthymic. No risk was observed or reported.  Psychologist met with Pt using teletherapy platform due to COVID-19 pandemic disaster. Pt was at their home, while psychologist was working remotely. Pt continued to provide consent for telehealth services.  Met with patient.  In session, patient reported struggling with a challenging week.  Patient's daughter in [place] is trying to move and is stressed about doing so, her  has been frustrated with coordinating around patient's appointment schedules and patient has not been feeling well physically.  Therapist empathized with patient's frustration.  Introduced radical acceptance skill.  Patient expressed understanding and understands that goal will be challenging.  Patient did report using dear Man skill to discuss her needs with her daughter.  Patient reported conversation went well but while her daughter understands she has her own frustrations and mother is not convinced that her daughter will continuously be able to listen to mother's request.  Last few minutes of session patient and therapist pleated a task that patient would find enjoyable because she has not taken time for herself this week.  Homework assigned for patient to attempt to begin using radical acceptance.  Next session will continue with steps for radical acceptance. [FreeTextEntry1] : Treatment plan reviewed for psycho-education include the followin. Pt will learn about their symptoms and diagnosis and be able to explain it back to psychologist. 2. Pt will learn about the CBT model and be able to accurately categorize their symptoms and diagnosis in terms of cognition, emotional responses, behaviors, and physiological arousal. Treatment plan for relaxation techniques reviewed in session included the followin. Pt can benefit from learning and practicing Relaxation Techniques to reduce physiological arousal, including flight/flight/freeze responses, manifested by muscle tightness or tension; emotions including anxiety and panic, sadness and depression, and anger. 2. Relaxation techniques can include any or all of the following: Diaphragmatic Breathing (DB), Progressive Muscle Relaxation (PMR), Imaginal Relaxation (IMR), or mindfulness. 3. Relaxation techniques will be learned and practiced within sessions, and assigned for homework between sessions. This will entail interventions such using monitoring forms to track their stress and other symptoms, and assess the efficacy of their implementation of relaxation techniques. Treatment plan reviewed for cognitive therapy included the followin. Pt can benefit from learning & practicing cognitive restructuring, including learning to categorize automatic thoughts/cognitive distortions (e.g. shoulds, catastrophic thoughts, overgeneralizations), judgements, biases, and attributions.  2. Pt can benefit from using cognitive reframing to challenge cognitive distortions to increase flexibility in thinking. 3. Pt can benefit from using self-coping statements to encourage themselves and provide validation.  4. Cognitive restructuring will be learned and practiced within sessions, and assigned for homework between sessions. This will entail interventions such using mood monitoring and thought record forms, to challenge cognitive distortions in real time.

## 2024-09-19 ENCOUNTER — APPOINTMENT (OUTPATIENT)
Dept: PSYCHIATRY | Facility: CLINIC | Age: 59
End: 2024-09-19
Payer: COMMERCIAL

## 2024-09-19 DIAGNOSIS — F43.22 ADJUSTMENT DISORDER WITH ANXIETY: ICD-10-CM

## 2024-09-19 PROCEDURE — 90837 PSYTX W PT 60 MINUTES: CPT | Mod: 95

## 2024-09-19 NOTE — PLAN
[FreeTextEntry2] : CBT for anxiety [Cognitive and/or Behavior Therapy] : Cognitive and/or Behavior Therapy  [de-identified] : Pt was Ox3. Pt's affect was observed to be euthymic. No risk was observed or reported.  Psychologist met with Pt using teletherapy platform due to COVID-19 pandemic disaster. Pt was at their home, while psychologist was working remotely. Pt continued to provide consent for telehealth services.  Met with patient.  In session, patient reported struggling with an argument she had with her  this past week.  Patient found herself caught as a go between her children and her .  Discussed when, I feel, I need statements.  Patient was able to successfully use statements and plans to tell her  that when he does not know about things going on with their adult children it makes her feel badly but he is out of her loop and frustrated that he expects her to fill him in.  Patient then plans to tell him that she needs him to talk to their daughters more on his own.  Reviewed steps of radical acceptance.  Patient asked appropriate questions.  Homework assigned for patient to attempt to begin using radical acceptance and use when, I feel, I need statement.  Next session will practice radical acceptance in session. [FreeTextEntry1] : Treatment plan reviewed for psycho-education include the followin. Pt will learn about their symptoms and diagnosis and be able to explain it back to psychologist. 2. Pt will learn about the CBT model and be able to accurately categorize their symptoms and diagnosis in terms of cognition, emotional responses, behaviors, and physiological arousal. Treatment plan for relaxation techniques reviewed in session included the followin. Pt can benefit from learning and practicing Relaxation Techniques to reduce physiological arousal, including flight/flight/freeze responses, manifested by muscle tightness or tension; emotions including anxiety and panic, sadness and depression, and anger. 2. Relaxation techniques can include any or all of the following: Diaphragmatic Breathing (DB), Progressive Muscle Relaxation (PMR), Imaginal Relaxation (IMR), or mindfulness. 3. Relaxation techniques will be learned and practiced within sessions, and assigned for homework between sessions. This will entail interventions such using monitoring forms to track their stress and other symptoms, and assess the efficacy of their implementation of relaxation techniques. Treatment plan reviewed for cognitive therapy included the followin. Pt can benefit from learning & practicing cognitive restructuring, including learning to categorize automatic thoughts/cognitive distortions (e.g. shoulds, catastrophic thoughts, overgeneralizations), judgements, biases, and attributions.  2. Pt can benefit from using cognitive reframing to challenge cognitive distortions to increase flexibility in thinking. 3. Pt can benefit from using self-coping statements to encourage themselves and provide validation.  4. Cognitive restructuring will be learned and practiced within sessions, and assigned for homework between sessions. This will entail interventions such using mood monitoring and thought record forms, to challenge cognitive distortions in real time.

## 2024-09-23 DIAGNOSIS — Z94.84 STEM CELLS TRANSPLANT STATUS: ICD-10-CM

## 2024-09-23 DIAGNOSIS — C85.90 NON-HODGKIN LYMPHOMA, UNSPECIFIED, UNSPECIFIED SITE: ICD-10-CM

## 2024-09-24 ENCOUNTER — APPOINTMENT (OUTPATIENT)
Dept: ENDOCRINOLOGY | Facility: CLINIC | Age: 59
End: 2024-09-24
Payer: COMMERCIAL

## 2024-09-24 VITALS
DIASTOLIC BLOOD PRESSURE: 62 MMHG | HEART RATE: 81 BPM | HEIGHT: 62 IN | OXYGEN SATURATION: 97 % | SYSTOLIC BLOOD PRESSURE: 122 MMHG | WEIGHT: 162 LBS | BODY MASS INDEX: 29.81 KG/M2

## 2024-09-24 DIAGNOSIS — E05.90 THYROTOXICOSIS, UNSPECIFIED W/OUT THYROTOXIC CRISIS OR STORM: ICD-10-CM

## 2024-09-24 DIAGNOSIS — E05.00 THYROTOXICOSIS WITH DIFFUSE GOITER W/OUT THYROTOXIC CRISIS OR STORM: ICD-10-CM

## 2024-09-24 PROCEDURE — 99205 OFFICE O/P NEW HI 60 MIN: CPT

## 2024-09-24 RX ORDER — METHIMAZOLE 10 MG/1
10 TABLET ORAL
Qty: 30 | Refills: 1 | Status: ACTIVE | COMMUNITY
Start: 2024-09-24 | End: 1900-01-01

## 2024-09-24 NOTE — HISTORY OF PRESENT ILLNESS
[FreeTextEntry1] : initial visit here for hyperthyroidism has peripheral T cell lymphoma diagnosed 5/16/22 s/p CHOEP 6/20/22-10/6/22 and s/p autologous PBSCT 11/25/22  here with crystal (works as an  at [employer]) having some tremors, worsening anxiety issues, hot flashes, night sweats, nausea, palpitations having gritty sensation in the eyes  no hoarseness/dysphagia/dyspnea  thyroid hx: abnormal thyroid labs found on routine labs 8/14/24 thyroid ultrasound: 8/2024 subcm right thyroid odule FH of thyroid issue: gma paternal s/p total thyroidectomy for goiter? FH of autoimmune disease: daughter with UC FH of thyroid cancer: none FH of diabetes: brother, father head/neck external beam radiation: none hx of recent iodinated contrast study/lithium use/amiodarone use: none  meds for thyroid: none menopause:53yo

## 2024-09-24 NOTE — ASSESSMENT
[FreeTextEntry1] : 59F w/ hyperthyroidism due to Graves' disease, subcm thyroid noduleand peripheral T cell lymphoma diagnosed 5/16/22 s/p CHOEP 6/20/22-10/6/22 and s/p autologous PBSCT 11/25/22 with residual neuropathy, now in remission here for hyperthyroid evaluation rtc in 6-8 weeks with labs  Hyperthyroidism, Graves' disease, subcm thyroid nodule- clinically and biochemically hyperthyroid with classic symptoms. likely has occurred after transplant as tfts were normal prior. TSI and TRAb both positive there is a change of remission in 1-2 years, but would only titrate off if Ab neg Elaborated and educated on definitive options including RUST therapy, surgery and ATD. Educated on side effects of agranulocytosis, hepatotoxicity, and rash. Patient to call the office if any signs of fever/sore throat/rash/jaundice (RUQ pain/scleral icterus) and will need to stop meds and go to the ER for evaluation.  If any symptoms of hypothyroidism (constipation, fatigue, cold intolerance, excessive dry skin/hair loss), patient to call office warned about possible weight gain. would need to be mindful of intake sono images reviewed with the patient, appears more as heterogeneous thyroid rather than discrete nodule, repeat sono in 1 year, 8/2025. discussed thyroid nodules and thyroid cancer start MMI 10mg daily unable to start BB due to borderline BP

## 2024-09-24 NOTE — PHYSICAL EXAM
[Alert] : alert [Well Nourished] : well nourished [No Acute Distress] : no acute distress [Well Developed] : well developed [Normal Sclera/Conjunctiva] : normal sclera/conjunctiva [EOMI] : extra ocular movement intact [No Proptosis] : no proptosis [Normal Oropharynx] : the oropharynx was normal [Thyroid Not Enlarged] : the thyroid was not enlarged [No Thyroid Nodules] : no palpable thyroid nodules [No Respiratory Distress] : no respiratory distress [No Accessory Muscle Use] : no accessory muscle use [Clear to Auscultation] : lungs were clear to auscultation bilaterally [Normal S1, S2] : normal S1 and S2 [Normal Rate] : heart rate was normal [Regular Rhythm] : with a regular rhythm [Oriented x3] : oriented to person, place, and time [Normal Affect] : the affect was normal [Normal Mood] : the mood was normal [de-identified] : b/l lower lid lag [de-identified] : b/l tremors

## 2024-09-24 NOTE — HISTORY OF PRESENT ILLNESS
[FreeTextEntry1] : initial visit here for hyperthyroidism has peripheral T cell lymphoma diagnosed 5/16/22 s/p CHOEP 6/20/22-10/6/22 and s/p autologous PBSCT 11/25/22  here with crystal (works as an  at [employer]) having some tremors, worsening anxiety issues, hot flashes, night sweats, nausea, palpitations having gritty sensation in the eyes  no hoarseness/dysphagia/dyspnea  thyroid hx: abnormal thyroid labs found on routine labs 8/14/24 thyroid ultrasound: 8/2024 subcm right thyroid odule FH of thyroid issue: gma paternal s/p total thyroidectomy for goiter? FH of autoimmune disease: daughter with UC FH of thyroid cancer: none FH of diabetes: brother, father head/neck external beam radiation: none hx of recent iodinated contrast study/lithium use/amiodarone use: none  meds for thyroid: none menopause:53yo

## 2024-09-24 NOTE — CONSULT LETTER
[Dear  ___] : Dear  [unfilled], [Consult Letter:] : I had the pleasure of evaluating your patient, [unfilled]. [Please see my note below.] : Please see my note below. [Consult Closing:] : Thank you very much for allowing me to participate in the care of this patient.  If you have any questions, please do not hesitate to contact me. [Sincerely,] : Sincerely, [FreeTextEntry3] : Karen Ozuna MD

## 2024-09-24 NOTE — PHYSICAL EXAM
[Alert] : alert [Well Nourished] : well nourished [No Acute Distress] : no acute distress [Well Developed] : well developed [Normal Sclera/Conjunctiva] : normal sclera/conjunctiva [EOMI] : extra ocular movement intact [No Proptosis] : no proptosis [Normal Oropharynx] : the oropharynx was normal [Thyroid Not Enlarged] : the thyroid was not enlarged [No Thyroid Nodules] : no palpable thyroid nodules [No Respiratory Distress] : no respiratory distress [No Accessory Muscle Use] : no accessory muscle use [Clear to Auscultation] : lungs were clear to auscultation bilaterally [Normal S1, S2] : normal S1 and S2 [Normal Rate] : heart rate was normal [Regular Rhythm] : with a regular rhythm [Oriented x3] : oriented to person, place, and time [Normal Affect] : the affect was normal [Normal Mood] : the mood was normal [de-identified] : b/l lower lid lag [de-identified] : b/l tremors

## 2024-09-25 ENCOUNTER — NON-APPOINTMENT (OUTPATIENT)
Age: 59
End: 2024-09-25

## 2024-09-26 ENCOUNTER — APPOINTMENT (OUTPATIENT)
Dept: PSYCHIATRY | Facility: CLINIC | Age: 59
End: 2024-09-26

## 2024-10-01 DIAGNOSIS — Z78.9 OTHER REDUCED MOBILITY: ICD-10-CM

## 2024-10-01 DIAGNOSIS — Z74.09 OTHER REDUCED MOBILITY: ICD-10-CM

## 2024-10-10 ENCOUNTER — APPOINTMENT (OUTPATIENT)
Dept: PSYCHIATRY | Facility: CLINIC | Age: 59
End: 2024-10-10
Payer: COMMERCIAL

## 2024-10-10 DIAGNOSIS — F43.22 ADJUSTMENT DISORDER WITH ANXIETY: ICD-10-CM

## 2024-10-10 PROCEDURE — 90837 PSYTX W PT 60 MINUTES: CPT | Mod: 95

## 2024-10-14 ENCOUNTER — APPOINTMENT (OUTPATIENT)
Dept: ENDOCRINOLOGY | Facility: CLINIC | Age: 59
End: 2024-10-14

## 2024-10-22 ENCOUNTER — APPOINTMENT (OUTPATIENT)
Dept: PSYCHIATRY | Facility: CLINIC | Age: 59
End: 2024-10-22
Payer: COMMERCIAL

## 2024-10-22 PROCEDURE — 90834 PSYTX W PT 45 MINUTES: CPT | Mod: 95

## 2024-10-27 ENCOUNTER — OUTPATIENT (OUTPATIENT)
Dept: OUTPATIENT SERVICES | Facility: HOSPITAL | Age: 59
LOS: 1 days | Discharge: HOME | End: 2024-10-27

## 2024-10-27 DIAGNOSIS — C84.40 PERIPHERAL T-CELL LYMPHOMA, NOT ELSEWHERE CLASSIFIED, UNSPECIFIED SITE: ICD-10-CM

## 2024-10-31 ENCOUNTER — APPOINTMENT (OUTPATIENT)
Dept: PSYCHIATRY | Facility: CLINIC | Age: 59
End: 2024-10-31
Payer: COMMERCIAL

## 2024-10-31 PROCEDURE — 90837 PSYTX W PT 60 MINUTES: CPT | Mod: 95

## 2024-11-01 ENCOUNTER — APPOINTMENT (OUTPATIENT)
Dept: PHYSICAL MEDICINE AND REHAB | Facility: CLINIC | Age: 59
End: 2024-11-01
Payer: COMMERCIAL

## 2024-11-01 ENCOUNTER — APPOINTMENT (OUTPATIENT)
Age: 59
End: 2024-11-01

## 2024-11-01 DIAGNOSIS — R26.89 OTHER ABNORMALITIES OF GAIT AND MOBILITY: ICD-10-CM

## 2024-11-01 DIAGNOSIS — R41.89 OTHER SYMPTOMS AND SIGNS INVOLVING COGNITIVE FUNCTIONS AND AWARENESS: ICD-10-CM

## 2024-11-01 DIAGNOSIS — M79.2 NEURALGIA AND NEURITIS, UNSPECIFIED: ICD-10-CM

## 2024-11-01 PROCEDURE — 99214 OFFICE O/P EST MOD 30 MIN: CPT

## 2024-11-02 ENCOUNTER — OUTPATIENT (OUTPATIENT)
Dept: OUTPATIENT SERVICES | Facility: HOSPITAL | Age: 59
LOS: 1 days | Discharge: HOME | End: 2024-11-02

## 2024-11-02 DIAGNOSIS — C84.40 PERIPHERAL T-CELL LYMPHOMA, NOT ELSEWHERE CLASSIFIED, UNSPECIFIED SITE: ICD-10-CM

## 2024-11-02 DIAGNOSIS — C85.90 NON-HODGKIN LYMPHOMA, UNSPECIFIED, UNSPECIFIED SITE: ICD-10-CM

## 2024-11-04 ENCOUNTER — APPOINTMENT (OUTPATIENT)
Dept: FAMILY MEDICINE | Facility: CLINIC | Age: 59
End: 2024-11-04

## 2024-11-05 ENCOUNTER — RESULT REVIEW (OUTPATIENT)
Age: 59
End: 2024-11-05

## 2024-11-05 ENCOUNTER — APPOINTMENT (OUTPATIENT)
Dept: HEMATOLOGY ONCOLOGY | Facility: CLINIC | Age: 59
End: 2024-11-05

## 2024-11-05 ENCOUNTER — APPOINTMENT (OUTPATIENT)
Dept: HEMATOLOGY ONCOLOGY | Facility: CLINIC | Age: 59
End: 2024-11-05
Payer: COMMERCIAL

## 2024-11-05 VITALS
HEART RATE: 82 BPM | RESPIRATION RATE: 16 BRPM | DIASTOLIC BLOOD PRESSURE: 68 MMHG | WEIGHT: 153.42 LBS | TEMPERATURE: 98.8 F | OXYGEN SATURATION: 99 % | BODY MASS INDEX: 28.06 KG/M2 | SYSTOLIC BLOOD PRESSURE: 100 MMHG

## 2024-11-05 DIAGNOSIS — Z95.828 PRESENCE OF OTHER VASCULAR IMPLANTS AND GRAFTS: ICD-10-CM

## 2024-11-05 DIAGNOSIS — Z94.84 STEM CELLS TRANSPLANT STATUS: ICD-10-CM

## 2024-11-05 DIAGNOSIS — C85.90 NON-HODGKIN LYMPHOMA, UNSPECIFIED, UNSPECIFIED SITE: ICD-10-CM

## 2024-11-05 LAB
BASOPHILS # BLD AUTO: 0.02 K/UL — SIGNIFICANT CHANGE UP (ref 0–0.2)
BASOPHILS NFR BLD AUTO: 0.4 % — SIGNIFICANT CHANGE UP (ref 0–2)
EOSINOPHIL # BLD AUTO: 0.07 K/UL — SIGNIFICANT CHANGE UP (ref 0–0.5)
EOSINOPHIL NFR BLD AUTO: 1.5 % — SIGNIFICANT CHANGE UP (ref 0–6)
HCT VFR BLD CALC: 36.7 % — SIGNIFICANT CHANGE UP (ref 34.5–45)
HGB BLD-MCNC: 12 G/DL — SIGNIFICANT CHANGE UP (ref 11.5–15.5)
IMM GRANULOCYTES NFR BLD AUTO: 0.2 % — SIGNIFICANT CHANGE UP (ref 0–0.9)
LYMPHOCYTES # BLD AUTO: 1.28 K/UL — SIGNIFICANT CHANGE UP (ref 1–3.3)
LYMPHOCYTES # BLD AUTO: 26.9 % — SIGNIFICANT CHANGE UP (ref 13–44)
MCHC RBC-ENTMCNC: 28 PG — SIGNIFICANT CHANGE UP (ref 27–34)
MCHC RBC-ENTMCNC: 32.7 G/DL — SIGNIFICANT CHANGE UP (ref 32–36)
MCV RBC AUTO: 85.7 FL — SIGNIFICANT CHANGE UP (ref 80–100)
MONOCYTES # BLD AUTO: 0.46 K/UL — SIGNIFICANT CHANGE UP (ref 0–0.9)
MONOCYTES NFR BLD AUTO: 9.7 % — SIGNIFICANT CHANGE UP (ref 2–14)
NEUTROPHILS # BLD AUTO: 2.92 K/UL — SIGNIFICANT CHANGE UP (ref 1.8–7.4)
NEUTROPHILS NFR BLD AUTO: 61.3 % — SIGNIFICANT CHANGE UP (ref 43–77)
NRBC # BLD: 0 /100 WBCS — SIGNIFICANT CHANGE UP (ref 0–0)
NRBC BLD-RTO: 0 /100 WBCS — SIGNIFICANT CHANGE UP (ref 0–0)
PLATELET # BLD AUTO: 229 K/UL — SIGNIFICANT CHANGE UP (ref 150–400)
RBC # BLD: 4.28 M/UL — SIGNIFICANT CHANGE UP (ref 3.8–5.2)
RBC # FLD: 13 % — SIGNIFICANT CHANGE UP (ref 10.3–14.5)
WBC # BLD: 4.76 K/UL — SIGNIFICANT CHANGE UP (ref 3.8–10.5)
WBC # FLD AUTO: 4.76 K/UL — SIGNIFICANT CHANGE UP (ref 3.8–10.5)

## 2024-11-05 PROCEDURE — 99215 OFFICE O/P EST HI 40 MIN: CPT

## 2024-11-06 ENCOUNTER — APPOINTMENT (OUTPATIENT)
Dept: OBGYN | Facility: CLINIC | Age: 59
End: 2024-11-06
Payer: COMMERCIAL

## 2024-11-06 VITALS
HEIGHT: 62 IN | DIASTOLIC BLOOD PRESSURE: 76 MMHG | SYSTOLIC BLOOD PRESSURE: 118 MMHG | BODY MASS INDEX: 28.52 KG/M2 | WEIGHT: 155 LBS

## 2024-11-06 DIAGNOSIS — Z12.39 ENCOUNTER FOR OTHER SCREENING FOR MALIGNANT NEOPLASM OF BREAST: ICD-10-CM

## 2024-11-06 DIAGNOSIS — Z01.419 ENCOUNTER FOR GYNECOLOGICAL EXAMINATION (GENERAL) (ROUTINE) W/OUT ABNORMAL FINDINGS: ICD-10-CM

## 2024-11-06 DIAGNOSIS — Z13.820 ENCOUNTER FOR SCREENING FOR OSTEOPOROSIS: ICD-10-CM

## 2024-11-06 DIAGNOSIS — D21.9 BENIGN NEOPLASM OF CONNECTIVE AND OTHER SOFT TISSUE, UNSPECIFIED: ICD-10-CM

## 2024-11-06 DIAGNOSIS — Z00.00 ENCOUNTER FOR GENERAL ADULT MEDICAL EXAMINATION W/OUT ABNORMAL FINDINGS: ICD-10-CM

## 2024-11-06 DIAGNOSIS — N94.10 UNSPECIFIED DYSPAREUNIA: ICD-10-CM

## 2024-11-06 LAB
ALBUMIN SERPL ELPH-MCNC: 4.6 G/DL
ALP BLD-CCNC: 120 U/L
ALT SERPL-CCNC: 31 U/L
ANION GAP SERPL CALC-SCNC: 12 MMOL/L
AST SERPL-CCNC: 30 U/L
BILIRUB SERPL-MCNC: 0.4 MG/DL
BUN SERPL-MCNC: 18 MG/DL
CALCIUM SERPL-MCNC: 10.4 MG/DL
CHLORIDE SERPL-SCNC: 104 MMOL/L
CO2 SERPL-SCNC: 25 MMOL/L
CREAT SERPL-MCNC: 0.63 MG/DL
EGFR: 103 ML/MIN/1.73M2
GLUCOSE SERPL-MCNC: 114 MG/DL
LDH SERPL-CCNC: 172 U/L
MAGNESIUM SERPL-MCNC: 2.1 MG/DL
POTASSIUM SERPL-SCNC: 5.3 MMOL/L
PROT SERPL-MCNC: 7.1 G/DL
SODIUM SERPL-SCNC: 141 MMOL/L

## 2024-11-06 PROCEDURE — 99396 PREV VISIT EST AGE 40-64: CPT

## 2024-11-06 PROCEDURE — 99213 OFFICE O/P EST LOW 20 MIN: CPT | Mod: 25

## 2024-11-06 RX ORDER — ESTRADIOL 0.1 MG/G
0.1 CREAM VAGINAL
Qty: 1 | Refills: 1 | Status: ACTIVE | COMMUNITY
Start: 2024-11-06 | End: 1900-01-01

## 2024-11-07 ENCOUNTER — APPOINTMENT (OUTPATIENT)
Dept: PSYCHIATRY | Facility: CLINIC | Age: 59
End: 2024-11-07
Payer: COMMERCIAL

## 2024-11-07 LAB — HPV HIGH+LOW RISK DNA PNL CVX: NOT DETECTED

## 2024-11-07 PROCEDURE — 90837 PSYTX W PT 60 MINUTES: CPT | Mod: 95

## 2024-11-08 ENCOUNTER — APPOINTMENT (OUTPATIENT)
Dept: ENDOCRINOLOGY | Facility: CLINIC | Age: 59
End: 2024-11-08
Payer: COMMERCIAL

## 2024-11-08 VITALS
BODY MASS INDEX: 28.52 KG/M2 | HEIGHT: 62 IN | SYSTOLIC BLOOD PRESSURE: 118 MMHG | OXYGEN SATURATION: 96 % | DIASTOLIC BLOOD PRESSURE: 70 MMHG | HEART RATE: 95 BPM | WEIGHT: 155 LBS

## 2024-11-08 DIAGNOSIS — E05.90 THYROTOXICOSIS, UNSPECIFIED W/OUT THYROTOXIC CRISIS OR STORM: ICD-10-CM

## 2024-11-08 DIAGNOSIS — E04.1 NONTOXIC SINGLE THYROID NODULE: ICD-10-CM

## 2024-11-08 DIAGNOSIS — E05.00 THYROTOXICOSIS WITH DIFFUSE GOITER W/OUT THYROTOXIC CRISIS OR STORM: ICD-10-CM

## 2024-11-08 PROCEDURE — 99214 OFFICE O/P EST MOD 30 MIN: CPT

## 2024-11-08 PROCEDURE — G2211 COMPLEX E/M VISIT ADD ON: CPT

## 2024-11-13 LAB — CYTOLOGY CVX/VAG DOC THIN PREP: ABNORMAL

## 2024-11-14 ENCOUNTER — APPOINTMENT (OUTPATIENT)
Dept: PSYCHIATRY | Facility: CLINIC | Age: 59
End: 2024-11-14
Payer: COMMERCIAL

## 2024-11-14 DIAGNOSIS — F43.22 ADJUSTMENT DISORDER WITH ANXIETY: ICD-10-CM

## 2024-11-14 PROCEDURE — 90837 PSYTX W PT 60 MINUTES: CPT | Mod: 95

## 2024-11-19 ENCOUNTER — RX RENEWAL (OUTPATIENT)
Age: 59
End: 2024-11-19

## 2024-11-21 NOTE — END OF VISIT
Pharmacy Medication History Review    Ronna Beckham is a 64 y.o. female admitted for Supraventricular tachycardia (CMS-[facility]). Pharmacy reviewed the patient's [id number] medications and allergies for accuracy.    Medications ADDED:  Vitamin D2 1.25 mg  Loperamide 2 mg  Multivitamin   Winrevair injection  Medications CHANGED:  N/A  Medications REMOVED:   N/A     The list below reflects the updated PTA list.   Prior to Admission Medications   Prescriptions Last Dose Informant   Opsumit 10 mg tablet tablet  Self   Sig: TAKE 1 TABLET BY MOUTH DAILY. DO NOT HANDLE IF PREGNANT. DO NOT SPLIT, CRUSH, OR CHEW. REVIEW MEDICATION GUIDE.   aspirin 81 mg EC tablet  Self   Sig: Take 1 tablet (81 mg) by mouth once daily.   calcium carbonate-vitamin D3 500 mg-5 mcg (200 unit) tablet  Self   Sig: Take 2 tablets by mouth 2 times a day.   cholestyramine (Questran) 4 gram packet  Self   Sig: Take 1 packet (4 g) by mouth once daily.   dilTIAZem CD (Cardizem CD) 180 mg 24 hr capsule  Self   Sig: Take 1 capsule (180 mg) by mouth once daily.   epoprostenol (Veletri) 1.5 mg recon soln  Self   Si.5 mg (1,500 mcg) by central venous line infusion route continuously. Reconstitute contents of vial with 5 ml diluent and mix cassette as directed. Infuse continuously per physician orders. Allow for priming volume. Dose range: 1-150 ng/kg/min.   ergocalciferol (Vitamin D-2) 1.25 MG (11152 UT) capsule  Self   Sig: Take 1 capsule (1,250 mcg) by mouth 1 (one) time per week on Wednesday   folic acid (Folvite) 1 mg tablet  Self   Sig: Take 1 tablet (1 mg) by mouth once daily. Needs to resume   furosemide (Lasix) 40 mg tablet  Self   Sig: Take 1.5 tablets (60 mg) by mouth once daily.   gabapentin (Neurontin) 800 mg tablet  Self   Sig: Take 1 tablet (800 mg) by mouth 3 times a day.   hydroxychloroquine (Plaquenil) 200 mg tablet  Self   Sig: Take 1 tablet (200 mg) by mouth 2 times a day.   loperamide (Imodium) 2 mg capsule  Self   Sig: Take 1  [Time Spent: ___ minutes] : I have spent [unfilled] minutes of time on the encounter. capsule (2 mg) by mouth once daily as needed for diarrhea.   magnesium chloride (MagDelay) 64 mg EC tablet  Self   Sig: Take 1 tablet (64 mg) by mouth 2 times a day.   melatonin 5 mg tablet  Self   Sig: Take 1 tablet (5 mg) by mouth once daily at bedtime.   multivitamin-iron-folic acid  mg-mcg tablet  Self   Sig: Take 1 tablet by mouth once daily.   omeprazole (PriLOSEC) 40 mg DR capsule  Self   Sig: Take 1 capsule (40 mg) by mouth once daily in the morning. Take before meals. Do not crush or chew.   potassium chloride CR 20 mEq ER tablet  Self   Sig: Take 1 tablet (20 mEq) by mouth 2 times a day. Do not crush or chew.   sotatercept-csrk (WINREVAIR SUBQ) 11/1/2024 Self   Sig: Inject under the skin every 21 (twenty-one) days.   venlafaxine XR (Effexor-XR) 150 mg 24 hr capsule  Self   Sig: Take 1 capsule (150 mg) by mouth once daily. Do not crush or chew.      Facility-Administered Medications: None       The list below reflects the updated allergy list. Please review each documented allergy for additional clarification and justification.  Allergies  Reviewed by Rosa Goetz RN on 11/21/2024        Severity Reactions Comments    Levetiracetam Medium Other Weakness, unable to walk            Patient accepts M2B at discharge. Pharmacy has been updated to [facility].    Sources  [facility]  Pharmacy dispense history  Patient interview Good historian     Additional Comments  Patient reports starting Winrevair injections every 21 day- was unsure if getting 45 mg or 60 mg dose     Ankit Diaz, Danielle  Transitions of Care Pharmacist  [facility]  Please reach out via Secure Chat for questions, or if no response call [facility] or [facility]

## 2024-11-25 ENCOUNTER — APPOINTMENT (OUTPATIENT)
Dept: GASTROENTEROLOGY | Facility: CLINIC | Age: 59
End: 2024-11-25

## 2024-11-26 ENCOUNTER — APPOINTMENT (OUTPATIENT)
Dept: INFUSION THERAPY | Facility: HOSPITAL | Age: 59
End: 2024-11-26

## 2024-11-26 ENCOUNTER — NON-APPOINTMENT (OUTPATIENT)
Age: 59
End: 2024-11-26

## 2024-11-27 ENCOUNTER — APPOINTMENT (OUTPATIENT)
Dept: PSYCHIATRY | Facility: CLINIC | Age: 59
End: 2024-11-27

## 2024-11-27 DIAGNOSIS — Z23 ENCOUNTER FOR IMMUNIZATION: ICD-10-CM

## 2024-12-03 ENCOUNTER — APPOINTMENT (OUTPATIENT)
Dept: INFUSION THERAPY | Facility: HOSPITAL | Age: 59
End: 2024-12-03

## 2024-12-05 ENCOUNTER — APPOINTMENT (OUTPATIENT)
Dept: PSYCHIATRY | Facility: CLINIC | Age: 59
End: 2024-12-05
Payer: COMMERCIAL

## 2024-12-05 DIAGNOSIS — F43.22 ADJUSTMENT DISORDER WITH ANXIETY: ICD-10-CM

## 2024-12-05 PROCEDURE — 90837 PSYTX W PT 60 MINUTES: CPT | Mod: 95

## 2024-12-10 ENCOUNTER — APPOINTMENT (OUTPATIENT)
Dept: INFUSION THERAPY | Facility: HOSPITAL | Age: 59
End: 2024-12-10

## 2024-12-12 ENCOUNTER — APPOINTMENT (OUTPATIENT)
Dept: PSYCHIATRY | Facility: CLINIC | Age: 59
End: 2024-12-12

## 2024-12-12 NOTE — ASSESSMENT
[FreeTextEntry1] : NCS B/L UE/LE performed at today's office visit.  Patient will return to the electrodiagnostic lab in 1 to 2 weeks for needle EMG to complete the evaluation.  Full report to follow. Resident

## 2024-12-19 ENCOUNTER — APPOINTMENT (OUTPATIENT)
Dept: PSYCHIATRY | Facility: CLINIC | Age: 59
End: 2024-12-19
Payer: COMMERCIAL

## 2024-12-19 DIAGNOSIS — F43.22 ADJUSTMENT DISORDER WITH ANXIETY: ICD-10-CM

## 2024-12-19 PROCEDURE — 90837 PSYTX W PT 60 MINUTES: CPT | Mod: 95

## 2024-12-22 ENCOUNTER — OUTPATIENT (OUTPATIENT)
Dept: OUTPATIENT SERVICES | Facility: HOSPITAL | Age: 59
LOS: 1 days | Discharge: HOME | End: 2024-12-22

## 2024-12-22 DIAGNOSIS — C84.40 PERIPHERAL T-CELL LYMPHOMA, NOT ELSEWHERE CLASSIFIED, UNSPECIFIED SITE: ICD-10-CM

## 2024-12-23 ENCOUNTER — APPOINTMENT (OUTPATIENT)
Dept: PSYCHIATRY | Facility: CLINIC | Age: 59
End: 2024-12-23

## 2024-12-27 ENCOUNTER — APPOINTMENT (OUTPATIENT)
Age: 59
End: 2024-12-27

## 2025-01-02 ENCOUNTER — APPOINTMENT (OUTPATIENT)
Dept: PSYCHIATRY | Facility: CLINIC | Age: 60
End: 2025-01-02
Payer: COMMERCIAL

## 2025-01-02 PROCEDURE — 90837 PSYTX W PT 60 MINUTES: CPT | Mod: 95

## 2025-01-07 ENCOUNTER — APPOINTMENT (OUTPATIENT)
Dept: HEMATOLOGY ONCOLOGY | Facility: CLINIC | Age: 60
End: 2025-01-07
Payer: COMMERCIAL

## 2025-01-07 ENCOUNTER — NON-APPOINTMENT (OUTPATIENT)
Age: 60
End: 2025-01-07

## 2025-01-07 VITALS
HEIGHT: 62 IN | HEART RATE: 91 BPM | OXYGEN SATURATION: 96 % | SYSTOLIC BLOOD PRESSURE: 112 MMHG | DIASTOLIC BLOOD PRESSURE: 69 MMHG | BODY MASS INDEX: 27.28 KG/M2 | WEIGHT: 148.26 LBS | TEMPERATURE: 97.5 F

## 2025-01-07 DIAGNOSIS — Z94.81 BONE MARROW TRANSPLANT STATUS: ICD-10-CM

## 2025-01-07 DIAGNOSIS — C85.90 NON-HODGKIN LYMPHOMA, UNSPECIFIED, UNSPECIFIED SITE: ICD-10-CM

## 2025-01-07 DIAGNOSIS — Z94.84 STEM CELLS TRANSPLANT STATUS: ICD-10-CM

## 2025-01-07 DIAGNOSIS — E05.00 THYROTOXICOSIS WITH DIFFUSE GOITER W/OUT THYROTOXIC CRISIS OR STORM: ICD-10-CM

## 2025-01-07 PROCEDURE — 99215 OFFICE O/P EST HI 40 MIN: CPT

## 2025-01-09 ENCOUNTER — APPOINTMENT (OUTPATIENT)
Dept: PSYCHIATRY | Facility: CLINIC | Age: 60
End: 2025-01-09
Payer: COMMERCIAL

## 2025-01-09 PROCEDURE — 90837 PSYTX W PT 60 MINUTES: CPT | Mod: 95

## 2025-01-10 ENCOUNTER — LABORATORY RESULT (OUTPATIENT)
Age: 60
End: 2025-01-10

## 2025-01-10 ENCOUNTER — APPOINTMENT (OUTPATIENT)
Dept: RHEUMATOLOGY | Facility: CLINIC | Age: 60
End: 2025-01-10
Payer: COMMERCIAL

## 2025-01-10 VITALS
SYSTOLIC BLOOD PRESSURE: 118 MMHG | WEIGHT: 148 LBS | TEMPERATURE: 98.1 F | OXYGEN SATURATION: 97 % | HEART RATE: 89 BPM | HEIGHT: 62 IN | BODY MASS INDEX: 27.23 KG/M2 | DIASTOLIC BLOOD PRESSURE: 76 MMHG

## 2025-01-10 DIAGNOSIS — M79.10 MYALGIA, UNSPECIFIED SITE: ICD-10-CM

## 2025-01-10 DIAGNOSIS — M79.2 NEURALGIA AND NEURITIS, UNSPECIFIED: ICD-10-CM

## 2025-01-10 DIAGNOSIS — M25.50 PAIN IN UNSPECIFIED JOINT: ICD-10-CM

## 2025-01-10 DIAGNOSIS — G62.9 POLYNEUROPATHY, UNSPECIFIED: ICD-10-CM

## 2025-01-10 LAB
ALBUMIN SERPL ELPH-MCNC: 4.4 G/DL
ALP BLD-CCNC: 137 U/L
ALT SERPL-CCNC: 28 U/L
ANA SER QL IA: NEGATIVE
ANION GAP SERPL CALC-SCNC: 11 MMOL/L
AST SERPL-CCNC: 20 U/L
BASOPHILS # BLD AUTO: 0.02 K/UL
BASOPHILS NFR BLD AUTO: 0.4 %
BILIRUB SERPL-MCNC: 0.5 MG/DL
BUN SERPL-MCNC: 18 MG/DL
C3 SERPL-MCNC: 142 MG/DL
C4 SERPL-MCNC: 24 MG/DL
CALCIUM SERPL-MCNC: 10.2 MG/DL
CCP AB SER IA-ACNC: <8 U/ML
CENTROMERE IGG SER-ACNC: <0.2 AL
CHLORIDE SERPL-SCNC: 100 MMOL/L
CHROMATIN AB SERPL-ACNC: <0.2 AL
CK SERPL-CCNC: 47 U/L
CO2 SERPL-SCNC: 26 MMOL/L
CREAT SERPL-MCNC: 0.51 MG/DL
CRP SERPL-MCNC: <3 MG/L
DSDNA AB SER-ACNC: <1 IU/ML
EGFR: 109 ML/MIN/1.73M2
ENA JO1 AB SER IA-ACNC: <0.2 AL
ENA RNP AB SER IA-ACNC: <0.2 AL
ENA SCL70 IGG SER IA-ACNC: <0.2 AL
ENA SM AB SER IA-ACNC: <0.2 AL
ENA SS-A AB SER IA-ACNC: 0.2 AL
ENA SS-B AB SER IA-ACNC: <0.2 AL
EOSINOPHIL # BLD AUTO: 0.07 K/UL
EOSINOPHIL NFR BLD AUTO: 1.5 %
ERYTHROCYTE [SEDIMENTATION RATE] IN BLOOD BY WESTERGREN METHOD: 22 MM/HR
GLUCOSE SERPL-MCNC: 123 MG/DL
HCT VFR BLD CALC: 37.8 %
HGB BLD-MCNC: 11.9 G/DL
IMM GRANULOCYTES NFR BLD AUTO: 0.2 %
LYMPHOCYTES # BLD AUTO: 1.4 K/UL
LYMPHOCYTES NFR BLD AUTO: 31 %
MAN DIFF?: NORMAL
MCHC RBC-ENTMCNC: 27.3 PG
MCHC RBC-ENTMCNC: 31.5 G/DL
MCV RBC AUTO: 86.7 FL
MONOCYTES # BLD AUTO: 0.44 K/UL
MONOCYTES NFR BLD AUTO: 9.7 %
NEUTROPHILS # BLD AUTO: 2.58 K/UL
NEUTROPHILS NFR BLD AUTO: 57.2 %
PLATELET # BLD AUTO: 253 K/UL
POTASSIUM SERPL-SCNC: 4.7 MMOL/L
PROT SERPL-MCNC: 7.2 G/DL
RBC # BLD: 4.36 M/UL
RBC # FLD: 13.4 %
RF+CCP IGG SER-IMP: NEGATIVE
RHEUMATOID FACT SER QL: <10 IU/ML
RIBOSOMAL P AB SER IA-ACNC: <0.2 AL
SODIUM SERPL-SCNC: 137 MMOL/L
THYROGLOB AB SERPL-ACNC: 3090 IU/ML
THYROPEROXIDASE AB SERPL IA-ACNC: 64.2 IU/ML
TSH SERPL-ACNC: <0.01 UIU/ML
WBC # FLD AUTO: 4.52 K/UL

## 2025-01-10 PROCEDURE — G2211 COMPLEX E/M VISIT ADD ON: CPT

## 2025-01-10 PROCEDURE — 99205 OFFICE O/P NEW HI 60 MIN: CPT

## 2025-01-10 RX ORDER — IBUPROFEN 800 MG
TABLET ORAL
Refills: 0 | Status: ACTIVE | COMMUNITY

## 2025-01-13 LAB — ALDOLASE SERPL-CCNC: 7.9 U/L

## 2025-01-14 ENCOUNTER — ASOB RESULT (OUTPATIENT)
Age: 60
End: 2025-01-14

## 2025-01-14 ENCOUNTER — APPOINTMENT (OUTPATIENT)
Dept: ANTEPARTUM | Facility: CLINIC | Age: 60
End: 2025-01-14
Payer: COMMERCIAL

## 2025-01-14 ENCOUNTER — APPOINTMENT (OUTPATIENT)
Dept: OBGYN | Facility: CLINIC | Age: 60
End: 2025-01-14
Payer: COMMERCIAL

## 2025-01-14 DIAGNOSIS — R93.89 ABNORMAL FINDINGS ON DIAGNOSTIC IMAGING OF OTHER SPECIFIED BODY STRUCTURES: ICD-10-CM

## 2025-01-14 DIAGNOSIS — D21.9 BENIGN NEOPLASM OF CONNECTIVE AND OTHER SOFT TISSUE, UNSPECIFIED: ICD-10-CM

## 2025-01-14 DIAGNOSIS — N94.10 UNSPECIFIED DYSPAREUNIA: ICD-10-CM

## 2025-01-14 PROCEDURE — 76830 TRANSVAGINAL US NON-OB: CPT

## 2025-01-14 PROCEDURE — 76856 US EXAM PELVIC COMPLETE: CPT | Mod: 59

## 2025-01-14 PROCEDURE — 99214 OFFICE O/P EST MOD 30 MIN: CPT

## 2025-01-16 ENCOUNTER — APPOINTMENT (OUTPATIENT)
Dept: PSYCHIATRY | Facility: CLINIC | Age: 60
End: 2025-01-16
Payer: COMMERCIAL

## 2025-01-16 PROCEDURE — 90837 PSYTX W PT 60 MINUTES: CPT | Mod: 95

## 2025-01-17 LAB
ALBUMIN MFR SERPL ELPH: 57.5 %
ALBUMIN SERPL-MCNC: 4.1 G/DL
ALBUMIN/GLOB SERPL: 1.3 RATIO
ALPHA1 GLOB MFR SERPL ELPH: 4.4 %
ALPHA1 GLOB SERPL ELPH-MCNC: 0.3 G/DL
ALPHA2 GLOB MFR SERPL ELPH: 12.2 %
ALPHA2 GLOB SERPL ELPH-MCNC: 0.9 G/DL
B-GLOBULIN MFR SERPL ELPH: 11.2 %
B-GLOBULIN SERPL ELPH-MCNC: 0.8 G/DL
GAMMA GLOB FLD ELPH-MCNC: 1.1 G/DL
GAMMA GLOB MFR SERPL ELPH: 14.7 %
INTERPRETATION SERPL IEP-IMP: NORMAL
M PROTEIN SPEC IFE-MCNC: NORMAL
PROT SERPL-MCNC: 7.2 G/DL
PROT SERPL-MCNC: 7.2 G/DL

## 2025-01-20 ENCOUNTER — RX RENEWAL (OUTPATIENT)
Age: 60
End: 2025-01-20

## 2025-01-23 ENCOUNTER — APPOINTMENT (OUTPATIENT)
Dept: PSYCHIATRY | Facility: CLINIC | Age: 60
End: 2025-01-23
Payer: COMMERCIAL

## 2025-01-23 PROCEDURE — 90837 PSYTX W PT 60 MINUTES: CPT | Mod: 95

## 2025-01-28 ENCOUNTER — APPOINTMENT (OUTPATIENT)
Dept: RHEUMATOLOGY | Facility: CLINIC | Age: 60
End: 2025-01-28
Payer: COMMERCIAL

## 2025-01-28 VITALS
OXYGEN SATURATION: 98 % | TEMPERATURE: 97.9 F | HEART RATE: 80 BPM | SYSTOLIC BLOOD PRESSURE: 110 MMHG | BODY MASS INDEX: 27.23 KG/M2 | RESPIRATION RATE: 17 BRPM | DIASTOLIC BLOOD PRESSURE: 80 MMHG | WEIGHT: 148 LBS | HEIGHT: 62 IN

## 2025-01-28 DIAGNOSIS — M79.10 MYALGIA, UNSPECIFIED SITE: ICD-10-CM

## 2025-01-28 DIAGNOSIS — M25.50 PAIN IN UNSPECIFIED JOINT: ICD-10-CM

## 2025-01-28 PROCEDURE — G2211 COMPLEX E/M VISIT ADD ON: CPT

## 2025-01-28 PROCEDURE — 99214 OFFICE O/P EST MOD 30 MIN: CPT

## 2025-01-29 ENCOUNTER — APPOINTMENT (OUTPATIENT)
Dept: ENDOCRINOLOGY | Facility: CLINIC | Age: 60
End: 2025-01-29
Payer: COMMERCIAL

## 2025-01-29 VITALS
HEIGHT: 62 IN | DIASTOLIC BLOOD PRESSURE: 74 MMHG | SYSTOLIC BLOOD PRESSURE: 110 MMHG | OXYGEN SATURATION: 99 % | WEIGHT: 147 LBS | BODY MASS INDEX: 27.05 KG/M2 | HEART RATE: 95 BPM

## 2025-01-29 DIAGNOSIS — E05.00 THYROTOXICOSIS WITH DIFFUSE GOITER W/OUT THYROTOXIC CRISIS OR STORM: ICD-10-CM

## 2025-01-29 DIAGNOSIS — E04.1 NONTOXIC SINGLE THYROID NODULE: ICD-10-CM

## 2025-01-29 DIAGNOSIS — E78.5 HYPERLIPIDEMIA, UNSPECIFIED: ICD-10-CM

## 2025-01-29 DIAGNOSIS — E05.90 THYROTOXICOSIS, UNSPECIFIED W/OUT THYROTOXIC CRISIS OR STORM: ICD-10-CM

## 2025-01-29 PROCEDURE — 99215 OFFICE O/P EST HI 40 MIN: CPT

## 2025-01-29 RX ORDER — ATENOLOL 25 MG/1
25 TABLET ORAL DAILY
Qty: 30 | Refills: 0 | Status: ACTIVE | COMMUNITY
Start: 2025-01-29 | End: 1900-01-01

## 2025-01-30 ENCOUNTER — APPOINTMENT (OUTPATIENT)
Dept: PSYCHIATRY | Facility: CLINIC | Age: 60
End: 2025-01-30

## 2025-01-30 LAB
T3 SERPL-MCNC: 276 NG/DL
T4 FREE SERPL-MCNC: 1.7 NG/DL
T4 SERPL-MCNC: 9.8 UG/DL
TSH SERPL-ACNC: <0.01 UIU/ML

## 2025-01-30 PROCEDURE — 90837 PSYTX W PT 60 MINUTES: CPT | Mod: 95

## 2025-01-31 ENCOUNTER — APPOINTMENT (OUTPATIENT)
Dept: PHYSICAL MEDICINE AND REHAB | Facility: CLINIC | Age: 60
End: 2025-01-31
Payer: COMMERCIAL

## 2025-01-31 DIAGNOSIS — G62.9 POLYNEUROPATHY, UNSPECIFIED: ICD-10-CM

## 2025-01-31 DIAGNOSIS — M79.2 NEURALGIA AND NEURITIS, UNSPECIFIED: ICD-10-CM

## 2025-01-31 DIAGNOSIS — R41.3 OTHER AMNESIA: ICD-10-CM

## 2025-01-31 PROCEDURE — 99214 OFFICE O/P EST MOD 30 MIN: CPT

## 2025-02-04 ENCOUNTER — APPOINTMENT (OUTPATIENT)
Dept: NUCLEAR MEDICINE | Facility: CLINIC | Age: 60
End: 2025-02-04
Payer: COMMERCIAL

## 2025-02-04 ENCOUNTER — OUTPATIENT (OUTPATIENT)
Dept: OUTPATIENT SERVICES | Facility: HOSPITAL | Age: 60
LOS: 1 days | End: 2025-02-04

## 2025-02-04 DIAGNOSIS — Z00.8 ENCOUNTER FOR OTHER GENERAL EXAMINATION: ICD-10-CM

## 2025-02-04 PROCEDURE — 78815 PET IMAGE W/CT SKULL-THIGH: CPT | Mod: 26,PS

## 2025-02-06 ENCOUNTER — APPOINTMENT (OUTPATIENT)
Dept: PSYCHIATRY | Facility: CLINIC | Age: 60
End: 2025-02-06
Payer: COMMERCIAL

## 2025-02-06 PROCEDURE — 90837 PSYTX W PT 60 MINUTES: CPT | Mod: 95

## 2025-02-13 ENCOUNTER — APPOINTMENT (OUTPATIENT)
Dept: PSYCHIATRY | Facility: CLINIC | Age: 60
End: 2025-02-13
Payer: COMMERCIAL

## 2025-02-13 DIAGNOSIS — F43.22 ADJUSTMENT DISORDER WITH ANXIETY: ICD-10-CM

## 2025-02-13 PROCEDURE — 90837 PSYTX W PT 60 MINUTES: CPT | Mod: 95

## 2025-02-14 ENCOUNTER — OUTPATIENT (OUTPATIENT)
Dept: OUTPATIENT SERVICES | Facility: HOSPITAL | Age: 60
LOS: 1 days | Discharge: HOME | End: 2025-02-14

## 2025-02-14 ENCOUNTER — NON-APPOINTMENT (OUTPATIENT)
Age: 60
End: 2025-02-14

## 2025-02-14 DIAGNOSIS — C84.40 PERIPHERAL T-CELL LYMPHOMA, NOT ELSEWHERE CLASSIFIED, UNSPECIFIED SITE: ICD-10-CM

## 2025-02-20 ENCOUNTER — APPOINTMENT (OUTPATIENT)
Dept: PSYCHIATRY | Facility: CLINIC | Age: 60
End: 2025-02-20

## 2025-02-21 ENCOUNTER — APPOINTMENT (OUTPATIENT)
Age: 60
End: 2025-02-21

## 2025-02-27 ENCOUNTER — APPOINTMENT (OUTPATIENT)
Dept: PSYCHIATRY | Facility: CLINIC | Age: 60
End: 2025-02-27
Payer: COMMERCIAL

## 2025-02-27 PROCEDURE — 90837 PSYTX W PT 60 MINUTES: CPT | Mod: 95

## 2025-02-28 ENCOUNTER — APPOINTMENT (OUTPATIENT)
Dept: PHYSICAL MEDICINE AND REHAB | Facility: CLINIC | Age: 60
End: 2025-02-28
Payer: COMMERCIAL

## 2025-02-28 DIAGNOSIS — M79.2 NEURALGIA AND NEURITIS, UNSPECIFIED: ICD-10-CM

## 2025-02-28 DIAGNOSIS — G62.9 POLYNEUROPATHY, UNSPECIFIED: ICD-10-CM

## 2025-02-28 DIAGNOSIS — R26.89 OTHER ABNORMALITIES OF GAIT AND MOBILITY: ICD-10-CM

## 2025-02-28 PROCEDURE — 99214 OFFICE O/P EST MOD 30 MIN: CPT | Mod: 95

## 2025-03-03 ENCOUNTER — NON-APPOINTMENT (OUTPATIENT)
Age: 60
End: 2025-03-03

## 2025-03-05 ENCOUNTER — APPOINTMENT (OUTPATIENT)
Dept: HEMATOLOGY ONCOLOGY | Facility: CLINIC | Age: 60
End: 2025-03-05
Payer: COMMERCIAL

## 2025-03-05 VITALS
BODY MASS INDEX: 27.42 KG/M2 | HEART RATE: 71 BPM | SYSTOLIC BLOOD PRESSURE: 108 MMHG | OXYGEN SATURATION: 97 % | HEIGHT: 62 IN | DIASTOLIC BLOOD PRESSURE: 69 MMHG | WEIGHT: 149 LBS

## 2025-03-05 DIAGNOSIS — Z94.81 BONE MARROW TRANSPLANT STATUS: ICD-10-CM

## 2025-03-05 DIAGNOSIS — Z94.84 STEM CELLS TRANSPLANT STATUS: ICD-10-CM

## 2025-03-05 PROCEDURE — 99215 OFFICE O/P EST HI 40 MIN: CPT

## 2025-03-06 ENCOUNTER — APPOINTMENT (OUTPATIENT)
Dept: PSYCHIATRY | Facility: CLINIC | Age: 60
End: 2025-03-06
Payer: COMMERCIAL

## 2025-03-06 PROCEDURE — 90837 PSYTX W PT 60 MINUTES: CPT | Mod: 95

## 2025-03-12 ENCOUNTER — APPOINTMENT (OUTPATIENT)
Dept: ANTEPARTUM | Facility: CLINIC | Age: 60
End: 2025-03-12
Payer: COMMERCIAL

## 2025-03-12 ENCOUNTER — APPOINTMENT (OUTPATIENT)
Dept: ENDOCRINOLOGY | Facility: CLINIC | Age: 60
End: 2025-03-12
Payer: COMMERCIAL

## 2025-03-12 ENCOUNTER — APPOINTMENT (OUTPATIENT)
Dept: OBGYN | Facility: CLINIC | Age: 60
End: 2025-03-12
Payer: COMMERCIAL

## 2025-03-12 ENCOUNTER — ASOB RESULT (OUTPATIENT)
Age: 60
End: 2025-03-12

## 2025-03-12 VITALS
HEIGHT: 62 IN | BODY MASS INDEX: 27.6 KG/M2 | DIASTOLIC BLOOD PRESSURE: 70 MMHG | SYSTOLIC BLOOD PRESSURE: 110 MMHG | WEIGHT: 150 LBS | OXYGEN SATURATION: 98 % | HEART RATE: 68 BPM

## 2025-03-12 VITALS
SYSTOLIC BLOOD PRESSURE: 124 MMHG | WEIGHT: 150 LBS | BODY MASS INDEX: 27.6 KG/M2 | DIASTOLIC BLOOD PRESSURE: 79 MMHG | HEIGHT: 62 IN

## 2025-03-12 DIAGNOSIS — R59.0 LOCALIZED ENLARGED LYMPH NODES: ICD-10-CM

## 2025-03-12 DIAGNOSIS — E05.00 THYROTOXICOSIS WITH DIFFUSE GOITER W/OUT THYROTOXIC CRISIS OR STORM: ICD-10-CM

## 2025-03-12 DIAGNOSIS — Z12.39 ENCOUNTER FOR OTHER SCREENING FOR MALIGNANT NEOPLASM OF BREAST: ICD-10-CM

## 2025-03-12 DIAGNOSIS — D21.9 BENIGN NEOPLASM OF CONNECTIVE AND OTHER SOFT TISSUE, UNSPECIFIED: ICD-10-CM

## 2025-03-12 DIAGNOSIS — E05.90 THYROTOXICOSIS, UNSPECIFIED W/OUT THYROTOXIC CRISIS OR STORM: ICD-10-CM

## 2025-03-12 PROCEDURE — 99214 OFFICE O/P EST MOD 30 MIN: CPT

## 2025-03-12 PROCEDURE — G2211 COMPLEX E/M VISIT ADD ON: CPT

## 2025-03-12 PROCEDURE — 76856 US EXAM PELVIC COMPLETE: CPT | Mod: 59

## 2025-03-12 PROCEDURE — 76830 TRANSVAGINAL US NON-OB: CPT

## 2025-03-13 ENCOUNTER — APPOINTMENT (OUTPATIENT)
Dept: PSYCHIATRY | Facility: CLINIC | Age: 60
End: 2025-03-13
Payer: COMMERCIAL

## 2025-03-13 DIAGNOSIS — F43.22 ADJUSTMENT DISORDER WITH ANXIETY: ICD-10-CM

## 2025-03-13 PROCEDURE — 90837 PSYTX W PT 60 MINUTES: CPT | Mod: 95

## 2025-03-19 ENCOUNTER — NON-APPOINTMENT (OUTPATIENT)
Age: 60
End: 2025-03-19

## 2025-03-20 ENCOUNTER — APPOINTMENT (OUTPATIENT)
Dept: FAMILY MEDICINE | Facility: CLINIC | Age: 60
End: 2025-03-20
Payer: COMMERCIAL

## 2025-03-20 ENCOUNTER — APPOINTMENT (OUTPATIENT)
Dept: PSYCHIATRY | Facility: CLINIC | Age: 60
End: 2025-03-20

## 2025-03-20 VITALS
DIASTOLIC BLOOD PRESSURE: 80 MMHG | BODY MASS INDEX: 27.6 KG/M2 | HEIGHT: 62 IN | TEMPERATURE: 98.2 F | HEART RATE: 75 BPM | OXYGEN SATURATION: 98 % | WEIGHT: 150 LBS | SYSTOLIC BLOOD PRESSURE: 122 MMHG

## 2025-03-20 DIAGNOSIS — M25.612 STIFFNESS OF LEFT SHOULDER, NOT ELSEWHERE CLASSIFIED: ICD-10-CM

## 2025-03-20 DIAGNOSIS — M25.512 PAIN IN LEFT SHOULDER: ICD-10-CM

## 2025-03-20 DIAGNOSIS — C85.90 NON-HODGKIN LYMPHOMA, UNSPECIFIED, UNSPECIFIED SITE: ICD-10-CM

## 2025-03-20 DIAGNOSIS — F41.8 OTHER SPECIFIED ANXIETY DISORDERS: ICD-10-CM

## 2025-03-20 PROCEDURE — 99214 OFFICE O/P EST MOD 30 MIN: CPT

## 2025-03-20 RX ORDER — ALPRAZOLAM 0.5 MG/1
0.5 TABLET ORAL
Qty: 10 | Refills: 0 | Status: ACTIVE | COMMUNITY
Start: 2025-03-20 | End: 1900-01-01

## 2025-03-20 RX ORDER — NAPROXEN 500 MG/1
500 TABLET ORAL
Qty: 28 | Refills: 0 | Status: ACTIVE | COMMUNITY
Start: 2025-03-20 | End: 1900-01-01

## 2025-03-27 ENCOUNTER — APPOINTMENT (OUTPATIENT)
Dept: PSYCHIATRY | Facility: CLINIC | Age: 60
End: 2025-03-27
Payer: COMMERCIAL

## 2025-03-27 DIAGNOSIS — F43.22 ADJUSTMENT DISORDER WITH ANXIETY: ICD-10-CM

## 2025-03-27 PROCEDURE — 90837 PSYTX W PT 60 MINUTES: CPT | Mod: 95

## 2025-03-28 ENCOUNTER — APPOINTMENT (OUTPATIENT)
Dept: PHYSICAL MEDICINE AND REHAB | Facility: CLINIC | Age: 60
End: 2025-03-28
Payer: COMMERCIAL

## 2025-03-28 DIAGNOSIS — Z74.09 OTHER REDUCED MOBILITY: ICD-10-CM

## 2025-03-28 DIAGNOSIS — G62.9 POLYNEUROPATHY, UNSPECIFIED: ICD-10-CM

## 2025-03-28 DIAGNOSIS — Z78.9 OTHER REDUCED MOBILITY: ICD-10-CM

## 2025-03-28 DIAGNOSIS — R29.898 OTHER SYMPTOMS AND SIGNS INVOLVING THE MUSCULOSKELETAL SYSTEM: ICD-10-CM

## 2025-03-28 PROCEDURE — 99214 OFFICE O/P EST MOD 30 MIN: CPT

## 2025-04-01 ENCOUNTER — RESULT REVIEW (OUTPATIENT)
Age: 60
End: 2025-04-01

## 2025-04-01 ENCOUNTER — OUTPATIENT (OUTPATIENT)
Dept: OUTPATIENT SERVICES | Facility: HOSPITAL | Age: 60
LOS: 1 days | End: 2025-04-01
Payer: COMMERCIAL

## 2025-04-01 ENCOUNTER — APPOINTMENT (OUTPATIENT)
Dept: ULTRASOUND IMAGING | Facility: CLINIC | Age: 60
End: 2025-04-01
Payer: COMMERCIAL

## 2025-04-01 ENCOUNTER — APPOINTMENT (OUTPATIENT)
Dept: MAMMOGRAPHY | Facility: CLINIC | Age: 60
End: 2025-04-01
Payer: COMMERCIAL

## 2025-04-01 DIAGNOSIS — Z00.00 ENCOUNTER FOR GENERAL ADULT MEDICAL EXAMINATION WITHOUT ABNORMAL FINDINGS: ICD-10-CM

## 2025-04-01 PROCEDURE — 76882 US LMTD JT/FCL EVL NVASC XTR: CPT

## 2025-04-01 PROCEDURE — G0279: CPT | Mod: 26

## 2025-04-01 PROCEDURE — 76882 US LMTD JT/FCL EVL NVASC XTR: CPT | Mod: 26,LT

## 2025-04-01 PROCEDURE — 77066 DX MAMMO INCL CAD BI: CPT | Mod: 26

## 2025-04-01 PROCEDURE — G0279: CPT

## 2025-04-01 PROCEDURE — 77066 DX MAMMO INCL CAD BI: CPT

## 2025-04-03 ENCOUNTER — APPOINTMENT (OUTPATIENT)
Dept: PSYCHIATRY | Facility: CLINIC | Age: 60
End: 2025-04-03

## 2025-04-03 ENCOUNTER — NON-APPOINTMENT (OUTPATIENT)
Age: 60
End: 2025-04-03

## 2025-04-08 ENCOUNTER — NON-APPOINTMENT (OUTPATIENT)
Age: 60
End: 2025-04-08

## 2025-04-08 ENCOUNTER — APPOINTMENT (OUTPATIENT)
Dept: HEMATOLOGY ONCOLOGY | Facility: CLINIC | Age: 60
End: 2025-04-08

## 2025-04-08 ENCOUNTER — RESULT REVIEW (OUTPATIENT)
Age: 60
End: 2025-04-08

## 2025-04-08 LAB
BASOPHILS # BLD AUTO: 0.02 K/UL — SIGNIFICANT CHANGE UP (ref 0–0.2)
BASOPHILS NFR BLD AUTO: 0.4 % — SIGNIFICANT CHANGE UP (ref 0–2)
EOSINOPHIL # BLD AUTO: 0.11 K/UL — SIGNIFICANT CHANGE UP (ref 0–0.5)
EOSINOPHIL NFR BLD AUTO: 2.2 % — SIGNIFICANT CHANGE UP (ref 0–6)
HCT VFR BLD CALC: 37 % — SIGNIFICANT CHANGE UP (ref 34.5–45)
HGB BLD-MCNC: 11.8 G/DL — SIGNIFICANT CHANGE UP (ref 11.5–15.5)
IMM GRANULOCYTES # BLD AUTO: 0.02 K/UL — SIGNIFICANT CHANGE UP (ref 0–0.07)
IMM GRANULOCYTES NFR BLD AUTO: 0.4 % — SIGNIFICANT CHANGE UP (ref 0–0.9)
LYMPHOCYTES # BLD AUTO: 1.64 K/UL — SIGNIFICANT CHANGE UP (ref 1–3.3)
LYMPHOCYTES NFR BLD AUTO: 33.5 % — SIGNIFICANT CHANGE UP (ref 13–44)
MCHC RBC-ENTMCNC: 27.5 PG — SIGNIFICANT CHANGE UP (ref 27–34)
MCHC RBC-ENTMCNC: 31.9 G/DL — LOW (ref 32–36)
MCV RBC AUTO: 86.2 FL — SIGNIFICANT CHANGE UP (ref 80–100)
MONOCYTES # BLD AUTO: 0.45 K/UL — SIGNIFICANT CHANGE UP (ref 0–0.9)
MONOCYTES NFR BLD AUTO: 9.2 % — SIGNIFICANT CHANGE UP (ref 2–14)
NEUTROPHILS # BLD AUTO: 2.66 K/UL — SIGNIFICANT CHANGE UP (ref 1.8–7.4)
NEUTROPHILS NFR BLD AUTO: 54.3 % — SIGNIFICANT CHANGE UP (ref 43–77)
NRBC # BLD AUTO: 0 K/UL — SIGNIFICANT CHANGE UP (ref 0–0)
NRBC # FLD: 0 K/UL — SIGNIFICANT CHANGE UP (ref 0–0)
NRBC BLD AUTO-RTO: 0 /100 WBCS — SIGNIFICANT CHANGE UP (ref 0–0)
PLATELET # BLD AUTO: 243 K/UL — SIGNIFICANT CHANGE UP (ref 150–400)
PMV BLD: 8.9 FL — SIGNIFICANT CHANGE UP (ref 7–13)
RBC # BLD: 4.29 M/UL — SIGNIFICANT CHANGE UP (ref 3.8–5.2)
RBC # FLD: 13.6 % — SIGNIFICANT CHANGE UP (ref 10.3–14.5)
WBC # BLD: 4.9 K/UL — SIGNIFICANT CHANGE UP (ref 3.8–10.5)
WBC # FLD AUTO: 4.9 K/UL — SIGNIFICANT CHANGE UP (ref 3.8–10.5)

## 2025-04-09 ENCOUNTER — APPOINTMENT (OUTPATIENT)
Dept: GASTROENTEROLOGY | Facility: CLINIC | Age: 60
End: 2025-04-09
Payer: COMMERCIAL

## 2025-04-09 VITALS
OXYGEN SATURATION: 99 % | BODY MASS INDEX: 27.6 KG/M2 | HEART RATE: 78 BPM | WEIGHT: 150 LBS | HEIGHT: 62 IN | DIASTOLIC BLOOD PRESSURE: 80 MMHG | SYSTOLIC BLOOD PRESSURE: 115 MMHG | TEMPERATURE: 97.7 F

## 2025-04-09 DIAGNOSIS — Z78.9 OTHER SPECIFIED HEALTH STATUS: ICD-10-CM

## 2025-04-09 DIAGNOSIS — Z80.0 FAMILY HISTORY OF MALIGNANT NEOPLASM OF DIGESTIVE ORGANS: ICD-10-CM

## 2025-04-09 DIAGNOSIS — Z83.79 FAMILY HISTORY OF OTHER DISEASES OF THE DIGESTIVE SYSTEM: ICD-10-CM

## 2025-04-09 DIAGNOSIS — Z12.11 ENCOUNTER FOR SCREENING FOR MALIGNANT NEOPLASM OF COLON: ICD-10-CM

## 2025-04-09 DIAGNOSIS — K59.09 OTHER CONSTIPATION: ICD-10-CM

## 2025-04-09 DIAGNOSIS — Z82.3 FAMILY HISTORY OF STROKE: ICD-10-CM

## 2025-04-09 LAB
ALBUMIN SERPL ELPH-MCNC: 4.6 G/DL
ALP BLD-CCNC: 168 U/L
ALT SERPL-CCNC: 16 U/L
ANION GAP SERPL CALC-SCNC: 10 MMOL/L
AST SERPL-CCNC: 15 U/L
BILIRUB SERPL-MCNC: 0.5 MG/DL
BUN SERPL-MCNC: 16 MG/DL
CALCIUM SERPL-MCNC: 10.4 MG/DL
CHLORIDE SERPL-SCNC: 101 MMOL/L
CO2 SERPL-SCNC: 27 MMOL/L
CREAT SERPL-MCNC: 0.59 MG/DL
EGFRCR SERPLBLD CKD-EPI 2021: 104 ML/MIN/1.73M2
GLUCOSE SERPL-MCNC: 128 MG/DL
LDH SERPL-CCNC: 153 U/L
POTASSIUM SERPL-SCNC: 4.9 MMOL/L
PROT SERPL-MCNC: 7.1 G/DL
SODIUM SERPL-SCNC: 138 MMOL/L
URATE SERPL-MCNC: 3 MG/DL

## 2025-04-09 PROCEDURE — 99213 OFFICE O/P EST LOW 20 MIN: CPT

## 2025-04-09 RX ORDER — PRUCALOPRIDE 1 MG/1
1 TABLET ORAL
Qty: 90 | Refills: 1 | Status: ACTIVE | COMMUNITY
Start: 2025-04-09 | End: 1900-01-01

## 2025-04-09 RX ORDER — SODIUM SULFATE, MAGNESIUM SULFATE, AND POTASSIUM CHLORIDE 17.75; 2.7; 2.25 G/1; G/1; G/1
1479-225-188 TABLET ORAL
Qty: 24 | Refills: 0 | Status: ACTIVE | COMMUNITY
Start: 2025-04-09 | End: 1900-01-01

## 2025-04-10 ENCOUNTER — APPOINTMENT (OUTPATIENT)
Dept: PSYCHIATRY | Facility: CLINIC | Age: 60
End: 2025-04-10
Payer: COMMERCIAL

## 2025-04-10 ENCOUNTER — OUTPATIENT (OUTPATIENT)
Dept: OUTPATIENT SERVICES | Facility: HOSPITAL | Age: 60
LOS: 1 days | Discharge: HOME | End: 2025-04-10

## 2025-04-10 DIAGNOSIS — C84.40 PERIPHERAL T-CELL LYMPHOMA, NOT ELSEWHERE CLASSIFIED, UNSPECIFIED SITE: ICD-10-CM

## 2025-04-10 PROCEDURE — 90837 PSYTX W PT 60 MINUTES: CPT | Mod: 95

## 2025-04-11 ENCOUNTER — APPOINTMENT (OUTPATIENT)
Dept: ENDOCRINOLOGY | Facility: CLINIC | Age: 60
End: 2025-04-11
Payer: COMMERCIAL

## 2025-04-11 VITALS
HEIGHT: 62 IN | DIASTOLIC BLOOD PRESSURE: 60 MMHG | HEART RATE: 74 BPM | SYSTOLIC BLOOD PRESSURE: 124 MMHG | BODY MASS INDEX: 28.52 KG/M2 | WEIGHT: 155 LBS | OXYGEN SATURATION: 97 %

## 2025-04-11 DIAGNOSIS — R35.0 FREQUENCY OF MICTURITION: ICD-10-CM

## 2025-04-11 DIAGNOSIS — E04.1 NONTOXIC SINGLE THYROID NODULE: ICD-10-CM

## 2025-04-11 DIAGNOSIS — E05.00 THYROTOXICOSIS WITH DIFFUSE GOITER W/OUT THYROTOXIC CRISIS OR STORM: ICD-10-CM

## 2025-04-11 DIAGNOSIS — R63.1 POLYDIPSIA: ICD-10-CM

## 2025-04-11 DIAGNOSIS — E05.90 THYROTOXICOSIS, UNSPECIFIED W/OUT THYROTOXIC CRISIS OR STORM: ICD-10-CM

## 2025-04-11 PROCEDURE — 99214 OFFICE O/P EST MOD 30 MIN: CPT

## 2025-04-11 PROCEDURE — G2211 COMPLEX E/M VISIT ADD ON: CPT

## 2025-04-12 ENCOUNTER — OUTPATIENT (OUTPATIENT)
Dept: OUTPATIENT SERVICES | Facility: HOSPITAL | Age: 60
LOS: 1 days | End: 2025-04-12
Payer: COMMERCIAL

## 2025-04-12 ENCOUNTER — APPOINTMENT (OUTPATIENT)
Dept: MRI IMAGING | Facility: CLINIC | Age: 60
End: 2025-04-12

## 2025-04-12 DIAGNOSIS — M25.512 PAIN IN LEFT SHOULDER: ICD-10-CM

## 2025-04-12 DIAGNOSIS — M25.612 STIFFNESS OF LEFT SHOULDER, NOT ELSEWHERE CLASSIFIED: ICD-10-CM

## 2025-04-12 PROCEDURE — 73221 MRI JOINT UPR EXTREM W/O DYE: CPT

## 2025-04-12 PROCEDURE — 73221 MRI JOINT UPR EXTREM W/O DYE: CPT | Mod: 26,LT

## 2025-04-17 ENCOUNTER — APPOINTMENT (OUTPATIENT)
Dept: PSYCHIATRY | Facility: CLINIC | Age: 60
End: 2025-04-17
Payer: COMMERCIAL

## 2025-04-17 DIAGNOSIS — F43.22 ADJUSTMENT DISORDER WITH ANXIETY: ICD-10-CM

## 2025-04-17 PROCEDURE — 90837 PSYTX W PT 60 MINUTES: CPT | Mod: 95

## 2025-04-18 ENCOUNTER — APPOINTMENT (OUTPATIENT)
Age: 60
End: 2025-04-18

## 2025-04-20 PROBLEM — S46.009A ROTATOR CUFF INJURY: Status: ACTIVE | Noted: 2025-04-20

## 2025-04-24 RX ORDER — SODIUM SULFATE, POTASSIUM SULFATE AND MAGNESIUM SULFATE 1.6; 3.13; 17.5 G/177ML; G/177ML; G/177ML
17.5-3.13-1.6 SOLUTION ORAL
Qty: 1 | Refills: 0 | Status: ACTIVE | COMMUNITY
Start: 2025-04-24 | End: 1900-01-01

## 2025-05-01 ENCOUNTER — APPOINTMENT (OUTPATIENT)
Dept: PSYCHIATRY | Facility: CLINIC | Age: 60
End: 2025-05-01
Payer: COMMERCIAL

## 2025-05-01 DIAGNOSIS — F43.22 ADJUSTMENT DISORDER WITH ANXIETY: ICD-10-CM

## 2025-05-01 PROCEDURE — 90834 PSYTX W PT 45 MINUTES: CPT | Mod: 95

## 2025-05-06 ENCOUNTER — OUTPATIENT (OUTPATIENT)
Dept: OUTPATIENT SERVICES | Facility: HOSPITAL | Age: 60
LOS: 1 days | Discharge: HOME | End: 2025-05-06

## 2025-05-06 DIAGNOSIS — C84.40 PERIPHERAL T-CELL LYMPHOMA, NOT ELSEWHERE CLASSIFIED, UNSPECIFIED SITE: ICD-10-CM

## 2025-05-06 DIAGNOSIS — D64.9 ANEMIA, UNSPECIFIED: ICD-10-CM

## 2025-05-06 DIAGNOSIS — C85.90 NON-HODGKIN LYMPHOMA, UNSPECIFIED, UNSPECIFIED SITE: ICD-10-CM

## 2025-05-07 ENCOUNTER — APPOINTMENT (OUTPATIENT)
Dept: HEMATOLOGY ONCOLOGY | Facility: CLINIC | Age: 60
End: 2025-05-07
Payer: COMMERCIAL

## 2025-05-07 ENCOUNTER — RESULT REVIEW (OUTPATIENT)
Age: 60
End: 2025-05-07

## 2025-05-07 ENCOUNTER — APPOINTMENT (OUTPATIENT)
Dept: HEMATOLOGY ONCOLOGY | Facility: CLINIC | Age: 60
End: 2025-05-07

## 2025-05-07 VITALS
HEART RATE: 69 BPM | TEMPERATURE: 98 F | OXYGEN SATURATION: 99 % | SYSTOLIC BLOOD PRESSURE: 113 MMHG | RESPIRATION RATE: 18 BRPM | WEIGHT: 155.84 LBS | BODY MASS INDEX: 28.51 KG/M2 | DIASTOLIC BLOOD PRESSURE: 75 MMHG

## 2025-05-07 DIAGNOSIS — C85.90 NON-HODGKIN LYMPHOMA, UNSPECIFIED, UNSPECIFIED SITE: ICD-10-CM

## 2025-05-07 DIAGNOSIS — Z94.84 STEM CELLS TRANSPLANT STATUS: ICD-10-CM

## 2025-05-07 LAB
ALBUMIN SERPL ELPH-MCNC: 4.6 G/DL
ALP BLD-CCNC: 150 U/L
ALT SERPL-CCNC: 22 U/L
ANION GAP SERPL CALC-SCNC: 11 MMOL/L
AST SERPL-CCNC: 21 U/L
BASOPHILS # BLD AUTO: 0.02 K/UL — SIGNIFICANT CHANGE UP (ref 0–0.2)
BASOPHILS NFR BLD AUTO: 0.4 % — SIGNIFICANT CHANGE UP (ref 0–2)
BILIRUB SERPL-MCNC: 0.4 MG/DL
BUN SERPL-MCNC: 16 MG/DL
CALCIUM SERPL-MCNC: 10.2 MG/DL
CHLORIDE SERPL-SCNC: 104 MMOL/L
CO2 SERPL-SCNC: 26 MMOL/L
CREAT SERPL-MCNC: 0.58 MG/DL
EGFRCR SERPLBLD CKD-EPI 2021: 105 ML/MIN/1.73M2
EOSINOPHIL # BLD AUTO: 0.09 K/UL — SIGNIFICANT CHANGE UP (ref 0–0.5)
EOSINOPHIL NFR BLD AUTO: 1.7 % — SIGNIFICANT CHANGE UP (ref 0–6)
GLUCOSE SERPL-MCNC: 119 MG/DL
HCT VFR BLD CALC: 36.8 % — SIGNIFICANT CHANGE UP (ref 34.5–45)
HGB BLD-MCNC: 11.9 G/DL — SIGNIFICANT CHANGE UP (ref 11.5–15.5)
IMM GRANULOCYTES NFR BLD AUTO: 0.4 % — SIGNIFICANT CHANGE UP (ref 0–0.9)
LDH SERPL-CCNC: 167 U/L
LYMPHOCYTES # BLD AUTO: 1.54 K/UL — SIGNIFICANT CHANGE UP (ref 1–3.3)
LYMPHOCYTES # BLD AUTO: 29.4 % — SIGNIFICANT CHANGE UP (ref 13–44)
MAGNESIUM SERPL-MCNC: 2.2 MG/DL
MCHC RBC-ENTMCNC: 28.3 PG — SIGNIFICANT CHANGE UP (ref 27–34)
MCHC RBC-ENTMCNC: 32.3 G/DL — SIGNIFICANT CHANGE UP (ref 32–36)
MCV RBC AUTO: 87.4 FL — SIGNIFICANT CHANGE UP (ref 80–100)
MONOCYTES # BLD AUTO: 0.47 K/UL — SIGNIFICANT CHANGE UP (ref 0–0.9)
MONOCYTES NFR BLD AUTO: 9 % — SIGNIFICANT CHANGE UP (ref 2–14)
NEUTROPHILS # BLD AUTO: 3.1 K/UL — SIGNIFICANT CHANGE UP (ref 1.8–7.4)
NEUTROPHILS NFR BLD AUTO: 59.1 % — SIGNIFICANT CHANGE UP (ref 43–77)
NRBC BLD AUTO-RTO: 0 /100 WBCS — SIGNIFICANT CHANGE UP (ref 0–0)
PLATELET # BLD AUTO: 231 K/UL — SIGNIFICANT CHANGE UP (ref 150–400)
POTASSIUM SERPL-SCNC: 5.2 MMOL/L
PROT SERPL-MCNC: 6.9 G/DL
RBC # BLD: 4.21 M/UL — SIGNIFICANT CHANGE UP (ref 3.8–5.2)
RBC # FLD: 13.6 % — SIGNIFICANT CHANGE UP (ref 10.3–14.5)
SODIUM SERPL-SCNC: 141 MMOL/L
WBC # BLD: 5.24 K/UL — SIGNIFICANT CHANGE UP (ref 3.8–10.5)
WBC # FLD AUTO: 5.24 K/UL — SIGNIFICANT CHANGE UP (ref 3.8–10.5)

## 2025-05-07 PROCEDURE — 99214 OFFICE O/P EST MOD 30 MIN: CPT

## 2025-05-08 ENCOUNTER — APPOINTMENT (OUTPATIENT)
Dept: ORTHOPEDIC SURGERY | Facility: CLINIC | Age: 60
End: 2025-05-08
Payer: COMMERCIAL

## 2025-05-08 ENCOUNTER — APPOINTMENT (OUTPATIENT)
Dept: PSYCHIATRY | Facility: CLINIC | Age: 60
End: 2025-05-08

## 2025-05-08 DIAGNOSIS — M75.02 ADHESIVE CAPSULITIS OF LEFT SHOULDER: ICD-10-CM

## 2025-05-08 PROCEDURE — 99204 OFFICE O/P NEW MOD 45 MIN: CPT | Mod: 25

## 2025-05-08 PROCEDURE — 20611 DRAIN/INJ JOINT/BURSA W/US: CPT | Mod: LT

## 2025-05-15 ENCOUNTER — APPOINTMENT (OUTPATIENT)
Dept: PSYCHIATRY | Facility: CLINIC | Age: 60
End: 2025-05-15
Payer: COMMERCIAL

## 2025-05-15 PROCEDURE — 90837 PSYTX W PT 60 MINUTES: CPT | Mod: 95

## 2025-05-21 ENCOUNTER — APPOINTMENT (OUTPATIENT)
Dept: PSYCHIATRY | Facility: CLINIC | Age: 60
End: 2025-05-21
Payer: COMMERCIAL

## 2025-05-21 PROCEDURE — 90837 PSYTX W PT 60 MINUTES: CPT | Mod: 95

## 2025-05-23 ENCOUNTER — APPOINTMENT (OUTPATIENT)
Dept: ENDOCRINOLOGY | Facility: CLINIC | Age: 60
End: 2025-05-23
Payer: COMMERCIAL

## 2025-05-23 VITALS
WEIGHT: 149 LBS | SYSTOLIC BLOOD PRESSURE: 118 MMHG | BODY MASS INDEX: 27.42 KG/M2 | HEIGHT: 62 IN | HEART RATE: 80 BPM | DIASTOLIC BLOOD PRESSURE: 70 MMHG | OXYGEN SATURATION: 97 %

## 2025-05-23 DIAGNOSIS — E05.90 THYROTOXICOSIS, UNSPECIFIED W/OUT THYROTOXIC CRISIS OR STORM: ICD-10-CM

## 2025-05-23 DIAGNOSIS — E05.00 THYROTOXICOSIS WITH DIFFUSE GOITER W/OUT THYROTOXIC CRISIS OR STORM: ICD-10-CM

## 2025-05-23 PROCEDURE — 99214 OFFICE O/P EST MOD 30 MIN: CPT

## 2025-05-23 PROCEDURE — G2211 COMPLEX E/M VISIT ADD ON: CPT

## 2025-05-23 NOTE — STUDENT SIGN OFF DOCUMENT - CO-SIGNATURE DATE
Medication failed protocol.    Medication: insulin glargine (Lantus SoloStar) 100 UNIT/ML pen-injector   Last office visit date: 04/03/2025  Next appointment scheduled?: Yes   Medication Refill Protocol Failed.  Insulins Refill Protocol - 12 Month Protocol Failed     Genesys text sent and rx sent to provider for review    17-Nov-2022 15:21

## 2025-05-29 ENCOUNTER — APPOINTMENT (OUTPATIENT)
Dept: PSYCHIATRY | Facility: CLINIC | Age: 60
End: 2025-05-29
Payer: COMMERCIAL

## 2025-05-29 PROCEDURE — 90837 PSYTX W PT 60 MINUTES: CPT | Mod: 95

## 2025-05-30 ENCOUNTER — APPOINTMENT (OUTPATIENT)
Dept: PHYSICAL MEDICINE AND REHAB | Facility: CLINIC | Age: 60
End: 2025-05-30
Payer: COMMERCIAL

## 2025-05-30 DIAGNOSIS — G62.9 POLYNEUROPATHY, UNSPECIFIED: ICD-10-CM

## 2025-05-30 DIAGNOSIS — Z74.09 OTHER REDUCED MOBILITY: ICD-10-CM

## 2025-05-30 DIAGNOSIS — Z78.9 OTHER REDUCED MOBILITY: ICD-10-CM

## 2025-05-30 PROCEDURE — 99214 OFFICE O/P EST MOD 30 MIN: CPT

## 2025-06-05 ENCOUNTER — APPOINTMENT (OUTPATIENT)
Dept: PSYCHIATRY | Facility: CLINIC | Age: 60
End: 2025-06-05
Payer: COMMERCIAL

## 2025-06-05 PROCEDURE — 90834 PSYTX W PT 45 MINUTES: CPT | Mod: 95

## 2025-06-06 ENCOUNTER — OUTPATIENT (OUTPATIENT)
Dept: OUTPATIENT SERVICES | Facility: HOSPITAL | Age: 60
LOS: 1 days | Discharge: HOME | End: 2025-06-06

## 2025-06-06 DIAGNOSIS — C84.40 PERIPHERAL T-CELL LYMPHOMA, NOT ELSEWHERE CLASSIFIED, UNSPECIFIED SITE: ICD-10-CM

## 2025-06-12 ENCOUNTER — APPOINTMENT (OUTPATIENT)
Dept: PSYCHIATRY | Facility: CLINIC | Age: 60
End: 2025-06-12
Payer: COMMERCIAL

## 2025-06-12 PROCEDURE — 90837 PSYTX W PT 60 MINUTES: CPT | Mod: 95

## 2025-06-13 ENCOUNTER — APPOINTMENT (OUTPATIENT)
Dept: CARDIOLOGY | Facility: CLINIC | Age: 60
End: 2025-06-13
Payer: COMMERCIAL

## 2025-06-13 VITALS
OXYGEN SATURATION: 99 % | HEART RATE: 75 BPM | DIASTOLIC BLOOD PRESSURE: 68 MMHG | BODY MASS INDEX: 27.6 KG/M2 | SYSTOLIC BLOOD PRESSURE: 100 MMHG | WEIGHT: 150 LBS | HEIGHT: 62 IN

## 2025-06-13 PROBLEM — R07.89 CHEST DISCOMFORT: Status: ACTIVE | Noted: 2025-06-13

## 2025-06-13 PROBLEM — R00.2 PALPITATIONS: Status: ACTIVE | Noted: 2025-06-13

## 2025-06-13 PROCEDURE — 93000 ELECTROCARDIOGRAM COMPLETE: CPT

## 2025-06-13 PROCEDURE — G2211 COMPLEX E/M VISIT ADD ON: CPT

## 2025-06-13 PROCEDURE — 99214 OFFICE O/P EST MOD 30 MIN: CPT

## 2025-06-13 RX ORDER — ATENOLOL 25 MG/1
25 TABLET ORAL
Refills: 0 | Status: ACTIVE | COMMUNITY

## 2025-06-16 ENCOUNTER — APPOINTMENT (OUTPATIENT)
Dept: ORTHOPEDIC SURGERY | Facility: CLINIC | Age: 60
End: 2025-06-16
Payer: COMMERCIAL

## 2025-06-16 VITALS — BODY MASS INDEX: 27.6 KG/M2 | WEIGHT: 150 LBS | HEIGHT: 62 IN

## 2025-06-16 PROCEDURE — 99214 OFFICE O/P EST MOD 30 MIN: CPT

## 2025-06-19 ENCOUNTER — APPOINTMENT (OUTPATIENT)
Dept: PSYCHIATRY | Facility: CLINIC | Age: 60
End: 2025-06-19
Payer: COMMERCIAL

## 2025-06-19 PROCEDURE — 90837 PSYTX W PT 60 MINUTES: CPT | Mod: 95

## 2025-06-20 ENCOUNTER — APPOINTMENT (OUTPATIENT)
Dept: GASTROENTEROLOGY | Facility: GI CENTER | Age: 60
End: 2025-06-20

## 2025-06-20 ENCOUNTER — APPOINTMENT (OUTPATIENT)
Age: 60
End: 2025-06-20
Payer: COMMERCIAL

## 2025-06-20 PROCEDURE — 97810 ACUP 1/> WO ESTIM 1ST 15 MIN: CPT

## 2025-06-20 PROCEDURE — 97811 ACUP 1/> W/O ESTIM EA ADD 15: CPT

## 2025-06-26 ENCOUNTER — APPOINTMENT (OUTPATIENT)
Dept: PSYCHIATRY | Facility: CLINIC | Age: 60
End: 2025-06-26
Payer: COMMERCIAL

## 2025-06-26 PROCEDURE — 90837 PSYTX W PT 60 MINUTES: CPT | Mod: 95

## 2025-06-27 ENCOUNTER — RESULT REVIEW (OUTPATIENT)
Age: 60
End: 2025-06-27

## 2025-06-27 ENCOUNTER — APPOINTMENT (OUTPATIENT)
Age: 60
End: 2025-06-27

## 2025-06-27 ENCOUNTER — APPOINTMENT (OUTPATIENT)
Dept: HEMATOLOGY ONCOLOGY | Facility: CLINIC | Age: 60
End: 2025-06-27
Payer: COMMERCIAL

## 2025-06-27 VITALS
SYSTOLIC BLOOD PRESSURE: 107 MMHG | HEART RATE: 78 BPM | OXYGEN SATURATION: 97 % | HEIGHT: 62 IN | BODY MASS INDEX: 27.98 KG/M2 | DIASTOLIC BLOOD PRESSURE: 72 MMHG | WEIGHT: 152.05 LBS

## 2025-06-27 LAB
ALBUMIN SERPL ELPH-MCNC: 4.2 G/DL — SIGNIFICANT CHANGE UP (ref 3.3–5)
ALP SERPL-CCNC: 124 U/L — HIGH (ref 40–120)
ALT FLD-CCNC: 30 U/L — SIGNIFICANT CHANGE UP (ref 10–40)
ANION GAP SERPL CALC-SCNC: 13 MMOL/L — SIGNIFICANT CHANGE UP (ref 5–17)
AST SERPL-CCNC: 30 U/L — SIGNIFICANT CHANGE UP (ref 10–35)
BASOPHILS # BLD AUTO: 0.03 K/UL — SIGNIFICANT CHANGE UP (ref 0–0.2)
BASOPHILS NFR BLD AUTO: 0.6 % — SIGNIFICANT CHANGE UP (ref 0–2)
BILIRUB SERPL-MCNC: 0.4 MG/DL — SIGNIFICANT CHANGE UP (ref 0.2–1.2)
BUN SERPL-MCNC: 20 MG/DL — SIGNIFICANT CHANGE UP (ref 7–23)
CALCIUM SERPL-MCNC: 10 MG/DL — SIGNIFICANT CHANGE UP (ref 8.4–10.5)
CHLORIDE SERPL-SCNC: 100 MMOL/L — SIGNIFICANT CHANGE UP (ref 96–108)
CO2 SERPL-SCNC: 22 MMOL/L — SIGNIFICANT CHANGE UP (ref 22–31)
CREAT SERPL-MCNC: 0.49 MG/DL — LOW (ref 0.5–1.3)
EGFR: 109 ML/MIN/1.73M2 — SIGNIFICANT CHANGE UP
EGFR: 109 ML/MIN/1.73M2 — SIGNIFICANT CHANGE UP
EOSINOPHIL # BLD AUTO: 0.08 K/UL — SIGNIFICANT CHANGE UP (ref 0–0.5)
EOSINOPHIL NFR BLD AUTO: 1.6 % — SIGNIFICANT CHANGE UP (ref 0–6)
GLUCOSE SERPL-MCNC: 114 MG/DL — HIGH (ref 70–99)
HCT VFR BLD CALC: 37.2 % — SIGNIFICANT CHANGE UP (ref 34.5–45)
HGB BLD-MCNC: 12.1 G/DL — SIGNIFICANT CHANGE UP (ref 11.5–15.5)
IMM GRANULOCYTES # BLD AUTO: 0.01 K/UL — SIGNIFICANT CHANGE UP (ref 0–0.07)
IMM GRANULOCYTES NFR BLD AUTO: 0.2 % — SIGNIFICANT CHANGE UP (ref 0–0.9)
LDH SERPL L TO P-CCNC: 236 U/L — SIGNIFICANT CHANGE UP (ref 50–242)
LYMPHOCYTES # BLD AUTO: 1.33 K/UL — SIGNIFICANT CHANGE UP (ref 1–3.3)
LYMPHOCYTES NFR BLD AUTO: 25.9 % — SIGNIFICANT CHANGE UP (ref 13–44)
MCHC RBC-ENTMCNC: 28.3 PG — SIGNIFICANT CHANGE UP (ref 27–34)
MCHC RBC-ENTMCNC: 32.5 G/DL — SIGNIFICANT CHANGE UP (ref 32–36)
MCV RBC AUTO: 87.1 FL — SIGNIFICANT CHANGE UP (ref 80–100)
MONOCYTES # BLD AUTO: 0.36 K/UL — SIGNIFICANT CHANGE UP (ref 0–0.9)
MONOCYTES NFR BLD AUTO: 7 % — SIGNIFICANT CHANGE UP (ref 2–14)
NEUTROPHILS # BLD AUTO: 3.33 K/UL — SIGNIFICANT CHANGE UP (ref 1.8–7.4)
NEUTROPHILS NFR BLD AUTO: 64.7 % — SIGNIFICANT CHANGE UP (ref 43–77)
NRBC # BLD AUTO: 0 K/UL — SIGNIFICANT CHANGE UP (ref 0–0)
NRBC # FLD: 0 K/UL — SIGNIFICANT CHANGE UP (ref 0–0)
NRBC BLD AUTO-RTO: 0 /100 WBCS — SIGNIFICANT CHANGE UP (ref 0–0)
PLATELET # BLD AUTO: 274 K/UL — SIGNIFICANT CHANGE UP (ref 150–400)
PMV BLD: 9.6 FL — SIGNIFICANT CHANGE UP (ref 7–13)
POTASSIUM SERPL-MCNC: 4.3 MMOL/L — SIGNIFICANT CHANGE UP (ref 3.5–5.3)
POTASSIUM SERPL-SCNC: 4.3 MMOL/L — SIGNIFICANT CHANGE UP (ref 3.5–5.3)
PROT SERPL-MCNC: 7.1 G/DL — SIGNIFICANT CHANGE UP (ref 6–8.3)
RBC # BLD: 4.27 M/UL — SIGNIFICANT CHANGE UP (ref 3.8–5.2)
RBC # FLD: 13.4 % — SIGNIFICANT CHANGE UP (ref 10.3–14.5)
SODIUM SERPL-SCNC: 135 MMOL/L — SIGNIFICANT CHANGE UP (ref 135–145)
WBC # BLD: 5.14 K/UL — SIGNIFICANT CHANGE UP (ref 3.8–10.5)
WBC # FLD AUTO: 5.14 K/UL — SIGNIFICANT CHANGE UP (ref 3.8–10.5)

## 2025-06-27 PROCEDURE — 99214 OFFICE O/P EST MOD 30 MIN: CPT

## 2025-06-30 ENCOUNTER — APPOINTMENT (OUTPATIENT)
Age: 60
End: 2025-06-30
Payer: COMMERCIAL

## 2025-06-30 PROCEDURE — 97810 ACUP 1/> WO ESTIM 1ST 15 MIN: CPT

## 2025-06-30 PROCEDURE — 97811 ACUP 1/> W/O ESTIM EA ADD 15: CPT

## 2025-07-03 ENCOUNTER — APPOINTMENT (OUTPATIENT)
Dept: PSYCHIATRY | Facility: CLINIC | Age: 60
End: 2025-07-03
Payer: COMMERCIAL

## 2025-07-03 PROCEDURE — 90837 PSYTX W PT 60 MINUTES: CPT | Mod: 95

## 2025-07-06 PROCEDURE — 93270 REMOTE 30 DAY ECG REV/REPORT: CPT

## 2025-07-07 ENCOUNTER — APPOINTMENT (OUTPATIENT)
Age: 60
End: 2025-07-07

## 2025-07-09 ENCOUNTER — APPOINTMENT (OUTPATIENT)
Dept: FAMILY MEDICINE | Facility: CLINIC | Age: 60
End: 2025-07-09
Payer: COMMERCIAL

## 2025-07-09 VITALS
WEIGHT: 153 LBS | OXYGEN SATURATION: 98 % | TEMPERATURE: 98.2 F | DIASTOLIC BLOOD PRESSURE: 78 MMHG | BODY MASS INDEX: 28.16 KG/M2 | HEART RATE: 111 BPM | HEIGHT: 62 IN | SYSTOLIC BLOOD PRESSURE: 118 MMHG

## 2025-07-09 PROBLEM — Z91.89 RISK OF EXPOSURE TO LYME DISEASE: Status: ACTIVE | Noted: 2025-07-09

## 2025-07-09 PROCEDURE — G2211 COMPLEX E/M VISIT ADD ON: CPT

## 2025-07-09 PROCEDURE — 99213 OFFICE O/P EST LOW 20 MIN: CPT

## 2025-07-10 ENCOUNTER — APPOINTMENT (OUTPATIENT)
Dept: PSYCHIATRY | Facility: CLINIC | Age: 60
End: 2025-07-10
Payer: COMMERCIAL

## 2025-07-10 PROCEDURE — 90837 PSYTX W PT 60 MINUTES: CPT | Mod: 95

## 2025-07-14 ENCOUNTER — APPOINTMENT (OUTPATIENT)
Age: 60
End: 2025-07-14
Payer: COMMERCIAL

## 2025-07-14 ENCOUNTER — APPOINTMENT (OUTPATIENT)
Age: 60
End: 2025-07-14

## 2025-07-14 LAB — TICK ID W/ REFLEX TO LYME PCR, TICK: ABNORMAL

## 2025-07-14 PROCEDURE — 97811 ACUP 1/> W/O ESTIM EA ADD 15: CPT

## 2025-07-14 PROCEDURE — 97810 ACUP 1/> WO ESTIM 1ST 15 MIN: CPT

## 2025-07-17 ENCOUNTER — APPOINTMENT (OUTPATIENT)
Dept: PSYCHIATRY | Facility: CLINIC | Age: 60
End: 2025-07-17
Payer: COMMERCIAL

## 2025-07-17 PROCEDURE — 90837 PSYTX W PT 60 MINUTES: CPT | Mod: 95

## 2025-07-18 ENCOUNTER — APPOINTMENT (OUTPATIENT)
Dept: ENDOCRINOLOGY | Facility: CLINIC | Age: 60
End: 2025-07-18
Payer: COMMERCIAL

## 2025-07-18 VITALS
BODY MASS INDEX: 26.31 KG/M2 | HEART RATE: 93 BPM | SYSTOLIC BLOOD PRESSURE: 110 MMHG | HEIGHT: 62 IN | WEIGHT: 143 LBS | DIASTOLIC BLOOD PRESSURE: 70 MMHG | OXYGEN SATURATION: 98 %

## 2025-07-18 PROCEDURE — 99214 OFFICE O/P EST MOD 30 MIN: CPT

## 2025-07-18 PROCEDURE — G2211 COMPLEX E/M VISIT ADD ON: CPT

## 2025-07-21 ENCOUNTER — APPOINTMENT (OUTPATIENT)
Age: 60
End: 2025-07-21
Payer: COMMERCIAL

## 2025-07-21 PROCEDURE — 97811 ACUP 1/> W/O ESTIM EA ADD 15: CPT

## 2025-07-21 PROCEDURE — 97810 ACUP 1/> WO ESTIM 1ST 15 MIN: CPT

## 2025-07-24 ENCOUNTER — APPOINTMENT (OUTPATIENT)
Dept: PSYCHIATRY | Facility: CLINIC | Age: 60
End: 2025-07-24
Payer: COMMERCIAL

## 2025-07-24 PROCEDURE — 90837 PSYTX W PT 60 MINUTES: CPT | Mod: 95

## 2025-07-30 PROCEDURE — 93272 ECG/REVIEW INTERPRET ONLY: CPT

## 2025-07-31 ENCOUNTER — APPOINTMENT (OUTPATIENT)
Dept: PSYCHIATRY | Facility: CLINIC | Age: 60
End: 2025-07-31
Payer: COMMERCIAL

## 2025-07-31 PROCEDURE — 90837 PSYTX W PT 60 MINUTES: CPT | Mod: 95

## 2025-08-01 ENCOUNTER — APPOINTMENT (OUTPATIENT)
Age: 60
End: 2025-08-01

## 2025-08-01 ENCOUNTER — APPOINTMENT (OUTPATIENT)
Dept: PHYSICAL MEDICINE AND REHAB | Facility: CLINIC | Age: 60
End: 2025-08-01
Payer: COMMERCIAL

## 2025-08-01 PROCEDURE — 99214 OFFICE O/P EST MOD 30 MIN: CPT

## 2025-08-05 ENCOUNTER — APPOINTMENT (OUTPATIENT)
Dept: CT IMAGING | Facility: CLINIC | Age: 60
End: 2025-08-05
Payer: COMMERCIAL

## 2025-08-05 ENCOUNTER — OUTPATIENT (OUTPATIENT)
Dept: OUTPATIENT SERVICES | Facility: HOSPITAL | Age: 60
LOS: 1 days | End: 2025-08-05

## 2025-08-05 DIAGNOSIS — Z00.8 ENCOUNTER FOR OTHER GENERAL EXAMINATION: ICD-10-CM

## 2025-08-05 PROCEDURE — 74177 CT ABD & PELVIS W/CONTRAST: CPT | Mod: 26

## 2025-08-05 PROCEDURE — 71260 CT THORAX DX C+: CPT | Mod: 26

## 2025-08-06 ENCOUNTER — APPOINTMENT (OUTPATIENT)
Dept: PSYCHIATRY | Facility: CLINIC | Age: 60
End: 2025-08-06
Payer: COMMERCIAL

## 2025-08-06 PROCEDURE — 90837 PSYTX W PT 60 MINUTES: CPT | Mod: 95

## 2025-08-07 ENCOUNTER — APPOINTMENT (OUTPATIENT)
Dept: CARDIOLOGY | Facility: CLINIC | Age: 60
End: 2025-08-07
Payer: COMMERCIAL

## 2025-08-07 PROCEDURE — A9502: CPT

## 2025-08-07 PROCEDURE — 78452 HT MUSCLE IMAGE SPECT MULT: CPT

## 2025-08-07 PROCEDURE — 76376 3D RENDER W/INTRP POSTPROCES: CPT

## 2025-08-07 PROCEDURE — 93356 MYOCRD STRAIN IMG SPCKL TRCK: CPT

## 2025-08-07 PROCEDURE — 93306 TTE W/DOPPLER COMPLETE: CPT

## 2025-08-07 PROCEDURE — 93015 CV STRESS TEST SUPVJ I&R: CPT

## 2025-08-08 ENCOUNTER — APPOINTMENT (OUTPATIENT)
Age: 60
End: 2025-08-08

## 2025-08-14 ENCOUNTER — APPOINTMENT (OUTPATIENT)
Dept: PSYCHIATRY | Facility: CLINIC | Age: 60
End: 2025-08-14
Payer: COMMERCIAL

## 2025-08-14 PROCEDURE — 90837 PSYTX W PT 60 MINUTES: CPT | Mod: 95

## 2025-08-15 ENCOUNTER — APPOINTMENT (OUTPATIENT)
Dept: FAMILY MEDICINE | Facility: CLINIC | Age: 60
End: 2025-08-15

## 2025-08-20 ENCOUNTER — OUTPATIENT (OUTPATIENT)
Dept: OUTPATIENT SERVICES | Facility: HOSPITAL | Age: 60
LOS: 1 days | End: 2025-08-20
Payer: COMMERCIAL

## 2025-08-20 ENCOUNTER — APPOINTMENT (OUTPATIENT)
Dept: ULTRASOUND IMAGING | Facility: CLINIC | Age: 60
End: 2025-08-20
Payer: COMMERCIAL

## 2025-08-20 ENCOUNTER — NON-APPOINTMENT (OUTPATIENT)
Age: 60
End: 2025-08-20

## 2025-08-20 DIAGNOSIS — E05.00 THYROTOXICOSIS WITH DIFFUSE GOITER WITHOUT THYROTOXIC CRISIS OR STORM: ICD-10-CM

## 2025-08-20 DIAGNOSIS — M25.552 PAIN IN LEFT HIP: ICD-10-CM

## 2025-08-20 DIAGNOSIS — E04.1 NONTOXIC SINGLE THYROID NODULE: ICD-10-CM

## 2025-08-20 DIAGNOSIS — R29.898 OTHER SYMPTOMS AND SIGNS INVOLVING THE MUSCULOSKELETAL SYSTEM: ICD-10-CM

## 2025-08-20 PROCEDURE — 76536 US EXAM OF HEAD AND NECK: CPT | Mod: 26

## 2025-08-20 PROCEDURE — 76536 US EXAM OF HEAD AND NECK: CPT

## 2025-08-21 ENCOUNTER — APPOINTMENT (OUTPATIENT)
Dept: PSYCHIATRY | Facility: CLINIC | Age: 60
End: 2025-08-21
Payer: COMMERCIAL

## 2025-08-21 PROCEDURE — 90834 PSYTX W PT 45 MINUTES: CPT | Mod: 95

## 2025-08-23 ENCOUNTER — LABORATORY RESULT (OUTPATIENT)
Age: 60
End: 2025-08-23

## 2025-08-28 ENCOUNTER — APPOINTMENT (OUTPATIENT)
Dept: PSYCHIATRY | Facility: CLINIC | Age: 60
End: 2025-08-28
Payer: COMMERCIAL

## 2025-08-28 PROCEDURE — 90837 PSYTX W PT 60 MINUTES: CPT | Mod: 95

## 2025-08-29 ENCOUNTER — TRANSCRIPTION ENCOUNTER (OUTPATIENT)
Age: 60
End: 2025-08-29

## 2025-09-03 ENCOUNTER — APPOINTMENT (OUTPATIENT)
Dept: GASTROENTEROLOGY | Facility: CLINIC | Age: 60
End: 2025-09-03
Payer: COMMERCIAL

## 2025-09-03 VITALS
OXYGEN SATURATION: 98 % | WEIGHT: 159 LBS | DIASTOLIC BLOOD PRESSURE: 60 MMHG | SYSTOLIC BLOOD PRESSURE: 120 MMHG | HEART RATE: 70 BPM | BODY MASS INDEX: 29.26 KG/M2 | RESPIRATION RATE: 16 BRPM | HEIGHT: 62 IN

## 2025-09-03 DIAGNOSIS — K59.09 OTHER CONSTIPATION: ICD-10-CM

## 2025-09-03 DIAGNOSIS — Z12.11 ENCOUNTER FOR SCREENING FOR MALIGNANT NEOPLASM OF COLON: ICD-10-CM

## 2025-09-03 PROCEDURE — 99214 OFFICE O/P EST MOD 30 MIN: CPT

## 2025-09-03 RX ORDER — LINACLOTIDE 145 UG/1
145 CAPSULE, GELATIN COATED ORAL
Qty: 30 | Refills: 4 | Status: ACTIVE | COMMUNITY
Start: 2025-09-03 | End: 1900-01-01

## 2025-09-04 ENCOUNTER — APPOINTMENT (OUTPATIENT)
Dept: PSYCHIATRY | Facility: CLINIC | Age: 60
End: 2025-09-04
Payer: COMMERCIAL

## 2025-09-04 PROCEDURE — 90837 PSYTX W PT 60 MINUTES: CPT | Mod: 95

## 2025-09-08 ENCOUNTER — APPOINTMENT (OUTPATIENT)
Dept: ORTHOPEDIC SURGERY | Facility: CLINIC | Age: 60
End: 2025-09-08
Payer: COMMERCIAL

## 2025-09-08 DIAGNOSIS — M75.02 ADHESIVE CAPSULITIS OF LEFT SHOULDER: ICD-10-CM

## 2025-09-08 PROCEDURE — 99214 OFFICE O/P EST MOD 30 MIN: CPT

## 2025-09-09 ENCOUNTER — APPOINTMENT (OUTPATIENT)
Dept: HEMATOLOGY ONCOLOGY | Facility: CLINIC | Age: 60
End: 2025-09-09
Payer: COMMERCIAL

## 2025-09-09 VITALS
WEIGHT: 156.64 LBS | HEART RATE: 75 BPM | TEMPERATURE: 98.1 F | OXYGEN SATURATION: 97 % | BODY MASS INDEX: 28.82 KG/M2 | SYSTOLIC BLOOD PRESSURE: 94 MMHG | HEIGHT: 62 IN | DIASTOLIC BLOOD PRESSURE: 63 MMHG

## 2025-09-09 DIAGNOSIS — Z94.84 STEM CELLS TRANSPLANT STATUS: ICD-10-CM

## 2025-09-09 PROCEDURE — 99214 OFFICE O/P EST MOD 30 MIN: CPT

## 2025-09-11 ENCOUNTER — APPOINTMENT (OUTPATIENT)
Dept: PSYCHIATRY | Facility: CLINIC | Age: 60
End: 2025-09-11
Payer: COMMERCIAL

## 2025-09-11 PROCEDURE — 90837 PSYTX W PT 60 MINUTES: CPT | Mod: 95

## 2025-09-16 ENCOUNTER — APPOINTMENT (OUTPATIENT)
Dept: NEUROLOGY | Facility: CLINIC | Age: 60
End: 2025-09-16

## 2025-09-16 VITALS
WEIGHT: 155 LBS | SYSTOLIC BLOOD PRESSURE: 103 MMHG | HEIGHT: 62 IN | OXYGEN SATURATION: 99 % | HEART RATE: 73 BPM | BODY MASS INDEX: 28.52 KG/M2 | DIASTOLIC BLOOD PRESSURE: 67 MMHG

## 2025-09-16 DIAGNOSIS — R29.898 OTHER SYMPTOMS AND SIGNS INVOLVING THE MUSCULOSKELETAL SYSTEM: ICD-10-CM

## 2025-09-18 ENCOUNTER — NON-APPOINTMENT (OUTPATIENT)
Age: 60
End: 2025-09-18

## 2025-09-18 ENCOUNTER — APPOINTMENT (OUTPATIENT)
Dept: PSYCHIATRY | Facility: CLINIC | Age: 60
End: 2025-09-18

## 2025-09-19 ENCOUNTER — APPOINTMENT (OUTPATIENT)
Dept: FAMILY MEDICINE | Facility: CLINIC | Age: 60
End: 2025-09-19
Payer: COMMERCIAL

## 2025-09-19 VITALS
OXYGEN SATURATION: 98 % | WEIGHT: 155 LBS | BODY MASS INDEX: 28.52 KG/M2 | HEIGHT: 62 IN | DIASTOLIC BLOOD PRESSURE: 80 MMHG | SYSTOLIC BLOOD PRESSURE: 110 MMHG | HEART RATE: 78 BPM

## 2025-09-19 DIAGNOSIS — G62.9 POLYNEUROPATHY, UNSPECIFIED: ICD-10-CM

## 2025-09-19 DIAGNOSIS — I49.3 VENTRICULAR PREMATURE DEPOLARIZATION: ICD-10-CM

## 2025-09-19 DIAGNOSIS — Z80.0 FAMILY HISTORY OF MALIGNANT NEOPLASM OF DIGESTIVE ORGANS: ICD-10-CM

## 2025-09-19 DIAGNOSIS — Z00.00 ENCOUNTER FOR GENERAL ADULT MEDICAL EXAMINATION W/OUT ABNORMAL FINDINGS: ICD-10-CM

## 2025-09-19 DIAGNOSIS — R22.1 LOCALIZED SWELLING, MASS AND LUMP, NECK: ICD-10-CM

## 2025-09-19 DIAGNOSIS — R93.89 ABNORMAL FINDINGS ON DIAGNOSTIC IMAGING OF OTHER SPECIFIED BODY STRUCTURES: ICD-10-CM

## 2025-09-19 DIAGNOSIS — N83.209 UNSPECIFIED OVARIAN CYST, UNSPECIFIED SIDE: ICD-10-CM

## 2025-09-19 DIAGNOSIS — D21.9 BENIGN NEOPLASM OF CONNECTIVE AND OTHER SOFT TISSUE, UNSPECIFIED: ICD-10-CM

## 2025-09-19 DIAGNOSIS — R59.0 LOCALIZED ENLARGED LYMPH NODES: ICD-10-CM

## 2025-09-19 DIAGNOSIS — I34.0 NONRHEUMATIC MITRAL (VALVE) INSUFFICIENCY: ICD-10-CM

## 2025-09-19 DIAGNOSIS — F41.9 ANXIETY DISORDER, UNSPECIFIED: ICD-10-CM

## 2025-09-19 DIAGNOSIS — F32.A DEPRESSION, UNSPECIFIED: ICD-10-CM

## 2025-09-19 DIAGNOSIS — R00.2 PALPITATIONS: ICD-10-CM

## 2025-09-19 DIAGNOSIS — K59.09 OTHER CONSTIPATION: ICD-10-CM

## 2025-09-19 DIAGNOSIS — M85.88 OTHER SPECIFIED DISORDERS OF BONE DENSITY AND STRUCTURE, OTHER SITE: ICD-10-CM

## 2025-09-19 DIAGNOSIS — R26.89 OTHER ABNORMALITIES OF GAIT AND MOBILITY: ICD-10-CM

## 2025-09-19 PROCEDURE — G0444 DEPRESSION SCREEN ANNUAL: CPT | Mod: 59

## 2025-09-19 PROCEDURE — 99396 PREV VISIT EST AGE 40-64: CPT

## 2025-09-20 ENCOUNTER — LABORATORY RESULT (OUTPATIENT)
Age: 60
End: 2025-09-20

## 2025-09-20 LAB
ALBUMIN SERPL ELPH-MCNC: 4.6 G/DL
ALP BLD-CCNC: 120 U/L
ALT SERPL-CCNC: 27 U/L
ANION GAP SERPL CALC-SCNC: 12 MMOL/L
AST SERPL-CCNC: 22 U/L
BASOPHILS # BLD AUTO: 0.02 K/UL
BASOPHILS NFR BLD AUTO: 0.4 %
BILIRUB SERPL-MCNC: 0.5 MG/DL
BUN SERPL-MCNC: 16 MG/DL
CALCIUM SERPL-MCNC: 9.9 MG/DL
CHLORIDE SERPL-SCNC: 103 MMOL/L
CHOLEST SERPL-MCNC: 187 MG/DL
CO2 SERPL-SCNC: 24 MMOL/L
CREAT SERPL-MCNC: 0.66 MG/DL
EGFRCR SERPLBLD CKD-EPI 2021: 102 ML/MIN/1.73M2
EOSINOPHIL # BLD AUTO: 0.06 K/UL
EOSINOPHIL NFR BLD AUTO: 1.2 %
FERRITIN SERPL-MCNC: 325 NG/ML
FOLATE SERPL-MCNC: 11.3 NG/ML
GLUCOSE SERPL-MCNC: 110 MG/DL
HCT VFR BLD CALC: 38.1 %
HDLC SERPL-MCNC: 57 MG/DL
HGB BLD-MCNC: 12 G/DL
IMM GRANULOCYTES NFR BLD AUTO: 0.2 %
IRON SATN MFR SERPL: 26 %
IRON SERPL-MCNC: 81 UG/DL
LDH SERPL-CCNC: 153 U/L
LDLC SERPL-MCNC: 111 MG/DL
LYMPHOCYTES # BLD AUTO: 1.34 K/UL
LYMPHOCYTES NFR BLD AUTO: 27.8 %
MAN DIFF?: NORMAL
MCHC RBC-ENTMCNC: 29.1 PG
MCHC RBC-ENTMCNC: 31.5 G/DL
MCV RBC AUTO: 92.5 FL
MONOCYTES # BLD AUTO: 0.34 K/UL
MONOCYTES NFR BLD AUTO: 7.1 %
NEUTROPHILS # BLD AUTO: 3.05 K/UL
NEUTROPHILS NFR BLD AUTO: 63.3 %
NONHDLC SERPL-MCNC: 129 MG/DL
PLATELET # BLD AUTO: 241 K/UL
POTASSIUM SERPL-SCNC: 4.3 MMOL/L
PROT SERPL-MCNC: 7.1 G/DL
RBC # BLD: 4.12 M/UL
RBC # FLD: 12.8 %
SODIUM SERPL-SCNC: 140 MMOL/L
TIBC SERPL-MCNC: 313 UG/DL
TRIGL SERPL-MCNC: 101 MG/DL
UIBC SERPL-MCNC: 233 UG/DL
VIT B12 SERPL-MCNC: 485 PG/ML
WBC # FLD AUTO: 4.82 K/UL

## 2025-09-21 LAB
APPEARANCE: CLEAR
BACTERIA: NEGATIVE /HPF
BILIRUBIN URINE: NEGATIVE
BLOOD URINE: ABNORMAL
CAST: 0 /LPF
COLOR: YELLOW
EPITHELIAL CELLS: 0 /HPF
GLUCOSE QUALITATIVE U: NEGATIVE MG/DL
KETONES URINE: NEGATIVE MG/DL
LEUKOCYTE ESTERASE URINE: NEGATIVE
MICROSCOPIC-UA: NORMAL
NITRITE URINE: NEGATIVE
PH URINE: 6.5
PROTEIN URINE: NEGATIVE MG/DL
RED BLOOD CELLS URINE: 0 /HPF
SPECIFIC GRAVITY URINE: 1.01
UROBILINOGEN URINE: 0.2 MG/DL
WHITE BLOOD CELLS URINE: 0 /HPF

## 2025-09-26 PROBLEM — Z86.39 HISTORY OF THYROID NODULE: Status: RESOLVED | Noted: 2024-09-08 | Resolved: 2025-09-26

## (undated) DEVICE — DRAPE TOWEL BLUE 17" X 24"

## (undated) DEVICE — LIGASURE SM JAW 16.5MM 18CM

## (undated) DEVICE — SUT VICRYL 0 27" CT-1

## (undated) DEVICE — PACK MINOR WITH LAP

## (undated) DEVICE — VESSEL LOOP MINI-BLUE 0.075" X 16"

## (undated) DEVICE — ELCTR EDGE BOVIE INSULATED BLADE TIP 2.75"

## (undated) DEVICE — BLANKET WARMER LOWER ADULT

## (undated) DEVICE — NDL HYPO REGULAR BEVEL 25G X 1.5"

## (undated) DEVICE — SPONGE PEANUT AUTO COUNT

## (undated) DEVICE — GLV 7 PROTEXIS

## (undated) DEVICE — SUT SILK 3-0 30" TIES

## (undated) DEVICE — SUT VICRYL 4-0 27" PS-2 UNDYED

## (undated) DEVICE — SUT MONOCRYL 4-0 27" PS-2 UNDYED

## (undated) DEVICE — SYR CONTROL LUER LOK 10CC

## (undated) DEVICE — DRAPE HALF SHEET 40X57"

## (undated) DEVICE — SUT SILK 0 30" SH

## (undated) DEVICE — WRAP COMPRESSION CALF MED

## (undated) DEVICE — SUT VICRYL 3-0 27" SH UNDYED

## (undated) DEVICE — SUT SILK 2-0 30" TIES

## (undated) DEVICE — GLV 7.5 PROTEXIS

## (undated) DEVICE — DRAPE SPLIT SHEETS 77X108"

## (undated) DEVICE — GOWN XL

## (undated) DEVICE — SUT PDS II 2-0 27" CT-2

## (undated) DEVICE — ELCTR GROUNDING PAD ADULT COVIDIEN

## (undated) DEVICE — DRSG MASTISOL